# Patient Record
Sex: FEMALE | Employment: OTHER | URBAN - METROPOLITAN AREA
[De-identification: names, ages, dates, MRNs, and addresses within clinical notes are randomized per-mention and may not be internally consistent; named-entity substitution may affect disease eponyms.]

---

## 2017-01-03 ENCOUNTER — LAB VISIT (OUTPATIENT)
Dept: LAB | Facility: HOSPITAL | Age: 62
End: 2017-01-03
Attending: INTERNAL MEDICINE
Payer: COMMERCIAL

## 2017-01-03 ENCOUNTER — OFFICE VISIT (OUTPATIENT)
Dept: FAMILY MEDICINE | Facility: CLINIC | Age: 62
End: 2017-01-03
Payer: COMMERCIAL

## 2017-01-03 VITALS
BODY MASS INDEX: 33.76 KG/M2 | TEMPERATURE: 98 F | SYSTOLIC BLOOD PRESSURE: 102 MMHG | HEART RATE: 72 BPM | HEIGHT: 60 IN | DIASTOLIC BLOOD PRESSURE: 70 MMHG | WEIGHT: 171.94 LBS

## 2017-01-03 DIAGNOSIS — R53.83 FATIGUE, UNSPECIFIED TYPE: Primary | ICD-10-CM

## 2017-01-03 DIAGNOSIS — R53.83 FATIGUE, UNSPECIFIED TYPE: ICD-10-CM

## 2017-01-03 DIAGNOSIS — E03.9 HYPOTHYROIDISM, UNSPECIFIED TYPE: ICD-10-CM

## 2017-01-03 LAB
BASOPHILS # BLD AUTO: 0.07 K/UL
BASOPHILS NFR BLD: 1.2 %
DIFFERENTIAL METHOD: NORMAL
EOSINOPHIL # BLD AUTO: 0.2 K/UL
EOSINOPHIL NFR BLD: 2.7 %
ERYTHROCYTE [DISTWIDTH] IN BLOOD BY AUTOMATED COUNT: 12.4 %
HCT VFR BLD AUTO: 40.4 %
HGB BLD-MCNC: 13.6 G/DL
LYMPHOCYTES # BLD AUTO: 1.9 K/UL
LYMPHOCYTES NFR BLD: 34.2 %
MCH RBC QN AUTO: 30.9 PG
MCHC RBC AUTO-ENTMCNC: 33.7 %
MCV RBC AUTO: 92 FL
MONOCYTES # BLD AUTO: 0.5 K/UL
MONOCYTES NFR BLD: 9.3 %
NEUTROPHILS # BLD AUTO: 3 K/UL
NEUTROPHILS NFR BLD: 52.4 %
PLATELET # BLD AUTO: 311 K/UL
PMV BLD AUTO: 11.1 FL
RBC # BLD AUTO: 4.4 M/UL
TSH SERPL DL<=0.005 MIU/L-ACNC: 0.48 UIU/ML
WBC # BLD AUTO: 5.62 K/UL

## 2017-01-03 PROCEDURE — 99999 PR PBB SHADOW E&M-EST. PATIENT-LVL III: CPT | Mod: PBBFAC,,, | Performed by: INTERNAL MEDICINE

## 2017-01-03 PROCEDURE — 1159F MED LIST DOCD IN RCRD: CPT | Mod: S$GLB,,, | Performed by: INTERNAL MEDICINE

## 2017-01-03 PROCEDURE — 36415 COLL VENOUS BLD VENIPUNCTURE: CPT | Mod: PO

## 2017-01-03 PROCEDURE — 84443 ASSAY THYROID STIM HORMONE: CPT

## 2017-01-03 PROCEDURE — 99214 OFFICE O/P EST MOD 30 MIN: CPT | Mod: S$GLB,,, | Performed by: INTERNAL MEDICINE

## 2017-01-03 PROCEDURE — 85025 COMPLETE CBC W/AUTO DIFF WBC: CPT

## 2017-01-03 NOTE — PROGRESS NOTES
Subjective:        Patient ID: Radha Fall is a 61 y.o. female.    Chief Complaint: Thyroid Problem and Fatigue    HPI   Radha Fall presents with c/o fatigue and follow up of hypothyroidism.  Pt reports increased fatigue from baseline starting ~1-2 weeks ago.  There has not been a significant change in her usual routine or lifestyle.  She also reports an episode of CP ~1 week ago (after fatigue started); right sided, sharp, occurred while pt was sitting at work, not associated with any other sx and resolved spontaneously.  Pt's exercise tolerance is stable; no sx with daily activities; gets somewhat winded after walking 30 minutes but this is as per usual.  No CP associated with exertion.    Pt has famhx of heart dz on father's side (father  at 64, had 3v CABG; grandfather w MI in 50s; great grandfather also +CAD).  Pt had stress echo 3 years ago that was normal.    Pt wasn't sure if her sx were related to thyroid so she increased her dose of Synthroid from 125 to 150mcg daily 1 week ago.    Review of Systems  as per HPI      Objective:        Vitals:    17 0834   BP: 102/70   Pulse: 72   Temp: 97.9 °F (36.6 °C)     Physical Exam   Constitutional: She is oriented to person, place, and time. She appears well-developed and well-nourished. No distress.   Cardiovascular: Normal rate, regular rhythm and normal heart sounds.    Pulmonary/Chest: Effort normal and breath sounds normal. No respiratory distress.   Neurological: She is alert and oriented to person, place, and time.   Skin: Skin is warm and dry. No erythema. No pallor.   Vitals reviewed.          Assessment:         1. Fatigue, unspecified type    2. Hypothyroidism, unspecified type              Plan:         Radha was seen today for thyroid problem and fatigue.    Diagnoses and all orders for this visit:    Fatigue, unspecified type  -     CBC auto differential; Future    Hypothyroidism, unspecified type    - Check TSH, CBC today.  Pt has  h/o anemia associated with lupus.  Continue current dose Levothyroxine pending lab results.  - CP atypical for cardiac etiology; other causes of fatigue more likely.  Pt low-intermediate risk due to SLE and famhx but no other RFs like HLD, HTN, DM or tobacco use.  Will hold off on stress testing for now; reconsider if CP recurrent or fatigue persistent despite normal CBC, thyroid.  Pt agreeable to this plan        Follow up pending lab results; pt advised she is due for annual exam and would need to return for fasting labs.

## 2017-01-09 ENCOUNTER — PATIENT MESSAGE (OUTPATIENT)
Dept: FAMILY MEDICINE | Facility: CLINIC | Age: 62
End: 2017-01-09

## 2017-01-09 ENCOUNTER — PATIENT MESSAGE (OUTPATIENT)
Dept: RHEUMATOLOGY | Facility: CLINIC | Age: 62
End: 2017-01-09

## 2017-01-09 DIAGNOSIS — Z12.31 VISIT FOR SCREENING MAMMOGRAM: Primary | ICD-10-CM

## 2017-01-24 ENCOUNTER — INITIAL CONSULT (OUTPATIENT)
Dept: OTOLARYNGOLOGY | Facility: CLINIC | Age: 62
End: 2017-01-24
Payer: COMMERCIAL

## 2017-01-24 ENCOUNTER — HOSPITAL ENCOUNTER (OUTPATIENT)
Dept: RADIOLOGY | Facility: HOSPITAL | Age: 62
Discharge: HOME | End: 2017-01-24
Attending: INTERNAL MEDICINE
Payer: COMMERCIAL

## 2017-01-24 ENCOUNTER — CLINICAL SUPPORT (OUTPATIENT)
Dept: AUDIOLOGY | Facility: CLINIC | Age: 62
End: 2017-01-24
Payer: COMMERCIAL

## 2017-01-24 VITALS
BODY MASS INDEX: 33.89 KG/M2 | DIASTOLIC BLOOD PRESSURE: 81 MMHG | TEMPERATURE: 98 F | HEART RATE: 62 BPM | HEIGHT: 60 IN | SYSTOLIC BLOOD PRESSURE: 125 MMHG | WEIGHT: 172.63 LBS

## 2017-01-24 DIAGNOSIS — J34.9 SINUS DISORDER: ICD-10-CM

## 2017-01-24 DIAGNOSIS — Z12.31 VISIT FOR SCREENING MAMMOGRAM: ICD-10-CM

## 2017-01-24 DIAGNOSIS — H90.6 MIXED HEARING LOSS, BILATERAL: Primary | ICD-10-CM

## 2017-01-24 DIAGNOSIS — Z86.018 HISTORY OF ACOUSTIC NEUROMA: Primary | ICD-10-CM

## 2017-01-24 DIAGNOSIS — Z97.4 WEARS HEARING AID: ICD-10-CM

## 2017-01-24 DIAGNOSIS — H69.93 EUSTACHIAN TUBE DYSFUNCTION, BILATERAL: ICD-10-CM

## 2017-01-24 DIAGNOSIS — H90.3 SENSORINEURAL HEARING LOSS, BILATERAL: ICD-10-CM

## 2017-01-24 PROCEDURE — 92557 COMPREHENSIVE HEARING TEST: CPT | Mod: S$GLB,,, | Performed by: AUDIOLOGIST

## 2017-01-24 PROCEDURE — 77067 SCR MAMMO BI INCL CAD: CPT | Mod: 26,,, | Performed by: RADIOLOGY

## 2017-01-24 PROCEDURE — 99213 OFFICE O/P EST LOW 20 MIN: CPT | Mod: S$GLB,,, | Performed by: OTOLARYNGOLOGY

## 2017-01-24 PROCEDURE — 92567 TYMPANOMETRY: CPT | Mod: 51,S$GLB,, | Performed by: AUDIOLOGIST

## 2017-01-24 PROCEDURE — 99999 PR PBB SHADOW E&M-EST. PATIENT-LVL III: CPT | Mod: PBBFAC,,, | Performed by: OTOLARYNGOLOGY

## 2017-01-24 PROCEDURE — 77067 SCR MAMMO BI INCL CAD: CPT | Mod: TC

## 2017-01-24 PROCEDURE — 99999 PR PBB SHADOW E&M-EST. PATIENT-LVL I: CPT | Mod: PBBFAC,,,

## 2017-01-24 PROCEDURE — 1159F MED LIST DOCD IN RCRD: CPT | Mod: S$GLB,,, | Performed by: OTOLARYNGOLOGY

## 2017-01-24 RX ORDER — MULTIVITAMIN
1 TABLET ORAL
COMMUNITY
Start: 2012-04-20 | End: 2017-02-21 | Stop reason: SDUPTHER

## 2017-01-24 RX ORDER — ESOMEPRAZOLE MAGNESIUM 40 MG/1
1 CAPSULE, DELAYED RELEASE ORAL
COMMUNITY
Start: 2005-01-01 | End: 2017-02-21 | Stop reason: SDUPTHER

## 2017-01-24 RX ORDER — HYDROXYCHLOROQUINE SULFATE 200 MG/1
1 TABLET, FILM COATED ORAL 2 TIMES DAILY
COMMUNITY
Start: 1984-04-01 | End: 2017-06-14 | Stop reason: SDUPTHER

## 2017-01-24 RX ORDER — AMOXICILLIN 500 MG
1 CAPSULE ORAL
COMMUNITY
End: 2017-02-21 | Stop reason: SDUPTHER

## 2017-01-24 RX ORDER — ACETAMINOPHEN 500 MG
1 TABLET ORAL
COMMUNITY
Start: 2016-03-01 | End: 2017-05-15

## 2017-01-24 RX ORDER — CHOLECALCIFEROL (VITAMIN D3) 25 MCG
185 TABLET ORAL
COMMUNITY
End: 2017-05-15

## 2017-01-24 RX ORDER — LEVOTHYROXINE SODIUM 150 UG/1
1 TABLET ORAL
COMMUNITY
End: 2017-02-21

## 2017-01-24 NOTE — MR AVS SNAPSHOT
[facility] - Otorhinolaryngology  [address]  [facility] [address]  Phone: [phone]  Fax: [phone]                  Radha Fall   2017 2:45 PM   Initial consult    Description:  Female : 1955   Provider:  Herrera Solorzano III, MD   Department:  [facility] - Otorhinolaryngology           Diagnoses this Visit        Comments    History of acoustic neuroma    -  Primary     Sensorineural hearing loss, bilateral         Eustachian tube dysfunction, bilateral         Sinus disorder         Wears hearing aid                To Do List           Future Appointments        Provider Department Dept Phone    2017 4:00 PM [facility] MAMMO2 SCREEN [facility] [phone]    2017 8:00 AM LAB, [facility] [phone]    2017 8:00 AM William Rios MD [facility] - Rheumatology [phone]      Goals (5 Years of Data)     None      [facility]     [facility] Care Line -  Assistance  Registered nurses in the [facility] provide clinical advisement, health education, appointment booking, and other advisory services.  Call for this free service at [phone].             Medications           Message regarding Medications     Verify the changes and/or additions to your medication regime listed below are the same as discussed with your clinician today.  If any of these changes or additions are incorrect, please notify your healthcare provider.             Verify that the below list of medications is an accurate representation of the medications you are currently taking.  If none reported, the list may be blank. If incorrect, please contact your healthcare provider. Carry this list with you in case of emergency.           Current Medications     alprazolam (XANAX) 0.25 MG tablet Take 1 tablet (0.25 mg total) by mouth 3 (three) times daily as needed (tinnitus).    aspirin 81 MG Chew Take 81 mg by  mouth once daily.    b complex vitamins tablet Take 1 tablet by mouth 3 (three) times daily.     cholecalciferol, vitamin D3, 5,000 unit Tab Take 1 tablet by mouth.    ergocalciferol (ERGOCALCIFEROL) 50,000 unit Cap Take 1 capsule by mouth  every 7 days    esomeprazole (NEXIUM) 40 MG capsule Take 40 mg by mouth before breakfast.     esomeprazole (NEXIUM) 40 MG capsule Take 1 tablet by mouth.    fish oil-omega-3 fatty acids 300-1,000 mg capsule Take 1 g by mouth 2 (two) times daily.     fish oil-omega-3 fatty acids 300-1,000 mg capsule Take 1 g by mouth.    hydroxychloroquine (PLAQUENIL) 200 mg tablet Take 1 tablet by mouth.    levothyroxine (SYNTHROID) 150 MCG tablet Take 1 tablet by mouth.    multivitamin (THERAGRAN) per tablet Take 1 tablet by mouth before breakfast.    multivitamin (THERAGRAN) per tablet Take 1 tablet by mouth.    PLAQUENIL 200 mg tablet Take 1 tablet by mouth two  times daily    SYNTHROID 125 mcg tablet Take 1 tablet by mouth  every morning    VITAMIN B COMPLEX ORAL Take 1 tablet by mouth.    vitamin D 1000 units Tab Take 185 mg by mouth.           Clinical Reference Information           Vital Signs - Last Recorded  Most recent update: 1/24/2017  2:48 PM by Adam Diego MA    BP Pulse Temp Ht Wt BMI    125/81 (BP Location: Right arm, Patient Position: Sitting, BP Method: Automatic) 62 98 °F (36.7 °C) (Tympanic) 5' (1.524 m) 78.3 kg (172 lb 9.9 oz) 33.71 kg/m2      Blood Pressure          Most Recent Value    BP  125/81      Allergies as of 1/24/2017     Bactrim [Sulfamethoxazole-trimethoprim]      Immunizations Administered on Date of Encounter - 1/24/2017     None      Orders Placed During Today's Visit     Future Labs/Procedures Expected by Expires    MRI Brain W WO Contrast  1/24/2017 1/24/2018      Instructions    MRI brain/IACs ordered re: acoustic neuroma RX with radiation( [place])   Use of Flonase NSS encouraged for rhinitis sx; 1-2 sprays @ lateral nostril once a day   Gentle middle  ear insuffaltion techniques encouragEd; E.T. literatuRE provided  Consider consultation with Dr. MARANDA Myers re: indications for eustachian tube dilation pending course  Consider sinus CT pending course

## 2017-01-24 NOTE — LETTER
January 24, 2017      Kim Cage MD  [address] [facility]  [address]  [facility] [address]           [facility] - Otorhinolaryngology  [address]  [facility] [address]  Phone: [phone]  Fax: [phone]          Patient: Radha Fall   MR Number: [id number]   YOB: 1955   Date of Visit: 1/24/2017       Dear Dr. Kim Cage:    Thank you for referring Radha Fall to me for evaluation. Attached you will find relevant portions of my assessment and plan of care.    If you have questions, please do not hesitate to call me. I look forward to following Radha Fall along with you.    Sincerely,    Herrera Solorzano III, MD    Enclosure  CC:  No Recipients    If you would like to receive this communication electronically, please contact [email or web address] or [phone] to request more information on [employer] Link access.    For providers and/or their staff who would like to refer a patient to [facility], please contact us through our one-stop-shop provider referral line, [facility], at [phone].    If you feel you have received this communication in error or would no longer like to receive these types of communications, please e-mail [email or web address]

## 2017-01-24 NOTE — PROGRESS NOTES
Subjective:       Patient ID: Radha Fall is a 61 y.o. female.    Chief Complaint: No chief complaint on file.    HPI: Ms. 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  She is followed by Dr. Recio of the rheumatology service here.  She is status post a temporal lobe brain biopsy for SLE  She been evaluated here by Dr. Lito Thomas as well as Dr. Lino Mathews in late 2015 for her AD schwannoma pathology ( elucidated by MRI scanning 10/16/15 )as well as her history of bilateral hearing loss.    She was offered surgical resection of the tumor with neurosurgery versus radiation therapy.  She deviation Internet research and decided to be treated at the [facility] with stereotactic radiation.  She was told that she would be deaf in the right ear after several years.    She was more recently diagnosed with possible fluid behind  in her right ear drum by her PCP who referred her here for specialty evaluation related to a sinus disorder or eustachian tube dysfunction.  She indicates postnasal drip symptoms and clear nasal discharge at times.  She does not feel a fluid behind her right or left eardrum presently however.  Her hearing loss fluctuates i.e. it comes and goes and she tends to blame fluid for the problem.  She is here for audiometric testing and elucidation of her middle ear status specifically.    Dr. Gonzalez had examined her 1/3/17 and diagnosed her with fatigue and hypothyroidism.    She has asked my help in scheduling an MRI scan of her brain and IACs in follow-up for her acoustic neuroma treatment as indicated above.  I am happy to do so.  She is wearing a left ear hearing aid as a right ear hearing is poor especially with regard to discrimination there.    PMH: Asthma, thyroid disease, hearing loss, SLE, Crohn's , asthma, hypothyroidism,  polyneuropathy and collagen vascular disease, hepatomegaly, anemia, hearing loss in right ear   PSH: Repair of ruptured fistula 1999; ORIF right arm fracture 2013  Family history: Heart disease, high blood pressure, stroke, thyroid disease, alcoholism, hearing loss, small cell lung cancer  Habits: One half pack of cigarettes per day ×10 years/the patient quit smoking 35 years ago; 2 cups of coffee per day  Occupation: [employer]  Review of Systems   Ears: Positive for hearing loss, ringing in ear, dizziness, taken gentramycin/streptomycin and family history of hearing loss.    Nose:  Positive for nosebleeds and postnasal drip.    Constitutional: Positive for fever, chills and night sweats.    Respiratory:  Positive for asthma.    Gastrointestinal:  Positive for acid reflux.   Other:  Positive for weakness and swollen glands. Negative for rash.           Objective:         Blood pressure 125/81 pulse 62 temperature 98.0 height 5 feet weight 172 pounds  Gen.: Alert and oriented  female in no acute distress; she is a good historian  Physical Exam   Constitutional: She is oriented to person, place, and time. She appears well-developed and well-nourished.   HENT:   Head: Normocephalic.   Right Ear: Tympanic membrane and external ear normal. No drainage. No foreign bodies. No mastoid tenderness. Tympanic membrane is not perforated. No decreased hearing is noted.   Left Ear: Tympanic membrane and external ear normal. No drainage. No foreign bodies. No mastoid tenderness. Tympanic membrane is not perforated. No decreased hearing is noted.   Ears:    Nose: Nose normal. No nasal deformity, septal deviation or nasal septal hematoma. No epistaxis. Right sinus exhibits no maxillary sinus tenderness and no frontal sinus tenderness. Left sinus exhibits no maxillary sinus tenderness and no frontal sinus tenderness.       Mouth/Throat: Uvula is midline, oropharynx is clear and moist and mucous membranes are normal. No oral  lesions. No trismus in the jaw. No uvula swelling. No oropharyngeal exudate or tonsillar abscesses.   Neck: Neck supple. No tracheal deviation present. No thyromegaly present.   Pulmonary/Chest: Effort normal. No stridor.   Lymphadenopathy:     She has no cervical adenopathy.   Neurological: She is alert and oriented to person, place, and time. She displays weakness.   Skin: No rash noted.       Assessment:       1. History of acoustic neuroma    2. Sensorineural hearing loss, bilateral    3. Eustachian tube dysfunction, bilateral    4. Sinus disorder    5. Wears hearing aid AS       Plan:     MRI brain/IACs ordered re: acoustic neuroma Rx with radiation( [facility])   Use of Flonase NSS encouraged for rhinitis sx; 1-2 sprays @ lateral nostril once a day   Gentle middle ear insuffaltion techniques encouraged; E.T. literature provided  Consider consultation with Dr. MARANDA Myers re: indications for eustachian tube dilation pending course  Consider sinus CT pending course

## 2017-01-24 NOTE — PATIENT INSTRUCTIONS
MRI brain/IACs ordered re: acoustic neuroma Rx with radiation( [facility])   Use of Flonase NSS encouraged for rhinitis sx; 1-2 sprays @ lateral nostril once a day   Gentle middle ear insuffaltion techniques encouraged; E.T. literature provided  Consider consultation with Dr. MARANDA Myers re: indications for eustachian tube dilation pending course  Consider sinus CT pending course

## 2017-01-26 PROBLEM — K57.30 PANCOLONIC DIVERTICULOSIS: Status: ACTIVE | Noted: 2017-01-26

## 2017-02-10 ENCOUNTER — HOSPITAL ENCOUNTER (OUTPATIENT)
Dept: RADIOLOGY | Facility: HOSPITAL | Age: 62
Discharge: HOME | End: 2017-02-10
Attending: OTOLARYNGOLOGY
Payer: COMMERCIAL

## 2017-02-10 DIAGNOSIS — H90.3 SENSORINEURAL HEARING LOSS, BILATERAL: ICD-10-CM

## 2017-02-10 DIAGNOSIS — Z86.018 HISTORY OF ACOUSTIC NEUROMA: ICD-10-CM

## 2017-02-10 PROCEDURE — 70553 MRI BRAIN STEM W/O & W/DYE: CPT | Mod: TC

## 2017-02-10 PROCEDURE — 70553 MRI BRAIN STEM W/O & W/DYE: CPT | Mod: 26,,, | Performed by: RADIOLOGY

## 2017-02-10 PROCEDURE — 25500020 PHARM REV CODE 255: Performed by: OTOLARYNGOLOGY

## 2017-02-10 PROCEDURE — A9585 GADOBUTROL INJECTION: HCPCS | Performed by: OTOLARYNGOLOGY

## 2017-02-10 RX ORDER — GADOBUTROL 604.72 MG/ML
8 INJECTION INTRAVENOUS
Status: COMPLETED | OUTPATIENT
Start: 2017-02-10 | End: 2017-02-10

## 2017-02-10 RX ADMIN — GADOBUTROL 8 ML: 604.72 INJECTION INTRAVENOUS at 08:02

## 2017-02-21 ENCOUNTER — OFFICE VISIT (OUTPATIENT)
Dept: RHEUMATOLOGY | Facility: CLINIC | Age: 62
End: 2017-02-21
Payer: COMMERCIAL

## 2017-02-21 ENCOUNTER — PATIENT MESSAGE (OUTPATIENT)
Dept: RHEUMATOLOGY | Facility: CLINIC | Age: 62
End: 2017-02-21

## 2017-02-21 ENCOUNTER — LAB VISIT (OUTPATIENT)
Dept: LAB | Facility: HOSPITAL | Age: 62
End: 2017-02-21
Attending: INTERNAL MEDICINE
Payer: COMMERCIAL

## 2017-02-21 VITALS
HEIGHT: 62 IN | SYSTOLIC BLOOD PRESSURE: 126 MMHG | HEART RATE: 65 BPM | WEIGHT: 173.13 LBS | BODY MASS INDEX: 31.86 KG/M2 | DIASTOLIC BLOOD PRESSURE: 73 MMHG

## 2017-02-21 DIAGNOSIS — R74.8 ALKALINE PHOSPHATASE ELEVATION: ICD-10-CM

## 2017-02-21 DIAGNOSIS — D33.3 RIGHT ACOUSTIC NEUROMA: ICD-10-CM

## 2017-02-21 DIAGNOSIS — H93.11 RIGHT-SIDED TINNITUS: ICD-10-CM

## 2017-02-21 DIAGNOSIS — H91.91 HEARING LOSS OF RIGHT EAR, UNSPECIFIED HEARING LOSS TYPE: ICD-10-CM

## 2017-02-21 DIAGNOSIS — M32.19 OTHER SYSTEMIC LUPUS ERYTHEMATOSUS WITH OTHER ORGAN INVOLVEMENT: ICD-10-CM

## 2017-02-21 DIAGNOSIS — M32.19 SYSTEMIC LUPUS ERYTHEMATOSUS WITH OTHER ORGAN INVOLVEMENT, UNSPECIFIED SLE TYPE: Primary | ICD-10-CM

## 2017-02-21 DIAGNOSIS — M85.80 OSTEOPENIA: ICD-10-CM

## 2017-02-21 DIAGNOSIS — R90.82 WHITE MATTER ABNORMALITY ON MRI OF BRAIN: ICD-10-CM

## 2017-02-21 DIAGNOSIS — E55.9 VITAMIN D DEFICIENCY: ICD-10-CM

## 2017-02-21 DIAGNOSIS — E78.5 HYPERLIPIDEMIA, UNSPECIFIED HYPERLIPIDEMIA TYPE: ICD-10-CM

## 2017-02-21 DIAGNOSIS — M32.9 SLE (SYSTEMIC LUPUS ERYTHEMATOSUS): ICD-10-CM

## 2017-02-21 LAB
25(OH)D3+25(OH)D2 SERPL-MCNC: 28 NG/ML
ALBUMIN SERPL BCP-MCNC: 3.5 G/DL
ALP SERPL-CCNC: 159 U/L
ALT SERPL W/O P-5'-P-CCNC: 17 U/L
ANION GAP SERPL CALC-SCNC: 8 MMOL/L
AST SERPL-CCNC: 21 U/L
BASOPHILS # BLD AUTO: 0.03 K/UL
BASOPHILS NFR BLD: 0.5 %
BILIRUB SERPL-MCNC: 0.4 MG/DL
BUN SERPL-MCNC: 16 MG/DL
C3 SERPL-MCNC: 118 MG/DL
C4 SERPL-MCNC: 32 MG/DL
CALCIUM SERPL-MCNC: 9 MG/DL
CHLORIDE SERPL-SCNC: 106 MMOL/L
CHOLEST/HDLC SERPL: 3.3 {RATIO}
CO2 SERPL-SCNC: 26 MMOL/L
CREAT SERPL-MCNC: 0.8 MG/DL
CRP SERPL-MCNC: 9.1 MG/L
DIFFERENTIAL METHOD: NORMAL
DSDNA AB SER-ACNC: NORMAL [IU]/ML
EOSINOPHIL # BLD AUTO: 0.1 K/UL
EOSINOPHIL NFR BLD: 2.3 %
ERYTHROCYTE [DISTWIDTH] IN BLOOD BY AUTOMATED COUNT: 12.8 %
ERYTHROCYTE [SEDIMENTATION RATE] IN BLOOD BY WESTERGREN METHOD: 34 MM/HR
EST. GFR  (AFRICAN AMERICAN): >60 ML/MIN/1.73 M^2
EST. GFR  (NON AFRICAN AMERICAN): >60 ML/MIN/1.73 M^2
GGT SERPL-CCNC: 19 U/L
GLUCOSE SERPL-MCNC: 78 MG/DL
HCT VFR BLD AUTO: 40.8 %
HDL/CHOLESTEROL RATIO: 30.4 %
HDLC SERPL-MCNC: 168 MG/DL
HDLC SERPL-MCNC: 51 MG/DL
HGB BLD-MCNC: 13.1 G/DL
LDLC SERPL CALC-MCNC: 101.6 MG/DL
LYMPHOCYTES # BLD AUTO: 1.8 K/UL
LYMPHOCYTES NFR BLD: 31.8 %
MCH RBC QN AUTO: 30.7 PG
MCHC RBC AUTO-ENTMCNC: 32.1 %
MCV RBC AUTO: 96 FL
MONOCYTES # BLD AUTO: 0.5 K/UL
MONOCYTES NFR BLD: 8.5 %
NEUTROPHILS # BLD AUTO: 3.2 K/UL
NEUTROPHILS NFR BLD: 56.7 %
NONHDLC SERPL-MCNC: 117 MG/DL
PLATELET # BLD AUTO: 299 K/UL
PMV BLD AUTO: 10.4 FL
POTASSIUM SERPL-SCNC: 4.3 MMOL/L
PROT SERPL-MCNC: 7.4 G/DL
RBC # BLD AUTO: 4.27 M/UL
SODIUM SERPL-SCNC: 140 MMOL/L
TRIGL SERPL-MCNC: 77 MG/DL
WBC # BLD AUTO: 5.56 K/UL

## 2017-02-21 PROCEDURE — 80053 COMPREHEN METABOLIC PANEL: CPT

## 2017-02-21 PROCEDURE — 86160 COMPLEMENT ANTIGEN: CPT | Mod: 59

## 2017-02-21 PROCEDURE — 82977 ASSAY OF GGT: CPT

## 2017-02-21 PROCEDURE — 86160 COMPLEMENT ANTIGEN: CPT

## 2017-02-21 PROCEDURE — 36415 COLL VENOUS BLD VENIPUNCTURE: CPT

## 2017-02-21 PROCEDURE — 82306 VITAMIN D 25 HYDROXY: CPT

## 2017-02-21 PROCEDURE — 86225 DNA ANTIBODY NATIVE: CPT

## 2017-02-21 PROCEDURE — 85651 RBC SED RATE NONAUTOMATED: CPT

## 2017-02-21 PROCEDURE — 80061 LIPID PANEL: CPT

## 2017-02-21 PROCEDURE — 86140 C-REACTIVE PROTEIN: CPT

## 2017-02-21 PROCEDURE — 85025 COMPLETE CBC W/AUTO DIFF WBC: CPT

## 2017-02-21 PROCEDURE — 99999 PR PBB SHADOW E&M-EST. PATIENT-LVL IV: CPT | Mod: PBBFAC,,, | Performed by: INTERNAL MEDICINE

## 2017-02-21 PROCEDURE — 99214 OFFICE O/P EST MOD 30 MIN: CPT | Mod: S$GLB,,, | Performed by: INTERNAL MEDICINE

## 2017-02-21 ASSESSMENT — ROUTINE ASSESSMENT OF PATIENT INDEX DATA (RAPID3)
PSYCHOLOGICAL DISTRESS SCORE: 0
PATIENT GLOBAL ASSESSMENT SCORE: 1.5
FATIGUE SCORE: 3
AM STIFFNESS SCORE: 1, YES
PAIN SCORE: 4
TOTAL RAPID3 SCORE: 2.06
MDHAQ FUNCTION SCORE: .2
WHEN YOU AWAKENED IN THE MORNING OVER THE LAST WEEK, PLEASE INDICATE THE AMOUNT OF TIME IT TAKES UNTIL YOU ARE AS LIMBER AS YOU WILL BE FOR THE DAY: 30 MINS

## 2017-02-21 ASSESSMENT — SYSTEMIC LUPUS ERYTHEMATOSUS DISEASE ACTIVITY INDEX (SLEDAI): TOTAL_SCORE: 0

## 2017-02-21 NOTE — PROGRESS NOTES
"I have personally taken the history and examined the patient and agree with the resident,s note as stated above URI one month or so ago, now with residual cough and mild SCTOT. No antibiotics for this. Completed radiation to acoustic neuroma([facility]) and followed by [facility]. + recurrent allergic rhinitis and OM. Had colonoscopy 2 wks ago Dr. Beau Summers: "colitis" but no Rx; told not Crohn's (?)    Exam: normal. Chest is clear to ausc    Results for VÍCTOR COTA (MRN [id number]) as of 2/21/2017 09:31   Ref. Range 2/2/2016 10:27   Vit D, 25-Hydroxy Latest Ref Range: 30 - 96 ng/mL 28 (L)   Results for VÍCTOR COTA (MRN [id number]) as of 2/21/2017 09:31   Ref. Range 2/21/2017 07:52   WBC Latest Ref Range: 3.90 - 12.70 K/uL 5.56   RBC Latest Ref Range: 4.00 - 5.40 M/uL 4.27   Hemoglobin Latest Ref Range: 12.0 - 16.0 g/dL 13.1   Hematocrit Latest Ref Range: 37.0 - 48.5 % 40.8   MCV Latest Ref Range: 82 - 98 fL 96   MCH Latest Ref Range: 27.0 - 31.0 pg 30.7   MCHC Latest Ref Range: 32.0 - 36.0 % 32.1   RDW Latest Ref Range: 11.5 - 14.5 % 12.8   Platelets Latest Ref Range: 150 - 350 K/uL 299   MPV Latest Ref Range: 9.2 - 12.9 fL 10.4   Gran% Latest Ref Range: 38.0 - 73.0 % 56.7   Gran # Latest Ref Range: 1.8 - 7.7 K/uL 3.2   Lymph% Latest Ref Range: 18.0 - 48.0 % 31.8   Lymph # Latest Ref Range: 1.0 - 4.8 K/uL 1.8   Mono% Latest Ref Range: 4.0 - 15.0 % 8.5   Mono # Latest Ref Range: 0.3 - 1.0 K/uL 0.5   Eosinophil% Latest Ref Range: 0.0 - 8.0 % 2.3   Eos # Latest Ref Range: 0.0 - 0.5 K/uL 0.1   Basophil% Latest Ref Range: 0.0 - 1.9 % 0.5   Baso # Latest Ref Range: 0.00 - 0.20 K/uL 0.03   Sodium Latest Ref Range: 136 - 145 mmol/L 140   Potassium Latest Ref Range: 3.5 - 5.1 mmol/L 4.3   Chloride Latest Ref Range: 95 - 110 mmol/L 106   CO2 Latest Ref Range: 23 - 29 mmol/L 26   Anion Gap Latest Ref Range: 8 - 16 mmol/L 8   BUN, Bld Latest Ref Range: 6 - 20 mg/dL 16   Creatinine Latest Ref Range: 0.5 - " 1.4 mg/dL 0.8   eGFR if non African American Latest Ref Range: >60 mL/min/1.73 m^2 >60.0   eGFR if African American Latest Ref Range: >60 mL/min/1.73 m^2 >60.0   Glucose Latest Ref Range: 70 - 110 mg/dL 78   Calcium Latest Ref Range: 8.7 - 10.5 mg/dL 9.0   Alkaline Phosphatase Latest Ref Range: 55 - 135 U/L 159 (H)   Total Protein Latest Ref Range: 6.0 - 8.4 g/dL 7.4   Albumin Latest Ref Range: 3.5 - 5.2 g/dL 3.5   Total Bilirubin Latest Ref Range: 0.1 - 1.0 mg/dL 0.4   AST Latest Ref Range: 10 - 40 U/L 21   ALT Latest Ref Range: 10 - 44 U/L 17   GGT Latest Ref Range: 8 - 55 U/L 19   CRP Latest Ref Range: 0.0 - 8.2 mg/L 9.1 (H)     Procedure: MRI of the brain and IACs with and without contrast    Technique: Sagittal T1, axial T2, axial flair, axial diffusion and axial T1 post contrast imaging of the brain.  Additional thin section imaging of the IACs was performed using coronal T1, axial 3-D spoiled gradient, and axial and coronal T1 fat sat postcontrast imaging. 8 mL of gadavist contrast was injected intravenously.             Comparison: brain MRI 10/16/2015    Finding: There is small focus of cystic encephalomalacia within the right is again noted posteriorly unchanged prior compatible with remote infarction. There is a few punctate foci of T2 flair signal hyperintensity elsewhere supratentorial white matter which is similar prior may be sequela of chronic ischemic change which would be advanced for age.    There is interval development of a punctate region of enhancement right parasagittal parietal subcortical white matter concerning for sequela of lupus in light of given history. There is no restricted diffusion to suggest acute infarction. No midline shift or mass effect.    IACs: There is continued although increased size extra-axial lesion right cerebellopontine angle cistern extending into the IAC and slight expansion. This measures 1.2 X 2.1 X 1.2 CM AP X TV X CC dimensions respectively previously  measuring 0.9 x 1.5 x 0.8 CM. There is heterogeneous hypoenhancement within the central portion of the cerebellopontine angle cistern component likely related to posttherapy change.      No abnormal enhancement along the left seventh eighth nerve complex.   Impression    MR IAC: Continued enhancing extra-axial right cerebellopontine angle cistern lesion extends into the IAC compatible with vestibular schwannoma. Overall increased in size however now heterogeneous hypoenhancement within the cisternal component suggestive for posttherapy change. Clinical correlation and followup advised.    MRI brain: There is interval development of a punctate focus of enhancement right parasagittal parietal subcortical white matter while overall nonspecific most compatible for sequela of active lupus in light of history. No evidence for diffusion signal abnormality associated with this.    Superimposed remote right basal ganglia infarction. No evidence for acute infarction.    Clinical correlation and followup advised    Please see above for additional details.      Electronically signed by: STACIE LAUREN DO  Date: 02/10/17  Time: 09:44            SLE in remission SLEDAI=0  Pending U/A, ds-DNA and complements , off mycophenolate x 9 months  Osteopenia < NOF FRAX therapy threshold next DXA 11/10/17  Vitamin D deficiency   Hyperlipidemia est 10 year ASCVD risk 2.4%  Chronic intermittent mildly elevated alk phos, bone origin  S/p cranial radiation Dr. [facility] Sept 2016, now followed [facility] group for right acoustic neuroma  New area of abnormality : punctate focus of enhancement right parasagittal parietal subcortical white matter.     Add Vitamin D, lipid panel to today's labs  Ref to Neurology for abnormal cranial MRI finding as above, used to see Dr. Whiteside/ Jordy Louise.  WB NM bone scan to evaluate alk phos of apparent bone origion  To see Dr. Monge for Plaquenil check, due now  Hydroxychloroquine 200mg  twice daily  RTC 3 months with standing lupus labs

## 2017-02-21 NOTE — PROGRESS NOTES
Subjective:       Patient ID: Radha Fall is a 62 y.o. female.    Chief Complaint: No chief complaint on file.    HPI   Pt is a 61 y/o F with a h/o SLE, right acoustic neuroma and hearing loss, and osteopenia who presents today for a f/u visit. She is taking Plaquenil 200 mg BID. She last saw Dr. Monge for a Plaquenil check on 8/8/16 and had a normal exam, and she needs to schedule a f/u with Dr. Monge. Ms. Fall had stereotactic radiation for a right acoustic neuroma at the [facility] in [place] in 9/2016. She is now seeing the Dr. Gotti group on the [facility] for f/u. She had an MRI on 2/10 which showed growth of the neuroma. She is starting therapy tomorrow for balance. She denies any falls and is not requiring an assistive device, but she just feels more unsteady that she used to. She is also seeing ENT for sinus issues. She reports that about 4 weeks ago she had URI symptoms and for the past two weeks, she has had fatigue and morning stiffness lasting 30 minutes. The stiffness recurs at night sometimes also. She has been taking Alleve 1 tablet daily. She denies any joint pain or swelling. She has also been having SOB x 2 weeks which occurs after walking for about 10 minutes, and she is unable to hold a conversation while walking. She denies any chest pain, pleuritic pain, seizures, skin rash, alopecia, oral ulcers, or abdominal pain.    Review of Systems   Constitutional: Positive for fever. Negative for chills and unexpected weight change.   HENT: Negative for mouth sores and trouble swallowing.    Eyes: Negative for pain and redness.   Respiratory: Positive for cough and shortness of breath. Negative for choking.    Cardiovascular: Negative for chest pain.   Gastrointestinal: Negative for abdominal pain, blood in stool, constipation, diarrhea and nausea.   Endocrine: Negative for cold intolerance and heat intolerance.   Genitourinary: Negative for dysuria, genital sores and hematuria.  "  Musculoskeletal: Negative for myalgias.   Skin: Negative for color change, pallor and rash.   Neurological: Positive for headaches. Negative for weakness and numbness.   Hematological: Negative for adenopathy. Does not bruise/bleed easily.   Psychiatric/Behavioral: Negative for dysphoric mood and suicidal ideas.         Objective:     Visit Vitals    /73    Pulse 65    Ht 5' 2.4" (1.585 m)    Wt 78.5 kg (173 lb 1.6 oz)    BMI 31.26 kg/m2        Physical Exam   Constitutional: She is oriented to person, place, and time and well-developed, well-nourished, and in no distress. No distress.   HENT:   Head: Normocephalic and atraumatic.   Eyes: Conjunctivae and EOM are normal. Pupils are equal, round, and reactive to light. Right eye exhibits no discharge. Left eye exhibits no discharge. No scleral icterus.   Neck: Normal range of motion.   Cardiovascular: Normal rate, regular rhythm, normal heart sounds and intact distal pulses.  Exam reveals no gallop and no friction rub.    No murmur heard.  Pulmonary/Chest: Effort normal and breath sounds normal. No respiratory distress. She has no wheezes. She has no rales.   Abdominal: Soft. Bowel sounds are normal. She exhibits no distension. There is no tenderness.       Right Side Rheumatological Exam     Examination finds the shoulder, elbow, wrist, 1st PIP, 2nd PIP, 2nd MCP, 3rd PIP, 3rd MCP, 4th PIP, 4th MCP, 5th PIP and 5th MCP normal.    The patient is tender to palpation of the knee    The patient has an enlarged 1st MCP    Knee Exam   Tenderness Location: lateral joint line    Muscle Strength (0-5 scale):  Neck Flexion:  5  Neck Extension: 5  Deltoid:  5  Biceps: 5/5   Triceps:  5  : 5/5   Iliopsoas: 5  Quadriceps:  5   Distal Lower Extremity: 5    Left Side Rheumatological Exam     Examination finds the shoulder, elbow, wrist, knee, 1st PIP, 2nd PIP, 2nd MCP, 3rd PIP, 3rd MCP, 4th PIP, 4th MCP, 5th PIP and 5th MCP normal.    The patient has an enlarged " 1st MCP.    Muscle Strength (0-5 scale):  Neck Flexion:  5  Neck Extension: 5  Deltoid:  5  Biceps: 5/5   Triceps:  5  :  5/5   Iliopsoas: 5  Quadriceps:  5   Distal Lower Extremity: 5      Neurological: She is alert and oriented to person, place, and time. She has normal reflexes. Gait normal.   Right Maria's Sign:  absent  Left Maria's Sign:  Absent  Reflex Scores:       Tricep reflexes are 2+ on the right side and 2+ on the left side.       Bicep reflexes are 2+ on the right side and 2+ on the left side.       Brachioradialis reflexes are 2+ on the right side and 2+ on the left side.       Patellar reflexes are 2+ on the right side and 2+ on the left side.       Achilles reflexes are 2+ on the right side and 2+ on the left side.  Skin: Skin is warm and dry. No rash noted. She is not diaphoretic. No erythema.     Psychiatric: Mood, memory, affect and judgment normal.   Musculoskeletal: Normal range of motion.           Assessment:       1. Systemic lupus erythematosus with other organ involvement, unspecified SLE type    2. Osteopenia    3. Right acoustic neuroma    4. Hearing loss of right ear, unspecified hearing loss type        TJC=1  SJC=0  SLEDAI=labs pending    Plan:       - See Dr. Monge for Plaquenil check  - Continue hydroxychloroquine 200mg twice daily  - Nuclear medicine bone scan due to elevated alkaline phosphatase  - RTC 3 months with standing lupus labs

## 2017-02-21 NOTE — MR AVS SNAPSHOT
[facility] Rheumatology  [address]  [facility] [address]  Phone: [phone]  Fax: [phone]                  Radha Fall   2017 8:00 AM   Office Visit    Description:  Female : 1955   Provider:  William Rios MD   Department:  [facility] - Rheumatology           Diagnoses this Visit        Comments    Systemic lupus erythematosus with other organ involvement, unspecified SLE type    -  Primary     Osteopenia         Right acoustic neuroma         Hearing loss of right ear, unspecified hearing loss type         Alkaline phosphatase elevation                To Do List           Future Appointments        Provider Department Dept Phone    2017 8:00 AM LAB, [facility] [phone]    2017 8:00 AM William Rios MD [facility] [phone]      Goals (5 Years of Data)     None      Follow-Up and Disposition     Return in about 3 months (around 2017).      [facility]     [facility] Line - 24/7 Assistance  Registered nurses in the [facility] provide clinical advisement, health education, appointment booking, and other advisory services.  Call for this free service at [phone].             Medications           Message regarding Medications     Verify the changes and/or additions to your medication regime listed below are the same as discussed with your clinician today.  If any of these changes or additions are incorrect, please notify your healthcare provider.             Verify that the below list of medications is an accurate representation of the medications you are currently taking.  If none reported, the list may be blank. If incorrect, please contact your healthcare provider. Carry this list with you in case of emergency.           Current Medications     aspirin 81 MG Chew Take 81 mg by mouth once daily.    b complex vitamins tablet Take 1 tablet by mouth 3 (three) times  "daily.     cholecalciferol, vitamin D3, 5,000 unit Tab Take 1 tablet by mouth.    ergocalciferol (ERGOCALCIFEROL) 50,000 unit Cap Take 1 capsule by mouth  every 7 days    esomeprazole (NEXIUM) 40 MG capsule Take 40 mg by mouth before breakfast.     fish oil-omega-3 fatty acids 300-1,000 mg capsule Take 1 g by mouth 2 (two) times daily.     hydroxychloroquine (PLAQUENIL) 200 mg tablet Take 1 tablet by mouth.    multivitamin (THERAGRAN) per tablet Take 1 tablet by mouth before breakfast.    PLAQUENIL 200 mg tablet Take 1 tablet by mouth two  times daily    SYNTHROID 125 mcg tablet Take 1 tablet by mouth  every morning    VITAMIN B COMPLEX ORAL Take 1 tablet by mouth.    vitamin D 1000 units Tab Take 185 mg by mouth.    alprazolam (XANAX) 0.25 MG tablet Take 1 tablet (0.25 mg total) by mouth 3 (three) times daily as needed (tinnitus).           Clinical Reference Information           Your Vitals Were     BP Pulse Height Weight BMI    126/73 65 5' 2.4" (1.585 m) 78.5 kg (173 lb 1.6 oz) 31.26 kg/m2      Blood Pressure          Most Recent Value    BP  126/73      Allergies as of 2/21/2017     Bactrim [Sulfamethoxazole-trimethoprim]      Immunizations Administered on Date of Encounter - 2/21/2017     None      Orders Placed During Today's Visit     Future Labs/Procedures Expected by Expires    NM Bone Scan Whole Body  2/21/2017 2/21/2018      Instructions    - Continue Plaquenil 200 mg twice daily  - Follow up with Dr. Monge (optometry)   - Nuclear medicine bone scan  - RTC 3 months       Language Assistance Services     ATTENTION: Language assistance services are available, free of charge. Please call [phone].      ATENCIÓN: Si habla español, tiene a polanco disposición servicios gratuitos de asistencia lingüística. Llame al [phone].     LEON Ý: N?u b?n nói Ti?ng Vi?t, có các d?ch v? h? tr? ngôn ng? mi?n phí dành cho b?n. G?i s? [phone].         [facility] complies with applicable Federal " civil rights laws and does not discriminate on the basis of race, color, national origin, age, disability, or sex.

## 2017-02-21 NOTE — PATIENT INSTRUCTIONS
- Continue Plaquenil 200 mg twice daily  - Follow up with Dr. Monge (optometry)   - Nuclear medicine bone scan  - RTC 3 months

## 2017-02-28 ENCOUNTER — PATIENT MESSAGE (OUTPATIENT)
Dept: RHEUMATOLOGY | Facility: CLINIC | Age: 62
End: 2017-02-28

## 2017-03-02 ENCOUNTER — HOSPITAL ENCOUNTER (OUTPATIENT)
Dept: RADIOLOGY | Facility: HOSPITAL | Age: 62
Discharge: HOME | End: 2017-03-02
Attending: STUDENT IN AN ORGANIZED HEALTH CARE EDUCATION/TRAINING PROGRAM
Payer: COMMERCIAL

## 2017-03-02 ENCOUNTER — PATIENT MESSAGE (OUTPATIENT)
Dept: FAMILY MEDICINE | Facility: CLINIC | Age: 62
End: 2017-03-02

## 2017-03-02 DIAGNOSIS — R74.8 ALKALINE PHOSPHATASE ELEVATION: ICD-10-CM

## 2017-03-02 DIAGNOSIS — E03.9 HYPOTHYROIDISM, UNSPECIFIED TYPE: Primary | ICD-10-CM

## 2017-03-02 PROCEDURE — A9503 TC99M MEDRONATE: HCPCS

## 2017-03-02 PROCEDURE — 78306 BONE IMAGING WHOLE BODY: CPT | Mod: 26,,, | Performed by: RADIOLOGY

## 2017-03-03 ENCOUNTER — PATIENT MESSAGE (OUTPATIENT)
Dept: RHEUMATOLOGY | Facility: CLINIC | Age: 62
End: 2017-03-03

## 2017-03-06 ENCOUNTER — PATIENT MESSAGE (OUTPATIENT)
Dept: FAMILY MEDICINE | Facility: CLINIC | Age: 62
End: 2017-03-06

## 2017-03-06 ENCOUNTER — LAB VISIT (OUTPATIENT)
Dept: LAB | Facility: HOSPITAL | Age: 62
End: 2017-03-06
Attending: INTERNAL MEDICINE
Payer: COMMERCIAL

## 2017-03-06 DIAGNOSIS — E03.9 HYPOTHYROIDISM, UNSPECIFIED TYPE: ICD-10-CM

## 2017-03-06 LAB — TSH SERPL DL<=0.005 MIU/L-ACNC: 0.77 UIU/ML

## 2017-03-06 PROCEDURE — 36415 COLL VENOUS BLD VENIPUNCTURE: CPT | Mod: PO

## 2017-03-06 PROCEDURE — 84443 ASSAY THYROID STIM HORMONE: CPT

## 2017-03-18 ENCOUNTER — TELEPHONE (OUTPATIENT)
Dept: RHEUMATOLOGY | Facility: HOSPITAL | Age: 62
End: 2017-03-18

## 2017-03-18 DIAGNOSIS — M32.9 SLE (SYSTEMIC LUPUS ERYTHEMATOSUS): ICD-10-CM

## 2017-03-18 RX ORDER — HYDROXYCHLOROQUINE SULFATE 200 MG/1
TABLET ORAL
Qty: 60 TABLET | Refills: 0 | Status: SHIPPED | OUTPATIENT
Start: 2017-03-18 | End: 2017-05-15 | Stop reason: SDUPTHER

## 2017-03-18 NOTE — TELEPHONE ENCOUNTER
Please tell pt she is overdue to see Dr. Monge for her 6 monthly Plaquenil eye evaluation so I can refill . Thanks ROBERTO

## 2017-03-20 ENCOUNTER — TELEPHONE (OUTPATIENT)
Dept: OPTOMETRY | Facility: CLINIC | Age: 62
End: 2017-03-20

## 2017-03-20 ENCOUNTER — TELEPHONE (OUTPATIENT)
Dept: RHEUMATOLOGY | Facility: CLINIC | Age: 62
End: 2017-03-20

## 2017-03-29 ENCOUNTER — PATIENT MESSAGE (OUTPATIENT)
Dept: OTOLARYNGOLOGY | Facility: CLINIC | Age: 62
End: 2017-03-29

## 2017-03-30 ENCOUNTER — CLINICAL SUPPORT (OUTPATIENT)
Dept: OPHTHALMOLOGY | Facility: CLINIC | Age: 62
End: 2017-03-30
Payer: COMMERCIAL

## 2017-03-30 ENCOUNTER — OFFICE VISIT (OUTPATIENT)
Dept: OPTOMETRY | Facility: CLINIC | Age: 62
End: 2017-03-30
Payer: COMMERCIAL

## 2017-03-30 DIAGNOSIS — M32.19 SYSTEMIC LUPUS WITH CENTRAL NERVOUS SYSTEM INVOLVEMENT: ICD-10-CM

## 2017-03-30 DIAGNOSIS — Z79.899 LONG-TERM USE OF PLAQUENIL: Primary | ICD-10-CM

## 2017-03-30 DIAGNOSIS — Z79.899 HIGH RISK MEDICATION USE: ICD-10-CM

## 2017-03-30 PROCEDURE — 99999 PR PBB SHADOW E&M-EST. PATIENT-LVL III: CPT | Mod: PBBFAC,,, | Performed by: OPTOMETRIST

## 2017-03-30 PROCEDURE — 92134 CPTRZ OPH DX IMG PST SGM RTA: CPT | Mod: S$GLB,,, | Performed by: OPTOMETRIST

## 2017-03-30 PROCEDURE — 92014 COMPRE OPH EXAM EST PT 1/>: CPT | Mod: S$GLB,,, | Performed by: OPTOMETRIST

## 2017-03-30 PROCEDURE — 92083 EXTENDED VISUAL FIELD XM: CPT | Mod: S$GLB,,, | Performed by: OPTOMETRIST

## 2017-03-30 NOTE — PROGRESS NOTES
HPI     Pt presents for plaquenil check  Pt states: no changes in vision, occasional dryness, not using any gtts.   Patient denies flashes, floaters, pain and double vision. Pt hx:Acoustic   neuroma   Patient's last dilated exam was:8/8/2016         Last edited by Aurea Cisneros PCT on 3/30/2017  9:44 AM.     ROS     Negative for: Constitutional, Gastrointestinal, Neurological, Skin,   Genitourinary, Musculoskeletal, HENT, Endocrine, Cardiovascular, Eyes,   Respiratory, Psychiatric, Allergic/Imm, Heme/Lymph    Last edited by Meche Monge, OD on 3/30/2017 12:25 PM. (History)        Assessment /Plan     For exam results, see Encounter Report.    Long-term use of Plaquenil  -     OCT - Retina    Systemic lupus with central nervous system involvement            1-2.  All testing normal OU-no retinopathy.  Monitor 6 months.

## 2017-04-17 ENCOUNTER — PATIENT MESSAGE (OUTPATIENT)
Dept: RHEUMATOLOGY | Facility: CLINIC | Age: 62
End: 2017-04-17

## 2017-05-09 DIAGNOSIS — E03.9 HYPOTHYROIDISM, UNSPECIFIED TYPE: ICD-10-CM

## 2017-05-10 RX ORDER — LEVOTHYROXINE SODIUM 125 UG/1
TABLET ORAL
Qty: 30 TABLET | Refills: 5 | Status: SHIPPED | OUTPATIENT
Start: 2017-05-10 | End: 2017-12-28 | Stop reason: SDUPTHER

## 2017-05-11 ENCOUNTER — PATIENT MESSAGE (OUTPATIENT)
Dept: RHEUMATOLOGY | Facility: CLINIC | Age: 62
End: 2017-05-11

## 2017-05-12 ENCOUNTER — LAB VISIT (OUTPATIENT)
Dept: LAB | Facility: HOSPITAL | Age: 62
End: 2017-05-12
Attending: INTERNAL MEDICINE
Payer: COMMERCIAL

## 2017-05-12 DIAGNOSIS — M32.9 SLE (SYSTEMIC LUPUS ERYTHEMATOSUS): ICD-10-CM

## 2017-05-12 LAB
ALBUMIN SERPL BCP-MCNC: 3.7 G/DL
ALP SERPL-CCNC: 135 U/L
ALT SERPL W/O P-5'-P-CCNC: 19 U/L
ANION GAP SERPL CALC-SCNC: 13 MMOL/L
AST SERPL-CCNC: 32 U/L
BASOPHILS # BLD AUTO: 0.04 K/UL
BASOPHILS NFR BLD: 0.5 %
BILIRUB SERPL-MCNC: 0.3 MG/DL
BUN SERPL-MCNC: 9 MG/DL
C3 SERPL-MCNC: 128 MG/DL
C4 SERPL-MCNC: 34 MG/DL
CALCIUM SERPL-MCNC: 9.5 MG/DL
CHLORIDE SERPL-SCNC: 101 MMOL/L
CO2 SERPL-SCNC: 22 MMOL/L
CREAT SERPL-MCNC: 0.9 MG/DL
CRP SERPL-MCNC: 8.6 MG/L
DIFFERENTIAL METHOD: ABNORMAL
EOSINOPHIL # BLD AUTO: 0.1 K/UL
EOSINOPHIL NFR BLD: 1.6 %
ERYTHROCYTE [DISTWIDTH] IN BLOOD BY AUTOMATED COUNT: 12.4 %
EST. GFR  (AFRICAN AMERICAN): >60 ML/MIN/1.73 M^2
EST. GFR  (NON AFRICAN AMERICAN): >60 ML/MIN/1.73 M^2
GLUCOSE SERPL-MCNC: 86 MG/DL
HCT VFR BLD AUTO: 39.9 %
HGB BLD-MCNC: 13 G/DL
LYMPHOCYTES # BLD AUTO: 2.3 K/UL
LYMPHOCYTES NFR BLD: 30.9 %
MCH RBC QN AUTO: 30.7 PG
MCHC RBC AUTO-ENTMCNC: 32.6 %
MCV RBC AUTO: 94 FL
MONOCYTES # BLD AUTO: 0.7 K/UL
MONOCYTES NFR BLD: 9.5 %
NEUTROPHILS # BLD AUTO: 4.3 K/UL
NEUTROPHILS NFR BLD: 57.4 %
PLATELET # BLD AUTO: 381 K/UL
PMV BLD AUTO: 10.6 FL
POTASSIUM SERPL-SCNC: 4.1 MMOL/L
PROT SERPL-MCNC: 7.9 G/DL
RBC # BLD AUTO: 4.24 M/UL
SODIUM SERPL-SCNC: 136 MMOL/L
WBC # BLD AUTO: 7.45 K/UL

## 2017-05-12 PROCEDURE — 36415 COLL VENOUS BLD VENIPUNCTURE: CPT | Mod: PO

## 2017-05-12 PROCEDURE — 85025 COMPLETE CBC W/AUTO DIFF WBC: CPT

## 2017-05-12 PROCEDURE — 86225 DNA ANTIBODY NATIVE: CPT

## 2017-05-12 PROCEDURE — 86140 C-REACTIVE PROTEIN: CPT

## 2017-05-12 PROCEDURE — 86160 COMPLEMENT ANTIGEN: CPT | Mod: 59

## 2017-05-12 PROCEDURE — 85651 RBC SED RATE NONAUTOMATED: CPT

## 2017-05-12 PROCEDURE — 80053 COMPREHEN METABOLIC PANEL: CPT

## 2017-05-12 PROCEDURE — 82306 VITAMIN D 25 HYDROXY: CPT

## 2017-05-12 PROCEDURE — 86160 COMPLEMENT ANTIGEN: CPT

## 2017-05-13 LAB — ERYTHROCYTE [SEDIMENTATION RATE] IN BLOOD BY WESTERGREN METHOD: 42 MM/HR

## 2017-05-15 ENCOUNTER — OFFICE VISIT (OUTPATIENT)
Dept: RHEUMATOLOGY | Facility: CLINIC | Age: 62
End: 2017-05-15
Payer: COMMERCIAL

## 2017-05-15 ENCOUNTER — LAB VISIT (OUTPATIENT)
Dept: LAB | Facility: HOSPITAL | Age: 62
End: 2017-05-15
Attending: INTERNAL MEDICINE
Payer: COMMERCIAL

## 2017-05-15 ENCOUNTER — CLINICAL SUPPORT (OUTPATIENT)
Dept: INFECTIOUS DISEASES | Facility: CLINIC | Age: 62
End: 2017-05-15
Payer: COMMERCIAL

## 2017-05-15 ENCOUNTER — TELEPHONE (OUTPATIENT)
Dept: RHEUMATOLOGY | Facility: CLINIC | Age: 62
End: 2017-05-15

## 2017-05-15 VITALS
BODY MASS INDEX: 30.07 KG/M2 | WEIGHT: 163.38 LBS | HEART RATE: 73 BPM | HEIGHT: 62 IN | SYSTOLIC BLOOD PRESSURE: 112 MMHG | DIASTOLIC BLOOD PRESSURE: 70 MMHG

## 2017-05-15 DIAGNOSIS — R90.89 ABNORMAL FINDING ON MRI OF BRAIN: ICD-10-CM

## 2017-05-15 DIAGNOSIS — H91.91 HEARING LOSS OF RIGHT EAR, UNSPECIFIED HEARING LOSS TYPE: ICD-10-CM

## 2017-05-15 DIAGNOSIS — K50.90 CROHN'S DISEASE WITHOUT COMPLICATION, UNSPECIFIED GASTROINTESTINAL TRACT LOCATION: ICD-10-CM

## 2017-05-15 DIAGNOSIS — E55.9 VITAMIN D DEFICIENCY: Primary | ICD-10-CM

## 2017-05-15 DIAGNOSIS — M85.80 OSTEOPENIA, UNSPECIFIED LOCATION: ICD-10-CM

## 2017-05-15 DIAGNOSIS — M35.9 POLYNEUROPATHY IN COLLAGEN VASCULAR DISEASE: ICD-10-CM

## 2017-05-15 DIAGNOSIS — M32.9 SLE (SYSTEMIC LUPUS ERYTHEMATOSUS): ICD-10-CM

## 2017-05-15 DIAGNOSIS — D33.3 RIGHT ACOUSTIC NEUROMA: ICD-10-CM

## 2017-05-15 DIAGNOSIS — R93.0 ABNORMAL MRI OF HEAD: ICD-10-CM

## 2017-05-15 DIAGNOSIS — G63 POLYNEUROPATHY IN COLLAGEN VASCULAR DISEASE: ICD-10-CM

## 2017-05-15 DIAGNOSIS — M32.8 OTHER FORMS OF SYSTEMIC LUPUS ERYTHEMATOSUS, UNSPECIFIED ORGAN INVOLVEMENT STATUS: Primary | ICD-10-CM

## 2017-05-15 DIAGNOSIS — H93.11 RIGHT-SIDED TINNITUS: ICD-10-CM

## 2017-05-15 LAB
25(OH)D3+25(OH)D2 SERPL-MCNC: 97 NG/ML
BILIRUB UR QL STRIP: NEGATIVE
CLARITY UR REFRACT.AUTO: CLEAR
COLOR UR AUTO: YELLOW
CREAT UR-MCNC: 76 MG/DL
GLUCOSE UR QL STRIP: NEGATIVE
HGB UR QL STRIP: NEGATIVE
KETONES UR QL STRIP: NEGATIVE
LEUKOCYTE ESTERASE UR QL STRIP: NEGATIVE
NITRITE UR QL STRIP: NEGATIVE
PH UR STRIP: 6 [PH] (ref 5–8)
PROT UR QL STRIP: NEGATIVE
PROT UR-MCNC: <7 MG/DL
PROT/CREAT RATIO, UR: NORMAL
SP GR UR STRIP: 1.01 (ref 1–1.03)
URN SPEC COLLECT METH UR: NORMAL
UROBILINOGEN UR STRIP-ACNC: NEGATIVE EU/DL

## 2017-05-15 PROCEDURE — 99999 PR PBB SHADOW E&M-EST. PATIENT-LVL I: CPT | Mod: PBBFAC,,,

## 2017-05-15 PROCEDURE — 1160F RVW MEDS BY RX/DR IN RCRD: CPT | Mod: S$GLB,,, | Performed by: INTERNAL MEDICINE

## 2017-05-15 PROCEDURE — 99214 OFFICE O/P EST MOD 30 MIN: CPT | Mod: S$GLB,,, | Performed by: INTERNAL MEDICINE

## 2017-05-15 PROCEDURE — 90471 IMMUNIZATION ADMIN: CPT | Mod: S$GLB,,, | Performed by: INTERNAL MEDICINE

## 2017-05-15 PROCEDURE — 81003 URINALYSIS AUTO W/O SCOPE: CPT

## 2017-05-15 PROCEDURE — 99999 PR PBB SHADOW E&M-EST. PATIENT-LVL III: CPT | Mod: PBBFAC,,, | Performed by: INTERNAL MEDICINE

## 2017-05-15 PROCEDURE — 82570 ASSAY OF URINE CREATININE: CPT

## 2017-05-15 PROCEDURE — 90736 HZV VACCINE LIVE SUBQ: CPT | Mod: S$GLB,,, | Performed by: INTERNAL MEDICINE

## 2017-05-15 ASSESSMENT — SYSTEMIC LUPUS ERYTHEMATOSUS DISEASE ACTIVITY INDEX (SLEDAI): TOTAL_SCORE: 0

## 2017-05-15 NOTE — MR AVS SNAPSHOT
[facility] - Rheumatology  [address]  [facility] [address]  Phone: [phone]  Fax: [phone]                  Radha Fall   5/15/2017 1:00 PM   Office Visit    Description:  Female : 1955   Provider:  iWlliam Rios MD   Department:  [facility]           Diagnoses this Visit        Comments    Other forms of systemic lupus erythematosus, unspecified organ involvement status    -  Primary     Abnormal MRI of head         Right acoustic neuroma         Hearing loss of right ear, unspecified hearing loss type         Right-sided tinnitus         Osteopenia, unspecified location         Crohn's disease without complication, unspecified gastrointestinal tract location         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         Abnormal finding on MRI of brain                To Do List           Future Appointments        Provider Department Dept Phone    2017 1:00 PM Tristian Gutiérrez MD [facility] [phone]      Goals (5 Years of Data)     None      [facility]     [facility] On Call Nurse Care Line -  Assistance  Unless otherwise directed by your provider, please contact [facility] On-Call, our nurse care line that is available for  assistance.     Registered nurses in the [facility] On Call Center provide: appointment scheduling, clinical advisement, health education, and other advisory services.  Call: [phone] (toll free)               Medications           Message regarding Medications     Verify the changes and/or additions to your medication regime listed below are the same as discussed with your clinician today.  If any of these changes or additions are incorrect, please notify your healthcare provider.        STOP taking these medications     alprazolam (XANAX) 0.25 MG tablet Take 1 tablet (0.25 mg total) by mouth 3 (three) times daily as needed (tinnitus).    cholecalciferol, vitamin D3, 5,000 unit Tab Take 1 tablet by  "mouth.    vitamin D 1000 units Tab Take 185 mg by mouth.           Verify that the below list of medications is an accurate representation of the medications you are currently taking.  If none reported, the list may be blank. If incorrect, please contact your healthcare provider. Carry this list with you in case of emergency.           Current Medications     aspirin 81 MG Chew Take 81 mg by mouth once daily.    b complex vitamins tablet Take 1 tablet by mouth 3 (three) times daily.     ergocalciferol (ERGOCALCIFEROL) 50,000 unit Cap Take 1 capsule by mouth  every 7 days    esomeprazole (NEXIUM) 40 MG capsule Take 40 mg by mouth before breakfast.     fish oil-omega-3 fatty acids 300-1,000 mg capsule Take 1 g by mouth 2 (two) times daily.     hydroxychloroquine (PLAQUENIL) 200 mg tablet Take 1 tablet by mouth 2 (two) times daily.     multivitamin (THERAGRAN) per tablet Take 1 tablet by mouth before breakfast.    SYNTHROID 125 mcg tablet Take 1 tablet by mouth  every morning           Clinical Reference Information           Your Vitals Were     BP Pulse Height Weight BMI    112/70 73 5' 2.4" (1.585 m) 74.1 kg (163 lb 6.4 oz) 29.5 kg/m2      Blood Pressure          Most Recent Value    BP  112/70      Allergies as of 5/15/2017     Bactrim [Sulfamethoxazole-trimethoprim]      Immunizations Administered on Date of Encounter - 5/15/2017     Name Date Dose VIS Date Route    Zoster  Incomplete 0.65 mL 10/6/2009 Subcutaneous      Orders Placed During Today's Visit      Normal Orders This Visit    Ambulatory Referral to Neurology     Zoster Vaccine - Live       Instructions    Zostavax today  Urine Pr/Cr ratio  RTC in 6 mo       Language Assistance Services     ATTENTION: Language assistance services are available, free of charge. Please call [phone].      ATENCIÓN: Si habla miguel, tiene a polanco disposición servicios gratuitos de asistencia lingüística. Llame al [phone].     [facility] Ý: N?u b?n nói Ti?ng Vi?t, có các " d?ch v? h? tr? ngôn ng? mi?n phí dành cho b?n. G?i s? [phone].         [facility] complies with applicable Federal civil rights laws and does not discriminate on the basis of race, color, national origin, age, disability, or sex.

## 2017-05-15 NOTE — PROGRESS NOTES
Subjective:        Patient ID: Radha Fall is a 62 y.o. female.     Chief Complaint: SLE, Jaccoud's arthritis, Acoustic neuroma, Osteopenia     HPI   Pt is a 63 y/o F with a h/o SLE, right acoustic neuroma and hearing loss, and osteopenia who presents today for a f/u visit. She is taking Plaquenil 200 mg BID. Saw Dr. Monge for a Plaquenil check on 3/30/16 and had a normal exam. Eufemia had stereotactic radiation for a right acoustic neuroma at the [facility] in [place] in 9/2016. She is now seeing the Dr. Gotti of Neurology group on the [facility] for f/u. She had an MRI on 2/10 which showed growth of the neuroma. Continuing therapy for balance. Decreased fatigue with minimal morning stiffness. She denies any joint pain or swelling. Continued occasional SOB but much improved. She denies any chest pain, pleuritic pain, seizures, skin rash, alopecia, oral ulcers, or abdominal pain. Went to [place] last week and got abdominal pain N/V now resolved with probiotic.     Answers for HPI/ROS submitted by the patient on 5/13/2017   fever: No  eye redness: No  swollen glands: No  headaches: No  shortness of breath: No  chest pain: No  trouble swallowing: No  heartburn: No  diarrhea: Yes  constipation: No  unexpected weight change: No  genital sore: No  dysuria: No  During the last 3 days, have you had a skin rash?: No  tingling: No  Bruises or bleeds easily: No  cough: Yes    Review of Systems   Constitutional: Negative for fever. Negative for chills and unexpected weight change.   HENT: Negative for mouth sores and trouble swallowing. Positive for hearing loss.  Eyes: Negative for pain and redness.   Respiratory: Negative for cough. Negative for choking.   Cardiovascular: Negative for chest pain.   Gastrointestinal: Negative for abdominal pain, blood in stool, constipation, diarrhea and nausea.   Endocrine: Negative for cold intolerance and heat intolerance.   Genitourinary: Negative for dysuria, genital sores and  hematuria.   Musculoskeletal: Negative for myalgias.   Skin: Negative for color change, pallor and rash.   Neurological: Negative for HA. Negative for weakness and numbness.   Hematological: Negative for adenopathy. Does not bruise/bleed easily.   Psychiatric/Behavioral: Negative for dysphoric mood and suicidal ideas.          Objective:     Vitals:    05/15/17 1313   BP: 112/70   Pulse: 73     Physical Exam   Constitutional: She is oriented to person, place, and time and well-developed, well-nourished, and in no distress. No distress.   HENT:   Head: Normocephalic and atraumatic.   Eyes: Conjunctivae and EOM are normal. Pupils are equal, round, and reactive to light. Right eye exhibits no discharge. Left eye exhibits no discharge. No scleral icterus.   Neck: Normal range of motion.   Cardiovascular: Normal rate, regular rhythm, normal heart sounds and intact distal pulses. Exam reveals no gallop and no friction rub.   No murmur heard.  Pulmonary/Chest: Effort normal and breath sounds normal. No respiratory distress. She has no wheezes. She has no rales.   Abdominal: Soft. Bowel sounds are normal. She exhibits no distension. There is no tenderness.      Right Side Rheumatological Exam   Examination finds the shoulder, elbow, wrist, 1st PIP, 2nd PIP, 2nd MCP, 3rd PIP, 3rd MCP, 4th PIP, 4th MCP, 5th PIP and 5th MCP normal.  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  The patient has an enlarged 1st MCP.  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 She has normal reflexes. Gait normal.   Right Maria's Sign: absent  Left Maria's Sign: Absent  Reflex Scores:  Brachioradialis reflexes are 2+ on the right side and 2+ on the left side.  Patellar reflexes are 2+ on the right side and 2+ on the left side.  Achilles reflexes are 2+ on the right side and 2+ on the left side.  Skin: Skin is warm and dry. No rash noted. She is not diaphoretic. No erythema.     Psychiatric: Mood, memory, affect and judgment normal.   Musculoskeletal: Normal range of motion.        Results for VÍCTOR COTA (MRN [id number]) as of 5/15/2017 13:19   Ref. Range 3/6/2017 09:04 5/12/2017 14:55   WBC Latest Ref Range: 3.90 - 12.70 K/uL  7.45   RBC Latest Ref Range: 4.00 - 5.40 M/uL  4.24   Hemoglobin Latest Ref Range: 12.0 - 16.0 g/dL  13.0   Hematocrit Latest Ref Range: 37.0 - 48.5 %  39.9   MCV Latest Ref Range: 82 - 98 fL  94   MCH Latest Ref Range: 27.0 - 31.0 pg  30.7   MCHC Latest Ref Range: 32.0 - 36.0 %  32.6   RDW Latest Ref Range: 11.5 - 14.5 %  12.4   Platelets Latest Ref Range: 150 - 350 K/uL  381 (H)   MPV Latest Ref Range: 9.2 - 12.9 fL  10.6   Gran% Latest Ref Range: 38.0 - 73.0 %  57.4   Gran # Latest Ref Range: 1.8 - 7.7 K/uL  4.3   Lymph% Latest Ref Range: 18.0 - 48.0 %  30.9   Lymph # Latest Ref Range: 1.0 - 4.8 K/uL  2.3   Mono% Latest Ref Range: 4.0 - 15.0 %  9.5   Mono # Latest Ref Range: 0.3 - 1.0 K/uL  0.7   Eosinophil% Latest Ref Range: 0.0 - 8.0 %  1.6   Eos # Latest Ref Range: 0.0 - 0.5 K/uL  0.1   Basophil% Latest Ref Range: 0.0 - 1.9 %  0.5   Baso # Latest Ref Range: 0.00 - 0.20 K/uL  0.04   Sed Rate Latest Ref Range: 0 - 20 mm/Hr  42 (H)   Sodium Latest Ref  Range: 136 - 145 mmol/L  136   Potassium Latest Ref Range: 3.5 - 5.1 mmol/L  4.1   Chloride Latest Ref Range: 95 - 110 mmol/L  101   CO2 Latest Ref Range: 23 - 29 mmol/L  22 (L)   Anion Gap Latest Ref Range: 8 - 16 mmol/L  13   BUN, Bld Latest Ref Range: 6 - 20 mg/dL  9   Creatinine Latest Ref Range: 0.5 - 1.4 mg/dL  0.9   eGFR if non African American Latest Ref Range: >60 mL/min/1.73 m^2  >60.0   eGFR if African American Latest Ref Range: >60 mL/min/1.73 m^2  >60.0   Glucose Latest Ref Range: 70 - 110 mg/dL  86   Calcium Latest Ref Range: 8.7 - 10.5 mg/dL  9.5   Alkaline Phosphatase Latest Ref Range: 55 - 135 U/L  135   Total Protein Latest Ref Range: 6.0 - 8.4 g/dL  7.9   Albumin Latest Ref Range: 3.5 - 5.2 g/dL  3.7   Total Bilirubin Latest Ref Range: 0.1 - 1.0 mg/dL  0.3   AST Latest Ref Range: 10 - 40 U/L  32   ALT Latest Ref Range: 10 - 44 U/L  19   CRP Latest Ref Range: 0.0 - 8.2 mg/L  8.6 (H)   TSH Latest Ref Range: 0.400 - 4.000 uIU/mL 0.770    Complement (C-3) Latest Ref Range: 50 - 180 mg/dL  128   Complement (C-4) Latest Ref Range: 11 - 44 mg/dL  34   Differential Method Unknown  Automated       Assessment:       1. Systemic lupus erythematosus with other organ involvement, unspecified SLE type    2. Osteopenia    3. Right acoustic neuroma    4. Hearing loss of right ear, unspecified hearing loss type        TJC=0  SJC=0  SLEDAI=0.00     Plan:     - Continue to see Dr. Monge for Plaquenil check q6mo  - Continue hydroxychloroquine 200mg twice daily  - Continue vit D2 weekly  - Vit D labs added on today  - Zostavax  - Urine Pr/Cr ratio to do today  - Continue FU with Dr Gotti of neurology  - RTC 3 months with standing lupus labs     - Last DEXA 7/20/15

## 2017-05-15 NOTE — PROGRESS NOTES
I have personally taken the history and examined the patient and agree with the resident,s note as stated above     Answers for HPI/ROS submitted by the patient on 5/13/2017   fever: No  eye redness: No  swollen glands: No  headaches: No  shortness of breath: No  chest pain: No  trouble swallowing: No  heartburn: No  diarrhea: Yes  constipation: No  unexpected weight change: No  genital sore: No  dysuria: No  During the last 3 days, have you had a skin rash?: No  tingling: No  Bruises or bleeds easily: No  cough: Yes        Results for VÍCTOR COTA (MRN [id number]) as of 5/15/2017 13:20   Ref. Range 5/12/2017 14:55   WBC Latest Ref Range: 3.90 - 12.70 K/uL 7.45   RBC Latest Ref Range: 4.00 - 5.40 M/uL 4.24   Hemoglobin Latest Ref Range: 12.0 - 16.0 g/dL 13.0   Hematocrit Latest Ref Range: 37.0 - 48.5 % 39.9   MCV Latest Ref Range: 82 - 98 fL 94   MCH Latest Ref Range: 27.0 - 31.0 pg 30.7   MCHC Latest Ref Range: 32.0 - 36.0 % 32.6   RDW Latest Ref Range: 11.5 - 14.5 % 12.4   Platelets Latest Ref Range: 150 - 350 K/uL 381 (H)   MPV Latest Ref Range: 9.2 - 12.9 fL 10.6   Gran% Latest Ref Range: 38.0 - 73.0 % 57.4   Gran # Latest Ref Range: 1.8 - 7.7 K/uL 4.3   Lymph% Latest Ref Range: 18.0 - 48.0 % 30.9   Lymph # Latest Ref Range: 1.0 - 4.8 K/uL 2.3   Mono% Latest Ref Range: 4.0 - 15.0 % 9.5   Mono # Latest Ref Range: 0.3 - 1.0 K/uL 0.7   Eosinophil% Latest Ref Range: 0.0 - 8.0 % 1.6   Eos # Latest Ref Range: 0.0 - 0.5 K/uL 0.1   Basophil% Latest Ref Range: 0.0 - 1.9 % 0.5   Baso # Latest Ref Range: 0.00 - 0.20 K/uL 0.04   Sed Rate Latest Ref Range: 0 - 20 mm/Hr 42 (H)   Sodium Latest Ref Range: 136 - 145 mmol/L 136   Potassium Latest Ref Range: 3.5 - 5.1 mmol/L 4.1   Chloride Latest Ref Range: 95 - 110 mmol/L 101   CO2 Latest Ref Range: 23 - 29 mmol/L 22 (L)   Anion Gap Latest Ref Range: 8 - 16 mmol/L 13   BUN, Bld Latest Ref Range: 6 - 20 mg/dL 9   Creatinine Latest Ref Range: 0.5 - 1.4 mg/dL 0.9   eGFR if non   Latest Ref Range: >60 mL/min/1.73 m^2 >60.0   eGFR if African American Latest Ref Range: >60 mL/min/1.73 m^2 >60.0   Glucose Latest Ref Range: 70 - 110 mg/dL 86   Calcium Latest Ref Range: 8.7 - 10.5 mg/dL 9.5   Alkaline Phosphatase Latest Ref Range: 55 - 135 U/L 135   Total Protein Latest Ref Range: 6.0 - 8.4 g/dL 7.9   Albumin Latest Ref Range: 3.5 - 5.2 g/dL 3.7   Total Bilirubin Latest Ref Range: 0.1 - 1.0 mg/dL 0.3   AST Latest Ref Range: 10 - 40 U/L 32   ALT Latest Ref Range: 10 - 44 U/L 19   CRP Latest Ref Range: 0.0 - 8.2 mg/L 8.6 (H)   Complement (C-3) Latest Ref Range: 50 - 180 mg/dL 128   Complement (C-4) Latest Ref Range: 11 - 44 mg/dL 34   Differential Method Unknown Automated     Results for VÍCTOR COTA (MRN [id number]) as of 5/15/2017 13:20   Ref. Range 2/21/2017 07:52   Vit D, 25-Hydroxy Latest Ref Range: 30 - 96 ng/mL 28 (L)   Results for VÍCTOR COTA (MRN [id number]) as of 5/15/2017 13:20   Ref. Range 2/21/2017 07:52   Cholesterol Latest Ref Range: 120 - 199 mg/dL 168   HDL Latest Ref Range: 40 - 75 mg/dL 51   LDL Cholesterol Latest Ref Range: 63.0 - 159.0 mg/dL 101.6   Total Cholesterol/HDL Ratio Latest Ref Range: 2.0 - 5.0  3.3   Triglycerides Latest Ref Range: 30 - 150 mg/dL 77         Est 10 year ASCVD: 2.3% SLE x2 = 4.6% < statin threshold      Procedure: MRI of the brain and IACs with and without contrast    Technique: Sagittal T1, axial T2, axial flair, axial diffusion and axial T1 post contrast imaging of the brain.  Additional thin section imaging of the IACs was performed using coronal T1, axial 3-D spoiled gradient, and axial and coronal T1 fat sat postcontrast imaging. 8 mL of gadavist contrast was injected intravenously.             Comparison: brain MRI 10/16/2015    Finding: There is small focus of cystic encephalomalacia within the right is again noted posteriorly unchanged prior compatible with remote infarction. There is a few punctate foci of T2 flair signal  hyperintensity elsewhere supratentorial white matter which is similar prior may be sequela of chronic ischemic change which would be advanced for age.    There is interval development of a punctate region of enhancement right parasagittal parietal subcortical white matter concerning for sequela of lupus in light of given history. There is no restricted diffusion to suggest acute infarction. No midline shift or mass effect.    IACs: There is continued although increased size extra-axial lesion right cerebellopontine angle cistern extending into the IAC and slight expansion. This measures 1.2 X 2.1 X 1.2 CM AP X TV X CC dimensions respectively previously measuring 0.9 x 1.5 x 0.8 CM. There is heterogeneous hypoenhancement within the central portion of the cerebellopontine angle cistern component likely related to posttherapy change.      No abnormal enhancement along the left seventh eighth nerve complex.   Impression    MR IAC: Continued enhancing extra-axial right cerebellopontine angle cistern lesion extends into the IAC compatible with vestibular schwannoma. Overall increased in size however now heterogeneous hypoenhancement within the cisternal component suggestive for posttherapy change. Clinical correlation and followup advised.    MRI brain: There is interval development of a punctate focus of enhancement right parasagittal parietal subcortical white matter while overall nonspecific most compatible for sequela of active lupus in light of history. No evidence for diffusion signal abnormality associated with this.    Superimposed remote right basal ganglia infarction. No evidence for acute infarction.    Clinical correlation and followup advised    Please see above for additional details.      Electronically signed by: STACIE LAUREN DO  Date: 02/10/17  Time: 09:44      The nuclear medicine whole body bone scan shows no evidence of metatstatic disease, rather degenerative changes. No bone disease to correspond  with the mildly elevated alk phos. RJQ   External Result Report   External Result Report   Narrative   Findings: Patient was administered 20 mCi of technetium 99m labeled MDP intravenously.    There is degenerative activity in the shoulders, elbows, wrists, hands, knees, ankles, feet, and L-spine.  Renal, bladder, and soft tissue activity is unremarkable.  There is no definite evidence of metastatic disease.  There is hyperostosis of the skull.   Impression    Degenerative change but no evidence of metastatic disease.      Electronically signed by: ADY CACERES  Date: 03/02/17  Time: 11:28     Encounter   View Encounter      Reviewed By   Dimitri Small MD on 5/8/2017 12:07 PM   Mark Johnson MD on 3/2/2017  1:34 PM                   SLE in remission SLEDAI=0 pending U/A, ds-DNA  , off mycophenolate x 1 year  Osteopenia < NOF FRAX therapy threshold next DXA 11/10/17  Vitamin D insufficiency  Hyperlipidemia est 10 year ASCVD risk 2.3%  Chronic intermittent mildly elevated alk phos, bone origin no metastatic disease on WB NM bone scan  S/p cranial radiation [facility] 2016, now followed [facility] for right acoustic neuroma  New area of abnormality : punctate focus of enhancement right parasagittal parietal subcortical white matter on brain MRI Juan ordered by Dr. Ross in [facility]. Reviewed by Dr. Gotti, he was not concerned.       Add Vitamin D,  to today's labs  *Zostavax today  She is following with Dr. Ortez and having her brain MRI from 2/10/17 forwarded to [facility]  Hydroxychloroquine 200mg twice daily saw Dr. Monge 3/30/17  Vitamin D2 50,000 units once a week  She will be in [place] for 4 months Give Dr. Jane Eisenberg in [place](but can't find in [facility]) e-mailed. Gave name of Dr. Maynak Quick in [place] and contact number instead through [facility].   RTC 6 months with standing lupus labs

## 2017-05-15 NOTE — TELEPHONE ENCOUNTER
Gwen, please send pt orders to have vitamin D checked in [place] in 3 months. No vitamin D by Rx or OTC until then.  Thanks. ROBERTO

## 2017-05-16 ENCOUNTER — PATIENT MESSAGE (OUTPATIENT)
Dept: RHEUMATOLOGY | Facility: CLINIC | Age: 62
End: 2017-05-16

## 2017-05-16 LAB — DSDNA AB SER-ACNC: NORMAL [IU]/ML

## 2017-06-14 DIAGNOSIS — M32.8 OTHER FORMS OF SYSTEMIC LUPUS ERYTHEMATOSUS: Primary | ICD-10-CM

## 2017-06-14 RX ORDER — HYDROXYCHLOROQUINE SULFATE 200 MG/1
TABLET ORAL
Qty: 180 TABLET | Refills: 0 | Status: SHIPPED | OUTPATIENT
Start: 2017-06-14 | End: 2017-08-30 | Stop reason: SDUPTHER

## 2017-06-29 ENCOUNTER — PATIENT MESSAGE (OUTPATIENT)
Dept: OPTOMETRY | Facility: CLINIC | Age: 62
End: 2017-06-29

## 2017-06-30 ENCOUNTER — PATIENT MESSAGE (OUTPATIENT)
Dept: RHEUMATOLOGY | Facility: CLINIC | Age: 62
End: 2017-06-30

## 2017-07-04 ENCOUNTER — PATIENT MESSAGE (OUTPATIENT)
Dept: RHEUMATOLOGY | Facility: CLINIC | Age: 62
End: 2017-07-04

## 2017-07-20 ENCOUNTER — PATIENT MESSAGE (OUTPATIENT)
Dept: OPTOMETRY | Facility: CLINIC | Age: 62
End: 2017-07-20

## 2017-08-10 ENCOUNTER — OFFICE VISIT (OUTPATIENT)
Dept: OPTOMETRY | Facility: CLINIC | Age: 62
End: 2017-08-10
Attending: OPTOMETRIST
Payer: COMMERCIAL

## 2017-08-10 ENCOUNTER — CLINICAL SUPPORT (OUTPATIENT)
Dept: OPHTHALMOLOGY | Facility: CLINIC | Age: 62
End: 2017-08-10
Attending: OPTOMETRIST
Payer: COMMERCIAL

## 2017-08-10 ENCOUNTER — OFFICE VISIT (OUTPATIENT)
Dept: OPTOMETRY | Facility: CLINIC | Age: 62
End: 2017-08-10
Attending: OPTOMETRIST

## 2017-08-10 DIAGNOSIS — H52.4 MYOPIA WITH PRESBYOPIA, BILATERAL: ICD-10-CM

## 2017-08-10 DIAGNOSIS — H52.4 MYOPIA WITH PRESBYOPIA, BILATERAL: Primary | ICD-10-CM

## 2017-08-10 DIAGNOSIS — Z79.899 LONG-TERM USE OF PLAQUENIL: ICD-10-CM

## 2017-08-10 DIAGNOSIS — M32.19 SYSTEMIC LUPUS WITH CENTRAL NERVOUS SYSTEM INVOLVEMENT: ICD-10-CM

## 2017-08-10 DIAGNOSIS — H52.13 MYOPIA WITH PRESBYOPIA, BILATERAL: Primary | ICD-10-CM

## 2017-08-10 DIAGNOSIS — H25.13 SENILE NUCLEAR SCLEROSIS, BILATERAL: ICD-10-CM

## 2017-08-10 DIAGNOSIS — Z79.899 LONG-TERM USE OF PLAQUENIL: Primary | ICD-10-CM

## 2017-08-10 DIAGNOSIS — H52.13 MYOPIA WITH PRESBYOPIA, BILATERAL: ICD-10-CM

## 2017-08-10 PROCEDURE — 92134 CPTRZ OPH DX IMG PST SGM RTA: CPT | Mod: S$GLB,,, | Performed by: OPTOMETRIST

## 2017-08-10 PROCEDURE — 92310 CONTACT LENS FITTING OU: CPT | Mod: S$GLB,,, | Performed by: OPTOMETRIST

## 2017-08-10 PROCEDURE — 92014 COMPRE OPH EXAM EST PT 1/>: CPT | Mod: S$GLB,,, | Performed by: OPTOMETRIST

## 2017-08-10 PROCEDURE — 99499 UNLISTED E&M SERVICE: CPT | Mod: S$GLB,,, | Performed by: OPTOMETRIST

## 2017-08-10 PROCEDURE — 99999 PR PBB SHADOW E&M-EST. PATIENT-LVL II: CPT | Mod: PBBFAC,,, | Performed by: OPTOMETRIST

## 2017-08-10 PROCEDURE — 92083 EXTENDED VISUAL FIELD XM: CPT | Mod: S$GLB,,, | Performed by: OPTOMETRIST

## 2017-08-10 NOTE — PROGRESS NOTES
Assessment /Plan     For exam results, see Encounter Report.    Myopia with presbyopia, bilateral          1.  See note with same date.

## 2017-08-10 NOTE — PROGRESS NOTES
HPI     Concerns About Ocular Health    Additional comments: Plaquenil therapy            Comments   Patient's last dilated exam was: 3/30/2017  Pt here for plaquenil check, hvf and oct. No changes in vision, trouble   reading small print with her contacts in, needs them updated. Patient   denies flashes, floaters, pain and double vision. Not using any gtts          Last edited by Aurea Cisneros, PCT on 8/10/2017 10:21 AM.   (History)            Assessment /Plan     For exam results, see Encounter Report.    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  All testing normal OU--no retinopathy.  Monitor 6 months.  3.  Early-monitor.  4.  Bifocal and contact lens rx given.

## 2017-08-14 ENCOUNTER — LAB VISIT (OUTPATIENT)
Dept: LAB | Facility: HOSPITAL | Age: 62
End: 2017-08-14
Attending: INTERNAL MEDICINE
Payer: COMMERCIAL

## 2017-08-14 DIAGNOSIS — E55.9 VITAMIN D DEFICIENCY: ICD-10-CM

## 2017-08-14 LAB — 25(OH)D3+25(OH)D2 SERPL-MCNC: 28 NG/ML

## 2017-08-14 PROCEDURE — 82306 VITAMIN D 25 HYDROXY: CPT

## 2017-08-14 PROCEDURE — 36415 COLL VENOUS BLD VENIPUNCTURE: CPT | Mod: PO

## 2017-08-15 ENCOUNTER — PATIENT MESSAGE (OUTPATIENT)
Dept: RHEUMATOLOGY | Facility: CLINIC | Age: 62
End: 2017-08-15

## 2017-08-30 DIAGNOSIS — M32.8 OTHER FORMS OF SYSTEMIC LUPUS ERYTHEMATOSUS: ICD-10-CM

## 2017-08-30 RX ORDER — HYDROXYCHLOROQUINE SULFATE 200 MG/1
TABLET ORAL
Qty: 180 TABLET | Refills: 1 | Status: SHIPPED | OUTPATIENT
Start: 2017-08-30 | End: 2017-12-22 | Stop reason: SDUPTHER

## 2017-09-07 ENCOUNTER — OFFICE VISIT (OUTPATIENT)
Dept: FAMILY MEDICINE | Facility: CLINIC | Age: 62
End: 2017-09-07
Payer: COMMERCIAL

## 2017-09-07 ENCOUNTER — TELEPHONE (OUTPATIENT)
Dept: FAMILY MEDICINE | Facility: CLINIC | Age: 62
End: 2017-09-07

## 2017-09-07 VITALS
BODY MASS INDEX: 33.34 KG/M2 | HEART RATE: 72 BPM | TEMPERATURE: 98 F | SYSTOLIC BLOOD PRESSURE: 92 MMHG | HEIGHT: 59 IN | WEIGHT: 165.38 LBS | DIASTOLIC BLOOD PRESSURE: 60 MMHG

## 2017-09-07 DIAGNOSIS — H93.11 RIGHT-SIDED TINNITUS: Primary | ICD-10-CM

## 2017-09-07 DIAGNOSIS — D33.3 RIGHT ACOUSTIC NEUROMA: ICD-10-CM

## 2017-09-07 PROCEDURE — 99213 OFFICE O/P EST LOW 20 MIN: CPT | Mod: S$GLB,,, | Performed by: INTERNAL MEDICINE

## 2017-09-07 PROCEDURE — 3008F BODY MASS INDEX DOCD: CPT | Mod: S$GLB,,, | Performed by: INTERNAL MEDICINE

## 2017-09-07 PROCEDURE — 99999 PR PBB SHADOW E&M-EST. PATIENT-LVL III: CPT | Mod: PBBFAC,,, | Performed by: INTERNAL MEDICINE

## 2017-09-07 RX ORDER — PSEUDOEPHEDRINE HYDROCHLORIDE 60 MG/1
60 TABLET ORAL EVERY 6 HOURS PRN
Qty: 30 TABLET | Refills: 3 | Status: SHIPPED | OUTPATIENT
Start: 2017-09-07 | End: 2017-11-27

## 2017-09-07 RX ORDER — ALPRAZOLAM 0.25 MG/1
0.25 TABLET ORAL EVERY 8 HOURS PRN
Qty: 30 TABLET | Refills: 0 | Status: SHIPPED | OUTPATIENT
Start: 2017-09-07 | End: 2018-01-21 | Stop reason: SDUPTHER

## 2017-09-07 RX ORDER — PSEUDOEPHEDRINE HYDROCHLORIDE 60 MG/1
60 TABLET ORAL EVERY 6 HOURS PRN
Qty: 30 TABLET | Refills: 3 | Status: SHIPPED | OUTPATIENT
Start: 2017-09-07 | End: 2017-09-07 | Stop reason: ALTCHOICE

## 2017-09-07 NOTE — TELEPHONE ENCOUNTER
Scheduled patient with  today for ear congestion. Informed patient [facility] does not have flu shots in stock at the moment.

## 2017-09-07 NOTE — PROGRESS NOTES
Subjective:        Patient ID: Radha Fall is a 62 y.o. female.    Chief Complaint: Tinnitus (right ear)    HPI   Radha Fall presents with c/o ocean sounds in her right ear.  This has happened in the past.  Current episode started a few weeks ago when pt was in [place] and drove through a very smoky area.  There is no associated pain, change in hearing, ear drainage.  Pt notes increased HAs in the R temple.  She has tried a friend's Sudogest and this has helped.  When she blows her nose she hears a cracking sound and her ears clear momentarily.    No sx in the L ear.  Pt has a hx of an acoustic neuroma on the right.  She has a f/u MRI scheduled next week.    Review of Systems  as per HPI      Objective:        Vitals:    09/07/17 1602   BP: 92/60   Pulse: 72   Temp: 97.8 °F (36.6 °C)     Physical Exam   Constitutional: She is oriented to person, place, and time. She appears well-developed and well-nourished. No distress.   HENT:   Head: Normocephalic and atraumatic.   Right Ear: External ear normal.   Left Ear: External ear normal.   - b/l ear canals clear, TMs normal color, no significant middle ear effusion appreciated   Neurological: She is alert and oriented to person, place, and time.   Psychiatric: She has a normal mood and affect. Her behavior is normal. Judgment and thought content normal.   Vitals reviewed.          Assessment:         1. Right-sided tinnitus    2. Right acoustic neuroma              Plan:         Radha was seen today for tinnitus.    Diagnoses and all orders for this visit:    Right-sided tinnitus  -     pseudoephedrine (SUDAFED) 60 MG tablet; Take 1 tablet (60 mg total) by mouth every 6 (six) hours as needed for Congestion.  -     alprazolam (XANAX) 0.25 MG tablet; Take 1 tablet (0.25 mg total) by mouth every 8 (eight) hours as needed (acoustic neuroma, tinnitus).    Right acoustic neuroma  -     alprazolam (XANAX) 0.25 MG tablet; Take 1 tablet (0.25 mg total) by mouth every 8  (eight) hours as needed (acoustic neuroma, tinnitus).    - Recommend OTC Flonase BID.  She can also try OTC antihistamines for allergy sx and congestion.  - Recommend sudogest PRN for severe congestion, not for regular daily use  - Refill Xanax for severe tinnitus

## 2017-09-14 ENCOUNTER — PATIENT MESSAGE (OUTPATIENT)
Dept: FAMILY MEDICINE | Facility: CLINIC | Age: 62
End: 2017-09-14

## 2017-09-19 ENCOUNTER — CLINICAL SUPPORT (OUTPATIENT)
Dept: FAMILY MEDICINE | Facility: CLINIC | Age: 62
End: 2017-09-19
Payer: COMMERCIAL

## 2017-09-19 DIAGNOSIS — Z23 NEED FOR PROPHYLACTIC VACCINATION AND INOCULATION AGAINST INFLUENZA: Primary | ICD-10-CM

## 2017-09-19 PROCEDURE — 90471 IMMUNIZATION ADMIN: CPT | Mod: S$GLB,,, | Performed by: INTERNAL MEDICINE

## 2017-09-19 PROCEDURE — 90686 IIV4 VACC NO PRSV 0.5 ML IM: CPT | Mod: S$GLB,,, | Performed by: INTERNAL MEDICINE

## 2017-09-19 PROCEDURE — 99999 PR PBB SHADOW E&M-EST. PATIENT-LVL I: CPT | Mod: PBBFAC,,,

## 2017-09-19 NOTE — PROGRESS NOTES
Two patient identifiers verified.  Allergies reviewed.  Flu vaccine given IM administered to right deltoid per MD order.  Patient tolerated injection well; no redness, bleeding, or bruising noted to injection site.  Patient instructed to remain in clinic setting for 15 minutes.  Verbalizes understanding.  Flu consent signed by patient. Flu vaccine administered.

## 2017-09-24 ENCOUNTER — PATIENT MESSAGE (OUTPATIENT)
Dept: FAMILY MEDICINE | Facility: CLINIC | Age: 62
End: 2017-09-24

## 2017-09-24 DIAGNOSIS — H91.90 DECREASED HEARING, UNSPECIFIED LATERALITY: Primary | ICD-10-CM

## 2017-09-25 NOTE — TELEPHONE ENCOUNTER
Per pt's message, she was prescribed 10 days of Prednisone for decreased hearing starting 9/22/17.  Outside MRI shows right acoustic neuroma stable.

## 2017-11-11 ENCOUNTER — OFFICE VISIT (OUTPATIENT)
Dept: URGENT CARE | Facility: CLINIC | Age: 62
End: 2017-11-11
Payer: COMMERCIAL

## 2017-11-11 VITALS
BODY MASS INDEX: 31.8 KG/M2 | HEIGHT: 60 IN | DIASTOLIC BLOOD PRESSURE: 82 MMHG | WEIGHT: 162 LBS | RESPIRATION RATE: 18 BRPM | OXYGEN SATURATION: 95 % | SYSTOLIC BLOOD PRESSURE: 135 MMHG | TEMPERATURE: 98 F | HEART RATE: 70 BPM

## 2017-11-11 DIAGNOSIS — J02.9 SORE THROAT: Primary | ICD-10-CM

## 2017-11-11 DIAGNOSIS — K12.0 APHTHOUS ULCER OF TONGUE: ICD-10-CM

## 2017-11-11 DIAGNOSIS — J32.9 SINUSITIS, UNSPECIFIED CHRONICITY, UNSPECIFIED LOCATION: ICD-10-CM

## 2017-11-11 LAB
CTP QC/QA: YES
S PYO RRNA THROAT QL PROBE: NEGATIVE

## 2017-11-11 PROCEDURE — 87880 STREP A ASSAY W/OPTIC: CPT | Mod: QW,S$GLB,, | Performed by: EMERGENCY MEDICINE

## 2017-11-11 PROCEDURE — 99214 OFFICE O/P EST MOD 30 MIN: CPT | Mod: 25,S$GLB,, | Performed by: EMERGENCY MEDICINE

## 2017-11-11 PROCEDURE — 96372 THER/PROPH/DIAG INJ SC/IM: CPT | Mod: S$GLB,,, | Performed by: EMERGENCY MEDICINE

## 2017-11-11 RX ORDER — AMOXICILLIN 875 MG/1
875 TABLET, FILM COATED ORAL 2 TIMES DAILY
Qty: 20 TABLET | Refills: 0 | Status: SHIPPED | OUTPATIENT
Start: 2017-11-11 | End: 2017-11-21

## 2017-11-11 RX ORDER — BETAMETHASONE SODIUM PHOSPHATE AND BETAMETHASONE ACETATE 3; 3 MG/ML; MG/ML
9 INJECTION, SUSPENSION INTRA-ARTICULAR; INTRALESIONAL; INTRAMUSCULAR; SOFT TISSUE ONCE
Status: COMPLETED | OUTPATIENT
Start: 2017-11-11 | End: 2017-11-11

## 2017-11-11 RX ADMIN — BETAMETHASONE SODIUM PHOSPHATE AND BETAMETHASONE ACETATE 9 MG: 3; 3 INJECTION, SUSPENSION INTRA-ARTICULAR; INTRALESIONAL; INTRAMUSCULAR; SOFT TISSUE at 01:11

## 2017-11-11 NOTE — PATIENT INSTRUCTIONS
Please return here or go to the Emergency Department for any concerns or worsening of condition    Orabase-B for tongue ulcers    Start taking antibiotics if not improved in 3 days    Zyrtec, Claritin, or Allegra OTC as directed for the next 7 days  Flonase OTC as directed for the next 7 days  Salt Water Nasal Spray OTC 3x/day for the next 7 days      Follow up with Primary Care or ENT if not improved in 7-10 Days:  [id number]      Sinusitis (No Antibiotics)    The sinuses are air-filled spaces within the bones of the face. They connect to the inside of the nose. Sinusitis is an inflammation of the tissue lining the sinus cavity. Sinus inflammation can occur during a cold. It can also be due to allergies to pollens and other particles in the air. It can cause symptoms such as sinus congestion, headache, sore throat, facial swelling and fullness. It may also cause a low-grade fever. No infection is present, and no antibiotic treatment is needed.  Home care  · Drink plenty of water, hot tea, and other liquids. This may help thin mucus. It also may promote sinus drainage.  · Heat may help soothe painful areas of the face. Use a towel soaked in hot water. Or,  the shower and direct the hot spray onto your face. Using a vaporizer along with a menthol rub at night may also help.   · An expectorant containing guaifenesin may help thin the mucus and promote drainage from the sinuses.  · Over-the-counter decongestants may be used unless a similar medicine was prescribed. Nasal sprays work the fastest. Use one that contains phenylephrine or oxymetazoline. First blow the nose gently. Then use the spray. Do not use these medicines more often than directed on the label or symptoms may get worse. You may also use tablets containing pseudoephedrine. Avoid products that combine ingredients, because side effects may be increased. Read labels. You can also ask the pharmacist for help. (NOTE: Persons with high blood pressure  should not use decongestants. They can raise blood pressure.)  · Over-the-counter antihistamines may help if allergies contributed to your sinusitis.    · Use acetaminophen or ibuprofen to control pain, unless another pain medicine was prescribed. (If you have chronic liver or kidney disease or ever had a stomach ulcer, talk with your doctor before using these medicines. Aspirin should never be used in anyone under 18 years of age who is ill with a fever. It may cause severe liver damage.)  · Use nasal rinses or irrigation as instructed by your health care provider.  · Don't smoke. This can worsen symptoms.  Follow-up care  Follow up with your healthcare provider or our staff if you are not improving within the next week.  When to seek medical advice  Call your healthcare provider if any of these occur:  · Green or yellow discharge from the nose or into the throat  · Facial pain or headache becoming more severe  · Stiff neck  · Unusual drowsiness or confusion  · Swelling of the forehead or eyelids  · Vision problems, including blurred or double vision  · Fever of 100.4ºF (38ºC) or higher, or as directed by your healthcare provider  · Seizure  · Breathing problems  · Symptoms not resolving within 10 days  Date Last Reviewed: 4/13/2015  © 4789-7831 [employer]. [address] [place] [address], [place] [address]. All rights reserved. This information is not intended as a substitute for professional medical care. Always follow your healthcare professional's instructions.      When to Use Antibiotics   Antibiotics are medicines used to treat infections caused by bacteria. They dont work for illnesses caused by viruses or an allergic reaction. In fact, taking antibiotics for reasons other than a bacterial infection can cause problems. For example, you may have side effects from the medicine. And if you really need an antibiotic, it may not work  well.                                                                                                                                              When antibiotics wont help  Your healthcare provider wont usually prescribe antibiotics for the following conditions. You can help by not asking for them if you have:   · A cold. This type of illness is caused by a virus. It can cause a runny nose, stuffed-up nose, sneezing, coughing, headache, mild body aches, and low fever. A cold gets better on its own in a few days to a week.  · The flu (influenza). This is a respiratory illness caused by a virus. The flu usually goes away on its own in a week or so. It can cause fever, body aches, sore throat, and fatigue.  · Bronchitis. This is an infection in the lungs most often caused by a virus. You may have coughing, phlegm, body aches, and a low fever. A common type of bronchitis is known as a chest cold (acute bronchitis). This often happens after you have a respiratory infection like a common cold. Bronchitis can take weeks to go away, but antibiotics usually dont help.  · Most sore throats. Sore throats are most often caused by viruses. Your throat may feel scratchy or achy, and it may hurt to swallow. You may also have a low fever and body aches. A sore throat usually gets better in a few days.  · Most ear infections. An ear infection may be caused by a virus or bacteria. It causes pain in the ear. Antibiotics usually dont help, and the infection goes away on its own.  · Most sinus infections (sinusitis). This kind of infection causes sinus pain and swelling, and a runny nose. In most cases, sinusitis goes away on its own, and antibiotics dont make recovery quicker.  · Allergic rhinitis. This is a set of symptoms caused by an allergic reaction. You may have sneezing, a runny nose, itchy or watery eyes, or a sore throat. Allergies are not treated with antibiotics.  · Low fever. A mild fever thats less than 100.4°F  (38°C) most likely doesnt need treatment with antibiotics.   When antibiotics can help   Antibiotics can be used to treat:                                                     · Strep throat. This is a throat infectioncaused by a certain type of bacteria. Symptoms of strep throat include a sore throat, white patches on the tonsils, red spots on the roof of the mouth, fever, body aches, and nausea and vomiting.  · Urinary tract infection (UTI). This is a bacterial infection of the bladder and the tube that takes urine out of the body. It can cause burning pain and urine thats cloudy or tinted with blood. UTIs are very common. Antibiotics usually help treat these infections.  · Some ear infections. In some cases, a healthcare provider may prescribe antibiotics for an ear infection. You may need a test to show whats causing the ear infection.  · Some sinus infections. In some cases, yourhealthcare provider may give you antibiotics. He or she may first need to make sure your symptoms arent caused by a virus, fungus, allergies, or air pollutants such as smoke.   Your doctor may also recommend antibiotics if you have a condition that can affect your immune system, such as diabetes or cancer.   Self-care at home   If your infection cant be treated with antibiotics, you can take other steps to feel better. Try the remedies below. In general:   · Rest and sleep as much as needed.  · Drink water and other clear fluids.  · Dont smoke, and avoid smoke from other people.  · Use over-the-counter medicine such as acetaminophen to ease pain or fever, as directed by your healthcare provider.   To treat sinus pain or nasal congestion:   · Put a warm, moist washcloth on your face where you feel sinus pain or pressure.  · Use a nasal spray with medicine or saline, as directed by your healthcare provider.  · Breathe in steam from a hot shower.  · Use a humidifier or cool mist vaporizer.   To quiet a cough:   · Use a humidifier or  "cool mist vaporizer.  · Breathe in steam from a hot shower.  · Use cough lozenges.   To sooth a sore throat:   · Suck on ice chips, popsicles, or lozenges.  · Use a sore throat spray.  · Use a humidifier or cool mist vaporizer.  · Gargle with saltwater.  · Drink warm liquids.   To ease ear pain:   · Hold a warm, moist washcloth on the ear for 10 minutes at a time.  Date Last Reviewed: 9/1/2016  © 4832-7899 [employer]. [address] [place] [id number]. All rights reserved. This information is not intended as a substitute for professional medical care. Always follow your healthcare professional's instructions.           Understanding Nasal Anatomy: Inside View  A lot happens under the surface of the nose. The bone and cartilage under the skin give the nose most of its size and shape. Other structures inside and behind the nose help you breathe. Learning the anatomy of the nose can help you better understand how the nose works.       Bone supports the upper third (bridge) of the nose. The upper cartilage supports the side of the nose. The lower cartilage adds support, width, and height. It helps shape the nostrils and the tip of the nose. Skin also helps shape the nose.          The nasal cavity is a hollow space behind the nose that air flows through. The septum is a thin "wall" made of cartilage and bone. It divides the inside of the nose into two chambers. The mucous membrane is thin tissue that lines the nose, sinuses, and throat. It warms and moistens the air you breathe in. It also makes the sticky mucus that helps clean the air of dust and other small particles. The turbinates on each side of the nose are curved, bony ridges lined with mucous membrane. They warm and moisten the air you breathe in. The sinuses are hollow, air-filled chambers in the bone around your nose. Mucus from the sinuses drains into the nasal cavity.  Date Last Reviewed: 11/1/2016  © 0077-6485 [employer]. [address] [place] [address]. All rights reserved. This information is not intended as a substitute for professional medical care. Always follow your healthcare professional's instructions.           Canker Sore    A canker sore (also called an aphthous ulcer) is a painful sore on the lining of the mouth. It is most painful during the first few days, and it lasts about 7 to 14 days before going away.  Causes  Canker sores are not cold sores or fever blisters. They are not contagious, so they are not spread by contact. The exact cause of canker sores is not clear, but there are a number of things that can trigger them in different people.  · Mild injury, such as biting the inside of the mouth, lip, or cheek, or dental procedures  · Stress  · Poor diet, or lack of certain nutrients, including B vitamins and iron  · Foods that can irritate the mouth, including tomatoes, citrus fruits, and some nuts (foods that are acidic or contain bitter substances called tannins)  · Irritating chemicals, such as those in some toothpastes and mouthwashes  · Certain chronic illnesses  Symptoms  Canker sores are found on the lining of the mouth. They can be inside the cheeks or lips, on the roof of the mouth, at the base of the gums, on the tongue, or in the back of the throat. Canker sores typically have these characteristics:  · Small, flat (not raised) sores  · Can be white or yellowish bumps that are red around the edges or have a red halo  · Usually small in size, roundish, and in groups  · Accompanied by pain or burning  Canker sores do not leave a scar. But they usually come back.  Home care  The goals of canker sore treatment are to decrease the pain, speed healing, and prevent recurrence. No single treatment works for everyone. Try a number of techniques to see what works best.  General care  · You may find that soft, easy-to-chew foods cause less pain. Use a straw to direct liquids away from the sore.  · Use  a soft-bristle toothbrush, and brush your teeth gently.  · Avoid acidic, salty, or spicy foods.  · Avoid injuring the inside of your mouth, or scraping your existing canker sores, by avoiding crusty and crunchy foods like french bread and chips.  Medicines  You can try over-the-counter medicines that cover the sores and numb them. This protects the sores while they heal and helps reduce pain.  Homemade rinses and solutions  You can use these solutions as mouth rinses. Spit them out after using them. You can also dab them on the sores. You can repeat these treatments as often as needed.  · Rinse your mouth with saltwater.  · Mix equal amounts of hydrogen peroxide and water. You can use it as a mouthwash or dab it on spots with a cotton swab. You can also add sodium bicarbonate to this to make a paste, and then dab it on spots.  Follow-up care  Follow up with your healthcare provider, or as advised.  · If a culture was done, you will be notified if the treatment needs to be changed. You can call as directed for the results.  Call 911  Contact emergency services if any of these occur:  · Trouble breathing  · Inability to swallow  · Extreme drowsiness or trouble awakening  · Fainting or loss of consciousness  · Rapid heart rate  · Seizure  · Stiff neck  When to seek medical advice  Call your healthcare provider right away if any of these occur:  · You have a fever of 100.4°F (38°C) or higher.  · You are pregnant.  · You just had surgery or another medical procedure, or you were just discharged from the hospital.  · You are unable to eat or swallow due to pain.  Date Last Reviewed: 7/30/2015  © 0001-3535 [employer] [address] [place] [address], [place] [address]. All rights reserved. This information is not intended as a substitute for professional medical care. Always follow your healthcare professional's instructions.

## 2017-11-11 NOTE — PROGRESS NOTES
Subjective:       Patient ID: Radha aFll is a 62 y.o. female.    Vitals:    11/11/17 1257   BP: 135/82   Pulse: 70   Resp: 18   Temp: 97.7 °F (36.5 °C)       Chief Complaint: Sore Throat    Sores on tough started on Tuesday         Sore Throat    This is a new problem. The current episode started in the past 7 days. The problem has been gradually worsening. The maximum temperature recorded prior to her arrival was 100.4 - 100.9 F. The pain is at a severity of 7/10. Associated symptoms include swollen glands and trouble swallowing. Pertinent negatives include no abdominal pain, congestion, coughing, ear pain, headaches, hoarse voice or shortness of breath. She has had no exposure to strep or mono. She has tried nothing for the symptoms.     Review of Systems   Constitution: Positive for fever (100.5 ). Negative for chills and malaise/fatigue.   HENT: Positive for sore throat (started on tues ) and trouble swallowing. Negative for congestion, ear pain and hoarse voice.    Eyes: Negative for discharge and redness.   Cardiovascular: Negative for chest pain, dyspnea on exertion and leg swelling.   Respiratory: Negative for cough, shortness of breath, sputum production and wheezing.    Musculoskeletal: Negative for myalgias.   Gastrointestinal: Negative for abdominal pain and nausea.   Neurological: Negative for headaches.       Objective:      Physical Exam   Constitutional: She is oriented to person, place, and time. She appears well-developed and well-nourished. She is cooperative.  Non-toxic appearance. She does not appear ill. No distress.   HENT:   Head: Normocephalic and atraumatic.   Right Ear: Hearing, tympanic membrane, external ear and ear canal normal.   Left Ear: Hearing, tympanic membrane, external ear and ear canal normal.   Nose: Mucosal edema and rhinorrhea present. No nasal deformity. No epistaxis. Right sinus exhibits no maxillary sinus tenderness and no frontal sinus tenderness. Left sinus  exhibits no maxillary sinus tenderness and no frontal sinus tenderness.   Mouth/Throat: Uvula is midline, oropharynx is clear and moist and mucous membranes are normal. No trismus in the jaw. Normal dentition. No uvula swelling. No posterior oropharyngeal erythema.   Aphthous ulcer to tongue   Eyes: Conjunctivae and lids are normal. No scleral icterus.   Sclera clear bilat   Neck: Trachea normal, full passive range of motion without pain and phonation normal. Neck supple.   Cardiovascular: Normal rate, regular rhythm, normal heart sounds, intact distal pulses and normal pulses.    Pulmonary/Chest: Effort normal and breath sounds normal. No respiratory distress.   Abdominal: Soft. Normal appearance and bowel sounds are normal. She exhibits no distension. There is no tenderness.   Musculoskeletal: Normal range of motion. She exhibits no edema or deformity.   Neurological: She is alert and oriented to person, place, and time. She exhibits normal muscle tone. Coordination normal.   Skin: Skin is warm, dry and intact. She is not diaphoretic. No pallor.   Psychiatric: She has a normal mood and affect. Her speech is normal and behavior is normal. Judgment and thought content normal. Cognition and memory are normal.   Nursing note and vitals reviewed.      Assessment:       1. Sore throat    2. Sinusitis, unspecified chronicity, unspecified location    3. Aphthous ulcer of tongue        Plan:       Radha was seen today for sore throat.    Diagnoses and all orders for this visit:    Sore throat  -     POCT rapid strep A    Sinusitis, unspecified chronicity, unspecified location    Aphthous ulcer of tongue    Other orders  -     amoxicillin (AMOXIL) 875 MG tablet; Take 1 tablet (875 mg total) by mouth 2 (two) times daily.  -     betamethasone acetate-betamethasone sodium phosphate injection 9 mg; Inject 1.5 mLs (9 mg total) into the muscle once.      Patient Instructions   Please return here or go to the Emergency Department  for any concerns or worsening of condition    [facility] for tongue ulcers    Start taking antibiotics if not improved in 3 days    Zyrtec, Claritin, or Allegra OTC as directed for the next 7 days  Flonase OTC as directed for the next 7 days  Salt Water Nasal Spray OTC 3x/day for the next 7 days      Follow up with Primary Care or ENT if not improved in 7-10 Days:  [id number]      Sinusitis (No Antibiotics)    The sinuses are air-filled spaces within the bones of the face. They connect to the inside of the nose. Sinusitis is an inflammation of the tissue lining the sinus cavity. Sinus inflammation can occur during a cold. It can also be due to allergies to pollens and other particles in the air. It can cause symptoms such as sinus congestion, headache, sore throat, facial swelling and fullness. It may also cause a low-grade fever. No infection is present, and no antibiotic treatment is needed.  Home care  · Drink plenty of water, hot tea, and other liquids. This may help thin mucus. It also may promote sinus drainage.  · Heat may help soothe painful areas of the face. Use a towel soaked in hot water. Or,  the shower and direct the hot spray onto your face. Using a vaporizer along with a menthol rub at night may also help.   · An expectorant containing guaifenesin may help thin the mucus and promote drainage from the sinuses.  · Over-the-counter decongestants may be used unless a similar medicine was prescribed. Nasal sprays work the fastest. Use one that contains phenylephrine or oxymetazoline. First blow the nose gently. Then use the spray. Do not use these medicines more often than directed on the label or symptoms may get worse. You may also use tablets containing pseudoephedrine. Avoid products that combine ingredients, because side effects may be increased. Read labels. You can also ask the pharmacist for help. (NOTE: Persons with high blood pressure should not use decongestants. They can raise blood  pressure.)  · Over-the-counter antihistamines may help if allergies contributed to your sinusitis.    · Use acetaminophen or ibuprofen to control pain, unless another pain medicine was prescribed. (If you have chronic liver or kidney disease or ever had a stomach ulcer, talk with your doctor before using these medicines. Aspirin should never be used in anyone under 18 years of age who is ill with a fever. It may cause severe liver damage.)  · Use nasal rinses or irrigation as instructed by your health care provider.  · Don't smoke. This can worsen symptoms.  Follow-up care  Follow up with your healthcare provider or our staff if you are not improving within the next week.  When to seek medical advice  Call your healthcare provider if any of these occur:  · Green or yellow discharge from the nose or into the throat  · Facial pain or headache becoming more severe  · Stiff neck  · Unusual drowsiness or confusion  · Swelling of the forehead or eyelids  · Vision problems, including blurred or double vision  · Fever of 100.4ºF (38ºC) or higher, or as directed by your healthcare provider  · Seizure  · Breathing problems  · Symptoms not resolving within 10 days  Date Last Reviewed: 4/13/2015  © 9345-0341 [employer]. [address] [place] [address], [place] [address]. All rights reserved. This information is not intended as a substitute for professional medical care. Always follow your healthcare professional's instructions.      When to Use Antibiotics   Antibiotics are medicines used to treat infections caused by bacteria. They dont work for illnesses caused by viruses or an allergic reaction. In fact, taking antibiotics for reasons other than a bacterial infection can cause problems. For example, you may have side effects from the medicine. And if you really need an antibiotic, it may not work  well.                                                                                                                                              When antibiotics wont help  Your healthcare provider wont usually prescribe antibiotics for the following conditions. You can help by not asking for them if you have:   · A cold. This type of illness is caused by a virus. It can cause a runny nose, stuffed-up nose, sneezing, coughing, headache, mild body aches, and low fever. A cold gets better on its own in a few days to a week.  · The flu (influenza). This is a respiratory illness caused by a virus. The flu usually goes away on its own in a week or so. It can cause fever, body aches, sore throat, and fatigue.  · Bronchitis. This is an infection in the lungs most often caused by a virus. You may have coughing, phlegm, body aches, and a low fever. A common type of bronchitis is known as a chest cold (acute bronchitis). This often happens after you have a respiratory infection like a common cold. Bronchitis can take weeks to go away, but antibiotics usually dont help.  · Most sore throats. Sore throats are most often caused by viruses. Your throat may feel scratchy or achy, and it may hurt to swallow. You may also have a low fever and body aches. A sore throat usually gets better in a few days.  · Most ear infections. An ear infection may be caused by a virus or bacteria. It causes pain in the ear. Antibiotics usually dont help, and the infection goes away on its own.  · Most sinus infections (sinusitis). This kind of infection causes sinus pain and swelling, and a runny nose. In most cases, sinusitis goes away on its own, and antibiotics dont make recovery quicker.  · Allergic rhinitis. This is a set of symptoms caused by an allergic reaction. You may have sneezing, a runny nose, itchy or watery eyes, or a sore throat. Allergies are not treated with antibiotics.  · Low fever. A mild fever thats less than 100.4°F  (38°C) most likely doesnt need treatment with antibiotics.   When antibiotics can help   Antibiotics can be used to treat:                                                     · Strep throat. This is a throat infectioncaused by a certain type of bacteria. Symptoms of strep throat include a sore throat, white patches on the tonsils, red spots on the roof of the mouth, fever, body aches, and nausea and vomiting.  · Urinary tract infection (UTI). This is a bacterial infection of the bladder and the tube that takes urine out of the body. It can cause burning pain and urine thats cloudy or tinted with blood. UTIs are very common. Antibiotics usually help treat these infections.  · Some ear infections. In some cases, a healthcare provider may prescribe antibiotics for an ear infection. You may need a test to show whats causing the ear infection.  · Some sinus infections. In some cases, yourhealthcare provider may give you antibiotics. He or she may first need to make sure your symptoms arent caused by a virus, fungus, allergies, or air pollutants such as smoke.   Your doctor may also recommend antibiotics if you have a condition that can affect your immune system, such as diabetes or cancer.   Self-care at home   If your infection cant be treated with antibiotics, you can take other steps to feel better. Try the remedies below. In general:   · Rest and sleep as much as needed.  · Drink water and other clear fluids.  · Dont smoke, and avoid smoke from other people.  · Use over-the-counter medicine such as acetaminophen to ease pain or fever, as directed by your healthcare provider.   To treat sinus pain or nasal congestion:   · Put a warm, moist washcloth on your face where you feel sinus pain or pressure.  · Use a nasal spray with medicine or saline, as directed by your healthcare provider.  · Breathe in steam from a hot shower.  · Use a humidifier or cool mist vaporizer.   To quiet a cough:   · Use a humidifier or  "cool mist vaporizer.  · Breathe in steam from a hot shower.  · Use cough lozenges.   To sooth a sore throat:   · Suck on ice chips, popsicles, or lozenges.  · Use a sore throat spray.  · Use a humidifier or cool mist vaporizer.  · Gargle with saltwater.  · Drink warm liquids.   To ease ear pain:   · Hold a warm, moist washcloth on the ear for 10 minutes at a time.  Date Last Reviewed: 9/1/2016  © 6743-9585 [employer]. [address] [place] [address]. All rights reserved. This information is not intended as a substitute for professional medical care. Always follow your healthcare professional's instructions.           Understanding Nasal Anatomy: Inside View  A lot happens under the surface of the nose. The bone and cartilage under the skin give the nose most of its size and shape. Other structures inside and behind the nose help you breathe. Learning the anatomy of the nose can help you better understand how the nose works.       Bone supports the upper third (bridge) of the nose. The upper cartilage supports the side of the nose. The lower cartilage adds support, width, and height. It helps shape the nostrils and the tip of the nose. Skin also helps shape the nose.          The nasal cavity is a hollow space behind the nose that air flows through. The septum is a thin "wall" made of cartilage and bone. It divides the inside of the nose into two chambers. The mucous membrane is thin tissue that lines the nose, sinuses, and throat. It warms and moistens the air you breathe in. It also makes the sticky mucus that helps clean the air of dust and other small particles. The turbinates on each side of the nose are curved, bony ridges lined with mucous membrane. They warm and moisten the air you breathe in. The sinuses are hollow, air-filled chambers in the bone around your nose. Mucus from the sinuses drains into the nasal cavity.  Date Last Reviewed: 11/1/2016  © 7247-9925 [employer]. [address] [place] [address]. All rights reserved. This information is not intended as a substitute for professional medical care. Always follow your healthcare professional's instructions.           Canker Sore    A canker sore (also called an aphthous ulcer) is a painful sore on the lining of the mouth. It is most painful during the first few days, and it lasts about 7 to 14 days before going away.  Causes  Canker sores are not cold sores or fever blisters. They are not contagious, so they are not spread by contact. The exact cause of canker sores is not clear, but there are a number of things that can trigger them in different people.  · Mild injury, such as biting the inside of the mouth, lip, or cheek, or dental procedures  · Stress  · Poor diet, or lack of certain nutrients, including B vitamins and iron  · Foods that can irritate the mouth, including tomatoes, citrus fruits, and some nuts (foods that are acidic or contain bitter substances called tannins)  · Irritating chemicals, such as those in some toothpastes and mouthwashes  · Certain chronic illnesses  Symptoms  Canker sores are found on the lining of the mouth. They can be inside the cheeks or lips, on the roof of the mouth, at the base of the gums, on the tongue, or in the back of the throat. Canker sores typically have these characteristics:  · Small, flat (not raised) sores  · Can be white or yellowish bumps that are red around the edges or have a red halo  · Usually small in size, roundish, and in groups  · Accompanied by pain or burning  Canker sores do not leave a scar. But they usually come back.  Home care  The goals of canker sore treatment are to decrease the pain, speed healing, and prevent recurrence. No single treatment works for everyone. Try a number of techniques to see what works best.  General care  · You may find that soft, easy-to-chew foods cause less pain. Use a straw to direct liquids away from the sore.  · Use  a soft-bristle toothbrush, and brush your teeth gently.  · Avoid acidic, salty, or spicy foods.  · Avoid injuring the inside of your mouth, or scraping your existing canker sores, by avoiding crusty and crunchy foods like french bread and chips.  Medicines  You can try over-the-counter medicines that cover the sores and numb them. This protects the sores while they heal and helps reduce pain.  Homemade rinses and solutions  You can use these solutions as mouth rinses. Spit them out after using them. You can also dab them on the sores. You can repeat these treatments as often as needed.  · Rinse your mouth with saltwater.  · Mix equal amounts of hydrogen peroxide and water. You can use it as a mouthwash or dab it on spots with a cotton swab. You can also add sodium bicarbonate to this to make a paste, and then dab it on spots.  Follow-up care  Follow up with your healthcare provider, or as advised.  · If a culture was done, you will be notified if the treatment needs to be changed. You can call as directed for the results.  Call 911  Contact emergency services if any of these occur:  · Trouble breathing  · Inability to swallow  · Extreme drowsiness or trouble awakening  · Fainting or loss of consciousness  · Rapid heart rate  · Seizure  · Stiff neck  When to seek medical advice  Call your healthcare provider right away if any of these occur:  · You have a fever of 100.4°F (38°C) or higher.  · You are pregnant.  · You just had surgery or another medical procedure, or you were just discharged from the hospital.  · You are unable to eat or swallow due to pain.  Date Last Reviewed: 7/30/2015  © 6378-5718 [employer]. [address] [place] [address], [place] [address]. All rights reserved. This information is not intended as a substitute for professional medical care. Always follow your healthcare professional's instructions.

## 2017-11-21 ENCOUNTER — LAB VISIT (OUTPATIENT)
Dept: LAB | Facility: HOSPITAL | Age: 62
End: 2017-11-21
Attending: INTERNAL MEDICINE
Payer: COMMERCIAL

## 2017-11-21 DIAGNOSIS — M32.9 SLE (SYSTEMIC LUPUS ERYTHEMATOSUS): ICD-10-CM

## 2017-11-21 LAB
ALBUMIN SERPL BCP-MCNC: 3.5 G/DL
ALP SERPL-CCNC: 147 U/L
ALT SERPL W/O P-5'-P-CCNC: 17 U/L
ANION GAP SERPL CALC-SCNC: 13 MMOL/L
AST SERPL-CCNC: 25 U/L
BASOPHILS # BLD AUTO: 0.1 K/UL
BASOPHILS NFR BLD: 1.2 %
BILIRUB SERPL-MCNC: 0.6 MG/DL
BUN SERPL-MCNC: 14 MG/DL
C3 SERPL-MCNC: 126 MG/DL
C4 SERPL-MCNC: 33 MG/DL
CALCIUM SERPL-MCNC: 9.5 MG/DL
CHLORIDE SERPL-SCNC: 106 MMOL/L
CO2 SERPL-SCNC: 20 MMOL/L
CREAT SERPL-MCNC: 0.9 MG/DL
CRP SERPL-MCNC: 5.1 MG/L
DIFFERENTIAL METHOD: ABNORMAL
EOSINOPHIL # BLD AUTO: 0.2 K/UL
EOSINOPHIL NFR BLD: 2.1 %
ERYTHROCYTE [DISTWIDTH] IN BLOOD BY AUTOMATED COUNT: 13.2 %
ERYTHROCYTE [SEDIMENTATION RATE] IN BLOOD BY WESTERGREN METHOD: 18 MM/HR
EST. GFR  (AFRICAN AMERICAN): >60 ML/MIN/1.73 M^2
EST. GFR  (NON AFRICAN AMERICAN): >60 ML/MIN/1.73 M^2
GLUCOSE SERPL-MCNC: 67 MG/DL
HCT VFR BLD AUTO: 40.9 %
HGB BLD-MCNC: 13 G/DL
IMM GRANULOCYTES # BLD AUTO: 0.01 K/UL
IMM GRANULOCYTES NFR BLD AUTO: 0.1 %
LYMPHOCYTES # BLD AUTO: 2.6 K/UL
LYMPHOCYTES NFR BLD: 30.5 %
MCH RBC QN AUTO: 30.4 PG
MCHC RBC AUTO-ENTMCNC: 31.8 G/DL
MCV RBC AUTO: 96 FL
MONOCYTES # BLD AUTO: 0.8 K/UL
MONOCYTES NFR BLD: 9.3 %
NEUTROPHILS # BLD AUTO: 4.8 K/UL
NEUTROPHILS NFR BLD: 56.8 %
NRBC BLD-RTO: 0 /100 WBC
PLATELET # BLD AUTO: 408 K/UL
PMV BLD AUTO: 10.9 FL
POTASSIUM SERPL-SCNC: 4.6 MMOL/L
PROT SERPL-MCNC: 7.6 G/DL
RBC # BLD AUTO: 4.28 M/UL
SODIUM SERPL-SCNC: 139 MMOL/L
WBC # BLD AUTO: 8.4 K/UL

## 2017-11-21 PROCEDURE — 86160 COMPLEMENT ANTIGEN: CPT | Mod: 59

## 2017-11-21 PROCEDURE — 86225 DNA ANTIBODY NATIVE: CPT

## 2017-11-21 PROCEDURE — 82977 ASSAY OF GGT: CPT

## 2017-11-21 PROCEDURE — 85025 COMPLETE CBC W/AUTO DIFF WBC: CPT

## 2017-11-21 PROCEDURE — 86140 C-REACTIVE PROTEIN: CPT

## 2017-11-21 PROCEDURE — 36415 COLL VENOUS BLD VENIPUNCTURE: CPT | Mod: PO

## 2017-11-21 PROCEDURE — 84075 ASSAY ALKALINE PHOSPHATASE: CPT

## 2017-11-21 PROCEDURE — 85651 RBC SED RATE NONAUTOMATED: CPT

## 2017-11-21 PROCEDURE — 86160 COMPLEMENT ANTIGEN: CPT

## 2017-11-21 PROCEDURE — 80053 COMPREHEN METABOLIC PANEL: CPT

## 2017-11-22 ENCOUNTER — TELEPHONE (OUTPATIENT)
Dept: RHEUMATOLOGY | Facility: CLINIC | Age: 62
End: 2017-11-22

## 2017-11-22 DIAGNOSIS — R74.8 ALKALINE PHOSPHATASE RAISED: Primary | ICD-10-CM

## 2017-11-22 LAB
DSDNA AB SER-ACNC: NORMAL [IU]/ML
GGT SERPL-CCNC: 39 U/L

## 2017-11-27 ENCOUNTER — OFFICE VISIT (OUTPATIENT)
Dept: RHEUMATOLOGY | Facility: CLINIC | Age: 62
End: 2017-11-27
Payer: COMMERCIAL

## 2017-11-27 VITALS
BODY MASS INDEX: 30.51 KG/M2 | DIASTOLIC BLOOD PRESSURE: 71 MMHG | WEIGHT: 165.81 LBS | HEART RATE: 64 BPM | SYSTOLIC BLOOD PRESSURE: 123 MMHG | HEIGHT: 62 IN

## 2017-11-27 DIAGNOSIS — H91.91 HEARING LOSS OF RIGHT EAR, UNSPECIFIED HEARING LOSS TYPE: ICD-10-CM

## 2017-11-27 DIAGNOSIS — M32.19 SYSTEMIC LUPUS ERYTHEMATOSUS WITH OTHER ORGAN INVOLVEMENT, UNSPECIFIED SLE TYPE: Primary | ICD-10-CM

## 2017-11-27 DIAGNOSIS — E55.9 VITAMIN D DEFICIENCY: ICD-10-CM

## 2017-11-27 DIAGNOSIS — K50.90 CROHN'S DISEASE WITHOUT COMPLICATION, UNSPECIFIED GASTROINTESTINAL TRACT LOCATION: ICD-10-CM

## 2017-11-27 DIAGNOSIS — M85.80 OSTEOPENIA, UNSPECIFIED LOCATION: ICD-10-CM

## 2017-11-27 DIAGNOSIS — D33.3 RIGHT ACOUSTIC NEUROMA: ICD-10-CM

## 2017-11-27 DIAGNOSIS — R90.89 ABNORMAL FINDING ON MRI OF BRAIN: ICD-10-CM

## 2017-11-27 PROCEDURE — 99214 OFFICE O/P EST MOD 30 MIN: CPT | Mod: S$GLB,,, | Performed by: INTERNAL MEDICINE

## 2017-11-27 PROCEDURE — 99999 PR PBB SHADOW E&M-EST. PATIENT-LVL III: CPT | Mod: PBBFAC,,, | Performed by: INTERNAL MEDICINE

## 2017-11-27 ASSESSMENT — ROUTINE ASSESSMENT OF PATIENT INDEX DATA (RAPID3)
AM STIFFNESS SCORE: 0, NO
PSYCHOLOGICAL DISTRESS SCORE: 3.3
TOTAL RAPID3 SCORE: .33
PATIENT GLOBAL ASSESSMENT SCORE: 0
FATIGUE SCORE: 2
MDHAQ FUNCTION SCORE: 0
PAIN SCORE: 1

## 2017-11-27 ASSESSMENT — SYSTEMIC LUPUS ERYTHEMATOSUS DISEASE ACTIVITY INDEX (SLEDAI): TOTAL_SCORE: 0

## 2017-11-27 NOTE — PROGRESS NOTES
Subjective:       Patient ID: Radha Fall is a 62 y.o. female.    Chief Complaint: F/u for disease management of SLE, Jaccoud's arthritis, Acoustic neuroma, Osteopenia    HPI   Ms. Fall is a 63 y/o woman with a h/o SLE, right acoustic neuroma and hearing loss, and osteopenia who presents today for a f/u visit. Last seen 5/15/17. She is taking Plaquenil 200 mg BID. Saw Dr. Monge for a Plaquenil check on 8/10/17 and had a normal exam. Sh takes ergocalciferol 50,000 units/week. She had stereotactic radiation for a right acoustic neuroma at the [facility] in [place] in 9/2016. She now follows up with [place]'s on [facility] [place], which she then sends to the [facility] to be read. Most recent MRI was in September, and she was told the neuroma seems to have shrunk a little. Completed therapy for balance. Some minimal fatigue , which she attributes to increased physical activity, with minimal morning stiffness. She denies any joint pain or swelling. She denies any chest pain, pleuritic pain, seizures, skin rash, alopecia, oral ulcers, or abdominal pain. She had symptoms similar to strep throat 2 weeks ago (tested neg for strep) with fever, cough, and soar throat, which resolved with a steroid shot and have not returned in 2 weeks.     Current Outpatient Prescriptions   Medication Sig Dispense Refill    alprazolam (XANAX) 0.25 MG tablet Take 1 tablet (0.25 mg total) by mouth every 8 (eight) hours as needed (acoustic neuroma, tinnitus). 30 tablet 0    aspirin 81 MG Chew Take 81 mg by mouth once daily.      b complex vitamins tablet Take 1 tablet by mouth 3 (three) times daily.       ergocalciferol (ERGOCALCIFEROL) 50,000 unit Cap Take 1 capsule by mouth  every 7 days 12 capsule 3    esomeprazole (NEXIUM) 40 MG capsule Take 40 mg by mouth before breakfast.       fish oil-omega-3 fatty acids 300-1,000 mg capsule Take 1 g by mouth 2 (two) times daily.       multivitamin (THERAGRAN) per tablet Take 1 tablet by mouth  before breakfast.      PLAQUENIL 200 mg tablet Take 1 tablet by mouth two  times daily 180 tablet 1    pseudoephedrine (SUDOGEST) 60 MG tablet Take 1 tablet (60 mg total) by mouth every 6 (six) hours as needed for Congestion. 30 tablet 3    SYNTHROID 125 mcg tablet Take 1 tablet by mouth  every morning 30 tablet 5     No current facility-administered medications for this visit.      Past Medical History:   Diagnosis Date    Acid reflux     Anemia 2years ago    Anxiety     Arthritis     Asthma, chronic 4/10/2013    Crohn's disease 1998    Depression     Dry eyes     Fracture of forearm, proximal, open     screws & plate 6/26/13 in [facility]    History of papilledema 11/10/11    Lupus     Neuromuscular disorder 12/2009    Neuropathy    Osteoporosis     Stroke 1/29/2013    Thyroid disease      Past Surgical History:   Procedure Laterality Date    ADENOIDECTOMY  1962    BRAIN SURGERY  01/18/2012    Brain Biopsy    COLONOSCOPY  2017    [facility] Dr. WILLIS Summers; pan-diverticulosis, rec repeat 3-5 years    FRACTURE SURGERY  2013    left forearm- 6/26/13    INCISION AND DRAINAGE PERIRECTAL ABSCESS  1998    abscess removal       Review of Systems   Constitutional: Positive for fatigue and fever (last fever was 2 weeks ago). Negative for activity change, appetite change and unexpected weight change.   HENT: Positive for mouth sores (Sores on her tongue about 2 weeks ago, and they resolved with orobase B) and sore throat. Negative for facial swelling, nosebleeds and trouble swallowing.         Swollen glands   Eyes: Negative for pain, discharge, redness and itching.   Respiratory: Positive for cough. Negative for choking and shortness of breath.    Cardiovascular: Negative for chest pain and leg swelling.   Gastrointestinal: Negative for abdominal distention, abdominal pain, blood in stool, constipation, diarrhea, nausea and vomiting.   Genitourinary: Negative for difficulty urinating, frequency and genital  "sores.   Musculoskeletal: Negative for arthralgias, back pain, joint swelling, myalgias, neck pain and neck stiffness.   Skin: Negative for color change and rash.   Neurological: Negative for headaches.   Psychiatric/Behavioral: Negative for dysphoric mood. The patient is not nervous/anxious.          Family History   Problem Relation Age of Onset    Cancer Mother 64     small cell lung cancer    Heart disease Father 64     acute MI; 3v CABG    Sjogren's syndrome Sister     Allergies Sister     Cancer Maternal Grandmother     COPD Maternal Grandfather     Cancer Paternal Grandmother     Stroke Paternal Grandmother     Heart disease Paternal Grandfather     Mental retardation Brother     Hypertension Sister     Angioedema Neg Hx     Asthma Neg Hx     Atopy Neg Hx     Eczema Neg Hx     Immunodeficiency Neg Hx     Rhinitis Neg Hx     Urticaria Neg Hx      Social History     Social History    Marital status: Single     Spouse name: N/A    Number of children: N/A    Years of education: N/A     Occupational History    human resources      Social History Main Topics    Smoking status: Former Smoker     Packs/day: 0.50     Years: 15.00     Types: Cigarettes     Quit date: 7/5/1984    Smokeless tobacco: Never Used    Alcohol use Yes      Comment: less than one per week    Drug use: No    Sexual activity: No     Other Topics Concern    Not on file     Social History Narrative    Single, works full time, yoga and walking and weights       Objective:   /71   Pulse 64   Ht 5' 2.4" (1.585 m)   Wt 75.2 kg (165 lb 12.8 oz)   BMI 29.94 kg/m²        Physical Exam   Constitutional: She is oriented to person, place, and time and well-developed, well-nourished, and in no distress. No distress.   HENT:   Head: Normocephalic.   Eyes: Pupils are equal, round, and reactive to light.   Neck: Normal range of motion. Neck supple. No thyromegaly present.   Cardiovascular: Normal rate, regular rhythm and " normal heart sounds.    No murmur heard.  Pulmonary/Chest: Effort normal and breath sounds normal. No respiratory distress. She has no wheezes.   Abdominal: Soft. Bowel sounds are normal. She exhibits no distension. There is no tenderness.       Right Side Rheumatological Exam     Examination finds the shoulder, elbow, wrist, knee, 1st PIP, 1st MCP, 2nd PIP, 2nd MCP, 3rd PIP, 3rd MCP, 4th PIP, 4th MCP, 5th PIP and 5th MCP normal.    Muscle Strength (0-5 scale):  Neck Flexion:  5  Neck Extension: 5  Deltoid:  5  Biceps: 5/5   Triceps:  5  : 5/5   Iliopsoas: 5  Quadriceps:  5   Distal Lower Extremity: 5    Left Side Rheumatological Exam     Examination finds the shoulder, elbow, wrist, knee, 1st PIP, 1st MCP, 2nd PIP, 2nd MCP, 3rd PIP, 3rd MCP, 4th PIP, 4th MCP, 5th PIP and 5th MCP normal.    Muscle Strength (0-5 scale):  Neck Flexion:  5  Neck Extension: 5  Deltoid:  5  Biceps: 5/5   Triceps:  5  :  5/5   Iliopsoas: 5  Quadriceps:  5   Distal Lower Extremity: 5      Neurological: She is alert and oriented to person, place, and time.   Skin: Skin is warm and dry. No rash noted. No erythema.     Musculoskeletal: Normal range of motion. She exhibits no edema.           LABS:  Results for VÍCTOR COTA (MRN [id number]) as of 11/27/2017 08:06   Ref. Range 11/21/2017 10:13   WBC Latest Ref Range: 3.90 - 12.70 K/uL 8.40   RBC Latest Ref Range: 4.00 - 5.40 M/uL 4.28   Hemoglobin Latest Ref Range: 12.0 - 16.0 g/dL 13.0   Hematocrit Latest Ref Range: 37.0 - 48.5 % 40.9   MCV Latest Ref Range: 82 - 98 fL 96   MCH Latest Ref Range: 27.0 - 31.0 pg 30.4   MCHC Latest Ref Range: 32.0 - 36.0 g/dL 31.8 (L)   RDW Latest Ref Range: 11.5 - 14.5 % 13.2   Platelets Latest Ref Range: 150 - 350 K/uL 408 (H)   MPV Latest Ref Range: 9.2 - 12.9 fL 10.9   Gran% Latest Ref Range: 38.0 - 73.0 % 56.8   Gran # Latest Ref Range: 1.8 - 7.7 K/uL 4.8   Immature Granulocytes Latest Ref Range: 0.0 - 0.5 % 0.1   Immature Grans (Abs) Latest Ref  "Range: 0.00 - 0.04 K/uL 0.01   Lymph% Latest Ref Range: 18.0 - 48.0 % 30.5   Lymph # Latest Ref Range: 1.0 - 4.8 K/uL 2.6   Mono% Latest Ref Range: 4.0 - 15.0 % 9.3   Mono # Latest Ref Range: 0.3 - 1.0 K/uL 0.8   Eosinophil% Latest Ref Range: 0.0 - 8.0 % 2.1   Eos # Latest Ref Range: 0.0 - 0.5 K/uL 0.2   Basophil% Latest Ref Range: 0.0 - 1.9 % 1.2   Baso # Latest Ref Range: 0.00 - 0.20 K/uL 0.10   nRBC Latest Ref Range: 0 /100 WBC 0   Sed Rate Latest Ref Range: 0 - 20 mm/Hr 18   Sodium Latest Ref Range: 136 - 145 mmol/L 139   Potassium Latest Ref Range: 3.5 - 5.1 mmol/L 4.6   Chloride Latest Ref Range: 95 - 110 mmol/L 106   CO2 Latest Ref Range: 23 - 29 mmol/L 20 (L)   Anion Gap Latest Ref Range: 8 - 16 mmol/L 13   BUN, Bld Latest Ref Range: 6 - 20 mg/dL 14   Creatinine Latest Ref Range: 0.5 - 1.4 mg/dL 0.9   eGFR if non African American Latest Ref Range: >60 mL/min/1.73 m^2 >60.0   eGFR if African American Latest Ref Range: >60 mL/min/1.73 m^2 >60.0   Glucose Latest Ref Range: 70 - 110 mg/dL 67 (L)   Calcium Latest Ref Range: 8.7 - 10.5 mg/dL 9.5   Alkaline Phosphatase Latest Ref Range: 55 - 135 U/L 147 (H)   Total Protein Latest Ref Range: 6.0 - 8.4 g/dL 7.6   Albumin Latest Ref Range: 3.5 - 5.2 g/dL 3.5   Total Bilirubin Latest Ref Range: 0.1 - 1.0 mg/dL 0.6   AST Latest Ref Range: 10 - 40 U/L 25   ALT Latest Ref Range: 10 - 44 U/L 17   GGT Latest Ref Range: 8 - 55 U/L 39   CRP Latest Ref Range: 0.0 - 8.2 mg/L 5.1   ds DNA Ab Latest Ref Range: Negative 1:10  Negative 1:10   Complement (C-3) Latest Ref Range: 50 - 180 mg/dL 126   Complement (C-4) Latest Ref Range: 11 - 44 mg/dL 33   Results for VÍCTOR CTOA (MRN [id number]) as of 11/27/2017 08:06   Ref. Range 8/14/2017 11:29   Vit D, 25-Hydroxy Latest Ref Range: 30 - 96 ng/mL 28 (L)       IMAGES:  3/2/17: NM Bone scan of whole body showed " Degenerative change but no evidence of metastatic disease."     Assessment:       SLE in remission SLEDAI=0 pending U/A, " ds-DNA  , off mycophenolate x 1 year  Osteopenia < NOF FRAX therapy threshold next DXA 11/10/17  Vitamin D insufficiency  Hyperlipidemia est 10 year ASCVD risk 2.3%  Chronic intermittent mildly elevated alk phos, bone origin no metastatic disease on WB NM bone scan  S/p cranial radiation Dr. [facility] Sept 2016, now followed [facility] group for right acoustic neuroma      Plan:       Labs: alk phos isoenzymes, Vit D, UA, and UPCKA to be drawn later this week when pt is fasting  She is following with and having her brain MRI read by [place] for the neuroma  Hydroxychloroquine 200mg twice daily saw Dr. Monge 3/30/17  Vitamin D2 50,000 units once a week  DEXA scan ordered for today  RTC 6 months with standing lupus labs to be drawn in 3 mo

## 2017-11-27 NOTE — PROGRESS NOTES
I have personally taken the history and examined the patient and agree with the resident,s note as stated above           The nuclear medicine whole body bone scan shows no evidence of metatstatic disease, rather degenerative changes. No bone disease to correspond with the mildly elevated alk phos. RJQ   External Result Report     External Result Report   Narrative     Findings: Patient was administered 20 mCi of technetium 99m labeled MDP intravenously.    There is degenerative activity in the shoulders, elbows, wrists, hands, knees, ankles, feet, and L-spine.  Renal, bladder, and soft tissue activity is unremarkable.  There is no definite evidence of metastatic disease.  There is hyperostosis of the skull.   Impression      Degenerative change but no evidence of metastatic disease.      Electronically signed by: ADY CACERES  Date: 03/02/17  Time: 11:28     Encounter     View Encounter          Results for VÍCTOR COTA (MRN [id number]) as of 11/27/2017 09:14   Ref. Range 11/21/2017 10:13   WBC Latest Ref Range: 3.90 - 12.70 K/uL 8.40   RBC Latest Ref Range: 4.00 - 5.40 M/uL 4.28   Hemoglobin Latest Ref Range: 12.0 - 16.0 g/dL 13.0   Hematocrit Latest Ref Range: 37.0 - 48.5 % 40.9   MCV Latest Ref Range: 82 - 98 fL 96   MCH Latest Ref Range: 27.0 - 31.0 pg 30.4   MCHC Latest Ref Range: 32.0 - 36.0 g/dL 31.8 (L)   RDW Latest Ref Range: 11.5 - 14.5 % 13.2   Platelets Latest Ref Range: 150 - 350 K/uL 408 (H)   MPV Latest Ref Range: 9.2 - 12.9 fL 10.9   Gran% Latest Ref Range: 38.0 - 73.0 % 56.8   Gran # Latest Ref Range: 1.8 - 7.7 K/uL 4.8   Immature Granulocytes Latest Ref Range: 0.0 - 0.5 % 0.1   Immature Grans (Abs) Latest Ref Range: 0.00 - 0.04 K/uL 0.01   Lymph% Latest Ref Range: 18.0 - 48.0 % 30.5   Lymph # Latest Ref Range: 1.0 - 4.8 K/uL 2.6   Mono% Latest Ref Range: 4.0 - 15.0 % 9.3   Mono # Latest Ref Range: 0.3 - 1.0 K/uL 0.8   Eosinophil% Latest Ref Range: 0.0 - 8.0 % 2.1   Eos # Latest Ref Range: 0.0 -  0.5 K/uL 0.2   Basophil% Latest Ref Range: 0.0 - 1.9 % 1.2   Baso # Latest Ref Range: 0.00 - 0.20 K/uL 0.10   nRBC Latest Ref Range: 0 /100 WBC 0   Sed Rate Latest Ref Range: 0 - 20 mm/Hr 18   Sodium Latest Ref Range: 136 - 145 mmol/L 139   Potassium Latest Ref Range: 3.5 - 5.1 mmol/L 4.6   Chloride Latest Ref Range: 95 - 110 mmol/L 106   CO2 Latest Ref Range: 23 - 29 mmol/L 20 (L)   Anion Gap Latest Ref Range: 8 - 16 mmol/L 13   BUN, Bld Latest Ref Range: 6 - 20 mg/dL 14   Creatinine Latest Ref Range: 0.5 - 1.4 mg/dL 0.9   eGFR if non African American Latest Ref Range: >60 mL/min/1.73 m^2 >60.0   eGFR if African American Latest Ref Range: >60 mL/min/1.73 m^2 >60.0   Glucose Latest Ref Range: 70 - 110 mg/dL 67 (L)   Calcium Latest Ref Range: 8.7 - 10.5 mg/dL 9.5   Alkaline Phosphatase Latest Ref Range: 55 - 135 U/L 147 (H)   Total Protein Latest Ref Range: 6.0 - 8.4 g/dL 7.6   Albumin Latest Ref Range: 3.5 - 5.2 g/dL 3.5   Total Bilirubin Latest Ref Range: 0.1 - 1.0 mg/dL 0.6   AST Latest Ref Range: 10 - 40 U/L 25   ALT Latest Ref Range: 10 - 44 U/L 17   GGT Latest Ref Range: 8 - 55 U/L 39   CRP Latest Ref Range: 0.0 - 8.2 mg/L 5.1   ds DNA Ab Latest Ref Range: Negative 1:10  Negative 1:10   Complement (C-3) Latest Ref Range: 50 - 180 mg/dL 126   Complement (C-4) Latest Ref Range: 11 - 44 mg/dL 33   Differential Method Unknown Automated                             SLE in remission SLEDAI=0 pending U/A,  off mycophenolate x 1.5 year  Osteopenia < NOF FRAX therapy threshold next DXA due now  Vitamin D insufficiency taking vitamin D2 50,000 units once weekly erratically  Hyperlipidemia est 10 year ASCVD risk 2.3%  Chronic intermittent mildly elevated alk phos, bone origin no metastatic disease on WB NM bone scan  S/p cranial radiation Dr. [facility] Sept 2016, now followed [place] for right acoustic neuroma. Dr. Ortez refuses to follow any longer.       Fasting Alk phos isoenzymes, vitamin D this  week  U/a  upcr today  Hydroxychloroquine 200mg twice daily saw Dr. Monge 8/10/17  Vitamin D2 50,000 units once a week adjust after vitamin D  When in [place] Dr. Jane Eisenberg in [place](but can't find in [facility]) e-mailed. Gave name of Dr. Mayank Quick in [place] and contact number instead through [employer].   Standing lupus labs q 3 months  DXA  RTC 6 months with standing lupus labs

## 2017-11-28 ENCOUNTER — PATIENT MESSAGE (OUTPATIENT)
Dept: RHEUMATOLOGY | Facility: CLINIC | Age: 62
End: 2017-11-28

## 2017-11-28 LAB
ALP BONE CFR SERPL: 55 U/L (ref 5–58)
ALP BONE SERPL-CCNC: 38 % (ref 16–56)
ALP INTEST CFR SERPL: 19 U/L
ALP INTEST SERPL-CCNC: 13 %
ALP LIVER CFR SERPL: 72 U/L (ref 5–93)
ALP LIVER SERPL-CCNC: 49 % (ref 44–84)
ALP SERPL-CCNC: 146 U/L (ref 33–130)

## 2017-12-12 ENCOUNTER — PATIENT MESSAGE (OUTPATIENT)
Dept: RHEUMATOLOGY | Facility: CLINIC | Age: 62
End: 2017-12-12

## 2017-12-13 ENCOUNTER — LAB VISIT (OUTPATIENT)
Dept: LAB | Facility: HOSPITAL | Age: 62
End: 2017-12-13
Attending: INTERNAL MEDICINE
Payer: COMMERCIAL

## 2017-12-13 ENCOUNTER — TELEPHONE (OUTPATIENT)
Dept: RHEUMATOLOGY | Facility: CLINIC | Age: 62
End: 2017-12-13

## 2017-12-13 DIAGNOSIS — E78.5 HYPERLIPIDEMIA, UNSPECIFIED HYPERLIPIDEMIA TYPE: ICD-10-CM

## 2017-12-13 DIAGNOSIS — E55.9 VITAMIN D DEFICIENCY: ICD-10-CM

## 2017-12-13 DIAGNOSIS — M32.9 SLE (SYSTEMIC LUPUS ERYTHEMATOSUS): ICD-10-CM

## 2017-12-13 LAB
25(OH)D3+25(OH)D2 SERPL-MCNC: 28 NG/ML
ALBUMIN SERPL BCP-MCNC: 3.6 G/DL
ALP SERPL-CCNC: 144 U/L
ALT SERPL W/O P-5'-P-CCNC: 18 U/L
ANION GAP SERPL CALC-SCNC: 11 MMOL/L
AST SERPL-CCNC: 32 U/L
BASOPHILS # BLD AUTO: 0.06 K/UL
BASOPHILS NFR BLD: 0.9 %
BILIRUB SERPL-MCNC: 0.5 MG/DL
BUN SERPL-MCNC: 19 MG/DL
C3 SERPL-MCNC: 126 MG/DL
C4 SERPL-MCNC: 31 MG/DL
CALCIUM SERPL-MCNC: 9.8 MG/DL
CHLORIDE SERPL-SCNC: 107 MMOL/L
CHOLEST SERPL-MCNC: 222 MG/DL
CHOLEST/HDLC SERPL: 3.2 {RATIO}
CO2 SERPL-SCNC: 24 MMOL/L
CREAT SERPL-MCNC: 0.9 MG/DL
CRP SERPL-MCNC: 5.9 MG/L
DIFFERENTIAL METHOD: ABNORMAL
EOSINOPHIL # BLD AUTO: 0.2 K/UL
EOSINOPHIL NFR BLD: 2.6 %
ERYTHROCYTE [DISTWIDTH] IN BLOOD BY AUTOMATED COUNT: 13.2 %
ERYTHROCYTE [SEDIMENTATION RATE] IN BLOOD BY WESTERGREN METHOD: 27 MM/HR
EST. GFR  (AFRICAN AMERICAN): >60 ML/MIN/1.73 M^2
EST. GFR  (NON AFRICAN AMERICAN): >60 ML/MIN/1.73 M^2
GLUCOSE SERPL-MCNC: 90 MG/DL
HCT VFR BLD AUTO: 41.4 %
HDLC SERPL-MCNC: 70 MG/DL
HDLC SERPL: 31.5 %
HGB BLD-MCNC: 13.4 G/DL
IMM GRANULOCYTES # BLD AUTO: 0.02 K/UL
IMM GRANULOCYTES NFR BLD AUTO: 0.3 %
LDLC SERPL CALC-MCNC: 139 MG/DL
LYMPHOCYTES # BLD AUTO: 1.9 K/UL
LYMPHOCYTES NFR BLD: 29.6 %
MCH RBC QN AUTO: 31 PG
MCHC RBC AUTO-ENTMCNC: 32.4 G/DL
MCV RBC AUTO: 96 FL
MONOCYTES # BLD AUTO: 0.5 K/UL
MONOCYTES NFR BLD: 7.8 %
NEUTROPHILS # BLD AUTO: 3.9 K/UL
NEUTROPHILS NFR BLD: 58.8 %
NONHDLC SERPL-MCNC: 152 MG/DL
NRBC BLD-RTO: 0 /100 WBC
PLATELET # BLD AUTO: 379 K/UL
PMV BLD AUTO: 10.9 FL
POTASSIUM SERPL-SCNC: 4.4 MMOL/L
PROT SERPL-MCNC: 7.8 G/DL
RBC # BLD AUTO: 4.32 M/UL
SODIUM SERPL-SCNC: 142 MMOL/L
TRIGL SERPL-MCNC: 65 MG/DL
WBC # BLD AUTO: 6.55 K/UL

## 2017-12-13 PROCEDURE — 86160 COMPLEMENT ANTIGEN: CPT | Mod: 59

## 2017-12-13 PROCEDURE — 85025 COMPLETE CBC W/AUTO DIFF WBC: CPT

## 2017-12-13 PROCEDURE — 86225 DNA ANTIBODY NATIVE: CPT

## 2017-12-13 PROCEDURE — 36415 COLL VENOUS BLD VENIPUNCTURE: CPT | Mod: PO

## 2017-12-13 PROCEDURE — 82306 VITAMIN D 25 HYDROXY: CPT

## 2017-12-13 PROCEDURE — 80053 COMPREHEN METABOLIC PANEL: CPT

## 2017-12-13 PROCEDURE — 85651 RBC SED RATE NONAUTOMATED: CPT

## 2017-12-13 PROCEDURE — 86140 C-REACTIVE PROTEIN: CPT

## 2017-12-13 PROCEDURE — 80061 LIPID PANEL: CPT

## 2017-12-13 PROCEDURE — 86160 COMPLEMENT ANTIGEN: CPT

## 2017-12-15 ENCOUNTER — PATIENT MESSAGE (OUTPATIENT)
Dept: RHEUMATOLOGY | Facility: CLINIC | Age: 62
End: 2017-12-15

## 2017-12-18 ENCOUNTER — APPOINTMENT (OUTPATIENT)
Dept: RADIOLOGY | Facility: CLINIC | Age: 62
End: 2017-12-18
Attending: GENERAL PRACTICE
Payer: COMMERCIAL

## 2017-12-18 DIAGNOSIS — E55.9 VITAMIN D DEFICIENCY: ICD-10-CM

## 2017-12-18 LAB — DSDNA AB SER-ACNC: NORMAL [IU]/ML

## 2017-12-18 PROCEDURE — 77080 DXA BONE DENSITY AXIAL: CPT | Mod: 26,,, | Performed by: INTERNAL MEDICINE

## 2017-12-18 PROCEDURE — 77080 DXA BONE DENSITY AXIAL: CPT | Mod: TC,PO

## 2017-12-22 DIAGNOSIS — M32.8 OTHER FORMS OF SYSTEMIC LUPUS ERYTHEMATOSUS: ICD-10-CM

## 2017-12-22 RX ORDER — HYDROXYCHLOROQUINE SULFATE 200 MG/1
TABLET ORAL
Qty: 180 TABLET | Refills: 0 | Status: SHIPPED | OUTPATIENT
Start: 2017-12-22 | End: 2018-01-31 | Stop reason: SDUPTHER

## 2017-12-26 ENCOUNTER — PATIENT MESSAGE (OUTPATIENT)
Dept: FAMILY MEDICINE | Facility: CLINIC | Age: 62
End: 2017-12-26

## 2017-12-26 DIAGNOSIS — E03.9 HYPOTHYROIDISM, UNSPECIFIED TYPE: Primary | ICD-10-CM

## 2017-12-27 ENCOUNTER — LAB VISIT (OUTPATIENT)
Dept: LAB | Facility: HOSPITAL | Age: 62
End: 2017-12-27
Attending: INTERNAL MEDICINE
Payer: COMMERCIAL

## 2017-12-27 DIAGNOSIS — E03.9 HYPOTHYROIDISM, UNSPECIFIED TYPE: Primary | ICD-10-CM

## 2017-12-27 LAB
T4 FREE SERPL-MCNC: 1.08 NG/DL
TSH SERPL DL<=0.005 MIU/L-ACNC: 7.53 UIU/ML

## 2017-12-27 PROCEDURE — 84443 ASSAY THYROID STIM HORMONE: CPT

## 2017-12-27 PROCEDURE — 36415 COLL VENOUS BLD VENIPUNCTURE: CPT | Mod: PO

## 2017-12-27 PROCEDURE — 84439 ASSAY OF FREE THYROXINE: CPT

## 2017-12-28 DIAGNOSIS — E03.9 HYPOTHYROIDISM, UNSPECIFIED TYPE: ICD-10-CM

## 2017-12-28 RX ORDER — LEVOTHYROXINE SODIUM 150 UG/1
TABLET ORAL
Qty: 90 TABLET | Refills: 11 | OUTPATIENT
Start: 2017-12-28

## 2017-12-28 RX ORDER — LEVOTHYROXINE SODIUM 150 UG/1
150 TABLET ORAL
Qty: 30 TABLET | Refills: 11 | Status: SHIPPED | OUTPATIENT
Start: 2017-12-28 | End: 2018-01-22 | Stop reason: SDUPTHER

## 2018-01-09 ENCOUNTER — PATIENT MESSAGE (OUTPATIENT)
Dept: RHEUMATOLOGY | Facility: CLINIC | Age: 63
End: 2018-01-09

## 2018-01-09 ENCOUNTER — TELEPHONE (OUTPATIENT)
Dept: RHEUMATOLOGY | Facility: CLINIC | Age: 63
End: 2018-01-09

## 2018-01-10 ENCOUNTER — TELEPHONE (OUTPATIENT)
Dept: RHEUMATOLOGY | Facility: CLINIC | Age: 63
End: 2018-01-10

## 2018-01-21 DIAGNOSIS — H93.11 RIGHT-SIDED TINNITUS: ICD-10-CM

## 2018-01-21 DIAGNOSIS — D33.3 RIGHT ACOUSTIC NEUROMA: ICD-10-CM

## 2018-01-21 DIAGNOSIS — E03.9 HYPOTHYROIDISM, UNSPECIFIED TYPE: ICD-10-CM

## 2018-01-22 ENCOUNTER — PATIENT MESSAGE (OUTPATIENT)
Dept: FAMILY MEDICINE | Facility: CLINIC | Age: 63
End: 2018-01-22

## 2018-01-22 DIAGNOSIS — E55.9 VITAMIN D DEFICIENCY: ICD-10-CM

## 2018-01-22 DIAGNOSIS — Z12.31 VISIT FOR SCREENING MAMMOGRAM: Primary | ICD-10-CM

## 2018-01-22 RX ORDER — ALPRAZOLAM 0.25 MG/1
TABLET ORAL
Qty: 30 TABLET | Refills: 0 | Status: SHIPPED | OUTPATIENT
Start: 2018-01-22 | End: 2018-05-25 | Stop reason: SDUPTHER

## 2018-01-22 RX ORDER — LEVOTHYROXINE SODIUM 150 UG/1
150 TABLET ORAL
Qty: 30 TABLET | Refills: 10 | Status: SHIPPED | OUTPATIENT
Start: 2018-01-22 | End: 2018-05-21 | Stop reason: SDUPTHER

## 2018-01-22 RX ORDER — ERGOCALCIFEROL 1.25 MG/1
CAPSULE ORAL
Qty: 12 CAPSULE | Refills: 3 | OUTPATIENT
Start: 2018-01-22

## 2018-01-22 RX ORDER — HYDROXYCHLOROQUINE SULFATE 200 MG/1
200 TABLET ORAL 2 TIMES DAILY
Qty: 180 TABLET | Refills: 0 | OUTPATIENT
Start: 2018-01-22

## 2018-01-25 ENCOUNTER — HOSPITAL ENCOUNTER (OUTPATIENT)
Dept: RADIOLOGY | Facility: HOSPITAL | Age: 63
Discharge: HOME | End: 2018-01-25
Attending: INTERNAL MEDICINE
Payer: COMMERCIAL

## 2018-01-25 VITALS — BODY MASS INDEX: 30.36 KG/M2 | HEIGHT: 62 IN | WEIGHT: 165 LBS

## 2018-01-25 DIAGNOSIS — Z12.31 VISIT FOR SCREENING MAMMOGRAM: ICD-10-CM

## 2018-01-25 PROCEDURE — 77067 SCR MAMMO BI INCL CAD: CPT | Mod: 26,,, | Performed by: RADIOLOGY

## 2018-01-25 PROCEDURE — 77067 SCR MAMMO BI INCL CAD: CPT | Mod: TC

## 2018-01-25 PROCEDURE — 77063 BREAST TOMOSYNTHESIS BI: CPT | Mod: 26,,, | Performed by: RADIOLOGY

## 2018-01-28 ENCOUNTER — PATIENT MESSAGE (OUTPATIENT)
Dept: RHEUMATOLOGY | Facility: CLINIC | Age: 63
End: 2018-01-28

## 2018-01-30 ENCOUNTER — OFFICE VISIT (OUTPATIENT)
Dept: FAMILY MEDICINE | Facility: CLINIC | Age: 63
End: 2018-01-30
Payer: COMMERCIAL

## 2018-01-30 ENCOUNTER — HOSPITAL ENCOUNTER (OUTPATIENT)
Dept: RADIOLOGY | Facility: HOSPITAL | Age: 63
Discharge: HOME | End: 2018-01-30
Attending: INTERNAL MEDICINE
Payer: COMMERCIAL

## 2018-01-30 VITALS
WEIGHT: 167.31 LBS | SYSTOLIC BLOOD PRESSURE: 90 MMHG | DIASTOLIC BLOOD PRESSURE: 60 MMHG | HEART RATE: 68 BPM | TEMPERATURE: 98 F | HEIGHT: 59 IN | BODY MASS INDEX: 33.73 KG/M2

## 2018-01-30 DIAGNOSIS — M25.562 CHRONIC PAIN OF LEFT KNEE: Primary | ICD-10-CM

## 2018-01-30 DIAGNOSIS — G89.29 CHRONIC PAIN OF LEFT KNEE: Primary | ICD-10-CM

## 2018-01-30 DIAGNOSIS — M25.562 CHRONIC PAIN OF LEFT KNEE: ICD-10-CM

## 2018-01-30 DIAGNOSIS — G89.29 CHRONIC PAIN OF LEFT KNEE: ICD-10-CM

## 2018-01-30 PROCEDURE — 73560 X-RAY EXAM OF KNEE 1 OR 2: CPT | Mod: TC,FY,PO,LT

## 2018-01-30 PROCEDURE — 99999 PR PBB SHADOW E&M-EST. PATIENT-LVL III: CPT | Mod: PBBFAC,,, | Performed by: INTERNAL MEDICINE

## 2018-01-30 PROCEDURE — 73560 X-RAY EXAM OF KNEE 1 OR 2: CPT | Mod: 26,LT,, | Performed by: RADIOLOGY

## 2018-01-30 PROCEDURE — 3008F BODY MASS INDEX DOCD: CPT | Mod: S$GLB,,, | Performed by: INTERNAL MEDICINE

## 2018-01-30 PROCEDURE — 99214 OFFICE O/P EST MOD 30 MIN: CPT | Mod: S$GLB,,, | Performed by: INTERNAL MEDICINE

## 2018-01-30 NOTE — PATIENT INSTRUCTIONS
Understanding Pes Anserine Bursitis    A bursa is a thin, slippery, sac-like film that contains a small amount of fluid. A bursa is found between bones and soft tissues in and around joints. It cushions and protects joint structures and stops them from rubbing against each other. If a bursa becomes inflamed and irritated, it is known as bursitis.  The pes anserine bursa is found on the inside of the knee joint. It lies between the shinbone (tibia) and 3 tendons that attach the hamstring muscles to the shinbone. Inflammation of this bursa is called pes anserine bursitis.  Causes of pes anserine bursitis  These may include:  · Overuse of the knee during running or other sports  · Sports that require repeated side-to-side motions, such as in tennis or soccer  · Being overweight  · Having knee arthritis  · Having MCL (medial collateral ligament) injury  Symptoms of pes anserine bursitis  The knee joint may be painful. This pain may be worse with kneeling, going up or down stairs, or getting up from a chair. The pain often gets better with rest. The side of the joint may be swollen. It may also be tender and feel warm to the touch.  Treatment for pes anserine bursitis  Treatments may include:  · Resting the knee. This lessens irritation and gives the bursa time to heal.  · Sleeping with a cushion between the thighs. This limits pressure on the bursa.  · Prescription or over-the-counter pain medicines. These help reduce inflammation, swelling, and pain.  · A weight-loss plan if you are overweight. This relieves pressure on the knee joint.  · Stretching and strengthening exercises. These help improve the strength and flexibility of the muscles around the knee.  · Cold therapy, such as using ice packs. This helps reduce swelling and pain.  · Physical therapy. This may include exercises or ultrasound.  · Injections of medicine into the bursa. These may help relieve symptoms.  For symptoms that dont get better with these  treatments, your healthcare provider may recommend surgery to remove the bursa.  Possible complications  If you dont give your knee time to heal, symptoms may return or get worse. Follow your healthcare providers instructions on resting and treating your knee.     When to call your healthcare provider  Call your healthcare provider if:  · Symptoms dont get better or get worse  · New symptoms develop  · Fever, chills or drainage occurs   Date Last Reviewed: 3/10/2016  © 6191-9568 [employer]. [address] [place] [address], [place] [address]. All rights reserved. This information is not intended as a substitute for professional medical care. Always follow your healthcare professional's instructions.

## 2018-01-30 NOTE — PROGRESS NOTES
"Subjective:        Patient ID: Radha Leija is a 62 y.o. female.    Chief Complaint: Joint Swelling (left knee,since november)    HPI   Radha Leija presents with c/o left knee pain.  Pain first started 2 months ago.  It started in the lateral knee, then medial knee joint and then superior tibial region.  There is some swelling and "clicking" associated with it.  Pain gets worse at night when pt is lying flat in bed.  She has not noticed increased pain with squatting or going up and down stairs.  Pt saw a chiropractor and this helped initially.  She saw a 2nd chiropractor but that didn't help much.  It's been worse over the past few weeks.  Pt denies fall, trauma or other injury.  Pt took Ibuprofen last night and that really helped.    Pt has appt with rheum tomorrow to see if this is related to her lupus.    Review of Systems   Constitutional: Negative for activity change and unexpected weight change.   HENT: Negative for hearing loss, rhinorrhea and trouble swallowing.    Eyes: Negative for discharge and visual disturbance.   Respiratory: Negative for chest tightness and wheezing.    Cardiovascular: Negative for chest pain and palpitations.   Gastrointestinal: Negative for blood in stool, constipation, diarrhea and vomiting.   Endocrine: Negative for polydipsia and polyuria.   Genitourinary: Negative for difficulty urinating, dysuria, hematuria and menstrual problem.   Musculoskeletal: Positive for arthralgias and joint swelling. Negative for neck pain.   Neurological: Positive for weakness. Negative for headaches.   Psychiatric/Behavioral: Negative for confusion and dysphoric mood.           Objective:        Vitals:    01/30/18 1344   BP: 90/60   Pulse: 68   Temp: 97.9 °F (36.6 °C)     Physical Exam   Constitutional: She is oriented to person, place, and time. She appears well-developed and well-nourished. No distress.   Musculoskeletal:        Legs:  - mild edema of L knee over anserine bursa, tender " to palpation  - full AROM, normal strength of L knee, +crepitus, no baker's cyst palpated  - normal gait   Neurological: She is alert and oriented to person, place, and time.   Skin: Skin is warm and dry.   Vitals reviewed.          Assessment:         1. Chronic pain of left knee              Plan:         Radha was seen today for joint swelling.    Diagnoses and all orders for this visit:    Chronic pain of left knee: DDx includes SLE, OA, pes anserine bursitis, meniscal tear/injury.  Xray to evaluate joint space, bones.  Pt will follow up with rheum tomorrow and then follow up with me for possible injection of bursitis.  Okay to use NSAIDs PRN, take with food and pt already on daily PPI.  -     X-Ray Knee 1 or 2 View Left; Future        Patient Instructions       Understanding Pes Anserine Bursitis    A bursa is a thin, slippery, sac-like film that contains a small amount of fluid. A bursa is found between bones and soft tissues in and around joints. It cushions and protects joint structures and stops them from rubbing against each other. If a bursa becomes inflamed and irritated, it is known as bursitis.  The pes anserine bursa is found on the inside of the knee joint. It lies between the shinbone (tibia) and 3 tendons that attach the hamstring muscles to the shinbone. Inflammation of this bursa is called pes anserine bursitis.  Causes of pes anserine bursitis  These may include:  · Overuse of the knee during running or other sports  · Sports that require repeated side-to-side motions, such as in tennis or soccer  · Being overweight  · Having knee arthritis  · Having MCL (medial collateral ligament) injury  Symptoms of pes anserine bursitis  The knee joint may be painful. This pain may be worse with kneeling, going up or down stairs, or getting up from a chair. The pain often gets better with rest. The side of the joint may be swollen. It may also be tender and feel warm to the touch.  Treatment for pes anserine  bursitis  Treatments may include:  · Resting the knee. This lessens irritation and gives the bursa time to heal.  · Sleeping with a cushion between the thighs. This limits pressure on the bursa.  · Prescription or over-the-counter pain medicines. These help reduce inflammation, swelling, and pain.  · A weight-loss plan if you are overweight. This relieves pressure on the knee joint.  · Stretching and strengthening exercises. These help improve the strength and flexibility of the muscles around the knee.  · Cold therapy, such as using ice packs. This helps reduce swelling and pain.  · Physical therapy. This may include exercises or ultrasound.  · Injections of medicine into the bursa. These may help relieve symptoms.  For symptoms that dont get better with these treatments, your healthcare provider may recommend surgery to remove the bursa.  Possible complications  If you dont give your knee time to heal, symptoms may return or get worse. Follow your healthcare providers instructions on resting and treating your knee.     When to call your healthcare provider  Call your healthcare provider if:  · Symptoms dont get better or get worse  · New symptoms develop  · Fever, chills or drainage occurs   Date Last Reviewed: 3/10/2016  © 0702-3692 [employer]. [address] [place] [address], [place] [address]. All rights reserved. This information is not intended as a substitute for professional medical care. Always follow your healthcare professional's instructions.

## 2018-01-31 ENCOUNTER — CLINICAL SUPPORT (OUTPATIENT)
Dept: RHEUMATOLOGY | Facility: CLINIC | Age: 63
End: 2018-01-31
Payer: COMMERCIAL

## 2018-01-31 ENCOUNTER — TELEPHONE (OUTPATIENT)
Dept: RHEUMATOLOGY | Facility: CLINIC | Age: 63
End: 2018-01-31

## 2018-01-31 VITALS
DIASTOLIC BLOOD PRESSURE: 71 MMHG | BODY MASS INDEX: 30.94 KG/M2 | WEIGHT: 168.13 LBS | HEART RATE: 59 BPM | HEIGHT: 62 IN | SYSTOLIC BLOOD PRESSURE: 134 MMHG

## 2018-01-31 DIAGNOSIS — H83.3X2 NOISE-INDUCED HEARING LOSS OF LEFT EAR, UNSPECIFIED HEARING STATUS ON CONTRALATERAL SIDE: ICD-10-CM

## 2018-01-31 DIAGNOSIS — M17.12 PRIMARY OSTEOARTHRITIS OF LEFT KNEE: Primary | ICD-10-CM

## 2018-01-31 DIAGNOSIS — R42 VERTIGO: ICD-10-CM

## 2018-01-31 DIAGNOSIS — H91.8X2 OTHER SPECIFIED HEARING LOSS OF LEFT EAR, UNSPECIFIED HEARING STATUS ON CONTRALATERAL SIDE: ICD-10-CM

## 2018-01-31 DIAGNOSIS — D33.3 RIGHT ACOUSTIC NEUROMA: ICD-10-CM

## 2018-01-31 DIAGNOSIS — E55.9 VITAMIN D DEFICIENCY: ICD-10-CM

## 2018-01-31 PROCEDURE — 99213 OFFICE O/P EST LOW 20 MIN: CPT | Mod: S$GLB,,, | Performed by: INTERNAL MEDICINE

## 2018-01-31 PROCEDURE — 99999 PR PBB SHADOW E&M-EST. PATIENT-LVL IV: CPT | Mod: PBBFAC,,, | Performed by: INTERNAL MEDICINE

## 2018-01-31 RX ORDER — ERGOCALCIFEROL 1.25 MG/1
CAPSULE ORAL
Qty: 12 CAPSULE | Refills: 3 | Status: SHIPPED | OUTPATIENT
Start: 2018-01-31 | End: 2019-07-25 | Stop reason: SDUPTHER

## 2018-01-31 RX ORDER — HYDROXYCHLOROQUINE SULFATE 200 MG/1
200 TABLET ORAL 2 TIMES DAILY
Qty: 180 TABLET | Refills: 0 | Status: SHIPPED | OUTPATIENT
Start: 2018-01-31 | End: 2018-05-07 | Stop reason: SDUPTHER

## 2018-01-31 ASSESSMENT — ROUTINE ASSESSMENT OF PATIENT INDEX DATA (RAPID3)
PAIN SCORE: 3.5
MDHAQ FUNCTION SCORE: .7
TOTAL RAPID3 SCORE: 2.61
WHEN YOU AWAKENED IN THE MORNING OVER THE LAST WEEK, PLEASE INDICATE THE AMOUNT OF TIME IT TAKES UNTIL YOU ARE AS LIMBER AS YOU WILL BE FOR THE DAY: 15 MINS
PSYCHOLOGICAL DISTRESS SCORE: 3.3
PATIENT GLOBAL ASSESSMENT SCORE: 2
FATIGUE SCORE: 3.5
AM STIFFNESS SCORE: 1, YES

## 2018-01-31 NOTE — PROGRESS NOTES
"Subjective:       Patient ID: Radha Leija is a 62 y.o. female.    Chief Complaint: Left knee pain  HPI for last several months, pain and swelling left knee, lateral and inferomedial without fall/direct trauma, but worsened after manipulated by chiropractor. Had x-ray yesterday ordered by Dr. Gonzalez her primary which shows moderate medial TF OA and mild PF OA  Pt brought it today for injection, but knee is no longer painful, and swelling markedly decreased, and no longer tender  Review of Systems   HENT:        Vertigo noted several episodes  Has acoustic neuroma, followed [facility], used to see Dr. Ortez but no longer wants to see him. Will see Neurology here if necessary, but doesn;t wish to pursue currently         Objective:   /71   Pulse (!) 59   Ht 5' 2.4" (1.585 m)   Wt 76.2 kg (168 lb 1.6 oz)   BMI 30.35 kg/m²      Physical Exam       Left Side Rheumatological Exam     The patient is tender to palpation of the knee.    She has swelling of the knee    Knee Exam   Tenderness Location: medial joint line    Range of Motion   The patient has normal left knee ROM.  Extension: normal   Flexion: normal     Patellofemoral Crepitus: positive  Effusion: positive  Warmth: negative            Assessment/Plan         Problem List Items Addressed This Visit     Vitamin D deficiency    Overview     Results for RADHA COTA (MRN [id number]) as of 11/27/2017 08:33   Ref. Range 8/14/2017 11:29   Vit D, 25-Hydroxy Latest Ref Range: 30 - 96 ng/mL 28 (L)            Relevant Medications    ergocalciferol (ERGOCALCIFEROL) 50,000 unit Cap    Osteoarthritis of left knee - Primary    Overview     Time of Procedure: 01/30/18 14:13:15  Accession # [id number]    Comparison: None.    Number of views: 2: AP and lateral views of the LEFT knee.    Findings:  There is mild tricompartmental osteoarthrosis.    I detect no fracture, dislocation, radiopaque retained foreign body, lytic or blastic lesion.    Calcification " overlying the popliteal fossa raises the question of synovial osteochondromatosis.   Impression       Please see above.       Electronically signed by: Ameena Porter MD  Date: 01/30/18  Time: 14:24               Current Assessment & Plan     Left knee painless today  Swelling spontaneously resolving  Has laxity medial and lateral collaterals  Small cool suprapatellar bulge  + crepitus, full rom    No need to inject as no longer painful  Ref to PT for quad and hamstring strengthening  Keep reg appt for SLE         Relevant Orders    Ambulatory Referral to Physical/Occupational Therapy    Vertigo    Overview     Note of Dr. 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 Audiogram: lost further hearing right ear, 36% word recognition on right. There was deterioration left non-tumor ear. Recommended f/u with local otologist to evaluate lost hearing left ear.  Recommend f/u MRI and audiogram in one year.         Current Assessment & Plan     Given the vertigo, and hearing loss in left ear, will refer to ENT as recommended by Dr. Evans her neurosurgeon. She formerly saw Dr. Ortez but no longer does

## 2018-01-31 NOTE — ASSESSMENT & PLAN NOTE
Left knee painless today  Swelling spontaneously resolving  Has laxity medial and lateral collaterals  Small cool suprapatellar bulge  + crepitus, full rom    No need to inject as no longer painful  Ref to PT for quad and hamstring strengthening  Keep reg appt for SLE

## 2018-01-31 NOTE — ASSESSMENT & PLAN NOTE
Given the vertigo, and hearing loss in left ear, will refer to ENT as recommended by Dr. Evans her neurosurgeon. She formerly saw Dr. Ortez but no longer does

## 2018-02-01 ENCOUNTER — CLINICAL SUPPORT (OUTPATIENT)
Dept: REHABILITATION | Facility: HOSPITAL | Age: 63
End: 2018-02-01
Attending: INTERNAL MEDICINE
Payer: COMMERCIAL

## 2018-02-01 DIAGNOSIS — M17.12 PRIMARY OSTEOARTHRITIS OF LEFT KNEE: ICD-10-CM

## 2018-02-01 PROCEDURE — 97110 THERAPEUTIC EXERCISES: CPT | Mod: PO

## 2018-02-01 PROCEDURE — 97162 PT EVAL MOD COMPLEX 30 MIN: CPT | Mod: PO

## 2018-02-01 NOTE — PROGRESS NOTES
PHYSICAL THERAPY INITIAL EVALUATION     Date: 02/01/2018  Name: Radha Leija  Clinic Number: [id number]    Visit #: 1   Start Time:  12:10  Stop Time:  1:00  Time in treatment: 50 minutes    Orders:    none none   To Vendor Referred By By Location/POS By Department   none William Rios MD [facility] RHEUMATOLOGY   Priority Start Date Expiration Date Referral Entered By   Routine 01/31/2018 12/31/2018 William Rios MD   Visits Requested Visits Authorized Visits Completed Visits Scheduled   1 35 0 1       Diagnosis   M17.12 (ICD-10-CM) - Primary osteoarthritis of left knee       History   History of Present Illness:   Radha Leija presents with c/o left knee pain.  Pain first started November 2017.  It started in the lateral knee, then medial knee joint and then superior tibial region   Pt saw a chiropractor and this helped initially.    Treatment Diagnosis: No diagnosis found.    Past Medical History      Past Medical History:   Diagnosis Date    Acid reflux     Anemia 2years ago    Anxiety     Arthritis     Asthma, chronic 4/10/2013    Crohn's disease 1998    Depression     Dry eyes     Fracture of forearm, proximal, open     screws & plate 6/26/13 in [facility]    History of papilledema 11/10/11    Lupus     Neuromuscular disorder 12/2009    Neuropathy    Osteoporosis     Stroke 1/29/2013    Thyroid disease    Right drop foot 2/2 ?  Allergies  Review of patient's allergies indicates:   Allergen Reactions    Bactrim [sulfamethoxazole-trimethoprim] Nausea And Vomiting     Nausea, ? rash  Nausea, ? rash       Current Medication list:   Current Outpatient Prescriptions on File Prior to Visit   Medication Sig Dispense Refill    ALPRAZolam (XANAX) 0.25 MG tablet TAKE 1 TABLET BY MOUTH EVERY 8 HOURS AS NEEDED 30 tablet 0    aspirin 81 MG Chew Take 81 mg by mouth once daily.      b complex vitamins tablet Take 1 tablet by mouth 3 (three) times daily.       ergocalciferol  (ERGOCALCIFEROL) 50,000 unit Cap Take 1 capsule by mouth  every 7 days 12 capsule 3    esomeprazole (NEXIUM) 40 MG capsule Take 40 mg by mouth before breakfast.       fish oil-omega-3 fatty acids 300-1,000 mg capsule Take 1 g by mouth 2 (two) times daily.       levothyroxine (SYNTHROID) 150 MCG tablet Take 1 tablet (150 mcg total) by mouth before breakfast. 30 tablet 10    multivitamin (THERAGRAN) per tablet Take 1 tablet by mouth before breakfast.      PLAQUENIL 200 mg tablet Take 1 tablet (200 mg total) by mouth 2 (two) times daily. 180 tablet 0     No current facility-administered medications on file prior to visit.        Precautions: Standard,    Prior Therapy: no    Diagnostic Tests: xrays  Number of views: 2: AP and lateral views of the LEFT knee.  Findings:  There is mild tricompartmental osteoarthrosis.  I detect no fracture, dislocation, radiopaque retained foreign body, lytic or blastic lesion.  Calcification overlying the popliteal fossa raises the question of synovial osteochondromatosis.    Stairs in home/work?  no  Are stairs bothersome?:  yes  Work status: retired  Sports/Recreational Activities: swim  Assistive devices/equipment cane but doesn't utilize    Cultural, Spiritual, Developmental and Educational concerns: none  Abuse/Neglect, Nutritional concerns: none     Patient Therapy Goals: get stronger legs    Subjective   Radha ALONSO Vandling states she is not currently having any pain in the left.  Both knees get stiff.  Pt states when she went to [facility] pain had already abated.     Activities that increase symptoms:  Stairs, squatting   What decreases symptoms:   Ibuprofen, ice  Popping/clicking: yes  Lower extremity gives way: denies  Locking episodes: denies  Are symptoms changing since onset:  improved  Pain level with 0 being the lowest and 10 being at onset of treatment:  0  Pain level with 0 being the lowest and 10 being at conclusion of treatment:   0  Pain level at best: 0  Pain level at  worst: 7Current functional status: independent  Exercise routine prior to onset :  None        FALLS: How many in the last month?                   How many in the last year?           0  Does the patient have any concerns of abuse  no    Objective   62 y.o. White female arrives to clinic in [place]    GAIT: exhibits right circumduction.  States she had a foot drop of unknown cause on right but now improved    POSTURAL examination in standing: forward head, rounded shoulders,  protrusive abdomen, level pelvis  Left knee laterally deviated     JOINT ROM  AROM hips      WFL   AROM ankles  WFL  AROM knees   WFL    Range of Motion: Knee  POSITION  supine LEFT    AAROM/ AROM COMMENTS RIGHT    AROM COMMENTS   2/1/18 5-130  /   0-130                    Joint Mobility: WNL    LE MMT                Grossly 5/5 Right                 Grossly 5/5 Left  With following exceptions:                                             RIGHT                                  LEFT                        Hip  Abductors       4/5                                     4/5               Hip  Adductors       3+/5                                     3+/5               Hip flexors              4+/5                                     4+/5                Hip extensors         3/5                                     3/5               Quadriceps            5-/5                                      4/5               Hamstrings            5-/5                                      4+/5               Gross Core strength:  [id number]:  Right:  Hamstrings extend to 10 degrees in supine with hip flexed to 90 degrees  Left:  Hamstrings extend  To 15 degrees in supine with hip flexed to 90 degrees  Gastroc/soleus: neutral     PALPATION: Tender to palpation at left medial joint line  Crepitus: mild  Sensation: nt  Muscle  Tone:  Normal  Atrophy noted: no      EDEMA:   Left: moderate suprapatella region  Right: absent  TREATMENT: Evaluation  completed  THEREX:  Pt received 15 minutes of individualized exercises with one-to one instruction  to develop a  Home Exercise program to develop strength, increase flexibility, improve balance, and increase endurance of the LEs.    1 x 10 reps quad sets  1 x 10 reps TKE   1 x 10 reps SLR   1 x 10 reps VMO SLR  1 x 10 reps hip abduction in supine with knee flexed with blue Theraband resistance  1 x 10 reps hip adduction in supine with knee flexed with ball resistance   Add next session    1 x 10 reps sidelying hip abduction SLR   1 x 10 reps sidelying hip adduction SLR   1 x 10 reps prone hip extension SLR  ITB stretch in sidelying    Education   Patient was provided with a written copy of the above home exercises indicated in bold to perform as tolerated within limits of pain.  Exercises were reviewed and patient was able to demonstrate them prior to the end of the session.  Pt was instructed in ways to improve safety of home environment to prevent falls  Pt instructed in proper use of ice or heat to limb.    All of the patients questions were answered  Goals of therapy, the roles of PT and PTA, and the need for compliance of appointments, attendance policy and HEP was discussed with patient .  Patient expressed understanding and agreement with above education.      No barriers to learning or social/cultural issues were identified that could hinder therapy.    Assessment   Pt with Lupus had several months of  Insidious onset right knee pain that resolved this week. Ptstates she perceived no change in symptoms post therapy and suffered no adverse effects with treatment.   .  Pt presents with a fluctuant  clinical presentation which includes:  Knee pain fluctuant in nature, decreased left knee extension, decreased strength, laterally deviated left patella with weak adductors and VMO and tight ITB, abnormal gait pattern     Patient can benefit from outpatient physical therapy and a home program  Prognosis to achieve  below goals is good provided pt is compliant with home program and PT appointments..    Pt's spiritual, cultural and educational needs considered and pt agreeable to plan of care and goals as stated below:    Medical necessity is demonstrated by the following impairments/problem list:   Requires skilled supervision to complete and progress HEP    Decreased standing tolerance    Decreased community ambulation    Impaired muscle strength   Impaired ROM   Pain    Anticipated barriers to physical therapy: none    G Code   nt/Limitation/Restriction for FOTO Knee Survey  Status Limitation G-Code CMS Severity Modifier  Intake 60% 40% Current Status CK - At least 40 percent but less than 60 percent  Predicted 66% 34% Goal Status+ CJ - At least 20 percent but less than 40 percent  D/C Status CK **only report if this is a one time visit           Goals   8weeks. Pt agrees with goals set.  Independent with HEP.  Increased MMT in 50% of weak mm that are below 5-/5 grade  by 1/3 grade to enable participation in ADLS and leisure activity  Improve safety of gait and pt to report no falls curing course of therapy.  Increased AROM of knee extension by 5degrees of extension  to enable pt  achieve full heel strike during ambulation  Improve gait pattern  Without an AD incorpratng proper heel strike /toe off bilaterally    Goals beyond 8 weeks will be set at that time based on reassessment    Re-assessment due on or before:  3/1/18    PTA may be involved in patient care as part of the Rehab team.      Plan   Pt will be seen 1-2 times per week for 8 weeks.   Recommended Treatment Plan will include:  Manual soft tissue and/or joint mobilization  Therapeutic Exercise  Neuromuscular re-education          Individualized Home Exercise Program  Modalities: heat/ice, estim, US as needed         Pt education    PTA may be involved in patient's care as part of the Rehab Team.        History  Co-morbidities and personal factors that may impact  the plan of care Examination  Body Structures and Functions, activity limitations and participation restrictions that may impact the plan of care Clinical Presentation   Co-morbidities:   SLE, OA, neuropathy, OP    Personal Factors:   no deficits Body Regions:   lower extremities    Body Systems:    ROM  strength  balance  gait  edema    Participation Restrictions:   no     Activity limitations:   Learning and applying knowledge  no deficits  General Tasks and Commands  no deficits  Communication  no deficits  Mobility  walking  Self care  no deficits  Domestic Life  no deficits  Interactions/Relationships  no deficits  Life Areas  no deficits  Community and Social Life  no deficits         evolving clinical presentation with changing clinical characteristics                  moderate   high high Decision Making/ Complexity Score:  moderate        Alyse Giraldo, PT, MHA, WCC  02/01/2018

## 2018-02-05 ENCOUNTER — CLINICAL SUPPORT (OUTPATIENT)
Dept: REHABILITATION | Facility: HOSPITAL | Age: 63
End: 2018-02-05
Attending: INTERNAL MEDICINE
Payer: COMMERCIAL

## 2018-02-05 DIAGNOSIS — M17.12 PRIMARY OSTEOARTHRITIS OF LEFT KNEE: Primary | ICD-10-CM

## 2018-02-05 PROCEDURE — 97110 THERAPEUTIC EXERCISES: CPT | Mod: PO

## 2018-02-05 NOTE — PROGRESS NOTES
PHYSICAL THERAPY PROGRESS NOTE     Date: 02/05/2018  Name: Radha Leija  Clinic Number: [id number]    Visit #: 2  Start Time:  1000  Stop Time:    Time in treatment: 50 minutes    Orders:    none none   To Vendor Referred By By Location/POS By Department   none William Rios MD [facility] RHEUMATOLOGY   Priority Start Date Expiration Date Referral Entered By   Routine 01/31/2018 12/31/2018 William Rios MD   Visits Requested Visits Authorized Visits Completed Visits Scheduled   1 35 2 0       Diagnosis   M17.12 (ICD-10-CM) - Primary osteoarthritis of left knee       History   History of Present Illness:   Radha Leija presents with c/o left knee pain.  Pain first started November 2017.  It started in the lateral knee, then medial knee joint and then superior tibial region   Pt saw a chiropractor and this helped initially.    Treatment Diagnosis: No diagnosis found.    Past Medical History      Past Medical History:   Diagnosis Date    Acid reflux     Anemia 2years ago    Anxiety     Arthritis     Asthma, chronic 4/10/2013    Crohn's disease 1998    Depression     Dry eyes     Fracture of forearm, proximal, open     screws [facility] plate 6/26/13 [facility]    History of papilledema 11/10/11    Lupus     Neuromuscular disorder 12/2009    Neuropathy    Osteoporosis     Stroke 1/29/2013    Thyroid disease    Right drop foot 2/2 ?      Precautions: hearing loss in R ear    Subjective       Pt reports she has been doing the exercises once a day.  Pt denies soreness following exercises at home.    Pain: 0/10      Objective     GAIT: exhibits right circumduction.  States she had a foot drop of unknown cause on right but now improved    POSTURAL examination in standing: forward head, rounded shoulders,  protrusive abdomen, level pelvis  Left knee laterally deviated       POSITION  supine LEFT    AAROM/ AROM COMMENTS RIGHT    AROM COMMENTS   2/1/18 5-130  /   0-130        LE  MMT                Grossly 5/5 Right                 Grossly 5/5 Left  With following exceptions:                                             RIGHT                                  LEFT                        Hip  Abductors       4/5                                     4/5               Hip  Adductors       3+/5                                     3+/5               Hip flexors              4+/5                                     4+/5                Hip extensors         3/5                                     3/5               Quadriceps            5-/5                                      4/5               Hamstrings            5-/5                                      4+/5                   TREATMENT:   THEREX:  Pt received 55 minutes of individualized exercises with one-to one instruction  to develop a  Home Exercise program to develop strength, increase flexibility, improve balance, and increase endurance of the LEs.      2 x 10 reps quad sets  2 x 10 reps TKE   2 x 10 reps SLR   1 x 10 reps VMO SLR  1 x 10 reps hip abduction in supine with knee flexed with blue Theraband resistance  2 x 10 reps hip adduction in supine with knee flexed with ball resistance     1 x 10 reps sidelying hip abduction SLR   1 x 10 reps sidelying hip adduction SLR   1 x 10 reps prone hip extension SLR    Hamstring stretch at stairs 3 x 30 seconds  Gastroc stretch on incline 3 x 30 seconds  Massage roller to L IT band x 3 minutes    Education     Patient was provided with a written copy of the above home exercises indicated in bold to perform as tolerated within limits of pain.  Exercises were reviewed and patient was able to demonstrate them prior to the end of the session.  Patient expressed understanding and agreement with above education.      Assessment     Pt tolerated treatment well with no increase in pain.  Will progress reps as tolerated.     Patient can benefit from outpatient physical therapy and a home program  Prognosis  to achieve below goals is good provided pt is compliant with home program and PT appointments..    Pt's spiritual, cultural and educational needs considered and pt agreeable to plan of care and goals as stated below:    Medical necessity is demonstrated by the following impairments/problem list:   Requires skilled supervision to complete and progress HEP    Decreased standing tolerance    Decreased community ambulation    Impaired muscle strength   Impaired ROM   Pain    Anticipated barriers to physical therapy: none    G Code   nt/Limitation/Restriction for FOTO Knee Survey  Status Limitation G-Code CMS Severity Modifier  Intake 60% 40% Current Status CK - At least 40 percent but less than 60 percent  Predicted 66% 34% Goal Status+ CJ - At least 20 percent but less than 40 percent  D/C Status CK **only report if this is a one time visit           Goals   8weeks. Pt agrees with goals set.  Independent with HEP.  Increased MMT in 50% of weak mm that are below 5-/5 grade  by 1/3 grade to enable participation in ADLS and leisure activity  Improve safety of gait and pt to report no falls curing course of therapy.  Increased AROM of knee extension by 5degrees of extension  to enable pt  achieve full heel strike during ambulation  Improve gait pattern  Without an AD incorpratng proper heel strike /toe off bilaterally    Goals beyond 8 weeks will be set at that time based on reassessment    Re-assessment due on or before:  3/1/18    PTA may be involved in patient care as part of the Rehab team.      Plan   Pt will be seen 1-2 times per week for 8 weeks.   Recommended Treatment Plan will include:  Manual soft tissue and/or joint mobilization  Therapeutic Exercise  Neuromuscular re-education          Individualized Home Exercise Program  Modalities: heat/ice, estim, US as needed         Pt education    PTA may be involved in patient's care as part of the Rehab Team.        History  Co-morbidities and personal factors that  may impact the plan of care Examination  Body Structures and Functions, activity limitations and participation restrictions that may impact the plan of care Clinical Presentation   Co-morbidities:   SLE, OA, neuropathy, OP    Personal Factors:   no deficits Body Regions:   lower extremities    Body Systems:    ROM  strength  balance  gait  edema    Participation Restrictions:   no     Activity limitations:   Learning and applying knowledge  no deficits  General Tasks and Commands  no deficits  Communication  no deficits  Mobility  walking  Self care  no deficits  Domestic Life  no deficits  Interactions/Relationships  no deficits  Life Areas  no deficits  Community and Social Life  no deficits         evolving clinical presentation with changing clinical characteristics                  moderate   high high Decision Making/ Complexity Score:  moderate

## 2018-02-20 ENCOUNTER — PATIENT MESSAGE (OUTPATIENT)
Dept: RHEUMATOLOGY | Facility: CLINIC | Age: 63
End: 2018-02-20

## 2018-02-26 ENCOUNTER — CLINICAL SUPPORT (OUTPATIENT)
Dept: REHABILITATION | Facility: HOSPITAL | Age: 63
End: 2018-02-26
Attending: INTERNAL MEDICINE
Payer: COMMERCIAL

## 2018-02-26 DIAGNOSIS — M17.12 PRIMARY OSTEOARTHRITIS OF LEFT KNEE: Primary | ICD-10-CM

## 2018-02-26 PROCEDURE — 97110 THERAPEUTIC EXERCISES: CPT | Mod: PO

## 2018-02-26 NOTE — PROGRESS NOTES
Physical Therapy Daily Note     Name: Radha Leija  Clinic Number: [id number]  Diagnosis:   Encounter Diagnosis   Name Primary?    Primary osteoarthritis of left knee Yes     Physician: William Rios MD  Precautions:Standard  Visit #: 3  of 35  Eval Date: 2/1/18  Time In: 10:00  Time Out: 11:00  Treatment Time: 50   1:1 time: 25     Subjective     Pt reports: having some good days and some bad days. She reports no pain currently. Pain generally worse with to (L) knee with prolonged walking.   Pain Scale: Radha rates pain on a scale of 0-10 to be 0 currently.    Objective     Radha received individual therapeutic exercises to develop LE strength, endurance, ROM and flexibility for 50 minutes including:  Recumbent stationary bike x 5 minutes level 1  Hamstring stretch at stairs 2 x 30 seconds  Gastroc stretch on incline 2 x 30 seconds    x 10 reps quad sets with heel prop  2 x 10 reps  With 1# TKE   2 x 10 reps  1# SLR   2 x 10 reps VMO SLR with 1#   2 x 10 reps hip abduction in supine with knee flexed with blue Theraband resistance  2 x 10 reps hip adduction in supine with knee flexed with ball resistance    2 x 10 reps sidelying hip abduction SLR   2 x 10 reps sidelying hip adduction SLR   2 x 10 reps prone hip extension SLR   Bridging 2 x 10  Massage roller to L IT band x 2 minutes  Standing TKE with GTB 2 x 10  Leg press ( precor downstairs) with 20# 2 x 10    Patient receives a cold pack applied to (L)knee x 10 minutes for pain control.     Patient education: Patient educated to continue with previously issued HEP along with updated copy of HEP to include: bridging and Standing TKE with GTB. She was educated to avoid exacerbation of pain with ex's which she voices understanding.   No spiritual or educational barriers to learning      Assessment     Patient tolerated Tx well performing and progress with ex's/activities within appropriate level of muscular  fatigue without c/o pain. She reports that the addition of 1# ankle weights for SLR ex's provided a good challenge and is demonstrating improved strength. (L) knee extension not formally measured today but appeared improved from eval.   This is a 63 y.o. female referred to outpatient physical therapy and presents with a medical diagnosis of   Encounter Diagnosis   Name Primary?    Primary osteoarthritis of left knee Yes   and demonstrates limitations as described in the problem list.   Pt will continue to benefit from skilled outpatient physical therapy to address the deficits listed in the problem list, provide pt/family education and to maximize pt's level of independence in the home and community environment.     8weeks. Pt agrees with goals set. ( From Eval)   Independent with HEP.  Increased MMT in 50% of weak mm that are below 5-/5 grade  by 1/3 grade to enable participation in ADLS and leisure activity  Improve safety of gait and pt to report no falls curing course of therapy.  Increased AROM of knee extension by 5degrees of extension  to enable pt  achieve full heel strike during ambulation  Improve gait pattern  Without an AD incorpratng proper heel strike /toe off bilaterally   Plan     Continue with established Plan of Care towards PT goals.    Therapist: Jak Urias, PTA  2/26/2018

## 2018-03-06 ENCOUNTER — CLINICAL SUPPORT (OUTPATIENT)
Dept: REHABILITATION | Facility: HOSPITAL | Age: 63
End: 2018-03-06
Attending: INTERNAL MEDICINE
Payer: COMMERCIAL

## 2018-03-06 DIAGNOSIS — M17.12 PRIMARY OSTEOARTHRITIS OF LEFT KNEE: Primary | ICD-10-CM

## 2018-03-06 PROCEDURE — 97110 THERAPEUTIC EXERCISES: CPT | Mod: PO

## 2018-03-06 NOTE — PROGRESS NOTES
Physical Therapy Reassessment     Date: 03/06/2018  Name: Radha Leija  Clinic Number: [id number]    Visit #: 1   Start Time:  12:10  Stop Time:  1:00  Time in treatment: 50 minutes    Orders:    none none   To Vendor Referred By By Location/POS By Department   none William Rios MD [facility] RHEUMATOLOGY   Priority Start Date Expiration Date Referral Entered By   Routine 01/31/2018 12/31/2018 William Rios MD   Visits Requested Visits Authorized Visits Completed Visits Scheduled   1 35 0 1       Diagnosis   M17.12 (ICD-10-CM) - Primary osteoarthritis of left knee       History   History of Present Illness:   Radha Leija presents with c/o left knee pain.  Pain first started November 2017.  It started in the lateral knee, then medial knee joint and then superior tibial region   Pt saw a chiropractor and this helped initially.    Treatment Diagnosis:   1. Primary osteoarthritis of left knee         Precautions: Standard,    Diagnostic Tests: xrays  Number of views: 2: AP and lateral views of the LEFT knee.  Findings:  There is mild tricompartmental osteoarthrosis.  Calcification overlying the popliteal fossa raises the question of synovial osteochondromatosis.        Subjective   Radha Leija states she is currently having any pain in the left knee 1/10.  Both knees get stiff.    FALLS: How many in the last month?                   How many in the last year?           0  Does the patient have any concerns of abuse  no    Objective   63 y.o. White female arrives to clinic in [facility]    GAIT: exhibits right circumduction.    POSTURAL examination in standing: forward head, rounded shoulders,  protrusive abdomen, level pelvis  Left knee laterally deviated   Range of Motion: Knee  POSITION  supine LEFT    AAROM/ AROM COMMENTS RIGHT    AROM COMMENTS   2/1/18 5-130  /   0-130    3/6 5-130 /5-130               Joint Mobility:  Marked crepitus L>R  :                                              RIGHT                                  LEFT                        Hip  Abductors       4/5                                     4/5               Hip  Adductors      4-/5                                     4-/5               Hip flexors              4+/5                                     4+/5                Hip extensors         4-/5                                    4-/5               Quadriceps            5-/5                                      4/5               Hamstrings            5-/5                                      4+/5                 Right:  Hamstrings extend to 10 degrees in supine with hip flexed to 90 degrees  Left:  Hamstrings extend  To 15 degrees in supine with hip flexed to 90 degrees  Gastroc/soleus: neutral     PALPATION: Tender to palpation at left medial joint line    EDEMA:   Left: none noted today but pt relates the knee feels swollen  Right: absent      TREATMENT:  THEREX:  Pt received 55 minutes of individualized exercises with one-to one instruction  to develop a  Home Exercise program to develop strength, increase flexibility, improve balance, and increase endurance of the LEs.    Recumbent stationary bike x 5 minutes level 1  Hamstring stretch at stairs 2 x 30 seconds  Gastroc stretch on incline 2 x 30 seconds    x 10 reps quad sets   2 x 10 reps  With 1# TKE   x 10 reps  3# SLR    x 10 reps VMO SLR with 3#   2 x 10 reps hip abduction in supine with knee flexed with blue Theraband resistance NP  2 x 10 reps hip adduction in supine with knee flexed with ball resistance  NP   x 10 reps sidelying hip abduction SLR 3#   x 10 reps sidelying hip adduction SLR 3#  x 10 reps prone hip extension SLR 3#    Bridging 2 x 10  Massage roller to L IT band x 2 minutes  Standing TKE with GTB 2 x 10  NP  Step ups on step x 10   Leg press (upstairs) with 100# 2 x 10  3 way  Had some difficulty with technique of abduction with GTB    Patient receives a cold pack applied to (L)knee x 10  minutes for pain control.     Patient education: Patient educated to continue with previously issued HEP Pt expressed understandign and agreement    Assessment     Patient tolerated Tx well performing and progress with ex's/activities within appropriate level of muscular fatigue without c/o pain. She reports that she has been using 3# for wt for SLRs at home and was able to tolerate 10 reps with 3# in clinic.Her ROM is slightly improved in the left knee  Medical necessity is demonstrated by the following impairments/problem list:   Requires skilled supervision to complete and progress HEP    Decreased standing tolerance    Decreased community ambulation    Impaired muscle strength   Impaired ROM   Pain    Anticipated barriers to physical therapy: none    G Code   nt/Limitation/Restriction for FOTO Knee Survey  Status Limitation G-Code CMS Severity Modifier  Intake 60% 40% Current Status CK - At least 40 percent but less than 60 percent  Predicted 66% 34% Goal Status+ CJ - At least 20 percent but less than 40 percent  D/C Status CK **only report if this is a one time visit           Goals   8weeks. Pt agrees with goals set.  Independent with HEP.  Increased MMT in 50% of weak mm that are below 5-/5 grade  by 1/3 grade to enable participation in ADLS and leisure activity  Improve safety of gait and pt to report no falls curing course of therapy.  Increased AROM of knee extension by 5degrees of extension  to enable pt  achieve full heel strike during ambulation  Improve gait pattern  Without an AD incorpratng proper heel strike /toe off bilaterally    Goals beyond 8 weeks will be set at that time based on reassessment      PTA may be involved in patient care as part of the Rehab team.      Plan   Pt will be seen 1-2 times per week for 8 weeks.   Increase therex as tolerated  PTA may be involved in patient's care as part of the Rehab Team.             Alyse Giraldo, PT, A, [facility]  03/06/2018

## 2018-03-20 ENCOUNTER — CLINICAL SUPPORT (OUTPATIENT)
Dept: REHABILITATION | Facility: HOSPITAL | Age: 63
End: 2018-03-20
Attending: INTERNAL MEDICINE
Payer: COMMERCIAL

## 2018-03-20 DIAGNOSIS — M17.12 PRIMARY OSTEOARTHRITIS OF LEFT KNEE: Primary | ICD-10-CM

## 2018-03-20 PROCEDURE — 97110 THERAPEUTIC EXERCISES: CPT | Mod: PO

## 2018-03-20 NOTE — PROGRESS NOTES
Physical Therapy Daily Note     Name: Radha Leija  Clinic Number: [id number]  Diagnosis:   Encounter Diagnosis   Name Primary?    Primary osteoarthritis of left knee Yes     Physician: William Rios MD  Precautions:Standard  Visit #: 5  of 35  Eval Date: 2/1/18  Time In: 11:00  Time Out: 12:00  Treatment Time: 50     Subjective     Pt reports: that she is feeling a little better overall. States that her walking tolerance is limited to 30 minutes prior to knee symptoms.     Pain Scale: Radha rates pain on a scale of 0-10 to be 0 currently.    Objective     Radha received individual therapeutic exercises to develop LE strength, endurance, ROM and flexibility for 50 minutes including:  Recumbent stationary bike x 8 minutes level 1  Hamstring stretch at stairs 2 x 30 seconds  Gastroc stretch on incline 2 x 30 seconds   x 20 reps quad sets with heel prop  2 x 10 reps  with 3#  TKE   2 x 10 reps  3# SLR   2 x 10 reps VMO SLR with 1#--NP   2 x 10 reps hip abduction in supine with knee flexed with blue Theraband resistance--NP  2 x 10 reps hip adduction in supine with knee flexed with ball resistance --NP   2 x 10 reps sidelying hip abduction SLR with 3# 2 x 10  2 x 10 reps sidelying hip adduction SLR with 3# 2 x 10  2 x 10 reps prone hip extension SLR with 3# 2 x 10  Bridging + iso hip abd GTB 2 x 10  Sidelying clams with GTB 2 x 10  Massage roller to L IT band x 2 minutes--NP  Standing TKE on Matrix with 40# 2 x 10 2 x 10  Leg press ( precor downstairs) with 30# 2 x 10    Patient receives a cold pack applied to (L)knee x 10 minutes for pain control.     Patient education: Patient educated to continue with previously issued HEP .  She was educated on the addition of sidelying clams with Tband with good understanding, She was educated to avoid exacerbation of pain with ex's which she voices understanding.   No spiritual or educational barriers to learning       Assessment     Patient tolerated Tx well performing and progressing with ex's/activities within appropriate level of muscular fatigue without c/o pain.  She reports still with tendency for (L) knee symptoms with walking time > 30 minutes although this is an improvement overall. This is a 63 y.o. female referred to outpatient physical therapy and presents with a medical diagnosis of   Encounter Diagnosis   Name Primary?    Primary osteoarthritis of left knee Yes   and demonstrates limitations as described in the problem list.   Pt will continue to benefit from skilled outpatient physical therapy to address the deficits listed in the problem list, provide pt/family education and to maximize pt's level of independence in the home and community environment.     Goals from reassess  8weeks.    Independent with HEP.  Increased MMT in 50% of weak mm that are below 5-/5 grade  by 1/3 grade to enable participation in ADLS and leisure activity  Improve safety of gait and pt to report no falls curing course of therapy.  Increased AROM of knee extension by 5degrees of extension  to enable pt  achieve full heel strike during ambulation  Improve gait pattern  Without an AD incorpratng proper heel strike /toe off bilaterally   Plan     Continue with established Plan of Care towards PT goals.    Therapist: Jak Urias, PTA  3/20/2018

## 2018-04-03 ENCOUNTER — CLINICAL SUPPORT (OUTPATIENT)
Dept: REHABILITATION | Facility: HOSPITAL | Age: 63
End: 2018-04-03
Attending: INTERNAL MEDICINE
Payer: COMMERCIAL

## 2018-04-03 DIAGNOSIS — M17.12 PRIMARY OSTEOARTHRITIS OF LEFT KNEE: Primary | ICD-10-CM

## 2018-04-03 PROCEDURE — 97110 THERAPEUTIC EXERCISES: CPT | Mod: PO

## 2018-04-03 NOTE — PROGRESS NOTES
Physical Therapy Daily Note     Name: Radha Leija  Clinic Number: [id number]  Diagnosis:   Encounter Diagnosis   Name Primary?    Primary osteoarthritis of left knee Yes     Physician: William Rios MD  Precautions:Standard  Visit #: 6  of 35  Eval Date: 2/1/18  Time In: 11:00  Time Out: 12:00  Treatment Time: 50     Subjective     Pt reports: that she is feeling a little better overall. States that her walking tolerance has increased to about 40 minutes now.  Pain Scale: Radha rates pain on a scale of 0-10 to be 0 currently.    Objective     Radha received individual therapeutic exercises to develop LE strength, endurance, ROM and flexibility for 50 minutes including:  Recumbent stationary bike x 8 minutes level 1  Hamstring stretch at stairs 2 x 30 seconds  Gastroc stretch on incline 2 x 30 seconds   x 20 reps quad sets with heel prop  2 x 10 reps  with4#  TKE   2 x 10 reps  3# SLR   2 x 10 reps VMO SLR with 3#    2 x 10 reps hip abduction in supine with knee flexed with blue Theraband resistance--NP  2 x 10 reps hip adduction in supine with knee flexed with ball resistance --NP   2 x 10 reps sidelying hip abduction SLR with 3# 2 x 10  2 x 10 reps sidelying hip adduction SLR with 3# 2 x 10  2 x 10 reps prone hip extension SLR with 3# 2 x 10  Bridging + iso hip abd BTB 2 x 10  Sidelying clams with BTB 2 x 10  Massage roller to L IT band x 2 minutes--NP  Standing TKE on Matrix with 40# 2 x 10    Leg press ( precor downstairs) with 30# 3 x 10    Patient receives a cold pack applied to (L)knee x 10 minutes for pain control.     Patient education: Patient educated to continue with previously issued HEP .  S  She was educated to avoid exacerbation of pain with ex's which she voices understanding.   No spiritual or educational barriers to learning      Assessment     Patient tolerated Tx well performing and progressing with ex's/activities within appropriate  level of muscular fatigue without c/o pain.  She reports slight improvements with walking tolerance to 40 minutes now . Notes slight decreased quad contract on (L) compared to (R) with QS activity. WIll monitor and attempt to progress as tolerated. This is a 63 y.o. female referred to outpatient physical therapy and presents with a medical diagnosis of   Encounter Diagnosis   Name Primary?    Primary osteoarthritis of left knee Yes   and demonstrates limitations as described in the problem list.   Pt will continue to benefit from skilled outpatient physical therapy to address the deficits listed in the problem list, provide pt/family education and to maximize pt's level of independence in the home and community environment.     Goals from reassess  8weeks.    Independent with HEP.  Increased MMT in 50% of weak mm that are below 5-/5 grade  by 1/3 grade to enable participation in ADLS and leisure activity  Improve safety of gait and pt to report no falls curing course of therapy.  Increased AROM of knee extension by 5degrees of extension  to enable pt  achieve full heel strike during ambulation  Improve gait pattern  Without an AD incorpratng proper heel strike /toe off bilaterally   Plan     Continue with established Plan of Care towards PT goals.    Therapist: Jak Urias, PTA  4/3/2018

## 2018-04-05 ENCOUNTER — DOCUMENTATION ONLY (OUTPATIENT)
Dept: REHABILITATION | Facility: HOSPITAL | Age: 63
End: 2018-04-05

## 2018-04-17 ENCOUNTER — CLINICAL SUPPORT (OUTPATIENT)
Dept: REHABILITATION | Facility: HOSPITAL | Age: 63
End: 2018-04-17
Attending: INTERNAL MEDICINE
Payer: COMMERCIAL

## 2018-04-17 DIAGNOSIS — M17.12 PRIMARY OSTEOARTHRITIS OF LEFT KNEE: Primary | ICD-10-CM

## 2018-04-17 PROCEDURE — 97110 THERAPEUTIC EXERCISES: CPT | Mod: PO

## 2018-04-17 NOTE — PROGRESS NOTES
"                                                    Physical Therapy Daily Note     Name: Radha Leija  Clinic Number: [id number]  Diagnosis:   Encounter Diagnosis   Name Primary?    Primary osteoarthritis of left knee Yes     Physician: William Rios MD  Precautions:Standard  Visit #: 7 of 35  Eval Date: 2/1/18  Time In: 11:00  Time Out: 12:00  Treatment Time: 50     Subjective     Pt reports: that she is feeling a little better overall with improved walking tolerance. States that she was able to walk for 2 miles without c/o.  Pain Scale: Radha rates pain on a scale of 0-10 to be 0 currently.    Objective     Radha received individual therapeutic exercises to develop LE strength, endurance, ROM and flexibility for 50 minutes including:    Recumbent stationary bike x 8 minutes level 3    Supine ex's   ·  x 20 reps quad sets with heel prop  · 2 x 10 reps  With 5#  TKE   · 2 x 10 reps  3# SLR   · 2 x 10 reps VMO SLR with 3#    · Bridging + iso hip abd MTB 2 x 10  · 2 x 10 reps hip abduction in supine with knee flexed with blue Theraband resistance--NP  · 2 x 10 reps hip adduction in supine with knee flexed with ball resistance --NP     Sidelying ex's  · 2 x 10 reps sidelying hip abduction  with 3# 2 x 10  · 2 x 10 reps sidelying hip adduction   with 3# 2 x 10  · Sidelying clams with MTB 2 x 10   ·   PRone ex's   · 2 x 10 reps prone hip extension SLR with 3# 2 x 10    Standing ex's  · Standing TKE on Matrix with 55# 2 x 10    · Hamstring stretch at stairs 2 x 30 seconds  · Gastroc stretch on incline 2 x 30 seconds  · Lateral step ups (4") 2 x 10    Machines  · Leg press ( precor downstairs) with 40# 3 x 10    Patient receives a cold pack applied to (L)knee x 10 minutes for pain control.     Patient education: Patient educated to continue with previously issued HEP .  S  She was educated to avoid exacerbation of pain with ex's which she voices understanding.   No spiritual or educational barriers to learning  "     Assessment     Patient tolerated Tx well performing and progressing with ex's/activities within appropriate level of muscular fatigue without c/o pain.  Improved overall activity and walking tolerance per subjective report.. Notes slight decreased quad contract on (L) compared to (R) with QS activity but overall is doing better.. WIll monitor and attempt to progress as tolerated. This is a 63 y.o. female referred to outpatient physical therapy and presents with a medical diagnosis of   Encounter Diagnosis   Name Primary?    Primary osteoarthritis of left knee Yes   and demonstrates limitations as described in the problem list.   Pt will continue to benefit from skilled outpatient physical therapy to address the deficits listed in the problem list, provide pt/family education and to maximize pt's level of independence in the home and community environment.     Goals from reassess  8weeks.    Independent with HEP.  Increased MMT in 50% of weak mm that are below 5-/5 grade  by 1/3 grade to enable participation in ADLS and leisure activity  Improve safety of gait and pt to report no falls curing course of therapy.  Increased AROM of knee extension by 5degrees of extension  to enable pt  achieve full heel strike during ambulation  Improve gait pattern  Without an AD incorpratng proper heel strike /toe off bilaterally   Plan     Continue with established Plan of Care towards PT goals.    Therapist: Jak Urias, PTA  4/17/2018

## 2018-04-24 ENCOUNTER — OFFICE VISIT (OUTPATIENT)
Dept: URGENT CARE | Facility: CLINIC | Age: 63
End: 2018-04-24
Payer: COMMERCIAL

## 2018-04-24 VITALS
BODY MASS INDEX: 30.91 KG/M2 | SYSTOLIC BLOOD PRESSURE: 114 MMHG | RESPIRATION RATE: 16 BRPM | HEIGHT: 62 IN | OXYGEN SATURATION: 99 % | TEMPERATURE: 98 F | WEIGHT: 168 LBS | HEART RATE: 64 BPM | DIASTOLIC BLOOD PRESSURE: 58 MMHG

## 2018-04-24 DIAGNOSIS — M17.12 PRIMARY OSTEOARTHRITIS OF LEFT KNEE: Primary | ICD-10-CM

## 2018-04-24 PROCEDURE — 99214 OFFICE O/P EST MOD 30 MIN: CPT | Mod: S$GLB,,, | Performed by: EMERGENCY MEDICINE

## 2018-04-24 NOTE — PATIENT INSTRUCTIONS
Follow up with   Orthopedist    in 5-7 days if not improved [phone]    What Is Osteoarthritis?    There are about 100 different types of arthritis. In general, arthritis means problems with the joints. A joint is a point in the body where two or more bones come together. Arthritis may also cause problems in the tissue near the joints, including muscles, tendons, and ligaments. And, in some types of arthritis, the entire body can be affected.  Osteoarthritis (OA) is sometimes called degenerative joint disease, or wear-and-tear arthritis. It's the most common type of arthritis. In OA, the cartilage wears away. Cartilage is a slick tissue that covers the ends of the bones. It acts as a cushion and allows them to glide smoothly against each other. When the cartilage wears away, bone rubs against bone. This causes pain, swelling, stiffness, and difficulty moving. Risk factors for developing OA include obesity, being older than 40, past joint trauma, repetitive joint use, and a family history of OA.  Symptoms  OA can affect any joint. Weight-bearing joints, such as the hips and knees, are often affected. Common symptoms are joint pain and stiffness. Pain and stiffness may get worse with periods of inactivity or overuse. For example, you may have more stiffness first thing in the morning, usually for less than 30 minutes. Or you may have stiffness after sitting for a long period of time. This might be while sitting at a movie. You may also have more pain in your hips or knees if you walk farther than you usually do.  Other common symptoms are:  · Weak muscles  · Unstable or wobbly joints  · Grinding or crackling noises with motion  · Joints with swelling or bumps  · Loss of range of motion, or the ability to bend and straighten them  If you have any of these joint changes, make an appointment to see your healthcare provider. The two of you can work together to create a treatment plan that may help lessen your pain and  stiffness and prevent symptoms from getting worse.  Date Last Reviewed: 2/14/2016  © 7493-9676 [employer]. [address] [place] [address], [place] [address]. All rights reserved. This information is not intended as a substitute for professional medical care. Always follow your healthcare professional's instructions.

## 2018-04-24 NOTE — PROGRESS NOTES
"Subjective:       Patient ID: Radha Taylor is a 63 y.o. female.    Vitals:    04/24/18 0949   BP: (!) 114/58   Pulse: 64   Resp: 16   Temp: 97.5 °F (36.4 °C)   SpO2: 99%   Weight: 76.2 kg (168 lb)   Height: 5' 2" (1.575 m)       Chief Complaint: Knee Pain    Pt states pain and swelling of left knee x 2 days. Pt has hx of osteoarthritis and has been going to physical therapy per her PCP.      Knee Pain    The incident occurred 2 days ago. There was no injury mechanism. The pain is present in the left knee. The quality of the pain is described as aching. The pain is at a severity of 5/10. She reports no foreign bodies present. The symptoms are aggravated by weight bearing.     Review of Systems   Constitution: Negative for chills and fever.   HENT: Negative for sore throat.    Eyes: Negative for blurred vision.   Cardiovascular: Negative for chest pain.   Respiratory: Negative for shortness of breath.    Skin: Negative for rash.   Musculoskeletal: Positive for joint pain and joint swelling. Negative for back pain.   Gastrointestinal: Negative for abdominal pain, diarrhea, nausea and vomiting.   Neurological: Negative for headaches.   Psychiatric/Behavioral: The patient is not nervous/anxious.        Objective:      Physical Exam   Constitutional: She is oriented to person, place, and time. She appears well-developed and well-nourished.   HENT:   Head: Normocephalic and atraumatic. Head is without abrasion, without contusion and without laceration.   Right Ear: External ear normal.   Left Ear: External ear normal.   Nose: Nose normal.   Mouth/Throat: Oropharynx is clear and moist.   Eyes: Conjunctivae, EOM and lids are normal. Pupils are equal, round, and reactive to light.   Neck: Trachea normal, full passive range of motion without pain and phonation normal. Neck supple.   Cardiovascular: Normal rate, regular rhythm and normal heart sounds.    Pulmonary/Chest: Effort normal and breath sounds normal. No stridor. " No respiratory distress.   Musculoskeletal:        Left knee: She exhibits decreased range of motion. She exhibits no swelling, no effusion, no ecchymosis, no deformity, no laceration, no erythema and normal alignment. Tenderness found. Medial joint line and MCL tenderness noted. No lateral joint line, no LCL and no patellar tendon tenderness noted.        Legs:  Neuro-vascularly intact distal to extremity     Neurological: She is alert and oriented to person, place, and time.   Skin: Skin is warm, dry and intact. Capillary refill takes less than 2 seconds. No abrasion, no bruising, no burn, no ecchymosis, no laceration, no lesion and no rash noted. No erythema.   Psychiatric: She has a normal mood and affect. Her speech is normal and behavior is normal. Judgment and thought content normal. Cognition and memory are normal.   Nursing note and vitals reviewed.      Assessment:       1. Primary osteoarthritis of left knee        Plan:       Radha was seen today for knee pain.    Diagnoses and all orders for this visit:    Primary osteoarthritis of left knee       Patient Instructions     Follow up with   Orthopedist    in 5-7 days if not improved [phone]    What Is Osteoarthritis?    There are about 100 different types of arthritis. In general, arthritis means problems with the joints. A joint is a point in the body where two or more bones come together. Arthritis may also cause problems in the tissue near the joints, including muscles, tendons, and ligaments. And, in some types of arthritis, the entire body can be affected.  Osteoarthritis (OA) is sometimes called degenerative joint disease, or wear-and-tear arthritis. It's the most common type of arthritis. In OA, the cartilage wears away. Cartilage is a slick tissue that covers the ends of the bones. It acts as a cushion and allows them to glide smoothly against each other. When the cartilage wears away, bone rubs against bone. This causes pain, swelling,  stiffness, and difficulty moving. Risk factors for developing OA include obesity, being older than 40, past joint trauma, repetitive joint use, and a family history of OA.  Symptoms  OA can affect any joint. Weight-bearing joints, such as the hips and knees, are often affected. Common symptoms are joint pain and stiffness. Pain and stiffness may get worse with periods of inactivity or overuse. For example, you may have more stiffness first thing in the morning, usually for less than 30 minutes. Or you may have stiffness after sitting for a long period of time. This might be while sitting at a movie. You may also have more pain in your hips or knees if you walk farther than you usually do.  Other common symptoms are:  · Weak muscles  · Unstable or wobbly joints  · Grinding or crackling noises with motion  · Joints with swelling or bumps  · Loss of range of motion, or the ability to bend and straighten them  If you have any of these joint changes, make an appointment to see your healthcare provider. The two of you can work together to create a treatment plan that may help lessen your pain and stiffness and prevent symptoms from getting worse.  Date Last Reviewed: 2/14/2016  © 1467-4021 [employer]. [address] [place] [address], [place] [address]. All rights reserved. This information is not intended as a substitute for professional medical care. Always follow your healthcare professional's instructions.

## 2018-04-27 ENCOUNTER — TELEPHONE (OUTPATIENT)
Dept: URGENT CARE | Facility: CLINIC | Age: 63
End: 2018-04-27

## 2018-05-01 ENCOUNTER — CLINICAL SUPPORT (OUTPATIENT)
Dept: REHABILITATION | Facility: HOSPITAL | Age: 63
End: 2018-05-01
Attending: INTERNAL MEDICINE
Payer: COMMERCIAL

## 2018-05-01 DIAGNOSIS — M17.12 PRIMARY OSTEOARTHRITIS OF LEFT KNEE: Primary | ICD-10-CM

## 2018-05-01 PROCEDURE — G8979 MOBILITY GOAL STATUS: HCPCS | Mod: CJ,PO

## 2018-05-01 PROCEDURE — G8980 MOBILITY D/C STATUS: HCPCS | Mod: CJ,PO

## 2018-05-01 PROCEDURE — 97110 THERAPEUTIC EXERCISES: CPT | Mod: PO

## 2018-05-01 NOTE — PROGRESS NOTES
Physical Therapy treatment and discharge summary     Date: 05/01/2018  Name: Radha Taylor  Clinic Number: [id number]    Visit #: 8  Start Time:  100  Stop Time:  2:00  Time in treatment: 50 minutes  30 minutes one to one and billable    Orders:    none none   To Vendor Referred By By Location/POS By Department   none William Rios MD [facility] RHEUMATOLOGY   Priority Start Date Expiration Date Referral Entered By   Routine 01/31/2018 12/31/2018 William Rios MD   Visits Requested Visits Authorized Visits Completed Visits Scheduled   1 [phone] 1       Diagnosis   M17.12 (ICD-10-CM) - Primary osteoarthritis of left knee       History   History of Present Illness:   Radha Leija presents with c/o left knee pain.  Pain first started November 2017.  It started in the lateral knee, then medial knee joint and then superior tibial region   Pt saw a chiropractor and this helped initially.    Treatment Diagnosis:   1. Primary osteoarthritis of left knee         Precautions: Standard,    Diagnostic Tests: xrays  Number of views: 2: AP and lateral views of the LEFT knee.  Findings:  There is mild tricompartmental osteoarthrosis.  Calcification overlying the popliteal fossa raises the question of synovial osteochondromatosis.        Subjective   Radha Taylor states she is received a steroid injection int he left knee last week and since then she has not had any pain in the left knee or any where else.      Objective   63 y.o. White female arrives to clinic in NAD    GAIT: exhibits mild right circumduction.    POSTURAL examination in standing: forward head, rounded shoulders,  protrusive abdomen, level pelvis  Left knee laterally deviated   Range of Motion: Knee  POSITION  supine LEFT    AAROM/ AROM COMMENTS RIGHT    AROM COMMENTS   2/1/18 5-130  /   0-130    3/6 5-130 /5-130      5/1/18 3-132 / 3-132        Joint Mobility:  Marked crepitus L>R  :                                              RIGHT                                  LEFT                        Hip  Abductors       5-5                                      5-/5               Hip  Adductors       4-/5                                     4-/5               Hip flexors              5/5                                      5-/5                           Hip extensors         4/5                                       4/5               Quadriceps            5-/5                                     5-/5               Hamstrings            5-/5                                     5-/5                 Right:  Hamstrings extend to 10 degrees in supine with hip flexed to 90 degrees  Left:  Hamstrings extend  To 15 degrees in supine with hip flexed to 90 degrees  Gastroc/soleus: neutral     PALPATION: Tender to palpation at left medial joint line    EDEMA:   Left: none noted today but pt relates the knee feels swollen sometimes  Right: absent    TREATMENT:  THEREX:  Pt received 20 minutes of independent  exercises with  instruction including   Recumbent stationary bike x 10 minutes level 1  Hamstring stretch at stairs 2 x 30 seconds  Gastroc stretch on incline 2 x 30 seconds   2 x 10 reps hip abduction in supine with knee flexed with blue Theraband resistance   2 x 10 reps hip adduction in supine with knee flexed with ball resistance    Bridging 2 x 10  Massage roller to L IT band x 2 minutes    Pt received 25 minutes of one to one therex including review and advancement of the following exercises  Step ups on step x 10   Leg press (upstairs) with 100# 2 x 10  3 way  Had some difficulty with technique of abduction with GTB  1 x 10 reps quad sets   2 x 10 reps  With 1# TKE   x 10 reps  3# SLR    x 10 reps VMO SLR with 3# Patient receives a cold pack applied to (L)knee x 10 minutes for pain control.   2 x 10 reps sidelying hip abduction SLR 3#  2 x 10 reps sidelying hip adduction SLR 3#  2x 10 reps prone hip extension SLR 3#        EDUCATION: Pt is  now on a comprehensive  HEP which Pt is faithful to do at least 3-5x weekly.  Patient is ready for Medical Fitness Program and was educated about it but pt is not interested in it.   Patient understands that continuation of  HEP is necessary to  maintain benefits gained in therapy. All questions were answered and pt agrees to discharge.    Assessment   Pt has demonstrated improvement in LE mm strength as evidenced by MMT.  She is now without LE pain and states she feels she could continue with HEP and going to the gym on her own.  She is competent with HEP.  She brought a brace which an urgent care doctor provided but she did not know how to wear it.  I advised her to use it only if she felt it decreased her pain but to not rely upon it for stability but to rather continue with stabilization exercises.  She agreed and expressed understanding.  She is now ready for discharge from PT with goals met as noted below.    G Code     CMS Impairment/Limitation/Restriction for FOTO Knee Survey  Status Limitation G-Code CMS Severity Modifier  Intake 60% 40%  Predicted 66% 34% Goal Status+ CJ - At least 20 percent but less than 40 percent  5/1/2018 75% 25% Current Status CJ - At least 20 percent but less than 40 percent  D/C Status CJ **only report if this is discharge survey  Goals   8weeks. Pt agrees with goals set.  MET Independent with HEP.  EXCEEDED Increased MMT in 50% of weak mm that are below 5-/5 grade  by 1/3 grade to enable participation in ADLS and leisure activity  MET Improve safety of gait and pt to report no falls curing course of therapy.  IMET ncreased AROM of knee extension by 5degrees of extension  to enable pt  achieve full heel strike during ambulation  IMPROVEDImprove gait pattern  Without an AD incorpratng proper heel strike /toe off bilaterally       Plan   DISCHARGED FROM PT       Alyse Giraldo, PT, MHA, [facility]  05/01/2018

## 2018-05-07 RX ORDER — HYDROXYCHLOROQUINE SULFATE 200 MG/1
TABLET ORAL
Qty: 180 TABLET | Refills: 0 | Status: SHIPPED | OUTPATIENT
Start: 2018-05-07 | End: 2018-05-21

## 2018-05-17 ENCOUNTER — LAB VISIT (OUTPATIENT)
Dept: LAB | Facility: HOSPITAL | Age: 63
End: 2018-05-17
Attending: INTERNAL MEDICINE
Payer: COMMERCIAL

## 2018-05-17 DIAGNOSIS — M32.9 SLE (SYSTEMIC LUPUS ERYTHEMATOSUS): ICD-10-CM

## 2018-05-17 LAB
ALBUMIN SERPL BCP-MCNC: 3.5 G/DL
ALP SERPL-CCNC: 128 U/L
ALT SERPL W/O P-5'-P-CCNC: 17 U/L
ANION GAP SERPL CALC-SCNC: 9 MMOL/L
AST SERPL-CCNC: 23 U/L
BASOPHILS # BLD AUTO: 0.05 K/UL
BASOPHILS NFR BLD: 1.1 %
BILIRUB SERPL-MCNC: 0.7 MG/DL
BUN SERPL-MCNC: 12 MG/DL
C3 SERPL-MCNC: 116 MG/DL
C4 SERPL-MCNC: 32 MG/DL
CALCIUM SERPL-MCNC: 9.5 MG/DL
CHLORIDE SERPL-SCNC: 109 MMOL/L
CO2 SERPL-SCNC: 25 MMOL/L
CREAT SERPL-MCNC: 0.8 MG/DL
CRP SERPL-MCNC: 9.7 MG/L
DIFFERENTIAL METHOD: NORMAL
EOSINOPHIL # BLD AUTO: 0.1 K/UL
EOSINOPHIL NFR BLD: 2.7 %
ERYTHROCYTE [DISTWIDTH] IN BLOOD BY AUTOMATED COUNT: 12.4 %
ERYTHROCYTE [SEDIMENTATION RATE] IN BLOOD BY WESTERGREN METHOD: 26 MM/HR
EST. GFR  (AFRICAN AMERICAN): >60 ML/MIN/1.73 M^2
EST. GFR  (NON AFRICAN AMERICAN): >60 ML/MIN/1.73 M^2
GLUCOSE SERPL-MCNC: 86 MG/DL
HCT VFR BLD AUTO: 39.8 %
HGB BLD-MCNC: 12.9 G/DL
IMM GRANULOCYTES # BLD AUTO: 0.01 K/UL
IMM GRANULOCYTES NFR BLD AUTO: 0.2 %
LYMPHOCYTES # BLD AUTO: 1.8 K/UL
LYMPHOCYTES NFR BLD: 40.5 %
MCH RBC QN AUTO: 30.9 PG
MCHC RBC AUTO-ENTMCNC: 32.4 G/DL
MCV RBC AUTO: 95 FL
MONOCYTES # BLD AUTO: 0.5 K/UL
MONOCYTES NFR BLD: 11.3 %
NEUTROPHILS # BLD AUTO: 2 K/UL
NEUTROPHILS NFR BLD: 44.2 %
NRBC BLD-RTO: 0 /100 WBC
PLATELET # BLD AUTO: 318 K/UL
PMV BLD AUTO: 11.2 FL
POTASSIUM SERPL-SCNC: 4.5 MMOL/L
PROT SERPL-MCNC: 7.2 G/DL
RBC # BLD AUTO: 4.17 M/UL
SODIUM SERPL-SCNC: 143 MMOL/L
WBC # BLD AUTO: 4.42 K/UL

## 2018-05-17 PROCEDURE — 85025 COMPLETE CBC W/AUTO DIFF WBC: CPT

## 2018-05-17 PROCEDURE — 80053 COMPREHEN METABOLIC PANEL: CPT

## 2018-05-17 PROCEDURE — 86225 DNA ANTIBODY NATIVE: CPT

## 2018-05-17 PROCEDURE — 86160 COMPLEMENT ANTIGEN: CPT | Mod: 59

## 2018-05-17 PROCEDURE — 85651 RBC SED RATE NONAUTOMATED: CPT

## 2018-05-17 PROCEDURE — 36415 COLL VENOUS BLD VENIPUNCTURE: CPT | Mod: PO

## 2018-05-17 PROCEDURE — 86160 COMPLEMENT ANTIGEN: CPT

## 2018-05-17 PROCEDURE — 86140 C-REACTIVE PROTEIN: CPT

## 2018-05-18 LAB — DSDNA AB SER-ACNC: NORMAL [IU]/ML

## 2018-05-21 ENCOUNTER — OFFICE VISIT (OUTPATIENT)
Dept: RHEUMATOLOGY | Facility: CLINIC | Age: 63
End: 2018-05-21
Payer: COMMERCIAL

## 2018-05-21 ENCOUNTER — LAB VISIT (OUTPATIENT)
Dept: LAB | Facility: HOSPITAL | Age: 63
End: 2018-05-21
Attending: INTERNAL MEDICINE
Payer: COMMERCIAL

## 2018-05-21 ENCOUNTER — TELEPHONE (OUTPATIENT)
Dept: FAMILY MEDICINE | Facility: CLINIC | Age: 63
End: 2018-05-21

## 2018-05-21 VITALS
WEIGHT: 168 LBS | BODY MASS INDEX: 30.91 KG/M2 | SYSTOLIC BLOOD PRESSURE: 107 MMHG | HEIGHT: 62 IN | HEART RATE: 65 BPM | DIASTOLIC BLOOD PRESSURE: 65 MMHG

## 2018-05-21 DIAGNOSIS — E66.9 OBESITY (BMI 30-39.9): ICD-10-CM

## 2018-05-21 DIAGNOSIS — E55.9 VITAMIN D DEFICIENCY: ICD-10-CM

## 2018-05-21 DIAGNOSIS — M32.8 OTHER FORMS OF SYSTEMIC LUPUS ERYTHEMATOSUS, UNSPECIFIED ORGAN INVOLVEMENT STATUS: Primary | ICD-10-CM

## 2018-05-21 DIAGNOSIS — E03.9 HYPOTHYROIDISM, UNSPECIFIED TYPE: ICD-10-CM

## 2018-05-21 DIAGNOSIS — E78.5 HYPERLIPIDEMIA, UNSPECIFIED HYPERLIPIDEMIA TYPE: ICD-10-CM

## 2018-05-21 DIAGNOSIS — E03.9 HYPOTHYROIDISM, UNSPECIFIED TYPE: Primary | ICD-10-CM

## 2018-05-21 DIAGNOSIS — R42 VERTIGO: ICD-10-CM

## 2018-05-21 DIAGNOSIS — M32.8 OTHER FORMS OF SYSTEMIC LUPUS ERYTHEMATOSUS, UNSPECIFIED ORGAN INVOLVEMENT STATUS: ICD-10-CM

## 2018-05-21 DIAGNOSIS — M85.80 OSTEOPENIA, UNSPECIFIED LOCATION: ICD-10-CM

## 2018-05-21 LAB
25(OH)D3+25(OH)D2 SERPL-MCNC: 33 NG/ML
BILIRUB UR QL STRIP: NEGATIVE
CHOLEST SERPL-MCNC: 171 MG/DL
CHOLEST/HDLC SERPL: 3.2 {RATIO}
CLARITY UR REFRACT.AUTO: ABNORMAL
COLOR UR AUTO: YELLOW
CREAT UR-MCNC: 35 MG/DL
GLUCOSE UR QL STRIP: NEGATIVE
HDLC SERPL-MCNC: 54 MG/DL
HDLC SERPL: 31.6 %
HGB UR QL STRIP: NEGATIVE
KETONES UR QL STRIP: NEGATIVE
LDLC SERPL CALC-MCNC: 99 MG/DL
LEUKOCYTE ESTERASE UR QL STRIP: NEGATIVE
NITRITE UR QL STRIP: NEGATIVE
NONHDLC SERPL-MCNC: 117 MG/DL
PH UR STRIP: 6 [PH] (ref 5–8)
PROT UR QL STRIP: NEGATIVE
PROT UR-MCNC: <7 MG/DL
PROT/CREAT RATIO, UR: NORMAL
SP GR UR STRIP: 1 (ref 1–1.03)
T4 FREE SERPL-MCNC: 1.64 NG/DL
TRIGL SERPL-MCNC: 90 MG/DL
TSH SERPL DL<=0.005 MIU/L-ACNC: 0.13 UIU/ML
URN SPEC COLLECT METH UR: ABNORMAL
UROBILINOGEN UR STRIP-ACNC: NEGATIVE EU/DL

## 2018-05-21 PROCEDURE — 84443 ASSAY THYROID STIM HORMONE: CPT

## 2018-05-21 PROCEDURE — 81003 URINALYSIS AUTO W/O SCOPE: CPT

## 2018-05-21 PROCEDURE — 84439 ASSAY OF FREE THYROXINE: CPT

## 2018-05-21 PROCEDURE — 3008F BODY MASS INDEX DOCD: CPT | Mod: CPTII,S$GLB,, | Performed by: INTERNAL MEDICINE

## 2018-05-21 PROCEDURE — 99214 OFFICE O/P EST MOD 30 MIN: CPT | Mod: S$GLB,,, | Performed by: INTERNAL MEDICINE

## 2018-05-21 PROCEDURE — 80061 LIPID PANEL: CPT

## 2018-05-21 PROCEDURE — 99999 PR PBB SHADOW E&M-EST. PATIENT-LVL III: CPT | Mod: PBBFAC,,, | Performed by: INTERNAL MEDICINE

## 2018-05-21 PROCEDURE — 36415 COLL VENOUS BLD VENIPUNCTURE: CPT

## 2018-05-21 PROCEDURE — 82306 VITAMIN D 25 HYDROXY: CPT

## 2018-05-21 PROCEDURE — 82570 ASSAY OF URINE CREATININE: CPT

## 2018-05-21 RX ORDER — LEVOTHYROXINE SODIUM 137 UG/1
137 TABLET ORAL
Qty: 90 TABLET | Refills: 0 | Status: SHIPPED | OUTPATIENT
Start: 2018-05-21 | End: 2018-06-13 | Stop reason: SDUPTHER

## 2018-05-21 RX ORDER — HYDROXYCHLOROQUINE SULFATE 200 MG/1
200 TABLET, FILM COATED ORAL 2 TIMES DAILY
Qty: 60 TABLET | Refills: 2 | Status: SHIPPED | OUTPATIENT
Start: 2018-05-21 | End: 2018-08-22 | Stop reason: SDUPTHER

## 2018-05-21 ASSESSMENT — ROUTINE ASSESSMENT OF PATIENT INDEX DATA (RAPID3)
AM STIFFNESS SCORE: 0, NO
TOTAL RAPID3 SCORE: .67
PSYCHOLOGICAL DISTRESS SCORE: 1.1
FATIGUE SCORE: .5
PAIN SCORE: 1
PATIENT GLOBAL ASSESSMENT SCORE: 1
MDHAQ FUNCTION SCORE: 0

## 2018-05-21 ASSESSMENT — SYSTEMIC LUPUS ERYTHEMATOSUS DISEASE ACTIVITY INDEX (SLEDAI): TOTAL_SCORE: 6

## 2018-05-21 NOTE — TELEPHONE ENCOUNTER
----- Message from William Rios MD sent at 5/21/2018  4:55 PM CDT -----  The thyroid tests shows the Synthroid 150mcg daily is too high a dose. Need to check with Dr. Gorman /Cornelio who will likely reduce the dose of Synthroid slightly. The vitamin D is fine. The lipid panel is fine. The urine prot/creat ratio is normal. The urinalysis is normal. RJQ

## 2018-05-21 NOTE — ASSESSMENT & PLAN NOTE
SLEDAI=6(arthritis, malar rash intermittently, not currently present) pending U/A    Cont hydroxychloroquine 200mg twice daily

## 2018-05-21 NOTE — ASSESSMENT & PLAN NOTE
Is scheduled for brain MRI by her ENT in Aug, plans to schedule it sooner  F/u ENT/Neurosurgery([facility])  Forward MRI and their assessment to me

## 2018-05-21 NOTE — PROGRESS NOTES
"Subjective:       Patient ID: Radha Taylor is a 63 y.o. female.    Chief Complaint: SLE;  HPI Having some vertigo several episodes last 3 wks, but also under great stress with death of family member, and in process of moving, also travelling back and forth to [place], [place].  No hair fall. Has had intermittent butterfly rash.  No oral or nasal ulcers.  No serositis. Intermittent pain and swelling both knees.   Review of Systems   Constitutional: Negative for appetite change, fever and unexpected weight change.   HENT: Negative for mouth sores.    Eyes: Negative for visual disturbance.   Respiratory: Negative for cough, shortness of breath and wheezing.    Cardiovascular: Negative for chest pain and palpitations.   Gastrointestinal: Negative for abdominal pain, anal bleeding, blood in stool, constipation, diarrhea, nausea and vomiting.   Genitourinary: Negative for dysuria, frequency and urgency.   Musculoskeletal: Negative for arthralgias, back pain, gait problem, joint swelling, myalgias, neck pain and neck stiffness.   Skin: Negative for rash.   Neurological: Negative for weakness, numbness and headaches.   Hematological: Negative for adenopathy. Does not bruise/bleed easily.   Psychiatric/Behavioral: Negative for sleep disturbance. The patient is not nervous/anxious.          Objective:   /65   Pulse 65   Ht 5' 2" (1.575 m)   Wt 76.2 kg (168 lb)   BMI 30.73 kg/m²      Physical Exam   Constitutional: She is oriented to person, place, and time and well-developed, well-nourished, and in no distress.   HENT:   Head: Normocephalic and atraumatic.   Mouth/Throat: Oropharynx is clear and moist.   Decreased hearing   Eyes: Conjunctivae and EOM are normal.   Neck: Normal range of motion. Neck supple. No thyromegaly present.   Cardiovascular: Normal rate, regular rhythm, normal heart sounds and intact distal pulses.  Exam reveals no gallop and no friction rub.    No murmur heard.  Pulmonary/Chest: Breath sounds " normal. She has no wheezes. She has no rales. She exhibits no tenderness.   Abdominal: Soft. She exhibits no distension and no mass. There is no tenderness.       Right Side Rheumatological Exam     Examination finds the shoulder, elbow and wrist normal.    She has swelling of the knee    The patient has an enlarged knee, 1st PIP, 1st MCP, 2nd PIP, 2nd MCP, 3rd PIP, 3rd MCP, 4th PIP, 4th MCP, 5th PIP and 5th MCP    Left Side Rheumatological Exam     Examination finds the shoulder, elbow and wrist normal.    She has swelling of the knee    The patient has an enlarged knee, 1st PIP, 1st MCP, 2nd PIP, 2nd MCP, 3rd PIP, 3rd MCP, 4th PIP, 4th MCP, 5th PIP and 5th MCP.      Lymphadenopathy:     She has no cervical adenopathy.   Neurological: She is alert and oriented to person, place, and time. She displays normal reflexes. Gait normal.   Nl motor strength UE and LE bilateral   Skin: No rash noted. No erythema. No pallor.     Psychiatric: Mood, memory, affect and judgment normal.   Musculoskeletal: She exhibits no edema.         Results for VÍCTOR COTA (MRN [id number]) as of 5/21/2018 08:43   Ref. Range 5/17/2018 08:09   WBC Latest Ref Range: 3.90 - 12.70 K/uL 4.42   RBC Latest Ref Range: 4.00 - 5.40 M/uL 4.17   Hemoglobin Latest Ref Range: 12.0 - 16.0 g/dL 12.9   Hematocrit Latest Ref Range: 37.0 - 48.5 % 39.8   MCV Latest Ref Range: 82 - 98 fL 95   MCH Latest Ref Range: 27.0 - 31.0 pg 30.9   MCHC Latest Ref Range: 32.0 - 36.0 g/dL 32.4   RDW Latest Ref Range: 11.5 - 14.5 % 12.4   Platelets Latest Ref Range: 150 - 350 K/uL 318   MPV Latest Ref Range: 9.2 - 12.9 fL 11.2   Gran% Latest Ref Range: 38.0 - 73.0 % 44.2   Gran # (ANC) Latest Ref Range: 1.8 - 7.7 K/uL 2.0   Immature Granulocytes Latest Ref Range: 0.0 - 0.5 % 0.2   Immature Grans (Abs) Latest Ref Range: 0.00 - 0.04 K/uL 0.01   Lymph% Latest Ref Range: 18.0 - 48.0 % 40.5   Lymph # Latest Ref Range: 1.0 - 4.8 K/uL 1.8   Mono% Latest Ref Range: 4.0 - 15.0 % 11.3    Mono # Latest Ref Range: 0.3 - 1.0 K/uL 0.5   Eosinophil% Latest Ref Range: 0.0 - 8.0 % 2.7   Eos # Latest Ref Range: 0.0 - 0.5 K/uL 0.1   Basophil% Latest Ref Range: 0.0 - 1.9 % 1.1   Baso # Latest Ref Range: 0.00 - 0.20 K/uL 0.05   nRBC Latest Ref Range: 0 /100 WBC 0   Sed Rate Latest Ref Range: 0 - 20 mm/Hr 26 (H)   Sodium Latest Ref Range: 136 - 145 mmol/L 143   Potassium Latest Ref Range: 3.5 - 5.1 mmol/L 4.5   Chloride Latest Ref Range: 95 - 110 mmol/L 109   CO2 Latest Ref Range: 23 - 29 mmol/L 25   Anion Gap Latest Ref Range: 8 - 16 mmol/L 9   BUN, Bld Latest Ref Range: 8 - 23 mg/dL 12   Creatinine Latest Ref Range: 0.5 - 1.4 mg/dL 0.8   eGFR if non African American Latest Ref Range: >60 mL/min/1.73 m^2 >60.0   eGFR if African American Latest Ref Range: >60 mL/min/1.73 m^2 >60.0   Glucose Latest Ref Range: 70 - 110 mg/dL 86   Calcium Latest Ref Range: 8.7 - 10.5 mg/dL 9.5   Alkaline Phosphatase Latest Ref Range: 55 - 135 U/L 128   Total Protein Latest Ref Range: 6.0 - 8.4 g/dL 7.2   Albumin Latest Ref Range: 3.5 - 5.2 g/dL 3.5   Total Bilirubin Latest Ref Range: 0.1 - 1.0 mg/dL 0.7   AST Latest Ref Range: 10 - 40 U/L 23   ALT Latest Ref Range: 10 - 44 U/L 17   CRP Latest Ref Range: 0.0 - 8.2 mg/L 9.7 (H)   ds DNA Ab Latest Ref Range: Negative 1:10  Negative 1:10   Complement (C-3) Latest Ref Range: 50 - 180 mg/dL 116   Complement (C-4) Latest Ref Range: 11 - 44 mg/dL 32   Differential Method Unknown Automated     Assessment/Plan         Problem List Items Addressed This Visit     Systemic lupus erythematosus - Primary    Current Assessment & Plan     SLEDAI=0 pending U/A         Relevant Medications    hydroxychloroquine (PLAQUENIL) 200 mg tablet    Other Relevant Orders    Urinalysis    C3 complement    Anti-DNA antibody, double-stranded    Comprehensive metabolic panel    CBC auto differential    Protein / creatinine ratio, urine    C4 complement    Sedimentation rate, manual    C-reactive protein     LIPID PANEL    Hypothyroid    Current Assessment & Plan     TSH. Free T4         Relevant Orders    TSH    T4, FREE    Osteopenia    Overview     The bone density shows osteopenia with increased fracture risk. Would suggest resuming a bisphosphonate in the form of alendronate(Fosamax) 35mg once a week once your vitamin D is higher. Will discuss at next visit. RJQ          PACS Images     Show images for DXA Bone Density Spine And Hip   Reviewed By Papito Rios MD on 2017 18:01   Patient Result Comments     Viewed by Radha Taylor on 2017  6:31 PM   Written by William Rios MD on 2017  6:01 PM   The bone density shows osteopenia with increased fracture risk. Would suggest resuming a bisphosphonate in the form of alendronate(Fosamax) 35mg once a week once your vitamin D is higher. Will discuss at next visit. RJQ   External Result Report     External Result Report   Narrative     : 1955 ORDERING PHYSICIAN: LIDA Busby LOCATION: [facility]    HISTORY: 61 y/o female with a hx of fractured left forearm at 59 y/o. She had menopausal symptoms at 59 y/o. She is taking 50,000 units of Vit D twice a week. She has a hx of Lupus, Crohn's and Asthma. She exercises 3 times a week and does not smoke.    TECHNIQUE: Bone Mineral Density performed using Hologic Horizon A ([id number]) reveals good positioning of lumbar spine and hip.    BONE MINERAL DENSITY RESULTS:  Lumbar Spine: Lumbar bone mineral density L1-L4 is 0.833g/cm2, which is a t-score of -1.9. The z-score is -0.5.    Total Hip: The total hip bone mineral density is 0.776g/cm2.  The t-score is -1.4, and the z-score is -0.4.  Femoral neck BMD is 0.586g/cm2 and the t-score is -2.4.    COMPARISONS:  Date Location BMD T-score  11/10/15 L-spine 0.812 -2.1  Total Hip 0.754 -1.5   Impression       Osteopenia of the femoral neck, total hip and lumbar spine. There has been no significant change from the previous study.  FRAX  calculation indicates high fracture risk and does support treatment for osteoporosis.    RECOMMENDATIONS:  1) Adequate calcium and Vitamin D therapy  2) Appropriate exercise  3) Consider medical therapy including bisphosphonates, or denosumab.  4) Consider repeat BMD in 2 years.    EXPLANATION OF RESULTS:  The t-score compares this results to the bone density of a 25 year old of the same gender. The z-score compares this result to the average bone density to people of the same age and gender. The amounts indicate the number of standard deviations above or below the mean.  * Osteoporosis is generally defined as having a t-score between less than -2.5.  * Osteopenia is generally defined as having a t-score between -1 and -2.5.  * The normal range is generally defined as having a t-score between -1 to 1.      Electronically signed by: CHRYSTAL CAMACHO MD  Date: 12/20/17  Time: 15:55     Encounter     View Encounter            FRAX hip 3.2%  Major 17%         Current Assessment & Plan     Once vitamin D > 30-40, resume alendronate 35 mg po once a week         Vitamin D deficiency    Overview     Results for VÍCTOR COTA (MRN [id number]) as of 5/21/2018 08:43   Ref. Range 12/13/2017 08:16   Vit D, 25-Hydroxy Latest Ref Range: 30 - 96 ng/mL 28 (L)            Current Assessment & Plan     Vitamin D today  Cont vitamin D2 50,000 units once a week         Relevant Orders    Vitamin D    Vertigo    Overview     Note of Dr. 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 Audiogram: lost further hearing right ear, 36% word recognition on right. There was deterioration left non-tumor ear. Recommended f/u with local otologist to evaluate lost hearing left ear.  Recommend f/u MRI and  audiogram in one year.         Current Assessment & Plan     Is scheduled for brain MRI by her ENT in Aug, plans to schedule it sooner  F/u ENT/Neurosurgery([facility])  Forward MRI and their assessment to me         Hyperlipidemia    Overview     Results for VÍCTOR COTA (MRN [id number]) as of 5/21/2018 08:43   Ref. Range 12/13/2017 08:16   Cholesterol Latest Ref Range: 120 - 199 mg/dL 222 (H)   HDL Latest Ref Range: 40 - 75 mg/dL 70   LDL Cholesterol Latest Ref Range: 63.0 - 159.0 mg/dL 139.0   Total Cholesterol/HDL Ratio Latest Ref Range: 2.0 - 5.0  3.2   Triglycerides Latest Ref Range: 30 - 150 mg/dL 65            Current Assessment & Plan     Lipid panel         Relevant Orders    LIPID PANEL    Obesity (BMI 30-39.9)    Current Assessment & Plan     Gained 3#

## 2018-05-25 ENCOUNTER — PATIENT MESSAGE (OUTPATIENT)
Dept: FAMILY MEDICINE | Facility: CLINIC | Age: 63
End: 2018-05-25

## 2018-05-25 DIAGNOSIS — H93.11 RIGHT-SIDED TINNITUS: ICD-10-CM

## 2018-05-25 DIAGNOSIS — D33.3 RIGHT ACOUSTIC NEUROMA: ICD-10-CM

## 2018-05-25 RX ORDER — ALPRAZOLAM 0.25 MG/1
0.25 TABLET ORAL EVERY 8 HOURS PRN
Qty: 30 TABLET | Refills: 0 | Status: SHIPPED | OUTPATIENT
Start: 2018-05-25 | End: 2018-08-09

## 2018-06-13 DIAGNOSIS — E03.9 HYPOTHYROIDISM, UNSPECIFIED TYPE: ICD-10-CM

## 2018-06-13 RX ORDER — LEVOTHYROXINE SODIUM 137 UG/1
TABLET ORAL
Qty: 90 TABLET | Refills: 0 | Status: SHIPPED | OUTPATIENT
Start: 2018-06-13 | End: 2018-07-27 | Stop reason: SDUPTHER

## 2018-06-27 ENCOUNTER — PATIENT MESSAGE (OUTPATIENT)
Dept: RHEUMATOLOGY | Facility: CLINIC | Age: 63
End: 2018-06-27

## 2018-07-02 ENCOUNTER — PATIENT MESSAGE (OUTPATIENT)
Dept: RHEUMATOLOGY | Facility: CLINIC | Age: 63
End: 2018-07-02

## 2018-07-03 ENCOUNTER — PATIENT MESSAGE (OUTPATIENT)
Dept: RHEUMATOLOGY | Facility: CLINIC | Age: 63
End: 2018-07-03

## 2018-07-10 DIAGNOSIS — E78.5 HYPERLIPIDEMIA, UNSPECIFIED HYPERLIPIDEMIA TYPE: ICD-10-CM

## 2018-07-10 DIAGNOSIS — E55.9 VITAMIN D DEFICIENCY: ICD-10-CM

## 2018-07-10 DIAGNOSIS — M32.9 SYSTEMIC LUPUS ERYTHEMATOSUS, UNSPECIFIED SLE TYPE, UNSPECIFIED ORGAN INVOLVEMENT STATUS: Primary | ICD-10-CM

## 2018-07-17 ENCOUNTER — PATIENT MESSAGE (OUTPATIENT)
Dept: FAMILY MEDICINE | Facility: CLINIC | Age: 63
End: 2018-07-17

## 2018-07-17 DIAGNOSIS — E03.9 HYPOTHYROIDISM, UNSPECIFIED TYPE: Primary | ICD-10-CM

## 2018-07-19 ENCOUNTER — PATIENT MESSAGE (OUTPATIENT)
Dept: FAMILY MEDICINE | Facility: CLINIC | Age: 63
End: 2018-07-19

## 2018-07-19 ENCOUNTER — TELEPHONE (OUTPATIENT)
Dept: FAMILY MEDICINE | Facility: CLINIC | Age: 63
End: 2018-07-19

## 2018-07-19 DIAGNOSIS — E03.9 HYPOTHYROIDISM, UNSPECIFIED TYPE: ICD-10-CM

## 2018-07-19 NOTE — TELEPHONE ENCOUNTER
Can you print and fax the TSH order to [facility] in [place], [place]?  Info in pt's last Rodney message.  Please let pt know when she can go get the lab.

## 2018-07-27 ENCOUNTER — PATIENT MESSAGE (OUTPATIENT)
Dept: FAMILY MEDICINE | Facility: CLINIC | Age: 63
End: 2018-07-27

## 2018-07-27 RX ORDER — LEVOTHYROXINE SODIUM 125 UG/1
125 TABLET ORAL
Qty: 90 TABLET | Refills: 0 | Status: SHIPPED | OUTPATIENT
Start: 2018-07-27 | End: 2018-10-17 | Stop reason: SDUPTHER

## 2018-08-08 ENCOUNTER — PATIENT MESSAGE (OUTPATIENT)
Dept: FAMILY MEDICINE | Facility: CLINIC | Age: 63
End: 2018-08-08

## 2018-08-09 ENCOUNTER — OFFICE VISIT (OUTPATIENT)
Dept: OPTOMETRY | Facility: CLINIC | Age: 63
End: 2018-08-09
Payer: COMMERCIAL

## 2018-08-09 ENCOUNTER — CLINICAL SUPPORT (OUTPATIENT)
Dept: OPHTHALMOLOGY | Facility: CLINIC | Age: 63
End: 2018-08-09
Payer: COMMERCIAL

## 2018-08-09 DIAGNOSIS — Z79.899 LONG-TERM USE OF PLAQUENIL: Primary | ICD-10-CM

## 2018-08-09 DIAGNOSIS — H52.4 MYOPIA WITH PRESBYOPIA, BILATERAL: Primary | ICD-10-CM

## 2018-08-09 DIAGNOSIS — H52.13 MYOPIA WITH PRESBYOPIA, BILATERAL: Primary | ICD-10-CM

## 2018-08-09 DIAGNOSIS — H52.4 MYOPIA WITH PRESBYOPIA, BILATERAL: ICD-10-CM

## 2018-08-09 DIAGNOSIS — M32.19 SYSTEMIC LUPUS WITH CENTRAL NERVOUS SYSTEM INVOLVEMENT: ICD-10-CM

## 2018-08-09 DIAGNOSIS — H25.13 SENILE NUCLEAR SCLEROSIS, BILATERAL: ICD-10-CM

## 2018-08-09 DIAGNOSIS — H52.13 MYOPIA WITH PRESBYOPIA, BILATERAL: ICD-10-CM

## 2018-08-09 DIAGNOSIS — H04.123 DRY EYE SYNDROME OF BOTH EYES: ICD-10-CM

## 2018-08-09 DIAGNOSIS — Z79.899 LONG-TERM USE OF PLAQUENIL: ICD-10-CM

## 2018-08-09 PROCEDURE — 92134 CPTRZ OPH DX IMG PST SGM RTA: CPT | Mod: S$GLB,,, | Performed by: OPTOMETRIST

## 2018-08-09 PROCEDURE — 92014 COMPRE OPH EXAM EST PT 1/>: CPT | Mod: S$GLB,,, | Performed by: OPTOMETRIST

## 2018-08-09 PROCEDURE — 99999 PR PBB SHADOW E&M-EST. PATIENT-LVL II: CPT | Mod: PBBFAC,,, | Performed by: OPTOMETRIST

## 2018-08-09 PROCEDURE — 92083 EXTENDED VISUAL FIELD XM: CPT | Mod: S$GLB,,, | Performed by: OPTOMETRIST

## 2018-08-09 PROCEDURE — 92499 UNLISTED OPH SVC/PROCEDURE: CPT | Mod: ,,, | Performed by: OPTOMETRIST

## 2018-08-09 NOTE — PROGRESS NOTES
HPI     Last eye exam was 8/10/17 with Dr. Monge.  Patient states no vision changes since last exam. Only wears SCL's part   time-removes and replaces daily. Notices letter missing when looking at   something but will come back after blinking a few times-occurs more while   wearing SCL's than glasses.  Patient denies diplopia, headaches, flashes/floaters, and pain.    Plaquenil 200 mg Daily PO (since 1984)      Last edited by Joanie Cesar on 8/9/2018 10:01 AM. (History)            Assessment /Plan     For exam results, see Encounter Report.    Long-term use of Plaquenil  -     OCT- Retina    Systemic lupus with central nervous system involvement  -     OCT- Retina    Senile nuclear sclerosis, bilateral    Dry eye syndrome of both eyes    Myopia with presbyopia, bilateral          1-2.  All testing normal OU--no retinopathy.  Monitor 6 months.  3.  Early-monitor.  4.  Bifocal and contact lens rx given.  Very small cystic change OU in the fovea.  Feel this is caused by vitreous traction and epiretinal membrane.  Not affecting vision.  Will discuss with retina.

## 2018-08-10 ENCOUNTER — OFFICE VISIT (OUTPATIENT)
Dept: FAMILY MEDICINE | Facility: CLINIC | Age: 63
End: 2018-08-10
Payer: COMMERCIAL

## 2018-08-10 VITALS
SYSTOLIC BLOOD PRESSURE: 116 MMHG | WEIGHT: 165.81 LBS | HEIGHT: 60 IN | TEMPERATURE: 98 F | DIASTOLIC BLOOD PRESSURE: 70 MMHG | BODY MASS INDEX: 32.55 KG/M2 | RESPIRATION RATE: 20 BRPM

## 2018-08-10 DIAGNOSIS — K21.9 GASTROESOPHAGEAL REFLUX DISEASE, ESOPHAGITIS PRESENCE NOT SPECIFIED: ICD-10-CM

## 2018-08-10 DIAGNOSIS — R06.00 DYSPNEA, UNSPECIFIED TYPE: Primary | ICD-10-CM

## 2018-08-10 DIAGNOSIS — Z79.1 NSAID LONG-TERM USE: ICD-10-CM

## 2018-08-10 PROCEDURE — 3008F BODY MASS INDEX DOCD: CPT | Mod: CPTII,S$GLB,, | Performed by: INTERNAL MEDICINE

## 2018-08-10 PROCEDURE — 99214 OFFICE O/P EST MOD 30 MIN: CPT | Mod: S$GLB,,, | Performed by: INTERNAL MEDICINE

## 2018-08-10 PROCEDURE — 93010 ELECTROCARDIOGRAM REPORT: CPT | Mod: S$GLB,,, | Performed by: INTERNAL MEDICINE

## 2018-08-10 PROCEDURE — 93005 ELECTROCARDIOGRAM TRACING: CPT | Mod: S$GLB,,, | Performed by: INTERNAL MEDICINE

## 2018-08-10 PROCEDURE — 99999 PR PBB SHADOW E&M-EST. PATIENT-LVL III: CPT | Mod: PBBFAC,,, | Performed by: INTERNAL MEDICINE

## 2018-08-10 RX ORDER — ESOMEPRAZOLE MAGNESIUM 40 MG/1
40 CAPSULE, DELAYED RELEASE ORAL
Qty: 90 CAPSULE | Refills: 3 | Status: SHIPPED | OUTPATIENT
Start: 2018-08-10 | End: 2019-05-07 | Stop reason: SDUPTHER

## 2018-08-10 NOTE — PROGRESS NOTES
Subjective:        Patient ID: Radha Taylor is a 63 y.o. female.    Chief Complaint: Fatigue    HPI   Radha Taylor presents with c/o SCOTT.  Pt thinks this started gradually and noticed it just this summer.  She gets winded after walking up 1-2 flights of stairs or walking up a hill.  She's not sure if it's due to fatigue or stress from moving but it doesn't feel like her baseline.    She denies chest pain or tightness, coughing, wheezing, leg cramping.    Of note, pt's orthopedist started her on Aleve BID for L elbow arthritis and pain.  She has been taking Nexium QOD for GERD.    Review of Systems  as per HPI      Objective:        Vitals:    08/10/18 1124   BP: 116/70   Resp: 20   Temp: 98.1 °F (36.7 °C)     Physical Exam   Constitutional: She is oriented to person, place, and time. She appears well-developed and well-nourished. No distress.   HENT:   Head: Normocephalic and atraumatic.   Cardiovascular: Normal rate, regular rhythm and normal heart sounds.    No murmur heard.  Pulmonary/Chest: Effort normal and breath sounds normal. No respiratory distress. She has no wheezes. She has no rales.   - good air movement in all lung fields  - no bronchial breath sounds or ronchi   Musculoskeletal: She exhibits no edema.   Neurological: She is alert and oriented to person, place, and time.   Skin: Skin is warm and dry. She is not diaphoretic.   Vitals reviewed.          Assessment:         1. Dyspnea, unspecified type    2. Gastroesophageal reflux disease, esophagitis presence not specified    3. NSAID long-term use              Plan:         Radha was seen today for fatigue.    Diagnoses and all orders for this visit:    Dyspnea, unspecified type: EKG today shows sinus bradycardia; no AV block, Tw or ST abnormalities.  - Recommend stress test; pt is leaving in 2 days for [place] and [facility].  She will be back in [facility] in 2 months.  Recommend if pt establishes care in [place], to try and get the stress test sooner.   Exercise as tolerated.  Stop if severe SOB or ER if chest pain with dyspnea.  -     IN OFFICE EKG 12-LEAD (to Muse)    Gastroesophageal reflux disease, esophagitis presence not specified: Given regular NSAID use, recommend continuing Nexium daily until no longer taking Aleve daily.  - Renal function, UA normal 3 months ago. Recheck when pt returns in a few months for annual exam and labs.  -     esomeprazole (NEXIUM) 40 MG capsule; Take 1 capsule (40 mg total) by mouth before breakfast.    NSAID long-term use  -     esomeprazole (NEXIUM) 40 MG capsule; Take 1 capsule (40 mg total) by mouth before breakfast.        Follow-up for annual exam or sooner as needed.

## 2018-08-11 ENCOUNTER — PATIENT MESSAGE (OUTPATIENT)
Dept: FAMILY MEDICINE | Facility: CLINIC | Age: 63
End: 2018-08-11

## 2018-08-13 ENCOUNTER — TELEPHONE (OUTPATIENT)
Dept: FAMILY MEDICINE | Facility: CLINIC | Age: 63
End: 2018-08-13

## 2018-08-13 ENCOUNTER — PATIENT MESSAGE (OUTPATIENT)
Dept: FAMILY MEDICINE | Facility: CLINIC | Age: 63
End: 2018-08-13

## 2018-08-13 DIAGNOSIS — E55.9 VITAMIN D DEFICIENCY: ICD-10-CM

## 2018-08-13 DIAGNOSIS — Z00.00 ROUTINE GENERAL MEDICAL EXAMINATION AT A HEALTH CARE FACILITY: Primary | ICD-10-CM

## 2018-08-13 DIAGNOSIS — E78.5 HYPERLIPIDEMIA, UNSPECIFIED HYPERLIPIDEMIA TYPE: ICD-10-CM

## 2018-08-13 DIAGNOSIS — E03.9 HYPOTHYROIDISM, UNSPECIFIED TYPE: ICD-10-CM

## 2018-08-22 DIAGNOSIS — M32.8 OTHER FORMS OF SYSTEMIC LUPUS ERYTHEMATOSUS, UNSPECIFIED ORGAN INVOLVEMENT STATUS: ICD-10-CM

## 2018-08-22 RX ORDER — HYDROXYCHLOROQUINE SULFATE 200 MG/1
TABLET, FILM COATED ORAL
Qty: 180 TABLET | Refills: 1 | Status: SHIPPED | OUTPATIENT
Start: 2018-08-22 | End: 2019-10-16 | Stop reason: SDUPTHER

## 2018-08-22 RX ORDER — HYDROXYCHLOROQUINE SULFATE 200 MG/1
TABLET, FILM COATED ORAL
Qty: 180 TABLET | Refills: 1 | Status: SHIPPED | OUTPATIENT
Start: 2018-08-22 | End: 2018-10-19

## 2018-10-02 ENCOUNTER — PATIENT MESSAGE (OUTPATIENT)
Dept: FAMILY MEDICINE | Facility: CLINIC | Age: 63
End: 2018-10-02

## 2018-10-09 ENCOUNTER — PATIENT MESSAGE (OUTPATIENT)
Dept: RHEUMATOLOGY | Facility: CLINIC | Age: 63
End: 2018-10-09

## 2018-10-16 ENCOUNTER — LAB VISIT (OUTPATIENT)
Dept: LAB | Facility: OTHER | Age: 63
End: 2018-10-16
Payer: COMMERCIAL

## 2018-10-16 ENCOUNTER — TELEPHONE (OUTPATIENT)
Dept: RHEUMATOLOGY | Facility: CLINIC | Age: 63
End: 2018-10-16

## 2018-10-16 DIAGNOSIS — R74.8 ALKALINE PHOSPHATASE ELEVATION: Primary | ICD-10-CM

## 2018-10-16 DIAGNOSIS — E78.5 HYPERLIPIDEMIA, UNSPECIFIED HYPERLIPIDEMIA TYPE: ICD-10-CM

## 2018-10-16 DIAGNOSIS — E03.9 HYPOTHYROIDISM, UNSPECIFIED TYPE: ICD-10-CM

## 2018-10-16 DIAGNOSIS — E55.9 VITAMIN D DEFICIENCY: ICD-10-CM

## 2018-10-16 DIAGNOSIS — Z00.00 ROUTINE GENERAL MEDICAL EXAMINATION AT A HEALTH CARE FACILITY: ICD-10-CM

## 2018-10-16 DIAGNOSIS — M32.9 SYSTEMIC LUPUS ERYTHEMATOSUS, UNSPECIFIED SLE TYPE, UNSPECIFIED ORGAN INVOLVEMENT STATUS: ICD-10-CM

## 2018-10-16 LAB
25(OH)D3+25(OH)D2 SERPL-MCNC: 27 NG/ML
ALBUMIN SERPL BCP-MCNC: 3.6 G/DL
ALBUMIN SERPL BCP-MCNC: 3.6 G/DL
ALP SERPL-CCNC: 136 U/L
ALP SERPL-CCNC: 136 U/L
ALT SERPL W/O P-5'-P-CCNC: 32 U/L
ALT SERPL W/O P-5'-P-CCNC: 32 U/L
ANION GAP SERPL CALC-SCNC: 9 MMOL/L
ANION GAP SERPL CALC-SCNC: 9 MMOL/L
AST SERPL-CCNC: 30 U/L
AST SERPL-CCNC: 30 U/L
BASOPHILS # BLD AUTO: 0.04 K/UL
BASOPHILS # BLD AUTO: 0.04 K/UL
BASOPHILS NFR BLD: 0.8 %
BASOPHILS NFR BLD: 0.8 %
BILIRUB SERPL-MCNC: 0.5 MG/DL
BILIRUB SERPL-MCNC: 0.5 MG/DL
BUN SERPL-MCNC: 12 MG/DL
BUN SERPL-MCNC: 12 MG/DL
C3 SERPL-MCNC: 100 MG/DL
C4 SERPL-MCNC: 23 MG/DL
CALCIUM SERPL-MCNC: 9.2 MG/DL
CALCIUM SERPL-MCNC: 9.2 MG/DL
CHLORIDE SERPL-SCNC: 109 MMOL/L
CHLORIDE SERPL-SCNC: 109 MMOL/L
CHOLEST SERPL-MCNC: 191 MG/DL
CHOLEST/HDLC SERPL: 3.4 {RATIO}
CO2 SERPL-SCNC: 24 MMOL/L
CO2 SERPL-SCNC: 24 MMOL/L
CREAT SERPL-MCNC: 0.8 MG/DL
CREAT SERPL-MCNC: 0.8 MG/DL
CRP SERPL-MCNC: 9.7 MG/L
DIFFERENTIAL METHOD: NORMAL
DIFFERENTIAL METHOD: NORMAL
EOSINOPHIL # BLD AUTO: 0.2 K/UL
EOSINOPHIL # BLD AUTO: 0.2 K/UL
EOSINOPHIL NFR BLD: 3.4 %
EOSINOPHIL NFR BLD: 3.4 %
ERYTHROCYTE [DISTWIDTH] IN BLOOD BY AUTOMATED COUNT: 13.3 %
ERYTHROCYTE [DISTWIDTH] IN BLOOD BY AUTOMATED COUNT: 13.3 %
ERYTHROCYTE [SEDIMENTATION RATE] IN BLOOD: 27 MM/HR
EST. GFR  (AFRICAN AMERICAN): >60 ML/MIN/1.73 M^2
EST. GFR  (AFRICAN AMERICAN): >60 ML/MIN/1.73 M^2
EST. GFR  (NON AFRICAN AMERICAN): >60 ML/MIN/1.73 M^2
EST. GFR  (NON AFRICAN AMERICAN): >60 ML/MIN/1.73 M^2
ESTIMATED AVG GLUCOSE: 105 MG/DL
GLUCOSE SERPL-MCNC: 80 MG/DL
GLUCOSE SERPL-MCNC: 80 MG/DL
HBA1C MFR BLD HPLC: 5.3 %
HCT VFR BLD AUTO: 38.1 %
HCT VFR BLD AUTO: 38.1 %
HDLC SERPL-MCNC: 57 MG/DL
HDLC SERPL: 29.8 %
HGB BLD-MCNC: 12.4 G/DL
HGB BLD-MCNC: 12.4 G/DL
LDLC SERPL CALC-MCNC: 113.6 MG/DL
LYMPHOCYTES # BLD AUTO: 1.9 K/UL
LYMPHOCYTES # BLD AUTO: 1.9 K/UL
LYMPHOCYTES NFR BLD: 40.6 %
LYMPHOCYTES NFR BLD: 40.6 %
MCH RBC QN AUTO: 30.8 PG
MCH RBC QN AUTO: 30.8 PG
MCHC RBC AUTO-ENTMCNC: 32.5 G/DL
MCHC RBC AUTO-ENTMCNC: 32.5 G/DL
MCV RBC AUTO: 95 FL
MCV RBC AUTO: 95 FL
MONOCYTES # BLD AUTO: 0.4 K/UL
MONOCYTES # BLD AUTO: 0.4 K/UL
MONOCYTES NFR BLD: 8.9 %
MONOCYTES NFR BLD: 8.9 %
NEUTROPHILS # BLD AUTO: 2.2 K/UL
NEUTROPHILS # BLD AUTO: 2.2 K/UL
NEUTROPHILS NFR BLD: 46.1 %
NEUTROPHILS NFR BLD: 46.1 %
NONHDLC SERPL-MCNC: 134 MG/DL
PLATELET # BLD AUTO: 290 K/UL
PLATELET # BLD AUTO: 290 K/UL
PMV BLD AUTO: 9.9 FL
PMV BLD AUTO: 9.9 FL
POTASSIUM SERPL-SCNC: 4.4 MMOL/L
POTASSIUM SERPL-SCNC: 4.4 MMOL/L
PROT SERPL-MCNC: 7.2 G/DL
PROT SERPL-MCNC: 7.2 G/DL
RBC # BLD AUTO: 4.03 M/UL
RBC # BLD AUTO: 4.03 M/UL
SODIUM SERPL-SCNC: 142 MMOL/L
SODIUM SERPL-SCNC: 142 MMOL/L
TRIGL SERPL-MCNC: 102 MG/DL
TSH SERPL DL<=0.005 MIU/L-ACNC: 2.07 UIU/ML
WBC # BLD AUTO: 4.71 K/UL
WBC # BLD AUTO: 4.71 K/UL

## 2018-10-16 PROCEDURE — 85651 RBC SED RATE NONAUTOMATED: CPT

## 2018-10-16 PROCEDURE — 86225 DNA ANTIBODY NATIVE: CPT

## 2018-10-16 PROCEDURE — 86160 COMPLEMENT ANTIGEN: CPT

## 2018-10-16 PROCEDURE — 36415 COLL VENOUS BLD VENIPUNCTURE: CPT

## 2018-10-16 PROCEDURE — 80061 LIPID PANEL: CPT

## 2018-10-16 PROCEDURE — 82977 ASSAY OF GGT: CPT

## 2018-10-16 PROCEDURE — 84080 ASSAY ALKALINE PHOSPHATASES: CPT

## 2018-10-16 PROCEDURE — 86140 C-REACTIVE PROTEIN: CPT

## 2018-10-16 PROCEDURE — 84443 ASSAY THYROID STIM HORMONE: CPT

## 2018-10-16 PROCEDURE — 82306 VITAMIN D 25 HYDROXY: CPT

## 2018-10-16 PROCEDURE — 85025 COMPLETE CBC W/AUTO DIFF WBC: CPT

## 2018-10-16 PROCEDURE — 84075 ASSAY ALKALINE PHOSPHATASE: CPT

## 2018-10-16 PROCEDURE — 80053 COMPREHEN METABOLIC PANEL: CPT

## 2018-10-16 PROCEDURE — 83036 HEMOGLOBIN GLYCOSYLATED A1C: CPT

## 2018-10-16 PROCEDURE — 86160 COMPLEMENT ANTIGEN: CPT | Mod: 59

## 2018-10-17 DIAGNOSIS — E03.9 HYPOTHYROIDISM, UNSPECIFIED TYPE: ICD-10-CM

## 2018-10-17 LAB — GGT SERPL-CCNC: 26 U/L

## 2018-10-18 LAB — DSDNA AB SER-ACNC: ABNORMAL [IU]/ML

## 2018-10-18 RX ORDER — LEVOTHYROXINE SODIUM 125 UG/1
TABLET ORAL
Qty: 90 TABLET | Refills: 0 | Status: SHIPPED | OUTPATIENT
Start: 2018-10-18 | End: 2018-10-19 | Stop reason: SDUPTHER

## 2018-10-19 ENCOUNTER — OFFICE VISIT (OUTPATIENT)
Dept: FAMILY MEDICINE | Facility: CLINIC | Age: 63
End: 2018-10-19
Payer: COMMERCIAL

## 2018-10-19 VITALS
DIASTOLIC BLOOD PRESSURE: 70 MMHG | HEART RATE: 62 BPM | TEMPERATURE: 98 F | WEIGHT: 166 LBS | HEIGHT: 60 IN | BODY MASS INDEX: 32.59 KG/M2 | SYSTOLIC BLOOD PRESSURE: 110 MMHG

## 2018-10-19 DIAGNOSIS — H91.91 HEARING LOSS OF RIGHT EAR, UNSPECIFIED HEARING LOSS TYPE: ICD-10-CM

## 2018-10-19 DIAGNOSIS — Z87.891 HISTORY OF TOBACCO USE: ICD-10-CM

## 2018-10-19 DIAGNOSIS — G63 POLYNEUROPATHY IN COLLAGEN VASCULAR DISEASE: ICD-10-CM

## 2018-10-19 DIAGNOSIS — Z23 NEED FOR PROPHYLACTIC VACCINATION AND INOCULATION AGAINST INFLUENZA: ICD-10-CM

## 2018-10-19 DIAGNOSIS — E03.9 HYPOTHYROIDISM, UNSPECIFIED TYPE: ICD-10-CM

## 2018-10-19 DIAGNOSIS — M35.9 POLYNEUROPATHY IN COLLAGEN VASCULAR DISEASE: ICD-10-CM

## 2018-10-19 DIAGNOSIS — Z00.00 ANNUAL PHYSICAL EXAM: Primary | ICD-10-CM

## 2018-10-19 DIAGNOSIS — E55.9 VITAMIN D DEFICIENCY: ICD-10-CM

## 2018-10-19 DIAGNOSIS — R06.00 DYSPNEA, UNSPECIFIED TYPE: ICD-10-CM

## 2018-10-19 DIAGNOSIS — M85.80 OSTEOPENIA, UNSPECIFIED LOCATION: ICD-10-CM

## 2018-10-19 DIAGNOSIS — M32.19 SYSTEMIC LUPUS ERYTHEMATOSUS WITH OTHER ORGAN INVOLVEMENT, UNSPECIFIED SLE TYPE: ICD-10-CM

## 2018-10-19 DIAGNOSIS — D33.3 RIGHT ACOUSTIC NEUROMA: ICD-10-CM

## 2018-10-19 DIAGNOSIS — Z87.09 HISTORY OF ASTHMA: ICD-10-CM

## 2018-10-19 DIAGNOSIS — H93.11 RIGHT-SIDED TINNITUS: ICD-10-CM

## 2018-10-19 DIAGNOSIS — R42 VERTIGO: ICD-10-CM

## 2018-10-19 PROBLEM — E78.5 HYPERLIPIDEMIA: Status: RESOLVED | Noted: 2018-05-21 | Resolved: 2018-10-19

## 2018-10-19 PROCEDURE — 99396 PREV VISIT EST AGE 40-64: CPT | Mod: 25,S$GLB,, | Performed by: INTERNAL MEDICINE

## 2018-10-19 PROCEDURE — 90471 IMMUNIZATION ADMIN: CPT | Mod: S$GLB,,, | Performed by: INTERNAL MEDICINE

## 2018-10-19 PROCEDURE — 90686 IIV4 VACC NO PRSV 0.5 ML IM: CPT | Mod: S$GLB,,, | Performed by: INTERNAL MEDICINE

## 2018-10-19 PROCEDURE — 99999 PR PBB SHADOW E&M-EST. PATIENT-LVL III: CPT | Mod: PBBFAC,,, | Performed by: INTERNAL MEDICINE

## 2018-10-19 RX ORDER — DICLOFENAC SODIUM 16.05 MG/ML
SOLUTION TOPICAL
Refills: 0 | COMMUNITY
Start: 2018-10-01 | End: 2019-01-27

## 2018-10-19 RX ORDER — LEVOTHYROXINE SODIUM 125 UG/1
125 TABLET ORAL
Qty: 90 TABLET | Refills: 3 | Status: SHIPPED | OUTPATIENT
Start: 2018-10-19 | End: 2019-05-07 | Stop reason: SDUPTHER

## 2018-10-19 NOTE — PROGRESS NOTES
Subjective:        Patient ID: Radha Taylor is a 63 y.o. female.    Chief Complaint: Annual Exam and Flu Vaccine    HPI   Radha Taylor presents for annual exam.    Pt purchased topical Diclofenac OTC in [place] and it has been helping with her arthritis pain.  She was hesitant to take oral NSAIDs recommended by her orthopedist.    Pt continues to have some SCOTT, occasional wheezing - only with exertion.  She has a hx of asthma in childhood.  She has a remote smoking hx, 1/2 ppd x 15 years, quit 1984.  She had a normal stress test done in [place] this past summer.  She has not had PFTs.    Review of Systems   Constitutional: Negative for activity change and unexpected weight change.   HENT: Positive for hearing loss (chronic, stable, b/l hearing aids).    Eyes: Negative for visual disturbance (glasses).   Respiratory: Positive for shortness of breath and wheezing. Negative for chest tightness.    Cardiovascular: Negative for chest pain and leg swelling.   Gastrointestinal: Negative for abdominal pain.   Genitourinary: Negative for difficulty urinating.   Musculoskeletal: Positive for arthralgias.   Skin: Negative for rash.           Objective:        Vitals:    10/19/18 0904   BP: 110/70   Pulse: 62   Temp: 98.4 °F (36.9 °C)     Physical Exam   Constitutional: She is oriented to person, place, and time. She appears well-developed and well-nourished. No distress.   HENT:   Head: Normocephalic and atraumatic.   Right Ear: External ear normal.   Left Ear: External ear normal.   Nose: Nose normal.   - bilateral ear canals clear, tympanic membranes visualized - normal color and light reflex   Eyes: Conjunctivae and EOM are normal.   Neck: Normal range of motion. Neck supple. No thyromegaly present.   Cardiovascular: Normal rate, regular rhythm and normal heart sounds.   No murmur heard.  Pulmonary/Chest: Effort normal and breath sounds normal. No respiratory distress.   Abdominal: Soft. She exhibits no distension.  There is no tenderness. There is no guarding.   Musculoskeletal: She exhibits deformity (2/2 SLE, arthritis). She exhibits no edema.   Lymphadenopathy:     She has no cervical adenopathy.   Neurological: She is alert and oriented to person, place, and time.   Skin: Skin is warm and dry. No rash noted. No erythema.   Psychiatric: She has a normal mood and affect. Her behavior is normal. Judgment and thought content normal.   Vitals reviewed.      Most recent lab results reviewed with patient.    Assessment:         1. Annual physical exam    2. Need for prophylactic vaccination and inoculation against influenza    3. Hypothyroidism, unspecified type    4. Dyspnea, unspecified type    5. History of asthma    6. History of tobacco use    7. 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 Hearing loss of right ear, unspecified hearing loss type    9. Right-sided tinnitus    10. Right acoustic neuroma    11. Vertigo    12. Systemic lupus erythematosus with other organ involvement, unspecified SLE type    13. Vitamin D deficiency    14. Osteopenia, unspecified location              Plan:         Radha was seen today for annual exam and flu vaccine.    Diagnoses and all orders for this visit:    Annual physical exam  - flu shot today    Need for prophylactic vaccination and inoculation against influenza  -     Flu Vaccine - Quadrivalent (PF) (3 years & older)    Hypothyroidism, unspecified type: TSH normal; continue Levothyroxine 125mcg daily  -     levothyroxine (SYNTHROID) 125 MCG tablet; Take 1 tablet (125 mcg total) by mouth before breakfast.    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

## 2018-10-19 NOTE — PROGRESS NOTES
Two patient identifiers verified.  Allergies reviewed. Flu vaccine given  IM administered to Left deltoid per MD order.  Patient tolerated injection well; no redness, bleeding, or bruising noted to injection site.  Patient instructed to remain in clinic setting for 15 minutes.  Verbalizes understanding.  Flu consent signed by patient.

## 2018-10-20 LAB
ALP BONE CFR SERPL: 35 % (ref 28–66)
ALP INTEST CFR SERPL: 16 % (ref 1–24)
ALP LIVER CFR SERPL: 49 % (ref 25–69)
ALP PLAC CFR SERPL: 0 %
ALP SERPL-CCNC: 117 U/L (ref 33–130)

## 2018-10-22 ENCOUNTER — HOSPITAL ENCOUNTER (OUTPATIENT)
Dept: PULMONOLOGY | Facility: CLINIC | Age: 63
Discharge: HOME | End: 2018-10-22
Payer: COMMERCIAL

## 2018-10-22 DIAGNOSIS — R06.00 DYSPNEA, UNSPECIFIED TYPE: ICD-10-CM

## 2018-10-22 DIAGNOSIS — Z87.891 HISTORY OF TOBACCO USE: ICD-10-CM

## 2018-10-22 DIAGNOSIS — Z87.09 HISTORY OF ASTHMA: ICD-10-CM

## 2018-10-22 LAB
POST FEV1 FVC: 0.82
POST FEV1: 2.07
POST FVC: 2.51
PRE FEV1 FVC: 81
PRE FEV1: 2.1
PRE FVC: 2.58
PREDICTED FEV1 FVC: 82
PREDICTED FEV1: 2.1
PREDICTED FVC: 2.59

## 2018-10-22 PROCEDURE — 94060 EVALUATION OF WHEEZING: CPT | Mod: S$GLB,,, | Performed by: INTERNAL MEDICINE

## 2018-10-22 PROCEDURE — 94729 DIFFUSING CAPACITY: CPT | Mod: S$GLB,,, | Performed by: INTERNAL MEDICINE

## 2018-10-22 PROCEDURE — 94727 GAS DIL/WSHOT DETER LNG VOL: CPT | Mod: S$GLB,,, | Performed by: INTERNAL MEDICINE

## 2018-10-25 DIAGNOSIS — Z87.09 HISTORY OF ASTHMA: Primary | ICD-10-CM

## 2018-10-25 DIAGNOSIS — R06.2 WHEEZING: ICD-10-CM

## 2018-10-25 RX ORDER — ALBUTEROL SULFATE 90 UG/1
1-2 AEROSOL, METERED RESPIRATORY (INHALATION) EVERY 6 HOURS PRN
Qty: 18 G | Refills: 0 | Status: SHIPPED | OUTPATIENT
Start: 2018-10-25 | End: 2018-11-17 | Stop reason: SDUPTHER

## 2018-11-10 ENCOUNTER — PATIENT MESSAGE (OUTPATIENT)
Dept: RHEUMATOLOGY | Facility: CLINIC | Age: 63
End: 2018-11-10

## 2018-11-13 ENCOUNTER — PATIENT MESSAGE (OUTPATIENT)
Dept: RHEUMATOLOGY | Facility: CLINIC | Age: 63
End: 2018-11-13

## 2018-11-17 DIAGNOSIS — R06.2 WHEEZING: ICD-10-CM

## 2018-11-17 DIAGNOSIS — Z87.09 HISTORY OF ASTHMA: ICD-10-CM

## 2018-11-19 ENCOUNTER — LAB VISIT (OUTPATIENT)
Dept: LAB | Facility: HOSPITAL | Age: 63
End: 2018-11-19
Attending: INTERNAL MEDICINE
Payer: COMMERCIAL

## 2018-11-19 ENCOUNTER — TELEPHONE (OUTPATIENT)
Dept: FAMILY MEDICINE | Facility: CLINIC | Age: 63
End: 2018-11-19

## 2018-11-19 DIAGNOSIS — M32.8 OTHER FORMS OF SYSTEMIC LUPUS ERYTHEMATOSUS, UNSPECIFIED ORGAN INVOLVEMENT STATUS: ICD-10-CM

## 2018-11-19 LAB
ALBUMIN SERPL BCP-MCNC: 3.7 G/DL
ALP SERPL-CCNC: 121 U/L
ALT SERPL W/O P-5'-P-CCNC: 16 U/L
ANION GAP SERPL CALC-SCNC: 9 MMOL/L
AST SERPL-CCNC: 26 U/L
BASOPHILS # BLD AUTO: 0.06 K/UL
BASOPHILS NFR BLD: 1.3 %
BILIRUB SERPL-MCNC: 0.6 MG/DL
BUN SERPL-MCNC: 14 MG/DL
C3 SERPL-MCNC: 101 MG/DL
C4 SERPL-MCNC: 26 MG/DL
CALCIUM SERPL-MCNC: 9.3 MG/DL
CHLORIDE SERPL-SCNC: 106 MMOL/L
CO2 SERPL-SCNC: 23 MMOL/L
CREAT SERPL-MCNC: 0.8 MG/DL
CRP SERPL-MCNC: 4.4 MG/L
DIFFERENTIAL METHOD: NORMAL
EOSINOPHIL # BLD AUTO: 0.1 K/UL
EOSINOPHIL NFR BLD: 2.5 %
ERYTHROCYTE [DISTWIDTH] IN BLOOD BY AUTOMATED COUNT: 13 %
ERYTHROCYTE [SEDIMENTATION RATE] IN BLOOD BY WESTERGREN METHOD: 37 MM/HR
EST. GFR  (AFRICAN AMERICAN): >60 ML/MIN/1.73 M^2
EST. GFR  (NON AFRICAN AMERICAN): >60 ML/MIN/1.73 M^2
GLUCOSE SERPL-MCNC: 73 MG/DL
HCT VFR BLD AUTO: 40.3 %
HGB BLD-MCNC: 13 G/DL
IMM GRANULOCYTES # BLD AUTO: 0.01 K/UL
IMM GRANULOCYTES NFR BLD AUTO: 0.2 %
LYMPHOCYTES # BLD AUTO: 1.9 K/UL
LYMPHOCYTES NFR BLD: 39.5 %
MCH RBC QN AUTO: 31 PG
MCHC RBC AUTO-ENTMCNC: 32.3 G/DL
MCV RBC AUTO: 96 FL
MONOCYTES # BLD AUTO: 0.5 K/UL
MONOCYTES NFR BLD: 11.5 %
NEUTROPHILS # BLD AUTO: 2.1 K/UL
NEUTROPHILS NFR BLD: 45 %
NRBC BLD-RTO: 0 /100 WBC
PLATELET # BLD AUTO: 325 K/UL
PMV BLD AUTO: 11 FL
POTASSIUM SERPL-SCNC: 4.5 MMOL/L
PROT SERPL-MCNC: 7.4 G/DL
RBC # BLD AUTO: 4.19 M/UL
SODIUM SERPL-SCNC: 138 MMOL/L
WBC # BLD AUTO: 4.71 K/UL

## 2018-11-19 PROCEDURE — 85652 RBC SED RATE AUTOMATED: CPT

## 2018-11-19 PROCEDURE — 86160 COMPLEMENT ANTIGEN: CPT | Mod: 59

## 2018-11-19 PROCEDURE — 86140 C-REACTIVE PROTEIN: CPT

## 2018-11-19 PROCEDURE — 86160 COMPLEMENT ANTIGEN: CPT

## 2018-11-19 PROCEDURE — 36415 COLL VENOUS BLD VENIPUNCTURE: CPT | Mod: PO

## 2018-11-19 PROCEDURE — 86225 DNA ANTIBODY NATIVE: CPT

## 2018-11-19 PROCEDURE — 85025 COMPLETE CBC W/AUTO DIFF WBC: CPT

## 2018-11-19 PROCEDURE — 80053 COMPREHEN METABOLIC PANEL: CPT

## 2018-11-19 RX ORDER — ALBUTEROL SULFATE 108 UG/1
AEROSOL, METERED RESPIRATORY (INHALATION)
Qty: 6.7 G | Refills: 3 | Status: SHIPPED | OUTPATIENT
Start: 2018-11-19 | End: 2020-03-23 | Stop reason: SDUPTHER

## 2018-11-20 ENCOUNTER — PATIENT MESSAGE (OUTPATIENT)
Dept: FAMILY MEDICINE | Facility: CLINIC | Age: 63
End: 2018-11-20

## 2018-11-20 DIAGNOSIS — H93.11 RIGHT-SIDED TINNITUS: ICD-10-CM

## 2018-11-20 DIAGNOSIS — D33.3 RIGHT ACOUSTIC NEUROMA: ICD-10-CM

## 2018-11-20 RX ORDER — ALPRAZOLAM 0.25 MG/1
0.25 TABLET ORAL EVERY 8 HOURS PRN
Qty: 30 TABLET | Refills: 0 | Status: SHIPPED | OUTPATIENT
Start: 2018-11-20 | End: 2019-03-13 | Stop reason: SDUPTHER

## 2018-11-21 LAB — DSDNA AB SER-ACNC: ABNORMAL [IU]/ML

## 2018-11-26 ENCOUNTER — TELEPHONE (OUTPATIENT)
Dept: RHEUMATOLOGY | Facility: CLINIC | Age: 63
End: 2018-11-26

## 2018-11-26 ENCOUNTER — OFFICE VISIT (OUTPATIENT)
Dept: RHEUMATOLOGY | Facility: CLINIC | Age: 63
End: 2018-11-26
Payer: COMMERCIAL

## 2018-11-26 VITALS
SYSTOLIC BLOOD PRESSURE: 117 MMHG | DIASTOLIC BLOOD PRESSURE: 59 MMHG | HEIGHT: 62 IN | WEIGHT: 169.81 LBS | HEART RATE: 64 BPM | BODY MASS INDEX: 31.25 KG/M2

## 2018-11-26 DIAGNOSIS — D33.3 RIGHT ACOUSTIC NEUROMA: ICD-10-CM

## 2018-11-26 DIAGNOSIS — G63 POLYNEUROPATHY IN COLLAGEN VASCULAR DISEASE: ICD-10-CM

## 2018-11-26 DIAGNOSIS — M35.9 POLYNEUROPATHY IN COLLAGEN VASCULAR DISEASE: ICD-10-CM

## 2018-11-26 DIAGNOSIS — M32.8 OTHER FORMS OF SYSTEMIC LUPUS ERYTHEMATOSUS, UNSPECIFIED ORGAN INVOLVEMENT STATUS: Primary | ICD-10-CM

## 2018-11-26 PROCEDURE — 99214 OFFICE O/P EST MOD 30 MIN: CPT | Mod: S$GLB,,, | Performed by: INTERNAL MEDICINE

## 2018-11-26 PROCEDURE — 99999 PR PBB SHADOW E&M-EST. PATIENT-LVL III: CPT | Mod: PBBFAC,,, | Performed by: INTERNAL MEDICINE

## 2018-11-26 PROCEDURE — 3008F BODY MASS INDEX DOCD: CPT | Mod: CPTII,S$GLB,, | Performed by: INTERNAL MEDICINE

## 2018-11-26 ASSESSMENT — ROUTINE ASSESSMENT OF PATIENT INDEX DATA (RAPID3)
PSYCHOLOGICAL DISTRESS SCORE: 1.1
PAIN SCORE: 2
AM STIFFNESS SCORE: 1, YES
FATIGUE SCORE: .5
MDHAQ FUNCTION SCORE: 0
PATIENT GLOBAL ASSESSMENT SCORE: 0
WHEN YOU AWAKENED IN THE MORNING OVER THE LAST WEEK, PLEASE INDICATE THE AMOUNT OF TIME IT TAKES UNTIL YOU ARE AS LIMBER AS YOU WILL BE FOR THE DAY: 10 MINS
TOTAL RAPID3 SCORE: .67

## 2018-11-26 ASSESSMENT — SYSTEMIC LUPUS ERYTHEMATOSUS DISEASE ACTIVITY INDEX (SLEDAI): TOTAL_SCORE: 4

## 2018-11-26 NOTE — PROGRESS NOTES
I have personally taken the history and examined the patient and agree with the resident,s note as stated above  Has had several episodes nasal but not oral ulcers.  Some chronic hair fall, not worse.   Saw Dr. Quick in [place]. Back from [place], sailing x 2 wks with lots of sun exposure.    Exam: + Jaccoud's, otherwise nl    SLE: SLEDAI= 2 ( nasal ulcers > 10 days ago) pending urinalysis. She feels well and doesn't feel as if having lupus flare  Vitamin D deficiency     She will contact me if any new SLE symptoms. She doesn't feel needs Kenalog   *Shingrix when available  Fax recent stress test from [place], and lastest brain MRI done @ [facility].  Cont vitamin D2 50,000 units once a week and add vitamin D3 1000 units daily, goal is vitamin D > 40  RTC 3 months with standing labs

## 2018-11-26 NOTE — PROGRESS NOTES
"Subjective:       Patient ID: Radha Taylor is a 63 y.o. female.    Chief Complaint: SLE    Lat clinic visit 5/21/18.    Has had intermittent butterfly rash. Several nose sores recently in October. No serositis. She has been feeling stiffer than she usual. Intermittent pain and swelling both knees. Denies alopecia. No increase in fatigue. Denies any major change since her last visit. Taking Plaquenil 200mg BID, ergocalciferol 50,000 mg. Returned from month long trip overseas October 15th; did endorse getting "too much sun".         Review of Systems   Constitutional: Negative for chills, fatigue, fever and unexpected weight change.   HENT: Negative for congestion, rhinorrhea and sore throat.    Eyes: Negative for pain, discharge and itching.   Respiratory: Positive for shortness of breath (asthma). Negative for chest tightness.    Cardiovascular: Negative for chest pain and palpitations.   Gastrointestinal: Negative for abdominal pain.   Musculoskeletal: Positive for arthralgias, joint swelling and myalgias. Negative for gait problem and neck pain.   Skin: Negative for color change and rash.   Neurological: Negative for dizziness and light-headedness.   Psychiatric/Behavioral: Negative for behavioral problems.         Objective:   BP (!) 117/59   Pulse 64   Ht 5' 2.4" (1.585 m)   Wt 77 kg (169 lb 12.8 oz)   BMI 30.66 kg/m²      Physical Exam   Constitutional: She is oriented to person, place, and time and well-developed, well-nourished, and in no distress. No distress.   HENT:   Head: Normocephalic and atraumatic.   Right Ear: External ear normal.   Left Ear: External ear normal.   Nose: Nose normal.   Mouth/Throat: Oropharynx is clear and moist. No oropharyngeal exudate.   Eyes: Conjunctivae and EOM are normal. Right eye exhibits no discharge. Left eye exhibits no discharge. No scleral icterus.   Cardiovascular: Normal rate, regular rhythm, normal heart sounds and intact distal pulses.    Pulmonary/Chest: " Effort normal and breath sounds normal. No respiratory distress.   Abdominal: Soft. Bowel sounds are normal. She exhibits no distension.       Right Side Rheumatological Exam     Examination finds the shoulder, elbow, wrist, knee, 1st PIP, 1st MCP, 2nd PIP, 2nd MCP, 3rd PIP, 3rd MCP, 4th PIP, 4th MCP, 5th PIP and 5th MCP normal.    Muscle Strength (0-5 scale):  Deltoid:  5  Biceps: 5/5   Triceps:  5  : 5/5   Iliopsoas: 5  Quadriceps:  5   Distal Lower Extremity: 5    Left Side Rheumatological Exam     Examination finds the shoulder, elbow, wrist, knee, 1st PIP, 1st MCP, 2nd PIP, 2nd MCP, 3rd PIP, 3rd MCP, 4th PIP, 4th MCP, 5th PIP and 5th MCP normal.    Muscle Strength (0-5 scale):  Deltoid:  5  Biceps: 5/5   Triceps:  5  :  5/5   Iliopsoas: 5  Quadriceps:  5   Distal Lower Extremity: 5      Neurological: She is alert and oriented to person, place, and time. She has normal reflexes. She displays normal reflexes. No cranial nerve deficit. She exhibits normal muscle tone. Gait normal. Coordination normal. GCS score is 15.   Reflex Scores:       Bicep reflexes are 2+ on the right side and 2+ on the left side.       Brachioradialis reflexes are 2+ on the right side and 2+ on the left side.       Patellar reflexes are 2+ on the right side and 2+ on the left side.       Achilles reflexes are 2+ on the right side and 2+ on the left side.  Skin: Skin is warm and dry. No rash noted. She is not diaphoretic. No erythema. No pallor.     Psychiatric: Mood, memory, affect and judgment normal.   Musculoskeletal: Normal range of motion. She exhibits deformity (Ulnar deviation of fingers bilaterally. Correctable. ). She exhibits no edema or tenderness.           Labs:  Results for VÍCTOR COTA (MRN [id number]) as of 11/26/2018 14:16   Ref. Range 11/19/2018 09:42   WBC Latest Ref Range: 3.90 - 12.70 K/uL 4.71   RBC Latest Ref Range: 4.00 - 5.40 M/uL 4.19   Hemoglobin Latest Ref Range: 12.0 - 16.0 g/dL 13.0   Hematocrit  Latest Ref Range: 37.0 - 48.5 % 40.3   MCV Latest Ref Range: 82 - 98 fL 96   MCH Latest Ref Range: 27.0 - 31.0 pg 31.0   MCHC Latest Ref Range: 32.0 - 36.0 g/dL 32.3   RDW Latest Ref Range: 11.5 - 14.5 % 13.0   Platelets Latest Ref Range: 150 - 350 K/uL 325   MPV Latest Ref Range: 9.2 - 12.9 fL 11.0   Gran% Latest Ref Range: 38.0 - 73.0 % 45.0   Gran # (ANC) Latest Ref Range: 1.8 - 7.7 K/uL 2.1   Immature Granulocytes Latest Ref Range: 0.0 - 0.5 % 0.2   Immature Grans (Abs) Latest Ref Range: 0.00 - 0.04 K/uL 0.01   Lymph% Latest Ref Range: 18.0 - 48.0 % 39.5   Lymph # Latest Ref Range: 1.0 - 4.8 K/uL 1.9   Mono% Latest Ref Range: 4.0 - 15.0 % 11.5   Mono # Latest Ref Range: 0.3 - 1.0 K/uL 0.5   Eosinophil% Latest Ref Range: 0.0 - 8.0 % 2.5   Eos # Latest Ref Range: 0.0 - 0.5 K/uL 0.1   Basophil% Latest Ref Range: 0.0 - 1.9 % 1.3   Baso # Latest Ref Range: 0.00 - 0.20 K/uL 0.06   nRBC Latest Ref Range: 0 /100 WBC 0   Differential Method Unknown Automated   Sed Rate Latest Ref Range: 0 - 36 mm/Hr 37 (H)   Sodium Latest Ref Range: 136 - 145 mmol/L 138   Potassium Latest Ref Range: 3.5 - 5.1 mmol/L 4.5   Chloride Latest Ref Range: 95 - 110 mmol/L 106   CO2 Latest Ref Range: 23 - 29 mmol/L 23   Anion Gap Latest Ref Range: 8 - 16 mmol/L 9   BUN, Bld Latest Ref Range: 8 - 23 mg/dL 14   Creatinine Latest Ref Range: 0.5 - 1.4 mg/dL 0.8   eGFR if non African American Latest Ref Range: >60 mL/min/1.73 m^2 >60.0   eGFR if African American Latest Ref Range: >60 mL/min/1.73 m^2 >60.0   Glucose Latest Ref Range: 70 - 110 mg/dL 73   Calcium Latest Ref Range: 8.7 - 10.5 mg/dL 9.3   Alkaline Phosphatase Latest Ref Range: 55 - 135 U/L 121   Total Protein Latest Ref Range: 6.0 - 8.4 g/dL 7.4   Albumin Latest Ref Range: 3.5 - 5.2 g/dL 3.7   Total Bilirubin Latest Ref Range: 0.1 - 1.0 mg/dL 0.6   AST Latest Ref Range: 10 - 40 U/L 26   ALT Latest Ref Range: 10 - 44 U/L 16   CRP Latest Ref Range: 0.0 - 8.2 mg/L 4.4   ds DNA Ab Latest Ref  Range: Negative 1:10  Positive 1:40 (A)   Complement (C-3) Latest Ref Range: 50 - 180 mg/dL 101   Complement (C-4) Latest Ref Range: 11 - 44 mg/dL 26     Assessment:     SLE    Plan:   Urine sample today  Continue Plaquenil 200mg BID  Continue Ergocalciferol 50,000 units  Add Vitamin D3 1000 units daily  Return to clinic in 3 months with standing labs

## 2018-11-26 NOTE — PATIENT INSTRUCTIONS
Urine sample today  Send recent MRI and Stress test to office  Shingrix (new shingles vaccine) when available  Return to clinic in 3 months or sooner if flare or worsening symptoms

## 2018-12-03 ENCOUNTER — PATIENT MESSAGE (OUTPATIENT)
Dept: RHEUMATOLOGY | Facility: CLINIC | Age: 63
End: 2018-12-03

## 2018-12-10 ENCOUNTER — TELEPHONE (OUTPATIENT)
Dept: RHEUMATOLOGY | Facility: CLINIC | Age: 63
End: 2018-12-10

## 2018-12-10 NOTE — TELEPHONE ENCOUNTER
Received copy of letter from Александр Evans MD Dept of Neurologic Surgery  [facility]    6/7/18:    Reviewed most recent MRI head. 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 Review of MRI  Head 5/31/18: tumor definitely smaller than it was on date of treatment 9/1/16. Very stable compared with prior MRI of 9/18/17> No radiation effect. Nothing sinister on imaging/ Recommended f/u MRI Sept 2019, 3 years from date of Rx.

## 2018-12-13 ENCOUNTER — PATIENT MESSAGE (OUTPATIENT)
Dept: RHEUMATOLOGY | Facility: CLINIC | Age: 63
End: 2018-12-13

## 2018-12-23 ENCOUNTER — PATIENT MESSAGE (OUTPATIENT)
Dept: FAMILY MEDICINE | Facility: CLINIC | Age: 63
End: 2018-12-23

## 2018-12-24 DIAGNOSIS — E03.9 HYPOTHYROIDISM, UNSPECIFIED TYPE: Primary | ICD-10-CM

## 2018-12-26 ENCOUNTER — LAB VISIT (OUTPATIENT)
Dept: LAB | Facility: OTHER | Age: 63
End: 2018-12-26
Payer: COMMERCIAL

## 2018-12-26 DIAGNOSIS — E03.9 HYPOTHYROIDISM, UNSPECIFIED TYPE: ICD-10-CM

## 2018-12-26 LAB — TSH SERPL DL<=0.005 MIU/L-ACNC: 1.11 UIU/ML

## 2018-12-26 PROCEDURE — 36415 COLL VENOUS BLD VENIPUNCTURE: CPT

## 2018-12-26 PROCEDURE — 84443 ASSAY THYROID STIM HORMONE: CPT

## 2019-01-26 ENCOUNTER — PATIENT MESSAGE (OUTPATIENT)
Dept: RHEUMATOLOGY | Facility: CLINIC | Age: 64
End: 2019-01-26

## 2019-01-28 ENCOUNTER — OFFICE VISIT (OUTPATIENT)
Dept: FAMILY MEDICINE | Facility: CLINIC | Age: 64
End: 2019-01-28
Payer: COMMERCIAL

## 2019-01-28 ENCOUNTER — PATIENT MESSAGE (OUTPATIENT)
Dept: FAMILY MEDICINE | Facility: CLINIC | Age: 64
End: 2019-01-28

## 2019-01-28 ENCOUNTER — PATIENT MESSAGE (OUTPATIENT)
Dept: RHEUMATOLOGY | Facility: CLINIC | Age: 64
End: 2019-01-28

## 2019-01-28 ENCOUNTER — TELEPHONE (OUTPATIENT)
Dept: RHEUMATOLOGY | Facility: CLINIC | Age: 64
End: 2019-01-28

## 2019-01-28 VITALS
HEIGHT: 60 IN | SYSTOLIC BLOOD PRESSURE: 120 MMHG | WEIGHT: 166.25 LBS | HEART RATE: 67 BPM | BODY MASS INDEX: 32.64 KG/M2 | DIASTOLIC BLOOD PRESSURE: 64 MMHG | TEMPERATURE: 98 F

## 2019-01-28 DIAGNOSIS — M32.8 OTHER FORMS OF SYSTEMIC LUPUS ERYTHEMATOSUS, UNSPECIFIED ORGAN INVOLVEMENT STATUS: ICD-10-CM

## 2019-01-28 DIAGNOSIS — Z12.31 VISIT FOR SCREENING MAMMOGRAM: ICD-10-CM

## 2019-01-28 DIAGNOSIS — D33.3 RIGHT ACOUSTIC NEUROMA: ICD-10-CM

## 2019-01-28 DIAGNOSIS — R42 VERTIGO: Primary | ICD-10-CM

## 2019-01-28 DIAGNOSIS — M32.19 SYSTEMIC LUPUS ERYTHEMATOSUS WITH OTHER ORGAN INVOLVEMENT, UNSPECIFIED SLE TYPE: Primary | ICD-10-CM

## 2019-01-28 DIAGNOSIS — H93.11 RIGHT-SIDED TINNITUS: ICD-10-CM

## 2019-01-28 DIAGNOSIS — R42 VERTIGO: ICD-10-CM

## 2019-01-28 DIAGNOSIS — H91.91 HEARING LOSS OF RIGHT EAR, UNSPECIFIED HEARING LOSS TYPE: ICD-10-CM

## 2019-01-28 DIAGNOSIS — D33.3 RIGHT ACOUSTIC NEUROMA: Primary | ICD-10-CM

## 2019-01-28 PROCEDURE — 99214 PR OFFICE/OUTPT VISIT, EST, LEVL IV, 30-39 MIN: ICD-10-PCS | Mod: 25,S$GLB,, | Performed by: INTERNAL MEDICINE

## 2019-01-28 PROCEDURE — 96372 THER/PROPH/DIAG INJ SC/IM: CPT | Mod: S$GLB,,, | Performed by: INTERNAL MEDICINE

## 2019-01-28 PROCEDURE — 99999 PR PBB SHADOW E&M-EST. PATIENT-LVL IV: ICD-10-PCS | Mod: PBBFAC,,, | Performed by: INTERNAL MEDICINE

## 2019-01-28 PROCEDURE — 99999 PR PBB SHADOW E&M-EST. PATIENT-LVL IV: CPT | Mod: PBBFAC,,, | Performed by: INTERNAL MEDICINE

## 2019-01-28 PROCEDURE — 3008F PR BODY MASS INDEX (BMI) DOCUMENTED: ICD-10-PCS | Mod: CPTII,S$GLB,, | Performed by: INTERNAL MEDICINE

## 2019-01-28 PROCEDURE — 99214 OFFICE O/P EST MOD 30 MIN: CPT | Mod: 25,S$GLB,, | Performed by: INTERNAL MEDICINE

## 2019-01-28 PROCEDURE — 96372 PR INJECTION,THERAP/PROPH/DIAG2ST, IM OR SUBCUT: ICD-10-PCS | Mod: S$GLB,,, | Performed by: INTERNAL MEDICINE

## 2019-01-28 PROCEDURE — 3008F BODY MASS INDEX DOCD: CPT | Mod: CPTII,S$GLB,, | Performed by: INTERNAL MEDICINE

## 2019-01-28 NOTE — PROGRESS NOTES
Subjective:        Patient ID: Radha Taylor is a 63 y.o. female.    Chief Complaint: Dizziness and Nausea    HPI   Radha Taylor presents to follow up on vertigo.  Pt has had 2 episodes recently.  The first occurred while she was sleeping; she woke up with the vertigo even before she sat up in bed.  The 2nd episode occurred while she was sitting at a play.  She went to the ER yesterday and was given Meclizine, which helped but made her sleepy.  CTA showed no acute stroke.    Pt saw rheum 2 months ago and her inflammatory markers were elevated.  She declined treatment with a steroid injection at that time but she is reconsidering today because she has had vertigo 2/2 flare of SLE in the past.    Pt reports her hearing loss has decreased.  She had her hearing tested last week and her L side is worse, R is stable.  The tinnitus is increased.  She took a half of a Xanax for this and it helped a little, but it also makes her sleepy.    Pt denies any recent allergy sx, including congestion.    Pt also needs to establish care with neuro and neurosurg here.    Dizziness is resolved today.       Review of Systems   Constitutional: Negative for activity change and unexpected weight change.   HENT: Positive for hearing loss and rhinorrhea. Negative for trouble swallowing.    Eyes: Negative for discharge and visual disturbance.   Respiratory: Negative for chest tightness and wheezing.    Cardiovascular: Negative for chest pain and palpitations.   Gastrointestinal: Positive for vomiting. Negative for blood in stool, constipation and diarrhea.   Endocrine: Negative for polydipsia and polyuria.   Genitourinary: Negative for difficulty urinating, dysuria, hematuria and menstrual problem.   Musculoskeletal: Positive for arthralgias and joint swelling. Negative for neck pain.   Neurological: Positive for weakness and headaches.   Psychiatric/Behavioral: Negative for confusion and dysphoric mood.           Objective:         Vitals:    01/28/19 1437   BP: 120/64   Pulse: 67   Temp: 98 °F (36.7 °C)     Physical Exam   Constitutional: She is oriented to person, place, and time. She appears well-developed and well-nourished. No distress.   HENT:   Head: Normocephalic and atraumatic.   Right Ear: External ear normal.   Left Ear: External ear normal.   - bilateral ear canals clear, tympanic membranes visualized - normal color and light reflex  - no middle ear effusions   Neurological: She is alert and oriented to person, place, and time.   Skin: Skin is warm and dry.   Vitals reviewed.          Assessment:         1. Vertigo    2. Hearing loss of right ear, unspecified hearing loss type    3. Right-sided tinnitus    4. Right acoustic neuroma    5. Other forms of systemic lupus erythematosus, unspecified organ involvement status    6. Visit for screening mammogram              Plan:         Radha was seen today for dizziness and nausea.    Diagnoses and all orders for this visit:    Vertigo  Hearing loss of right ear, unspecified hearing loss type  Right-sided tinnitus  Right acoustic neuroma  Other forms of systemic lupus erythematosus, unspecified organ involvement status  - Solumedrol IM x 1 dose today  - Counseled pt all medications used to treat vertigo can cause some sleepiness.  - Pt is going to follow up with her hearing aid provider regarding recent changes in her hearing testing.  - Referral to neuro.  - Schedule MRI ordered by Dr. Rios.  -     methylPREDNISolone sod suc(PF) injection 125 mg    Visit for screening mammogram  -     Mammo Digital Screening Bilat; Future

## 2019-01-28 NOTE — TELEPHONE ENCOUNTER
Have placed referral to Neurosurgery to f/u on acoustic neuroma right, s/p XRT [place] in [place].  Pt Having recurrent vertigo. Brain MRI ordered. ROBERTO

## 2019-01-28 NOTE — PROGRESS NOTES
Two patient identifiers verified.  Allergies reviewed.  Solu_Medrol 125 mg. IM administered to LUOQ per MD order.  Patient tolerated injection well; no redness, bleeding, or bruising noted to injection site.  Patient instructed to remain in clinic setting for 15 minutes.  Verbalizes understanding.

## 2019-01-28 NOTE — TELEPHONE ENCOUNTER
Please schedule MRI brain and ask her whom she is following with in [place] for the vertigo, aware she sees Neurosurgeon @ [place], whom will she see here? Let me know and will place referral. Thanks ROBERTO

## 2019-01-30 ENCOUNTER — HOSPITAL ENCOUNTER (OUTPATIENT)
Dept: RADIOLOGY | Facility: HOSPITAL | Age: 64
Discharge: HOME | End: 2019-01-30
Attending: INTERNAL MEDICINE
Payer: COMMERCIAL

## 2019-01-30 ENCOUNTER — TELEPHONE (OUTPATIENT)
Dept: RHEUMATOLOGY | Facility: CLINIC | Age: 64
End: 2019-01-30

## 2019-01-30 ENCOUNTER — PATIENT MESSAGE (OUTPATIENT)
Dept: RHEUMATOLOGY | Facility: CLINIC | Age: 64
End: 2019-01-30

## 2019-01-30 DIAGNOSIS — M32.19 SYSTEMIC LUPUS ERYTHEMATOSUS WITH OTHER ORGAN INVOLVEMENT, UNSPECIFIED SLE TYPE: ICD-10-CM

## 2019-01-30 DIAGNOSIS — D33.3 RIGHT ACOUSTIC NEUROMA: ICD-10-CM

## 2019-01-30 DIAGNOSIS — R42 VERTIGO: ICD-10-CM

## 2019-01-30 PROCEDURE — 25500020 PHARM REV CODE 255: Performed by: INTERNAL MEDICINE

## 2019-01-30 PROCEDURE — A9585 GADOBUTROL INJECTION: HCPCS | Performed by: INTERNAL MEDICINE

## 2019-01-30 PROCEDURE — 70553 MRI BRAIN W WO CONTRAST: ICD-10-PCS | Mod: 26,,, | Performed by: RADIOLOGY

## 2019-01-30 PROCEDURE — 70553 MRI BRAIN STEM W/O & W/DYE: CPT | Mod: TC

## 2019-01-30 PROCEDURE — 70553 MRI BRAIN STEM W/O & W/DYE: CPT | Mod: 26,,, | Performed by: RADIOLOGY

## 2019-01-30 RX ORDER — GADOBUTROL 604.72 MG/ML
8 INJECTION INTRAVENOUS
Status: COMPLETED | OUTPATIENT
Start: 2019-01-30 | End: 2019-01-30

## 2019-01-30 RX ADMIN — GADOBUTROL 8 ML: 604.72 INJECTION INTRAVENOUS at 01:01

## 2019-01-31 ENCOUNTER — PATIENT MESSAGE (OUTPATIENT)
Dept: FAMILY MEDICINE | Facility: CLINIC | Age: 64
End: 2019-01-31

## 2019-02-01 ENCOUNTER — PATIENT MESSAGE (OUTPATIENT)
Dept: RHEUMATOLOGY | Facility: CLINIC | Age: 64
End: 2019-02-01

## 2019-02-05 ENCOUNTER — OFFICE VISIT (OUTPATIENT)
Dept: NEUROLOGY | Facility: CLINIC | Age: 64
End: 2019-02-05
Payer: COMMERCIAL

## 2019-02-05 ENCOUNTER — HOSPITAL ENCOUNTER (OUTPATIENT)
Dept: RADIOLOGY | Facility: HOSPITAL | Age: 64
Discharge: HOME | End: 2019-02-05
Attending: INTERNAL MEDICINE
Payer: COMMERCIAL

## 2019-02-05 ENCOUNTER — PATIENT MESSAGE (OUTPATIENT)
Dept: NEUROLOGY | Facility: CLINIC | Age: 64
End: 2019-02-05

## 2019-02-05 VITALS
BODY MASS INDEX: 31.22 KG/M2 | SYSTOLIC BLOOD PRESSURE: 107 MMHG | HEART RATE: 68 BPM | WEIGHT: 159 LBS | HEIGHT: 60 IN | DIASTOLIC BLOOD PRESSURE: 68 MMHG

## 2019-02-05 DIAGNOSIS — R42 VERTIGO: ICD-10-CM

## 2019-02-05 DIAGNOSIS — H81.311 VERTIGO, AURAL, RIGHT: Primary | ICD-10-CM

## 2019-02-05 DIAGNOSIS — Z12.31 VISIT FOR SCREENING MAMMOGRAM: ICD-10-CM

## 2019-02-05 PROCEDURE — 3008F PR BODY MASS INDEX (BMI) DOCUMENTED: ICD-10-PCS | Mod: CPTII,S$GLB,, | Performed by: PSYCHIATRY & NEUROLOGY

## 2019-02-05 PROCEDURE — 77067 SCR MAMMO BI INCL CAD: CPT | Mod: TC

## 2019-02-05 PROCEDURE — 77067 SCR MAMMO BI INCL CAD: CPT | Mod: 26,,, | Performed by: RADIOLOGY

## 2019-02-05 PROCEDURE — 77067 MAMMO DIGITAL SCREENING BILAT WITH CAD: ICD-10-PCS | Mod: 26,,, | Performed by: RADIOLOGY

## 2019-02-05 PROCEDURE — 99999 PR PBB SHADOW E&M-EST. PATIENT-LVL III: CPT | Mod: PBBFAC,,, | Performed by: PSYCHIATRY & NEUROLOGY

## 2019-02-05 PROCEDURE — 3008F BODY MASS INDEX DOCD: CPT | Mod: CPTII,S$GLB,, | Performed by: PSYCHIATRY & NEUROLOGY

## 2019-02-05 PROCEDURE — 99204 OFFICE O/P NEW MOD 45 MIN: CPT | Mod: S$GLB,,, | Performed by: PSYCHIATRY & NEUROLOGY

## 2019-02-05 PROCEDURE — 99999 PR PBB SHADOW E&M-EST. PATIENT-LVL III: ICD-10-PCS | Mod: PBBFAC,,, | Performed by: PSYCHIATRY & NEUROLOGY

## 2019-02-05 PROCEDURE — 99204 PR OFFICE/OUTPT VISIT, NEW, LEVL IV, 45-59 MIN: ICD-10-PCS | Mod: S$GLB,,, | Performed by: PSYCHIATRY & NEUROLOGY

## 2019-02-05 NOTE — ASSESSMENT & PLAN NOTE
Her acoustic neuroma is likely the cause of the aural vertigo. Will get ENG to make sure sure this is aural in nature.

## 2019-02-05 NOTE — LETTER
February 5, 2019      Vita Grimes MD  [address] [place] [facility] [address]           [facility]  [address]  [place] [address]  Phone: [phone]          Patient: Radha Taylor   MR Number: [id number]   YOB: 1955   Date of Visit: 2/5/2019       Dear Dr. Vita Grimes:    Thank you for referring Radha Taylor to me for evaluation. Attached you will find relevant portions of my assessment and plan of care.    If you have questions, please do not hesitate to call me. I look forward to following Radha Taylor along with you.    Sincerely,    Scarlett Pavon MD    Enclosure  CC:  No Recipients    If you would like to receive this communication electronically, please contact [email or web address] or [phone] to request more information on [employer] Link access.    For providers and/or their staff who would like to refer a patient to [facility], please contact us through our one-stop-shop provider referral line, [facility], at [phone].    If you feel you have received this communication in error or would no longer like to receive these types of communications, please e-mail [email or web address]

## 2019-02-05 NOTE — PROGRESS NOTES
Subjective:       Patient ID: Radha Taylor is a 63 y.o. female.    Chief Complaint: Numbness and Dizziness    2 dizzy spalls the most recent of which was 10 days ago. Episode lasted 2 hours. She has true vertigo, nausea, weakness (generalized) and numbness left middle toe. Denies diplopia. Had some unsteadiness of gait. The first episode occurred 1/24/2019 when she awakened with the symptoms and those lasted 20 minutes associated with nausea and emesis   She was seen in ED and had MRI. 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 Has history of tinnitus and is deaf in right ear. Had it treated with gamma knife at [facility] in [place].      Review of Systems   HENT: Positive for hearing loss and tinnitus.    Musculoskeletal: Positive for back pain.   Neurological: Positive for dizziness and numbness.       Objective:      Physical Exam   Neurological: She is alert. She has normal strength and normal reflexes. A cranial nerve deficit is present.   Nystagmus on left gaze.  (+) romberg  Decreased hearing OD       Assessment:       Problem List Items Addressed This Visit        ENT    Vertigo     Her acoustic neuroma is likely the cause of the aural vertigo. Will get ENG to make sure sure this is aural in nature.           Other Visit Diagnoses     Vertigo, aural, right    -  Primary    Relevant Orders    Electronystagmographyx        Plan:       RTC 6 weeks

## 2019-02-06 ENCOUNTER — PATIENT MESSAGE (OUTPATIENT)
Dept: NEUROLOGY | Facility: CLINIC | Age: 64
End: 2019-02-06

## 2019-02-19 ENCOUNTER — PATIENT MESSAGE (OUTPATIENT)
Dept: FAMILY MEDICINE | Facility: CLINIC | Age: 64
End: 2019-02-19

## 2019-02-19 DIAGNOSIS — R09.81 NASAL SINUS CONGESTION: Primary | ICD-10-CM

## 2019-02-20 ENCOUNTER — PATIENT MESSAGE (OUTPATIENT)
Dept: FAMILY MEDICINE | Facility: CLINIC | Age: 64
End: 2019-02-20

## 2019-02-20 RX ORDER — PSEUDOEPHEDRINE HYDROCHLORIDE 60 MG/1
60 TABLET ORAL EVERY 6 HOURS PRN
Qty: 30 TABLET | Refills: 1 | Status: SHIPPED | OUTPATIENT
Start: 2019-02-20 | End: 2019-05-07 | Stop reason: SDUPTHER

## 2019-02-25 ENCOUNTER — OFFICE VISIT (OUTPATIENT)
Dept: FAMILY MEDICINE | Facility: CLINIC | Age: 64
End: 2019-02-25
Payer: COMMERCIAL

## 2019-02-25 ENCOUNTER — PATIENT MESSAGE (OUTPATIENT)
Dept: FAMILY MEDICINE | Facility: CLINIC | Age: 64
End: 2019-02-25

## 2019-02-25 ENCOUNTER — HOSPITAL ENCOUNTER (OUTPATIENT)
Dept: RADIOLOGY | Facility: HOSPITAL | Age: 64
Discharge: HOME | End: 2019-02-25
Attending: INTERNAL MEDICINE
Payer: COMMERCIAL

## 2019-02-25 VITALS
TEMPERATURE: 98 F | BODY MASS INDEX: 31.46 KG/M2 | SYSTOLIC BLOOD PRESSURE: 126 MMHG | WEIGHT: 160.25 LBS | HEART RATE: 74 BPM | HEIGHT: 60 IN | DIASTOLIC BLOOD PRESSURE: 70 MMHG

## 2019-02-25 DIAGNOSIS — J98.8 RESPIRATORY INFECTION: ICD-10-CM

## 2019-02-25 DIAGNOSIS — J98.8 RESPIRATORY INFECTION: Primary | ICD-10-CM

## 2019-02-25 DIAGNOSIS — J10.1 INFLUENZA A: ICD-10-CM

## 2019-02-25 DIAGNOSIS — J10.1 INFLUENZA A: Primary | ICD-10-CM

## 2019-02-25 DIAGNOSIS — R06.2 WHEEZING: ICD-10-CM

## 2019-02-25 DIAGNOSIS — Z87.09 HISTORY OF ASTHMA: ICD-10-CM

## 2019-02-25 LAB
INFLUENZA A, MOLECULAR: POSITIVE
INFLUENZA B, MOLECULAR: NEGATIVE
SPECIMEN SOURCE: ABNORMAL

## 2019-02-25 PROCEDURE — 71046 X-RAY EXAM CHEST 2 VIEWS: CPT | Mod: TC,FY,PO

## 2019-02-25 PROCEDURE — 87502 INFLUENZA DNA AMP PROBE: CPT | Mod: PO

## 2019-02-25 PROCEDURE — 99214 OFFICE O/P EST MOD 30 MIN: CPT | Mod: ,,, | Performed by: INTERNAL MEDICINE

## 2019-02-25 PROCEDURE — 71046 X-RAY EXAM CHEST 2 VIEWS: CPT | Mod: 26,,, | Performed by: RADIOLOGY

## 2019-02-25 PROCEDURE — 99999 PR PBB SHADOW E&M-EST. PATIENT-LVL III: ICD-10-PCS | Mod: PBBFAC,,, | Performed by: INTERNAL MEDICINE

## 2019-02-25 PROCEDURE — 99214 PR OFFICE/OUTPT VISIT, EST, LEVL IV, 30-39 MIN: ICD-10-PCS | Mod: ,,, | Performed by: INTERNAL MEDICINE

## 2019-02-25 PROCEDURE — 99999 PR PBB SHADOW E&M-EST. PATIENT-LVL III: CPT | Mod: PBBFAC,,, | Performed by: INTERNAL MEDICINE

## 2019-02-25 PROCEDURE — 71046 XR CHEST PA AND LATERAL: ICD-10-PCS | Mod: 26,,, | Performed by: RADIOLOGY

## 2019-02-25 RX ORDER — PROMETHAZINE HYDROCHLORIDE AND CODEINE PHOSPHATE 6.25; 1 MG/5ML; MG/5ML
5 SOLUTION ORAL EVERY 6 HOURS PRN
Qty: 180 ML | Refills: 0 | Status: SHIPPED | OUTPATIENT
Start: 2019-02-25 | End: 2019-02-28

## 2019-02-25 RX ORDER — PROMETHAZINE HYDROCHLORIDE AND CODEINE PHOSPHATE 6.25; 1 MG/5ML; MG/5ML
5 SOLUTION ORAL EVERY 6 HOURS PRN
Qty: 180 ML | Refills: 0 | Status: CANCELLED | OUTPATIENT
Start: 2019-02-25

## 2019-02-25 RX ORDER — OSELTAMIVIR PHOSPHATE 75 MG/1
75 CAPSULE ORAL 2 TIMES DAILY
Qty: 10 CAPSULE | Refills: 0 | Status: SHIPPED | OUTPATIENT
Start: 2019-02-25 | End: 2019-02-25 | Stop reason: SDUPTHER

## 2019-02-25 RX ORDER — PROMETHAZINE HYDROCHLORIDE AND CODEINE PHOSPHATE 6.25; 1 MG/5ML; MG/5ML
5 SOLUTION ORAL EVERY 6 HOURS PRN
Qty: 180 ML | Refills: 0 | Status: SHIPPED | OUTPATIENT
Start: 2019-02-25 | End: 2019-02-25 | Stop reason: SDUPTHER

## 2019-02-25 RX ORDER — OSELTAMIVIR PHOSPHATE 75 MG/1
75 CAPSULE ORAL 2 TIMES DAILY
Qty: 10 CAPSULE | Refills: 0 | Status: SHIPPED | OUTPATIENT
Start: 2019-02-25 | End: 2019-03-02

## 2019-02-25 NOTE — PROGRESS NOTES
Subjective:        Patient ID: Radha Taylor is a 64 y.o. female.    Chief Complaint: Chest Pain; Fever; Sore Throat; and Cough    HPI   Radha Taylor presents with c/o fever and cough that started 2 days ago.  Fever is now resolved.  Cough is productive of clear sputum but chest congestion is getting worse.  She has sore throat.  Pt has not needed to use her inhaler more frequently.  Pt did get a flu shot this season but she was exposed to someone who had flu last week.  She has been taking Zina seltzer cold which helps temporarily with cough.    Review of Systems  as per HPI      Objective:        Vitals:    02/25/19 0956   BP: 126/70   Pulse: 74   Temp: 98 °F (36.7 °C)     Physical Exam   Constitutional: She is oriented to person, place, and time. She appears well-developed and well-nourished. No distress.   HENT:   Head: Normocephalic and atraumatic.   Right Ear: External ear normal.   Left Ear: External ear normal.   Nose: Nose normal.   Mouth/Throat: Oropharynx is clear and moist. No oropharyngeal exudate.   Eyes: Conjunctivae and EOM are normal.   Neck: Neck supple.   Cardiovascular: Normal rate, regular rhythm and normal heart sounds.   Pulmonary/Chest: Effort normal. No stridor. No respiratory distress. She has no wheezes. She has rales (coarse breath sounds b/l).   Neurological: She is alert and oriented to person, place, and time.   Skin: Skin is warm and dry. She is not diaphoretic.   Vitals reviewed.          Assessment:         1. Respiratory infection              Plan:         Radha was seen today for chest pain, fever, sore throat and cough.    Diagnoses and all orders for this visit:    Respiratory infection: flu swab, CXR today  - promethazine/codeine for cough  - recommend OTC guaifenesin for thick mucous and congestion  -     Influenza A & B by Molecular  -     X-Ray Chest PA And Lateral; Future  -     promethazine-codeine 6.25-10 mg/5 ml (PHENERGAN WITH CODEINE) 6.25-10 mg/5 mL syrup;  Take 5 mLs by mouth every 6 (six) hours as needed for Cough. May cause sedation.      Follow up pending CXR and flu results.

## 2019-02-25 NOTE — TELEPHONE ENCOUNTER
----- Message from Kelsey Ro sent at 2/25/2019  1:20 PM CST -----  Contact: patient [phone]  You sent an e-script for promethazine with codeine and that pharmacy is out of the medication. She found another pharmacy that has the medication in stock. Please order from the following pharmacy:    [facility] [address] [phone]

## 2019-02-28 ENCOUNTER — OFFICE VISIT (OUTPATIENT)
Dept: NEUROSURGERY | Facility: CLINIC | Age: 64
End: 2019-02-28
Payer: COMMERCIAL

## 2019-02-28 ENCOUNTER — PATIENT MESSAGE (OUTPATIENT)
Dept: NEUROLOGY | Facility: CLINIC | Age: 64
End: 2019-02-28

## 2019-02-28 VITALS
DIASTOLIC BLOOD PRESSURE: 58 MMHG | SYSTOLIC BLOOD PRESSURE: 120 MMHG | BODY MASS INDEX: 31.88 KG/M2 | HEIGHT: 60 IN | WEIGHT: 162.38 LBS | HEART RATE: 59 BPM

## 2019-02-28 DIAGNOSIS — R42 VERTIGO: ICD-10-CM

## 2019-02-28 DIAGNOSIS — D33.3 RIGHT ACOUSTIC NEUROMA: Primary | ICD-10-CM

## 2019-02-28 PROCEDURE — 99999 PR PBB SHADOW E&M-EST. PATIENT-LVL III: ICD-10-PCS | Mod: PBBFAC,,, | Performed by: NEUROLOGICAL SURGERY

## 2019-02-28 PROCEDURE — 3008F PR BODY MASS INDEX (BMI) DOCUMENTED: ICD-10-PCS | Mod: CPTII,S$GLB,, | Performed by: NEUROLOGICAL SURGERY

## 2019-02-28 PROCEDURE — 3008F BODY MASS INDEX DOCD: CPT | Mod: CPTII,S$GLB,, | Performed by: NEUROLOGICAL SURGERY

## 2019-02-28 PROCEDURE — 99999 PR PBB SHADOW E&M-EST. PATIENT-LVL III: CPT | Mod: PBBFAC,,, | Performed by: NEUROLOGICAL SURGERY

## 2019-02-28 PROCEDURE — 99205 PR OFFICE/OUTPT VISIT, NEW, LEVL V, 60-74 MIN: ICD-10-PCS | Mod: S$GLB,,, | Performed by: NEUROLOGICAL SURGERY

## 2019-02-28 PROCEDURE — 99205 OFFICE O/P NEW HI 60 MIN: CPT | Mod: S$GLB,,, | Performed by: NEUROLOGICAL SURGERY

## 2019-02-28 NOTE — LETTER
February 28, 2019      William Rios MD  [address]  [facility] [address]           [facility] 7[facility] Fl  [address]  [facility] [address]  Phone: [phone]          Patient: Radha Taylor   MR Number: [id number]   YOB: 1955   Date of Visit: 2/28/2019       Dear Dr. William Rios:    Thank you for referring Radha Taylor to me for evaluation. Attached you will find relevant portions of my assessment and plan of care.    If you have questions, please do not hesitate to call me. I look forward to following Radha Taylor along with you.    Sincerely,    Terrance Aldridge MD    Enclosure  CC:  No Recipients    If you would like to receive this communication electronically, please contact [email or web address] or [phone] to request more information on [employer] access.    For providers and/or their staff who would like to refer a patient to [facility], please contact us through our one-stop-shop provider referral line, [facility], at [phone].    If you feel you have received this communication in error or would no longer like to receive these types of communications, please e-mail [email or web address]

## 2019-02-28 NOTE — PROGRESS NOTES
History & Physical    I, Guy Gonzalez, attest that this documentation has been prepared under the direction and in the presence of Terrance Aldridge MD.    02/28/2019    No chief complaint on file.      History of Present Illness:  Radha Taylor is a 64 y.o. patient who was referred to me by Dr. William Rios. This is a patient with history of acoustic neuroma s/p treatment with stereotactic radiosurgery 3 years ago at the [facility]. Patient had previously been seen by Dr. Stuart, prior to her SRS at [place], and is here to follow up after neurology evaluation for an episode of vertigo.     Patient complains of a recent episode of vertigo, associated with nausea and vomiting. Patient also reports subjective decreased hearing particularly on the right side, with gradual worsening since having SRS. Patient's last audiogram in our records was done in 2015. Patient usually follows up for her vestibular schwannoma at [place].    Patient mentions having multiple bouts of the flu over the last 6 months which may be contributing to her nausea and vomiting and she thinks is related to her recent episode of vertigo.     Review of patient's allergies indicates:   Allergen Reactions    Bactrim [sulfamethoxazole-trimethoprim] Nausea And Vomiting     Nausea, ? rash  Nausea, ? rash       Current Outpatient Medications   Medication Sig Dispense Refill    ALPRAZolam (XANAX) 0.25 MG tablet Take 1 tablet (0.25 mg total) by mouth every 8 (eight) hours as needed. 30 tablet 0    aspirin 81 MG Chew Take 81 mg by mouth once daily.      b complex vitamins tablet Take 1 tablet by mouth 3 (three) times daily.       ergocalciferol (ERGOCALCIFEROL) 50,000 unit Cap Take 1 capsule by mouth  every 7 days 12 capsule 3    esomeprazole (NEXIUM) 40 MG capsule Take 1 capsule (40 mg total) by mouth before breakfast. 90 capsule 3    fish oil-omega-3 fatty acids 300-1,000 mg capsule Take 1 g by mouth 2 (two) times daily.       hydroxychloroquine  (PLAQUENIL) 200 mg tablet TAKE 1 TABLET BY MOUTH TWICE DAILY 180 tablet 1    levothyroxine (SYNTHROID) 125 MCG tablet Take 1 tablet (125 mcg total) by mouth before breakfast. 90 tablet 3    meclizine (ANTIVERT) 12.5 mg tablet Take 1 tablet (12.5 mg total) by mouth 3 (three) times daily as needed for Dizziness. 30 tablet 0    multivitamin (THERAGRAN) per tablet Take 1 tablet by mouth before breakfast.      oseltamivir (TAMIFLU) 75 MG capsule Take 1 capsule (75 mg total) by mouth 2 (two) times daily. for 5 days 10 capsule 0    PROVENTIL HFA 90 mcg/actuation inhaler INHALE 1 TO 2 PUFFS INTO THE LUNGS EVERY 6 HOURS AS NEEDED FOR WHEEZING OR SHORTNESS OF BREATH. USE 30 MINS BEFORE EXERCISING 6.7 g 3    SUDOGEST 60 mg tablet Take 1 tablet (60 mg total) by mouth every 6 (six) hours as needed for Congestion. 30 tablet 1     No current facility-administered medications for this visit.        Past Medical History:   Diagnosis Date    Acid reflux     Anemia 2years ago    Anxiety     Arthritis     Asthma, chronic 4/10/2013    Crohn's disease 1998    Depression     Dry eyes     Fracture of forearm, proximal, open     screws & plate 6/26/[facility]    History of papilledema 11/10/11    Hyperlipidemia 5/21/2018    Lupus     Neuromuscular disorder 12/2009    Neuropathy    Osteoporosis     Stroke 1/29/2013    Thyroid disease        Past Surgical History:   Procedure Laterality Date    ADENOIDECTOMY  1962    BRAIN SURGERY  01/18/2012    Brain Biopsy    COLONOSCOPY  2017    [facility] Dr. WILLIS Summers; pan-diverticulosis, rec repeat 3-5 years    INCISION AND DRAINAGE PERIRECTAL ABSCESS  1998    abscess removal    ORIF FOREARM FRACTURE Left 06/23/2013    proximal ulnar and radius       Family History   Problem Relation Age of Onset    Cancer Mother 64        small cell lung cancer; smoker    Heart disease Father 64        acute MI; 3v CABG    Sjogren's syndrome Sister     Allergies Sister     Cancer  Maternal Grandmother     COPD Maternal Grandfather     Cancer Paternal Grandmother     Stroke Paternal Grandmother     Ovarian cancer Paternal Grandmother     Heart disease Paternal Grandfather     Mental retardation Brother     Hypertension Sister     Angioedema Neg Hx     Asthma Neg Hx     Atopy Neg Hx     Eczema Neg Hx     Immunodeficiency Neg Hx     Rhinitis Neg Hx     Urticaria Neg Hx        Social History     Tobacco Use    Smoking status: Former Smoker     Packs/day: 0.50     Years: 15.00     Pack years: 7.50     Types: Cigarettes     Last attempt to quit: 1984     Years since quittin.6    Smokeless tobacco: Never Used    Tobacco comment: Quit    Substance Use Topics    Alcohol use: Yes     Comment: less than one per week    Drug use: No        Review of Systems:  Review of Systems   Constitutional: Negative for activity change.   HENT: Negative for congestion.    Eyes: Negative for discharge.   Respiratory: Negative for apnea.    Cardiovascular: Negative for chest pain.   Gastrointestinal: Positive for nausea and vomiting. Negative for abdominal distention.   Endocrine: Negative for cold intolerance.   Genitourinary: Negative for difficulty urinating.   Musculoskeletal: Negative for arthralgias.   Neurological: Positive for dizziness. Negative for weakness and headaches.   Psychiatric/Behavioral: Negative for agitation.       Vital Signs (Most Recent)  Pulse: (!) 59 (19 0850)  BP: (!) 120/58 (19 0850)  5' (1.524 m)  73.7 kg (162 lb 6.4 oz)       Physical Exam:  Physical Exam:    Constitutional: She appears well-developed.     Eyes: Pupils are equal, round, and reactive to light. EOM are normal. Right eye exhibits no discharge. Left eye exhibits no discharge.     Abdominal: Soft.     Skin: Skin displays no rash on trunk and no rash on extremities.     Psych/Behavior: She is alert. She is oriented to person, place, and time.     Musculoskeletal:        Neck: There is  no tenderness.        Back: Range of motion is full.        Right Upper Extremities: Range of motion is full. Muscle strength is 5/5. Tone is normal.        Left Upper Extremities: Range of motion is full. Muscle strength is 5/5. Tone is normal.       Right Lower Extremities: Range of motion is full. Muscle strength is 5/5. Tone is normal.        Left Lower Extremities: Range of motion is full. Muscle strength is 5/5. Tone is normal.     Neurological:        Coordination: She has a normal Romberg Test and normal tandem walking coordination.        Sensory: There is no sensory deficit in the trunk. There is no sensory deficit in the extremities.        DTRs: DTRs are DTRS NORMAL AND SYMMETRICnormal and symmetric. Tricep reflexes are 2+ on the right side and 2+ on the left side. Bicep reflexes are 2+ on the right side and 2+ on the left side. Brachioradialis reflexes are 2+ on the right side and 2+ on the left side. Patellar reflexes are 2+ on the right side and 2+ on the left side. Achilles reflexes are 2+ on the right side and 2+ on the left side. She displays no Babinski's sign on the right side. She displays no Babinski's sign on the left side.        Cranial nerves: Cranial nerve(s) II, III, IV, V, VI, VII, VIII, IX, X, XI and XII are intact.     Cranial nerves II-XII are intact.   Intact hearing to finger rub in both ears. Intact CN VII.     Laboratory  CBC: Reviewed  CMP: Reviewed    Diagnostic Results:  MRI: Reviewed   I have personally reviewed the images and discussed them with the patient:     MRI Brain W WO Contrast, dated 1/30/2019:   Known enhancing extra-axial mass centered in the right internal auditory canal compatible with a vestibular schwannoma as above.  There has been a slight decrease in size of the cisternal component relative to the prior study of 02/10/2017.    The brain appears unchanged.  No evidence of new infarct or other acute intracranial pathology.    ASSESSMENT/PLAN:       ICD-10-CM  ICD-9-CM   1. Right acoustic neuroma D33.3 225.1   2. Vertigo R42 780.4       PLAN:    1. Right-sided vestibular schwannoma treated with radiosurgery 3 years ago. Follow up MRI shows decrease in size of the tumor. At this time, there is no surgical interventions indicated. I think her vertigo could be associated to her flu, rather than to her tumor.     2. Vertigo: continue follow up with her ENT for audiogram and vestibular rehab, as she had previously done.     3. RTC PRN, patient is discharged from my clinic. She will continue to follow up with Neurosurgery at [place] to follow up on her acoustic neuroma.     4. I spent 45 minutes with the patient, 50% of time in counselingabout the above mentioned issues.        Diagnoses and all orders for this visit:    Right acoustic neuroma    Vertigo      I, Dr. Terrance Aldridge, personally performed the services described in this documentation. All medical record entries made by the scribe were at my direction and in my presence.  I have reviewed the chart and agree that the record reflects my personal performance and is accurate and complete. Terrance Aldridge MD.  7:22 PM 02/28/2019

## 2019-03-03 ENCOUNTER — PATIENT MESSAGE (OUTPATIENT)
Dept: NEUROLOGY | Facility: CLINIC | Age: 64
End: 2019-03-03

## 2019-03-12 ENCOUNTER — PATIENT MESSAGE (OUTPATIENT)
Dept: FAMILY MEDICINE | Facility: CLINIC | Age: 64
End: 2019-03-12

## 2019-03-12 ENCOUNTER — OFFICE VISIT (OUTPATIENT)
Dept: RHEUMATOLOGY | Facility: CLINIC | Age: 64
End: 2019-03-12
Payer: COMMERCIAL

## 2019-03-12 VITALS
BODY MASS INDEX: 30.02 KG/M2 | HEIGHT: 62 IN | DIASTOLIC BLOOD PRESSURE: 72 MMHG | WEIGHT: 163.13 LBS | HEART RATE: 65 BPM | SYSTOLIC BLOOD PRESSURE: 124 MMHG

## 2019-03-12 DIAGNOSIS — M32.19 OTHER SYSTEMIC LUPUS ERYTHEMATOSUS WITH OTHER ORGAN INVOLVEMENT: Primary | ICD-10-CM

## 2019-03-12 DIAGNOSIS — R42 VERTIGO: Primary | ICD-10-CM

## 2019-03-12 DIAGNOSIS — H93.11 RIGHT-SIDED TINNITUS: ICD-10-CM

## 2019-03-12 DIAGNOSIS — D33.3 RIGHT ACOUSTIC NEUROMA: ICD-10-CM

## 2019-03-12 PROCEDURE — 99214 OFFICE O/P EST MOD 30 MIN: CPT | Mod: S$GLB,,, | Performed by: INTERNAL MEDICINE

## 2019-03-12 PROCEDURE — 3008F PR BODY MASS INDEX (BMI) DOCUMENTED: ICD-10-PCS | Mod: CPTII,S$GLB,, | Performed by: INTERNAL MEDICINE

## 2019-03-12 PROCEDURE — 99214 PR OFFICE/OUTPT VISIT, EST, LEVL IV, 30-39 MIN: ICD-10-PCS | Mod: S$GLB,,, | Performed by: INTERNAL MEDICINE

## 2019-03-12 PROCEDURE — 99999 PR PBB SHADOW E&M-EST. PATIENT-LVL III: ICD-10-PCS | Mod: PBBFAC,,, | Performed by: INTERNAL MEDICINE

## 2019-03-12 PROCEDURE — 3008F BODY MASS INDEX DOCD: CPT | Mod: CPTII,S$GLB,, | Performed by: INTERNAL MEDICINE

## 2019-03-12 PROCEDURE — 99999 PR PBB SHADOW E&M-EST. PATIENT-LVL III: CPT | Mod: PBBFAC,,, | Performed by: INTERNAL MEDICINE

## 2019-03-12 ASSESSMENT — ROUTINE ASSESSMENT OF PATIENT INDEX DATA (RAPID3)
PAIN SCORE: 0
PSYCHOLOGICAL DISTRESS SCORE: 2.2
FATIGUE SCORE: 4
PATIENT GLOBAL ASSESSMENT SCORE: 4
MDHAQ FUNCTION SCORE: .3
AM STIFFNESS SCORE: 0, NO
TOTAL RAPID3 SCORE: 1.67

## 2019-03-12 ASSESSMENT — SYSTEMIC LUPUS ERYTHEMATOSUS DISEASE ACTIVITY INDEX (SLEDAI): TOTAL_SCORE: 2

## 2019-03-12 NOTE — PROGRESS NOTES
I have personally taken the history and examined the patient and agree with the resident,s note as stated above       Results for VÍCTOR COTA (MRN [id number]) as of 3/12/2019 14:56   Ref. Range 1/27/2019 18:21 1/30/2019 12:25 1/30/2019 12:25 2/25/2019 10:32   WBC Latest Ref Range: 3.90 - 12.70 K/uL  8.76     RBC Latest Ref Range: 4.00 - 5.40 M/uL  4.23     Hemoglobin Latest Ref Range: 12.0 - 16.0 g/dL  13.1     Hematocrit Latest Ref Range: 37.0 - 48.5 %  39.8     MCV Latest Ref Range: 82 - 98 fL  94     MCH Latest Ref Range: 27.0 - 31.0 pg  31.0     MCHC Latest Ref Range: 32.0 - 36.0 g/dL  32.9     RDW Latest Ref Range: 11.5 - 14.5 %  12.2     Platelets Latest Ref Range: 150 - 350 K/uL  340     MPV Latest Ref Range: 9.2 - 12.9 fL  10.5     Gran% Latest Ref Range: 38.0 - 73.0 %  50.7     Gran # (ANC) Latest Ref Range: 1.8 - 7.7 K/uL  4.4     Immature Granulocytes Latest Ref Range: 0.0 - 0.5 %  0.2     Immature Grans (Abs) Latest Ref Range: 0.00 - 0.04 K/uL  0.02     Lymph% Latest Ref Range: 18.0 - 48.0 %  38.2     Lymph # Latest Ref Range: 1.0 - 4.8 K/uL  3.4     Mono% Latest Ref Range: 4.0 - 15.0 %  9.0     Mono # Latest Ref Range: 0.3 - 1.0 K/uL  0.8     Eosinophil% Latest Ref Range: 0.0 - 8.0 %  1.1     Eos # Latest Ref Range: 0.0 - 0.5 K/uL  0.1     Basophil% Latest Ref Range: 0.0 - 1.9 %  0.8     Baso # Latest Ref Range: 0.00 - 0.20 K/uL  0.07     nRBC Latest Ref Range: 0 /100 WBC  0     Differential Method Unknown  Automated     Sed Rate Latest Ref Range: 0 - 36 mm/Hr  18     Sodium Latest Ref Range: 136 - 145 mmol/L  140     Potassium Latest Ref Range: 3.5 - 5.1 mmol/L  4.3     Chloride Latest Ref Range: 95 - 110 mmol/L  105     CO2 Latest Ref Range: 23 - 29 mmol/L  28     Anion Gap Latest Ref Range: 8 - 16 mmol/L  7 (L)     BUN, Bld Latest Ref Range: 8 - 23 mg/dL  20     Creatinine Latest Ref Range: 0.5 - 1.4 mg/dL  0.9 0.9    eGFR if non African American Latest Ref Range: >60 mL/min/1.73 m^2  >60.0 >60.0     eGFR if African American Latest Ref Range: >60 mL/min/1.73 m^2  >60.0 >60.0    Glucose Latest Ref Range: 70 - 110 mg/dL  87     Calcium Latest Ref Range: 8.7 - 10.5 mg/dL  9.8     Alkaline Phosphatase Latest Ref Range: 55 - 135 U/L  115     Total Protein Latest Ref Range: 6.0 - 8.4 g/dL  7.8     Albumin Latest Ref Range: 3.5 - 5.2 g/dL  4.1     Total Bilirubin Latest Ref Range: 0.1 - 1.0 mg/dL  0.6     AST Latest Ref Range: 10 - 40 U/L  27     ALT Latest Ref Range: 10 - 44 U/L  20     CRP Latest Ref Range: 0.0 - 8.2 mg/L  1.7     ds DNA Ab Latest Ref Range: Negative 1:10   Positive 1:10 (A)     Complement (C-3) Latest Ref Range: 50 - 180 mg/dL  102     Complement (C-4) Latest Ref Range: 11 - 44 mg/dL  24     Flu A & B Source Unknown    NP   Influenza A, Molecular Latest Ref Range: Negative     Positive (A)   Influenza B, Molecular Latest Ref Range: Negative     Negative   POC Glucose Latest Ref Range: 73 - 118 mg/dL 97      POC Creatinine Latest Ref Range: 0.6 - 1.2 mg/dL 0.8      POC BUN Latest Ref Range: 7 - 22 mg/dL 16      POC Chloride Latest Ref Range: 98 - 108 mmol/L 101      POC Sodium Latest Ref Range: 128 - 145 mmol/L 139      POC Potassium Latest Ref Range: 3.6 - 5.1 mmol/L 4.2      POC PTINR Latest Ref Range: 0.8 - 1.2  1.1      Calcium, POC Latest Ref Range: 8.0 - 10.3 mg/dL 9.8      Albumin, POC Latest Ref Range: 3.3 - 5.5 g/dL 4.5      Alkaline Phosphatase, POC Latest Ref Range: 42 - 141 U/L 108      POC Troponin I Latest Ref Range: 0.00 - 0.08 ng/mL <0.01      POC eGFR Latest Ref Range: 61 - 2,000 mL/min >60      POC Hemolysis Unknown 0      POC WBC Latest Ref Range: 3.9 - 12.7 K/uL 8.5      POC GRA% Latest Ref Range: 38.0 - 73.0 % 75.4 (H)      POC MID% Latest Ref Range: 4.0 - 15.0 % 5.9      POC LYM% Latest Ref Range: 18.0 - 48.0 % 18.7      POC Gran# Latest Ref Range: 1.8 - 7.7 K/uL 6.4      POC MID# Latest Ref Range: 0.3 - 1.0 K/uL 0.5      POC LYM# Latest Ref Range: 1.0 - 4.8 K/uL 1.6      POC  RBC Latest Ref Range: 4.00 - 5.40 M/uL 4.46      POC HGB Latest Ref Range: 12.0 - 16.0 g/dL 13.5      POC MCV Latest Ref Range: 82.0 - 98.0 fl 86.7      POC HCT Latest Ref Range: 37.0 - 48.5 % 38.7      POC MCH Latest Ref Range: 27.0 - 31.0 pg 30.4      POC MCHC Latest Ref Range: 32.0 - 36.0 g/dL 35.0      POC RDW% Latest Ref Range: 11.5 - 14.5 % 11.6      POC PLT Latest Ref Range: 150 - 350 K/uL 263      POC MPV Latest Ref Range: 9.2 - 12.9 fl 8.6 (L)      Results for VÍCTOR COTA (MRN [id number]) as of 3/12/2019 14:56   Ref. Range 10/16/2018 08:40   Vit D, 25-Hydroxy Latest Ref Range: 30 - 96 ng/mL 27 (L)       Received copy of letter from Александр Evans MD Dept of Neurologic Surgery  [facility]     6/7/18:     Reviewed most recent MRI head. 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 Review of MRI  Head 5/31/18: tumor definitely smaller than it was on date of treatment 9/1/16. Very stable compared with prior MRI of 9/18/17> No radiation effect. Nothing sinister on imaging/ Recommended f/u MRI Sept 2019, 3 years from date of Rx  The MRI brain shows decrease in size of the vesitbular schwannoma compared to study here 2 years ago. No other new findings. No evidence of any new findings. Would f/u with Neurology and Neurosurgery as planned. RJQ          PACS Images for [facility] Viewer      Show images for MRI Brain W WO Contrast   PACS Images for [facility] [place] Viewer (Includes Digital Folio Images)      Show images for MRI Brain W WO Contrast   Reviewed By List     William Rios MD on 1/30/2019 18:06   Result Notes for MRI Brain W WO Contrast     Notes recorded by William Rios MD on 1/30/2019 at 6:06 PM CST  The MRI brain shows decrease in size of the vesitbular schwannoma compared to study here 2 years ago. No other new findings. No evidence of any new findings. Would f/u with Neurology and Neurosurgery as planned. RJQ   MRI Brain W WO Contrast   Order:  [id number]   Status:  Final result   Visible to patient:  Yes (Patient Portal)   Next appt:  03/13/2019 at 03:30 PM in Audiology (DAVID Marks)   Dx:  Vertigo; Right acoustic neuroma; Syst...   Details     Reading Physician Reading Date Result Priority   Rizwan Machado MD 1/30/2019       Narrative     EXAMINATION:  MRI BRAIN W WO CONTRAST    CLINICAL HISTORY:  recurrent vertigo, acoustic neuroma, SLE with h/o CNS lupus; Other organ or system involvement in systemic lupus erythematosus    TECHNIQUE:  Multiplanar multisequence MR imaging of the brain was performed before and after the administration of 02/10/2017 mL Gadavist intravenous contrast.    COMPARISON:  02/10/2017    FINDINGS:  Ventricles are normal in size for age.  No hydrocephalus.    The brain appears unchanged.  Few small scattered nonspecific T2/FLAIR hyperintense foci in the supratentorial white matter.  Few prominent perivascular spaces in the basal ganglia region bilaterally..  No new parenchymal mass, hemorrhage, edema or recent or remote major vascular distribution infarct.  No abnormal postcontrast parenchymal or leptomeningeal enhancement.    Homogeneously enhancing extra-axial mass extending through the right internal auditory canal into the cerebellopontine angle cistern compatible with the reported history of vestibular schwannoma.  The cisternal segment portion of the lesion is slightly decreased in size, measuring 0.8 x 0.9 cm in maximal transverse dimension, previously 1.2 x 1.2 cm respectively.  There is lesion again fills the internal auditory canal extending to the fundus without apparent extension into the vestibule or cochlear.  No new enhancing extra-axial mass identified elsewhere    No extra-axial blood or fluid collections.    The T2 skull base flow voids are preserved.    Prior right frontal craniotomy. Bone marrow signal intensity unremarkable.      Impression       Known enhancing extra-axial mass centered in the  right internal auditory canal compatible with a vestibular schwannoma as above.  There has been a slight decrease in size of the cisternal component relative to the prior study of 02/10/2017.    The brain appears unchanged.  No evidence of new infarct or other acute intracranial pathology.      Electronically signed by: Rizwan Machado MD  Date: 01/30/2019  Time: 14:37                      SLE: SLEDAI=2 pending U/A  Vitamin D deficiency  Right vestibular Schwannoma s/p gamma knife surgery   Recurrent vertigo x2 ED on 1/27/19. Saw Scarlett Pavon in Neurololgy 2/5/19; ENG ordered; saw Dr. Aldridge Neurosurgery 2/28/19: felt vertigo could be associated with flu as MRI of brain showed decrease in size of schwannoma. Rec f/u with ENT with audiogram and vestibular rehab. F/u [place]  S/p influenza A respiratory infection 2/25/19: Dr. Grimes          Vitamin D today  U/A, UPCR today  Cont hydroxychloroquine 200mg twice daily. Saw Dr. Monge 8/9/18 in Optometry   *Shingrix x2  when available  Cont vitamin D2 50,000 units once a week and add vitamin D3 1000 units daily, goal is vitamin D > 40  F/u Dr. Pavon in Neurology after ENG  F/u ENT with audiogram, vestibular rehab  RTC 3 months with standing labs

## 2019-03-12 NOTE — PROGRESS NOTES
"Subjective:       Patient ID: Radha Taylor is a 64 y.o. female.    Chief Complaint: SLE    Mrs. Taylor is a 64 year old woman with SLE. Taking Plaquenil 200mg BID, ergocalciferol 50,000 mg. She is here for her 3 month follow-up; last seen in clinic 11/26/18. She has not had any nose sores or oral ulcers. She had a HA yesterday on her right side. She does have SOB when she is exerting herself. She denies any vision changes. Denies any blood in her urine. Denies any recent infections or antibiotic use. Positive Influenza A in late February. She has been seeing a neurologist for vertigo and symptoms that may be related to her acoustic neuroma. She endorses alopecia.       Review of Systems   Constitutional: Negative for unexpected weight change.   HENT: Negative for mouth sores.    Respiratory: Negative for choking.    Gastrointestinal: Negative for blood in stool.   Genitourinary: Negative for hematuria.   Musculoskeletal: Negative for arthralgias.   Skin: Negative for rash.   Neurological: Positive for dizziness and headaches. Negative for seizures and syncope.   Psychiatric/Behavioral: Negative for behavioral problems.         Objective:   /72   Pulse 65   Ht 5' 2.4" (1.585 m)   Wt 74 kg (163 lb 1.6 oz)   BMI 29.45 kg/m²      Physical Exam   Constitutional: She is oriented to person, place, and time and well-developed, well-nourished, and in no distress. No distress.   HENT:   Head: Normocephalic and atraumatic.   Mouth/Throat: No oropharyngeal exudate.   Cardiovascular: Normal rate.    Pulmonary/Chest: Effort normal and breath sounds normal. No respiratory distress. She has no wheezes. She has no rales. She exhibits no tenderness.   Abdominal: She exhibits no distension.       Right Side Rheumatological Exam     Examination finds the shoulder, elbow, wrist and knee normal.    Muscle Strength (0-5 scale):  Deltoid:  5  Biceps: 5/5   Triceps:  5  : 5/5   Iliopsoas: 5  Quadriceps:  5   Distal Lower " Extremity: 5    Left Side Rheumatological Exam     Examination finds the shoulder, elbow, wrist and knee normal.    Muscle Strength (0-5 scale):  Deltoid:  5  Biceps: 5/5   Triceps:  5  :  5/5   Iliopsoas: 5  Quadriceps:  5   Distal Lower Extremity: 5      Neurological: She is alert and oriented to person, place, and time. She has normal reflexes. She displays normal reflexes. No cranial nerve deficit. She exhibits normal muscle tone. Gait normal. Coordination normal. GCS score is 15.   Reflex Scores:       Bicep reflexes are 2+ on the right side and 2+ on the left side.       Brachioradialis reflexes are 2+ on the right side and 2+ on the left side.       Patellar reflexes are 2+ on the right side and 2+ on the left side.       Achilles reflexes are 2+ on the right side and 2+ on the left side.  Skin: Skin is warm and dry. No rash noted. She is not diaphoretic. No erythema. No pallor.     Psychiatric: Mood, memory, affect and judgment normal.   Musculoskeletal: She exhibits deformity (Ulnar deviation of fingers bilaterally. Correctable.). She exhibits no edema or tenderness.           Labs:  Results for VÍCTOR COTA (MRN [id number]) as of 3/12/2019 14:58   Ref. Range 1/30/2019 12:25   Creatinine Latest Ref Range: 0.5 - 1.4 mg/dL 0.9   eGFR if non African American Latest Ref Range: >60 mL/min/1.73 m^2 >60.0   eGFR if African American Latest Ref Range: >60 mL/min/1.73 m^2 >60.0   Results for VÍCTOR COTA (MRN [id number]) as of 3/12/2019 14:58   Ref. Range 1/30/2019 12:25   WBC Latest Ref Range: 3.90 - 12.70 K/uL 8.76   RBC Latest Ref Range: 4.00 - 5.40 M/uL 4.23   Hemoglobin Latest Ref Range: 12.0 - 16.0 g/dL 13.1   Hematocrit Latest Ref Range: 37.0 - 48.5 % 39.8   MCV Latest Ref Range: 82 - 98 fL 94   MCH Latest Ref Range: 27.0 - 31.0 pg 31.0   MCHC Latest Ref Range: 32.0 - 36.0 g/dL 32.9   RDW Latest Ref Range: 11.5 - 14.5 % 12.2   Platelets Latest Ref Range: 150 - 350 K/uL 340   MPV Latest Ref Range: 9.2 -  12.9 fL 10.5   Gran% Latest Ref Range: 38.0 - 73.0 % 50.7   Gran # (ANC) Latest Ref Range: 1.8 - 7.7 K/uL 4.4   Immature Granulocytes Latest Ref Range: 0.0 - 0.5 % 0.2   Immature Grans (Abs) Latest Ref Range: 0.00 - 0.04 K/uL 0.02   Lymph% Latest Ref Range: 18.0 - 48.0 % 38.2   Lymph # Latest Ref Range: 1.0 - 4.8 K/uL 3.4   Mono% Latest Ref Range: 4.0 - 15.0 % 9.0   Mono # Latest Ref Range: 0.3 - 1.0 K/uL 0.8   Eosinophil% Latest Ref Range: 0.0 - 8.0 % 1.1   Eos # Latest Ref Range: 0.0 - 0.5 K/uL 0.1   Basophil% Latest Ref Range: 0.0 - 1.9 % 0.8   Baso # Latest Ref Range: 0.00 - 0.20 K/uL 0.07   nRBC Latest Ref Range: 0 /100 WBC 0   Differential Method Unknown Automated   Sed Rate Latest Ref Range: 0 - 36 mm/Hr 18   Sodium Latest Ref Range: 136 - 145 mmol/L 140   Potassium Latest Ref Range: 3.5 - 5.1 mmol/L 4.3   Chloride Latest Ref Range: 95 - 110 mmol/L 105   CO2 Latest Ref Range: 23 - 29 mmol/L 28   Anion Gap Latest Ref Range: 8 - 16 mmol/L 7 (L)   BUN, Bld Latest Ref Range: 8 - 23 mg/dL 20   Creatinine Latest Ref Range: 0.5 - 1.4 mg/dL 0.9   eGFR if non African American Latest Ref Range: >60 mL/min/1.73 m^2 >60.0   eGFR if African American Latest Ref Range: >60 mL/min/1.73 m^2 >60.0   Glucose Latest Ref Range: 70 - 110 mg/dL 87   Calcium Latest Ref Range: 8.7 - 10.5 mg/dL 9.8   Alkaline Phosphatase Latest Ref Range: 55 - 135 U/L 115   Total Protein Latest Ref Range: 6.0 - 8.4 g/dL 7.8   Albumin Latest Ref Range: 3.5 - 5.2 g/dL 4.1   Total Bilirubin Latest Ref Range: 0.1 - 1.0 mg/dL 0.6   AST Latest Ref Range: 10 - 40 U/L 27   ALT Latest Ref Range: 10 - 44 U/L 20   CRP Latest Ref Range: 0.0 - 8.2 mg/L 1.7   ds DNA Ab Latest Ref Range: Negative 1:10  Positive 1:10 (A)   Complement (C-3) Latest Ref Range: 50 - 180 mg/dL 102   Complement (C-4) Latest Ref Range: 11 - 44 mg/dL 24       Assessment:     SLE: SLEDAI=2 pending U/A  Vitamin D deficiency  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 Saw Scarlett Pavon in Neurololgy 2/5/19; ENG ordered; saw Dr. Aldridge Neurosurgery 2/28/19: felt vertigo could be associated with flu as MRI of brain showed decrease in size of schwannoma. Rec f/u with ENT with audiogram and vestibular rehab. F/u [facility]  S/p influenza A respiratory infection 2/25/19: Dr. Grimes    Plan:   Urine sample today  Vitamin D level today  UPCR today  Continue Plaquenil 200mg BID. Dr. Monge seen 8/9/18 in Optometry  Shingrix x 2 when available  Continue Ergocalciferol 50,000 units add vitamin D3 1000 units daily, goal is vitamin D > 40  F/u Dr. Pavon in Neurology after ENG  F/u ENT with audiogram, vestibular rehab  Return to clinic in 3 months with standing labs

## 2019-03-13 ENCOUNTER — CLINICAL SUPPORT (OUTPATIENT)
Dept: AUDIOLOGY | Facility: CLINIC | Age: 64
End: 2019-03-13
Payer: COMMERCIAL

## 2019-03-13 ENCOUNTER — LAB VISIT (OUTPATIENT)
Dept: LAB | Facility: HOSPITAL | Age: 64
End: 2019-03-13
Attending: INTERNAL MEDICINE
Payer: COMMERCIAL

## 2019-03-13 DIAGNOSIS — H83.2X3 VESTIBULAR HYPOFUNCTION OF BOTH EARS: Primary | ICD-10-CM

## 2019-03-13 DIAGNOSIS — E55.9 VITAMIN D DEFICIENCY: ICD-10-CM

## 2019-03-13 LAB — 25(OH)D3+25(OH)D2 SERPL-MCNC: 44 NG/ML

## 2019-03-13 PROCEDURE — 99999 PR PBB SHADOW E&M-EST. PATIENT-LVL I: CPT | Mod: PBBFAC,,, | Performed by: AUDIOLOGIST

## 2019-03-13 PROCEDURE — 36415 COLL VENOUS BLD VENIPUNCTURE: CPT

## 2019-03-13 PROCEDURE — 92540 PR VESTIBULAR EVAL NYSTAG FOVL&PERPH STIM OSCIL TRACKING: ICD-10-PCS | Mod: S$GLB,,, | Performed by: AUDIOLOGIST

## 2019-03-13 PROCEDURE — 99999 PR PBB SHADOW E&M-EST. PATIENT-LVL I: ICD-10-PCS | Mod: PBBFAC,,, | Performed by: AUDIOLOGIST

## 2019-03-13 PROCEDURE — 92537 PR CALORIC VSTBLR TEST W/REC BITHERMAL: ICD-10-PCS | Mod: S$GLB,,, | Performed by: AUDIOLOGIST

## 2019-03-13 PROCEDURE — 92540 BASIC VESTIBULAR EVALUATION: CPT | Mod: S$GLB,,, | Performed by: AUDIOLOGIST

## 2019-03-13 PROCEDURE — 92537 CALORIC VSTBLR TEST W/REC: CPT | Mod: S$GLB,,, | Performed by: AUDIOLOGIST

## 2019-03-13 PROCEDURE — 82306 VITAMIN D 25 HYDROXY: CPT

## 2019-03-13 RX ORDER — ALPRAZOLAM 0.25 MG/1
0.25 TABLET ORAL EVERY 8 HOURS PRN
Qty: 30 TABLET | Refills: 0 | Status: SHIPPED | OUTPATIENT
Start: 2019-03-13 | End: 2019-05-07 | Stop reason: SDUPTHER

## 2019-03-13 RX ORDER — MECLIZINE HCL 12.5 MG 12.5 MG/1
12.5 TABLET ORAL 3 TIMES DAILY PRN
Qty: 30 TABLET | Refills: 0 | Status: SHIPPED | OUTPATIENT
Start: 2019-03-13 | End: 2019-05-07 | Stop reason: SDUPTHER

## 2019-03-13 NOTE — PROGRESS NOTES
VNG/Postuography Evaluation    Referring physician:  Dr. Scarlett Pavon    64 y.o. female complains of vertigo, dizziness, headache, bilateral hearing loss, and bilateral tinnitus.  Symptoms are not provoked by any specific movement and have been recurring over the past 2 months.  Ms. Taylor reported that took Xanax and Meclizine yesterday but it was more than 24 hours prior to testing.      Gaze nystagmus was absent.    Oculomotor function was normal.    Spontaneous nystagmus was absent.    The head-hanging left Hallpike revealed <clinically insignificant, non-fatiguing> nystagmus: 5 d/s left-beating in the supine position and 3 d/s left-beating in the return to upright position.    The head-hanging right Hallpike revealed <clinically insignificant, non-fatiguing> nystagmus: 3 d/s left-beating in the supine position.    Static positional testing revealed <clinically insignificant> nystagmus: 5 d/s left-beating in the head left position.    Unilateral weakness: 26% (right)  Directional preponderance 47% (left-beating)    RC: 7 d/s   d/s  RW: 0 d/s  LW: 7 d/s  Fixation suppression was normal.    Impression: Bilateral vestibular hypofunction accompanied by a non-localizing vestibular abnormality that is incompletely compensated.    Recommend: 1. A trial with [place] exercises to help reduce subjective symptoms.  A copy of these exercises was provided at today's appointment.  2. Consider a trial of formal vestibular/balance rehab.

## 2019-03-13 NOTE — Clinical Note
Please see results of yesterday's VNG evaluation.  Please let me know if I can provide any additional information.Thank you,Marilyn Bennett, CCC-[facility] Audi[facility]

## 2019-03-19 ENCOUNTER — OFFICE VISIT (OUTPATIENT)
Dept: NEUROLOGY | Facility: CLINIC | Age: 64
End: 2019-03-19
Payer: COMMERCIAL

## 2019-03-19 VITALS
SYSTOLIC BLOOD PRESSURE: 129 MMHG | WEIGHT: 163 LBS | HEART RATE: 66 BPM | BODY MASS INDEX: 30 KG/M2 | HEIGHT: 62 IN | DIASTOLIC BLOOD PRESSURE: 66 MMHG

## 2019-03-19 DIAGNOSIS — H81.10 BENIGN PAROXYSMAL POSITIONAL VERTIGO, UNSPECIFIED LATERALITY: Primary | ICD-10-CM

## 2019-03-19 DIAGNOSIS — R42 VERTIGO: ICD-10-CM

## 2019-03-19 PROCEDURE — 3008F BODY MASS INDEX DOCD: CPT | Mod: CPTII,S$GLB,, | Performed by: PSYCHIATRY & NEUROLOGY

## 2019-03-19 PROCEDURE — 99214 PR OFFICE/OUTPT VISIT, EST, LEVL IV, 30-39 MIN: ICD-10-PCS | Mod: S$GLB,,, | Performed by: PSYCHIATRY & NEUROLOGY

## 2019-03-19 PROCEDURE — 99999 PR PBB SHADOW E&M-EST. PATIENT-LVL III: ICD-10-PCS | Mod: PBBFAC,,, | Performed by: PSYCHIATRY & NEUROLOGY

## 2019-03-19 PROCEDURE — 3008F PR BODY MASS INDEX (BMI) DOCUMENTED: ICD-10-PCS | Mod: CPTII,S$GLB,, | Performed by: PSYCHIATRY & NEUROLOGY

## 2019-03-19 PROCEDURE — 99999 PR PBB SHADOW E&M-EST. PATIENT-LVL III: CPT | Mod: PBBFAC,,, | Performed by: PSYCHIATRY & NEUROLOGY

## 2019-03-19 PROCEDURE — 99214 OFFICE O/P EST MOD 30 MIN: CPT | Mod: S$GLB,,, | Performed by: PSYCHIATRY & NEUROLOGY

## 2019-03-19 NOTE — PROGRESS NOTES
Subjective:       Patient ID: Radha Taylor is a 64 y.o. female.    Chief Complaint:  Dizziness      History of Present Illness  Dizziness is dissipating. Last episode was 1 week ago. Had ENG and results are:  Impression: Bilateral vestibular hypofunction accompanied by a non-localizing vestibular abnormality that is incompletely compensated.     Recommend: 1. A trial with [place] exercises to help reduce subjective symptoms.  A copy of these exercises was provided at today's appointment.  2. Consider a trial of formal vestibular/balance rehab.          Dizziness    Review of Systems  Review of Systems   Neurological: Positive for dizziness.       Objective:      Neurologic Exam     Mental Status   Oriented to person, place, and time.   Level of consciousness: alert  Knowledge: good. Able to perform simple calculations.   Able to name object. Able to read. Able to repeat. Able to write.     Cranial Nerves   Cranial nerves II through XII intact.     CN III, IV, VI   Pupils are equal, round, and reactive to light.  Extraocular motions are normal.     Motor Exam   Muscle bulk: normal  Right arm tone: normal  Left arm tone: normal  Right arm pronator drift: absent  Left arm pronator drift: absent  Right leg tone: normal  Left leg tone: normal    Strength   Strength 5/5 throughout.     Sensory Exam   Light touch normal.     Gait, Coordination, and Reflexes     Gait  Gait: normal    Coordination   Romberg: negative  Finger to nose coordination: normal  Heel to shin coordination: normal  Tandem walking coordination: normal    Tremor   Resting tremor: absent  Intention tremor: absent  Action tremor: absent    Reflexes   Reflexes 2+ except as noted.       Physical Exam   Constitutional: She is oriented to person, place, and time. She appears well-developed and well-nourished.   HENT:   Head: Normocephalic and atraumatic.   Eyes: EOM are normal. Pupils are equal, round, and reactive to light.   Cardiovascular: Normal  rate and regular rhythm.   Neurological: She is alert and oriented to person, place, and time. She has normal strength. She has a normal Finger-Nose-Finger Test, a normal Heel to Shin Test, a normal Romberg Test and a normal Tandem Gait Test. Gait normal.   Vitals reviewed.        Assessment:     Problem List Items Addressed This Visit        ENT    Vertigo    Overview     Note of Dr. Александр Evans Neurologic Surgery, [facility] 9/21/17:    One year post gamma knife surgery to treat right vestibular Schwannoma with severe right sided hearing loss, no word recognition on right, normal on left  MRI 9/18/17: tumor significantly smaller, no adverse radiation effect on brain. Audiogram: lost further hearing right ear, 36% word recognition on right. There was deterioration left non-tumor ear. Recommended f/u with local otologist to evaluate lost hearing left ear.  Recommend f/u MRI and audiogram in one year.         Current Assessment & Plan     Patient is improved. Has been doing [place] execises and is improving.         Relevant Orders    Ambulatory Referral to Physical/Occupational Therapy      Other Visit Diagnoses     Benign paroxysmal positional vertigo, unspecified laterality    -  Primary    Relevant Orders    Ambulatory Referral to Physical/Occupational Therapy            Plan:     RTC 6 months

## 2019-03-22 ENCOUNTER — PATIENT MESSAGE (OUTPATIENT)
Dept: NEUROLOGY | Facility: CLINIC | Age: 64
End: 2019-03-22

## 2019-04-01 ENCOUNTER — CLINICAL SUPPORT (OUTPATIENT)
Dept: REHABILITATION | Facility: OTHER | Age: 64
End: 2019-04-01
Attending: PSYCHIATRY & NEUROLOGY
Payer: COMMERCIAL

## 2019-04-01 DIAGNOSIS — R42 DIZZINESS: ICD-10-CM

## 2019-04-01 PROCEDURE — 97161 PT EVAL LOW COMPLEX 20 MIN: CPT | Mod: PN

## 2019-04-01 PROCEDURE — 97112 NEUROMUSCULAR REEDUCATION: CPT | Mod: PN

## 2019-04-05 NOTE — PROGRESS NOTES
"  Physical Therapy Daily Treatment Note     Name: Radha ALONSO [place]  Clinic Number: [id number]    Therapy Diagnosis:   Encounter Diagnosis   Name Primary?    Dizziness      Physician: Scarlett Pavon MD    Visit Date: 4/8/2019    Physician Orders: PT Eval and Treat - Balance/vestibular rehab  Medical Diagnosis from Referral:   R42 (ICD-10-CM) - Vertigo   H81.10 (ICD-10-CM) - Benign paroxysmal positional vertigo, unspecified laterality         Evaluation Date: 4/1/2019  Authorization Period Expiration: 12/31/2019  Plan of Care Expiration: 07/01/2019  Visit # / Visits authorized: 2/ 30     Time In: 3:00 PM  Time Out: 3:53 PM  Total Billable Time: 53 minutes     Precautions: Standard [place] R ear, SLE, oseopenia, hypothyroidism, obesity    Subjective     Pt reports: 1 episode of dizziness while driving since the previous visit. Notes slight improvement since the initial visit..  She was compliant with home exercise program.  Response to previous treatment: fair  Functional change: none    Pain: N/A  Location: bilateral head, ears      Objective       Radha participated in neuromuscular re-education activities to improve: Balance, Coordination, Kinesthetic, Sense, Proprioception and Posture for 53 minutes. The following activities were included:    Sitting VOR X1 up/down 2 x 30"  Sitting VOR X1 L/R 2 x 30"  Sitting Smooth pursuit up/down 2 x 30"  Sitting Smooth pursuit L/R 2 x 30"  Sitting VOR Cancellation up/down 2 x 30"  Sitting VOR cancellation L/R 2 x 30"  Sitting Saccades up/down 2 x 30"  Sitting Saccades L/R 2 x 30"  Sitting Pencil pushups 10x    NBOS w/ head turns: 10x ea direction w/o aired, 10x ea WITH airex  Tandem stance: 2 x 30"  SLS: 2 x 30" --- frequent LOB with touch down assist to limit fall --- UA to maintain >3" before LOB  Seated forward bending head with hands 10x, head stays forwards 10x  Step forward with opp arm out: 10x  Lat step out with arms out: 15x   Walk with head turns: 3x in // bars " today  Side stepping: 3x  in // bars today  Caraoca: 3x  in // bars today    Home Exercises Provided and Patient Education Provided     Education provided:   - none today    Written Home Exercises Provided: Patient instructed to cont prior HEP.  Exercises were reviewed and Radha was able to demonstrate them prior to the end of the session.  Radha demonstrated good  understanding of the education provided.     See EMR under Patient Instructions for exercises provided prior visit.    Assessment     Pt tolerated overall session well today. Good return technique iwht HEP indicates good compliance at home. Pt notes dizziness with VOR cancellation and saccades L/R with mild dizziness, but notes resolution of sx as soon as motion stops. Progressed dynamic and static balance today. Good stationary balance on stable and unstable surface with NBOS but pt with increased difficulty maintaining SLS R>L without HHA before LOB. Increased lateral excursions with head turns L/R with forward walking.    Radha is progressing well towards her goals.   Pt prognosis is Good.     Pt will continue to benefit from skilled outpatient physical therapy to address the deficits listed in the problem list box on initial evaluation, provide pt/family education and to maximize pt's level of independence in the home and community environment.     Pt's spiritual, cultural and educational needs considered and pt agreeable to plan of care and goals.     Anticipated barriers to physical therapy: present acoustic neuroma, financial considerations, chronicity of symptoms      Goals:   Short term (4 weeks):  1. Pt able to look up and down with Min c/o vertigo during dressing and grooming activities. - not met, progressing  2. Pt able to look left and right with Min c/o vertigo while driving/riding in the car. - not met, progressing  3. Pt able to roll left/right in bed and transfer to sitting with Min c/o vertigo. - not met, progressing  4. Pt to  demonstrate improved functional mobility with FOTO limitation <=36% disability. - not met, progressing     Long term (4 weeks):  1. Pt able to look up and down without c/o vertigo during dressing and grooming activities. - not met, progressing  2. Pt able to look left and right without c/o vertigo while driving/riding in the car. - not met, progressing  3. Pt able to roll left/right in bed and transfer to sitting without c/o vertigo. - not met, progressing  4. Pt to demonstrate improved functional mobility with FOTO limitation <=26% disability. - not met, progressing  5. Pt is independent with HEP and demonstrates good return technique to allow for independence with controlling symptoms at home. - not met, progressing      Plan     Continue with POC for dizziness and balance instability. Refer to Neuro PT at [facility] if pt no longer progressing.    Codie Sharma, PT

## 2019-04-08 ENCOUNTER — CLINICAL SUPPORT (OUTPATIENT)
Dept: REHABILITATION | Facility: OTHER | Age: 64
End: 2019-04-08
Payer: COMMERCIAL

## 2019-04-08 DIAGNOSIS — R42 DIZZINESS: ICD-10-CM

## 2019-04-08 PROCEDURE — 97112 NEUROMUSCULAR REEDUCATION: CPT | Mod: PN

## 2019-04-08 RX ORDER — HYDROXYCHLOROQUINE SULFATE 200 MG/1
TABLET ORAL
Qty: 180 TABLET | Refills: 0 | Status: SHIPPED | OUTPATIENT
Start: 2019-04-08 | End: 2019-05-07 | Stop reason: SDUPTHER

## 2019-04-11 ENCOUNTER — PATIENT MESSAGE (OUTPATIENT)
Dept: FAMILY MEDICINE | Facility: CLINIC | Age: 64
End: 2019-04-11

## 2019-04-15 ENCOUNTER — PATIENT MESSAGE (OUTPATIENT)
Dept: FAMILY MEDICINE | Facility: CLINIC | Age: 64
End: 2019-04-15

## 2019-04-15 ENCOUNTER — CLINICAL SUPPORT (OUTPATIENT)
Dept: REHABILITATION | Facility: OTHER | Age: 64
End: 2019-04-15
Payer: COMMERCIAL

## 2019-04-15 DIAGNOSIS — R42 DIZZINESS: ICD-10-CM

## 2019-04-15 PROCEDURE — 97110 THERAPEUTIC EXERCISES: CPT | Mod: PN

## 2019-04-15 NOTE — PROGRESS NOTES
"  Physical Therapy Daily Treatment Note     Name: Radha ALONSO [place]  Clinic Number: [id number]    Therapy Diagnosis:   Encounter Diagnosis   Name Primary?    Dizziness      Physician: Scarlett Pavon MD    Visit Date: 4/15/2019    Physician Orders: PT Eval and Treat - Balance/vestibular rehab  Medical Diagnosis from Referral:   R42 (ICD-10-CM) - Vertigo   H81.10 (ICD-10-CM) - Benign paroxysmal positional vertigo, unspecified laterality         Evaluation Date: 4/1/2019  Authorization Period Expiration: 12/31/2019  Plan of Care Expiration: 07/01/2019  Visit # / Visits authorized: 3/ 30     Time In: 3:00 PM  Time Out: 4:00 PM  Total Billable Time: 60 minutes     Precautions: Standard [place] R ear, SLE, oseopenia, hypothyroidism, obesity    Subjective     Pt reports: 1 episode of dizziness while driving since the previous visit. Notes slight improvement since the initial visit..  She was compliant with home exercise program.  Response to previous treatment: fair  Functional change: none    Pain: N/A  Location: bilateral head, ears      Objective       Radha participated in neuromuscular re-education activities to improve: Balance, Coordination, Kinesthetic, Sense, Proprioception and Posture for 60 minutes. The following activities were included:    Sitting VOR X1 up/down 2 x 30" - changed to VOR X2 today  Sitting VOR X1 L/R 2 x 30" - changed to VOR X2 today  +VOR X1 diagonals: 2 x 30"    Sitting Smooth pursuit up/down 2 x 30"  Sitting Smooth pursuit L/R 2 x 30"  +smooth pursuit diagonals 2 x 30"    Sitting VOR Cancellation up/down 2 x 30"  Sitting VOR cancellation L/R 2 x 30"  +VOR cancellation diagonals: 2 x 30"    Sitting Saccades up/down 2 x 30"  Sitting Saccades L/R 2 x 30"  +saccades diagonals: 2 x 30"    Sitting Pencil pushups 10x -- changed to 3-way today    NBOS w/ head turns: 10x ea direction w/o aired, 10x ea WITH airex  Tandem stance: 2 x 30"  SLS: 2 x 30" --- frequent LOB with touch down assist to limit fall " "--- UA to maintain >3" before LOB  Seated forward bending head with hands 10x, head stays forwards 10x  Step forward with opp arm out: 10x  Lat step out with arms out: 15x     Walk with head turns: 3x in // bars today  Side stepping: 3x  in // bars today  Caraoca: 3x  in // bars today  +tandem walks: 3 laps in // bars    Home Exercises Provided and Patient Education Provided     Education provided:   - none today    Written Home Exercises Provided: Patient instructed to cont prior HEP.  Exercises were reviewed and Radha was able to demonstrate them prior to the end of the session.  Radha demonstrated good  understanding of the education provided.     See EMR under Patient Instructions for exercises provided prior visit.    Assessment     Pt tolerated overall session well today. Progressed vestibulo-occular exercises today to more functional PNF patterns to promote coordination and control. Pt able to perform without increased symptoms but continued to require VC for slow controlled motion and maintaining focus on small image. Encouraged pt to continue HEP in cardinal planes as they have slowly improved her tolerance with functional activities.    Radha is progressing well towards her goals.   Pt prognosis is Good.     Pt will continue to benefit from skilled outpatient physical therapy to address the deficits listed in the problem list box on initial evaluation, provide pt/family education and to maximize pt's level of independence in the home and community environment.     Pt's spiritual, cultural and educational needs considered and pt agreeable to plan of care and goals.     Anticipated barriers to physical therapy: present acoustic neuroma, financial considerations, chronicity of symptoms      Goals:   Short term (4 weeks):  1. Pt able to look up and down with Min c/o vertigo during dressing and grooming activities. - not met, progressing  2. Pt able to look left and right with Min c/o vertigo while " driving/riding in the car. - not met, progressing  3. Pt able to roll left/right in bed and transfer to sitting with Min c/o vertigo. - not met, progressing  4. Pt to demonstrate improved functional mobility with FOTO limitation <=36% disability. - not met, progressing     Long term (4 weeks):  1. Pt able to look up and down without c/o vertigo during dressing and grooming activities. - not met, progressing  2. Pt able to look left and right without c/o vertigo while driving/riding in the car. - not met, progressing  3. Pt able to roll left/right in bed and transfer to sitting without c/o vertigo. - not met, progressing  4. Pt to demonstrate improved functional mobility with FOTO limitation <=26% disability. - not met, progressing  5. Pt is independent with HEP and demonstrates good return technique to allow for independence with controlling symptoms at home. - not met, progressing      Plan     Continue with POC for dizziness and balance instability. Refer to Neuro PT at [facility] if pt no longer progressing.    Codie Sharma, PT

## 2019-04-23 ENCOUNTER — PATIENT MESSAGE (OUTPATIENT)
Dept: AUDIOLOGY | Facility: CLINIC | Age: 64
End: 2019-04-23

## 2019-04-23 ENCOUNTER — PATIENT MESSAGE (OUTPATIENT)
Dept: NEUROLOGY | Facility: CLINIC | Age: 64
End: 2019-04-23

## 2019-05-07 ENCOUNTER — OFFICE VISIT (OUTPATIENT)
Dept: PRIMARY CARE CLINIC | Facility: CLINIC | Age: 64
End: 2019-05-07
Payer: COMMERCIAL

## 2019-05-07 VITALS
HEIGHT: 61 IN | DIASTOLIC BLOOD PRESSURE: 68 MMHG | SYSTOLIC BLOOD PRESSURE: 104 MMHG | BODY MASS INDEX: 30.47 KG/M2 | WEIGHT: 161.38 LBS

## 2019-05-07 DIAGNOSIS — Z79.1 NSAID LONG-TERM USE: ICD-10-CM

## 2019-05-07 DIAGNOSIS — B35.4 TINEA CORPORIS: Primary | ICD-10-CM

## 2019-05-07 DIAGNOSIS — E03.9 HYPOTHYROIDISM, UNSPECIFIED TYPE: ICD-10-CM

## 2019-05-07 DIAGNOSIS — H93.11 RIGHT-SIDED TINNITUS: ICD-10-CM

## 2019-05-07 DIAGNOSIS — D33.3 RIGHT ACOUSTIC NEUROMA: ICD-10-CM

## 2019-05-07 DIAGNOSIS — R42 VERTIGO: ICD-10-CM

## 2019-05-07 DIAGNOSIS — K21.9 GASTROESOPHAGEAL REFLUX DISEASE, ESOPHAGITIS PRESENCE NOT SPECIFIED: ICD-10-CM

## 2019-05-07 DIAGNOSIS — R09.81 NASAL SINUS CONGESTION: ICD-10-CM

## 2019-05-07 PROCEDURE — 3008F PR BODY MASS INDEX (BMI) DOCUMENTED: ICD-10-PCS | Mod: CPTII,S$GLB,, | Performed by: INTERNAL MEDICINE

## 2019-05-07 PROCEDURE — 99999 PR PBB SHADOW E&M-EST. PATIENT-LVL III: ICD-10-PCS | Mod: PBBFAC,,, | Performed by: INTERNAL MEDICINE

## 2019-05-07 PROCEDURE — 99213 OFFICE O/P EST LOW 20 MIN: CPT | Mod: S$GLB,,, | Performed by: INTERNAL MEDICINE

## 2019-05-07 PROCEDURE — 99213 PR OFFICE/OUTPT VISIT, EST, LEVL III, 20-29 MIN: ICD-10-PCS | Mod: S$GLB,,, | Performed by: INTERNAL MEDICINE

## 2019-05-07 PROCEDURE — 3008F BODY MASS INDEX DOCD: CPT | Mod: CPTII,S$GLB,, | Performed by: INTERNAL MEDICINE

## 2019-05-07 PROCEDURE — 99999 PR PBB SHADOW E&M-EST. PATIENT-LVL III: CPT | Mod: PBBFAC,,, | Performed by: INTERNAL MEDICINE

## 2019-05-07 RX ORDER — MECLIZINE HCL 12.5 MG 12.5 MG/1
12.5 TABLET ORAL 3 TIMES DAILY PRN
Qty: 30 TABLET | Refills: 2 | Status: SHIPPED | OUTPATIENT
Start: 2019-05-07 | End: 2019-06-18

## 2019-05-07 RX ORDER — ALPRAZOLAM 0.25 MG/1
0.25 TABLET ORAL EVERY 8 HOURS PRN
Qty: 30 TABLET | Refills: 2 | Status: SHIPPED | OUTPATIENT
Start: 2019-05-07 | End: 2019-12-16 | Stop reason: SDUPTHER

## 2019-05-07 RX ORDER — ESOMEPRAZOLE MAGNESIUM 40 MG/1
40 CAPSULE, DELAYED RELEASE ORAL
Qty: 90 CAPSULE | Refills: 2 | Status: SHIPPED | OUTPATIENT
Start: 2019-05-07 | End: 2019-06-18

## 2019-05-07 RX ORDER — CLOTRIMAZOLE 1 %
CREAM (GRAM) TOPICAL
Qty: 60 G | Refills: 1 | Status: SHIPPED | OUTPATIENT
Start: 2019-05-07 | End: 2019-07-16

## 2019-05-07 RX ORDER — PSEUDOEPHEDRINE HYDROCHLORIDE 60 MG/1
60 TABLET ORAL EVERY 6 HOURS PRN
Qty: 30 TABLET | Refills: 2 | Status: SHIPPED | OUTPATIENT
Start: 2019-05-07 | End: 2019-08-24 | Stop reason: SDUPTHER

## 2019-05-07 RX ORDER — LEVOTHYROXINE SODIUM 125 UG/1
125 TABLET ORAL
Qty: 90 TABLET | Refills: 2 | Status: SHIPPED | OUTPATIENT
Start: 2019-05-07 | End: 2019-10-18 | Stop reason: ALTCHOICE

## 2019-05-07 NOTE — PROGRESS NOTES
Subjective:        Patient ID: Radha Taylor is a 64 y.o. female.    Chief Complaint: Rash (red, raised denies itch x few weeks)    HPI   Radha Taylor presents with c/o small rash on the right upper arm for a few weeks.  No itching or pain.  Pt has not applied anything to treat it.  It has not changed significantly and pt has no other similar lesions elsewhere on the skin.    Review of Systems   Constitutional: Negative for activity change and unexpected weight change.   HENT: Positive for hearing loss. Negative for rhinorrhea and trouble swallowing.    Eyes: Negative for discharge and visual disturbance.   Respiratory: Negative for chest tightness and wheezing.    Cardiovascular: Negative for chest pain and palpitations.   Gastrointestinal: Positive for constipation. Negative for blood in stool, diarrhea and vomiting.   Endocrine: Negative for polydipsia and polyuria.   Genitourinary: Negative for difficulty urinating, dysuria, hematuria and menstrual problem.   Musculoskeletal: Positive for arthralgias and joint swelling. Negative for neck pain.   Neurological: Positive for weakness. Negative for headaches.   Psychiatric/Behavioral: Negative for confusion and dysphoric mood.           Objective:        Vitals:    05/07/19 1011   BP: 104/68     Physical Exam   Constitutional: She is oriented to person, place, and time. She appears well-developed and well-nourished. No distress.   Neurological: She is alert and oriented to person, place, and time.   Skin: Skin is warm and dry.   R upper anterior arm: 2cm erythematous plaque, no central clearing, minimal scaling   Vitals reviewed.          Assessment:         1. Tinea corporis    2. Right-sided tinnitus    3. Right acoustic neuroma    4. Vertigo    5. Hypothyroidism, unspecified type    6. Gastroesophageal reflux disease, esophagitis presence not specified    7. NSAID long-term use    8. Nasal sinus congestion              Plan:         Radha was seen today  for rash.    Diagnoses and all orders for this visit:    Tinea corporis: Rash suggestive of tinea.  Recommend clotrimazole cream BID until rash resolves.  Follow up if worse or not improving in 1-2 weeks.  -     clotrimazole (LOTRIMIN) 1 % cream; Apply twice daily to affected area of skin until ringworm resolves.    Medications refilled:  Right-sided tinnitus  -     ALPRAZolam (XANAX) 0.25 MG tablet; Take 1 tablet (0.25 mg total) by mouth every 8 (eight) hours as needed.    Right acoustic neuroma  -     ALPRAZolam (XANAX) 0.25 MG tablet; Take 1 tablet (0.25 mg total) by mouth every 8 (eight) hours as needed.    Vertigo  -     meclizine (ANTIVERT) 12.5 mg tablet; Take 1 tablet (12.5 mg total) by mouth 3 (three) times daily as needed for Dizziness.    Hypothyroidism, unspecified type  -     levothyroxine (SYNTHROID) 125 MCG tablet; Take 1 tablet (125 mcg total) by mouth before breakfast.    Gastroesophageal reflux disease, esophagitis presence not specified  -     esomeprazole (NEXIUM) 40 MG capsule; Take 1 capsule (40 mg total) by mouth before breakfast.    NSAID long-term use  -     esomeprazole (NEXIUM) 40 MG capsule; Take 1 capsule (40 mg total) by mouth before breakfast.    Nasal sinus congestion  -     SUDOGEST 60 mg tablet; Take 1 tablet (60 mg total) by mouth every 6 (six) hours as needed for Congestion.      Follow up as needed.

## 2019-06-05 ENCOUNTER — PATIENT MESSAGE (OUTPATIENT)
Dept: PRIMARY CARE CLINIC | Facility: CLINIC | Age: 64
End: 2019-06-05

## 2019-06-06 ENCOUNTER — PATIENT MESSAGE (OUTPATIENT)
Dept: RHEUMATOLOGY | Facility: CLINIC | Age: 64
End: 2019-06-06

## 2019-06-17 ENCOUNTER — LAB VISIT (OUTPATIENT)
Dept: LAB | Facility: HOSPITAL | Age: 64
End: 2019-06-17
Attending: INTERNAL MEDICINE
Payer: COMMERCIAL

## 2019-06-17 ENCOUNTER — TELEPHONE (OUTPATIENT)
Dept: RHEUMATOLOGY | Facility: CLINIC | Age: 64
End: 2019-06-17

## 2019-06-17 DIAGNOSIS — R74.8 ALKALINE PHOSPHATASE ELEVATION: Primary | ICD-10-CM

## 2019-06-17 DIAGNOSIS — M32.9 SYSTEMIC LUPUS ERYTHEMATOSUS, UNSPECIFIED SLE TYPE, UNSPECIFIED ORGAN INVOLVEMENT STATUS: ICD-10-CM

## 2019-06-17 LAB
BILIRUB UR QL STRIP: NEGATIVE
CLARITY UR REFRACT.AUTO: CLEAR
COLOR UR AUTO: YELLOW
CREAT UR-MCNC: 59 MG/DL (ref 15–325)
GLUCOSE UR QL STRIP: NEGATIVE
HGB UR QL STRIP: NEGATIVE
KETONES UR QL STRIP: NEGATIVE
LEUKOCYTE ESTERASE UR QL STRIP: NEGATIVE
MICROSCOPIC COMMENT: NORMAL
NITRITE UR QL STRIP: NEGATIVE
PH UR STRIP: 6 [PH] (ref 5–8)
PROT UR QL STRIP: NEGATIVE
PROT UR-MCNC: <7 MG/DL (ref 0–15)
PROT/CREAT UR: NORMAL MG/G{CREAT} (ref 0–0.2)
SP GR UR STRIP: 1 (ref 1–1.03)
SQUAMOUS #/AREA URNS AUTO: 0 /HPF
URN SPEC COLLECT METH UR: NORMAL
WBC #/AREA URNS AUTO: 0 /HPF (ref 0–5)

## 2019-06-17 PROCEDURE — 81001 URINALYSIS AUTO W/SCOPE: CPT

## 2019-06-17 PROCEDURE — 84156 ASSAY OF PROTEIN URINE: CPT

## 2019-06-18 ENCOUNTER — OFFICE VISIT (OUTPATIENT)
Dept: RHEUMATOLOGY | Facility: CLINIC | Age: 64
End: 2019-06-18
Payer: COMMERCIAL

## 2019-06-18 VITALS
HEIGHT: 62 IN | WEIGHT: 163.13 LBS | HEART RATE: 72 BPM | BODY MASS INDEX: 30.02 KG/M2 | DIASTOLIC BLOOD PRESSURE: 69 MMHG | SYSTOLIC BLOOD PRESSURE: 107 MMHG

## 2019-06-18 DIAGNOSIS — K21.9 GASTROESOPHAGEAL REFLUX DISEASE, ESOPHAGITIS PRESENCE NOT SPECIFIED: ICD-10-CM

## 2019-06-18 DIAGNOSIS — M32.19 OTHER SYSTEMIC LUPUS ERYTHEMATOSUS WITH OTHER ORGAN INVOLVEMENT: Primary | ICD-10-CM

## 2019-06-18 DIAGNOSIS — R42 VERTIGO: ICD-10-CM

## 2019-06-18 DIAGNOSIS — R74.8 ELEVATED ALKALINE PHOSPHATASE LEVEL: ICD-10-CM

## 2019-06-18 DIAGNOSIS — M85.80 OSTEOPENIA, UNSPECIFIED LOCATION: ICD-10-CM

## 2019-06-18 PROCEDURE — 99999 PR PBB SHADOW E&M-EST. PATIENT-LVL IV: ICD-10-PCS | Mod: PBBFAC,,, | Performed by: INTERNAL MEDICINE

## 2019-06-18 PROCEDURE — 3008F BODY MASS INDEX DOCD: CPT | Mod: CPTII,S$GLB,, | Performed by: INTERNAL MEDICINE

## 2019-06-18 PROCEDURE — 99999 PR PBB SHADOW E&M-EST. PATIENT-LVL IV: CPT | Mod: PBBFAC,,, | Performed by: INTERNAL MEDICINE

## 2019-06-18 PROCEDURE — 99214 OFFICE O/P EST MOD 30 MIN: CPT | Mod: S$GLB,,, | Performed by: INTERNAL MEDICINE

## 2019-06-18 PROCEDURE — 99214 PR OFFICE/OUTPT VISIT, EST, LEVL IV, 30-39 MIN: ICD-10-PCS | Mod: S$GLB,,, | Performed by: INTERNAL MEDICINE

## 2019-06-18 PROCEDURE — 3008F PR BODY MASS INDEX (BMI) DOCUMENTED: ICD-10-PCS | Mod: CPTII,S$GLB,, | Performed by: INTERNAL MEDICINE

## 2019-06-18 RX ORDER — VIT C/E/ZN/COPPR/LUTEIN/ZEAXAN 250MG-90MG
1000 CAPSULE ORAL DAILY
COMMUNITY
End: 2022-03-09

## 2019-06-18 ASSESSMENT — ROUTINE ASSESSMENT OF PATIENT INDEX DATA (RAPID3)
MDHAQ FUNCTION SCORE: 2.2
PAIN SCORE: 0
PATIENT GLOBAL ASSESSMENT SCORE: 0
AM STIFFNESS SCORE: 0, NO
TOTAL RAPID3 SCORE: 2.44
FATIGUE SCORE: 0
PSYCHOLOGICAL DISTRESS SCORE: 0

## 2019-06-18 ASSESSMENT — SYSTEMIC LUPUS ERYTHEMATOSUS DISEASE ACTIVITY INDEX (SLEDAI)
TOTAL_SCORE: 2
TOTAL_SCORE: 2

## 2019-06-18 NOTE — PATIENT INSTRUCTIONS
-We will recheck your alk phos levels in 1 month, be sure to be fasting for this lab  -We will get normal lupus labs plus check your cholesterol in 3 months prior to next appointment  -Be sure to take your Vitamin D2 50,000 Units every week, add over the counter Vitamin D 3 1000 units DAILY as well  -Shingrix vaccine (1st dose) will be administered today; second dose is due in 2-5 months  -Avar Nasal Spray/Gel for nasal sores can be purchased over the counter  -New prescription for Zantac 150 mg twice a day (medication for reflux placed today, can discontinue Nexium). Be sure to mention this change with your gastroenterologists  -We will repeat bone mineral density test at the end of this year  -Make sure to get flu shot around October/ early november

## 2019-06-18 NOTE — PROGRESS NOTES
"Subjective:       Patient ID: Radha Taylor is a 64 y.o. female.    Chief Complaint: No chief complaint on file.    64 year old female with history of SLE. Last seen in clinic 03/2019. In the interim she has seen Neurology/audiology for issues with vertigo and dizziness, had ENG perfomed and was given vestibular exercises which have helped with her symptoms.  Regarding her lupus, she reports some dry eyes and nasal sores, but attributes this due to her recent travels to [place] where she reports it was very dry.  She denies any hair loss at the moment, no rashes, no hospitalizations for lupus complications. She has been adherent with her plaquenil 200 mg bid; she reports that her yearly eye exam is almost due.  She admits that she has not been very adherent to her vitamin D medication and reports that she has not been taking any supplemental vitamin d OTC daily either.  She has not received her Shingrix vaccination.  She is concerned with a trigger finger that she has on her right hand, 5th digit. Reports that it used to get stuck and she would have to manually extend her finger, but has since improved.    Review of Systems   Constitutional: Negative for fever and unexpected weight change.   HENT: Negative for trouble swallowing.    Eyes: Negative for redness.   Respiratory: Positive for shortness of breath. Negative for cough.    Cardiovascular: Negative for chest pain and leg swelling.   Gastrointestinal: Negative for constipation and diarrhea.   Genitourinary: Negative for dysuria, genital sores and hematuria.   Musculoskeletal: Negative for arthralgias and joint swelling.   Skin: Negative for rash.   Neurological: Negative for headaches.   Hematological: Does not bruise/bleed easily.         Objective:   /69   Pulse 72   Ht 5' 2.4" (1.585 m)   Wt 74 kg (163 lb 1.6 oz)   BMI 29.45 kg/m²      Physical Exam   Constitutional: She is oriented to person, place, and time and well-developed, well-nourished, " and in no distress. No distress.   HENT:   Head: Normocephalic and atraumatic.   Right Ear: External ear normal.   Left Ear: External ear normal.   Eyes: Conjunctivae and EOM are normal. Pupils are equal, round, and reactive to light.   Neck: Normal range of motion. No JVD present.   Cardiovascular: Normal rate, regular rhythm, normal heart sounds and intact distal pulses.    No murmur heard.  Pulmonary/Chest: Effort normal and breath sounds normal. No stridor. No respiratory distress. She has no wheezes.   Abdominal: Soft. There is no guarding.       Right Side Rheumatological Exam     Examination finds the shoulder, elbow, wrist, knee, 1st PIP, 1st MCP, 2nd PIP, 2nd MCP, 3rd PIP, 3rd MCP, 4th PIP, 4th MCP, 5th PIP and 5th MCP normal.    Muscle Strength (0-5 scale):  Deltoid:  5  Biceps: 5/5   Triceps:  5  : 5/5   Iliopsoas: 5  Quadriceps:  5   Distal Lower Extremity: 5    Left Side Rheumatological Exam     Examination finds the shoulder, elbow, wrist, knee, 1st PIP, 1st MCP, 2nd PIP, 2nd MCP, 3rd PIP, 3rd MCP, 4th PIP, 4th MCP, 5th PIP and 5th MCP normal.    Muscle Strength (0-5 scale):  Deltoid:  5  Biceps: 5/5   Triceps:  5  :  5/5   Iliopsoas: 5  Quadriceps:  5   Distal Lower Extremity: 5      Neurological: She is alert and oriented to person, place, and time. She displays normal reflexes. No cranial nerve deficit. She exhibits normal muscle tone. Gait normal. Coordination normal. GCS score is 15.   Reflex Scores:       Tricep reflexes are 2+ on the right side and 2+ on the left side.       Bicep reflexes are 2+ on the right side and 2+ on the left side.       Patellar reflexes are 1+ on the right side and 1+ on the left side.       Achilles reflexes are 1+ on the right side and 1+ on the left side.  Skin: Skin is warm and dry. No rash noted. She is not diaphoretic. No erythema.     Psychiatric: Mood, memory, affect and judgment normal.   Musculoskeletal: Normal range of motion. She exhibits deformity  (ulnar deviation of fingers, bilatearl hands; reducible). She exhibits no edema or tenderness.         Results for VÍCTOR COTA (MRN [id number]) as of 6/18/2019 10:15   Ref. Range 6/17/2019 09:43   WBC Latest Ref Range: 3.90 - 12.70 K/uL 6.32   RBC Latest Ref Range: 4.00 - 5.40 M/uL 4.42   Hemoglobin Latest Ref Range: 12.0 - 16.0 g/dL 13.4   Hematocrit Latest Ref Range: 37.0 - 48.5 % 43.1   MCV Latest Ref Range: 82 - 98 fL 98   MCH Latest Ref Range: 27.0 - 31.0 pg 30.3   MCHC Latest Ref Range: 32.0 - 36.0 g/dL 31.1 (L)   RDW Latest Ref Range: 11.5 - 14.5 % 11.9   Platelets Latest Ref Range: 150 - 350 K/uL 337   MPV Latest Ref Range: 9.2 - 12.9 fL 10.3   Gran% Latest Ref Range: 38.0 - 73.0 % 55.4   Gran # (ANC) Latest Ref Range: 1.8 - 7.7 K/uL 3.5   Lymph% Latest Ref Range: 18.0 - 48.0 % 30.9   Lymph # Latest Ref Range: 1.0 - 4.8 K/uL 2.0   Mono% Latest Ref Range: 4.0 - 15.0 % 9.5   Mono # Latest Ref Range: 0.3 - 1.0 K/uL 0.6   Eosinophil% Latest Ref Range: 0.0 - 8.0 % 3.0   Eos # Latest Ref Range: 0.0 - 0.5 K/uL 0.2   Basophil% Latest Ref Range: 0.0 - 1.9 % 0.9   Baso # Latest Ref Range: 0.00 - 0.20 K/uL 0.06   nRBC Latest Ref Range: 0 /100 WBC 0   Differential Method Unknown Automated   Immature Grans (Abs) Latest Ref Range: 0.00 - 0.04 K/uL 0.02   Immature Granulocytes Latest Ref Range: 0.0 - 0.5 % 0.3   Sed Rate Latest Ref Range: 0 - 36 mm/Hr 37 (H)   Sodium Latest Ref Range: 136 - 145 mmol/L 140   Potassium Latest Ref Range: 3.5 - 5.1 mmol/L 4.2   Chloride Latest Ref Range: 95 - 110 mmol/L 106   CO2 Latest Ref Range: 23 - 29 mmol/L 25   Anion Gap Latest Ref Range: 8 - 16 mmol/L 9   BUN, Bld Latest Ref Range: 8 - 23 mg/dL 12   Creatinine Latest Ref Range: 0.5 - 1.4 mg/dL 0.8   eGFR if non African American Latest Ref Range: >60 mL/min/1.73 m^2 >60.0   eGFR if African American Latest Ref Range: >60 mL/min/1.73 m^2 >60.0   Glucose Latest Ref Range: 70 - 110 mg/dL 72   Calcium Latest Ref Range: 8.7 - 10.5 mg/dL 9.6    Alkaline Phosphatase Latest Ref Range: 55 - 135 U/L 141 (H)   PROTEIN TOTAL Latest Ref Range: 6.0 - 8.4 g/dL 7.5   Albumin Latest Ref Range: 3.5 - 5.2 g/dL 3.6   BILIRUBIN TOTAL Latest Ref Range: 0.1 - 1.0 mg/dL 0.4   AST Latest Ref Range: 10 - 40 U/L 22   ALT Latest Ref Range: 10 - 44 U/L 16   GGT Latest Ref Range: 8 - 55 U/L 29   CRP Latest Ref Range: 0.0 - 8.2 mg/L 5.5   Complement (C-3) Latest Ref Range: 50 - 180 mg/dL 113   Complement (C-4) Latest Ref Range: 11 - 44 mg/dL 26       Results for VÍCTOR COTA (MRN [id number]) as of 6/18/2019 10:15   Ref. Range 6/17/2019 09:16   Specimen UA Unknown Urine, Unspecified   Color, UA Latest Ref Range: Yellow, Straw, Alejandra  Yellow   Appearance, UA Latest Ref Range: Clear  Clear   Specific [place], UA Latest Ref Range: 1.005 - 1.030  1.005   pH, UA Latest Ref Range: 5.0 - 8.0  6.0   Protein, UA Latest Ref Range: Negative  Negative   Glucose, UA Latest Ref Range: Negative  Negative   Ketones, UA Latest Ref Range: Negative  Negative   Occult Blood UA Latest Ref Range: Negative  Negative   NITRITE UA Latest Ref Range: Negative  Negative   Bilirubin (UA) Latest Ref Range: Negative  Negative   Leukocytes, UA Latest Ref Range: Negative  Negative   WBC, UA Latest Ref Range: 0 - 5 /hpf 0   Squam Epithel, UA Latest Units: /hpf 0   Microscopic Comment Unknown SEE COMMENT   Prot/Creat Ratio, Ur Latest Ref Range: 0.00 - 0.20  Unable to calculate   Protein, Urine Random Latest Ref Range: 0 - 15 mg/dL <7   Creatinine, Random Ur Latest Ref Range: 15.0 - 325.0 mg/dL 59.0        Assessment:       1. Other systemic lupus erythematosus with other organ involvement    2. Osteopenia, unspecified location    3. Gastroesophageal reflux disease, esophagitis presence not specified    4. Vertigo    5. Elevated alkaline phosphatase level          SLEDAI Score: 2  Seizure: Not present  Psychosis: Not present  Organic Brain Syndrome: Not present  Visual Disturbance: Not present  Cranial Nerve  Disorder: Not present  Lupus Headache: Not present  CVA: Not present  Vasculitis: Not present  Arthritis: Not present  Myositis: Not present  Urinary Casts: Not present  Hematuria: Not present  Proteinuria: Not present  Pyuria: Not present  New Rash: Not present  Alopecia: Not present  Mucosal Ulcers: Present  Pleurisy: Not present  Pericarditis: Not present  Low Complement: Not present  Increased DNA Binding: Not present  Fever: Not present  Thrombocytopenia: Not present  Leukopenia: Not present    Plan:       SLE  - CRP 5.5, Sed Rate elevated 37, pending Anti-dsDNA, Urine wnl, SLEDAI score 2  -Continue plaquenil 200 mg bid; yearly optometry checks  -Continue vitamin D2 50,000 U weekly; start Vitamin D3 1000 Units daily (Over the counter)  -Will recheck Lipid panel with standing lupus labs  -Recommended [place] Nasal spray/gel for nasal sores    Osteopenia, Vitamin D def  -Last Vit D level 44 on 03/2019  -Encouraged to continue taking Vitamin D2 50,000 units weekly, added Vitamin D3 1000 units daily. Goal Vitamin D level >40  -DEXA scan due at the end of the year    Elevated Alk Phos  -will recheck alk phos in 1 month, instructed Ms. Taylor to be fasting for lab work    Vertigo  -improved with vestibular exercises  -continue to follow with audiology    GERD  -Discontinued Nexium, initiated Zantac 150 mg bid. Encouraged patient to notify gastroenterologist of change    Shingrix 1st dose to be administered today, 2nd dose due in 2-5 months time    RTC 3 months with standing lupus labs + lipid panel

## 2019-06-18 NOTE — PROGRESS NOTES
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 Melina Allen's with intermittent triggering right little finger, with nodular tenosynovitis.        Take vitamin D2 50,000 units every 7 days and vitamin D3 1000 units daily with goal vitamin D >40  Cont hydroxychloroquine 200mg twice daily. Saw Dr. Monge 8/9/18 in Optometry  *Shingrix x2  first dose today  Stop esomeprazole, try ranitidine 150mg twice daily for GERD given potential long term toxicities of PPIs  Ref to IM, Dr. Grimes moved to [place]  DXA due > 12/18/19  Ref to Dr. Giordano if triggering right little finger persists/worsens. Wants to hold off now  RTC 3 months with standing labs

## 2019-06-19 ENCOUNTER — PATIENT MESSAGE (OUTPATIENT)
Dept: RHEUMATOLOGY | Facility: CLINIC | Age: 64
End: 2019-06-19

## 2019-06-19 ENCOUNTER — CLINICAL SUPPORT (OUTPATIENT)
Dept: INFECTIOUS DISEASES | Facility: CLINIC | Age: 64
End: 2019-06-19
Payer: COMMERCIAL

## 2019-06-19 PROCEDURE — 90750 ZOSTER RECOMBINANT VACCINE: ICD-10-PCS | Mod: S$GLB,,, | Performed by: INTERNAL MEDICINE

## 2019-06-19 PROCEDURE — 90750 HZV VACC RECOMBINANT IM: CPT | Mod: S$GLB,,, | Performed by: INTERNAL MEDICINE

## 2019-06-19 PROCEDURE — 90471 ZOSTER RECOMBINANT VACCINE: ICD-10-PCS | Mod: S$GLB,,, | Performed by: INTERNAL MEDICINE

## 2019-06-19 PROCEDURE — 90471 IMMUNIZATION ADMIN: CPT | Mod: S$GLB,,, | Performed by: INTERNAL MEDICINE

## 2019-06-19 NOTE — PROGRESS NOTES
Mrs. Taylorreceived initial dose Shingrix vaccine and tolerated it well. She left the unit in NAD.

## 2019-07-16 ENCOUNTER — OFFICE VISIT (OUTPATIENT)
Dept: OPTOMETRY | Facility: CLINIC | Age: 64
End: 2019-07-16
Payer: COMMERCIAL

## 2019-07-16 ENCOUNTER — OFFICE VISIT (OUTPATIENT)
Dept: OPTOMETRY | Facility: CLINIC | Age: 64
End: 2019-07-16

## 2019-07-16 DIAGNOSIS — Z79.899 LONG-TERM USE OF PLAQUENIL: ICD-10-CM

## 2019-07-16 DIAGNOSIS — M32.9 SYSTEMIC LUPUS ERYTHEMATOSUS, UNSPECIFIED SLE TYPE, UNSPECIFIED ORGAN INVOLVEMENT STATUS: Primary | ICD-10-CM

## 2019-07-16 DIAGNOSIS — H52.4 MYOPIA WITH PRESBYOPIA OF BOTH EYES: ICD-10-CM

## 2019-07-16 DIAGNOSIS — H52.13 MYOPIA WITH PRESBYOPIA OF BOTH EYES: ICD-10-CM

## 2019-07-16 DIAGNOSIS — Z46.0 ENCOUNTER FOR FITTING OR ADJUSTMENT OF SPECTACLES OR CONTACT LENSES: Primary | ICD-10-CM

## 2019-07-16 DIAGNOSIS — H43.391 VITREOUS FLOATER, RIGHT: ICD-10-CM

## 2019-07-16 DIAGNOSIS — H25.13 SENILE NUCLEAR SCLEROSIS, BILATERAL: ICD-10-CM

## 2019-07-16 DIAGNOSIS — Z13.5 SCREENING FOR EYE CONDITION: ICD-10-CM

## 2019-07-16 PROCEDURE — 92310 CONTACT LENS FITTING OU: CPT | Mod: CSM,,, | Performed by: OPTOMETRIST

## 2019-07-16 PROCEDURE — 99499 UNLISTED E&M SERVICE: CPT | Mod: S$GLB,,, | Performed by: OPTOMETRIST

## 2019-07-16 PROCEDURE — 92015 PR REFRACTION: ICD-10-PCS | Mod: S$GLB,,, | Performed by: OPTOMETRIST

## 2019-07-16 PROCEDURE — 99499 NO LOS: ICD-10-PCS | Mod: S$GLB,,, | Performed by: OPTOMETRIST

## 2019-07-16 PROCEDURE — 92083 EXTENDED VISUAL FIELD XM: CPT | Mod: S$GLB,,, | Performed by: OPTOMETRIST

## 2019-07-16 PROCEDURE — 99999 PR PBB SHADOW E&M-EST. PATIENT-LVL III: CPT | Mod: PBBFAC,,, | Performed by: OPTOMETRIST

## 2019-07-16 PROCEDURE — 99999 PR PBB SHADOW E&M-EST. PATIENT-LVL III: ICD-10-PCS | Mod: PBBFAC,,, | Performed by: OPTOMETRIST

## 2019-07-16 PROCEDURE — 92083 HUMPHREY VISUAL FIELD - OU - BOTH EYES: ICD-10-PCS | Mod: S$GLB,,, | Performed by: OPTOMETRIST

## 2019-07-16 PROCEDURE — 92014 COMPRE OPH EXAM EST PT 1/>: CPT | Mod: S$GLB,,, | Performed by: OPTOMETRIST

## 2019-07-16 PROCEDURE — 92015 DETERMINE REFRACTIVE STATE: CPT | Mod: S$GLB,,, | Performed by: OPTOMETRIST

## 2019-07-16 PROCEDURE — 92310 PR CONTACT LENS FITTING (NO CHANGE): ICD-10-PCS | Mod: CSM,,, | Performed by: OPTOMETRIST

## 2019-07-16 PROCEDURE — 92134 CPTRZ OPH DX IMG PST SGM RTA: CPT | Mod: S$GLB,,, | Performed by: OPTOMETRIST

## 2019-07-16 PROCEDURE — 92014 PR EYE EXAM, EST PATIENT,COMPREHESV: ICD-10-PCS | Mod: S$GLB,,, | Performed by: OPTOMETRIST

## 2019-07-16 PROCEDURE — 92134 POSTERIOR SEGMENT OCT RETINA (OCULAR COHERENCE TOMOGRAPHY)-BOTH EYES: ICD-10-PCS | Mod: S$GLB,,, | Performed by: OPTOMETRIST

## 2019-07-16 NOTE — PROGRESS NOTES
HPI     Contact Lens Follow Up      Additional comments: Patient in today for contact lens follow-up with   general eye examination.  Refer to additional patient encounter notes   dated 07/16/2019.                Comments     Patient in today for contact lens follow-up with general eye examination.    Refer to additional patient encounter notes dated 07/16/2019.              Last edited by Mendez Busby, OD on 7/16/2019  1:30 PM. (History)            Assessment /Plan     For exam results, see Encounter Report.    1. Encounter for fitting or adjustment of spectacles or contact lenses                      Plaquenil patient (since 1984) without evidence of Plaquenil toxicity.  No bull's eye pigmentary changes at posterior pole of either eye.  Normal OCT of retina at macula in each eye.  Normal central 10° visual field.    Okay to continue to take Plaquenil under the supervision of rheumatologist.    Early nuclear sclerosis of lens of both eyes, consistent with age.    Pre-papillary vitreous floater(s) in the right eye.  No evidence of retinal etiology.    Myopia in each eye, with satisfactory best-corrected VA in each eye.    Presbyopia consistent with age.  New spectacle lens Rx issued for use in lieu of SCLs.    Wearing daily disposable multifocal SCLs.  Good lens fit in each eye.  Wearing lenses well in both eyes.  New CL RX issued with only slight change in lens power of the right lens.   No change made to the left lens power.  New CL Rx issued.    Recheck in one year, with repeat Ocampo visual field test and repeat OCT of the retina, per Plaquenil protocol.  Return sooner, if any apparent problems or changes in vision noted in the interim.

## 2019-07-16 NOTE — PATIENT INSTRUCTIONS
Plaquenil patient (since 1984) without evidence of Plaquenil toxicity.  No bull's eye pigmentary changes at posterior pole of either eye.  Normal OCT of retina at macula in each eye.  Normal central 10° visual field.    Okay to continue to take Plaquenil under the supervision of rheumatologist.    Early nuclear sclerosis of lens of both eyes, consistent with age.    Pre-papillary vitreous floater(s) in the right eye.  No evidence of retinal etiology.    Myopia in each eye, with satisfactory best-corrected VA in each eye.    Presbyopia consistent with age.  New spectacle lens Rx issued for use in lieu of SCLs.    Wearing daily disposable multifocal SCLs.  Good lens fit in each eye.  Wearing lenses well in both eyes.  New CL RX issued with only slight change in lens power of the right lens.   No change made to the left lens power.  New CL Rx issued.    Recheck in one year, with repeat Ocampo visual field test and repeat OCT of the retina, per Plaquenil protocol.  Return sooner, if any apparent problems or changes in vision noted in the interim.

## 2019-07-16 NOTE — PROGRESS NOTES
HPI     1 yr Plaq ck      Additional comments: Pt c/o floater OD and denies any decrease in VA.  Has been taking Plaquenil since 1984 for treatment of Lupus (two tablets   per day).  Has noticed floater in the right eye x 2 weeks.   Wears CLs and glasses.  Wears daily disposable SCLs (multifocal lenses              Comments     Patient's age: 64 y.o. WF  Occupation: retired   Approximate date of last eye examination:  08/09/2018  Name of last eye doctor seen:   City/State: [facility]  Wears glasses? Yes      If yes, wears  Full-time or part-time?  Part time   Present glasses are: Bifocal, SV Distance, SV Reading?  Bifocals   Approximate age of present glasses:  Over a year old    Got new glasses following last exam, or subsequently?:  Yes    Any problem with VA with glasses?  No, reports floaters OD  Wears CLs?: yes             If yes:              Type of CL worn:  Dailies Aqua Comfort plus Multifocal               Wears full-time or part-time:  Full time                Sleeps with contact lenses:  No                CL Solution used:  No                How often replace CLs:  Daily               Any problem with VA with CLs?  No               Has patient read and signed the contact lens fee information   document? Yes   Headaches?  No   Eye pain/discomfort?  No                                                                                      Flashes?   Yes   Floaters?  Yes, OD   Diplopia/Double vision?  No   Patient's Ocular History:         Any eye surgeries? No          Any eye injury?  No          Any treatment for eye disease?  No   Family history of eye disease?  No   Significant patient medical history:         1. Diabetes?  No        If yes, IDDM or NIDDM?    2. HBP?  No               3. Other (describe):  Thyroid    ! OTC eyedrops currently using:  AT's prn   ! Prescription eye meds currently using:  No    ! Any history of allergy/adverse reaction to any eye meds used   previously?  No     ! Any history  "of allergy/adverse reaction to eyedrops used during prior   eye exam(s)? No     ! Any history of allergy/adverse reaction to Novacaine or similar meds?   No    ! Any history of allergy/adverse reaction to Epinephrine or similar meds?   No     ! Patient okay with use of anesthetic eyedrops to check eye pressure?    Yes         ! Patient okay with use of eyedrops to dilate pupils today?  Yes    !  Allergies/Medications/Medical History/Family History reviewed today?    Yes       PD =   61/58  Desired reading distance =  22"                                                                      Last edited by Mendez Busby, OD on 7/16/2019  1:28 PM. (History)            Assessment /Plan     For exam results, see Encounter Report.    1. Systemic lupus erythematosus, unspecified SLE type, unspecified organ involvement status  Ocampo Visual Field - OU - Extended - Both Eyes    Posterior Segment OCT Retina-Both eyes   2. Long-term use of Plaquenil  Ocampo Visual Field - OU - Extended - Both Eyes    Posterior Segment OCT Retina-Both eyes   3. Senile nuclear sclerosis, bilateral     4. Vitreous floater, right     5. Screening for eye condition     6. Myopia with presbyopia of both eyes                      Plaquenil patient (since 1984) without evidence of Plaquenil toxicity.  No bull's eye pigmentary changes at posterior pole of either eye.  Normal OCT of retina at macula in each eye.  Normal central 10° visual field.    Okay to continue to take Plaquenil under the supervision of rheumatologist.    Early nuclear sclerosis of lens of both eyes, consistent with age.    Pre-papillary vitreous floater(s) in the right eye.  No evidence of retinal etiology.    Myopia in each eye, with satisfactory best-corrected VA in each eye.    Presbyopia consistent with age.  New spectacle lens Rx issued for use in lieu of SCLs.    Wearing daily disposable multifocal SCLs.  Good lens fit in each eye.  Wearing lenses well in both " eyes.  New CL RX issued with only slight change in lens power of the right lens.   No change made to the left lens power.  New CL Rx issued.    Recheck in one year, with repeat Ocampo visual field test and repeat OCT of the retina, per Plaquenil protocol.  Return sooner, if any apparent problems or changes in vision noted in the interim.

## 2019-07-18 ENCOUNTER — LAB VISIT (OUTPATIENT)
Dept: LAB | Facility: HOSPITAL | Age: 64
End: 2019-07-18
Attending: INTERNAL MEDICINE
Payer: COMMERCIAL

## 2019-07-18 DIAGNOSIS — R74.8 ALKALINE PHOSPHATASE ELEVATION: ICD-10-CM

## 2019-07-18 PROCEDURE — 84080 ASSAY ALKALINE PHOSPHATASES: CPT

## 2019-07-18 PROCEDURE — 84075 ASSAY ALKALINE PHOSPHATASE: CPT

## 2019-07-18 PROCEDURE — 36415 COLL VENOUS BLD VENIPUNCTURE: CPT

## 2019-07-25 DIAGNOSIS — E55.9 VITAMIN D DEFICIENCY: ICD-10-CM

## 2019-07-25 LAB
ALP BONE CFR SERPL: 34 % (ref 28–66)
ALP INTEST CFR SERPL: 12 % (ref 1–24)
ALP LIVER CFR SERPL: 54 % (ref 25–69)
ALP PLAC CFR SERPL: 0 %
ALP SERPL-CCNC: 113 U/L (ref 33–130)

## 2019-07-26 RX ORDER — ERGOCALCIFEROL 1.25 MG/1
CAPSULE ORAL
Qty: 12 CAPSULE | Refills: 3 | Status: SHIPPED | OUTPATIENT
Start: 2019-07-26 | End: 2020-03-16 | Stop reason: SDUPTHER

## 2019-07-26 RX ORDER — ERGOCALCIFEROL 1.25 MG/1
CAPSULE ORAL
Qty: 12 CAPSULE | Refills: 0 | Status: SHIPPED | OUTPATIENT
Start: 2019-07-26 | End: 2019-10-21

## 2019-08-13 ENCOUNTER — PATIENT MESSAGE (OUTPATIENT)
Dept: INTERNAL MEDICINE | Facility: CLINIC | Age: 64
End: 2019-08-13

## 2019-08-13 NOTE — TELEPHONE ENCOUNTER
You can assist In putting her on hold list if sooner comes up available  Or urgent appt if having urgent need prior

## 2019-08-24 ENCOUNTER — PATIENT MESSAGE (OUTPATIENT)
Dept: INTERNAL MEDICINE | Facility: CLINIC | Age: 64
End: 2019-08-24

## 2019-08-24 DIAGNOSIS — R09.81 NASAL SINUS CONGESTION: ICD-10-CM

## 2019-08-24 RX ORDER — PSEUDOEPHEDRINE HYDROCHLORIDE 60 MG/1
60 TABLET ORAL EVERY 6 HOURS PRN
Qty: 30 TABLET | Refills: 0 | Status: SHIPPED | OUTPATIENT
Start: 2019-08-24 | End: 2019-12-13 | Stop reason: SDUPTHER

## 2019-10-01 ENCOUNTER — PATIENT MESSAGE (OUTPATIENT)
Dept: INTERNAL MEDICINE | Facility: CLINIC | Age: 64
End: 2019-10-01

## 2019-10-16 ENCOUNTER — OFFICE VISIT (OUTPATIENT)
Dept: INTERNAL MEDICINE | Facility: CLINIC | Age: 64
End: 2019-10-16
Payer: COMMERCIAL

## 2019-10-16 ENCOUNTER — IMMUNIZATION (OUTPATIENT)
Dept: PHARMACY | Facility: CLINIC | Age: 64
End: 2019-10-16
Payer: COMMERCIAL

## 2019-10-16 VITALS
WEIGHT: 167.31 LBS | BODY MASS INDEX: 32.85 KG/M2 | HEIGHT: 60 IN | TEMPERATURE: 98 F | SYSTOLIC BLOOD PRESSURE: 122 MMHG | DIASTOLIC BLOOD PRESSURE: 80 MMHG | HEART RATE: 69 BPM

## 2019-10-16 DIAGNOSIS — I77.9 CAROTID ARTERY DISEASE WITHOUT CEREBRAL INFARCTION: ICD-10-CM

## 2019-10-16 DIAGNOSIS — Z00.00 ANNUAL PHYSICAL EXAM: Primary | ICD-10-CM

## 2019-10-16 DIAGNOSIS — M32.9 SYSTEMIC LUPUS ERYTHEMATOSUS, UNSPECIFIED SLE TYPE, UNSPECIFIED ORGAN INVOLVEMENT STATUS: ICD-10-CM

## 2019-10-16 DIAGNOSIS — R06.02 SOB (SHORTNESS OF BREATH): ICD-10-CM

## 2019-10-16 DIAGNOSIS — R53.1 WEAKNESS: ICD-10-CM

## 2019-10-16 DIAGNOSIS — K50.90 CROHN'S DISEASE WITHOUT COMPLICATION, UNSPECIFIED GASTROINTESTINAL TRACT LOCATION: ICD-10-CM

## 2019-10-16 DIAGNOSIS — M32.8 OTHER FORMS OF SYSTEMIC LUPUS ERYTHEMATOSUS, UNSPECIFIED ORGAN INVOLVEMENT STATUS: ICD-10-CM

## 2019-10-16 DIAGNOSIS — D33.3 RIGHT ACOUSTIC NEUROMA: ICD-10-CM

## 2019-10-16 DIAGNOSIS — E03.9 HYPOTHYROIDISM, UNSPECIFIED TYPE: ICD-10-CM

## 2019-10-16 PROCEDURE — 99396 PREV VISIT EST AGE 40-64: CPT | Mod: S$GLB,,, | Performed by: INTERNAL MEDICINE

## 2019-10-16 PROCEDURE — 99396 PR PREVENTIVE VISIT,EST,40-64: ICD-10-PCS | Mod: S$GLB,,, | Performed by: INTERNAL MEDICINE

## 2019-10-16 PROCEDURE — 99999 PR PBB SHADOW E&M-EST. PATIENT-LVL IV: ICD-10-PCS | Mod: PBBFAC,,, | Performed by: INTERNAL MEDICINE

## 2019-10-16 PROCEDURE — 99999 PR PBB SHADOW E&M-EST. PATIENT-LVL IV: CPT | Mod: PBBFAC,,, | Performed by: INTERNAL MEDICINE

## 2019-10-16 RX ORDER — HYDROXYCHLOROQUINE SULFATE 200 MG/1
200 TABLET, FILM COATED ORAL 2 TIMES DAILY
Qty: 180 TABLET | Refills: 1 | Status: SHIPPED | OUTPATIENT
Start: 2019-10-16 | End: 2020-03-23 | Stop reason: SDUPTHER

## 2019-10-16 NOTE — PROGRESS NOTES
Answers for HPI/ROS submitted by the patient on 10/15/2019   activity change: No  unexpected weight change: No  neck pain: No  hearing loss: Yes  rhinorrhea: No  trouble swallowing: No  eye discharge: No  visual disturbance: No  chest tightness: Yes  wheezing: Yes  chest pain: Yes  palpitations: No  blood in stool: No  constipation: No  vomiting: No  diarrhea: No  polydipsia: No  polyuria: No  difficulty urinating: No  hematuria: No  menstrual problem: No  dysuria: No  joint swelling: Yes  arthralgias: No  headaches: No  weakness: Yes  confusion: No  dysphoric mood: No  Subjective:       Patient ID: Radha aTylor is a 64 y.o. female.    Chief Complaint: Establish Care and Annual Exam   This is a 64-year-old who presents today for physical to establish she reports her current doctor has left although she follows with Rheumatology regularly for her ongoing medical care she reports a history of both Crohn's disease in the past which has been in remission she follows with her outlying gastroenterologist Dr. Summers at .  She is following with Rheumatology regularly reports history of lupus for which she has been fairly stable recently on Plaquenil in the past she has had hospitalizations including prior brain biopsy for inflammation.  Patient reports in addition she has been diagnosed with an acoustic neuroma on the right for which she had sought several opinions ultimately went to [facility] she decided she did not want proceed with surgery but underwent radiation at that time reports she had been followed there regularly until more recently things have been stable and she was discharged.  She does use hearing aids and occasionally will have issues with her balance related to her neuroma.  She does occasionally do balance training exercises has had no recent falls.  She has a few concerns had an episode last year where she was in the ER due to feeling badly and multiple tests were done she reports they did some CT scans  which show some carotid blockage she was concerned about that she feels that sometimes that she gets dizziness when she lifts things and carries them.  Sometimes she will get chest discomfort or shortness of breath.  She has concerns about her blockage and would like to consider further evaluation.  She is a former smoker and quit many years ago    HPI  Review of Systems   Constitutional: Negative for activity change and unexpected weight change.   HENT: Positive for hearing loss. Negative for rhinorrhea and trouble swallowing.         Hearing loss hearing aids    Eyes: Negative for discharge and visual disturbance.   Respiratory: Positive for chest tightness and wheezing.    Cardiovascular: Positive for chest pain. Negative for palpitations.   Gastrointestinal: Negative for blood in stool, constipation, diarrhea and vomiting.   Endocrine: Negative for polydipsia and polyuria.   Genitourinary: Negative for difficulty urinating, dysuria, hematuria and menstrual problem.   Musculoskeletal: Positive for joint swelling. Negative for arthralgias and neck pain.   Neurological: Positive for dizziness and weakness. Negative for headaches.        Balance issues at times    Psychiatric/Behavioral: Negative for confusion and dysphoric mood.       Objective:     Blood pressure 122/80, pulse 69, temperature 98.4 °F (36.9 °C), height 5' (1.524 m), weight 75.9 kg (167 lb 5.3 oz).    Physical Exam   Constitutional: No distress.   HENT:   Head: Normocephalic.   Mouth/Throat: Oropharynx is clear and moist.   Hearing aids    Eyes: No scleral icterus.   Neck: Neck supple.   Cardiovascular: Normal rate, regular rhythm and normal heart sounds. Exam reveals no gallop and no friction rub.   No murmur heard.  Pulmonary/Chest: Effort normal and breath sounds normal. No respiratory distress.   Breast : normal no masses or tenderness    Abdominal: Soft. Bowel sounds are normal. She exhibits no mass. There is no tenderness.   Musculoskeletal:  She exhibits no edema.   Neurological: She is alert.   Skin: No erythema.   Psychiatric: She has a normal mood and affect.   Vitals reviewed.      Assessment:       1. Annual physical exam    2. Hypothyroidism, unspecified type    3. Systemic lupus erythematosus, unspecified SLE type, unspecified organ involvement status    4. SOB (shortness of breath)    5. Weakness    6. Carotid artery disease without cerebral infarction    7. Right acoustic neuroma    8. Crohn's disease without complication, unspecified gastrointestinal tract location        Plan:       Radha was seen today for establish care.    Diagnoses and all orders for this visit:    Annual physical exam    Hypothyroidism, unspecified type  Euthyroid she remains on medication update with her next blood draw  Adjust if needed   -     TSH; Future  -     T4, free; Future    Systemic lupus erythematosus, unspecified SLE type, unspecified organ involvement status  History of she continues to follow with her rheumatologist regularly currently on Plaquenil  She reports continue to stay up to date on her eye exam     SOB (shortness of breath)  Weakness  -     Stress echo Treadmill Exercise; Future  Discussed proceed with due to symptoms     Carotid artery disease without cerebral infarction  Previous imaging reviewed and patient has concerns about carotid discussed we can update carotid ultrasound has a baseline she would like to consider seeing vascular surgeon due to her symptoms we discussed statin as preventative measure she would like to hold off for now  But she does have her upcoming lipids she was a previous smoker and quit  -     VAS US Carotid Bilateral; Future  -     Ambulatory consult to Vascular Surgery    Right acoustic neuroma  Patient reports has previously been evaluated by ENT she has had multiple specialty appointments and ultimately sought treatment at [facility] where she underwent radiation with some improvement over time she still has issues with  her balance and hearing but has been doing vestibular exercises and occasionally gait training    History of Crohn's disease she is following with her outlying gatroenterologist has been asymptomatic    Discussed gyn for her pap/pelvic     Flu shot recommended

## 2019-10-17 ENCOUNTER — PATIENT MESSAGE (OUTPATIENT)
Dept: INTERNAL MEDICINE | Facility: CLINIC | Age: 64
End: 2019-10-17

## 2019-10-17 ENCOUNTER — TELEPHONE (OUTPATIENT)
Dept: RHEUMATOLOGY | Facility: CLINIC | Age: 64
End: 2019-10-17

## 2019-10-17 ENCOUNTER — LAB VISIT (OUTPATIENT)
Dept: LAB | Facility: HOSPITAL | Age: 64
End: 2019-10-17
Attending: INTERNAL MEDICINE
Payer: COMMERCIAL

## 2019-10-17 ENCOUNTER — TELEPHONE (OUTPATIENT)
Dept: INTERNAL MEDICINE | Facility: CLINIC | Age: 64
End: 2019-10-17

## 2019-10-17 DIAGNOSIS — E03.9 HYPOTHYROIDISM, UNSPECIFIED TYPE: ICD-10-CM

## 2019-10-17 DIAGNOSIS — M32.9 SYSTEMIC LUPUS ERYTHEMATOSUS, UNSPECIFIED SLE TYPE, UNSPECIFIED ORGAN INVOLVEMENT STATUS: ICD-10-CM

## 2019-10-17 DIAGNOSIS — R74.8 ALKALINE PHOSPHATASE RAISED: Primary | ICD-10-CM

## 2019-10-17 DIAGNOSIS — K21.9 GASTROESOPHAGEAL REFLUX DISEASE, ESOPHAGITIS PRESENCE NOT SPECIFIED: ICD-10-CM

## 2019-10-17 LAB
ALBUMIN SERPL BCP-MCNC: 3.8 G/DL (ref 3.5–5.2)
ALP SERPL-CCNC: 166 U/L (ref 55–135)
ALT SERPL W/O P-5'-P-CCNC: 22 U/L (ref 10–44)
ANION GAP SERPL CALC-SCNC: 9 MMOL/L (ref 8–16)
AST SERPL-CCNC: 27 U/L (ref 10–40)
BASOPHILS # BLD AUTO: 0.07 K/UL (ref 0–0.2)
BASOPHILS NFR BLD: 1.2 % (ref 0–1.9)
BILIRUB SERPL-MCNC: 0.6 MG/DL (ref 0.1–1)
BUN SERPL-MCNC: 13 MG/DL (ref 8–23)
C3 SERPL-MCNC: 119 MG/DL (ref 50–180)
C4 SERPL-MCNC: 27 MG/DL (ref 11–44)
CALCIUM SERPL-MCNC: 9.7 MG/DL (ref 8.7–10.5)
CHLORIDE SERPL-SCNC: 106 MMOL/L (ref 95–110)
CHOLEST SERPL-MCNC: 187 MG/DL (ref 120–199)
CHOLEST/HDLC SERPL: 3 {RATIO} (ref 2–5)
CO2 SERPL-SCNC: 26 MMOL/L (ref 23–29)
CREAT SERPL-MCNC: 0.9 MG/DL (ref 0.5–1.4)
CRP SERPL-MCNC: 8.6 MG/L (ref 0–8.2)
DIFFERENTIAL METHOD: NORMAL
EOSINOPHIL # BLD AUTO: 0.1 K/UL (ref 0–0.5)
EOSINOPHIL NFR BLD: 2 % (ref 0–8)
ERYTHROCYTE [DISTWIDTH] IN BLOOD BY AUTOMATED COUNT: 12 % (ref 11.5–14.5)
ERYTHROCYTE [SEDIMENTATION RATE] IN BLOOD BY WESTERGREN METHOD: 32 MM/HR (ref 0–36)
EST. GFR  (AFRICAN AMERICAN): >60 ML/MIN/1.73 M^2
EST. GFR  (NON AFRICAN AMERICAN): >60 ML/MIN/1.73 M^2
GLUCOSE SERPL-MCNC: 93 MG/DL (ref 70–110)
HCT VFR BLD AUTO: 43.1 % (ref 37–48.5)
HDLC SERPL-MCNC: 63 MG/DL (ref 40–75)
HDLC SERPL: 33.7 % (ref 20–50)
HGB BLD-MCNC: 14 G/DL (ref 12–16)
IMM GRANULOCYTES # BLD AUTO: 0.01 K/UL (ref 0–0.04)
IMM GRANULOCYTES NFR BLD AUTO: 0.2 % (ref 0–0.5)
LDLC SERPL CALC-MCNC: 111.8 MG/DL (ref 63–159)
LYMPHOCYTES # BLD AUTO: 1.5 K/UL (ref 1–4.8)
LYMPHOCYTES NFR BLD: 26.2 % (ref 18–48)
MCH RBC QN AUTO: 29.8 PG (ref 27–31)
MCHC RBC AUTO-ENTMCNC: 32.5 G/DL (ref 32–36)
MCV RBC AUTO: 92 FL (ref 82–98)
MONOCYTES # BLD AUTO: 0.6 K/UL (ref 0.3–1)
MONOCYTES NFR BLD: 10.2 % (ref 4–15)
NEUTROPHILS # BLD AUTO: 3.5 K/UL (ref 1.8–7.7)
NEUTROPHILS NFR BLD: 60.2 % (ref 38–73)
NONHDLC SERPL-MCNC: 124 MG/DL
NRBC BLD-RTO: 0 /100 WBC
PLATELET # BLD AUTO: 335 K/UL (ref 150–350)
PMV BLD AUTO: 10.5 FL (ref 9.2–12.9)
POTASSIUM SERPL-SCNC: 4.2 MMOL/L (ref 3.5–5.1)
PROT SERPL-MCNC: 7.7 G/DL (ref 6–8.4)
RBC # BLD AUTO: 4.7 M/UL (ref 4–5.4)
SODIUM SERPL-SCNC: 141 MMOL/L (ref 136–145)
T4 FREE SERPL-MCNC: 1.6 NG/DL (ref 0.71–1.51)
TRIGL SERPL-MCNC: 61 MG/DL (ref 30–150)
TSH SERPL DL<=0.005 MIU/L-ACNC: 0.52 UIU/ML (ref 0.4–4)
WBC # BLD AUTO: 5.88 K/UL (ref 3.9–12.7)

## 2019-10-17 PROCEDURE — 86160 COMPLEMENT ANTIGEN: CPT | Mod: 59

## 2019-10-17 PROCEDURE — 80053 COMPREHEN METABOLIC PANEL: CPT

## 2019-10-17 PROCEDURE — 86140 C-REACTIVE PROTEIN: CPT

## 2019-10-17 PROCEDURE — 84439 ASSAY OF FREE THYROXINE: CPT

## 2019-10-17 PROCEDURE — 85025 COMPLETE CBC W/AUTO DIFF WBC: CPT

## 2019-10-17 PROCEDURE — 86225 DNA ANTIBODY NATIVE: CPT

## 2019-10-17 PROCEDURE — 84443 ASSAY THYROID STIM HORMONE: CPT

## 2019-10-17 PROCEDURE — 80061 LIPID PANEL: CPT

## 2019-10-17 PROCEDURE — 85652 RBC SED RATE AUTOMATED: CPT

## 2019-10-17 PROCEDURE — 86160 COMPLEMENT ANTIGEN: CPT

## 2019-10-17 PROCEDURE — 36415 COLL VENOUS BLD VENIPUNCTURE: CPT

## 2019-10-18 ENCOUNTER — TELEPHONE (OUTPATIENT)
Dept: INTERNAL MEDICINE | Facility: CLINIC | Age: 64
End: 2019-10-18

## 2019-10-18 DIAGNOSIS — E03.9 HYPOTHYROIDISM, UNSPECIFIED TYPE: Primary | ICD-10-CM

## 2019-10-18 DIAGNOSIS — E03.9 HYPOTHYROIDISM, UNSPECIFIED TYPE: ICD-10-CM

## 2019-10-18 RX ORDER — LEVOTHYROXINE SODIUM 112 UG/1
112 TABLET ORAL DAILY
Qty: 90 TABLET | Refills: 3 | Status: SHIPPED | OUTPATIENT
Start: 2019-10-18 | End: 2020-04-23 | Stop reason: SDUPTHER

## 2019-10-18 NOTE — TELEPHONE ENCOUNTER
Spoke with pt labs reveiwed thyorid remains   Up discussed stop her current dose   125 mcg and switch to 112 mcg daily    Recheck thyroid labs in 2 months  Order in please schedule   And notify pt on portal of appointment lab date requested

## 2019-10-18 NOTE — TELEPHONE ENCOUNTER
----- Message from Bob Baker sent at 10/18/2019 10:02 AM CDT -----  Contact: Patient [phone]  Patient is returning a phone call.  Who left a message for the patient: Dr navas   Does patient know what this is regarding:  Not sure of call  Comments:     Please call an advise  Thank you

## 2019-10-19 ENCOUNTER — PATIENT MESSAGE (OUTPATIENT)
Dept: INTERNAL MEDICINE | Facility: CLINIC | Age: 64
End: 2019-10-19

## 2019-10-21 ENCOUNTER — OFFICE VISIT (OUTPATIENT)
Dept: RHEUMATOLOGY | Facility: CLINIC | Age: 64
End: 2019-10-21
Payer: COMMERCIAL

## 2019-10-21 VITALS
HEART RATE: 71 BPM | SYSTOLIC BLOOD PRESSURE: 107 MMHG | DIASTOLIC BLOOD PRESSURE: 69 MMHG | HEIGHT: 61 IN | WEIGHT: 169.81 LBS | BODY MASS INDEX: 32.06 KG/M2

## 2019-10-21 DIAGNOSIS — M32.19 OTHER SYSTEMIC LUPUS ERYTHEMATOSUS WITH OTHER ORGAN INVOLVEMENT: Primary | ICD-10-CM

## 2019-10-21 DIAGNOSIS — Z78.0 MENOPAUSE: Primary | ICD-10-CM

## 2019-10-21 LAB — DSDNA AB SER-ACNC: ABNORMAL [IU]/ML

## 2019-10-21 PROCEDURE — 99214 OFFICE O/P EST MOD 30 MIN: CPT | Mod: S$GLB,,, | Performed by: INTERNAL MEDICINE

## 2019-10-21 PROCEDURE — 99214 PR OFFICE/OUTPT VISIT, EST, LEVL IV, 30-39 MIN: ICD-10-PCS | Mod: S$GLB,,, | Performed by: INTERNAL MEDICINE

## 2019-10-21 PROCEDURE — 3008F BODY MASS INDEX DOCD: CPT | Mod: CPTII,S$GLB,, | Performed by: INTERNAL MEDICINE

## 2019-10-21 PROCEDURE — 3008F PR BODY MASS INDEX (BMI) DOCUMENTED: ICD-10-PCS | Mod: CPTII,S$GLB,, | Performed by: INTERNAL MEDICINE

## 2019-10-21 PROCEDURE — 99999 PR PBB SHADOW E&M-EST. PATIENT-LVL III: ICD-10-PCS | Mod: PBBFAC,,, | Performed by: INTERNAL MEDICINE

## 2019-10-21 PROCEDURE — 99999 PR PBB SHADOW E&M-EST. PATIENT-LVL III: CPT | Mod: PBBFAC,,, | Performed by: INTERNAL MEDICINE

## 2019-10-21 RX ORDER — ALPRAZOLAM 0.25 MG/1
TABLET ORAL 3 TIMES DAILY
COMMUNITY
End: 2020-12-18

## 2019-10-21 ASSESSMENT — ROUTINE ASSESSMENT OF PATIENT INDEX DATA (RAPID3)
MDHAQ FUNCTION SCORE: .2
PAIN SCORE: 6
PATIENT GLOBAL ASSESSMENT SCORE: 2
PSYCHOLOGICAL DISTRESS SCORE: 2.2
WHEN YOU AWAKENED IN THE MORNING OVER THE LAST WEEK, PLEASE INDICATE THE AMOUNT OF TIME IT TAKES UNTIL YOU ARE AS LIMBER AS YOU WILL BE FOR THE DAY: 20 MINS
FATIGUE SCORE: 2.5
TOTAL RAPID3 SCORE: 2.89
AM STIFFNESS SCORE: 1, YES

## 2019-10-21 ASSESSMENT — SYSTEMIC LUPUS ERYTHEMATOSUS DISEASE ACTIVITY INDEX (SLEDAI): TOTAL_SCORE: 2

## 2019-10-21 NOTE — PROGRESS NOTES
Subjective:       Patient ID: Radha Taylor is a 64 y.o. female.    Chief Complaint: No chief complaint on file.    HPI   64 year old female with a history of Crohn's disease, GERD, acoustic neuroma s/p radiation therapy followed in this clinic for SLE. She was last seen in this clinic on 6/18/19 at which point her SLEDAI was 2 because of the presence of a nasal ulcer. At that time she was recommended to start taking otc Vit D 3, and her Nexium was switched to Zantac. In the interim she has seen optometry,  established with a new PCP (Dr. Espinal), received her flu shot and shingrix #1.   Today she says she has been feeling well with resolution of her nasal ulcers with Ayr spray. She tried zantac for about 2 weeks but had worsening of her GERD symptoms and switched back to the Nexium. She has occasional diffuse stiffness in the mornings for about 20 minutes and says she has been doing less exercises than in the past and is no longer swimming. She denies any headaches, vertigo, weight changes, alopecia, or significant joint pain or swelling at this time.  She has been taking her plaquenil and Vit D2 59067e weekly but has not been taking otc D3 1000u daily.    Review of Systems   Constitutional: Negative for fever and unexpected weight change.   HENT: Negative for trouble swallowing.    Eyes: Negative for redness.   Respiratory: Positive for shortness of breath. Negative for cough.    Cardiovascular: Negative for chest pain.   Gastrointestinal: Negative for abdominal pain, constipation and diarrhea.   Genitourinary: Negative for dysuria and genital sores.   Skin: Negative for rash.   Neurological: Negative for headaches.   Hematological: Does not bruise/bleed easily.         Objective:     Vitals:    10/21/19 0911   BP: 107/69   Pulse: 71         Physical Exam   Constitutional: She is well-developed, well-nourished, and in no distress.   HENT:   Head: Normocephalic and atraumatic.   Eyes: EOM are normal. Pupils are  equal, round, and reactive to light.   Cardiovascular: Normal rate and regular rhythm.    Pulmonary/Chest: Effort normal and breath sounds normal.       Right Side Rheumatological Exam     Examination finds the shoulder, elbow, wrist, knee, 1st PIP, 2nd PIP, 3rd PIP, 3rd MCP, 4th PIP, 4th MCP, 5th PIP and 5th MCP normal.    She has swelling of the 1st MCP and 2nd MCP    Left Side Rheumatological Exam     Examination finds the shoulder, elbow, wrist, knee, 1st PIP, 2nd PIP, 2nd MCP, 3rd PIP, 3rd MCP, 4th PIP, 4th MCP, 5th PIP and 5th MCP normal.    She has swelling of the 1st MCP      Skin: No rash noted. No erythema.      She has bilateral jaccoud's arthropathy with reversible ulnar deviation of the 2nd-5th digits bilaterally.    Results for VÍCTOR COTA (MRN [id number]) as of 10/21/2019 08:11   Ref. Range 10/17/2019 08:30   WBC Latest Ref Range: 3.90 - 12.70 K/uL 5.88   RBC Latest Ref Range: 4.00 - 5.40 M/uL 4.70   Hemoglobin Latest Ref Range: 12.0 - 16.0 g/dL 14.0   Hematocrit Latest Ref Range: 37.0 - 48.5 % 43.1   MCV Latest Ref Range: 82 - 98 fL 92   MCH Latest Ref Range: 27.0 - 31.0 pg 29.8   MCHC Latest Ref Range: 32.0 - 36.0 g/dL 32.5   RDW Latest Ref Range: 11.5 - 14.5 % 12.0   Platelets Latest Ref Range: 150 - 350 K/uL 335   MPV Latest Ref Range: 9.2 - 12.9 fL 10.5   Gran% Latest Ref Range: 38.0 - 73.0 % 60.2   Gran # (ANC) Latest Ref Range: 1.8 - 7.7 K/uL 3.5   Lymph% Latest Ref Range: 18.0 - 48.0 % 26.2   Lymph # Latest Ref Range: 1.0 - 4.8 K/uL 1.5   Mono% Latest Ref Range: 4.0 - 15.0 % 10.2   Mono # Latest Ref Range: 0.3 - 1.0 K/uL 0.6   Eosinophil% Latest Ref Range: 0.0 - 8.0 % 2.0   Eos # Latest Ref Range: 0.0 - 0.5 K/uL 0.1   Basophil% Latest Ref Range: 0.0 - 1.9 % 1.2   Baso # Latest Ref Range: 0.00 - 0.20 K/uL 0.07   nRBC Latest Ref Range: 0 /100 WBC 0   Differential Method Unknown Automated   Immature Grans (Abs) Latest Ref Range: 0.00 - 0.04 K/uL 0.01   Immature Granulocytes Latest Ref Range:  0.0 - 0.5 % 0.2   Sed Rate Latest Ref Range: 0 - 36 mm/Hr 32   Sodium Latest Ref Range: 136 - 145 mmol/L 141   Potassium Latest Ref Range: 3.5 - 5.1 mmol/L 4.2   Chloride Latest Ref Range: 95 - 110 mmol/L 106   CO2 Latest Ref Range: 23 - 29 mmol/L 26   Anion Gap Latest Ref Range: 8 - 16 mmol/L 9   BUN, Bld Latest Ref Range: 8 - 23 mg/dL 13   Creatinine Latest Ref Range: 0.5 - 1.4 mg/dL 0.9   eGFR if non African American Latest Ref Range: >60 mL/min/1.73 m^2 >60.0   eGFR if African American Latest Ref Range: >60 mL/min/1.73 m^2 >60.0   Glucose Latest Ref Range: 70 - 110 mg/dL 93   Calcium Latest Ref Range: 8.7 - 10.5 mg/dL 9.7   Alkaline Phosphatase Latest Ref Range: 55 - 135 U/L 166 (H)   PROTEIN TOTAL Latest Ref Range: 6.0 - 8.4 g/dL 7.7   Albumin Latest Ref Range: 3.5 - 5.2 g/dL 3.8   BILIRUBIN TOTAL Latest Ref Range: 0.1 - 1.0 mg/dL 0.6   AST Latest Ref Range: 10 - 40 U/L 27   ALT Latest Ref Range: 10 - 44 U/L 22   CRP Latest Ref Range: 0.0 - 8.2 mg/L 8.6 (H)   Triglycerides Latest Ref Range: 30 - 150 mg/dL 61   Cholesterol Latest Ref Range: 120 - 199 mg/dL 187   HDL Latest Ref Range: 40 - 75 mg/dL 63   Hdl/Cholesterol Ratio Latest Ref Range: 20.0 - 50.0 % 33.7   LDL Cholesterol External Latest Ref Range: 63.0 - 159.0 mg/dL 111.8   Non-HDL Cholesterol Latest Units: mg/dL 124   Total Cholesterol/HDL Ratio Latest Ref Range: 2.0 - 5.0  3.0   Results for VÍCTOR COTA (MRN [id number]) as of 10/21/2019 08:11   Ref. Range 7/18/2019 09:44   Intestine, Alk Phos Latest Ref Range: 1 - 24 % 12   Liver, Alk Phos Latest Ref Range: 25 - 69 % 54   PLACENTAL ISOENZYME Latest Ref Range: 0 % 0   Results for VÍCTOR COTA (MRN [id number]) as of 10/21/2019 08:11   Ref. Range 10/17/2019 08:30   TSH Latest Ref Range: 0.400 - 4.000 uIU/mL 0.523   Free T4 Latest Ref Range: 0.71 - 1.51 ng/dL 1.60 (H)   Results for VÍCTOR COTA (MRN [id number]) as of 10/21/2019 08:11   Ref. Range 10/17/2019 08:30   Complement (C-3) Latest Ref Range:  50 - 180 mg/dL 119   Complement (C-4) Latest Ref Range: 11 - 44 mg/dL 27   Results for VÍCTOR COTA (MRN [id number]) as of 10/21/2019 08:11   Ref. Range 10/17/2019 08:16   Specimen UA Unknown Urine, Unspecified   Color, UA Latest Ref Range: Yellow, Straw, Alejandra  Yellow   Appearance, UA Latest Ref Range: Clear  Clear   Specific [place], UA Latest Ref Range: 1.005 - 1.030  1.010   pH, UA Latest Ref Range: 5.0 - 8.0  7.0   Protein, UA Latest Ref Range: Negative  Negative   Glucose, UA Latest Ref Range: Negative  Negative   Ketones, UA Latest Ref Range: Negative  Negative   Occult Blood UA Latest Ref Range: Negative  Negative   NITRITE UA Latest Ref Range: Negative  Negative   Bilirubin (UA) Latest Ref Range: Negative  Negative   Leukocytes, UA Latest Ref Range: Negative  Negative   Results for VÍCTOR COTA (MRN [id number]) as of 10/21/2019 08:11   Ref. Range 10/17/2019 08:16   Prot/Creat Ratio, Ur Latest Ref Range: 0.00 - 0.20  Unable to calculate   Protein, Urine Random Latest Ref Range: 0 - 15 mg/dL <7   Creatinine, Random Ur Latest Ref Range: 15.0 - 325.0 mg/dL 65.0   Specific Gravity, UA Latest Ref Range: 1.005 - 1.030  1.010   Protein, UA Latest Ref Range: Negative  Negative   Leukocytes, UA Latest Ref Range: Negative  Negative   Color, UA Latest Ref Range: Yellow, Straw, Alejandra  Yellow   NITRITE UA Latest Ref Range: Negative  Negative   Results for VÍCTOR COTA (MRN [id number]) as of 10/21/2019 08:11   Ref. Range 7/18/2019 09:44   Bone, Alk Phos Latest Ref Range: 28 - 66 % 34     Posterior Segment OCT Retina OU 7/16/19  Impression:  Normal foveal contour OD and OS. No evidence of abnormality in   either eye to suggest Plaquenil toxicity.  Assessment:       1. Other systemic lupus erythematosus with other organ involvement    2. Osteopenia, unspecified location    3. Gastroesophageal reflux disease, esophagitis presence not specified    4. Vertigo    5. Elevated alkaline phosphatase level            Plan:    SLE  - CRP 8.6, Sed Rate 32, Urine wnl, SLEDAI=0  -Continue plaquenil 200 mg bid; yearly optometry checks (normal OCT 7/16/19)  -Continue vitamin D2 50,000 U weekly, Vitamin D3 1000 Units daily   -ASCVD 10-yr risk: 2.5% x2 due to SLE=5%     -Would strongly consider Statin therapy in the setting of carotid stenosis and borderline ASCVD score. Recommended that she discuss this with Dr. Espinal and Vascular Surgery  -Continue Ayr Nasal spray prn for nasal sores  -Recommended that she increase her exercise    Osteopenia, Vitamin D def  -Last Vit D level 44 on 03/2019  -Encouraged to continue taking Vitamin D2 50,000 units weekly, added Vitamin D3 1000 units daily. Goal Vitamin D level >40  -DEXA scan due after 12/18/19  -Recheck Vit D with labs in 3 months     Elevated Alk Phos  -Scheduled fasting Alk Phos and GGT in 1 month     Vertigo  -improved with vestibular exercises  -continue to follow with audiology     Shingrix 2st dose as soon as possible  RTC in 6 months with standing labs

## 2019-10-21 NOTE — PATIENT INSTRUCTIONS
-Get second shingrix dose   -Discuss starting statin with PCP  -fasting labs on 12/17  -bone density scan late december

## 2019-10-21 NOTE — PROGRESS NOTES
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 Melina Allen's with intermittent triggering right little finger, with nodular tenosynovitis.  ASCVD based on CTA showing 50% right carotid stenosis  Lipids with est 10 year ASCVD 2.5%( SLE x2)=5%, but with right carotid stenosis should be on statin with goal of LDL< 70  Chronic intermittently elevated alk phos since 2009 with normal isoenzymes        Take vitamin D2 50,000 units every 7 days and vitamin D3 1000 units daily with goal  to  Keep vitamin D >40(44 3/13/19)  Cont hydroxychloroquine 200mg twice daily. Saw Dr. Monge 7/16/19 in Optometry  *Shingrix #2  Back on esomeprazole. Consider change to famotidine, however ranitidine did not control GERD, will leave to Dr. Espinal  DXA due > 12/18/19  Agree with statin Rx per Dr. Espinal  Add fasting alk phos isoenzymes and GGT to thyroid function scheduled for 12/17/19  Standing lupus labs q 3 months  RTC 6 months  Answers for HPI/ROS submitted by the patient on 10/16/2019   fever: No  eye redness: No  headaches: No  shortness of breath: Yes  chest pain: Yes  trouble swallowing: No  diarrhea: No  constipation: Yes  unexpected weight change: No  genital sore: No  dysuria: No  During the last 3 days, have you had a skin rash?: No  Bruises or bleeds easily: No  cough: Yes

## 2019-10-31 ENCOUNTER — HOSPITAL ENCOUNTER (OUTPATIENT)
Dept: VASCULAR SURGERY | Facility: CLINIC | Age: 64
Discharge: HOME | End: 2019-10-31
Attending: INTERNAL MEDICINE
Payer: COMMERCIAL

## 2019-10-31 ENCOUNTER — HOSPITAL ENCOUNTER (OUTPATIENT)
Dept: CARDIOLOGY | Facility: CLINIC | Age: 64
Discharge: HOME | End: 2019-10-31
Attending: INTERNAL MEDICINE
Payer: COMMERCIAL

## 2019-10-31 VITALS — BODY MASS INDEX: 32.2 KG/M2 | HEIGHT: 60 IN | WEIGHT: 164 LBS

## 2019-10-31 DIAGNOSIS — R06.02 SOB (SHORTNESS OF BREATH): ICD-10-CM

## 2019-10-31 DIAGNOSIS — I77.9 CAROTID ARTERY DISEASE WITHOUT CEREBRAL INFARCTION: ICD-10-CM

## 2019-10-31 DIAGNOSIS — R53.1 WEAKNESS: ICD-10-CM

## 2019-10-31 LAB
ASCENDING AORTA: 3.05 CM
BSA FOR ECHO PROCEDURE: 1.77 M2
CV ECHO LV RWT: 0.23 CM
CV STRESS BASE HR: 70 BPM
DIASTOLIC BLOOD PRESSURE: 70 MMHG
DOP CALC LVOT AREA: 3 CM2
DOP CALC LVOT DIAMETER: 1.97 CM
DOP CALC LVOT PEAK VEL: 1.21 M/S
DOP CALC LVOT STROKE VOLUME: 66.11 CM3
DOP CALCLVOT PEAK VEL VTI: 21.7 CM
E WAVE DECELERATION TIME: 190.69 MSEC
E/A RATIO: 0.96
E/E' RATIO: 7.2 M/S
ECHO LV POSTERIOR WALL: 0.49 CM (ref 0.6–1.1)
FRACTIONAL SHORTENING: 34 % (ref 28–44)
INTERVENTRICULAR SEPTUM: 0.64 CM (ref 0.6–1.1)
IVRT: 0.09 MSEC
LA MAJOR: 5.47 CM
LA MINOR: 5.24 CM
LA WIDTH: 4.4 CM
LEFT ATRIUM SIZE: 3.75 CM
LEFT ATRIUM VOLUME INDEX: 43.8 ML/M2
LEFT ATRIUM VOLUME: 75.07 CM3
LEFT INTERNAL DIMENSION IN SYSTOLE: 2.78 CM (ref 2.1–4)
LEFT VENTRICLE DIASTOLIC VOLUME INDEX: 46.66 ML/M2
LEFT VENTRICLE DIASTOLIC VOLUME: 80.05 ML
LEFT VENTRICLE MASS INDEX: 38 G/M2
LEFT VENTRICLE SYSTOLIC VOLUME INDEX: 16.9 ML/M2
LEFT VENTRICLE SYSTOLIC VOLUME: 28.91 ML
LEFT VENTRICULAR INTERNAL DIMENSION IN DIASTOLE: 4.23 CM (ref 3.5–6)
LEFT VENTRICULAR MASS: 65.75 G
LV LATERAL E/E' RATIO: 5.14 M/S
LV SEPTAL E/E' RATIO: 12 M/S
MV PEAK A VEL: 0.75 M/S
MV PEAK E VEL: 0.72 M/S
OHS CV CPX 1 MINUTE RECOVERY HEART RATE: 112 BPM
OHS CV CPX 85 PERCENT MAX PREDICTED HEART RATE MALE: 129
OHS CV CPX ESTIMATED METS: 11
OHS CV CPX MAX PREDICTED HEART RATE: 151
OHS CV CPX PATIENT IS FEMALE: 1
OHS CV CPX PATIENT IS MALE: 0
OHS CV CPX PEAK DIASTOLIC BLOOD PRESSURE: 75 MMHG
OHS CV CPX PEAK HEAR RATE: 148 BPM
OHS CV CPX PEAK RATE PRESSURE PRODUCT: ABNORMAL
OHS CV CPX PEAK SYSTOLIC BLOOD PRESSURE: 169 MMHG
OHS CV CPX PERCENT MAX PREDICTED HEART RATE ACHIEVED: 98
OHS CV CPX RATE PRESSURE PRODUCT PRESENTING: 8610
PISA TR MAX VEL: 2.44 M/S
PULM VEIN S/D RATIO: 1.61
PV PEAK D VEL: 0.36 M/S
PV PEAK S VEL: 0.58 M/S
RA MAJOR: 4.27 CM
RA WIDTH: 3.38 CM
RIGHT VENTRICULAR END-DIASTOLIC DIMENSION: 2.79 CM
RV TISSUE DOPPLER FREE WALL SYSTOLIC VELOCITY 1 (APICAL 4 CHAMBER VIEW): 9.6 CM/S
SINUS: 2.86 CM
STJ: 2.48 CM
STRESS ECHO POST EXERCISE DUR MIN: 7 MINUTES
STRESS ECHO POST EXERCISE DUR SEC: 0 SECONDS
SYSTOLIC BLOOD PRESSURE: 123 MMHG
TDI LATERAL: 0.14 M/S
TDI SEPTAL: 0.06 M/S
TDI: 0.1 M/S
TR MAX PG: 24 MMHG
TRICUSPID ANNULAR PLANE SYSTOLIC EXCURSION: 1.47 CM

## 2019-10-31 PROCEDURE — 93880 PR DUPLEX SCAN EXTRACRANIAL,BILAT: ICD-10-PCS | Mod: S$GLB,,, | Performed by: SURGERY

## 2019-10-31 PROCEDURE — 93351 STRESS TTE COMPLETE: CPT

## 2019-10-31 PROCEDURE — 93351 STRESS ECHO (CUPID ONLY): ICD-10-PCS | Mod: 26,,, | Performed by: INTERNAL MEDICINE

## 2019-10-31 PROCEDURE — 93351 STRESS TTE COMPLETE: CPT | Mod: 26,,, | Performed by: INTERNAL MEDICINE

## 2019-10-31 PROCEDURE — 93880 EXTRACRANIAL BILAT STUDY: CPT | Mod: S$GLB,,, | Performed by: SURGERY

## 2019-11-03 NOTE — PROGRESS NOTES
Patient ID: Radha Taylor is a 64 y.o. female.    I. HISTORY     Chief Complaint: carotid artery stenosis      HPI: Radha Taylor is a 64 y.o. female with extensive medical history listed below. She is here today for new patient initial appointment. The patient was referred for evaluation of right carotid stenosis. The patient has no history of stroke or TIA. She has history of right neck/head irradiation for acoustic neuroma years ago. She was found to have right carotid stenosis on CTA in 2019 and carotid ultrasound performed 10/16/19 and was referred to vascular surgery clinic for evaluation. She denies any stroke/TIA symptoms currently. She has occasional dizziness but I explained that this is not related to carotid stenosis.       Past Medical History:   Diagnosis Date    Acid reflux     Anemia 2years ago    Anxiety     Arthritis     Asthma, chronic 4/10/2013    Crohn's disease     Depression     Dry eyes     Fracture of forearm, proximal, open     screws & plate 13 in [facility]    History of papilledema 11/10/11    Hyperlipidemia 2018    Lupus     Neuromuscular disorder 2009    Neuropathy    Osteoporosis     Stroke 2013    Thyroid disease         Past Surgical History:   Procedure Laterality Date    ADENOIDECTOMY      BRAIN SURGERY  2012    Brain Biopsy    COLONOSCOPY  2017    [facility] Dr. WILLIS Summers; pan-diverticulosis, rec repeat 3-5 years    INCISION AND DRAINAGE PERIRECTAL ABSCESS  1998    abscess removal    ORIF FOREARM FRACTURE Left 2013    proximal ulnar and radius       Social History     Tobacco Use   Smoking Status Former Smoker    Packs/day: 0.50    Years: 15.00    Pack years: 7.50    Types: Cigarettes    Last attempt to quit: 1984    Years since quittin.3   Smokeless Tobacco Never Used   Tobacco Comment    Quit        Review of Systems   Constitution: Negative for chills and fever.   HENT: Negative for ear  pain and hoarse voice.    Eyes: Negative for discharge and pain.   Cardiovascular: Negative for chest pain and palpitations.   Respiratory: Negative for cough, hemoptysis and shortness of breath.    Endocrine: Negative for polydipsia and polyuria.   Skin: Negative for dry skin and rash.   Musculoskeletal: Negative for back pain and neck pain.   Gastrointestinal: Negative for abdominal pain, diarrhea, nausea and vomiting.   Genitourinary: Negative for dysuria and hematuria.   Neurological: Negative for dizziness, loss of balance and paresthesias.     ASA: no  Clopidogrel: no  High Intensity Statin: no      II. PHYSICAL EXAM     Physical Exam   Constitutional: She is oriented to person, place, and time. She appears well-developed and well-nourished.   HENT:   Head: Normocephalic and atraumatic.   Eyes: EOM are normal. No scleral icterus.   Neck: Normal range of motion. Neck supple. No tracheal deviation present.   Pulmonary/Chest: Effort normal. No respiratory distress. She has no wheezes.   Neurological: She is alert and oriented to person, place, and time.   Skin: Skin is warm and dry.   Vitals reviewed.  VASC: 2+ radial pulses bilaterally    III. ASSESSMENT & PLAN (MEDICAL DECISION MAKING)     1. Carotid artery disease without cerebral infarction        Imaging Results:   Carotid Duplex 10/16/19:  R L   CCA: 124cm/s (tortuous)  ICA PSV: 184cm/s  ICA EDV: 41cm/s  Vert: antegrade  ICA/CCA ratio: 1.5  Impression: 50-75% stenosis CCA: 78cm/s  ICA PSV: 75cm/s  ICA EDV: 28cm/s  Vert: antegrade  ICA/CCA ratio: 1.1  Impression: <50% stenosis         Assessment/Diagnosis and Plan:  64 y.o. female with asymptomatic right internal carotid artery stenosis. She has history of right head/neck irradiation. Velocities above are corrected and based off of the initial imaging. Recommend best medical therapy with ASA, statin, and BP control <140/90.    -F/u with PCP to start atorvastatin 40mg daily or rosuvastatin 20mg daily  -Start  ASA 81mg daily  -Goal BP <140/90  -Follow up in 1 year for surveillance carotid ultrasound to monitor for progression    JACK Corona II, MD, VI  Vascular Surgeon  [facility]

## 2019-11-04 ENCOUNTER — OFFICE VISIT (OUTPATIENT)
Dept: VASCULAR SURGERY | Facility: CLINIC | Age: 64
End: 2019-11-04
Attending: SURGERY
Payer: COMMERCIAL

## 2019-11-04 VITALS
HEART RATE: 67 BPM | WEIGHT: 167.56 LBS | HEIGHT: 60 IN | SYSTOLIC BLOOD PRESSURE: 127 MMHG | TEMPERATURE: 99 F | BODY MASS INDEX: 32.89 KG/M2 | DIASTOLIC BLOOD PRESSURE: 68 MMHG

## 2019-11-04 DIAGNOSIS — I77.9 CAROTID ARTERY DISEASE WITHOUT CEREBRAL INFARCTION: Primary | ICD-10-CM

## 2019-11-04 PROCEDURE — 3008F BODY MASS INDEX DOCD: CPT | Mod: CPTII,S$GLB,, | Performed by: SURGERY

## 2019-11-04 PROCEDURE — 99999 PR PBB SHADOW E&M-EST. PATIENT-LVL IV: CPT | Mod: PBBFAC,,, | Performed by: SURGERY

## 2019-11-04 PROCEDURE — 3008F PR BODY MASS INDEX (BMI) DOCUMENTED: ICD-10-PCS | Mod: CPTII,S$GLB,, | Performed by: SURGERY

## 2019-11-04 PROCEDURE — 99204 PR OFFICE/OUTPT VISIT, NEW, LEVL IV, 45-59 MIN: ICD-10-PCS | Mod: S$GLB,,, | Performed by: SURGERY

## 2019-11-04 PROCEDURE — 99999 PR PBB SHADOW E&M-EST. PATIENT-LVL IV: ICD-10-PCS | Mod: PBBFAC,,, | Performed by: SURGERY

## 2019-11-04 PROCEDURE — 99204 OFFICE O/P NEW MOD 45 MIN: CPT | Mod: S$GLB,,, | Performed by: SURGERY

## 2019-11-04 NOTE — LETTER
November 4, 2019      Merry Espinal MD  [address]  [place] [facility] [address]           [facility] - Vascular Surgery  [address]  [place] [address]  Phone: [phone]  Fax: [phone]          Patient: Radha Taylor   MR Number: [id number]   YOB: 1955   Date of Visit: 11/4/2019       Dear Dr. Merry Espinal:    Thank you for referring Radha Taylor to me for evaluation. Attached you will find relevant portions of my assessment and plan of care.    If you have questions, please do not hesitate to call me. I look forward to following Radha Taylor along with you.    Sincerely,    JACK Corona II, MD    Enclosure  CC:  No Recipients    If you would like to receive this communication electronically, please contact [email or web address] or [phone] to request more information on [employer] Link access.    For providers and/or their staff who would like to refer a patient to [facility], please contact us through our one-stop-shop provider referral line, [facility], at [phone].    If you feel you have received this communication in error or would no longer like to receive these types of communications, please e-mail [email or web address]

## 2019-11-05 ENCOUNTER — PATIENT MESSAGE (OUTPATIENT)
Dept: INTERNAL MEDICINE | Facility: CLINIC | Age: 64
End: 2019-11-05

## 2019-11-05 ENCOUNTER — TELEPHONE (OUTPATIENT)
Dept: INTERNAL MEDICINE | Facility: CLINIC | Age: 64
End: 2019-11-05

## 2019-11-05 DIAGNOSIS — I77.9 CAROTID ARTERY DISEASE WITHOUT CEREBRAL INFARCTION: Primary | ICD-10-CM

## 2019-11-05 RX ORDER — ROSUVASTATIN CALCIUM 20 MG/1
20 TABLET, COATED ORAL DAILY
Qty: 30 TABLET | Refills: 6 | Status: SHIPPED | OUTPATIENT
Start: 2019-11-05 | End: 2020-03-16 | Stop reason: SDUPTHER

## 2019-11-05 NOTE — TELEPHONE ENCOUNTER
----- Message from Deidre Red sent at 11/5/2019 10:08 AM CST -----  Contact: self   Patient is returning a phone call.  Who left a message for the patient: Merry Espinal MD  Does patient know what this is regarding:  prescription  Comments:

## 2019-11-05 NOTE — TELEPHONE ENCOUNTER
reveiwed test with pt   And she is no agreeable to cholesterol medication  rx for rosuvastatin sent to pharmacy to start 20 mg    Plan recheck labs pt request febuary due to insurance     Please loren repeat  labs in early febuary

## 2019-11-07 ENCOUNTER — LAB VISIT (OUTPATIENT)
Dept: LAB | Facility: HOSPITAL | Age: 64
End: 2019-11-07
Attending: INTERNAL MEDICINE
Payer: COMMERCIAL

## 2019-11-07 DIAGNOSIS — R74.8 ALKALINE PHOSPHATASE RAISED: ICD-10-CM

## 2019-11-07 LAB — GGT SERPL-CCNC: 25 U/L (ref 8–55)

## 2019-11-07 PROCEDURE — 84075 ASSAY ALKALINE PHOSPHATASE: CPT

## 2019-11-07 PROCEDURE — 82977 ASSAY OF GGT: CPT

## 2019-11-07 PROCEDURE — 36415 COLL VENOUS BLD VENIPUNCTURE: CPT

## 2019-11-07 PROCEDURE — 84080 ASSAY ALKALINE PHOSPHATASES: CPT

## 2019-11-09 LAB
ALP BONE CFR SERPL: 28 % (ref 28–66)
ALP INTEST CFR SERPL: 7 % (ref 1–24)
ALP LIVER CFR SERPL: 65 % (ref 25–69)
ALP PLAC CFR SERPL: 0 %
ALP SERPL-CCNC: 145 U/L (ref 33–130)

## 2019-11-11 ENCOUNTER — PATIENT MESSAGE (OUTPATIENT)
Dept: INTERNAL MEDICINE | Facility: CLINIC | Age: 64
End: 2019-11-11

## 2019-12-05 ENCOUNTER — PATIENT MESSAGE (OUTPATIENT)
Dept: INTERNAL MEDICINE | Facility: CLINIC | Age: 64
End: 2019-12-05

## 2019-12-05 NOTE — TELEPHONE ENCOUNTER
Please call the colonoscopy dept and see if there is a possibility this could be done by the end of the year.    [phone]: please call for the patient and let her know. GINI

## 2019-12-13 ENCOUNTER — PATIENT MESSAGE (OUTPATIENT)
Dept: RHEUMATOLOGY | Facility: CLINIC | Age: 64
End: 2019-12-13

## 2019-12-13 DIAGNOSIS — E55.9 VITAMIN D DEFICIENCY: ICD-10-CM

## 2019-12-13 DIAGNOSIS — R09.81 NASAL SINUS CONGESTION: ICD-10-CM

## 2019-12-13 RX ORDER — ERGOCALCIFEROL 1.25 MG/1
CAPSULE ORAL
Qty: 12 CAPSULE | Refills: 3 | Status: SHIPPED | OUTPATIENT
Start: 2019-12-13 | End: 2020-11-18 | Stop reason: SDUPTHER

## 2019-12-15 RX ORDER — PSEUDOEPHEDRINE HYDROCHLORIDE 60 MG/1
TABLET ORAL
Qty: 30 TABLET | Refills: 2 | Status: SHIPPED | OUTPATIENT
Start: 2019-12-15 | End: 2020-06-08

## 2019-12-16 ENCOUNTER — PATIENT MESSAGE (OUTPATIENT)
Dept: INTERNAL MEDICINE | Facility: CLINIC | Age: 64
End: 2019-12-16

## 2019-12-16 DIAGNOSIS — D33.3 RIGHT ACOUSTIC NEUROMA: ICD-10-CM

## 2019-12-16 DIAGNOSIS — H93.11 RIGHT-SIDED TINNITUS: ICD-10-CM

## 2019-12-16 RX ORDER — ALPRAZOLAM 0.25 MG/1
0.25 TABLET ORAL EVERY 8 HOURS PRN
Qty: 30 TABLET | Refills: 5 | Status: SHIPPED | OUTPATIENT
Start: 2019-12-16 | End: 2020-11-13 | Stop reason: SDUPTHER

## 2019-12-17 ENCOUNTER — LAB VISIT (OUTPATIENT)
Dept: LAB | Facility: HOSPITAL | Age: 64
End: 2019-12-17
Attending: INTERNAL MEDICINE
Payer: COMMERCIAL

## 2019-12-17 ENCOUNTER — TELEPHONE (OUTPATIENT)
Dept: RHEUMATOLOGY | Facility: CLINIC | Age: 64
End: 2019-12-17

## 2019-12-17 DIAGNOSIS — R74.8 ALKALINE PHOSPHATASE ELEVATION: Primary | ICD-10-CM

## 2019-12-17 DIAGNOSIS — E03.9 HYPOTHYROIDISM, UNSPECIFIED TYPE: Primary | ICD-10-CM

## 2019-12-17 DIAGNOSIS — R74.8 ALKALINE PHOSPHATASE ELEVATION: ICD-10-CM

## 2019-12-17 DIAGNOSIS — E03.9 HYPOTHYROIDISM, UNSPECIFIED TYPE: ICD-10-CM

## 2019-12-17 DIAGNOSIS — M32.9 SYSTEMIC LUPUS ERYTHEMATOSUS, UNSPECIFIED SLE TYPE, UNSPECIFIED ORGAN INVOLVEMENT STATUS: ICD-10-CM

## 2019-12-17 LAB
ALBUMIN SERPL BCP-MCNC: 3.9 G/DL (ref 3.5–5.2)
ALP SERPL-CCNC: 157 U/L (ref 55–135)
ALT SERPL W/O P-5'-P-CCNC: 19 U/L (ref 10–44)
ANION GAP SERPL CALC-SCNC: 8 MMOL/L (ref 8–16)
AST SERPL-CCNC: 27 U/L (ref 10–40)
BILIRUB SERPL-MCNC: 0.5 MG/DL (ref 0.1–1)
BUN SERPL-MCNC: 12 MG/DL (ref 8–23)
CALCIUM SERPL-MCNC: 9.9 MG/DL (ref 8.7–10.5)
CHLORIDE SERPL-SCNC: 107 MMOL/L (ref 95–110)
CO2 SERPL-SCNC: 27 MMOL/L (ref 23–29)
CREAT SERPL-MCNC: 0.8 MG/DL (ref 0.5–1.4)
EST. GFR  (AFRICAN AMERICAN): >60 ML/MIN/1.73 M^2
EST. GFR  (NON AFRICAN AMERICAN): >60 ML/MIN/1.73 M^2
GGT SERPL-CCNC: 30 U/L (ref 8–55)
GLUCOSE SERPL-MCNC: 71 MG/DL (ref 70–110)
POTASSIUM SERPL-SCNC: 4.4 MMOL/L (ref 3.5–5.1)
PROT SERPL-MCNC: 7.7 G/DL (ref 6–8.4)
SODIUM SERPL-SCNC: 142 MMOL/L (ref 136–145)
T4 FREE SERPL-MCNC: 1.53 NG/DL (ref 0.71–1.51)
TSH SERPL DL<=0.005 MIU/L-ACNC: 0.73 UIU/ML (ref 0.4–4)

## 2019-12-17 PROCEDURE — 36415 COLL VENOUS BLD VENIPUNCTURE: CPT

## 2019-12-17 PROCEDURE — 84443 ASSAY THYROID STIM HORMONE: CPT

## 2019-12-17 PROCEDURE — 84439 ASSAY OF FREE THYROXINE: CPT

## 2019-12-17 PROCEDURE — 80053 COMPREHEN METABOLIC PANEL: CPT

## 2019-12-17 PROCEDURE — 82977 ASSAY OF GGT: CPT

## 2019-12-18 ENCOUNTER — PATIENT MESSAGE (OUTPATIENT)
Dept: RHEUMATOLOGY | Facility: CLINIC | Age: 64
End: 2019-12-18

## 2019-12-27 ENCOUNTER — HOSPITAL ENCOUNTER (OUTPATIENT)
Dept: RADIOLOGY | Facility: CLINIC | Age: 64
Discharge: HOME | End: 2019-12-27
Attending: INTERNAL MEDICINE
Payer: COMMERCIAL

## 2019-12-27 ENCOUNTER — LAB VISIT (OUTPATIENT)
Dept: LAB | Facility: HOSPITAL | Age: 64
End: 2019-12-27
Attending: INTERNAL MEDICINE
Payer: COMMERCIAL

## 2019-12-27 DIAGNOSIS — E03.9 HYPOTHYROIDISM, UNSPECIFIED TYPE: ICD-10-CM

## 2019-12-27 DIAGNOSIS — Z78.0 MENOPAUSE: ICD-10-CM

## 2019-12-27 DIAGNOSIS — R74.8 ALKALINE PHOSPHATASE ELEVATION: ICD-10-CM

## 2019-12-27 LAB
T4 FREE SERPL-MCNC: 1.19 NG/DL (ref 0.71–1.51)
TSH SERPL DL<=0.005 MIU/L-ACNC: 1.61 UIU/ML (ref 0.4–4)

## 2019-12-27 PROCEDURE — 84443 ASSAY THYROID STIM HORMONE: CPT

## 2019-12-27 PROCEDURE — 77080 DXA BONE DENSITY AXIAL: CPT | Mod: 26,,, | Performed by: INTERNAL MEDICINE

## 2019-12-27 PROCEDURE — 36415 COLL VENOUS BLD VENIPUNCTURE: CPT

## 2019-12-27 PROCEDURE — 84075 ASSAY ALKALINE PHOSPHATASE: CPT

## 2019-12-27 PROCEDURE — 77080 DXA BONE DENSITY AXIAL: CPT | Mod: TC

## 2019-12-27 PROCEDURE — 84080 ASSAY ALKALINE PHOSPHATASES: CPT

## 2019-12-27 PROCEDURE — 84439 ASSAY OF FREE THYROXINE: CPT

## 2019-12-27 PROCEDURE — 77080 DEXA BONE DENSITY SPINE HIP: ICD-10-PCS | Mod: 26,,, | Performed by: INTERNAL MEDICINE

## 2020-01-02 LAB
ALP BONE CFR SERPL: 29 % (ref 28–66)
ALP INTEST CFR SERPL: 17 % (ref 1–24)
ALP LIVER CFR SERPL: 54 % (ref 25–69)
ALP PLAC CFR SERPL: 0 %
ALP SERPL-CCNC: 150 U/L (ref 33–130)

## 2020-01-04 ENCOUNTER — TELEPHONE (OUTPATIENT)
Dept: RHEUMATOLOGY | Facility: CLINIC | Age: 65
End: 2020-01-04

## 2020-01-04 DIAGNOSIS — R74.8 ALKALINE PHOSPHATASE ELEVATION: Primary | ICD-10-CM

## 2020-01-06 ENCOUNTER — PATIENT MESSAGE (OUTPATIENT)
Dept: RHEUMATOLOGY | Facility: CLINIC | Age: 65
End: 2020-01-06

## 2020-01-06 ENCOUNTER — TELEPHONE (OUTPATIENT)
Dept: RHEUMATOLOGY | Facility: CLINIC | Age: 65
End: 2020-01-06

## 2020-01-06 ENCOUNTER — PATIENT MESSAGE (OUTPATIENT)
Dept: INTERNAL MEDICINE | Facility: CLINIC | Age: 65
End: 2020-01-06

## 2020-01-23 ENCOUNTER — PATIENT MESSAGE (OUTPATIENT)
Dept: INTERNAL MEDICINE | Facility: CLINIC | Age: 65
End: 2020-01-23

## 2020-01-24 DIAGNOSIS — Z12.31 ENCOUNTER FOR SCREENING MAMMOGRAM FOR BREAST CANCER: Primary | ICD-10-CM

## 2020-02-03 ENCOUNTER — TELEPHONE (OUTPATIENT)
Dept: RHEUMATOLOGY | Facility: CLINIC | Age: 65
End: 2020-02-03

## 2020-02-03 ENCOUNTER — PATIENT MESSAGE (OUTPATIENT)
Dept: INTERNAL MEDICINE | Facility: CLINIC | Age: 65
End: 2020-02-03

## 2020-02-03 ENCOUNTER — HOSPITAL ENCOUNTER (OUTPATIENT)
Dept: RADIOLOGY | Facility: HOSPITAL | Age: 65
Discharge: HOME | End: 2020-02-03
Attending: INTERNAL MEDICINE
Payer: MEDICARE

## 2020-02-03 DIAGNOSIS — R74.8 ALKALINE PHOSPHATASE ELEVATION: ICD-10-CM

## 2020-02-03 DIAGNOSIS — M85.80 HYPEROSTOSIS: Primary | ICD-10-CM

## 2020-02-03 PROCEDURE — 78306 BONE IMAGING WHOLE BODY: CPT | Mod: 26,,, | Performed by: RADIOLOGY

## 2020-02-03 PROCEDURE — 78306 NM BONE SCAN WHOLE BODY: ICD-10-PCS | Mod: 26,,, | Performed by: RADIOLOGY

## 2020-02-03 PROCEDURE — A9503 TC99M MEDRONATE: HCPCS

## 2020-02-04 ENCOUNTER — TELEPHONE (OUTPATIENT)
Dept: RHEUMATOLOGY | Facility: CLINIC | Age: 65
End: 2020-02-04

## 2020-02-05 ENCOUNTER — TELEPHONE (OUTPATIENT)
Dept: INTERNAL MEDICINE | Facility: CLINIC | Age: 65
End: 2020-02-05

## 2020-02-05 NOTE — TELEPHONE ENCOUNTER
Anahi, can you call this patient and let her know if she needs any more in the series for the shingle shot.  She wants to know if its a series of 2 or 3 shots.  Thank you!

## 2020-02-10 ENCOUNTER — HOSPITAL ENCOUNTER (OUTPATIENT)
Dept: RADIOLOGY | Facility: HOSPITAL | Age: 65
Discharge: HOME | End: 2020-02-10
Attending: INTERNAL MEDICINE
Payer: MEDICARE

## 2020-02-10 DIAGNOSIS — M85.80 HYPEROSTOSIS: ICD-10-CM

## 2020-02-10 DIAGNOSIS — Z12.31 ENCOUNTER FOR SCREENING MAMMOGRAM FOR BREAST CANCER: ICD-10-CM

## 2020-02-10 DIAGNOSIS — R74.8 ALKALINE PHOSPHATASE ELEVATION: ICD-10-CM

## 2020-02-10 PROCEDURE — 77063 MAMMO DIGITAL SCREENING BILAT WITH TOMOSYNTHESIS_CAD: ICD-10-PCS | Mod: 26,,, | Performed by: RADIOLOGY

## 2020-02-10 PROCEDURE — 77067 SCR MAMMO BI INCL CAD: CPT | Mod: 26,,, | Performed by: RADIOLOGY

## 2020-02-10 PROCEDURE — 77067 MAMMO DIGITAL SCREENING BILAT WITH TOMOSYNTHESIS_CAD: ICD-10-PCS | Mod: 26,,, | Performed by: RADIOLOGY

## 2020-02-10 PROCEDURE — 70260 X-RAY EXAM OF SKULL: CPT | Mod: 26,,, | Performed by: RADIOLOGY

## 2020-02-10 PROCEDURE — 70260 X-RAY EXAM OF SKULL: CPT | Mod: TC

## 2020-02-10 PROCEDURE — 77067 SCR MAMMO BI INCL CAD: CPT | Mod: TC

## 2020-02-10 PROCEDURE — 77063 BREAST TOMOSYNTHESIS BI: CPT | Mod: 26,,, | Performed by: RADIOLOGY

## 2020-02-10 PROCEDURE — 70260 XR SKULL COMPLETE MIN 4 VIEWS: ICD-10-PCS | Mod: 26,,, | Performed by: RADIOLOGY

## 2020-02-11 ENCOUNTER — PATIENT MESSAGE (OUTPATIENT)
Dept: RHEUMATOLOGY | Facility: CLINIC | Age: 65
End: 2020-02-11

## 2020-02-19 ENCOUNTER — PATIENT MESSAGE (OUTPATIENT)
Dept: NEUROLOGY | Facility: CLINIC | Age: 65
End: 2020-02-19

## 2020-03-16 ENCOUNTER — PATIENT MESSAGE (OUTPATIENT)
Dept: INTERNAL MEDICINE | Facility: CLINIC | Age: 65
End: 2020-03-16

## 2020-03-16 ENCOUNTER — OFFICE VISIT (OUTPATIENT)
Dept: INTERNAL MEDICINE | Facility: CLINIC | Age: 65
End: 2020-03-16
Payer: MEDICARE

## 2020-03-16 VITALS
SYSTOLIC BLOOD PRESSURE: 122 MMHG | DIASTOLIC BLOOD PRESSURE: 80 MMHG | WEIGHT: 167.56 LBS | HEIGHT: 60 IN | HEART RATE: 95 BPM | BODY MASS INDEX: 32.89 KG/M2 | OXYGEN SATURATION: 99 %

## 2020-03-16 DIAGNOSIS — L98.9 SKIN LESION: ICD-10-CM

## 2020-03-16 DIAGNOSIS — M32.9 SYSTEMIC LUPUS ERYTHEMATOSUS, UNSPECIFIED SLE TYPE, UNSPECIFIED ORGAN INVOLVEMENT STATUS: ICD-10-CM

## 2020-03-16 DIAGNOSIS — J31.0 RHINITIS, UNSPECIFIED TYPE: ICD-10-CM

## 2020-03-16 DIAGNOSIS — D33.3 RIGHT ACOUSTIC NEUROMA: ICD-10-CM

## 2020-03-16 DIAGNOSIS — B35.9 TINEA: Primary | ICD-10-CM

## 2020-03-16 DIAGNOSIS — E78.5 HYPERLIPIDEMIA, UNSPECIFIED HYPERLIPIDEMIA TYPE: ICD-10-CM

## 2020-03-16 PROCEDURE — 99999 PR PBB SHADOW E&M-EST. PATIENT-LVL IV: ICD-10-PCS | Mod: PBBFAC,,, | Performed by: INTERNAL MEDICINE

## 2020-03-16 PROCEDURE — 99999 PR PBB SHADOW E&M-EST. PATIENT-LVL IV: CPT | Mod: PBBFAC,,, | Performed by: INTERNAL MEDICINE

## 2020-03-16 PROCEDURE — 99214 PR OFFICE/OUTPT VISIT, EST, LEVL IV, 30-39 MIN: ICD-10-PCS | Mod: S$PBB,,, | Performed by: INTERNAL MEDICINE

## 2020-03-16 PROCEDURE — 99214 OFFICE O/P EST MOD 30 MIN: CPT | Mod: S$PBB,,, | Performed by: INTERNAL MEDICINE

## 2020-03-16 PROCEDURE — 99214 OFFICE O/P EST MOD 30 MIN: CPT | Mod: PBBFAC | Performed by: INTERNAL MEDICINE

## 2020-03-16 RX ORDER — ROSUVASTATIN CALCIUM 20 MG/1
20 TABLET, COATED ORAL DAILY
Qty: 30 TABLET | Refills: 6
Start: 2020-03-16 | End: 2020-11-13 | Stop reason: SDUPTHER

## 2020-03-16 RX ORDER — CLOTRIMAZOLE 1 %
CREAM (GRAM) TOPICAL 2 TIMES DAILY
Qty: 45 G | Refills: 4 | Status: SHIPPED | OUTPATIENT
Start: 2020-03-16 | End: 2021-10-18

## 2020-03-16 NOTE — PROGRESS NOTES
Answers for HPI/ROS submitted by the patient on 3/12/2020   Rash  Chronicity: recurrent  Onset: more than 1 month ago  Progression since onset: gradually worsening  Affected locations: right arm  Characteristics: dryness, redness, scaling  Exposed to: nothing  anorexia: No  congestion: No  diarrhea: No  eye pain: No  fatigue: No  fever: No  nail changes: No  rhinorrhea: No  sore throat: No  vomiting: No  Treatments tried: nothing  Improvement on treatment: no relief  asthma: Yes  allergies: No  eczema: No  varicella: Yes  Subjective:       Patient ID: Radha Taylor is a 65 y.o. female.    Chief Complaint: Arm Pain (blister on right upper arm)   This is a 65-year-old who presents today concerns about a rash on her right arm.  She reports she has had a rash similarly in the past and was treated with an antifungal cream which improved.  She goes back and forth between [facility] and reports that she does have a cat at home who comes and goes.     she has had no other rashes.  She also reports that she has some seasonal allergies congestion over the last few days with though pollen.  She does use some saline and nasal spray she has had no fever she has had no cough or flu symptoms.  She does have concerns about her underlying condition and questions about the virus as well    HPI  Review of Systems   Constitutional: Negative for fatigue and fever.   HENT: Negative for congestion, rhinorrhea and sore throat.    Eyes: Negative for pain.   Gastrointestinal: Negative for diarrhea and vomiting.   Skin: Positive for rash.       Objective:     Blood pressure 122/80, pulse 95, height 5' (1.524 m), weight 76 kg (167 lb 8.8 oz), SpO2 99 %.  temp 98.7  Physical Exam   Constitutional: No distress.   HENT:   Head: Normocephalic.   Mouth/Throat: Oropharynx is clear and moist.   Hearing aids   Congestion    Eyes: No scleral icterus.   Neck: Neck supple.   Cardiovascular: Normal rate, regular rhythm and normal heart  sounds. Exam reveals no gallop and no friction rub.   No murmur heard.  Pulmonary/Chest: Effort normal and breath sounds normal. No respiratory distress.   Abdominal: Soft. Bowel sounds are normal. She exhibits no mass. There is no tenderness.   Musculoskeletal: She exhibits no edema.   Neurological: She is alert.   Skin: No erythema.   Rash arm   Some scalint tinea    Psychiatric: She has a normal mood and affect.   Vitals reviewed.      Assessment:       1. Tinea    2. Skin lesion    3. Hyperlipidemia, unspecified hyperlipidemia type    4. Rhinitis, unspecified type    5. Right acoustic neuroma    6. Systemic lupus erythematosus, unspecified SLE type, unspecified organ involvement status        Plan:       Radha was seen today for arm pain.    Diagnoses and all orders for this visit:    Tinea    Skin lesion  -     Ambulatory referral/consult to Dermatology; Future    Hyperlipidemia, unspecified hyperlipidemia type  She is tolerating rosuvastatin but she cuts it in 1/2 with impromvent she will call if she wants an alternate prescription sent    Rhinitis, unspecified type  Congestion  Discussed saline spray     -     rosuvastatin (CRESTOR) 20 MG tablet; Take 1 tablet (20 mg total) by mouth once daily.    Hx lupus   Hx acoustic neuroma she continues to follow with her specialists     Tinea corporus   Trial of   -     clotrimazole (LOTRIMIN) 1 % cream; Apply topically 2 (two) times daily.    Pt educated regarding viral and contgeon precuatons

## 2020-03-18 ENCOUNTER — PATIENT MESSAGE (OUTPATIENT)
Dept: RHEUMATOLOGY | Facility: CLINIC | Age: 65
End: 2020-03-18

## 2020-03-19 ENCOUNTER — NURSE TRIAGE (OUTPATIENT)
Dept: ADMINISTRATIVE | Facility: CLINIC | Age: 65
End: 2020-03-19

## 2020-03-19 ENCOUNTER — PATIENT MESSAGE (OUTPATIENT)
Dept: RHEUMATOLOGY | Facility: CLINIC | Age: 65
End: 2020-03-19

## 2020-03-19 NOTE — TELEPHONE ENCOUNTER
Patient with lupus has had cough and nasal congestion for the past week. Symptoms have not worsened but persisted The cough has become productive but patient is experiencing postnasal drip. Patient did express worry concerning COVID-19 given her respiratory symptoms and the fact that she has lupus and is on Plaquenil.   - Suggested patient to stay home as much as possible given her autoimmune disease and current infection  - Recommended patient rest until her symptoms subside  - Symptoms do not currently make her eligible for COVID-19 testing  - Patient will follow care advice including Neti Pot, nasal decongestants, cough medication and pain medication (Tylenol) if fever starts  - Patient will call back if symptoms worsen or evolve. Also recommended contacting PCP if symptoms do not improve    Reason for Disposition   Colds with no complications    Additional Information   Negative: Severe difficulty breathing (e.g., struggling for each breath, speaks in single words)   Negative: Very weak (can't stand)   Negative: Sounds like a life-threatening emergency to the triager   Negative: Runny nose is caused by pollen or other allergies   Negative: Cough is the main symptom   Negative: Sore throat is the main symptom   Negative: Patient sounds very sick or weak to the triager   Negative: Fever > 103 F (39.4 C)   Negative: Fever > 101 F (38.3 C) and over 60 years of age   Negative: Fever > 100.0 F (37.8 C) and has diabetes mellitus or a weak immune system (e.g., HIV positive, cancer chemotherapy, organ transplant, splenectomy, chronic steroids)   Negative: Fever > 100.0 F (37.8 C) and bedridden (e.g., nursing home patient, stroke, chronic illness, recovering from surgery)   Negative: Fever present > 3 days (72 hours)   Negative: Fever returns after gone for over 24 hours and symptoms worse or not improved   Negative: Sinus pain (not just congestion) and fever   Negative: Earache   Negative: Sinus  congestion (pressure, fullness) present > 10 days   Negative: Nasal discharge present > 10 days   Negative: Using nasal washes and pain medicine > 24 hours and sinus pain (lower forehead, cheekbone, or eye) persists   Negative: Patient wants to be seen   Negative: Sore throat present > 5 days    Protocols used: COMMON COLD-A-OH

## 2020-03-22 ENCOUNTER — PATIENT MESSAGE (OUTPATIENT)
Dept: RHEUMATOLOGY | Facility: CLINIC | Age: 65
End: 2020-03-22

## 2020-03-23 ENCOUNTER — PATIENT MESSAGE (OUTPATIENT)
Dept: INTERNAL MEDICINE | Facility: CLINIC | Age: 65
End: 2020-03-23

## 2020-03-23 ENCOUNTER — TELEPHONE (OUTPATIENT)
Dept: INTERNAL MEDICINE | Facility: CLINIC | Age: 65
End: 2020-03-23

## 2020-03-23 DIAGNOSIS — M32.8 OTHER FORMS OF SYSTEMIC LUPUS ERYTHEMATOSUS, UNSPECIFIED ORGAN INVOLVEMENT STATUS: ICD-10-CM

## 2020-03-23 DIAGNOSIS — Z87.09 HISTORY OF ASTHMA: ICD-10-CM

## 2020-03-23 DIAGNOSIS — R06.2 WHEEZING: ICD-10-CM

## 2020-03-23 RX ORDER — HYDROXYCHLOROQUINE SULFATE 200 MG/1
200 TABLET, FILM COATED ORAL 2 TIMES DAILY
Qty: 180 TABLET | Refills: 1 | Status: SHIPPED | OUTPATIENT
Start: 2020-03-23 | End: 2020-05-04 | Stop reason: SDUPTHER

## 2020-03-23 RX ORDER — ALBUTEROL SULFATE 90 UG/1
AEROSOL, METERED RESPIRATORY (INHALATION)
Qty: 6.7 G | Refills: 3 | Status: SHIPPED | OUTPATIENT
Start: 2020-03-23 | End: 2020-03-24 | Stop reason: SDUPTHER

## 2020-03-23 NOTE — TELEPHONE ENCOUNTER
----- Message from Vita Chacon sent at 3/23/2020  9:26 AM CDT -----  Contact: Pt-- [phone]  Type:  Patient Returning Call    Who Called: Pt    Who Left Message for Patient: Giovana Almaraz LPN      Does the patient know what this is regarding?: yes    Would the patient rather a call back or a response via [facility] [facility]? Call    Best Call Back Number: [phone]

## 2020-03-23 NOTE — TELEPHONE ENCOUNTER
Called to speak with the pt about her symptoms. Pt also sent a portal message     Fever in the last 24 hours?NO  Been out of the country in the last 30 days?NO    Had any of the following symptoms;  Cough? YES MUCUS IS CLEAR  Muscle Pain? NO  S.O.B? YES  Diarrhea? NO  Rash? NO  Emesis? NO  Abdominal Pain? NO  Red Eye? NO  Weakness? YES  Bruising or Bleeding? NO  Joint Pain? NO  Severe Headache? SOMETIMES      Pt stated she is around her girlfriend that is sick.   Pt cannot sleep at night because of the coughing. She would also like to know can she use Proventil for wheezing?

## 2020-03-23 NOTE — TELEPHONE ENCOUNTER
Called and spoke with   Pt she reprots her friend was not tested  Neither met criteria  She has had some wheezing requested  Refill of her inhaler which was sent  And will call if fever discussed testing criteria may change  But for now continue home isolation and precautions   She is feeling better today will javier for sob etc

## 2020-03-24 ENCOUNTER — PATIENT MESSAGE (OUTPATIENT)
Dept: INTERNAL MEDICINE | Facility: CLINIC | Age: 65
End: 2020-03-24

## 2020-03-24 DIAGNOSIS — Z87.09 HISTORY OF ASTHMA: ICD-10-CM

## 2020-03-24 DIAGNOSIS — R06.2 WHEEZING: ICD-10-CM

## 2020-03-24 RX ORDER — ALBUTEROL SULFATE 90 UG/1
AEROSOL, METERED RESPIRATORY (INHALATION)
Qty: 6.7 G | Refills: 3 | Status: SHIPPED | OUTPATIENT
Start: 2020-03-24 | End: 2022-04-20 | Stop reason: SDUPTHER

## 2020-04-02 ENCOUNTER — PATIENT MESSAGE (OUTPATIENT)
Dept: INTERNAL MEDICINE | Facility: CLINIC | Age: 65
End: 2020-04-02

## 2020-04-06 ENCOUNTER — PATIENT MESSAGE (OUTPATIENT)
Dept: RHEUMATOLOGY | Facility: CLINIC | Age: 65
End: 2020-04-06

## 2020-04-06 ENCOUNTER — TELEPHONE (OUTPATIENT)
Dept: RHEUMATOLOGY | Facility: CLINIC | Age: 65
End: 2020-04-06

## 2020-04-13 ENCOUNTER — LAB VISIT (OUTPATIENT)
Dept: LAB | Facility: HOSPITAL | Age: 65
End: 2020-04-13
Attending: INTERNAL MEDICINE
Payer: MEDICARE

## 2020-04-13 DIAGNOSIS — M32.9 SYSTEMIC LUPUS ERYTHEMATOSUS, UNSPECIFIED SLE TYPE, UNSPECIFIED ORGAN INVOLVEMENT STATUS: ICD-10-CM

## 2020-04-13 LAB
BILIRUB UR QL STRIP: NEGATIVE
CLARITY UR REFRACT.AUTO: CLEAR
COLOR UR AUTO: YELLOW
CREAT UR-MCNC: 73 MG/DL (ref 15–325)
GLUCOSE UR QL STRIP: NEGATIVE
HGB UR QL STRIP: NEGATIVE
KETONES UR QL STRIP: NEGATIVE
LEUKOCYTE ESTERASE UR QL STRIP: NEGATIVE
NITRITE UR QL STRIP: NEGATIVE
PH UR STRIP: 7 [PH] (ref 5–8)
PROT UR QL STRIP: NEGATIVE
PROT UR-MCNC: <7 MG/DL (ref 0–15)
PROT/CREAT UR: NORMAL MG/G{CREAT} (ref 0–0.2)
SP GR UR STRIP: 1.01 (ref 1–1.03)
URN SPEC COLLECT METH UR: NORMAL

## 2020-04-13 PROCEDURE — 81003 URINALYSIS AUTO W/O SCOPE: CPT

## 2020-04-13 PROCEDURE — 82570 ASSAY OF URINE CREATININE: CPT

## 2020-04-16 ENCOUNTER — PATIENT MESSAGE (OUTPATIENT)
Dept: INTERNAL MEDICINE | Facility: CLINIC | Age: 65
End: 2020-04-16

## 2020-04-17 ENCOUNTER — CLINICAL SUPPORT (OUTPATIENT)
Dept: INFECTIOUS DISEASES | Facility: CLINIC | Age: 65
End: 2020-04-17
Payer: MEDICARE

## 2020-04-17 PROCEDURE — 90750 HZV VACC RECOMBINANT IM: CPT | Mod: PBBFAC

## 2020-04-21 ENCOUNTER — PATIENT MESSAGE (OUTPATIENT)
Dept: RHEUMATOLOGY | Facility: CLINIC | Age: 65
End: 2020-04-21

## 2020-04-23 ENCOUNTER — PATIENT OUTREACH (OUTPATIENT)
Dept: ADMINISTRATIVE | Facility: OTHER | Age: 65
End: 2020-04-23

## 2020-04-23 ENCOUNTER — OFFICE VISIT (OUTPATIENT)
Dept: RHEUMATOLOGY | Facility: CLINIC | Age: 65
End: 2020-04-23
Payer: MEDICARE

## 2020-04-23 VITALS
SYSTOLIC BLOOD PRESSURE: 149 MMHG | DIASTOLIC BLOOD PRESSURE: 86 MMHG | HEART RATE: 59 BPM | HEIGHT: 60 IN | WEIGHT: 159 LBS | BODY MASS INDEX: 31.22 KG/M2

## 2020-04-23 DIAGNOSIS — M32.19 OTHER SYSTEMIC LUPUS ERYTHEMATOSUS WITH OTHER ORGAN INVOLVEMENT: Primary | ICD-10-CM

## 2020-04-23 DIAGNOSIS — M85.80 OSTEOPENIA, UNSPECIFIED LOCATION: ICD-10-CM

## 2020-04-23 DIAGNOSIS — E55.9 VITAMIN D DEFICIENCY: ICD-10-CM

## 2020-04-23 DIAGNOSIS — Z78.0 MENOPAUSE: ICD-10-CM

## 2020-04-23 PROCEDURE — 99213 OFFICE O/P EST LOW 20 MIN: CPT | Mod: 95,,, | Performed by: INTERNAL MEDICINE

## 2020-04-23 PROCEDURE — 99213 PR OFFICE/OUTPT VISIT, EST, LEVL III, 20-29 MIN: ICD-10-PCS | Mod: 95,,, | Performed by: INTERNAL MEDICINE

## 2020-04-23 RX ORDER — LEVOTHYROXINE SODIUM 112 UG/1
112 TABLET ORAL DAILY
Qty: 90 TABLET | Refills: 3 | Status: SHIPPED | OUTPATIENT
Start: 2020-04-23 | End: 2020-07-07 | Stop reason: SDUPTHER

## 2020-04-23 ASSESSMENT — SYSTEMIC LUPUS ERYTHEMATOSUS DISEASE ACTIVITY INDEX (SLEDAI): TOTAL_SCORE: 4

## 2020-04-23 NOTE — TELEPHONE ENCOUNTER
Approved Medications      levothyroxine (SYNTHROID) 112 MCG tablet         Sig: Take 1 tablet (112 mcg total) by mouth once daily.    Disp:  90 tablet    Refills:  3    Start: 4/23/2020 - 4/23/2021    Class: Normal    Authorized by: Merry Espinal MD        To be filled at: [facility] - [facility] [address] [facility]

## 2020-04-23 NOTE — PATIENT INSTRUCTIONS
-Continue Hydroxychloroquine 200mg BID  -Handout provided on sources of dietary calcium    Many of our rheumatology patients are concerned about the novel corona virus, Covid-19. Rheumatology patients are at increased risk of complications from seasonal influenza as well as other viruses including Covid-19. Appropriate precautions include frequent thorough handwashing, avoiding hand-face and hand-mouth contact, and keeping a distance from friends and family who are sick with fever. You should avoid travel to high risk areas as noted on the Center for Disease Control website ( https://www.cdc.gov/coronavirus/2019-ncov/index.html).   If you develop flu-like symptoms with fever, you should call your primary care physician for guidance, or seek help at an urgent care center. Tell them you have fever when you first check in so that they will treat you appropriately.     If any member of your  household is diagnosed with Covid-19, please stop all immunosuppressive medications except prednisone and contact your primary care physician (PCP) immediately.       Most rheumatology medicines can be temporarily stopped for any illness with fever, except for prednisone. If you are unsure about your rheumatology medicine please ask  your rheumatologist. At this time you should continue your prescribed medicine while you are well.     The importance of hand washing cannot be stressed enough during this COVID 19 pandemic. Here is a YouTube video that shows you the proper 20 sec technique for hand washing.   https://www.[employer].com/watch?v=[email or web address]

## 2020-04-23 NOTE — PROGRESS NOTES
I have personally taken the history but not able to examine pt and agree with the resident,s note as stated above     The patient location is: home  The chief complaint leading to consultation is: SLE  Visit type: audiovisual;  Total time spent with patient: 15 min  Each patient to whom he or she provides medical services by telemedicine is:  (1) informed of the relationship between the physician and patient and the respective role of any other health care provider with respect to management of the patient; and (2) notified that he or she may decline to receive medical services by telemedicine and may withdraw from such care at any time.    Notes:     SLE  SLEDAI =4  Osteopenia on DXA 12/27/19 with elevated FRAX  Major 17.6% hip 2.6%  Headache after 2nd Shingrix, subsiding       *Pneumovax when she can  Cont hydroxychloroquine 200mg twice daily  Cont vitamin D2 50,000 units once a week  Message me if headaches don't continue to subside  RTC 3 months with standing lupus labs, vitamin D        Answers for HPI/ROS submitted by the patient on 4/21/2020   fever: No  eye redness: No  headaches: Yes  shortness of breath: No  chest pain: No  trouble swallowing: No  diarrhea: No  constipation: No  unexpected weight change: No  genital sore: No  dysuria: No  During the last 3 days, have you had a skin rash?: No  Bruises or bleeds easily: No  cough: Yes

## 2020-04-23 NOTE — PROGRESS NOTES
Subjective:   The patient location is: Home  The chief complaint leading to consultation is: SLE, Osteopenia  Visit type: audiovisual  Total time spent with patient: 30 minutes  Each patient to whom he or she provides medical services by telemedicine is:  (1) informed of the relationship between the physician and patient and the respective role of any other health care provider with respect to management of the patient; and (2) notified that he or she may decline to receive medical services by telemedicine and may withdraw from such care at any time.      Patient ID: Radha Taylor is a 65 y.o. female.    Chief Complaint: SLE, Osteopenia, OA, Vitamin D deficiency     Mrs. Taylor is a 64 y/o woman with PMHx of SLE, Vit D def, hypothyroid, Crohn's, Osteopenia, OA of left knee, here today for a 6 month follow up. She's been having diffuse intermittent headaches. Got her 2nd Shingrix 6 days ago and headaches started then. They are getting better everyday. She is taking Excedrin. She has a sore on her tongue today. Scheduled to get an eye exam next week. Fevers March 11th, chest cold, was sick for a few weeks. No longer feeling sick. She did eventually get tested for COVID-19 at the beginning of April and it was negative. She started taking rosuvastatin 20 and then was reduced to 10mg per PCP, making her achy and exhausted. Better with lower dose.    She denies fever, weight loss, dry eyes or mouth, rash, raynaud's phenomenon, alopecia, dysphagia, diarrhea or blood in the stools.      Review of Systems   Constitutional: Negative for fever and unexpected weight change.   HENT: Negative for trouble swallowing.    Eyes: Negative for redness.   Respiratory: Positive for cough. Negative for shortness of breath.    Cardiovascular: Negative for chest pain.   Gastrointestinal: Negative for constipation and diarrhea.   Genitourinary: Negative for dysuria and genital sores.   Skin: Negative for rash.   Neurological: Positive  for headaches.   Hematological: Does not bruise/bleed easily.         Objective:   BP (!) 149/86   Pulse (!) 59   Ht 5' (1.524 m)   Wt 72.1 kg (159 lb)   BMI 31.05 kg/m²      Physical Exam   Constitutional: She is oriented to person, place, and time and well-developed, well-nourished, and in no distress.   HENT:   Head: Normocephalic.   Eyes: EOM are normal. No scleral icterus.   Neck: Normal range of motion.   Pulmonary/Chest: Effort normal.   Neurological: She is alert and oriented to person, place, and time. No cranial nerve deficit.   Skin: Skin is dry.     Musculoskeletal: Normal range of motion.        XR SKULL COMPLETE MIN 4 VIEWS 02/10/2020  CLINICAL HISTORY:  Other specified disorders of bone density and structure, unspecified site  TECHNIQUE:  Skull, four views  COMPARISON:  None  FINDINGS:  Postoperative changes with plate and screws in the right frontoparietal region.  No suspicious appearing lytic or blastic lesion.  No depressed fracture.    NM BONE SCAN WHOLE BODY 02/03/2020    CLINICAL HISTORY:  SLE, increased alk phos;  Abnormal levels of other serum enzymes    FINDINGS:  Patient was administered 21.6 mCi of FDG intravenously.  There is hyperostosis of the skull a normal variant.  There is degenerative change of the C-spine, wrists, hands, knees, ankles, and feet.  No abnormal renal bladder or soft tissue activity is seen.  There is degenerative activity of the L-spine.  There is no evidence of trauma infection neoplasm metabolic bone disease or Paget's disease.       Results for VÍCTOR COTA (MRN [id number]) as of 4/23/2020 08:08   Ref. Range 4/13/2020 10:02 4/13/2020 10:18   WBC Latest Ref Range: 3.90 - 12.70 K/uL  4.93   RBC Latest Ref Range: 4.00 - 5.40 M/uL  4.08   Hemoglobin Latest Ref Range: 12.0 - 16.0 g/dL  12.4   Hematocrit Latest Ref Range: 37.0 - 48.5 %  39.1   MCV Latest Ref Range: 82 - 98 fL  96   MCH Latest Ref Range: 27.0 - 31.0 pg  30.4   MCHC Latest Ref Range: 32.0 - 36.0  g/dL  31.7 (L)   RDW Latest Ref Range: 11.5 - 14.5 %  11.8   Platelets Latest Ref Range: 150 - 350 K/uL  335   MPV Latest Ref Range: 9.2 - 12.9 fL  10.4   Gran% Latest Ref Range: 38.0 - 73.0 %  39.4   Gran # (ANC) Latest Ref Range: 1.8 - 7.7 K/uL  1.9   Lymph% Latest Ref Range: 18.0 - 48.0 %  42.8   Lymph # Latest Ref Range: 1.0 - 4.8 K/uL  2.1   Mono% Latest Ref Range: 4.0 - 15.0 %  13.0   Mono # Latest Ref Range: 0.3 - 1.0 K/uL  0.6   Eosinophil% Latest Ref Range: 0.0 - 8.0 %  2.8   Eos # Latest Ref Range: 0.0 - 0.5 K/uL  0.1   Basophil% Latest Ref Range: 0.0 - 1.9 %  1.8   Baso # Latest Ref Range: 0.00 - 0.20 K/uL  0.09   nRBC Latest Ref Range: 0 /100 WBC  0   Differential Method Unknown  Automated   Immature Grans (Abs) Latest Ref Range: 0.00 - 0.04 K/uL  0.01   Immature Granulocytes Latest Ref Range: 0.0 - 0.5 %  0.2   Sed Rate Latest Ref Range: 0 - 36 mm/Hr  35   Sodium Latest Ref Range: 136 - 145 mmol/L  139   Potassium Latest Ref Range: 3.5 - 5.1 mmol/L  4.4   Chloride Latest Ref Range: 95 - 110 mmol/L  107   CO2 Latest Ref Range: 23 - 29 mmol/L  24   Anion Gap Latest Ref Range: 8 - 16 mmol/L  8   BUN, Bld Latest Ref Range: 8 - 23 mg/dL  10   Creatinine Latest Ref Range: 0.5 - 1.4 mg/dL  0.8   eGFR if non African American Latest Ref Range: >60 mL/min/1.73 m^2  >60.0   eGFR if African American Latest Ref Range: >60 mL/min/1.73 m^2  >60.0   Glucose Latest Ref Range: 70 - 110 mg/dL  87   Calcium Latest Ref Range: 8.7 - 10.5 mg/dL  9.1   Alkaline Phosphatase Latest Ref Range: 55 - 135 U/L  137 (H)   PROTEIN TOTAL Latest Ref Range: 6.0 - 8.4 g/dL  7.6   Albumin Latest Ref Range: 3.5 - 5.2 g/dL  3.9   BILIRUBIN TOTAL Latest Ref Range: 0.1 - 1.0 mg/dL  0.5   AST Latest Ref Range: 10 - 40 U/L  29   ALT Latest Ref Range: 10 - 44 U/L  19   CRP Latest Ref Range: 0.0 - 8.2 mg/L  3.0   ds DNA Ab Latest Ref Range: Negative 1:10   Positive 1:320 (A)   Complement (C-3) Latest Ref Range: 50 - 180 mg/dL  102   Complement  (C-4) Latest Ref Range: 11 - 44 mg/dL  25   Specimen UA Unknown Urine, Unspecified    Color, UA Latest Ref Range: Yellow, Straw, Alejandra  Yellow    Appearance, UA Latest Ref Range: Clear  Clear    Specific [place], UA Latest Ref Range: 1.005 - 1.030  1.010    pH, UA Latest Ref Range: 5.0 - 8.0  7.0    Protein, UA Latest Ref Range: Negative  Negative    Glucose, UA Latest Ref Range: Negative  Negative    Ketones, UA Latest Ref Range: Negative  Negative    Occult Blood UA Latest Ref Range: Negative  Negative    NITRITE UA Latest Ref Range: Negative  Negative    Bilirubin (UA) Latest Ref Range: Negative  Negative    Leukocytes, UA Latest Ref Range: Negative  Negative    Prot/Creat Ratio, Ur Latest Ref Range: 0.00 - 0.20  Unable to calculate    Protein, Urine Random Latest Ref Range: 0 - 15 mg/dL <7    Creatinine, Random Ur Latest Ref Range: 15.0 - 325.0 mg/dL 73.0      Yaa SLEDAI Score     Office Visit from 4/23/2020 in [facility]  04/23/20  0805          Disease Activity Index   Seizure  0          Psychosis  0          Organic Brain Syndrome  0          Visual Disturbance  0          Cranial Nerve Disorder  0          Lupus Headache  0          CVA  0          Vasculitis  0          Arthritis  0          Myositis  0          Urinary Casts  0          Hematuria  0          Proteinuria  0          Pyuria  0          New Rash  0          Alopecia  0          Mucosal Ulcers  2          Pleurisy  0          Pericarditis  0          Low Complement  0          Increased DNA Binding  2          Fever  0          Thrombocytopenia  0          Leukopenia  0          SLEDAI Score  4 (calculated)               Lipid Panel   Ref. Range 2/10/2020 08:39   Cholesterol Latest Ref Range: 120 - 199 mg/dL 140   HDL Latest Ref Range: 40 - 75 mg/dL 64   Hdl/Cholesterol Ratio Latest Ref Range: 20.0 - 50.0 % 45.7   LDL Cholesterol External Latest Ref Range: 63.0 - 159.0 mg/dL 66.8   Non-HDL Cholesterol Latest Units: mg/dL 76    Total Cholesterol/HDL Ratio Latest Ref Range: 2.0 - 5.0  2.2   Triglycerides Latest Ref Range: 30 - 150 mg/dL 46       Assessment:       1. Other systemic lupus erythematosus with other organ involvement    2. Menopause    3. Osteopenia, unspecified location    4. Vitamin D deficiency            Plan:       SLE:   SLEDAI=4 (ulcer and increased ds DNA Ab)  Will closely monitor and repeat labs and f/u in 3 months, no need for increasing tx at the moment.  -Continue Hydroxychloroquine 200mg BID (aw Dr. Monge 7/16/19 in Optometry, due for next exam soon)  -Continue daily exercise.  -Continue Statin per Dr. Espinal    Elevated Alk Phos  No evidence of Paget's disease or metastatic disease on NM scan and skull xray.  Chronic intermittently elevated alk phos since 2009 with normal isoenzymes   Ref. Range 12/17/2019 08:52 2/10/2020 08:39 4/13/2020 10:18   Alkaline Phosphatase Latest Ref Range: 55 - 135 U/L 157 (H) 152 (H) 137 (H)      Ref. Range 5/15/2009 07:38   Alkaline Phos Bone Specific Latest Units: ug/L 20       Osteopenia  DXA 12/27/19: There is a 17.6% risk of a major osteoporotic fracture and a 2.6% risk of hip fracture in the next 10 years (FRAX adjusted for TBS).  Compared with previous DXA, BMD at the lumbar spine has remained stable, and the BMD at the total hip has remained stable.  Results for VÍCTOR COTA (MRN [id number]) as of 4/23/2020 08:08   Ref. Range 10/16/2018 08:40 3/13/2019 14:54   Vit D, 25-Hydroxy Latest Ref Range: 30 - 96 ng/mL 27 (L) 44   -Cont vitamin D2 50,000 units once a week, vitamin D3 1000 units daily OTC, and  1200mg dietary calcium daily   -Handout provided on dietary calcium sources  -Check vitamin D level with next labs     Standing lupus labs q 3 months plus vitamin D  Virtual Visit 3 months  Lori Bailey MD PM&R PGY1

## 2020-05-04 DIAGNOSIS — M32.8 OTHER FORMS OF SYSTEMIC LUPUS ERYTHEMATOSUS, UNSPECIFIED ORGAN INVOLVEMENT STATUS: ICD-10-CM

## 2020-05-04 RX ORDER — HYDROXYCHLOROQUINE SULFATE 200 MG/1
200 TABLET, FILM COATED ORAL 2 TIMES DAILY
Qty: 180 TABLET | Refills: 0 | Status: SHIPPED | OUTPATIENT
Start: 2020-05-04 | End: 2020-05-15

## 2020-05-13 ENCOUNTER — PATIENT MESSAGE (OUTPATIENT)
Dept: RHEUMATOLOGY | Facility: CLINIC | Age: 65
End: 2020-05-13

## 2020-05-13 ENCOUNTER — TELEPHONE (OUTPATIENT)
Dept: RHEUMATOLOGY | Facility: CLINIC | Age: 65
End: 2020-05-13

## 2020-05-13 DIAGNOSIS — M32.9 SYSTEMIC LUPUS ERYTHEMATOSUS, UNSPECIFIED SLE TYPE, UNSPECIFIED ORGAN INVOLVEMENT STATUS: Primary | ICD-10-CM

## 2020-05-15 ENCOUNTER — LAB VISIT (OUTPATIENT)
Dept: LAB | Facility: HOSPITAL | Age: 65
End: 2020-05-15
Attending: INTERNAL MEDICINE
Payer: MEDICARE

## 2020-05-15 DIAGNOSIS — M32.8 OTHER FORMS OF SYSTEMIC LUPUS ERYTHEMATOSUS, UNSPECIFIED ORGAN INVOLVEMENT STATUS: ICD-10-CM

## 2020-05-15 DIAGNOSIS — M32.9 SYSTEMIC LUPUS ERYTHEMATOSUS, UNSPECIFIED SLE TYPE, UNSPECIFIED ORGAN INVOLVEMENT STATUS: ICD-10-CM

## 2020-05-15 LAB — SARS-COV-2 IGG SERPLBLD QL IA.RAPID: NEGATIVE

## 2020-05-15 PROCEDURE — 36415 COLL VENOUS BLD VENIPUNCTURE: CPT

## 2020-05-15 PROCEDURE — 86769 SARS-COV-2 COVID-19 ANTIBODY: CPT

## 2020-05-15 RX ORDER — HYDROXYCHLOROQUINE SULFATE 200 MG/1
TABLET, FILM COATED ORAL
Qty: 180 TABLET | Refills: 0 | Status: SHIPPED | OUTPATIENT
Start: 2020-05-15 | End: 2020-07-05

## 2020-05-20 ENCOUNTER — TELEPHONE (OUTPATIENT)
Dept: RHEUMATOLOGY | Facility: CLINIC | Age: 65
End: 2020-05-20

## 2020-05-20 NOTE — TELEPHONE ENCOUNTER
Received from [facility] 5/12/20, Beau Summers MD:    6 mm sessile  polyp ileocecal valve  Normal mucosa entire colon, biopsied  Examined portion of ileum normal  Repeat colonoscopy 3-5 yrs

## 2020-05-22 ENCOUNTER — PATIENT MESSAGE (OUTPATIENT)
Dept: RHEUMATOLOGY | Facility: CLINIC | Age: 65
End: 2020-05-22

## 2020-06-01 ENCOUNTER — PATIENT OUTREACH (OUTPATIENT)
Dept: ADMINISTRATIVE | Facility: OTHER | Age: 65
End: 2020-06-01

## 2020-06-02 ENCOUNTER — OFFICE VISIT (OUTPATIENT)
Dept: OPTOMETRY | Facility: CLINIC | Age: 65
End: 2020-06-02
Payer: MEDICARE

## 2020-06-02 ENCOUNTER — CLINICAL SUPPORT (OUTPATIENT)
Dept: OPHTHALMOLOGY | Facility: CLINIC | Age: 65
End: 2020-06-02
Payer: MEDICARE

## 2020-06-02 DIAGNOSIS — H43.811 POSTERIOR VITREOUS DETACHMENT, RIGHT: ICD-10-CM

## 2020-06-02 DIAGNOSIS — G43.109 OCULAR MIGRAINE: ICD-10-CM

## 2020-06-02 DIAGNOSIS — Z79.899 LONG-TERM USE OF PLAQUENIL: ICD-10-CM

## 2020-06-02 DIAGNOSIS — H25.13 NUCLEAR SCLEROSIS OF BOTH EYES: ICD-10-CM

## 2020-06-02 DIAGNOSIS — H52.4 MYOPIA WITH PRESBYOPIA OF BOTH EYES: ICD-10-CM

## 2020-06-02 DIAGNOSIS — H52.13 MYOPIA WITH PRESBYOPIA OF BOTH EYES: Primary | ICD-10-CM

## 2020-06-02 DIAGNOSIS — H52.13 MYOPIA WITH PRESBYOPIA OF BOTH EYES: ICD-10-CM

## 2020-06-02 DIAGNOSIS — M32.9 SYSTEMIC LUPUS ERYTHEMATOSUS, UNSPECIFIED SLE TYPE, UNSPECIFIED ORGAN INVOLVEMENT STATUS: Primary | ICD-10-CM

## 2020-06-02 DIAGNOSIS — H52.4 MYOPIA WITH PRESBYOPIA OF BOTH EYES: Primary | ICD-10-CM

## 2020-06-02 PROCEDURE — 92014 PR EYE EXAM, EST PATIENT,COMPREHESV: ICD-10-PCS | Mod: S$PBB,,, | Performed by: OPTOMETRIST

## 2020-06-02 PROCEDURE — 99499 NO LOS: ICD-10-PCS | Mod: S$PBB,,, | Performed by: OPTOMETRIST

## 2020-06-02 PROCEDURE — 99212 OFFICE O/P EST SF 10 MIN: CPT | Mod: PBBFAC,27,25 | Performed by: OPTOMETRIST

## 2020-06-02 PROCEDURE — 92083 EXTENDED VISUAL FIELD XM: CPT | Mod: PBBFAC | Performed by: OPTOMETRIST

## 2020-06-02 PROCEDURE — 92083 HUMPHREY VISUAL FIELD - OU - BOTH EYES: ICD-10-PCS | Mod: 26,S$PBB,, | Performed by: OPTOMETRIST

## 2020-06-02 PROCEDURE — 99211 OFF/OP EST MAY X REQ PHY/QHP: CPT | Mod: PBBFAC,25

## 2020-06-02 PROCEDURE — 92310 CONTACT LENS FITTING OU: CPT | Mod: CSM,,, | Performed by: OPTOMETRIST

## 2020-06-02 PROCEDURE — 99999 PR PBB SHADOW E&M-EST. PATIENT-LVL I: CPT | Mod: PBBFAC,,,

## 2020-06-02 PROCEDURE — 99999 PR PBB SHADOW E&M-EST. PATIENT-LVL II: CPT | Mod: PBBFAC,,, | Performed by: OPTOMETRIST

## 2020-06-02 PROCEDURE — 99499 UNLISTED E&M SERVICE: CPT | Mod: S$PBB,,, | Performed by: OPTOMETRIST

## 2020-06-02 PROCEDURE — 99999 PR PBB SHADOW E&M-EST. PATIENT-LVL II: ICD-10-PCS | Mod: PBBFAC,,, | Performed by: OPTOMETRIST

## 2020-06-02 PROCEDURE — 99999 PR PBB SHADOW E&M-EST. PATIENT-LVL I: ICD-10-PCS | Mod: PBBFAC,,,

## 2020-06-02 PROCEDURE — 92310 PR CONTACT LENS FITTING (NO CHANGE): ICD-10-PCS | Mod: CSM,,, | Performed by: OPTOMETRIST

## 2020-06-02 PROCEDURE — 92134 CPTRZ OPH DX IMG PST SGM RTA: CPT | Mod: PBBFAC | Performed by: OPTOMETRIST

## 2020-06-02 PROCEDURE — 92134 POSTERIOR SEGMENT OCT RETINA (OCULAR COHERENCE TOMOGRAPHY)-BOTH EYES: ICD-10-PCS | Mod: 26,S$PBB,, | Performed by: OPTOMETRIST

## 2020-06-02 PROCEDURE — 92014 COMPRE OPH EXAM EST PT 1/>: CPT | Mod: S$PBB,,, | Performed by: OPTOMETRIST

## 2020-06-02 NOTE — PROGRESS NOTES
HPI     Last eye exam was 7/16/19 with Dr. Busby.  Patient states got new glasses made in December-no vision changes since   last exam. Only wears SCL's socially-removes and replaces daily.  Patient denies diplopia, headaches, flashes/floaters, and pain.    Plaquenil 200 mg Twice Daily PO      Last edited by Joanie Cesar on 6/2/2020 11:01 AM. (History)            Assessment /Plan     For exam results, see Encounter Report.    Systemic lupus erythematosus, unspecified SLE type, unspecified organ involvement status  -     Ocampo Visual Field - OU - Extended - Both Eyes  -     OCT- Retina    Long-term use of Plaquenil  -     Ocampo Visual Field - OU - Extended - Both Eyes  -     OCT- Retina    Nuclear sclerosis of both eyes    Posterior vitreous detachment, right    Ocular migraine    Myopia with presbyopia of both eyes            1-2. All testing normal OU-no retinopathy.  Monitor yearly.  3.  Early-monitor.  4.  Longstanding-stable.  Retina flat and intact OU--no holes, tears, breaks, or RDs.    5.  Patient reports [place] episode [place] kale[place].  Did get a headache later.  Educated pt on ocular migraine.  Monitor.    6.  Continue with current glasses.  Dispensed Total1 MF trials.  Patient will try and call in preference for final rx.

## 2020-06-12 DIAGNOSIS — R09.81 NASAL SINUS CONGESTION: ICD-10-CM

## 2020-06-12 RX ORDER — PSEUDOEPHEDRINE HYDROCHLORIDE 60 MG/1
TABLET ORAL
Qty: 30 TABLET | Refills: 4 | Status: SHIPPED | OUTPATIENT
Start: 2020-06-12 | End: 2020-06-19 | Stop reason: SDUPTHER

## 2020-06-19 DIAGNOSIS — R09.81 NASAL SINUS CONGESTION: ICD-10-CM

## 2020-06-20 RX ORDER — PSEUDOEPHEDRINE HYDROCHLORIDE 60 MG/1
TABLET ORAL
Qty: 30 TABLET | Refills: 4 | Status: SHIPPED | OUTPATIENT
Start: 2020-06-20 | End: 2021-10-18

## 2020-07-07 ENCOUNTER — PATIENT MESSAGE (OUTPATIENT)
Dept: PRIMARY CARE CLINIC | Facility: CLINIC | Age: 65
End: 2020-07-07

## 2020-07-07 RX ORDER — LEVOTHYROXINE SODIUM 112 UG/1
112 TABLET ORAL DAILY
Qty: 90 TABLET | Refills: 3 | Status: SHIPPED | OUTPATIENT
Start: 2020-07-07 | End: 2020-11-05 | Stop reason: SDUPTHER

## 2020-08-12 ENCOUNTER — TELEPHONE (OUTPATIENT)
Dept: RHEUMATOLOGY | Facility: CLINIC | Age: 65
End: 2020-08-12

## 2020-08-12 NOTE — TELEPHONE ENCOUNTER
Received from [facility] 7/17/20: [place]    Microalb/creat 0.3    HDL 52  LDL 98  Cmp: m; creat 0.84 eGFR 68 otherwise nl    TSH 0.517 nl  FELIX+ speckled 1:80. Homogeneous 1:320  crp 6.1 mg/L  C3 C4 both nl  Vitamin D 65  CBC wbc 4300 nl diff  ESR 28  UA 5 WBCs    Please tell pt labs are fine. 1 extra WBC in UA. Any urinary symptoms?  The sed rate inflammation test minimally elevated  All the other labs are fine. CARLENEQ

## 2020-08-27 DIAGNOSIS — M32.8 OTHER FORMS OF SYSTEMIC LUPUS ERYTHEMATOSUS, UNSPECIFIED ORGAN INVOLVEMENT STATUS: ICD-10-CM

## 2020-08-27 RX ORDER — HYDROXYCHLOROQUINE SULFATE 200 MG/1
200 TABLET, FILM COATED ORAL 2 TIMES DAILY
Qty: 180 TABLET | Refills: 1 | Status: SHIPPED | OUTPATIENT
Start: 2020-08-27 | End: 2020-09-03 | Stop reason: SDUPTHER

## 2020-09-03 DIAGNOSIS — M32.8 OTHER FORMS OF SYSTEMIC LUPUS ERYTHEMATOSUS, UNSPECIFIED ORGAN INVOLVEMENT STATUS: ICD-10-CM

## 2020-09-03 RX ORDER — HYDROXYCHLOROQUINE SULFATE 200 MG/1
200 TABLET, FILM COATED ORAL 2 TIMES DAILY
Qty: 180 TABLET | Refills: 1 | Status: SHIPPED | OUTPATIENT
Start: 2020-09-03 | End: 2021-03-29 | Stop reason: SDUPTHER

## 2020-09-08 ENCOUNTER — PATIENT MESSAGE (OUTPATIENT)
Dept: INTERNAL MEDICINE | Facility: CLINIC | Age: 65
End: 2020-09-08

## 2020-09-09 NOTE — TELEPHONE ENCOUNTER
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

## 2020-11-05 RX ORDER — LEVOTHYROXINE SODIUM 112 UG/1
112 TABLET ORAL DAILY
Qty: 90 TABLET | Refills: 3 | Status: SHIPPED | OUTPATIENT
Start: 2020-11-05 | End: 2021-10-20

## 2020-11-05 NOTE — TELEPHONE ENCOUNTER
----- Message from Tristian Barlow sent at 11/5/2020  1:24 PM CST -----  Regarding: self [phone]  Requesting an RX refill or new RX.  Is this a refill or new RX: refill   RX name and strength:levothyroxine (SYNTHROID) 112 MCG tablet  Is this a 30 day or 90 day RX: 90   Pharmacy name and phone # (copy/paste from chart):  [facility] - [address] [phone] (Phone)  [phone] (Fax)      Comments:

## 2020-11-11 ENCOUNTER — PATIENT MESSAGE (OUTPATIENT)
Dept: INTERNAL MEDICINE | Facility: CLINIC | Age: 65
End: 2020-11-11

## 2020-11-12 ENCOUNTER — LAB VISIT (OUTPATIENT)
Dept: LAB | Facility: HOSPITAL | Age: 65
End: 2020-11-12
Attending: INTERNAL MEDICINE
Payer: MEDICARE

## 2020-11-12 DIAGNOSIS — M32.9 SYSTEMIC LUPUS ERYTHEMATOSUS, UNSPECIFIED SLE TYPE, UNSPECIFIED ORGAN INVOLVEMENT STATUS: ICD-10-CM

## 2020-11-12 LAB
ALBUMIN SERPL BCP-MCNC: 3.9 G/DL (ref 3.5–5.2)
ALP SERPL-CCNC: 151 U/L (ref 55–135)
ALT SERPL W/O P-5'-P-CCNC: 19 U/L (ref 10–44)
ANION GAP SERPL CALC-SCNC: 8 MMOL/L (ref 8–16)
AST SERPL-CCNC: 28 U/L (ref 10–40)
BASOPHILS # BLD AUTO: 0.05 K/UL (ref 0–0.2)
BASOPHILS NFR BLD: 0.8 % (ref 0–1.9)
BILIRUB SERPL-MCNC: 0.5 MG/DL (ref 0.1–1)
BUN SERPL-MCNC: 11 MG/DL (ref 8–23)
C3 SERPL-MCNC: 101 MG/DL (ref 50–180)
C4 SERPL-MCNC: 25 MG/DL (ref 11–44)
CALCIUM SERPL-MCNC: 9.6 MG/DL (ref 8.7–10.5)
CHLORIDE SERPL-SCNC: 105 MMOL/L (ref 95–110)
CO2 SERPL-SCNC: 28 MMOL/L (ref 23–29)
CREAT SERPL-MCNC: 0.8 MG/DL (ref 0.5–1.4)
CRP SERPL-MCNC: 2.7 MG/L (ref 0–8.2)
DIFFERENTIAL METHOD: ABNORMAL
EOSINOPHIL # BLD AUTO: 0.2 K/UL (ref 0–0.5)
EOSINOPHIL NFR BLD: 2.5 % (ref 0–8)
ERYTHROCYTE [DISTWIDTH] IN BLOOD BY AUTOMATED COUNT: 11.9 % (ref 11.5–14.5)
ERYTHROCYTE [SEDIMENTATION RATE] IN BLOOD BY WESTERGREN METHOD: 27 MM/HR (ref 0–36)
EST. GFR  (AFRICAN AMERICAN): >60 ML/MIN/1.73 M^2
EST. GFR  (NON AFRICAN AMERICAN): >60 ML/MIN/1.73 M^2
GLUCOSE SERPL-MCNC: 81 MG/DL (ref 70–110)
HCT VFR BLD AUTO: 39.5 % (ref 37–48.5)
HGB BLD-MCNC: 12.3 G/DL (ref 12–16)
IMM GRANULOCYTES # BLD AUTO: 0.01 K/UL (ref 0–0.04)
IMM GRANULOCYTES NFR BLD AUTO: 0.2 % (ref 0–0.5)
LYMPHOCYTES # BLD AUTO: 1.8 K/UL (ref 1–4.8)
LYMPHOCYTES NFR BLD: 28 % (ref 18–48)
MCH RBC QN AUTO: 30.7 PG (ref 27–31)
MCHC RBC AUTO-ENTMCNC: 31.1 G/DL (ref 32–36)
MCV RBC AUTO: 99 FL (ref 82–98)
MONOCYTES # BLD AUTO: 0.5 K/UL (ref 0.3–1)
MONOCYTES NFR BLD: 8 % (ref 4–15)
NEUTROPHILS # BLD AUTO: 3.9 K/UL (ref 1.8–7.7)
NEUTROPHILS NFR BLD: 60.5 % (ref 38–73)
NRBC BLD-RTO: 0 /100 WBC
PLATELET # BLD AUTO: 288 K/UL (ref 150–350)
PMV BLD AUTO: 11.6 FL (ref 9.2–12.9)
POTASSIUM SERPL-SCNC: 4.6 MMOL/L (ref 3.5–5.1)
PROT SERPL-MCNC: 7.5 G/DL (ref 6–8.4)
RBC # BLD AUTO: 4.01 M/UL (ref 4–5.4)
SODIUM SERPL-SCNC: 141 MMOL/L (ref 136–145)
WBC # BLD AUTO: 6.36 K/UL (ref 3.9–12.7)

## 2020-11-12 PROCEDURE — 86160 COMPLEMENT ANTIGEN: CPT | Mod: 59

## 2020-11-12 PROCEDURE — 85025 COMPLETE CBC W/AUTO DIFF WBC: CPT

## 2020-11-12 PROCEDURE — 80053 COMPREHEN METABOLIC PANEL: CPT

## 2020-11-12 PROCEDURE — 86225 DNA ANTIBODY NATIVE: CPT | Mod: 59

## 2020-11-12 PROCEDURE — 86160 COMPLEMENT ANTIGEN: CPT

## 2020-11-12 PROCEDURE — 86140 C-REACTIVE PROTEIN: CPT

## 2020-11-12 PROCEDURE — 36415 COLL VENOUS BLD VENIPUNCTURE: CPT

## 2020-11-12 PROCEDURE — 85652 RBC SED RATE AUTOMATED: CPT

## 2020-11-12 PROCEDURE — 86225 DNA ANTIBODY NATIVE: CPT

## 2020-11-12 NOTE — TELEPHONE ENCOUNTER
From the chart I see pt is up-to-date. Not sure about the pneumonia vaccine. Does pt need a pneumonia vaccine?

## 2020-11-12 NOTE — TELEPHONE ENCOUNTER
She is up to date on her vaccines currently   Not neeed at this time   She will need again when 67

## 2020-11-13 ENCOUNTER — PATIENT MESSAGE (OUTPATIENT)
Dept: ADMINISTRATIVE | Facility: OTHER | Age: 65
End: 2020-11-13

## 2020-11-13 DIAGNOSIS — D33.3 RIGHT ACOUSTIC NEUROMA: ICD-10-CM

## 2020-11-13 DIAGNOSIS — H93.11 RIGHT-SIDED TINNITUS: ICD-10-CM

## 2020-11-13 RX ORDER — ROSUVASTATIN CALCIUM 20 MG/1
20 TABLET, COATED ORAL DAILY
Qty: 30 TABLET | Refills: 6 | Status: SHIPPED | OUTPATIENT
Start: 2020-11-13 | End: 2021-01-21 | Stop reason: SDUPTHER

## 2020-11-13 RX ORDER — ALPRAZOLAM 0.25 MG/1
0.25 TABLET ORAL EVERY 8 HOURS PRN
Qty: 30 TABLET | Refills: 5 | Status: SHIPPED | OUTPATIENT
Start: 2020-11-13 | End: 2022-06-25 | Stop reason: SDUPTHER

## 2020-11-16 LAB
DNA TITER: NORMAL
DSDNA AB SER-ACNC: POSITIVE [IU]/ML

## 2020-11-18 ENCOUNTER — OFFICE VISIT (OUTPATIENT)
Dept: RHEUMATOLOGY | Facility: CLINIC | Age: 65
End: 2020-11-18
Payer: MEDICARE

## 2020-11-18 ENCOUNTER — CLINICAL SUPPORT (OUTPATIENT)
Dept: INTERNAL MEDICINE | Facility: CLINIC | Age: 65
End: 2020-11-18
Payer: MEDICARE

## 2020-11-18 ENCOUNTER — PATIENT MESSAGE (OUTPATIENT)
Dept: RHEUMATOLOGY | Facility: CLINIC | Age: 65
End: 2020-11-18

## 2020-11-18 VITALS
HEIGHT: 60 IN | SYSTOLIC BLOOD PRESSURE: 120 MMHG | DIASTOLIC BLOOD PRESSURE: 75 MMHG | WEIGHT: 159 LBS | BODY MASS INDEX: 31.22 KG/M2 | HEART RATE: 63 BPM

## 2020-11-18 DIAGNOSIS — H91.91 HEARING LOSS OF RIGHT EAR, UNSPECIFIED HEARING LOSS TYPE: ICD-10-CM

## 2020-11-18 DIAGNOSIS — M32.19 OTHER SYSTEMIC LUPUS ERYTHEMATOSUS WITH OTHER ORGAN INVOLVEMENT: ICD-10-CM

## 2020-11-18 DIAGNOSIS — K50.90 CROHN'S DISEASE WITHOUT COMPLICATION, UNSPECIFIED GASTROINTESTINAL TRACT LOCATION: ICD-10-CM

## 2020-11-18 DIAGNOSIS — E55.9 VITAMIN D DEFICIENCY: ICD-10-CM

## 2020-11-18 DIAGNOSIS — R74.8 ELEVATED ALKALINE PHOSPHATASE LEVEL: ICD-10-CM

## 2020-11-18 PROCEDURE — 99214 OFFICE O/P EST MOD 30 MIN: CPT | Mod: PBBFAC,25 | Performed by: INTERNAL MEDICINE

## 2020-11-18 PROCEDURE — 99214 PR OFFICE/OUTPT VISIT, EST, LEVL IV, 30-39 MIN: ICD-10-PCS | Mod: S$PBB,,, | Performed by: INTERNAL MEDICINE

## 2020-11-18 PROCEDURE — G0009 ADMIN PNEUMOCOCCAL VACCINE: HCPCS | Mod: PBBFAC

## 2020-11-18 PROCEDURE — 99999 PR PBB SHADOW E&M-EST. PATIENT-LVL IV: ICD-10-PCS | Mod: PBBFAC,,, | Performed by: INTERNAL MEDICINE

## 2020-11-18 PROCEDURE — 99214 OFFICE O/P EST MOD 30 MIN: CPT | Mod: S$PBB,,, | Performed by: INTERNAL MEDICINE

## 2020-11-18 PROCEDURE — 99999 PR PBB SHADOW E&M-EST. PATIENT-LVL IV: CPT | Mod: PBBFAC,,, | Performed by: INTERNAL MEDICINE

## 2020-11-18 RX ORDER — ERGOCALCIFEROL 1.25 MG/1
50000 CAPSULE ORAL
Qty: 12 CAPSULE | Refills: 3 | Status: SHIPPED | OUTPATIENT
Start: 2020-11-18 | End: 2020-12-18

## 2020-11-18 ASSESSMENT — SYSTEMIC LUPUS ERYTHEMATOSUS DISEASE ACTIVITY INDEX (SLEDAI): TOTAL_SCORE: 4

## 2020-11-18 NOTE — PROGRESS NOTES
Subjective:       Patient ID: Radha Taylor is a 65 y.o. female.    Chief Complaint: SLE  HPI  Had generalized stiffness up until last week, right after back from [place], fine now. Also intermittent sores in the nose. Seeing ENT Dr Brian Rojo. Hasn't mentioned the nasal ulcers. Seeing b/o progressive hearing loss right ear. Suddenly. He prescribed prednisone 72 mg daily and Augmentin. Driving to [place] [employer] for 9 member family Thanksgiving. Masking and all getting tested prior. In summer, had visual migraines while in [place] had brain MRI neuroma was found to be smaller. The migraines resolved.   Review of Systems   Constitutional: Negative for fever and unexpected weight change.   HENT: Negative for mouth sores and trouble swallowing.    Eyes: Negative for redness.   Respiratory: Positive for cough and shortness of breath.    Cardiovascular: Negative for chest pain.   Gastrointestinal: Negative for constipation and diarrhea.   Genitourinary: Negative for dysuria and genital sores.   Skin: Negative for rash.   Neurological: Negative for headaches.   Hematological: Does not bruise/bleed easily.         Objective:   /75   Pulse 63   Ht 5' (1.524 m)   Wt 72.1 kg (159 lb)   BMI 31.05 kg/m²      Physical Exam   Constitutional: She is oriented to person, place, and time and well-developed, well-nourished, and in no distress.   obese   HENT:   Head: Normocephalic and atraumatic.   Mouth/Throat: Oropharynx is clear and moist.   Nasal ulcer left   Eyes: Conjunctivae and EOM are normal.   Neck: Normal range of motion. Neck supple. No thyromegaly present.   Cardiovascular: Normal rate, regular rhythm, normal heart sounds and intact distal pulses.  Exam reveals no gallop and no friction rub.    No murmur heard.  Pulmonary/Chest: Breath sounds normal. She has no wheezes. She has no rales. She exhibits no tenderness.   Abdominal: Soft. She exhibits no distension and no mass. There is no abdominal tenderness.    Lymphadenopathy:     She has no cervical adenopathy.   Neurological: She is alert and oriented to person, place, and time. She displays normal reflexes. Gait normal.   Nl motor strength UE and LE bilateral   Skin: No rash noted. No erythema. No pallor.     Psychiatric: Mood, memory, affect and judgment normal.   Musculoskeletal: No edema.      Comments: Jaccoud's arthropathy both hands         Results for VÍCTOR COTA (MRN [id number]) as of 11/18/2020 09:42   Ref. Range 11/12/2020 09:45 11/12/2020 09:46   WBC Latest Ref Range: 3.90 - 12.70 K/uL 6.36    RBC Latest Ref Range: 4.00 - 5.40 M/uL 4.01    Hemoglobin Latest Ref Range: 12.0 - 16.0 g/dL 12.3    Hematocrit Latest Ref Range: 37.0 - 48.5 % 39.5    MCV Latest Ref Range: 82 - 98 fL 99 (H)    MCH Latest Ref Range: 27.0 - 31.0 pg 30.7    MCHC Latest Ref Range: 32.0 - 36.0 g/dL 31.1 (L)    RDW Latest Ref Range: 11.5 - 14.5 % 11.9    Platelets Latest Ref Range: 150 - 350 K/uL 288    MPV Latest Ref Range: 9.2 - 12.9 fL 11.6    Gran % Latest Ref Range: 38.0 - 73.0 % 60.5    Lymph % Latest Ref Range: 18.0 - 48.0 % 28.0    Mono % Latest Ref Range: 4.0 - 15.0 % 8.0    Eosinophil % Latest Ref Range: 0.0 - 8.0 % 2.5    Basophil % Latest Ref Range: 0.0 - 1.9 % 0.8    Immature Granulocytes Latest Ref Range: 0.0 - 0.5 % 0.2    Gran # (ANC) Latest Ref Range: 1.8 - 7.7 K/uL 3.9    Lymph # Latest Ref Range: 1.0 - 4.8 K/uL 1.8    Mono # Latest Ref Range: 0.3 - 1.0 K/uL 0.5    Eos # Latest Ref Range: 0.0 - 0.5 K/uL 0.2    Baso # Latest Ref Range: 0.00 - 0.20 K/uL 0.05    Immature Grans (Abs) Latest Ref Range: 0.00 - 0.04 K/uL 0.01    nRBC Latest Ref Range: 0 /100 WBC 0    Differential Method Unknown Automated    Sed Rate Latest Ref Range: 0 - 36 mm/Hr 27    Sodium Latest Ref Range: 136 - 145 mmol/L 141    Potassium Latest Ref Range: 3.5 - 5.1 mmol/L 4.6    Chloride Latest Ref Range: 95 - 110 mmol/L 105    CO2 Latest Ref Range: 23 - 29 mmol/L 28    Anion Gap Latest Ref Range: 8 -  16 mmol/L 8    BUN Latest Ref Range: 8 - 23 mg/dL 11    Creatinine Latest Ref Range: 0.5 - 1.4 mg/dL 0.8    eGFR if non African American Latest Ref Range: >60 mL/min/1.73 m^2 >60.0    eGFR if African American Latest Ref Range: >60 mL/min/1.73 m^2 >60.0    Glucose Latest Ref Range: 70 - 110 mg/dL 81    Calcium Latest Ref Range: 8.7 - 10.5 mg/dL 9.6    Alkaline Phosphatase Latest Ref Range: 55 - 135 U/L 151 (H)    PROTEIN TOTAL Latest Ref Range: 6.0 - 8.4 g/dL 7.5    Albumin Latest Ref Range: 3.5 - 5.2 g/dL 3.9    BILIRUBIN TOTAL Latest Ref Range: 0.1 - 1.0 mg/dL 0.5    AST Latest Ref Range: 10 - 40 U/L 28    ALT Latest Ref Range: 10 - 44 U/L 19    CRP Latest Ref Range: 0.0 - 8.2 mg/L 2.7    ds DNA Ab Latest Ref Range: Negative 1:10  Positive (A)    DNA Titer Unknown 1:80    Complement (C-3) Latest Ref Range: 50 - 180 mg/dL 101    Complement (C-4) Latest Ref Range: 11 - 44 mg/dL 25    Specimen UA Unknown  Urine, Unspecified   Color, UA Latest Ref Range: Yellow, Straw, Alejandra   Yellow   Appearance, UA Latest Ref Range: Clear   Hazy (A)   Specific [place], UA Latest Ref Range: 1.005 - 1.030   1.005   pH, UA Latest Ref Range: 5.0 - 8.0   7.0   Protein, UA Latest Ref Range: Negative   Negative   Glucose, UA Latest Ref Range: Negative   Negative   Ketones, UA Latest Ref Range: Negative   Negative   Occult Blood UA Latest Ref Range: Negative   Negative   NITRITE UA Latest Ref Range: Negative   Negative   Bilirubin (UA) Latest Ref Range: Negative   Negative   Leukocytes, UA Latest Ref Range: Negative   Negative   Creatinine, Urine Latest Ref Range: 15.0 - 325.0 mg/dL  11.0 (L)   Protein, Urine Random Latest Ref Range: 0 - 15 mg/dL  <7   Prot/Creat Ratio, Urine Latest Ref Range: 0.00 - 0.20   Unable to calculate     Assessment/Plan         Other systemic lupus erythematosus with other organ involvement    Vitamin D deficiency  -     ergocalciferol (ERGOCALCIFEROL) 50,000 unit Cap; Take 1 capsule (50,000 Units total) by mouth  every 7 days.  Dispense: 12 capsule; Refill: 3    Hearing loss of right ear, unspecified hearing loss type    Crohn's disease without complication, unspecified gastrointestinal tract location    Elevated alkaline phosphatase level       Problem List Items Addressed This Visit     Crohn's disease    Current Assessment & Plan     asymptomatic         Elevated alkaline phosphatase level    Current Assessment & Plan     Fluctuates, since 2007         Hearing loss of right ear    Current Assessment & Plan     Encouraged to proceed with Dr. Rojo's recommended prednisone/antibiotic course  Have him check the nares         Systemic lupus erythematosus    Current Assessment & Plan     SLEDAI =4    *Pneumovax booster  Hydroxychloroquine 200mg twice daily with annual eye exam.  Discussed risks of upcoming [facility]  Outdoors, masks for all, 6 foot distancing  RTC 6 months with standing lupus labs           Vitamin D deficiency    Overview     Results for VÍCTOR COTA (MRN [id number]) as of 5/21/2018 08:43   Ref. Range 12/13/2017 08:16   Vit D, 25-Hydroxy Latest Ref Range: 30 - 96 ng/mL 28 (L)            Relevant Medications    ergocalciferol (ERGOCALCIFEROL) 50,000 unit Cap

## 2020-11-18 NOTE — ASSESSMENT & PLAN NOTE
SLEDAI =4    *Pneumovax booster  Hydroxychloroquine 200mg twice daily with annual eye exam.  Discussed risks of upcoming Thanksgiving family affair  Outdoors, masks for all, 6 foot distancing  RTC 6 months with standing lupus labs

## 2020-11-18 NOTE — ASSESSMENT & PLAN NOTE
Encouraged to proceed with Dr. Rojo's recommended prednisone/antibiotic course  Have him check the nares

## 2020-11-23 ENCOUNTER — TELEPHONE (OUTPATIENT)
Dept: VASCULAR SURGERY | Facility: CLINIC | Age: 65
End: 2020-11-23

## 2020-11-23 DIAGNOSIS — I77.9 CAROTID ARTERY DISEASE WITHOUT CEREBRAL INFARCTION: Primary | ICD-10-CM

## 2020-11-23 DIAGNOSIS — I65.21 CAROTID STENOSIS, RIGHT: ICD-10-CM

## 2020-11-23 NOTE — TELEPHONE ENCOUNTER
Attempted to reach patient to inform she was scheduled in the wrong clinic. She needs to see Dr. Corona at [facility]. [facility]. Message sent to Eileen to please f/u with patient

## 2020-12-08 ENCOUNTER — PATIENT MESSAGE (OUTPATIENT)
Dept: RHEUMATOLOGY | Facility: CLINIC | Age: 65
End: 2020-12-08

## 2020-12-18 ENCOUNTER — OFFICE VISIT (OUTPATIENT)
Dept: VASCULAR SURGERY | Facility: CLINIC | Age: 65
End: 2020-12-18
Attending: SURGERY
Payer: MEDICARE

## 2020-12-18 ENCOUNTER — HOSPITAL ENCOUNTER (OUTPATIENT)
Dept: VASCULAR SURGERY | Facility: CLINIC | Age: 65
Discharge: HOME | End: 2020-12-18
Attending: SURGERY
Payer: MEDICARE

## 2020-12-18 VITALS
SYSTOLIC BLOOD PRESSURE: 132 MMHG | TEMPERATURE: 98 F | HEIGHT: 60 IN | WEIGHT: 163.13 LBS | HEART RATE: 63 BPM | BODY MASS INDEX: 32.02 KG/M2 | DIASTOLIC BLOOD PRESSURE: 69 MMHG

## 2020-12-18 DIAGNOSIS — I65.21 CAROTID STENOSIS, RIGHT: ICD-10-CM

## 2020-12-18 DIAGNOSIS — I65.23 BILATERAL CAROTID ARTERY STENOSIS: ICD-10-CM

## 2020-12-18 DIAGNOSIS — I65.21 CAROTID STENOSIS, RIGHT: Primary | ICD-10-CM

## 2020-12-18 PROCEDURE — 93880 PR DUPLEX SCAN EXTRACRANIAL,BILAT: ICD-10-PCS | Mod: 26,S$PBB,, | Performed by: SURGERY

## 2020-12-18 PROCEDURE — 99212 PR OFFICE/OUTPT VISIT, EST, LEVL II, 10-19 MIN: ICD-10-PCS | Mod: S$PBB,,, | Performed by: SURGERY

## 2020-12-18 PROCEDURE — 99999 PR PBB SHADOW E&M-EST. PATIENT-LVL III: ICD-10-PCS | Mod: PBBFAC,,, | Performed by: SURGERY

## 2020-12-18 PROCEDURE — 99999 PR PBB SHADOW E&M-EST. PATIENT-LVL III: CPT | Mod: PBBFAC,,, | Performed by: SURGERY

## 2020-12-18 PROCEDURE — 99213 OFFICE O/P EST LOW 20 MIN: CPT | Mod: PBBFAC,25 | Performed by: SURGERY

## 2020-12-18 PROCEDURE — 93880 EXTRACRANIAL BILAT STUDY: CPT | Mod: 26,S$PBB,, | Performed by: SURGERY

## 2020-12-18 PROCEDURE — 93880 EXTRACRANIAL BILAT STUDY: CPT | Mod: PBBFAC | Performed by: SURGERY

## 2020-12-18 PROCEDURE — 99212 OFFICE O/P EST SF 10 MIN: CPT | Mod: S$PBB,,, | Performed by: SURGERY

## 2020-12-18 RX ORDER — BETAHISTINE HCL 100 %
POWDER (GRAM) MISCELLANEOUS
COMMUNITY
Start: 2020-12-15 | End: 2021-01-21

## 2020-12-18 RX ORDER — BETAHISTINE HCL 100 %
12 POWDER (GRAM) MISCELLANEOUS 2 TIMES DAILY
COMMUNITY
Start: 2020-12-15 | End: 2021-01-21

## 2020-12-18 NOTE — PROGRESS NOTES
"VASCULAR SURGERY NOTE    Patient ID: Radha Taylor is a 65 y.o. female.    I. HISTORY     Chief Complaint: carotid artery stenosis    HPI: Radha Taylor is a 65 y.o. female who is here today for established patient appointment. She was last seen by me 1 yr ago and at that time I wrote the following:    "Referred for evaluation of right carotid stenosis. The patient has no history of stroke or TIA. She has history of right neck/head irradiation for acoustic neuroma years ago. She was found to have right carotid stenosis on CTA in January 2019 and carotid ultrasound performed 10/16/19 and was referred to vascular surgery clinic for evaluation. She denies any stroke/TIA symptoms currently. She has occasional dizziness but I explained that this is not related to carotid stenosis."    She is here today for her yearly follow up. She continues to remain asymptomatic. No vision loss, confusion, weakness, or dizziness.    ALLERGIES: unremarkable    SOCIAL HISTORY: former smoker    FAMILY HISTORY: unremarkable    MEDICATIONS: up to date in EPIC    Past Medical History:   Diagnosis Date    Acid reflux     Anemia 2years ago    Anxiety     Arthritis     Asthma, chronic 4/10/2013    Crohn's disease 1998    Depression     Dry eyes     Fracture of forearm, proximal, open     screws [facility]    History of papilledema 11/10/11    Hyperlipidemia 5/21/2018    Lupus     Neuromuscular disorder 12/2009    Neuropathy    Osteoporosis     Stroke 1/29/2013    Thyroid disease         Past Surgical History:   Procedure Laterality Date    ADENOIDECTOMY  1962    BRAIN SURGERY  01/18/2012    Brain Biopsy    COLONOSCOPY  2017    A Dr. WILLIS Summers; pan-diverticulosis, rec repeat 3-5 years    INCISION AND DRAINAGE PERIRECTAL ABSCESS  1998    abscess removal    ORIF FOREARM FRACTURE Left 06/23/2013    proximal ulnar and radius       Social History     Tobacco Use   Smoking Status Former Smoker    " Packs/day: 0.50    Years: 15.00    Pack years: 7.50    Types: Cigarettes    Quit date: 1984    Years since quittin.4   Smokeless Tobacco Never Used   Tobacco Comment    Quit         Review of Systems   Constitution: Negative for chills and fever.   HENT: Negative for ear pain and hoarse voice.    Eyes: Negative for discharge and pain.   Cardiovascular: Negative for chest pain and palpitations.   Respiratory: Negative for cough, hemoptysis and shortness of breath.    Endocrine: Negative for polydipsia and polyuria.   Skin: Negative for dry skin and rash.   Musculoskeletal: Negative for back pain and neck pain.   Gastrointestinal: Negative for abdominal pain, diarrhea, nausea and vomiting.   Genitourinary: Negative for dysuria and hematuria.   Neurological: Negative for dizziness and paresthesias.       II. PHYSICAL EXAM     Physical Exam   Constitutional: She is oriented to person, place, and time. She appears well-developed and well-nourished. No distress.   HENT:   Head: Normocephalic and atraumatic.   Eyes: Pupils are equal, round, and reactive to light. Conjunctivae and EOM are normal.   Neck: Normal range of motion. Neck supple.   Cardiovascular: Normal rate, regular rhythm and intact distal pulses.   Pulmonary/Chest: Effort normal and breath sounds normal. No respiratory distress.   Musculoskeletal: Normal range of motion.         General: No deformity or edema.   Neurological: She is alert and oriented to person, place, and time. She has normal reflexes. No cranial nerve deficit. She exhibits normal muscle tone. Coordination normal.   Non focal neuro exam   Skin: Skin is warm and dry. She is not diaphoretic. No erythema.   Psychiatric: She has a normal mood and affect. Her behavior is normal.   Nursing note and vitals reviewed.      III. ASSESSMENT & PLAN (MEDICAL DECISION MAKING)       Imaging Results: (I have personally reviewed all images and provided interpretation below)   Carotid Duplex  12/21/20:     R L   CCA: 105cm/s  ICA PSV: 260cm/s (previous 228cm/s)  ICA EDV: 72cm/s  Vert: antegrade, normal waveform  ICA/CCA ratio: 2.4  Impression: 50-75% stenosis of ICA CCA: 85cm/s  ICA PSV: 108cm/s  ICA EDV:33cm/s  Vert: antegrade, normal waveform  ICA/CCA ratio: 1.4  Impression: less than 50% stenosis of ICA       Assessment/Diagnosis and Plan:    1. Carotid stenosis, right        65 y.o. female here for yearly follow up for asymptomatic right carotid stenosis. Her her velocities have increased slightly since last year, but she remains asymptomatic.  Velocities are still below threshold for 80% stenosis.  Due to h/o radiation, would recommend treatment with carotid stent versus TCAR if her stenosis worsens to more than 80% or she becomes symptomatic.  Until then continue best medical treatment.    -continue ASA 81 mg daily  -continue received a statin  -goal blood pressure less than 140/90  -RTC in 1 year for repeat carotid duplex    JACK Corona II, MD, VI  Vascular Surgeon  [facility]

## 2021-01-04 ENCOUNTER — PATIENT MESSAGE (OUTPATIENT)
Dept: RHEUMATOLOGY | Facility: CLINIC | Age: 66
End: 2021-01-04

## 2021-01-04 ENCOUNTER — PATIENT MESSAGE (OUTPATIENT)
Dept: INTERNAL MEDICINE | Facility: CLINIC | Age: 66
End: 2021-01-04

## 2021-01-12 ENCOUNTER — OFFICE VISIT (OUTPATIENT)
Dept: OPTOMETRY | Facility: CLINIC | Age: 66
End: 2021-01-12
Payer: MEDICARE

## 2021-01-12 ENCOUNTER — CLINICAL SUPPORT (OUTPATIENT)
Dept: OPHTHALMOLOGY | Facility: CLINIC | Age: 66
End: 2021-01-12
Payer: MEDICARE

## 2021-01-12 DIAGNOSIS — Z79.899 LONG-TERM USE OF PLAQUENIL: Primary | ICD-10-CM

## 2021-01-12 DIAGNOSIS — H52.13 MYOPIA WITH PRESBYOPIA OF BOTH EYES: Primary | ICD-10-CM

## 2021-01-12 DIAGNOSIS — H52.4 MYOPIA WITH PRESBYOPIA OF BOTH EYES: ICD-10-CM

## 2021-01-12 DIAGNOSIS — H52.4 MYOPIA WITH PRESBYOPIA OF BOTH EYES: Primary | ICD-10-CM

## 2021-01-12 DIAGNOSIS — M32.9 SYSTEMIC LUPUS ERYTHEMATOSUS, UNSPECIFIED SLE TYPE, UNSPECIFIED ORGAN INVOLVEMENT STATUS: ICD-10-CM

## 2021-01-12 DIAGNOSIS — H25.13 NUCLEAR SCLEROSIS OF BOTH EYES: ICD-10-CM

## 2021-01-12 DIAGNOSIS — H52.13 MYOPIA WITH PRESBYOPIA OF BOTH EYES: ICD-10-CM

## 2021-01-12 DIAGNOSIS — H43.811 POSTERIOR VITREOUS DETACHMENT, RIGHT: ICD-10-CM

## 2021-01-12 DIAGNOSIS — H53.9 VISION DISTURBANCE: ICD-10-CM

## 2021-01-12 PROCEDURE — 92015 DETERMINE REFRACTIVE STATE: CPT | Mod: ,,, | Performed by: OPTOMETRIST

## 2021-01-12 PROCEDURE — 99999 PR PBB SHADOW E&M-EST. PATIENT-LVL III: CPT | Mod: PBBFAC,,, | Performed by: OPTOMETRIST

## 2021-01-12 PROCEDURE — 92134 POSTERIOR SEGMENT OCT RETINA (OCULAR COHERENCE TOMOGRAPHY)-BOTH EYES: ICD-10-PCS | Mod: 26,S$PBB,, | Performed by: OPTOMETRIST

## 2021-01-12 PROCEDURE — 99213 OFFICE O/P EST LOW 20 MIN: CPT | Mod: PBBFAC,25 | Performed by: OPTOMETRIST

## 2021-01-12 PROCEDURE — 92014 PR EYE EXAM, EST PATIENT,COMPREHESV: ICD-10-PCS | Mod: S$PBB,,, | Performed by: OPTOMETRIST

## 2021-01-12 PROCEDURE — 92015 PR REFRACTION: ICD-10-PCS | Mod: ,,, | Performed by: OPTOMETRIST

## 2021-01-12 PROCEDURE — 92310 PR CONTACT LENS FITTING (NO CHANGE): ICD-10-PCS | Mod: CSM,,, | Performed by: OPTOMETRIST

## 2021-01-12 PROCEDURE — 99499 UNLISTED E&M SERVICE: CPT | Mod: S$PBB,,, | Performed by: OPTOMETRIST

## 2021-01-12 PROCEDURE — 92310 CONTACT LENS FITTING OU: CPT | Mod: CSM,,, | Performed by: OPTOMETRIST

## 2021-01-12 PROCEDURE — 92014 COMPRE OPH EXAM EST PT 1/>: CPT | Mod: S$PBB,,, | Performed by: OPTOMETRIST

## 2021-01-12 PROCEDURE — 92134 CPTRZ OPH DX IMG PST SGM RTA: CPT | Mod: PBBFAC | Performed by: OPTOMETRIST

## 2021-01-12 PROCEDURE — 92083 EXTENDED VISUAL FIELD XM: CPT | Mod: PBBFAC | Performed by: OPTOMETRIST

## 2021-01-12 PROCEDURE — 99499 NO LOS: ICD-10-PCS | Mod: S$PBB,,, | Performed by: OPTOMETRIST

## 2021-01-12 PROCEDURE — 92083 HUMPHREY VISUAL FIELD - OU - BOTH EYES: ICD-10-PCS | Mod: 26,S$PBB,, | Performed by: OPTOMETRIST

## 2021-01-12 PROCEDURE — 99999 PR PBB SHADOW E&M-EST. PATIENT-LVL III: ICD-10-PCS | Mod: PBBFAC,,, | Performed by: OPTOMETRIST

## 2021-01-21 ENCOUNTER — TELEPHONE (OUTPATIENT)
Dept: INTERNAL MEDICINE | Facility: CLINIC | Age: 66
End: 2021-01-21

## 2021-01-21 ENCOUNTER — OFFICE VISIT (OUTPATIENT)
Dept: INTERNAL MEDICINE | Facility: CLINIC | Age: 66
End: 2021-01-21
Payer: MEDICARE

## 2021-01-21 ENCOUNTER — TELEPHONE (OUTPATIENT)
Dept: OTOLARYNGOLOGY | Facility: CLINIC | Age: 66
End: 2021-01-21

## 2021-01-21 ENCOUNTER — LAB VISIT (OUTPATIENT)
Dept: LAB | Facility: HOSPITAL | Age: 66
End: 2021-01-21
Attending: INTERNAL MEDICINE
Payer: MEDICARE

## 2021-01-21 VITALS
BODY MASS INDEX: 32.59 KG/M2 | OXYGEN SATURATION: 100 % | SYSTOLIC BLOOD PRESSURE: 118 MMHG | DIASTOLIC BLOOD PRESSURE: 70 MMHG | HEIGHT: 60 IN | HEART RATE: 68 BPM | WEIGHT: 166 LBS

## 2021-01-21 DIAGNOSIS — I77.9 CAROTID ARTERY DISEASE WITHOUT CEREBRAL INFARCTION: ICD-10-CM

## 2021-01-21 DIAGNOSIS — E78.5 HYPERLIPIDEMIA, UNSPECIFIED HYPERLIPIDEMIA TYPE: ICD-10-CM

## 2021-01-21 DIAGNOSIS — H93.11 TINNITUS OF RIGHT EAR: ICD-10-CM

## 2021-01-21 DIAGNOSIS — Z00.00 ANNUAL PHYSICAL EXAM: Primary | ICD-10-CM

## 2021-01-21 DIAGNOSIS — E03.9 HYPOTHYROIDISM, UNSPECIFIED TYPE: ICD-10-CM

## 2021-01-21 DIAGNOSIS — H91.93 BILATERAL HEARING LOSS, UNSPECIFIED HEARING LOSS TYPE: ICD-10-CM

## 2021-01-21 DIAGNOSIS — M25.561 RIGHT KNEE PAIN, UNSPECIFIED CHRONICITY: ICD-10-CM

## 2021-01-21 DIAGNOSIS — D33.3 RIGHT ACOUSTIC NEUROMA: ICD-10-CM

## 2021-01-21 DIAGNOSIS — M20.60 DEFORMITY OF TOE, UNSPECIFIED LATERALITY: ICD-10-CM

## 2021-01-21 DIAGNOSIS — Z12.31 ENCOUNTER FOR SCREENING MAMMOGRAM FOR BREAST CANCER: ICD-10-CM

## 2021-01-21 DIAGNOSIS — M32.19 OTHER SYSTEMIC LUPUS ERYTHEMATOSUS WITH OTHER ORGAN INVOLVEMENT: ICD-10-CM

## 2021-01-21 LAB
CHOLEST SERPL-MCNC: 110 MG/DL (ref 120–199)
CHOLEST/HDLC SERPL: 2.1 {RATIO} (ref 2–5)
HDLC SERPL-MCNC: 53 MG/DL (ref 40–75)
HDLC SERPL: 48.2 % (ref 20–50)
LDLC SERPL CALC-MCNC: 45.8 MG/DL (ref 63–159)
NONHDLC SERPL-MCNC: 57 MG/DL
T4 FREE SERPL-MCNC: 1.32 NG/DL (ref 0.71–1.51)
TRIGL SERPL-MCNC: 56 MG/DL (ref 30–150)
TSH SERPL DL<=0.005 MIU/L-ACNC: 0.32 UIU/ML (ref 0.4–4)

## 2021-01-21 PROCEDURE — 99215 OFFICE O/P EST HI 40 MIN: CPT | Mod: PBBFAC | Performed by: INTERNAL MEDICINE

## 2021-01-21 PROCEDURE — 84439 ASSAY OF FREE THYROXINE: CPT

## 2021-01-21 PROCEDURE — 99999 PR PBB SHADOW E&M-EST. PATIENT-LVL V: ICD-10-PCS | Mod: PBBFAC,,, | Performed by: INTERNAL MEDICINE

## 2021-01-21 PROCEDURE — 99396 PREV VISIT EST AGE 40-64: CPT | Mod: S$PBB,,, | Performed by: INTERNAL MEDICINE

## 2021-01-21 PROCEDURE — 99396 PR PREVENTIVE VISIT,EST,40-64: ICD-10-PCS | Mod: S$PBB,,, | Performed by: INTERNAL MEDICINE

## 2021-01-21 PROCEDURE — 99999 PR PBB SHADOW E&M-EST. PATIENT-LVL V: CPT | Mod: PBBFAC,,, | Performed by: INTERNAL MEDICINE

## 2021-01-21 PROCEDURE — 36415 COLL VENOUS BLD VENIPUNCTURE: CPT

## 2021-01-21 PROCEDURE — 80061 LIPID PANEL: CPT

## 2021-01-21 PROCEDURE — 84443 ASSAY THYROID STIM HORMONE: CPT

## 2021-01-21 RX ORDER — ROSUVASTATIN CALCIUM 20 MG/1
20 TABLET, COATED ORAL DAILY
Qty: 90 TABLET | Refills: 4 | Status: SHIPPED | OUTPATIENT
Start: 2021-01-21 | End: 2022-03-29

## 2021-01-21 RX ORDER — ASPIRIN 81 MG/1
81 TABLET ORAL DAILY
COMMUNITY
End: 2021-12-08

## 2021-01-22 ENCOUNTER — PATIENT MESSAGE (OUTPATIENT)
Dept: INTERNAL MEDICINE | Facility: CLINIC | Age: 66
End: 2021-01-22

## 2021-01-22 DIAGNOSIS — L98.9 SKIN LESION: ICD-10-CM

## 2021-01-22 DIAGNOSIS — E03.9 HYPOTHYROIDISM, UNSPECIFIED TYPE: Primary | ICD-10-CM

## 2021-01-25 ENCOUNTER — PATIENT MESSAGE (OUTPATIENT)
Dept: INTERNAL MEDICINE | Facility: CLINIC | Age: 66
End: 2021-01-25

## 2021-01-30 ENCOUNTER — PATIENT MESSAGE (OUTPATIENT)
Dept: INTERNAL MEDICINE | Facility: CLINIC | Age: 66
End: 2021-01-30

## 2021-02-01 ENCOUNTER — TELEPHONE (OUTPATIENT)
Dept: OTOLARYNGOLOGY | Facility: CLINIC | Age: 66
End: 2021-02-01

## 2021-02-01 DIAGNOSIS — J31.0 RHINITIS, UNSPECIFIED TYPE: Primary | ICD-10-CM

## 2021-02-01 DIAGNOSIS — R09.81 CONGESTION OF NASAL SINUS: ICD-10-CM

## 2021-02-03 ENCOUNTER — PATIENT MESSAGE (OUTPATIENT)
Dept: VASCULAR SURGERY | Facility: CLINIC | Age: 66
End: 2021-02-03

## 2021-02-10 ENCOUNTER — HOSPITAL ENCOUNTER (OUTPATIENT)
Dept: RADIOLOGY | Facility: HOSPITAL | Age: 66
Discharge: HOME | End: 2021-02-10
Attending: INTERNAL MEDICINE
Payer: MEDICARE

## 2021-02-10 DIAGNOSIS — Z12.31 ENCOUNTER FOR SCREENING MAMMOGRAM FOR BREAST CANCER: ICD-10-CM

## 2021-02-10 PROCEDURE — 77063 BREAST TOMOSYNTHESIS BI: CPT | Mod: 26,,, | Performed by: RADIOLOGY

## 2021-02-10 PROCEDURE — 77067 SCR MAMMO BI INCL CAD: CPT | Mod: TC

## 2021-02-10 PROCEDURE — 77067 MAMMO DIGITAL SCREENING BILAT WITH TOMO: ICD-10-PCS | Mod: 26,,, | Performed by: RADIOLOGY

## 2021-02-10 PROCEDURE — 77063 MAMMO DIGITAL SCREENING BILAT WITH TOMO: ICD-10-PCS | Mod: 26,,, | Performed by: RADIOLOGY

## 2021-02-10 PROCEDURE — 77067 SCR MAMMO BI INCL CAD: CPT | Mod: 26,,, | Performed by: RADIOLOGY

## 2021-02-17 ENCOUNTER — TELEPHONE (OUTPATIENT)
Dept: RHEUMATOLOGY | Facility: CLINIC | Age: 66
End: 2021-02-17

## 2021-02-17 DIAGNOSIS — E55.9 VITAMIN D DEFICIENCY: Primary | ICD-10-CM

## 2021-02-17 RX ORDER — ERGOCALCIFEROL 1.25 MG/1
50000 CAPSULE ORAL
Status: CANCELLED | OUTPATIENT
Start: 2021-02-17 | End: 2021-02-17

## 2021-02-17 RX ORDER — ERGOCALCIFEROL 1.25 MG/1
50000 CAPSULE ORAL
Qty: 12 CAPSULE | Refills: 3 | Status: SHIPPED | OUTPATIENT
Start: 2021-02-17 | End: 2021-12-07 | Stop reason: SDUPTHER

## 2021-02-19 ENCOUNTER — OFFICE VISIT (OUTPATIENT)
Dept: DERMATOLOGY | Facility: CLINIC | Age: 66
End: 2021-02-19
Payer: MEDICARE

## 2021-02-19 DIAGNOSIS — L82.1 SK (SEBORRHEIC KERATOSIS): Primary | ICD-10-CM

## 2021-02-19 DIAGNOSIS — D23.9 DERMATOFIBROMA: ICD-10-CM

## 2021-02-19 DIAGNOSIS — Z12.83 SKIN CANCER SCREENING: ICD-10-CM

## 2021-02-19 DIAGNOSIS — D22.9 MULTIPLE BENIGN NEVI: ICD-10-CM

## 2021-02-19 DIAGNOSIS — L81.4 LENTIGINES: ICD-10-CM

## 2021-02-19 PROCEDURE — 99999 PR PBB SHADOW E&M-EST. PATIENT-LVL III: CPT | Mod: PBBFAC,,, | Performed by: DERMATOLOGY

## 2021-02-19 PROCEDURE — 99213 OFFICE O/P EST LOW 20 MIN: CPT | Mod: PBBFAC | Performed by: DERMATOLOGY

## 2021-02-19 PROCEDURE — 99203 OFFICE O/P NEW LOW 30 MIN: CPT | Mod: S$PBB,,, | Performed by: DERMATOLOGY

## 2021-02-19 PROCEDURE — 99203 PR OFFICE/OUTPT VISIT, NEW, LEVL III, 30-44 MIN: ICD-10-PCS | Mod: S$PBB,,, | Performed by: DERMATOLOGY

## 2021-02-19 PROCEDURE — 99999 PR PBB SHADOW E&M-EST. PATIENT-LVL III: ICD-10-PCS | Mod: PBBFAC,,, | Performed by: DERMATOLOGY

## 2021-02-23 ENCOUNTER — PATIENT MESSAGE (OUTPATIENT)
Dept: RHEUMATOLOGY | Facility: CLINIC | Age: 66
End: 2021-02-23

## 2021-03-02 ENCOUNTER — OFFICE VISIT (OUTPATIENT)
Dept: OTOLARYNGOLOGY | Facility: CLINIC | Age: 66
End: 2021-03-02
Payer: MEDICARE

## 2021-03-02 ENCOUNTER — CLINICAL SUPPORT (OUTPATIENT)
Dept: AUDIOLOGY | Facility: CLINIC | Age: 66
End: 2021-03-02
Payer: MEDICARE

## 2021-03-02 VITALS — BODY MASS INDEX: 34.96 KG/M2 | WEIGHT: 179 LBS

## 2021-03-02 DIAGNOSIS — H93.11 TINNITUS OF RIGHT EAR: ICD-10-CM

## 2021-03-02 DIAGNOSIS — R09.81 CONGESTION OF NASAL SINUS: ICD-10-CM

## 2021-03-02 DIAGNOSIS — H90.A31 MIXED CONDUCTIVE AND SENSORINEURAL HEARING LOSS OF RIGHT EAR WITH RESTRICTED HEARING OF LEFT EAR: Primary | ICD-10-CM

## 2021-03-02 DIAGNOSIS — D33.3 RIGHT ACOUSTIC NEUROMA: ICD-10-CM

## 2021-03-02 DIAGNOSIS — H91.93 BILATERAL HEARING LOSS, UNSPECIFIED HEARING LOSS TYPE: ICD-10-CM

## 2021-03-02 DIAGNOSIS — J31.0 RHINITIS, UNSPECIFIED TYPE: ICD-10-CM

## 2021-03-02 PROCEDURE — 99211 OFF/OP EST MAY X REQ PHY/QHP: CPT | Mod: PBBFAC,25 | Performed by: AUDIOLOGIST

## 2021-03-02 PROCEDURE — 99999 PR PBB SHADOW E&M-EST. PATIENT-LVL IV: ICD-10-PCS | Mod: PBBFAC,,, | Performed by: OTOLARYNGOLOGY

## 2021-03-02 PROCEDURE — 99204 OFFICE O/P NEW MOD 45 MIN: CPT | Mod: S$PBB,,, | Performed by: OTOLARYNGOLOGY

## 2021-03-02 PROCEDURE — 99999 PR PBB SHADOW E&M-EST. PATIENT-LVL IV: CPT | Mod: PBBFAC,,, | Performed by: OTOLARYNGOLOGY

## 2021-03-02 PROCEDURE — 92567 TYMPANOMETRY: CPT | Mod: PBBFAC | Performed by: AUDIOLOGIST

## 2021-03-02 PROCEDURE — 99999 PR PBB SHADOW E&M-EST. PATIENT-LVL I: ICD-10-PCS | Mod: PBBFAC,,, | Performed by: AUDIOLOGIST

## 2021-03-02 PROCEDURE — 99204 PR OFFICE/OUTPT VISIT, NEW, LEVL IV, 45-59 MIN: ICD-10-PCS | Mod: S$PBB,,, | Performed by: OTOLARYNGOLOGY

## 2021-03-02 PROCEDURE — 99214 OFFICE O/P EST MOD 30 MIN: CPT | Mod: PBBFAC,27 | Performed by: OTOLARYNGOLOGY

## 2021-03-02 PROCEDURE — 99999 PR PBB SHADOW E&M-EST. PATIENT-LVL I: CPT | Mod: PBBFAC,,, | Performed by: AUDIOLOGIST

## 2021-03-02 PROCEDURE — 92557 COMPREHENSIVE HEARING TEST: CPT | Mod: PBBFAC | Performed by: AUDIOLOGIST

## 2021-03-15 ENCOUNTER — TELEPHONE (OUTPATIENT)
Dept: RHEUMATOLOGY | Facility: CLINIC | Age: 66
End: 2021-03-15

## 2021-03-15 ENCOUNTER — LAB VISIT (OUTPATIENT)
Dept: LAB | Facility: HOSPITAL | Age: 66
End: 2021-03-15
Attending: INTERNAL MEDICINE
Payer: MEDICARE

## 2021-03-15 DIAGNOSIS — R74.8 ALKALINE PHOSPHATASE ELEVATION: Primary | ICD-10-CM

## 2021-03-15 DIAGNOSIS — M32.9 SYSTEMIC LUPUS ERYTHEMATOSUS, UNSPECIFIED SLE TYPE, UNSPECIFIED ORGAN INVOLVEMENT STATUS: ICD-10-CM

## 2021-03-15 LAB
ALBUMIN SERPL BCP-MCNC: 3.8 G/DL (ref 3.5–5.2)
ALP SERPL-CCNC: 151 U/L (ref 55–135)
ALT SERPL W/O P-5'-P-CCNC: 17 U/L (ref 10–44)
ANION GAP SERPL CALC-SCNC: 7 MMOL/L (ref 8–16)
AST SERPL-CCNC: 25 U/L (ref 10–40)
BASOPHILS # BLD AUTO: 0.06 K/UL (ref 0–0.2)
BASOPHILS NFR BLD: 0.9 % (ref 0–1.9)
BILIRUB SERPL-MCNC: 0.4 MG/DL (ref 0.1–1)
BUN SERPL-MCNC: 11 MG/DL (ref 8–23)
C3 SERPL-MCNC: 97 MG/DL (ref 50–180)
C4 SERPL-MCNC: 22 MG/DL (ref 11–44)
CALCIUM SERPL-MCNC: 9.1 MG/DL (ref 8.7–10.5)
CHLORIDE SERPL-SCNC: 107 MMOL/L (ref 95–110)
CO2 SERPL-SCNC: 26 MMOL/L (ref 23–29)
CREAT SERPL-MCNC: 0.8 MG/DL (ref 0.5–1.4)
CRP SERPL-MCNC: 1.5 MG/L (ref 0–8.2)
DIFFERENTIAL METHOD: NORMAL
EOSINOPHIL # BLD AUTO: 0.1 K/UL (ref 0–0.5)
EOSINOPHIL NFR BLD: 1.6 % (ref 0–8)
ERYTHROCYTE [DISTWIDTH] IN BLOOD BY AUTOMATED COUNT: 11.9 % (ref 11.5–14.5)
ERYTHROCYTE [SEDIMENTATION RATE] IN BLOOD BY WESTERGREN METHOD: 23 MM/HR (ref 0–36)
EST. GFR  (AFRICAN AMERICAN): >60 ML/MIN/1.73 M^2
EST. GFR  (NON AFRICAN AMERICAN): >60 ML/MIN/1.73 M^2
GLUCOSE SERPL-MCNC: 95 MG/DL (ref 70–110)
HCT VFR BLD AUTO: 38.4 % (ref 37–48.5)
HGB BLD-MCNC: 12.4 G/DL (ref 12–16)
IMM GRANULOCYTES # BLD AUTO: 0.01 K/UL (ref 0–0.04)
IMM GRANULOCYTES NFR BLD AUTO: 0.1 % (ref 0–0.5)
LYMPHOCYTES # BLD AUTO: 2.1 K/UL (ref 1–4.8)
LYMPHOCYTES NFR BLD: 30.4 % (ref 18–48)
MCH RBC QN AUTO: 30.2 PG (ref 27–31)
MCHC RBC AUTO-ENTMCNC: 32.3 G/DL (ref 32–36)
MCV RBC AUTO: 93 FL (ref 82–98)
MONOCYTES # BLD AUTO: 0.6 K/UL (ref 0.3–1)
MONOCYTES NFR BLD: 9 % (ref 4–15)
NEUTROPHILS # BLD AUTO: 4 K/UL (ref 1.8–7.7)
NEUTROPHILS NFR BLD: 58 % (ref 38–73)
NRBC BLD-RTO: 0 /100 WBC
PLATELET # BLD AUTO: 302 K/UL (ref 150–350)
PMV BLD AUTO: 11 FL (ref 9.2–12.9)
POTASSIUM SERPL-SCNC: 4.3 MMOL/L (ref 3.5–5.1)
PROT SERPL-MCNC: 7.3 G/DL (ref 6–8.4)
RBC # BLD AUTO: 4.11 M/UL (ref 4–5.4)
SODIUM SERPL-SCNC: 140 MMOL/L (ref 136–145)
WBC # BLD AUTO: 6.97 K/UL (ref 3.9–12.7)

## 2021-03-15 PROCEDURE — 80053 COMPREHEN METABOLIC PANEL: CPT | Performed by: INTERNAL MEDICINE

## 2021-03-15 PROCEDURE — 86140 C-REACTIVE PROTEIN: CPT | Performed by: INTERNAL MEDICINE

## 2021-03-15 PROCEDURE — 85025 COMPLETE CBC W/AUTO DIFF WBC: CPT | Performed by: INTERNAL MEDICINE

## 2021-03-15 PROCEDURE — 85652 RBC SED RATE AUTOMATED: CPT | Performed by: INTERNAL MEDICINE

## 2021-03-15 PROCEDURE — 86160 COMPLEMENT ANTIGEN: CPT | Performed by: INTERNAL MEDICINE

## 2021-03-15 PROCEDURE — 86225 DNA ANTIBODY NATIVE: CPT | Mod: 59 | Performed by: INTERNAL MEDICINE

## 2021-03-15 PROCEDURE — 86160 COMPLEMENT ANTIGEN: CPT | Mod: 59 | Performed by: INTERNAL MEDICINE

## 2021-03-15 PROCEDURE — 36415 COLL VENOUS BLD VENIPUNCTURE: CPT | Performed by: INTERNAL MEDICINE

## 2021-03-15 PROCEDURE — 86225 DNA ANTIBODY NATIVE: CPT | Performed by: INTERNAL MEDICINE

## 2021-03-16 ENCOUNTER — TELEPHONE (OUTPATIENT)
Dept: RHEUMATOLOGY | Facility: CLINIC | Age: 66
End: 2021-03-16

## 2021-03-17 ENCOUNTER — PATIENT MESSAGE (OUTPATIENT)
Dept: RHEUMATOLOGY | Facility: CLINIC | Age: 66
End: 2021-03-17

## 2021-03-17 LAB
DNA TITER: NORMAL
DSDNA AB SER-ACNC: POSITIVE [IU]/ML

## 2021-03-19 ENCOUNTER — LAB VISIT (OUTPATIENT)
Dept: LAB | Facility: HOSPITAL | Age: 66
End: 2021-03-19
Attending: INTERNAL MEDICINE
Payer: MEDICARE

## 2021-03-19 DIAGNOSIS — R74.8 ALKALINE PHOSPHATASE ELEVATION: ICD-10-CM

## 2021-03-19 LAB — GGT SERPL-CCNC: 22 U/L (ref 8–55)

## 2021-03-19 PROCEDURE — 82977 ASSAY OF GGT: CPT | Performed by: INTERNAL MEDICINE

## 2021-03-19 PROCEDURE — 84080 ASSAY ALKALINE PHOSPHATASES: CPT | Performed by: INTERNAL MEDICINE

## 2021-03-24 LAB
ALP BONE CFR SERPL: 38 % (ref 28–66)
ALP INTEST CFR SERPL: 13 % (ref 1–24)
ALP ISOS SERPL-IMP: NORMAL
ALP LIVER CFR SERPL: 49 % (ref 25–69)
ALP MACROHEPATIC CFR SERPL: NORMAL %
ALP PLAC CFR SERPL: 0 %
ALP SERPL-CCNC: 137 U/L (ref 37–153)

## 2021-03-29 ENCOUNTER — PATIENT MESSAGE (OUTPATIENT)
Dept: RHEUMATOLOGY | Facility: CLINIC | Age: 66
End: 2021-03-29

## 2021-03-29 ENCOUNTER — TELEPHONE (OUTPATIENT)
Dept: RHEUMATOLOGY | Facility: CLINIC | Age: 66
End: 2021-03-29

## 2021-03-29 DIAGNOSIS — M32.8 OTHER FORMS OF SYSTEMIC LUPUS ERYTHEMATOSUS, UNSPECIFIED ORGAN INVOLVEMENT STATUS: ICD-10-CM

## 2021-03-29 RX ORDER — HYDROXYCHLOROQUINE SULFATE 200 MG/1
200 TABLET, FILM COATED ORAL 2 TIMES DAILY
Qty: 180 TABLET | Refills: 1 | Status: SHIPPED | OUTPATIENT
Start: 2021-03-29 | End: 2021-04-13 | Stop reason: SDUPTHER

## 2021-03-29 RX ORDER — HYDROXYCHLOROQUINE SULFATE 200 MG/1
200 TABLET, FILM COATED ORAL 2 TIMES DAILY
Qty: 60 TABLET | Refills: 0 | Status: SHIPPED | OUTPATIENT
Start: 2021-03-29 | End: 2021-03-29 | Stop reason: SDUPTHER

## 2021-03-30 ENCOUNTER — PATIENT MESSAGE (OUTPATIENT)
Dept: RHEUMATOLOGY | Facility: CLINIC | Age: 66
End: 2021-03-30

## 2021-04-05 ENCOUNTER — PATIENT MESSAGE (OUTPATIENT)
Dept: ADMINISTRATIVE | Facility: HOSPITAL | Age: 66
End: 2021-04-05

## 2021-04-05 ENCOUNTER — PATIENT MESSAGE (OUTPATIENT)
Dept: RHEUMATOLOGY | Facility: CLINIC | Age: 66
End: 2021-04-05

## 2021-04-09 ENCOUNTER — TELEPHONE (OUTPATIENT)
Dept: INTERNAL MEDICINE | Facility: CLINIC | Age: 66
End: 2021-04-09

## 2021-04-09 RX ORDER — PROMETHAZINE HYDROCHLORIDE 25 MG/1
25 TABLET ORAL EVERY 6 HOURS PRN
Qty: 20 TABLET | Refills: 1 | Status: SHIPPED | OUTPATIENT
Start: 2021-04-09 | End: 2021-10-18

## 2021-04-13 DIAGNOSIS — M32.8 OTHER FORMS OF SYSTEMIC LUPUS ERYTHEMATOSUS, UNSPECIFIED ORGAN INVOLVEMENT STATUS: ICD-10-CM

## 2021-04-13 RX ORDER — HYDROXYCHLOROQUINE SULFATE 200 MG/1
200 TABLET, FILM COATED ORAL 2 TIMES DAILY
Qty: 180 TABLET | Refills: 1 | Status: SHIPPED | OUTPATIENT
Start: 2021-04-13 | End: 2021-07-06 | Stop reason: SDUPTHER

## 2021-04-16 ENCOUNTER — PATIENT MESSAGE (OUTPATIENT)
Dept: RESEARCH | Facility: HOSPITAL | Age: 66
End: 2021-04-16

## 2021-06-17 ENCOUNTER — PATIENT MESSAGE (OUTPATIENT)
Dept: RHEUMATOLOGY | Facility: CLINIC | Age: 66
End: 2021-06-17

## 2021-06-17 ENCOUNTER — PATIENT MESSAGE (OUTPATIENT)
Dept: INTERNAL MEDICINE | Facility: CLINIC | Age: 66
End: 2021-06-17

## 2021-06-18 DIAGNOSIS — Z01.84 COVID-19 VIRUS IGG ANTIBODY TEST RESULT UNKNOWN: Primary | ICD-10-CM

## 2021-07-06 DIAGNOSIS — M32.8 OTHER FORMS OF SYSTEMIC LUPUS ERYTHEMATOSUS, UNSPECIFIED ORGAN INVOLVEMENT STATUS: ICD-10-CM

## 2021-07-06 RX ORDER — HYDROXYCHLOROQUINE SULFATE 200 MG/1
200 TABLET, FILM COATED ORAL 2 TIMES DAILY
Qty: 180 TABLET | Refills: 1 | Status: SHIPPED | OUTPATIENT
Start: 2021-07-06 | End: 2021-12-07 | Stop reason: SDUPTHER

## 2021-07-07 ENCOUNTER — PATIENT MESSAGE (OUTPATIENT)
Dept: ADMINISTRATIVE | Facility: HOSPITAL | Age: 66
End: 2021-07-07

## 2021-08-18 ENCOUNTER — PATIENT MESSAGE (OUTPATIENT)
Dept: INTERNAL MEDICINE | Facility: CLINIC | Age: 66
End: 2021-08-18

## 2021-08-18 ENCOUNTER — PATIENT MESSAGE (OUTPATIENT)
Dept: RHEUMATOLOGY | Facility: CLINIC | Age: 66
End: 2021-08-18

## 2021-08-19 DIAGNOSIS — Z01.419 ENCOUNTER FOR GYNECOLOGICAL EXAMINATION: Primary | ICD-10-CM

## 2021-08-27 ENCOUNTER — PATIENT MESSAGE (OUTPATIENT)
Dept: INTERNAL MEDICINE | Facility: CLINIC | Age: 66
End: 2021-08-27

## 2021-08-27 ENCOUNTER — PATIENT MESSAGE (OUTPATIENT)
Dept: RHEUMATOLOGY | Facility: CLINIC | Age: 66
End: 2021-08-27

## 2021-09-11 ENCOUNTER — PATIENT MESSAGE (OUTPATIENT)
Dept: RHEUMATOLOGY | Facility: CLINIC | Age: 66
End: 2021-09-11

## 2021-09-14 ENCOUNTER — PATIENT MESSAGE (OUTPATIENT)
Dept: RHEUMATOLOGY | Facility: CLINIC | Age: 66
End: 2021-09-14

## 2021-09-15 ENCOUNTER — TELEPHONE (OUTPATIENT)
Dept: RHEUMATOLOGY | Facility: CLINIC | Age: 66
End: 2021-09-15

## 2021-09-15 ENCOUNTER — PATIENT MESSAGE (OUTPATIENT)
Dept: RHEUMATOLOGY | Facility: CLINIC | Age: 66
End: 2021-09-15

## 2021-09-16 ENCOUNTER — TELEPHONE (OUTPATIENT)
Dept: RHEUMATOLOGY | Facility: CLINIC | Age: 66
End: 2021-09-16

## 2021-09-16 ENCOUNTER — PATIENT MESSAGE (OUTPATIENT)
Dept: RHEUMATOLOGY | Facility: CLINIC | Age: 66
End: 2021-09-16

## 2021-09-17 ENCOUNTER — TELEPHONE (OUTPATIENT)
Dept: RHEUMATOLOGY | Facility: CLINIC | Age: 66
End: 2021-09-17

## 2021-10-02 ENCOUNTER — PATIENT MESSAGE (OUTPATIENT)
Dept: RHEUMATOLOGY | Facility: CLINIC | Age: 66
End: 2021-10-02

## 2021-10-07 ENCOUNTER — PATIENT MESSAGE (OUTPATIENT)
Dept: INTERNAL MEDICINE | Facility: CLINIC | Age: 66
End: 2021-10-07

## 2021-10-15 ENCOUNTER — PATIENT MESSAGE (OUTPATIENT)
Dept: INTERNAL MEDICINE | Facility: CLINIC | Age: 66
End: 2021-10-15
Payer: MEDICARE

## 2021-10-18 ENCOUNTER — TELEPHONE (OUTPATIENT)
Dept: RHEUMATOLOGY | Facility: CLINIC | Age: 66
End: 2021-10-18

## 2021-10-18 ENCOUNTER — OFFICE VISIT (OUTPATIENT)
Dept: INTERNAL MEDICINE | Facility: CLINIC | Age: 66
End: 2021-10-18
Payer: MEDICARE

## 2021-10-18 ENCOUNTER — PATIENT MESSAGE (OUTPATIENT)
Dept: RHEUMATOLOGY | Facility: CLINIC | Age: 66
End: 2021-10-18
Payer: MEDICARE

## 2021-10-18 ENCOUNTER — HOSPITAL ENCOUNTER (OUTPATIENT)
Dept: RADIOLOGY | Facility: HOSPITAL | Age: 66
Discharge: HOME | End: 2021-10-18
Attending: INTERNAL MEDICINE
Payer: MEDICARE

## 2021-10-18 VITALS
HEIGHT: 60 IN | BODY MASS INDEX: 31.82 KG/M2 | SYSTOLIC BLOOD PRESSURE: 102 MMHG | WEIGHT: 162.06 LBS | HEART RATE: 64 BPM | DIASTOLIC BLOOD PRESSURE: 60 MMHG | OXYGEN SATURATION: 97 %

## 2021-10-18 DIAGNOSIS — E55.9 VITAMIN D DEFICIENCY: ICD-10-CM

## 2021-10-18 DIAGNOSIS — R06.00 DYSPNEA, UNSPECIFIED TYPE: ICD-10-CM

## 2021-10-18 DIAGNOSIS — M32.19 OTHER SYSTEMIC LUPUS ERYTHEMATOSUS WITH OTHER ORGAN INVOLVEMENT: ICD-10-CM

## 2021-10-18 DIAGNOSIS — D33.3 RIGHT ACOUSTIC NEUROMA: ICD-10-CM

## 2021-10-18 DIAGNOSIS — I65.23 BILATERAL CAROTID ARTERY STENOSIS: ICD-10-CM

## 2021-10-18 DIAGNOSIS — M32.9 SYSTEMIC LUPUS ERYTHEMATOSUS, UNSPECIFIED SLE TYPE, UNSPECIFIED ORGAN INVOLVEMENT STATUS: Primary | ICD-10-CM

## 2021-10-18 DIAGNOSIS — R53.83 FATIGUE, UNSPECIFIED TYPE: ICD-10-CM

## 2021-10-18 DIAGNOSIS — R42 DIZZINESS: ICD-10-CM

## 2021-10-18 DIAGNOSIS — E78.5 HYPERLIPIDEMIA, UNSPECIFIED HYPERLIPIDEMIA TYPE: ICD-10-CM

## 2021-10-18 DIAGNOSIS — E03.9 HYPOTHYROIDISM, UNSPECIFIED TYPE: Primary | ICD-10-CM

## 2021-10-18 PROCEDURE — 99999 PR PBB SHADOW E&M-EST. PATIENT-LVL V: ICD-10-PCS | Mod: PBBFAC,,, | Performed by: INTERNAL MEDICINE

## 2021-10-18 PROCEDURE — 71046 X-RAY EXAM CHEST 2 VIEWS: CPT | Mod: TC

## 2021-10-18 PROCEDURE — 99215 OFFICE O/P EST HI 40 MIN: CPT | Mod: PBBFAC,25 | Performed by: INTERNAL MEDICINE

## 2021-10-18 PROCEDURE — 99999 PR PBB SHADOW E&M-EST. PATIENT-LVL V: CPT | Mod: PBBFAC,,, | Performed by: INTERNAL MEDICINE

## 2021-10-18 PROCEDURE — 99214 PR OFFICE/OUTPT VISIT, EST, LEVL IV, 30-39 MIN: ICD-10-PCS | Mod: S$PBB,,, | Performed by: INTERNAL MEDICINE

## 2021-10-18 PROCEDURE — 99214 OFFICE O/P EST MOD 30 MIN: CPT | Mod: S$PBB,,, | Performed by: INTERNAL MEDICINE

## 2021-10-18 PROCEDURE — 71046 XR CHEST PA AND LATERAL: ICD-10-PCS | Mod: 26,,, | Performed by: RADIOLOGY

## 2021-10-18 PROCEDURE — 71046 X-RAY EXAM CHEST 2 VIEWS: CPT | Mod: 26,,, | Performed by: RADIOLOGY

## 2021-10-18 RX ORDER — LISINOPRIL 5 MG/1
5 TABLET ORAL DAILY
Status: ON HOLD | COMMUNITY
Start: 2021-09-13 | End: 2021-11-12 | Stop reason: HOSPADM

## 2021-10-18 RX ORDER — PREDNISONE 5 MG/1
2 TABLET ORAL
Status: ON HOLD | COMMUNITY
Start: 2021-09-16 | End: 2021-11-12 | Stop reason: HOSPADM

## 2021-10-18 RX ORDER — HYDROGEN PEROXIDE 3 %
20 SOLUTION, NON-ORAL MISCELLANEOUS
COMMUNITY
End: 2022-06-25 | Stop reason: SDUPTHER

## 2021-10-19 ENCOUNTER — IMMUNIZATION (OUTPATIENT)
Dept: PHARMACY | Facility: CLINIC | Age: 66
End: 2021-10-19
Payer: MEDICARE

## 2021-10-19 DIAGNOSIS — Z23 NEED FOR VACCINATION: Primary | ICD-10-CM

## 2021-10-20 ENCOUNTER — LAB VISIT (OUTPATIENT)
Dept: LAB | Facility: HOSPITAL | Age: 66
End: 2021-10-20
Attending: INTERNAL MEDICINE
Payer: MEDICARE

## 2021-10-20 DIAGNOSIS — E55.9 VITAMIN D DEFICIENCY: ICD-10-CM

## 2021-10-20 DIAGNOSIS — M32.9 SYSTEMIC LUPUS ERYTHEMATOSUS, UNSPECIFIED SLE TYPE, UNSPECIFIED ORGAN INVOLVEMENT STATUS: ICD-10-CM

## 2021-10-20 DIAGNOSIS — E03.9 HYPOTHYROIDISM, UNSPECIFIED TYPE: ICD-10-CM

## 2021-10-20 LAB
25(OH)D3+25(OH)D2 SERPL-MCNC: 41 NG/ML (ref 30–96)
ALBUMIN SERPL BCP-MCNC: 3.7 G/DL (ref 3.5–5.2)
ALBUMIN SERPL BCP-MCNC: 3.7 G/DL (ref 3.5–5.2)
ALP SERPL-CCNC: 111 U/L (ref 55–135)
ALP SERPL-CCNC: 111 U/L (ref 55–135)
ALT SERPL W/O P-5'-P-CCNC: 19 U/L (ref 10–44)
ALT SERPL W/O P-5'-P-CCNC: 19 U/L (ref 10–44)
ANION GAP SERPL CALC-SCNC: 12 MMOL/L (ref 8–16)
ANION GAP SERPL CALC-SCNC: 12 MMOL/L (ref 8–16)
AST SERPL-CCNC: 26 U/L (ref 10–40)
AST SERPL-CCNC: 26 U/L (ref 10–40)
BASOPHILS # BLD AUTO: 0.07 K/UL (ref 0–0.2)
BASOPHILS # BLD AUTO: 0.07 K/UL (ref 0–0.2)
BASOPHILS NFR BLD: 0.9 % (ref 0–1.9)
BASOPHILS NFR BLD: 0.9 % (ref 0–1.9)
BILIRUB DIRECT SERPL-MCNC: 0.4 MG/DL (ref 0.1–0.3)
BILIRUB SERPL-MCNC: 0.9 MG/DL (ref 0.1–1)
BILIRUB SERPL-MCNC: 0.9 MG/DL (ref 0.1–1)
BUN SERPL-MCNC: 14 MG/DL (ref 8–23)
BUN SERPL-MCNC: 14 MG/DL (ref 8–23)
C3 SERPL-MCNC: 104 MG/DL (ref 50–180)
C4 SERPL-MCNC: 31 MG/DL (ref 11–44)
CALCIUM SERPL-MCNC: 9.7 MG/DL (ref 8.7–10.5)
CALCIUM SERPL-MCNC: 9.7 MG/DL (ref 8.7–10.5)
CHLORIDE SERPL-SCNC: 107 MMOL/L (ref 95–110)
CHLORIDE SERPL-SCNC: 107 MMOL/L (ref 95–110)
CHOLEST SERPL-MCNC: 134 MG/DL (ref 120–199)
CHOLEST/HDLC SERPL: 1.7 {RATIO} (ref 2–5)
CO2 SERPL-SCNC: 23 MMOL/L (ref 23–29)
CO2 SERPL-SCNC: 23 MMOL/L (ref 23–29)
CREAT SERPL-MCNC: 0.8 MG/DL (ref 0.5–1.4)
CREAT SERPL-MCNC: 0.8 MG/DL (ref 0.5–1.4)
CRP SERPL-MCNC: 5.7 MG/L (ref 0–8.2)
DIFFERENTIAL METHOD: NORMAL
DIFFERENTIAL METHOD: NORMAL
EOSINOPHIL # BLD AUTO: 0.2 K/UL (ref 0–0.5)
EOSINOPHIL # BLD AUTO: 0.2 K/UL (ref 0–0.5)
EOSINOPHIL NFR BLD: 1.8 % (ref 0–8)
EOSINOPHIL NFR BLD: 1.8 % (ref 0–8)
ERYTHROCYTE [DISTWIDTH] IN BLOOD BY AUTOMATED COUNT: 12.7 % (ref 11.5–14.5)
ERYTHROCYTE [DISTWIDTH] IN BLOOD BY AUTOMATED COUNT: 12.7 % (ref 11.5–14.5)
ERYTHROCYTE [SEDIMENTATION RATE] IN BLOOD BY WESTERGREN METHOD: 23 MM/HR (ref 0–36)
EST. GFR  (AFRICAN AMERICAN): >60 ML/MIN/1.73 M^2
EST. GFR  (AFRICAN AMERICAN): >60 ML/MIN/1.73 M^2
EST. GFR  (NON AFRICAN AMERICAN): >60 ML/MIN/1.73 M^2
EST. GFR  (NON AFRICAN AMERICAN): >60 ML/MIN/1.73 M^2
GLUCOSE SERPL-MCNC: 75 MG/DL (ref 70–110)
GLUCOSE SERPL-MCNC: 75 MG/DL (ref 70–110)
HCT VFR BLD AUTO: 38 % (ref 37–48.5)
HCT VFR BLD AUTO: 38 % (ref 37–48.5)
HDLC SERPL-MCNC: 81 MG/DL (ref 40–75)
HDLC SERPL: 60.4 % (ref 20–50)
HGB BLD-MCNC: 12.3 G/DL (ref 12–16)
HGB BLD-MCNC: 12.3 G/DL (ref 12–16)
IMM GRANULOCYTES # BLD AUTO: 0.03 K/UL (ref 0–0.04)
IMM GRANULOCYTES # BLD AUTO: 0.03 K/UL (ref 0–0.04)
IMM GRANULOCYTES NFR BLD AUTO: 0.4 % (ref 0–0.5)
IMM GRANULOCYTES NFR BLD AUTO: 0.4 % (ref 0–0.5)
LDLC SERPL CALC-MCNC: 41.2 MG/DL (ref 63–159)
LYMPHOCYTES # BLD AUTO: 1.5 K/UL (ref 1–4.8)
LYMPHOCYTES # BLD AUTO: 1.5 K/UL (ref 1–4.8)
LYMPHOCYTES NFR BLD: 18 % (ref 18–48)
LYMPHOCYTES NFR BLD: 18 % (ref 18–48)
MCH RBC QN AUTO: 30.5 PG (ref 27–31)
MCH RBC QN AUTO: 30.5 PG (ref 27–31)
MCHC RBC AUTO-ENTMCNC: 32.4 G/DL (ref 32–36)
MCHC RBC AUTO-ENTMCNC: 32.4 G/DL (ref 32–36)
MCV RBC AUTO: 94 FL (ref 82–98)
MCV RBC AUTO: 94 FL (ref 82–98)
MONOCYTES # BLD AUTO: 0.7 K/UL (ref 0.3–1)
MONOCYTES # BLD AUTO: 0.7 K/UL (ref 0.3–1)
MONOCYTES NFR BLD: 8.2 % (ref 4–15)
MONOCYTES NFR BLD: 8.2 % (ref 4–15)
NEUTROPHILS # BLD AUTO: 5.8 K/UL (ref 1.8–7.7)
NEUTROPHILS # BLD AUTO: 5.8 K/UL (ref 1.8–7.7)
NEUTROPHILS NFR BLD: 70.7 % (ref 38–73)
NEUTROPHILS NFR BLD: 70.7 % (ref 38–73)
NONHDLC SERPL-MCNC: 53 MG/DL
NRBC BLD-RTO: 0 /100 WBC
NRBC BLD-RTO: 0 /100 WBC
PLATELET # BLD AUTO: 330 K/UL (ref 150–450)
PLATELET # BLD AUTO: 330 K/UL (ref 150–450)
PMV BLD AUTO: 10.7 FL (ref 9.2–12.9)
PMV BLD AUTO: 10.7 FL (ref 9.2–12.9)
POTASSIUM SERPL-SCNC: 4.2 MMOL/L (ref 3.5–5.1)
POTASSIUM SERPL-SCNC: 4.2 MMOL/L (ref 3.5–5.1)
PROT SERPL-MCNC: 7.1 G/DL (ref 6–8.4)
PROT SERPL-MCNC: 7.1 G/DL (ref 6–8.4)
RBC # BLD AUTO: 4.03 M/UL (ref 4–5.4)
RBC # BLD AUTO: 4.03 M/UL (ref 4–5.4)
SODIUM SERPL-SCNC: 142 MMOL/L (ref 136–145)
SODIUM SERPL-SCNC: 142 MMOL/L (ref 136–145)
T4 FREE SERPL-MCNC: 1.13 NG/DL (ref 0.71–1.51)
TRIGL SERPL-MCNC: 59 MG/DL (ref 30–150)
TSH SERPL DL<=0.005 MIU/L-ACNC: 0.69 UIU/ML (ref 0.4–4)
WBC # BLD AUTO: 8.13 K/UL (ref 3.9–12.7)
WBC # BLD AUTO: 8.13 K/UL (ref 3.9–12.7)

## 2021-10-20 PROCEDURE — 86225 DNA ANTIBODY NATIVE: CPT | Performed by: INTERNAL MEDICINE

## 2021-10-20 PROCEDURE — 86225 DNA ANTIBODY NATIVE: CPT | Mod: 59 | Performed by: INTERNAL MEDICINE

## 2021-10-20 PROCEDURE — 84443 ASSAY THYROID STIM HORMONE: CPT | Performed by: INTERNAL MEDICINE

## 2021-10-20 PROCEDURE — 80061 LIPID PANEL: CPT | Performed by: INTERNAL MEDICINE

## 2021-10-20 PROCEDURE — 86146 BETA-2 GLYCOPROTEIN ANTIBODY: CPT | Performed by: INTERNAL MEDICINE

## 2021-10-20 PROCEDURE — 84439 ASSAY OF FREE THYROXINE: CPT | Performed by: INTERNAL MEDICINE

## 2021-10-20 PROCEDURE — 85613 RUSSELL VIPER VENOM DILUTED: CPT | Performed by: INTERNAL MEDICINE

## 2021-10-20 PROCEDURE — 86140 C-REACTIVE PROTEIN: CPT | Performed by: INTERNAL MEDICINE

## 2021-10-20 PROCEDURE — 82306 VITAMIN D 25 HYDROXY: CPT | Performed by: INTERNAL MEDICINE

## 2021-10-20 PROCEDURE — 85652 RBC SED RATE AUTOMATED: CPT | Performed by: INTERNAL MEDICINE

## 2021-10-20 PROCEDURE — 86160 COMPLEMENT ANTIGEN: CPT | Performed by: INTERNAL MEDICINE

## 2021-10-20 PROCEDURE — 85025 COMPLETE CBC W/AUTO DIFF WBC: CPT | Performed by: INTERNAL MEDICINE

## 2021-10-20 PROCEDURE — 80048 BASIC METABOLIC PNL TOTAL CA: CPT | Performed by: INTERNAL MEDICINE

## 2021-10-20 PROCEDURE — 80076 HEPATIC FUNCTION PANEL: CPT | Performed by: INTERNAL MEDICINE

## 2021-10-20 PROCEDURE — 86147 CARDIOLIPIN ANTIBODY EA IG: CPT | Mod: 59 | Performed by: INTERNAL MEDICINE

## 2021-10-20 PROCEDURE — 36415 COLL VENOUS BLD VENIPUNCTURE: CPT | Performed by: INTERNAL MEDICINE

## 2021-10-20 PROCEDURE — 86160 COMPLEMENT ANTIGEN: CPT | Mod: 59 | Performed by: INTERNAL MEDICINE

## 2021-10-22 ENCOUNTER — TELEPHONE (OUTPATIENT)
Dept: RHEUMATOLOGY | Facility: HOSPITAL | Age: 66
End: 2021-10-22

## 2021-10-22 ENCOUNTER — OFFICE VISIT (OUTPATIENT)
Dept: OTOLARYNGOLOGY | Facility: CLINIC | Age: 66
End: 2021-10-22
Payer: MEDICARE

## 2021-10-22 ENCOUNTER — PATIENT MESSAGE (OUTPATIENT)
Dept: INTERNAL MEDICINE | Facility: CLINIC | Age: 66
End: 2021-10-22
Payer: MEDICARE

## 2021-10-22 ENCOUNTER — PATIENT MESSAGE (OUTPATIENT)
Dept: OTOLARYNGOLOGY | Facility: CLINIC | Age: 66
End: 2021-10-22

## 2021-10-22 ENCOUNTER — PATIENT MESSAGE (OUTPATIENT)
Dept: RHEUMATOLOGY | Facility: CLINIC | Age: 66
End: 2021-10-22
Payer: MEDICARE

## 2021-10-22 VITALS — BODY MASS INDEX: 31.47 KG/M2 | WEIGHT: 161.19 LBS

## 2021-10-22 DIAGNOSIS — R42 DYSEQUILIBRIUM: ICD-10-CM

## 2021-10-22 DIAGNOSIS — D33.3 RIGHT ACOUSTIC NEUROMA: Primary | ICD-10-CM

## 2021-10-22 DIAGNOSIS — R42 VERTIGO: ICD-10-CM

## 2021-10-22 DIAGNOSIS — M32.9 SYSTEMIC LUPUS ERYTHEMATOSUS, UNSPECIFIED SLE TYPE, UNSPECIFIED ORGAN INVOLVEMENT STATUS: Primary | ICD-10-CM

## 2021-10-22 LAB
DNA TITER: NORMAL
DSDNA AB SER-ACNC: POSITIVE [IU]/ML
RIBOSOMAL P IGG SER-ACNC: <0.2 U

## 2021-10-22 PROCEDURE — 99213 PR OFFICE/OUTPT VISIT, EST, LEVL III, 20-29 MIN: ICD-10-PCS | Mod: S$PBB,,, | Performed by: OTOLARYNGOLOGY

## 2021-10-22 PROCEDURE — 99999 PR PBB SHADOW E&M-EST. PATIENT-LVL III: ICD-10-PCS | Mod: PBBFAC,,, | Performed by: OTOLARYNGOLOGY

## 2021-10-22 PROCEDURE — 99213 OFFICE O/P EST LOW 20 MIN: CPT | Mod: PBBFAC | Performed by: OTOLARYNGOLOGY

## 2021-10-22 PROCEDURE — 99213 OFFICE O/P EST LOW 20 MIN: CPT | Mod: S$PBB,,, | Performed by: OTOLARYNGOLOGY

## 2021-10-22 PROCEDURE — 99999 PR PBB SHADOW E&M-EST. PATIENT-LVL III: CPT | Mod: PBBFAC,,, | Performed by: OTOLARYNGOLOGY

## 2021-10-22 RX ORDER — MECLIZINE HYDROCHLORIDE 25 MG/1
25 TABLET ORAL 3 TIMES DAILY PRN
COMMUNITY
Start: 2021-09-28 | End: 2021-10-26

## 2021-10-23 ENCOUNTER — PATIENT MESSAGE (OUTPATIENT)
Dept: RHEUMATOLOGY | Facility: CLINIC | Age: 66
End: 2021-10-23
Payer: MEDICARE

## 2021-10-23 LAB
B2 GLYCOPROT1 IGA SER QL: <9 SAU
B2 GLYCOPROT1 IGG SER QL: <9 SGU
B2 GLYCOPROT1 IGM SER QL: <9 SMU

## 2021-10-24 LAB
APTT IMM NP PPP: NORMAL SEC (ref 32–48)
APTT P HEP NEUT PPP: NORMAL SEC (ref 32–48)
CONFIRM APTT STACLOT: NORMAL
DRVVT SCREEN TO CONFIRM RATIO: NORMAL RATIO
LA 3 SCREEN W REFLEX-IMP: NORMAL
LA NT DPL PPP QL: NORMAL
MIXING DRVVT: NORMAL SEC (ref 33–44)
PROTHROMBIN TIME: 12.3 SEC (ref 12–15.5)
REPTILASE TIME: NORMAL SEC
SCREEN APTT: 39 SEC (ref 32–48)
SCREEN DRVVT: 39 SEC (ref 33–44)
THROMBIN TIME: NORMAL SEC (ref 14.7–19.5)

## 2021-10-25 ENCOUNTER — TELEPHONE (OUTPATIENT)
Dept: RHEUMATOLOGY | Facility: CLINIC | Age: 66
End: 2021-10-25
Payer: MEDICARE

## 2021-10-25 ENCOUNTER — OFFICE VISIT (OUTPATIENT)
Dept: CARDIOLOGY | Facility: CLINIC | Age: 66
End: 2021-10-25
Payer: MEDICARE

## 2021-10-25 VITALS
HEART RATE: 67 BPM | DIASTOLIC BLOOD PRESSURE: 80 MMHG | BODY MASS INDEX: 31.86 KG/M2 | WEIGHT: 162.25 LBS | OXYGEN SATURATION: 99 % | SYSTOLIC BLOOD PRESSURE: 128 MMHG | HEIGHT: 60 IN

## 2021-10-25 DIAGNOSIS — M32.19 OTHER SYSTEMIC LUPUS ERYTHEMATOSUS WITH OTHER ORGAN INVOLVEMENT: ICD-10-CM

## 2021-10-25 DIAGNOSIS — R42 DIZZINESS: Primary | ICD-10-CM

## 2021-10-25 DIAGNOSIS — E55.9 VITAMIN D DEFICIENCY: ICD-10-CM

## 2021-10-25 DIAGNOSIS — I65.23 BILATERAL CAROTID ARTERY STENOSIS: ICD-10-CM

## 2021-10-25 DIAGNOSIS — E03.9 HYPOTHYROIDISM, UNSPECIFIED TYPE: ICD-10-CM

## 2021-10-25 LAB
CARDIOLIPIN IGG SER IA-ACNC: <9.4 GPL (ref 0–14.99)
CARDIOLIPIN IGM SER IA-ACNC: 11.8 MPL (ref 0–12.49)

## 2021-10-25 PROCEDURE — 99999 PR PBB SHADOW E&M-EST. PATIENT-LVL V: CPT | Mod: PBBFAC,GC,, | Performed by: STUDENT IN AN ORGANIZED HEALTH CARE EDUCATION/TRAINING PROGRAM

## 2021-10-25 PROCEDURE — 99213 OFFICE O/P EST LOW 20 MIN: CPT | Mod: S$PBB,GC,, | Performed by: STUDENT IN AN ORGANIZED HEALTH CARE EDUCATION/TRAINING PROGRAM

## 2021-10-25 PROCEDURE — 99999 PR PBB SHADOW E&M-EST. PATIENT-LVL V: ICD-10-PCS | Mod: PBBFAC,GC,, | Performed by: STUDENT IN AN ORGANIZED HEALTH CARE EDUCATION/TRAINING PROGRAM

## 2021-10-25 PROCEDURE — 99215 OFFICE O/P EST HI 40 MIN: CPT | Mod: PBBFAC | Performed by: STUDENT IN AN ORGANIZED HEALTH CARE EDUCATION/TRAINING PROGRAM

## 2021-10-25 PROCEDURE — 99213 PR OFFICE/OUTPT VISIT, EST, LEVL III, 20-29 MIN: ICD-10-PCS | Mod: S$PBB,GC,, | Performed by: STUDENT IN AN ORGANIZED HEALTH CARE EDUCATION/TRAINING PROGRAM

## 2021-10-26 ENCOUNTER — OFFICE VISIT (OUTPATIENT)
Dept: RHEUMATOLOGY | Facility: CLINIC | Age: 66
End: 2021-10-26
Payer: MEDICARE

## 2021-10-26 ENCOUNTER — CLINICAL SUPPORT (OUTPATIENT)
Dept: REHABILITATION | Facility: HOSPITAL | Age: 66
End: 2021-10-26
Attending: OTOLARYNGOLOGY
Payer: MEDICARE

## 2021-10-26 VITALS
HEART RATE: 86 BPM | SYSTOLIC BLOOD PRESSURE: 118 MMHG | WEIGHT: 163.13 LBS | BODY MASS INDEX: 32.02 KG/M2 | DIASTOLIC BLOOD PRESSURE: 67 MMHG | HEIGHT: 60 IN

## 2021-10-26 DIAGNOSIS — R26.89 IMPAIRMENT OF BALANCE: ICD-10-CM

## 2021-10-26 DIAGNOSIS — H81.90 VESTIBULAR DIZZINESS: ICD-10-CM

## 2021-10-26 DIAGNOSIS — D33.3 RIGHT ACOUSTIC NEUROMA: ICD-10-CM

## 2021-10-26 DIAGNOSIS — M32.9 SYSTEMIC LUPUS ERYTHEMATOSUS, UNSPECIFIED SLE TYPE, UNSPECIFIED ORGAN INVOLVEMENT STATUS: Primary | ICD-10-CM

## 2021-10-26 DIAGNOSIS — R42 DYSEQUILIBRIUM: ICD-10-CM

## 2021-10-26 DIAGNOSIS — R42 DIZZINESS: ICD-10-CM

## 2021-10-26 PROCEDURE — 99214 OFFICE O/P EST MOD 30 MIN: CPT | Mod: PBBFAC | Performed by: INTERNAL MEDICINE

## 2021-10-26 PROCEDURE — 99214 OFFICE O/P EST MOD 30 MIN: CPT | Mod: S$PBB,,, | Performed by: INTERNAL MEDICINE

## 2021-10-26 PROCEDURE — 99999 PR PBB SHADOW E&M-EST. PATIENT-LVL IV: CPT | Mod: PBBFAC,,, | Performed by: INTERNAL MEDICINE

## 2021-10-26 PROCEDURE — 99214 PR OFFICE/OUTPT VISIT, EST, LEVL IV, 30-39 MIN: ICD-10-PCS | Mod: S$PBB,,, | Performed by: INTERNAL MEDICINE

## 2021-10-26 PROCEDURE — 99999 PR PBB SHADOW E&M-EST. PATIENT-LVL IV: ICD-10-PCS | Mod: PBBFAC,,, | Performed by: INTERNAL MEDICINE

## 2021-10-26 PROCEDURE — 97162 PT EVAL MOD COMPLEX 30 MIN: CPT | Mod: PO

## 2021-10-26 ASSESSMENT — SYSTEMIC LUPUS ERYTHEMATOSUS DISEASE ACTIVITY INDEX (SLEDAI): TOTAL_SCORE: 6

## 2021-10-27 ENCOUNTER — OFFICE VISIT (OUTPATIENT)
Dept: NEUROLOGY | Facility: CLINIC | Age: 66
End: 2021-10-27
Payer: MEDICARE

## 2021-10-27 ENCOUNTER — TELEPHONE (OUTPATIENT)
Dept: SLEEP MEDICINE | Facility: CLINIC | Age: 66
End: 2021-10-27
Payer: MEDICARE

## 2021-10-27 ENCOUNTER — PATIENT MESSAGE (OUTPATIENT)
Dept: RHEUMATOLOGY | Facility: CLINIC | Age: 66
End: 2021-10-27
Payer: MEDICARE

## 2021-10-27 VITALS
HEART RATE: 69 BPM | WEIGHT: 161.63 LBS | BODY MASS INDEX: 31.73 KG/M2 | DIASTOLIC BLOOD PRESSURE: 73 MMHG | HEIGHT: 60 IN | SYSTOLIC BLOOD PRESSURE: 116 MMHG

## 2021-10-27 DIAGNOSIS — R42 DIZZINESS: ICD-10-CM

## 2021-10-27 DIAGNOSIS — R51.9 NONINTRACTABLE HEADACHE, UNSPECIFIED CHRONICITY PATTERN, UNSPECIFIED HEADACHE TYPE: ICD-10-CM

## 2021-10-27 DIAGNOSIS — M32.19 OTHER SYSTEMIC LUPUS ERYTHEMATOSUS WITH OTHER ORGAN INVOLVEMENT: Primary | ICD-10-CM

## 2021-10-27 DIAGNOSIS — H81.90 VESTIBULAR DIZZINESS: ICD-10-CM

## 2021-10-27 PROCEDURE — 99213 PR OFFICE/OUTPT VISIT, EST, LEVL III, 20-29 MIN: ICD-10-PCS | Mod: S$PBB,,, | Performed by: NURSE PRACTITIONER

## 2021-10-27 PROCEDURE — 99999 PR PBB SHADOW E&M-EST. PATIENT-LVL III: ICD-10-PCS | Mod: PBBFAC,,, | Performed by: NURSE PRACTITIONER

## 2021-10-27 PROCEDURE — 99213 OFFICE O/P EST LOW 20 MIN: CPT | Mod: S$PBB,,, | Performed by: NURSE PRACTITIONER

## 2021-10-27 PROCEDURE — 99213 OFFICE O/P EST LOW 20 MIN: CPT | Mod: PBBFAC | Performed by: NURSE PRACTITIONER

## 2021-10-27 PROCEDURE — 99999 PR PBB SHADOW E&M-EST. PATIENT-LVL III: CPT | Mod: PBBFAC,,, | Performed by: NURSE PRACTITIONER

## 2021-10-28 ENCOUNTER — TELEPHONE (OUTPATIENT)
Dept: SLEEP MEDICINE | Facility: CLINIC | Age: 66
End: 2021-10-28
Payer: MEDICARE

## 2021-11-02 ENCOUNTER — TELEPHONE (OUTPATIENT)
Dept: SLEEP MEDICINE | Facility: CLINIC | Age: 66
End: 2021-11-02
Payer: MEDICARE

## 2021-11-02 ENCOUNTER — CLINICAL SUPPORT (OUTPATIENT)
Dept: REHABILITATION | Facility: HOSPITAL | Age: 66
End: 2021-11-02
Attending: OTOLARYNGOLOGY
Payer: MEDICARE

## 2021-11-02 DIAGNOSIS — R26.89 IMPAIRMENT OF BALANCE: ICD-10-CM

## 2021-11-02 DIAGNOSIS — M32.9 SYSTEMIC LUPUS ERYTHEMATOSUS, UNSPECIFIED SLE TYPE, UNSPECIFIED ORGAN INVOLVEMENT STATUS: ICD-10-CM

## 2021-11-02 DIAGNOSIS — H81.90 VESTIBULAR DIZZINESS: ICD-10-CM

## 2021-11-02 DIAGNOSIS — H81.90 VESTIBULAR DIZZINESS: Primary | ICD-10-CM

## 2021-11-02 DIAGNOSIS — G44.52 NEW DAILY PERSISTENT HEADACHE: ICD-10-CM

## 2021-11-02 PROCEDURE — 97112 NEUROMUSCULAR REEDUCATION: CPT | Mod: PO

## 2021-11-03 ENCOUNTER — TELEPHONE (OUTPATIENT)
Dept: INTERVENTIONAL RADIOLOGY/VASCULAR | Facility: OTHER | Age: 66
End: 2021-11-03
Payer: MEDICARE

## 2021-11-04 ENCOUNTER — CLINICAL SUPPORT (OUTPATIENT)
Dept: REHABILITATION | Facility: HOSPITAL | Age: 66
End: 2021-11-04
Attending: OTOLARYNGOLOGY
Payer: MEDICARE

## 2021-11-04 DIAGNOSIS — H81.90 VESTIBULAR DIZZINESS: ICD-10-CM

## 2021-11-04 DIAGNOSIS — R26.89 IMPAIRMENT OF BALANCE: ICD-10-CM

## 2021-11-04 PROCEDURE — 97112 NEUROMUSCULAR REEDUCATION: CPT | Mod: PO

## 2021-11-09 ENCOUNTER — CLINICAL SUPPORT (OUTPATIENT)
Dept: REHABILITATION | Facility: HOSPITAL | Age: 66
End: 2021-11-09
Attending: OTOLARYNGOLOGY
Payer: MEDICARE

## 2021-11-09 DIAGNOSIS — R26.89 IMPAIRMENT OF BALANCE: ICD-10-CM

## 2021-11-09 DIAGNOSIS — H81.90 VESTIBULAR DIZZINESS: ICD-10-CM

## 2021-11-09 PROCEDURE — 97112 NEUROMUSCULAR REEDUCATION: CPT | Mod: PO

## 2021-11-12 ENCOUNTER — PATIENT MESSAGE (OUTPATIENT)
Dept: NEUROLOGY | Facility: CLINIC | Age: 66
End: 2021-11-12
Payer: MEDICARE

## 2021-11-12 ENCOUNTER — HOSPITAL ENCOUNTER (OUTPATIENT)
Facility: OTHER | Age: 66
Discharge: HOME | End: 2021-11-12
Attending: RADIOLOGY | Admitting: RADIOLOGY
Payer: MEDICARE

## 2021-11-12 VITALS
TEMPERATURE: 98 F | SYSTOLIC BLOOD PRESSURE: 128 MMHG | DIASTOLIC BLOOD PRESSURE: 66 MMHG | HEART RATE: 60 BPM | OXYGEN SATURATION: 99 % | RESPIRATION RATE: 16 BRPM

## 2021-11-12 DIAGNOSIS — M32.9 SYSTEMIC LUPUS ERYTHEMATOSUS, UNSPECIFIED SLE TYPE, UNSPECIFIED ORGAN INVOLVEMENT STATUS: ICD-10-CM

## 2021-11-12 DIAGNOSIS — H81.90 VESTIBULAR DIZZINESS: ICD-10-CM

## 2021-11-12 DIAGNOSIS — G44.52 NEW DAILY PERSISTENT HEADACHE: ICD-10-CM

## 2021-11-12 LAB
BASOPHILS NFR CSF MANUAL: 0 %
BLOOD COLLECTION FOR MS PROFILE: NORMAL
CLARITY CSF: CLEAR
COLOR CSF: COLORLESS
EOSINOPHIL NFR CSF MANUAL: 0 %
GLUCOSE CSF-MCNC: 55 MG/DL (ref 40–70)
LYMPHOCYTES NFR CSF MANUAL: 75 % (ref 40–80)
MONOS+MACROS NFR CSF MANUAL: 25 % (ref 15–45)
NEUTROPHILS NFR CSF MANUAL: 0 % (ref 0–6)
OTHER CELLS CSF: 0 %
PROT CSF-MCNC: 50 MG/DL (ref 15–40)
RBC # CSF: 35 /CU MM
SPECIMEN VOL CSF: 2 ML
WBC # CSF: 2 /CU MM (ref 0–5)

## 2021-11-12 PROCEDURE — 82945 GLUCOSE OTHER FLUID: CPT | Performed by: NURSE PRACTITIONER

## 2021-11-12 PROCEDURE — 89051 BODY FLUID CELL COUNT: CPT | Performed by: NURSE PRACTITIONER

## 2021-11-12 PROCEDURE — 25000003 PHARM REV CODE 250: Performed by: RADIOLOGY

## 2021-11-12 PROCEDURE — 83883 ASSAY NEPHELOMETRY NOT SPEC: CPT | Performed by: NURSE PRACTITIONER

## 2021-11-12 PROCEDURE — 84157 ASSAY OF PROTEIN OTHER: CPT | Performed by: NURSE PRACTITIONER

## 2021-11-12 RX ORDER — LIDOCAINE HYDROCHLORIDE 10 MG/ML
INJECTION INFILTRATION; PERINEURAL
Status: DISCONTINUED | OUTPATIENT
Start: 2021-11-12 | End: 2021-11-12 | Stop reason: HOSPADM

## 2021-11-15 ENCOUNTER — PATIENT MESSAGE (OUTPATIENT)
Dept: SLEEP MEDICINE | Facility: CLINIC | Age: 66
End: 2021-11-15
Payer: MEDICARE

## 2021-11-15 LAB — KAPPA LC FREE CSF-MCNC: 0.04 MG/DL

## 2021-11-16 ENCOUNTER — PATIENT MESSAGE (OUTPATIENT)
Dept: NEUROLOGY | Facility: CLINIC | Age: 66
End: 2021-11-16
Payer: MEDICARE

## 2021-11-16 ENCOUNTER — TELEPHONE (OUTPATIENT)
Dept: RHEUMATOLOGY | Facility: CLINIC | Age: 66
End: 2021-11-16
Payer: MEDICARE

## 2021-11-16 ENCOUNTER — CLINICAL SUPPORT (OUTPATIENT)
Dept: REHABILITATION | Facility: HOSPITAL | Age: 66
End: 2021-11-16
Attending: OTOLARYNGOLOGY
Payer: MEDICARE

## 2021-11-16 ENCOUNTER — PATIENT MESSAGE (OUTPATIENT)
Dept: RHEUMATOLOGY | Facility: CLINIC | Age: 66
End: 2021-11-16
Payer: MEDICARE

## 2021-11-16 DIAGNOSIS — R26.89 IMPAIRMENT OF BALANCE: ICD-10-CM

## 2021-11-16 DIAGNOSIS — R42 DIZZINESS: Primary | ICD-10-CM

## 2021-11-16 DIAGNOSIS — H81.90 VESTIBULAR DIZZINESS: ICD-10-CM

## 2021-11-16 DIAGNOSIS — R83.8 ELEVATED CSF PROTEIN: ICD-10-CM

## 2021-11-16 DIAGNOSIS — M32.9 SYSTEMIC LUPUS ERYTHEMATOSUS, UNSPECIFIED SLE TYPE, UNSPECIFIED ORGAN INVOLVEMENT STATUS: ICD-10-CM

## 2021-11-16 PROCEDURE — 97112 NEUROMUSCULAR REEDUCATION: CPT | Mod: PO

## 2021-11-17 ENCOUNTER — TELEPHONE (OUTPATIENT)
Dept: NEUROLOGY | Facility: CLINIC | Age: 66
End: 2021-11-17
Payer: MEDICARE

## 2021-11-17 ENCOUNTER — PATIENT MESSAGE (OUTPATIENT)
Dept: NEUROLOGY | Facility: CLINIC | Age: 66
End: 2021-11-17
Payer: MEDICARE

## 2021-11-17 DIAGNOSIS — R42 DIZZINESS: ICD-10-CM

## 2021-11-17 DIAGNOSIS — R83.8 ELEVATED CSF PROTEIN: Primary | ICD-10-CM

## 2021-11-18 ENCOUNTER — CLINICAL SUPPORT (OUTPATIENT)
Dept: REHABILITATION | Facility: HOSPITAL | Age: 66
End: 2021-11-18
Attending: OTOLARYNGOLOGY
Payer: MEDICARE

## 2021-11-18 DIAGNOSIS — R26.89 IMPAIRMENT OF BALANCE: ICD-10-CM

## 2021-11-18 DIAGNOSIS — H81.90 VESTIBULAR DIZZINESS: ICD-10-CM

## 2021-11-18 PROCEDURE — 97112 NEUROMUSCULAR REEDUCATION: CPT | Mod: PO

## 2021-11-19 RX ORDER — BUTALBITAL, ACETAMINOPHEN AND CAFFEINE 50; 325; 40 MG/1; MG/1; MG/1
1-2 TABLET ORAL
Qty: 30 TABLET | Refills: 1 | Status: SHIPPED | OUTPATIENT
Start: 2021-11-19 | End: 2021-12-07

## 2021-11-22 ENCOUNTER — CLINICAL SUPPORT (OUTPATIENT)
Dept: REHABILITATION | Facility: HOSPITAL | Age: 66
End: 2021-11-22
Attending: OTOLARYNGOLOGY
Payer: MEDICARE

## 2021-11-22 DIAGNOSIS — H81.90 VESTIBULAR DIZZINESS: ICD-10-CM

## 2021-11-22 DIAGNOSIS — R26.89 IMPAIRMENT OF BALANCE: ICD-10-CM

## 2021-11-22 PROCEDURE — 97112 NEUROMUSCULAR REEDUCATION: CPT | Mod: PO

## 2021-11-24 ENCOUNTER — CLINICAL SUPPORT (OUTPATIENT)
Dept: REHABILITATION | Facility: HOSPITAL | Age: 66
End: 2021-11-24
Attending: OTOLARYNGOLOGY
Payer: MEDICARE

## 2021-11-24 DIAGNOSIS — R26.89 IMPAIRMENT OF BALANCE: ICD-10-CM

## 2021-11-24 DIAGNOSIS — H81.90 VESTIBULAR DIZZINESS: ICD-10-CM

## 2021-11-24 PROCEDURE — 97112 NEUROMUSCULAR REEDUCATION: CPT | Mod: PO

## 2021-11-28 ENCOUNTER — PATIENT MESSAGE (OUTPATIENT)
Dept: INTERNAL MEDICINE | Facility: CLINIC | Age: 66
End: 2021-11-28
Payer: MEDICARE

## 2021-11-29 ENCOUNTER — CLINICAL SUPPORT (OUTPATIENT)
Dept: REHABILITATION | Facility: HOSPITAL | Age: 66
End: 2021-11-29
Attending: OTOLARYNGOLOGY
Payer: MEDICARE

## 2021-11-29 DIAGNOSIS — H81.90 VESTIBULAR DIZZINESS: ICD-10-CM

## 2021-11-29 DIAGNOSIS — R26.89 IMPAIRMENT OF BALANCE: ICD-10-CM

## 2021-11-29 PROCEDURE — 97112 NEUROMUSCULAR REEDUCATION: CPT | Mod: PO

## 2021-12-07 ENCOUNTER — OFFICE VISIT (OUTPATIENT)
Dept: RHEUMATOLOGY | Facility: CLINIC | Age: 66
End: 2021-12-07
Payer: MEDICARE

## 2021-12-07 ENCOUNTER — LAB VISIT (OUTPATIENT)
Dept: LAB | Facility: HOSPITAL | Age: 66
End: 2021-12-07
Attending: INTERNAL MEDICINE
Payer: MEDICARE

## 2021-12-07 VITALS
WEIGHT: 172 LBS | HEIGHT: 60 IN | HEART RATE: 54 BPM | SYSTOLIC BLOOD PRESSURE: 137 MMHG | BODY MASS INDEX: 33.77 KG/M2 | DIASTOLIC BLOOD PRESSURE: 74 MMHG

## 2021-12-07 DIAGNOSIS — Z78.0 MENOPAUSE: Primary | ICD-10-CM

## 2021-12-07 DIAGNOSIS — M85.9 LOW BONE DENSITY: ICD-10-CM

## 2021-12-07 DIAGNOSIS — E55.9 VITAMIN D DEFICIENCY: ICD-10-CM

## 2021-12-07 DIAGNOSIS — M32.9 SYSTEMIC LUPUS ERYTHEMATOSUS, UNSPECIFIED SLE TYPE, UNSPECIFIED ORGAN INVOLVEMENT STATUS: ICD-10-CM

## 2021-12-07 DIAGNOSIS — M32.8 OTHER FORMS OF SYSTEMIC LUPUS ERYTHEMATOSUS, UNSPECIFIED ORGAN INVOLVEMENT STATUS: ICD-10-CM

## 2021-12-07 LAB
ALBUMIN SERPL BCP-MCNC: 4 G/DL (ref 3.5–5.2)
ALP SERPL-CCNC: 139 U/L (ref 55–135)
ALT SERPL W/O P-5'-P-CCNC: 20 U/L (ref 10–44)
ANION GAP SERPL CALC-SCNC: 9 MMOL/L (ref 8–16)
AST SERPL-CCNC: 26 U/L (ref 10–40)
BASOPHILS # BLD AUTO: 0.09 K/UL (ref 0–0.2)
BASOPHILS NFR BLD: 1.5 % (ref 0–1.9)
BILIRUB SERPL-MCNC: 0.4 MG/DL (ref 0.1–1)
BUN SERPL-MCNC: 12 MG/DL (ref 8–23)
C3 SERPL-MCNC: 107 MG/DL (ref 50–180)
C4 SERPL-MCNC: 27 MG/DL (ref 11–44)
CALCIUM SERPL-MCNC: 9.3 MG/DL (ref 8.7–10.5)
CHLORIDE SERPL-SCNC: 105 MMOL/L (ref 95–110)
CO2 SERPL-SCNC: 26 MMOL/L (ref 23–29)
CREAT SERPL-MCNC: 0.7 MG/DL (ref 0.5–1.4)
CRP SERPL-MCNC: 3.1 MG/L (ref 0–8.2)
DIFFERENTIAL METHOD: ABNORMAL
EOSINOPHIL # BLD AUTO: 0.1 K/UL (ref 0–0.5)
EOSINOPHIL NFR BLD: 1.8 % (ref 0–8)
ERYTHROCYTE [DISTWIDTH] IN BLOOD BY AUTOMATED COUNT: 12.8 % (ref 11.5–14.5)
ERYTHROCYTE [SEDIMENTATION RATE] IN BLOOD BY WESTERGREN METHOD: 14 MM/HR (ref 0–36)
EST. GFR  (AFRICAN AMERICAN): >60 ML/MIN/1.73 M^2
EST. GFR  (NON AFRICAN AMERICAN): >60 ML/MIN/1.73 M^2
GLUCOSE SERPL-MCNC: 81 MG/DL (ref 70–110)
HCT VFR BLD AUTO: 40.7 % (ref 37–48.5)
HGB BLD-MCNC: 13 G/DL (ref 12–16)
IMM GRANULOCYTES # BLD AUTO: 0.01 K/UL (ref 0–0.04)
IMM GRANULOCYTES NFR BLD AUTO: 0.2 % (ref 0–0.5)
LYMPHOCYTES # BLD AUTO: 2 K/UL (ref 1–4.8)
LYMPHOCYTES NFR BLD: 34.1 % (ref 18–48)
MCH RBC QN AUTO: 31.4 PG (ref 27–31)
MCHC RBC AUTO-ENTMCNC: 31.9 G/DL (ref 32–36)
MCV RBC AUTO: 98 FL (ref 82–98)
MONOCYTES # BLD AUTO: 0.7 K/UL (ref 0.3–1)
MONOCYTES NFR BLD: 11.4 % (ref 4–15)
NEUTROPHILS # BLD AUTO: 3.1 K/UL (ref 1.8–7.7)
NEUTROPHILS NFR BLD: 51 % (ref 38–73)
NRBC BLD-RTO: 0 /100 WBC
PLATELET # BLD AUTO: 267 K/UL (ref 150–450)
PMV BLD AUTO: 10.8 FL (ref 9.2–12.9)
POTASSIUM SERPL-SCNC: 4.1 MMOL/L (ref 3.5–5.1)
PROT SERPL-MCNC: 7.3 G/DL (ref 6–8.4)
RBC # BLD AUTO: 4.14 M/UL (ref 4–5.4)
SODIUM SERPL-SCNC: 140 MMOL/L (ref 136–145)
WBC # BLD AUTO: 5.99 K/UL (ref 3.9–12.7)

## 2021-12-07 PROCEDURE — 86160 COMPLEMENT ANTIGEN: CPT | Performed by: INTERNAL MEDICINE

## 2021-12-07 PROCEDURE — 86225 DNA ANTIBODY NATIVE: CPT | Mod: 59 | Performed by: INTERNAL MEDICINE

## 2021-12-07 PROCEDURE — 99999 PR PBB SHADOW E&M-EST. PATIENT-LVL IV: ICD-10-PCS | Mod: PBBFAC,,, | Performed by: INTERNAL MEDICINE

## 2021-12-07 PROCEDURE — 99213 OFFICE O/P EST LOW 20 MIN: CPT | Mod: S$PBB,,, | Performed by: INTERNAL MEDICINE

## 2021-12-07 PROCEDURE — 86140 C-REACTIVE PROTEIN: CPT | Performed by: INTERNAL MEDICINE

## 2021-12-07 PROCEDURE — 36415 COLL VENOUS BLD VENIPUNCTURE: CPT | Performed by: INTERNAL MEDICINE

## 2021-12-07 PROCEDURE — 86160 COMPLEMENT ANTIGEN: CPT | Mod: 59 | Performed by: INTERNAL MEDICINE

## 2021-12-07 PROCEDURE — 99999 PR PBB SHADOW E&M-EST. PATIENT-LVL IV: CPT | Mod: PBBFAC,,, | Performed by: INTERNAL MEDICINE

## 2021-12-07 PROCEDURE — 86225 DNA ANTIBODY NATIVE: CPT | Performed by: INTERNAL MEDICINE

## 2021-12-07 PROCEDURE — 99213 PR OFFICE/OUTPT VISIT, EST, LEVL III, 20-29 MIN: ICD-10-PCS | Mod: S$PBB,,, | Performed by: INTERNAL MEDICINE

## 2021-12-07 PROCEDURE — 99214 OFFICE O/P EST MOD 30 MIN: CPT | Mod: PBBFAC | Performed by: INTERNAL MEDICINE

## 2021-12-07 PROCEDURE — 85652 RBC SED RATE AUTOMATED: CPT | Performed by: INTERNAL MEDICINE

## 2021-12-07 PROCEDURE — 80053 COMPREHEN METABOLIC PANEL: CPT | Performed by: INTERNAL MEDICINE

## 2021-12-07 PROCEDURE — 85025 COMPLETE CBC W/AUTO DIFF WBC: CPT | Performed by: INTERNAL MEDICINE

## 2021-12-07 RX ORDER — ERGOCALCIFEROL 1.25 MG/1
50000 CAPSULE ORAL
Qty: 12 CAPSULE | Refills: 3 | Status: SHIPPED | OUTPATIENT
Start: 2021-12-07 | End: 2022-02-21 | Stop reason: SDUPTHER

## 2021-12-07 RX ORDER — HYDROXYCHLOROQUINE SULFATE 200 MG/1
200 TABLET, FILM COATED ORAL 2 TIMES DAILY
Qty: 180 TABLET | Refills: 1 | Status: SHIPPED | OUTPATIENT
Start: 2021-12-07 | End: 2022-04-12 | Stop reason: SDUPTHER

## 2021-12-07 ASSESSMENT — SYSTEMIC LUPUS ERYTHEMATOSUS DISEASE ACTIVITY INDEX (SLEDAI): TOTAL_SCORE: 2

## 2021-12-08 ENCOUNTER — OFFICE VISIT (OUTPATIENT)
Dept: OPTOMETRY | Facility: CLINIC | Age: 66
End: 2021-12-08
Payer: MEDICARE

## 2021-12-08 DIAGNOSIS — Z79.899 LONG-TERM USE OF PLAQUENIL: Primary | ICD-10-CM

## 2021-12-08 DIAGNOSIS — H52.4 MYOPIA WITH PRESBYOPIA OF BOTH EYES: ICD-10-CM

## 2021-12-08 DIAGNOSIS — R42 DIZZINESS: ICD-10-CM

## 2021-12-08 DIAGNOSIS — H52.4 MYOPIA WITH PRESBYOPIA OF BOTH EYES: Primary | ICD-10-CM

## 2021-12-08 DIAGNOSIS — M32.9 SYSTEMIC LUPUS ERYTHEMATOSUS, UNSPECIFIED SLE TYPE, UNSPECIFIED ORGAN INVOLVEMENT STATUS: ICD-10-CM

## 2021-12-08 DIAGNOSIS — H52.13 MYOPIA WITH PRESBYOPIA OF BOTH EYES: Primary | ICD-10-CM

## 2021-12-08 DIAGNOSIS — Z97.3 WEARS CONTACT LENSES: ICD-10-CM

## 2021-12-08 DIAGNOSIS — H25.13 NUCLEAR SCLEROSIS OF BOTH EYES: ICD-10-CM

## 2021-12-08 DIAGNOSIS — H52.13 MYOPIA WITH PRESBYOPIA OF BOTH EYES: ICD-10-CM

## 2021-12-08 PROCEDURE — 92499 UNLISTED OPH SVC/PROCEDURE: CPT | Mod: CSM,,, | Performed by: OPTOMETRIST

## 2021-12-08 PROCEDURE — 92134 POSTERIOR SEGMENT OCT RETINA (OCULAR COHERENCE TOMOGRAPHY)-BOTH EYES: ICD-10-PCS | Mod: 26,S$PBB,, | Performed by: OPTOMETRIST

## 2021-12-08 PROCEDURE — 92083 HUMPHREY VISUAL FIELD - OU - BOTH EYES: ICD-10-PCS | Mod: 26,S$PBB,, | Performed by: OPTOMETRIST

## 2021-12-08 PROCEDURE — 92014 COMPRE OPH EXAM EST PT 1/>: CPT | Mod: S$PBB,,, | Performed by: OPTOMETRIST

## 2021-12-08 PROCEDURE — 99999 PR PBB SHADOW E&M-EST. PATIENT-LVL III: CPT | Mod: PBBFAC,,, | Performed by: OPTOMETRIST

## 2021-12-08 PROCEDURE — 92499 PR CONTACT LENS F/U LEV 1: ICD-10-PCS | Mod: CSM,,, | Performed by: OPTOMETRIST

## 2021-12-08 PROCEDURE — 92083 EXTENDED VISUAL FIELD XM: CPT | Mod: PBBFAC | Performed by: OPTOMETRIST

## 2021-12-08 PROCEDURE — 99213 OFFICE O/P EST LOW 20 MIN: CPT | Mod: PBBFAC | Performed by: OPTOMETRIST

## 2021-12-08 PROCEDURE — 92014 PR EYE EXAM, EST PATIENT,COMPREHESV: ICD-10-PCS | Mod: S$PBB,,, | Performed by: OPTOMETRIST

## 2021-12-08 PROCEDURE — 92134 CPTRZ OPH DX IMG PST SGM RTA: CPT | Mod: PBBFAC | Performed by: OPTOMETRIST

## 2021-12-08 PROCEDURE — 99999 PR PBB SHADOW E&M-EST. PATIENT-LVL III: ICD-10-PCS | Mod: PBBFAC,,, | Performed by: OPTOMETRIST

## 2021-12-08 RX ORDER — BUTALBITAL, ACETAMINOPHEN AND CAFFEINE 50; 325; 40 MG/1; MG/1; MG/1
TABLET ORAL
COMMUNITY
Start: 2021-12-07 | End: 2022-12-07

## 2021-12-10 ENCOUNTER — CLINICAL SUPPORT (OUTPATIENT)
Dept: REHABILITATION | Facility: HOSPITAL | Age: 66
End: 2021-12-10
Attending: OTOLARYNGOLOGY
Payer: MEDICARE

## 2021-12-10 DIAGNOSIS — H81.90 VESTIBULAR DIZZINESS: ICD-10-CM

## 2021-12-10 DIAGNOSIS — R26.89 IMPAIRMENT OF BALANCE: ICD-10-CM

## 2021-12-10 LAB
DNA TITER: NORMAL
DSDNA AB SER-ACNC: POSITIVE [IU]/ML

## 2021-12-10 PROCEDURE — 97112 NEUROMUSCULAR REEDUCATION: CPT | Mod: PO

## 2021-12-14 ENCOUNTER — CLINICAL SUPPORT (OUTPATIENT)
Dept: REHABILITATION | Facility: HOSPITAL | Age: 66
End: 2021-12-14
Attending: OTOLARYNGOLOGY
Payer: MEDICARE

## 2021-12-14 DIAGNOSIS — H81.90 VESTIBULAR DIZZINESS: ICD-10-CM

## 2021-12-14 DIAGNOSIS — R26.89 IMPAIRMENT OF BALANCE: ICD-10-CM

## 2021-12-14 PROCEDURE — 97112 NEUROMUSCULAR REEDUCATION: CPT | Mod: PO

## 2021-12-16 ENCOUNTER — CLINICAL SUPPORT (OUTPATIENT)
Dept: REHABILITATION | Facility: HOSPITAL | Age: 66
End: 2021-12-16
Attending: OTOLARYNGOLOGY
Payer: MEDICARE

## 2021-12-16 DIAGNOSIS — H81.90 VESTIBULAR DIZZINESS: ICD-10-CM

## 2021-12-16 DIAGNOSIS — R26.89 IMPAIRMENT OF BALANCE: ICD-10-CM

## 2021-12-16 PROCEDURE — 97112 NEUROMUSCULAR REEDUCATION: CPT | Mod: PO

## 2021-12-20 ENCOUNTER — OFFICE VISIT (OUTPATIENT)
Dept: NEUROLOGY | Facility: CLINIC | Age: 66
End: 2021-12-20
Payer: MEDICARE

## 2021-12-20 ENCOUNTER — LAB VISIT (OUTPATIENT)
Dept: LAB | Facility: HOSPITAL | Age: 66
End: 2021-12-20
Attending: STUDENT IN AN ORGANIZED HEALTH CARE EDUCATION/TRAINING PROGRAM
Payer: MEDICARE

## 2021-12-20 VITALS
WEIGHT: 170.94 LBS | DIASTOLIC BLOOD PRESSURE: 72 MMHG | HEART RATE: 58 BPM | HEIGHT: 60 IN | BODY MASS INDEX: 33.56 KG/M2 | SYSTOLIC BLOOD PRESSURE: 137 MMHG

## 2021-12-20 DIAGNOSIS — G62.9 NEUROPATHY: Primary | ICD-10-CM

## 2021-12-20 DIAGNOSIS — G43.009 MIGRAINE WITHOUT AURA AND WITHOUT STATUS MIGRAINOSUS, NOT INTRACTABLE: ICD-10-CM

## 2021-12-20 DIAGNOSIS — E53.8 DEFICIENCY OF OTHER SPECIFIED B GROUP VITAMINS: ICD-10-CM

## 2021-12-20 DIAGNOSIS — R42 DIZZINESS: ICD-10-CM

## 2021-12-20 DIAGNOSIS — G60.9 HEREDITARY AND IDIOPATHIC NEUROPATHY, UNSPECIFIED: ICD-10-CM

## 2021-12-20 DIAGNOSIS — G62.9 NEUROPATHY: ICD-10-CM

## 2021-12-20 DIAGNOSIS — R83.8 ELEVATED CSF PROTEIN: ICD-10-CM

## 2021-12-20 PROCEDURE — 99214 OFFICE O/P EST MOD 30 MIN: CPT | Mod: PBBFAC | Performed by: STUDENT IN AN ORGANIZED HEALTH CARE EDUCATION/TRAINING PROGRAM

## 2021-12-20 PROCEDURE — 99999 PR PBB SHADOW E&M-EST. PATIENT-LVL IV: CPT | Mod: PBBFAC,,, | Performed by: STUDENT IN AN ORGANIZED HEALTH CARE EDUCATION/TRAINING PROGRAM

## 2021-12-20 PROCEDURE — 82746 ASSAY OF FOLIC ACID SERUM: CPT | Performed by: STUDENT IN AN ORGANIZED HEALTH CARE EDUCATION/TRAINING PROGRAM

## 2021-12-20 PROCEDURE — 82525 ASSAY OF COPPER: CPT | Performed by: STUDENT IN AN ORGANIZED HEALTH CARE EDUCATION/TRAINING PROGRAM

## 2021-12-20 PROCEDURE — 84165 PROTEIN E-PHORESIS SERUM: CPT | Performed by: STUDENT IN AN ORGANIZED HEALTH CARE EDUCATION/TRAINING PROGRAM

## 2021-12-20 PROCEDURE — 84446 ASSAY OF VITAMIN E: CPT | Performed by: STUDENT IN AN ORGANIZED HEALTH CARE EDUCATION/TRAINING PROGRAM

## 2021-12-20 PROCEDURE — 99999 PR PBB SHADOW E&M-EST. PATIENT-LVL IV: ICD-10-PCS | Mod: PBBFAC,,, | Performed by: STUDENT IN AN ORGANIZED HEALTH CARE EDUCATION/TRAINING PROGRAM

## 2021-12-20 PROCEDURE — 99215 PR OFFICE/OUTPT VISIT, EST, LEVL V, 40-54 MIN: ICD-10-PCS | Mod: S$PBB,,, | Performed by: STUDENT IN AN ORGANIZED HEALTH CARE EDUCATION/TRAINING PROGRAM

## 2021-12-20 PROCEDURE — 84165 PROTEIN E-PHORESIS SERUM: CPT | Mod: 26,,, | Performed by: PATHOLOGY

## 2021-12-20 PROCEDURE — 99215 OFFICE O/P EST HI 40 MIN: CPT | Mod: S$PBB,,, | Performed by: STUDENT IN AN ORGANIZED HEALTH CARE EDUCATION/TRAINING PROGRAM

## 2021-12-20 PROCEDURE — 84165 PATHOLOGIST INTERPRETATION SPE: ICD-10-PCS | Mod: 26,,, | Performed by: PATHOLOGY

## 2021-12-20 PROCEDURE — 84207 ASSAY OF VITAMIN B-6: CPT | Performed by: STUDENT IN AN ORGANIZED HEALTH CARE EDUCATION/TRAINING PROGRAM

## 2021-12-20 RX ORDER — DULOXETIN HYDROCHLORIDE 20 MG/1
CAPSULE, DELAYED RELEASE ORAL
Qty: 42 CAPSULE | Refills: 0 | Status: SHIPPED | OUTPATIENT
Start: 2021-12-20 | End: 2022-01-12

## 2021-12-20 RX ORDER — DULOXETIN HYDROCHLORIDE 60 MG/1
60 CAPSULE, DELAYED RELEASE ORAL DAILY
Qty: 90 CAPSULE | Refills: 3 | Status: SHIPPED | OUTPATIENT
Start: 2022-01-09 | End: 2022-01-13

## 2021-12-20 NOTE — PROGRESS NOTES
[facility]  New Patient Visit      MS Disease Summary     Principle neurological diagnosis: R acoustic schwannoma, headaches  Last MRI Brain: 9/10/21 stable schwannoma  Last MRI C-spine: NA  CSF 11/12/21: No OCB. 50 protein, 55 glucose  JCV status: NA  Other relevant labs and tests: NA    History:     She has Lupus that was diagnosed in 1984, she has been on plaquenil since then. Her symptoms are mainly arthritis, rash, fatigue.   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 Follow up MRI has been stable.   She recently developed some symptoms that she thought were similar to MS (headaches, dizziness). Dizzy spells (room spinning, lightheaded) occurred in Aug 2021 and were occurring frequently until Nov 2021. She was ordered vestibular therapy by her ENT, and this has improved.    She used to have migraine headaches, she had ocular involvements - kaleidoscope vision. This year she started having pain - squeezing/pressure like pain around her head, associated with eye swelling, photophobia, phonophobia, mild nausea. They can last from a few seconds to an hour. She was prescribed Fioricet and this helped with her headaches (currently taking it once every other day). Aleve doesn't seem to help. Sleeping helps.   LP 11/12/21: MS profile was negative, but protein was elevated 50.  MRI 9/10/21 reportedly showed stable Schwannoma size compared to 1 year ago.     ROS wise:  She denies weakness, but she does have generalized fatigue.  She has tingling and numbness in her R foot, and now her L foot is experiencing the same, although to a milder degree. She is currently taking zinc.    She denies bowel/bladder symptoms.  Denies vision issues, recent ophthalmology visit showed normal exam findings.   She has cognitive deficits - trouble recalling names/objects      No family of MS or other neurological diseases.      ROS:     A complete 9 system ROS was reviewed by me and  otherwise negative .     Past Medical History:   Diagnosis Date    Acid reflux     Anemia 2years ago    Anxiety     Arthritis     Asthma, chronic 4/10/2013    Crohn's disease 1998    Depression     Dry eyes     Fracture of forearm, proximal, open     screws & plate 13 in [facility]    History of papilledema 11/10/11    Hyperlipidemia 2018    Lupus     Neuromuscular disorder 2009    Neuropathy    Osteoporosis     Stroke 2013    Thyroid disease        Past Surgical History:   Procedure Laterality Date    ADENOIDECTOMY      BRAIN SURGERY  2012    Brain Biopsy    COLONOSCOPY      [facility] Dr. WILLIS Summers; pan-diverticulosis, rec repeat 3-5 years    INCISION AND DRAINAGE PERIRECTAL ABSCESS  1998    abscess removal    ORIF FOREARM FRACTURE Left 2013    proximal ulnar and radius       Family History   Problem Relation Age of Onset    Cancer Mother 64        small cell lung cancer; smoker    Heart disease Father 64        acute MI; 3v CABG    Sjogren's syndrome Sister     Allergies Sister     Cancer Maternal Grandmother     COPD Maternal Grandfather     Cancer Paternal Grandmother     Stroke Paternal Grandmother     Ovarian cancer Paternal Grandmother     Heart disease Paternal Grandfather     Mental retardation Brother     Hypertension Sister     Angioedema Neg Hx     Asthma Neg Hx     Atopy Neg Hx     Eczema Neg Hx     Immunodeficiency Neg Hx     Rhinitis Neg Hx     Urticaria Neg Hx        Social History     Socioeconomic History    Marital status: Single   Occupational History    Occupation: human resources   Tobacco Use    Smoking status: Former Smoker     Packs/day: 0.50     Years: 15.00     Pack years: 7.50     Types: Cigarettes     Quit date: 1984     Years since quittin.4    Smokeless tobacco: Never Used    Tobacco comment: Quit    Substance and Sexual Activity    Alcohol use: Yes     Comment: less than one per week    Drug  use: No    Sexual activity: Never   Social History Narrative    [place] in [place], rest of the year in [facility]    Enjoys traveling; Going to [facility] x 1 month fall 2018    Regular exercise     Social Determinants of Health     Financial Resource Strain: Low Risk     Difficulty of Paying Living Expenses: Not hard at all   Physical Activity: Sufficiently Active    Days of Exercise per Week: 5 days    Minutes of Exercise per Session: 40 min   Social Connections: Unknown    Frequency of Communication with Friends and Family: More than three times a week    Active Member of Clubs or Organizations: No       Review of patient's allergies indicates:   Allergen Reactions    Bactrim [sulfamethoxazole-trimethoprim] Nausea And Vomiting     Nausea, ? rash  Nausea, ? rash       Living arrangements - the patient lives with their family.    Exam:     Vitals:    12/20/21 0915   BP: 137/72   Pulse: (!) 58   Weight: 77.6 kg (170 lb 15.5 oz)   Height: 5' (1.524 m)          In general, the patient is well nourished and appears to be in no acute distress.     Fundi are normal bilaterally.    MENTAL STATUS: language is fluent, normal verbal comprehension, 1 mistake on WORLD backward spelling, several mistakes performing serial 7's, patient is alert and oriented x 3, fund of knowlege is appropriate by vocabulary.     CRANIAL NERVE EXAM:  There is no intrernuclear ophthalmoplegia.  Extraocular muscles are intact. Pupils are equal, round, and reactive to light. No facial asymmetry. Facial sensation is intact bilaterally. There is no dysarthria. Uvula is midline, and palate moves symmetrically. Shoulder shrug intact bilaterlly. Tongue protrusion is midline. Hearing is intact to finger rub bilaterally. Neck is supple with full ROM  No Lhermitte's    MOTOR EXAM: Normal bulk and tone throughout UE and LE bilaterally.   No pronator drift; rapid sequential movements are normal; Strength is  5/5 in all groups in the lower extremities and upper  extremities except mild finger extensor weakness b/l (R 4/5, L 4+/5) and bilateral finger abductor weakness 4/5.    REFLEXES: 2+ and symmetric throughout in all four extremeties; toes are down bilaterally; no cross-adductors    SENSORY EXAM: Normal to light touch, vibration, pinprick throughout except RLE reduced vibration at toes.    COORDINATION: Normal finger-to-nose exam. Normal heel-to-shin exam.    GAIT: Narrow based and stable. Able to heel walk, toe walk, and perform tandem gait.     Negative Romberg's    Imaging (personally reviewed):   MRI brain 9/10/21 without evidence of any MS lesions, right acoustic schwannoma stable in size    Labs:     Lab Results   Component Value Date    PCHRWPWR96JT 41 10/20/2021    [id number] 44 03/13/2019    RNTGXWSG74YG 27 (L) 10/16/2018       Lab Results   Component Value Date    WBC 5.99 12/07/2021    RBC 4.14 12/07/2021    HGB 13.0 12/07/2021    HCT 40.7 12/07/2021    MCV 98 12/07/2021    MCH 31.4 (H) 12/07/2021    MCHC 31.9 (L) 12/07/2021    RDW 12.8 12/07/2021     12/07/2021    MPV 10.8 12/07/2021    GRAN 3.1 12/07/2021    GRAN 51.0 12/07/2021    LYMPH 2.0 12/07/2021    LYMPH 34.1 12/07/2021    MONO 0.7 12/07/2021    MONO 11.4 12/07/2021    EOS 0.1 12/07/2021    BASO 0.09 12/07/2021    EOSINOPHIL 1.8 12/07/2021    BASOPHIL 1.5 12/07/2021     Sodium   Date Value Ref Range Status   12/07/2021 140 136 - 145 mmol/L Final     Potassium   Date Value Ref Range Status   12/07/2021 4.1 3.5 - 5.1 mmol/L Final     Chloride   Date Value Ref Range Status   12/07/2021 105 95 - 110 mmol/L Final     CO2   Date Value Ref Range Status   12/07/2021 26 23 - 29 mmol/L Final     Glucose   Date Value Ref Range Status   12/07/2021 81 70 - 110 mg/dL Final     BUN   Date Value Ref Range Status   12/07/2021 12 8 - 23 mg/dL Final     Creatinine   Date Value Ref Range Status   12/07/2021 0.7 0.5 - 1.4 mg/dL Final     Calcium   Date Value Ref Range Status   12/07/2021 9.3 8.7 - 10.5 mg/dL  Final     Total Protein   Date Value Ref Range Status   12/07/2021 7.3 6.0 - 8.4 g/dL Final     Albumin   Date Value Ref Range Status   12/07/2021 4.0 3.5 - 5.2 g/dL Final     Total Bilirubin   Date Value Ref Range Status   12/07/2021 0.4 0.1 - 1.0 mg/dL Final     Comment:     For infants and newborns, interpretation of results should be based  on gestational age, weight and in agreement with clinical  observations.    Premature Infant recommended reference ranges:  Up to 24 hours.............<8.0 mg/dL  Up to 48 hours............<12.0 mg/dL  3-5 days..................<15.0 mg/dL  6-29 days.................<15.0 mg/dL       Alkaline Phosphatase   Date Value Ref Range Status   12/07/2021 139 (H) 55 - 135 U/L Final     AST   Date Value Ref Range Status   12/07/2021 26 10 - 40 U/L Final     ALT   Date Value Ref Range Status   12/07/2021 20 10 - 44 U/L Final     Anion Gap   Date Value Ref Range Status   12/07/2021 9 8 - 16 mmol/L Final     eGFR if    Date Value Ref Range Status   12/07/2021 >60.0 >60 mL/min/1.73 m^2 Final     eGFR if non    Date Value Ref Range Status   12/07/2021 >60.0 >60 mL/min/1.73 m^2 Final     Comment:     Calculation used to obtain the estimated glomerular filtration  rate (eGFR) is the CKD-EPI equation.                   Diagnosis/Assessment/Plan:     Risk for MS is very low given absence of characteristic lesions in the brain, and CSF study negative for intrathecal oligoclonal bands.  She does have a right acoustic schwannoma, which is stable in size the most recent MRI.  There is no evidence of any CNS lupus involvement on imaging. Her CSF protein is slightly elevated which is nonspecific. Her most bothersome symptom right now is her headache, I will start her on Cymbalta.  She will follow-up with Headache Clinic.  Education regarding lifestyle modifications for brain health discussed.       Total time spent with the patient: 47 minutes, 47 minutes of face to  face consultation and chart review and coordination of care, on the day of the visit. This includes face to face time and non-face to face time preparing to see the patient (eg, review of tests), obtaining and/or reviewing separately obtained history, documenting clinical information in the electronic or other health record, independently interpreting resultsand communicating results to the patient/family/caregiver, or care coordination.         Rochelle Saucedo MD, MSc  Attending neurologist

## 2021-12-20 NOTE — PATIENT INSTRUCTIONS
-Avoid cured meats, hot dogs, sausages, aged cheeses, red wine, chocolate, MSG  -Increase oral hydration    -Cymbalta:  Week 1: 20mg at bedtime  Week 2: 40mg at bedtime  Week 3 and thereafter: 60mg at bedtime     -Follow up with headache clinic   -Low risk for MS

## 2021-12-21 ENCOUNTER — PATIENT MESSAGE (OUTPATIENT)
Dept: NEUROLOGY | Facility: CLINIC | Age: 66
End: 2021-12-21
Payer: MEDICARE

## 2021-12-21 ENCOUNTER — CLINICAL SUPPORT (OUTPATIENT)
Dept: REHABILITATION | Facility: HOSPITAL | Age: 66
End: 2021-12-21
Attending: OTOLARYNGOLOGY
Payer: MEDICARE

## 2021-12-21 DIAGNOSIS — R26.89 IMPAIRMENT OF BALANCE: ICD-10-CM

## 2021-12-21 DIAGNOSIS — H81.90 VESTIBULAR DIZZINESS: ICD-10-CM

## 2021-12-21 LAB
ALBUMIN SERPL ELPH-MCNC: 3.96 G/DL (ref 3.35–5.55)
ALPHA1 GLOB SERPL ELPH-MCNC: 0.29 G/DL (ref 0.17–0.41)
ALPHA2 GLOB SERPL ELPH-MCNC: 0.78 G/DL (ref 0.43–0.99)
B-GLOBULIN SERPL ELPH-MCNC: 0.81 G/DL (ref 0.5–1.1)
FOLATE SERPL-MCNC: 35.7 NG/ML (ref 4–24)
GAMMA GLOB SERPL ELPH-MCNC: 1.18 G/DL (ref 0.67–1.58)
PATHOLOGIST INTERPRETATION SPE: NORMAL
PROT SERPL-MCNC: 7 G/DL (ref 6–8.4)
VIT B12 SERPL-MCNC: 489 NG/L (ref 180–914)

## 2021-12-21 PROCEDURE — 97112 NEUROMUSCULAR REEDUCATION: CPT | Mod: PO

## 2021-12-25 LAB — A-TOCOPHEROL VIT E SERPL-MCNC: 1428 UG/DL (ref 500–1800)

## 2021-12-27 LAB — PYRIDOXAL SERPL-MCNC: 40 UG/L (ref 5–50)

## 2021-12-28 LAB — COPPER SERPL-MCNC: 1270 UG/L (ref 810–1990)

## 2021-12-29 ENCOUNTER — CLINICAL SUPPORT (OUTPATIENT)
Dept: REHABILITATION | Facility: HOSPITAL | Age: 66
End: 2021-12-29
Attending: OTOLARYNGOLOGY
Payer: MEDICARE

## 2021-12-29 DIAGNOSIS — R26.89 IMPAIRMENT OF BALANCE: ICD-10-CM

## 2021-12-29 DIAGNOSIS — H81.90 VESTIBULAR DIZZINESS: ICD-10-CM

## 2021-12-29 PROCEDURE — 97112 NEUROMUSCULAR REEDUCATION: CPT | Mod: PO

## 2022-01-03 NOTE — PROGRESS NOTES
"    Physical Therapy Daily Treatment Note     Name: Radha ALONSO [facility]  Clinic Number: [id number]    Therapy Diagnosis:   Encounter Diagnoses   Name Primary?    Vestibular dizziness     Impairment of balance      Physician: Jeronimo Vance MD    Visit Date: 1/4/2022    Physician Orders: PT Eval and Treat   Medical Diagnosis from Referral:   D33.3 (ICD-10-CM) - Right acoustic neuroma   R42 (ICD-10-CM) - Dysequilibrium      Evaluation Date: 10/26/2021  Authorization Period Expiration: 01/01/22 to 03/08/22  Plan of Care Expiration: 12/28/21 to 01/28/22  Visit # / Visits authorized: 01/01 (14 total visits)- further authorization pending     Time In: 11:05  Time Out: 11:45  Total Billable Time: 40 minutes     Precautions: Standard, B hearing aides    PTA Visit #: 0/5     Missed visits: 0  Cancelled visits: 0  Subjective     Pt reports: that she is doing well this morning. No recent dizziness episodes. No questions regarding HEP.     She was compliant with home exercise program.    Response to previous treatment: No adverse effects reported   Functional change: Ongoing     Pain: 0/10  Location:  N/a     Objective     See below for objective measurements taken this date.    Treatment     Radha participated in neuromuscular re-education activities to improve: Balance and Vestibular Function for 40 minutes. The following activities were included:     Hallway walking: SBA  2 x 100 feet ambulation in hallway with  R<>L head turns, min sway, no dizziness  2 x 100 feet ambulation in hallway with up <> down head turns, min sway, no dizziness  2 x 100 feet, Ambulation in hallway with diagonal plane RUQ<>LLQ head turns, min unsteadiness, no dizziness  2 x 100 feet, Ambulation in hallway with diagonal plane LUQ<>RLQ head turns, min unsteadiness, no dizziness    X 4 laps tandem ambulation in // bars, occ touchdown support, CGA  X 4 laps alternating marching in // bars, 3" hold, CGA occ touchdown support  X 4 laps R<>L side stepping over " "8 medium cones, each LE taps cone prior to stepping, CGA, min unsteadiness, no UE support    2 x 30" each LE in front, tandem stance with eyes closed, CGA, intermittent touchdown support    X 4 laps forward ambulation in // bars with eyes closed    X 10 reps, RLE leading, forward step up onto 4 pt foam fitter board, no UE support, CGA  X 10 reps, LLE leading, forward step up onto 4 pt foam fitter board, no UE support, CGA  Progress to including head movements next session    Foam pad: narrow RAMONITA  3 x 30" static stance with eyes closed, CGA, no UE support  2 x 30" R<>L head turns, eyes closed, CGA, no UE support  2 x 30" up <> down head turns, eyes closed, CGA, no UE support    4 point foam fitter board  2 x 10 reps, RLE SLS with LLE tapping 2 large cones in front, CGA, no UE support, min unsteadiness  2 x 10 reps, LLE SLS with RLE tapping 2 large cones in front, CGA, no UE support, min unsteadiness  2 x 10 reps, R<>L side stepping across fitter board, CGA, no UE support, min unsteadiness    2 x 30" each LE, SLS occ touchdown support    Home Exercises Provided and Patient Education Provided     Education provided:   - HEP, POC, Proper technique with therapeutic interventions, Benefits/purpose of today's therapeutic interventions    Written Home Exercises Provided: yes. Updated on 12/29/21.    Exercises were reviewed and Radha was able to demonstrate them prior to the end of the session.  Radha demonstrated good  understanding of the education provided.     See EMR under Patient Instructions for exercises provided 11/2/2021, 11/22/21, 12/10/21    Assessment     Radha tolerated therapy session well this morning. Progressed difficulty of both dynamic and static balance activities for increased challenge. No dizziness elicited with activities performed today. Plan to continue to focus on balance challenges in subsequent sessions to prepare for upcoming d/c.     Radha is progressing well towards her goals.   Pt prognosis " is Good.     Pt will continue to benefit from skilled outpatient physical therapy to address the deficits listed in the problem list box on initial evaluation, provide pt/family education and to maximize pt's level of independence in the home and community environment.     Pt's spiritual, cultural and educational needs considered and pt agreeable to plan of care and goals.     Anticipated barriers to physical therapy: co-morbidities     Goals:  Short Term Goals: 4 weeks   1. Patient to be (I) with established HEP. MET 11/22/21  2. Patient to perform saccades at 80 bpm WFL for improved oculomotor endurance. MET 11/22/21  3. Patient to perform VOR 1 at 70 bpm WFL for improved gaze stabilization. MET 11/22/21  4. Patient to pass GST condition 2 with no more than min additional sway for improved balance in low vision environments. MET 11/22/21  5. Patient to improve GST condition 4 to at least 8 seconds for improved balance in low vision environments. MET 12/29/21  6. Patient to improve GST condition 6 to at least 15 seconds for improved balance in low vision environments. MET 11/22/21  7. Patient to improve performance on Fukuda step test to <20 deg bias in either direction for improved vestibular system symmetry. MET 11/22/21     Long Term Goals: 8 weeks   1. Patient to be (I) with advanced HEP. Progressing  2. Patient to perform saccades at 100 bpm WFL for improved oculomotor endurance. MET 11/22/21  3. Patient to perform VOR 1 at 90 bpm WFL for improved gaze stabilization. MET 11/22/21  4. Patient to pass GST condition 2 with no more than slight additional sway for improved balance in low vision environments. MET 12/29/21  5. Patient to improve GST condition 4 to at least 12 seconds for improved balance in low vision environments. Partially Met  6. Patient to improve GST condition 6 to at least 25 seconds for improved balance in low vision environments. MET 11/22/21   Updated 11/22/21: Patient to pass GST condition  6 with no more than slight additional sway for improved balance in low vision environments. Progressing  7. Patient to improve FGA score to at least 28/30 for improved balance and decreased risk of fall in community settings. Progressing      Plan     Continue smooth pursuits with reading component- can be performed walking or sitting.   Continue VOR 2.   Continue to address ambulation with head movements, turning, and positional movements.   Continue vision eliminated balance, tandem balance, and ascending/descending steps.     Silvia Tony, PT

## 2022-01-04 ENCOUNTER — CLINICAL SUPPORT (OUTPATIENT)
Dept: REHABILITATION | Facility: HOSPITAL | Age: 67
End: 2022-01-04
Attending: OTOLARYNGOLOGY
Payer: MEDICARE

## 2022-01-04 DIAGNOSIS — R26.89 IMPAIRMENT OF BALANCE: ICD-10-CM

## 2022-01-04 DIAGNOSIS — H81.90 VESTIBULAR DIZZINESS: ICD-10-CM

## 2022-01-04 PROCEDURE — 97112 NEUROMUSCULAR REEDUCATION: CPT | Mod: PO

## 2022-01-09 ENCOUNTER — PATIENT MESSAGE (OUTPATIENT)
Dept: NEUROLOGY | Facility: CLINIC | Age: 67
End: 2022-01-09
Payer: MEDICARE

## 2022-01-10 ENCOUNTER — PATIENT MESSAGE (OUTPATIENT)
Dept: NEUROLOGY | Facility: CLINIC | Age: 67
End: 2022-01-10
Payer: MEDICARE

## 2022-01-10 NOTE — PROGRESS NOTES
"    Physical Therapy Daily Treatment Note     Name: Radha ALONSO [facility]  Clinic Number: [id number]    Therapy Diagnosis:   Encounter Diagnoses   Name Primary?    Vestibular dizziness     Impairment of balance      Physician: Jeronimo Vance MD    Visit Date: 1/11/2022    Physician Orders: PT Eval and Treat   Medical Diagnosis from Referral:   D33.3 (ICD-10-CM) - Right acoustic neuroma   R42 (ICD-10-CM) - Dysequilibrium      Evaluation Date: 10/26/2021  Authorization Period Expiration: 01/01/22 to 03/08/22  Plan of Care Expiration: 12/28/21 to 01/28/22  Visit # / Visits authorized: 02/01 (15 total visits)- further authorization pending     Time In: 1150  Time Out: 1230  Total Billable Time: 40 minutes     Precautions: Standard, B hearing aides    PTA Visit #: 0/5     Missed visits: 0  Cancelled visits: 0  Subjective     Pt reports: that she is doing well this morning. No recent dizziness episodes.    She was compliant with home exercise program.    Response to previous treatment: No adverse effects reported   Functional change: Ongoing     Pain: 0/10  Location:  N/a     Objective     See below for objective measurements taken this date.    Treatment     Radha participated in neuromuscular re-education activities to improve: Balance and Vestibular Function for 40 minutes. The following activities were included:     Hallway walking: SBA  2 x 100 feet ambulation in hallway with  R<>L head turns, min sway, no dizziness  2 x 100 feet ambulation in hallway with up <> down head turns, min sway, no dizziness  2 x 100 feet, Ambulation in hallway with diagonal plane RUQ<>LLQ head turns, min unsteadiness, no dizziness  2 x 100 feet, Ambulation in hallway with diagonal plane LUQ<>RLQ head turns, min unsteadiness, no dizziness    X 4 laps tandem ambulation in // bars, occ touchdown support, contact guard assist   X 4 laps eyes closed walking 15 meters, contact guard assist     2 x 30" each LE in front, tandem stance with eyes closed, " "CGA, no upper extremity touchdown    Foam pad: narrow RAMONITA  3 x 30" static stance with eyes closed, standby assist, no upper extremity support  2 x 30" R<>L head turns, eyes closed, contact guard assist no upper extremity support, slight increase in dizziness  2 x 30" up <> down head turns, eyes closed, CGA, no UE support    2 x 30 sec split stance on hard side BOSU, each lower extremity, eyes closed, contact guard assist, no upper extremity support  2 x 30 sec split stance on fitter board, each lower extremity, horizontal head turns, no upper extremity support, standby assist (could be progressed to BOSU)    X 10 reps, RLE leading, forward step up onto 4 pt foam fitter board, with horizontal head turns, no upper extremity support, contact guard assist   X 10 reps, LLE leading, forward step up onto 4 pt foam fitter board, with horizontal head turns, no upper extremity support, contact guard assist     Standing on fitter board:   X 10 right lower extremity 2 orange cone tap, no upper extremity support, standby assist   X 10 left lower extremity 2 orange cone tap, no upper extremity support, standby assist       Home Exercises Provided and Patient Education Provided     Education provided:   - HEP, POC, Proper technique with therapeutic interventions, Benefits/purpose of today's therapeutic interventions    Written Home Exercises Provided: yes. Updated on 12/29/21.    Exercises were reviewed and Radha was able to demonstrate them prior to the end of the session.  Radha demonstrated good  understanding of the education provided.     See EMR under Patient Instructions for exercises provided 11/2/2021, 11/22/21, 12/10/21    Assessment     Radha tolerated therapy session well this morning. No dizziness reported post intervention. Brief report of dizziness with vision eliminated static balance with head turns. Good stability observed with ambulation with head turns, all performed standby assist. Modified single leg balance " activities performed with good stability and could be progressed next visit. Plan to continue to focus on balance challenges in subsequent sessions to prepare for upcoming d/c.     Radha is progressing well towards her goals.   Pt prognosis is Good.     Pt will continue to benefit from skilled outpatient physical therapy to address the deficits listed in the problem list box on initial evaluation, provide pt/family education and to maximize pt's level of independence in the home and community environment.     Pt's spiritual, cultural and educational needs considered and pt agreeable to plan of care and goals.     Anticipated barriers to physical therapy: co-morbidities     Goals:  Short Term Goals: 4 weeks   1. Patient to be (I) with established HEP. MET 11/22/21  2. Patient to perform saccades at 80 bpm WFL for improved oculomotor endurance. MET 11/22/21  3. Patient to perform VOR 1 at 70 bpm WFL for improved gaze stabilization. MET 11/22/21  4. Patient to pass GST condition 2 with no more than min additional sway for improved balance in low vision environments. MET 11/22/21  5. Patient to improve GST condition 4 to at least 8 seconds for improved balance in low vision environments. MET 12/29/21  6. Patient to improve GST condition 6 to at least 15 seconds for improved balance in low vision environments. MET 11/22/21  7. Patient to improve performance on Fukuda step test to <20 deg bias in either direction for improved vestibular system symmetry. MET 11/22/21     Long Term Goals: 8 weeks   1. Patient to be (I) with advanced HEP. Progressing  2. Patient to perform saccades at 100 bpm WFL for improved oculomotor endurance. MET 11/22/21  3. Patient to perform VOR 1 at 90 bpm WFL for improved gaze stabilization. MET 11/22/21  4. Patient to pass GST condition 2 with no more than slight additional sway for improved balance in low vision environments. MET 12/29/21  5. Patient to improve GST condition 4 to at least 12  seconds for improved balance in low vision environments. Partially Met  6. Patient to improve GST condition 6 to at least 25 seconds for improved balance in low vision environments. MET 11/22/21   Updated 11/22/21: Patient to pass GST condition 6 with no more than slight additional sway for improved balance in low vision environments. Progressing  7. Patient to improve FGA score to at least 28/30 for improved balance and decreased risk of fall in community settings. Progressing      Plan     Continue to address ambulation with head movements, turning, and positional movements.   Continue vision eliminated balance, tandem balance, and ascending/descending steps.     Chel Pereira, PT

## 2022-01-11 ENCOUNTER — OFFICE VISIT (OUTPATIENT)
Dept: RHEUMATOLOGY | Facility: CLINIC | Age: 67
End: 2022-01-11
Payer: MEDICARE

## 2022-01-11 ENCOUNTER — CLINICAL SUPPORT (OUTPATIENT)
Dept: REHABILITATION | Facility: HOSPITAL | Age: 67
End: 2022-01-11
Attending: OTOLARYNGOLOGY
Payer: MEDICARE

## 2022-01-11 VITALS
WEIGHT: 172 LBS | HEIGHT: 60 IN | DIASTOLIC BLOOD PRESSURE: 75 MMHG | HEART RATE: 69 BPM | BODY MASS INDEX: 33.77 KG/M2 | SYSTOLIC BLOOD PRESSURE: 139 MMHG

## 2022-01-11 DIAGNOSIS — R26.89 IMPAIRMENT OF BALANCE: ICD-10-CM

## 2022-01-11 DIAGNOSIS — M32.9 SYSTEMIC LUPUS ERYTHEMATOSUS, UNSPECIFIED SLE TYPE, UNSPECIFIED ORGAN INVOLVEMENT STATUS: Primary | ICD-10-CM

## 2022-01-11 DIAGNOSIS — H81.90 VESTIBULAR DIZZINESS: ICD-10-CM

## 2022-01-11 PROCEDURE — 99214 OFFICE O/P EST MOD 30 MIN: CPT | Mod: PBBFAC | Performed by: INTERNAL MEDICINE

## 2022-01-11 PROCEDURE — 99213 PR OFFICE/OUTPT VISIT, EST, LEVL III, 20-29 MIN: ICD-10-PCS | Mod: S$PBB,,, | Performed by: INTERNAL MEDICINE

## 2022-01-11 PROCEDURE — 99213 OFFICE O/P EST LOW 20 MIN: CPT | Mod: S$PBB,,, | Performed by: INTERNAL MEDICINE

## 2022-01-11 PROCEDURE — 97112 NEUROMUSCULAR REEDUCATION: CPT | Mod: PO

## 2022-01-11 PROCEDURE — 99999 PR PBB SHADOW E&M-EST. PATIENT-LVL IV: ICD-10-PCS | Mod: PBBFAC,,, | Performed by: INTERNAL MEDICINE

## 2022-01-11 PROCEDURE — 99999 PR PBB SHADOW E&M-EST. PATIENT-LVL IV: CPT | Mod: PBBFAC,,, | Performed by: INTERNAL MEDICINE

## 2022-01-11 ASSESSMENT — SYSTEMIC LUPUS ERYTHEMATOSUS DISEASE ACTIVITY INDEX (SLEDAI): TOTAL_SCORE: 2

## 2022-01-11 NOTE — PROGRESS NOTES
Subjective:       Patient ID: Radha Chiang is a 66 y.o. female.    Chief Complaint: SLE    HPI 66 year old with pmh of SLE here for follow up. Last visit was 12/7/21 where her dizziness symptoms have improved with vestibular therapy but reported continuous headaches. In the interim she followed up with neurology who added cymbalta and will continue to follow her in headache clinic. Today, she reports she is up to 60 mg of duloxetine at night since yesterday and is feeling very fatigued and overall unwell since starting the medication. She is, however, reporting a decrease in the frequency of her headaches. Her dizziness has also resolved. She denies oral ulcers, hair loss, chest pain, arthritis, myositis, or fever. She will be following up with neurology in headache clinic.     SLEDAI: 0    Review of Systems   Constitutional: Negative for fever and unexpected weight change.   HENT: Negative for mouth sores and trouble swallowing.    Eyes: Positive for redness.   Respiratory: Positive for shortness of breath. Negative for cough.    Cardiovascular: Negative for chest pain.   Gastrointestinal: Negative for constipation and diarrhea.   Genitourinary: Negative for dysuria and genital sores.   Skin: Negative for rash.   Neurological: Positive for headaches.   Hematological: Does not bruise/bleed easily.         Objective:   /75   Pulse 69   Ht 5' (1.524 m)   Wt 78 kg (172 lb)   BMI 33.59 kg/m²      Physical Exam   Constitutional: She is oriented to person, place, and time.   HENT:   Head: Normocephalic and atraumatic.   Eyes: Pupils are equal, round, and reactive to light.   Cardiovascular: Normal rate, regular rhythm, normal heart sounds and normal pulses.   Pulmonary/Chest: Effort normal and breath sounds normal.   Abdominal: Normal appearance and bowel sounds are normal.   Musculoskeletal:         General: Normal range of motion.      Cervical back: Normal range of motion.   Neurological: She is alert  and oriented to person, place, and time.   Reflex Scores:       Bicep reflexes are 2+ on the right side and 2+ on the left side.       Patellar reflexes are 2+ on the right side and 2+ on the left side.       Achilles reflexes are 2+ on the right side and 2+ on the left side.  Skin: Skin is warm.   Psychiatric: Her behavior is normal. Mood normal.       Right Side Rheumatological Exam     Shoulder Exam   Sensation: normal    Knee Exam   Sensation: normal    Hip Exam   Sensation: normal    Elbow/Wrist Exam   Sensation: normal    Muscle Strength (0-5 scale):  Neck Flexion:  5  Neck Extension: 5  Deltoid:  5  Biceps: 5/5   Triceps:  5  : 5/5   Iliopsoas: 5  Quadriceps:  5   Distal Lower Extremity: 5    Left Side Rheumatological Exam     Shoulder Exam   Sensation: normal    Knee Exam   Sensation: normal    Hip Exam   Sensation: normal    Elbow/Wrist Exam   Sensation: normal    Muscle Strength (0-5 scale):  Neck Flexion:  5  Neck Extension: 5  Deltoid:  5  Biceps: 5/5   Triceps:  5  :  5/5   Iliopsoas: 5  Quadriceps:  5   Distal Lower Extremity: 5          Lab Results   Component Value Date    WBC 5.99 12/07/2021    HGB 13.0 12/07/2021    HCT 40.7 12/07/2021    MCV 98 12/07/2021     12/07/2021       CMP  Sodium   Date Value Ref Range Status   12/07/2021 140 136 - 145 mmol/L Final     Potassium   Date Value Ref Range Status   12/07/2021 4.1 3.5 - 5.1 mmol/L Final     Chloride   Date Value Ref Range Status   12/07/2021 105 95 - 110 mmol/L Final     CO2   Date Value Ref Range Status   12/07/2021 26 23 - 29 mmol/L Final     Glucose   Date Value Ref Range Status   12/07/2021 81 70 - 110 mg/dL Final     BUN   Date Value Ref Range Status   12/07/2021 12 8 - 23 mg/dL Final     Creatinine   Date Value Ref Range Status   12/07/2021 0.7 0.5 - 1.4 mg/dL Final     Calcium   Date Value Ref Range Status   12/07/2021 9.3 8.7 - 10.5 mg/dL Final     Total Protein   Date Value Ref Range Status   12/07/2021 7.3 6.0 - 8.4 g/dL  Final     Albumin   Date Value Ref Range Status   12/07/2021 4.0 3.5 - 5.2 g/dL Final     Total Bilirubin   Date Value Ref Range Status   12/07/2021 0.4 0.1 - 1.0 mg/dL Final     Comment:     For infants and newborns, interpretation of results should be based  on gestational age, weight and in agreement with clinical  observations.    Premature Infant recommended reference ranges:  Up to 24 hours.............<8.0 mg/dL  Up to 48 hours............<12.0 mg/dL  3-5 days..................<15.0 mg/dL  6-29 days.................<15.0 mg/dL       Alkaline Phosphatase   Date Value Ref Range Status   12/07/2021 139 (H) 55 - 135 U/L Final     AST   Date Value Ref Range Status   12/07/2021 26 10 - 40 U/L Final     ALT   Date Value Ref Range Status   12/07/2021 20 10 - 44 U/L Final     Anion Gap   Date Value Ref Range Status   12/07/2021 9 8 - 16 mmol/L Final     eGFR if    Date Value Ref Range Status   12/07/2021 >60.0 >60 mL/min/1.73 m^2 Final     eGFR if non    Date Value Ref Range Status   12/07/2021 >60.0 >60 mL/min/1.73 m^2 Final     Comment:     Calculation used to obtain the estimated glomerular filtration  rate (eGFR) is the CKD-EPI equation.        Lab Results   Component Value Date    SEDRATE 14 12/07/2021      Lab Results   Component Value Date    CRP 3.1 12/07/2021       Assessment:       No diagnosis found.        Plan:       Problem List Items Addressed This Visit    None         SLE, SLEDAI 0:  - Continue hydroxychloroquine 200 mg BID  - Lupus labs at next appointment   - DEXA is scheduled for tomorrow 1/12/22.     Headache:   - Recommend decreasing Cymbalta to 40 mg for one week. If symptoms persist then decrease to 20 mg for one week. If symptoms persist than discontinue.   - Continue follow up with neurology in headache clinic.     Dizziness, resolved:    - Continue with vestibular therapy      RTC 3 months     Pt was seen and discussed with Dr. Rios who is in agreement with  the plan.    Cyril Hernandez MD  [facility] PGY-1

## 2022-01-11 NOTE — PATIENT INSTRUCTIONS
Decrease duloxetine to 40 mg for one week. If still symptomatic decrease to 20 mg for one week. If symptoms continue then discontinue.

## 2022-01-11 NOTE — PROGRESS NOTES
I have personally taken the history and examined the patient and agree with the resident,s note as stated above           SLE SLEDAI: 2( + dsDNA)but decreased from 1:1280 to 1:80 Recent brain MRI down in [place] as per above, unchanged from 1 year earlier and was fowarded to her Neurosurgeon at [place] who had treated the acoustic neuroma with gamma knife.  History of CNS lupus 2014  treated with cyclophosphamide IV x 6 months, then mycophenolate. Saw Dr. Saucedo 12/20/21, no evidence of MS, right acoustic schwannoma, headaches, started on duloxetine with side effects as listed by Dr. Hernandez  APS panel negative  Since August: Intermittent Dizziness/lightheadness, lasts 1-2 hrs, associated gain imbalance/unsteadiness  Post episode fatigue. Not vertigo, not orthostatic/positional. Now resolved  Persistent migraine headache for past year normal ESR and CRP  Chronic hearing loss right ear  Jaccoud's arthropathy, asymptomatic  Vitamin D deficiency level 41, perfect  Crohn's in remission  Chronically minimallyeelevated alk phos          Cont hydroxychloroquine 200mg twice daily, saw Dr. Monge in Optometry 12/8/21  DXA scheduled tomorrow  Cont vitamin D2 50,000 units once a week.   RTC 3 months with standing lupus labs     Answers for HPI/ROS submitted by the patient on 1/11/2022  fever: No  eye redness: Yes  mouth sores: No  headaches: Yes  shortness of breath: Yes  chest pain: No  trouble swallowing: No  diarrhea: No  constipation: No  unexpected weight change: No  genital sore: No  dysuria: No  During the last 3 days, have you had a skin rash?: No  Bruises or bleeds easily: No  cough: No

## 2022-01-12 ENCOUNTER — HOSPITAL ENCOUNTER (OUTPATIENT)
Dept: RADIOLOGY | Facility: CLINIC | Age: 67
Discharge: HOME | End: 2022-01-12
Attending: INTERNAL MEDICINE
Payer: MEDICARE

## 2022-01-12 DIAGNOSIS — E55.9 VITAMIN D DEFICIENCY: ICD-10-CM

## 2022-01-12 DIAGNOSIS — M85.9 LOW BONE DENSITY: ICD-10-CM

## 2022-01-12 DIAGNOSIS — Z78.0 MENOPAUSE: ICD-10-CM

## 2022-01-12 PROCEDURE — 77080 DXA BONE DENSITY AXIAL: CPT | Mod: 26,,, | Performed by: INTERNAL MEDICINE

## 2022-01-12 PROCEDURE — 77080 DEXA BONE DENSITY SPINE HIP: ICD-10-PCS | Mod: 26,,, | Performed by: INTERNAL MEDICINE

## 2022-01-12 PROCEDURE — 77080 DXA BONE DENSITY AXIAL: CPT | Mod: TC

## 2022-01-13 DIAGNOSIS — G62.9 NEUROPATHY: ICD-10-CM

## 2022-01-13 DIAGNOSIS — R42 DIZZINESS: ICD-10-CM

## 2022-01-13 RX ORDER — DULOXETIN HYDROCHLORIDE 20 MG/1
40 CAPSULE, DELAYED RELEASE ORAL NIGHTLY
Qty: 60 CAPSULE | Refills: 6 | Status: SHIPPED | OUTPATIENT
Start: 2022-01-13 | End: 2022-03-16

## 2022-01-14 NOTE — PROGRESS NOTES
"    Physical Therapy Daily Treatment Note     [facility]  [facility] Number: [id number]    Therapy Diagnosis:   No diagnosis found.  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 No recent dizziness episodes.    She was compliant with home exercise program.    Response to previous treatment: No adverse effects reported   Functional change: Ongoing     Pain: 0/10  Location:  N/a     Objective     See below for objective measurements taken this date.    Treatment     Radha participated in neuromuscular re-education activities to improve: Balance and Vestibular Function for 40 minutes. The following activities were included:     Hallway walking: SBA  2 x 100 feet ambulation in hallway with  R<>L head turns, min sway, no dizziness  2 x 100 feet ambulation in hallway with up <> down head turns, min sway, no dizziness  2 x 100 feet, Ambulation in hallway with diagonal plane RUQ<>LLQ head turns, min unsteadiness, no dizziness  2 x 100 feet, Ambulation in hallway with diagonal plane LUQ<>RLQ head turns, min unsteadiness, no dizziness    X 4 laps tandem ambulation in // bars, occ touchdown support, contact guard assist   X 4 laps eyes closed walking 15 meters, contact guard assist     2 x 30" each LE in front, tandem stance with eyes closed, CGA, no upper extremity touchdown    Foam pad: narrow RAMONITA  3 x 30" static stance with eyes closed, " "standby assist, no upper extremity support  2 x 30" R<>L head turns, eyes closed, contact guard assist no upper extremity support, slight increase in dizziness  2 x 30" up <> down head turns, eyes closed, CGA, no UE support    2 x 30 sec split stance on hard side BOSU, each lower extremity, eyes closed, contact guard assist, no upper extremity support  2 x 30 sec split stance on fitter board, each lower extremity, horizontal head turns, no upper extremity support, standby assist (could be progressed to BOSU)    X 10 reps, RLE leading, forward step up onto 4 pt foam fitter board, with horizontal head turns, no upper extremity support, contact guard assist   X 10 reps, LLE leading, forward step up onto 4 pt foam fitter board, with horizontal head turns, no upper extremity support, contact guard assist     Standing on fitter board:   X 10 right lower extremity 2 orange cone tap, no upper extremity support, standby assist   X 10 left lower extremity 2 orange cone tap, no upper extremity support, standby assist       Home Exercises Provided and Patient Education Provided     Education provided:   - HEP, POC, Proper technique with therapeutic interventions, Benefits/purpose of today's therapeutic interventions    Written Home Exercises Provided: yes. Updated on 12/29/21.    Exercises were reviewed and Radha was able to demonstrate them prior to the end of the session.  Radha demonstrated good  understanding of the education provided.     See EMR under Patient Instructions for exercises provided 11/2/2021, 11/22/21, 12/10/21    Assessment     Radha tolerated therapy session well this morning. No dizziness reported post intervention. Brief report of dizziness with vision eliminated static balance with head turns. Good stability observed with ambulation with head turns, all performed standby assist. Modified single leg balance activities performed with good stability and could be progressed next visit. Plan to continue to " focus on balance challenges in subsequent sessions to prepare for upcoming d/c.     Radha is progressing well towards her goals.   Pt prognosis is Good.     Pt will continue to benefit from skilled outpatient physical therapy to address the deficits listed in the problem list box on initial evaluation, provide pt/family education and to maximize pt's level of independence in the home and community environment.     Pt's spiritual, cultural and educational needs considered and pt agreeable to plan of care and goals.     Anticipated barriers to physical therapy: co-morbidities     Goals:  Short Term Goals: 4 weeks   1. Patient to be (I) with established HEP. MET 11/22/21  2. Patient to perform saccades at 80 bpm WFL for improved oculomotor endurance. MET 11/22/21  3. Patient to perform VOR 1 at 70 bpm WFL for improved gaze stabilization. MET 11/22/21  4. Patient to pass GST condition 2 with no more than min additional sway for improved balance in low vision environments. MET 11/22/21  5. Patient to improve GST condition 4 to at least 8 seconds for improved balance in low vision environments. MET 12/29/21  6. Patient to improve GST condition 6 to at least 15 seconds for improved balance in low vision environments. MET 11/22/21  7. Patient to improve performance on Fukuda step test to <20 deg bias in either direction for improved vestibular system symmetry. MET 11/22/21     Long Term Goals: 8 weeks   1. Patient to be (I) with advanced HEP. Progressing  2. Patient to perform saccades at 100 bpm WFL for improved oculomotor endurance. MET 11/22/21  3. Patient to perform VOR 1 at 90 bpm WFL for improved gaze stabilization. MET 11/22/21  4. Patient to pass GST condition 2 with no more than slight additional sway for improved balance in low vision environments. MET 12/29/21  5. Patient to improve GST condition 4 to at least 12 seconds for improved balance in low vision environments. Partially Met  6. Patient to improve GST  condition 6 to at least 25 seconds for improved balance in low vision environments. MET 11/22/21   Updated 11/22/21: Patient to pass GST condition 6 with no more than slight additional sway for improved balance in low vision environments. Progressing  7. Patient to improve FGA score to at least 28/30 for improved balance and decreased risk of fall in community settings. Progressing      Plan     Continue to address ambulation with head movements, turning, and positional movements.   Continue vision eliminated balance, tandem balance, and ascending/descending steps.     Chel Pereira, PT

## 2022-01-18 ENCOUNTER — CLINICAL SUPPORT (OUTPATIENT)
Dept: REHABILITATION | Facility: HOSPITAL | Age: 67
End: 2022-01-18
Attending: OTOLARYNGOLOGY
Payer: MEDICARE

## 2022-01-18 DIAGNOSIS — R26.89 IMPAIRMENT OF BALANCE: ICD-10-CM

## 2022-01-18 DIAGNOSIS — H81.90 VESTIBULAR DIZZINESS: ICD-10-CM

## 2022-01-18 PROCEDURE — 97112 NEUROMUSCULAR REEDUCATION: CPT | Mod: PO

## 2022-01-18 NOTE — PROGRESS NOTES
"    Physical Therapy Daily Treatment Note     Name: Radha ALONSO [facility]  Clinic Number: [id number]    Therapy Diagnosis:   Encounter Diagnoses   Name Primary?   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     She was compliant with home exercise program.    Response to previous treatment: No adverse effects reported   Functional change: Ongoing     Pain: 0/10  Location:  N/a     Objective     See below for objective measurements taken this date.    Treatment     Radha participated in neuromuscular re-education activities to improve: Balance and Vestibular Function for 45 minutes. The following activities were included:     Hallway walking: SBA  2 x 100 feet ambulation in hallway with  R<>L head turns, min sway, no dizziness  2 x 100 feet ambulation in hallway with up <> down head turns, min sway, no dizziness  2 x 100 feet, Ambulation in hallway with diagonal plane RUQ<>LLQ head turns, min unsteadiness, no dizziness  2 x 100 feet, Ambulation in hallway with diagonal plane LUQ<>RLQ head turns, min unsteadiness, no dizziness    X 4 laps tandem ambulation in // bars, occ touchdown support, contact guard assist   X 2 laps eyes closed walking 15 meters, contact guard assist     2 x 30" each LE in front, tandem stance with eyes closed, CGA, no upper extremity touchdown    2 x 30 " "sec split stance on hard side BOSU, each lower extremity, eyes closed, contact guard assist, no upper extremity support    2 X 10 reps, RLE leading, forward step up onto 4 pt foam fitter board, with horizontal head turns, no upper extremity support, contact guard assist   2 X 10 reps, LLE leading, forward step up onto 4 pt foam fitter board, with horizontal head turns, no upper extremity support, contact guard assist   2 X 10 reps, RLE leading, forward step up onto 4 pt foam fitter board, with vertical head turns, no upper extremity support, contact guard assist   2 X 10 reps, LLE leading, forward step up onto 4 pt foam fitter board, with vertical head turns, no upper extremity support, contact guard assist     Standing on fitter board:   X 10 right lower extremity 2 orange cone tap, no upper extremity support, standby assist   X 10 left lower extremity 2 orange cone tap, no upper extremity support, standby assist     Performed in corner:  X 30s, normal RAMONITA, eyes closed with horizontal head turns, SBA  X 30s, normal RAMONITA, eyes closed with vertical head turns, SBA  X 30s, feet together, eyes closed, SBA    Home Exercises Provided and Patient Education Provided     Education provided:   - HEP, POC, Proper technique with therapeutic interventions, Benefits/purpose of today's therapeutic interventions    Written Home Exercises Provided: yes. Updated on 12/29/21.    Exercises were reviewed and Radha was able to demonstrate them prior to the end of the session.  Radha demonstrated good  understanding of the education provided.     See EMR under Patient Instructions for exercises provided 11/2/2021, 11/22/21, 12/10/21    Assessment   Radha tolerated today's session well. She was most challenged with vision-eliminated tasks, but responded well to verbal cues to "fixate" on a target even with her eyes closed. HEP instructions were reviewed with patient to address her concerns; weekly frequency of interventions were clarified. " She also reported no challenge from vision-eliminated static balance in a corner. PT had patient perform a few trials of alternative conditions; she was most appropriately challenged by performing the tasks with a narrowed RAMONITA (feet together). Will defer changes to HEP to patient's primary PT. Next session to review HEP as needed and discharge from PT.     Radha is progressing well towards her goals.   Pt prognosis is Good.     Pt will continue to benefit from skilled outpatient physical therapy to address the deficits listed in the problem list box on initial evaluation, provide pt/family education and to maximize pt's level of independence in the home and community environment.     Pt's spiritual, cultural and educational needs considered and pt agreeable to plan of care and goals.     Anticipated barriers to physical therapy: co-morbidities     Goals:  Short Term Goals: 4 weeks   1. Patient to be (I) with established HEP. MET 11/22/21  2. Patient to perform saccades at 80 bpm WFL for improved oculomotor endurance. MET 11/22/21  3. Patient to perform VOR 1 at 70 bpm WFL for improved gaze stabilization. MET 11/22/21  4. Patient to pass GST condition 2 with no more than min additional sway for improved balance in low vision environments. MET 11/22/21  5. Patient to improve GST condition 4 to at least 8 seconds for improved balance in low vision environments. MET 12/29/21  6. Patient to improve GST condition 6 to at least 15 seconds for improved balance in low vision environments. MET 11/22/21  7. Patient to improve performance on Fukuda step test to <20 deg bias in either direction for improved vestibular system symmetry. MET 11/22/21     Long Term Goals: 8 weeks   1. Patient to be (I) with advanced HEP. Progressing  2. Patient to perform saccades at 100 bpm WFL for improved oculomotor endurance. MET 11/22/21  3. Patient to perform VOR 1 at 90 bpm WFL for improved gaze stabilization. MET 11/22/21  4. Patient to pass  GST condition 2 with no more than slight additional sway for improved balance in low vision environments. MET 12/29/21  5. Patient to improve GST condition 4 to at least 12 seconds for improved balance in low vision environments. Partially Met  6. Patient to improve GST condition 6 to at least 25 seconds for improved balance in low vision environments. MET 11/22/21   Updated 11/22/21: Patient to pass GST condition 6 with no more than slight additional sway for improved balance in low vision environments. Progressing  7. Patient to improve FGA score to at least 28/30 for improved balance and decreased risk of fall in community settings. Progressing      Plan     Review HEP as indicated. Discharge.     Nicole Mitchell, PT

## 2022-01-23 ENCOUNTER — PATIENT MESSAGE (OUTPATIENT)
Dept: NEUROLOGY | Facility: CLINIC | Age: 67
End: 2022-01-23
Payer: MEDICARE

## 2022-01-25 ENCOUNTER — TELEPHONE (OUTPATIENT)
Dept: RHEUMATOLOGY | Facility: CLINIC | Age: 67
End: 2022-01-25
Payer: MEDICARE

## 2022-01-25 NOTE — TELEPHONE ENCOUNTER
Gwen, please schedule brief visit virtual or in person to discuss therapy options for low bone density with increased fracture risk. Thank you ROBERTO

## 2022-01-26 ENCOUNTER — PATIENT MESSAGE (OUTPATIENT)
Dept: ADMINISTRATIVE | Facility: HOSPITAL | Age: 67
End: 2022-01-26
Payer: MEDICARE

## 2022-02-01 ENCOUNTER — LAB VISIT (OUTPATIENT)
Dept: LAB | Facility: HOSPITAL | Age: 67
End: 2022-02-01
Attending: INTERNAL MEDICINE
Payer: MEDICARE

## 2022-02-01 ENCOUNTER — OFFICE VISIT (OUTPATIENT)
Dept: RHEUMATOLOGY | Facility: CLINIC | Age: 67
End: 2022-02-01
Payer: MEDICARE

## 2022-02-01 VITALS
BODY MASS INDEX: 33.77 KG/M2 | SYSTOLIC BLOOD PRESSURE: 132 MMHG | WEIGHT: 172 LBS | HEIGHT: 60 IN | DIASTOLIC BLOOD PRESSURE: 74 MMHG | HEART RATE: 59 BPM

## 2022-02-01 DIAGNOSIS — M32.19 OTHER SYSTEMIC LUPUS ERYTHEMATOSUS WITH OTHER ORGAN INVOLVEMENT: Primary | ICD-10-CM

## 2022-02-01 DIAGNOSIS — M85.9 LOW BONE DENSITY: ICD-10-CM

## 2022-02-01 LAB
MAGNESIUM SERPL-MCNC: 1.8 MG/DL (ref 1.6–2.6)
PHOSPHATE SERPL-MCNC: 3.8 MG/DL (ref 2.7–4.5)
PTH-INTACT SERPL-MCNC: 87.1 PG/ML (ref 9–77)

## 2022-02-01 PROCEDURE — 36415 COLL VENOUS BLD VENIPUNCTURE: CPT | Performed by: INTERNAL MEDICINE

## 2022-02-01 PROCEDURE — 99212 PR OFFICE/OUTPT VISIT, EST, LEVL II, 10-19 MIN: ICD-10-PCS | Mod: S$PBB,,, | Performed by: INTERNAL MEDICINE

## 2022-02-01 PROCEDURE — 86334 IMMUNOFIX E-PHORESIS SERUM: CPT | Mod: 26,,, | Performed by: PATHOLOGY

## 2022-02-01 PROCEDURE — 99999 PR PBB SHADOW E&M-EST. PATIENT-LVL III: ICD-10-PCS | Mod: PBBFAC,,, | Performed by: INTERNAL MEDICINE

## 2022-02-01 PROCEDURE — 99999 PR PBB SHADOW E&M-EST. PATIENT-LVL III: CPT | Mod: PBBFAC,,, | Performed by: INTERNAL MEDICINE

## 2022-02-01 PROCEDURE — 99212 OFFICE O/P EST SF 10 MIN: CPT | Mod: S$PBB,,, | Performed by: INTERNAL MEDICINE

## 2022-02-01 PROCEDURE — 86334 PATHOLOGIST INTERPRETATION IFE: ICD-10-PCS | Mod: 26,,, | Performed by: PATHOLOGY

## 2022-02-01 PROCEDURE — 83970 ASSAY OF PARATHORMONE: CPT | Performed by: INTERNAL MEDICINE

## 2022-02-01 PROCEDURE — 86334 IMMUNOFIX E-PHORESIS SERUM: CPT | Performed by: INTERNAL MEDICINE

## 2022-02-01 PROCEDURE — 99213 OFFICE O/P EST LOW 20 MIN: CPT | Mod: PBBFAC | Performed by: INTERNAL MEDICINE

## 2022-02-01 PROCEDURE — 83735 ASSAY OF MAGNESIUM: CPT | Performed by: INTERNAL MEDICINE

## 2022-02-01 PROCEDURE — 84100 ASSAY OF PHOSPHORUS: CPT | Performed by: INTERNAL MEDICINE

## 2022-02-01 ASSESSMENT — ROUTINE ASSESSMENT OF PATIENT INDEX DATA (RAPID3)
PSYCHOLOGICAL DISTRESS SCORE: 1.1
PATIENT GLOBAL ASSESSMENT SCORE: 3
TOTAL RAPID3 SCORE: 2
MDHAQ FUNCTION SCORE: 0.3
PAIN SCORE: 2
FATIGUE SCORE: 1.5

## 2022-02-01 NOTE — PROGRESS NOTES
I have personally taken the history and examined the patient and agree with the resident,s note as stated above       The DXA shows low bone density with increased fracture risk. Gwen will schedule brief visit, virtual or in person, to discuss therapy options to help prevent fractures. RJQ              PACS Images for [facility] [place] Viewer     Show images for DXA Bone Density Spine And Hip    All Reviewers List    William Rios MD on 1/25/2022 16:52     DXA Bone Density Spine And Hip  Order: [id number]   Status: Final result       Visible to patient: Yes (not seen)       Next appt: 02/04/2022 at 10:45 AM in Outpatient Rehab (Silvia Tony, PT)       Dx: Menopause; Vitamin D deficiency; Low ...       1 Result Note       1 Patient Communication       1  Topic      Details    Reading Physician Reading Date Result Priority   Margy Preciado MD  [phone] 1/25/2022      Narrative & Impression  EXAMINATION:  DEXA BONE DENSITY SPINE HIP     CLINICAL HISTORY:  Vitamin D deficiency, unspecified.  67 y/o female with a history of fractured left arm at 59 y/o.  She had menopausal symptoms at 50 y/o.  She is taking is taking 50,000 units of Vit D once a week.  She has a history of Lupus, Asthma, Crohn's Disease and IBS.  She exercises regularly and does not smoke.     TECHNIQUE:  DXA specification: [facility] Hologic Horizon A ([id number])     Bone Mineral Density scanning was performed over the hip and lumbar spine.     Review of the images confirms satisfactory positioning and technique.     COMPARISON:  Comparison study done on 12/27/2019.     Lumbar spine:    BMD 0.826 g/cm2 and T-score -2.0.     Total Hip:           BMD 0.773 g/cm2 and T-score -1.4.     Distal 1/3 radius: Not applicable     FINDINGS:  Lumbar spine (L1-L4):              BMD is 0.808 g/cm2, T-score is -2.2, and Z-score is -0.4.     Total hip:                                BMD is 0.761 g/cm2, T-score is -1.5, and Z-score is -0.3.     Femoral neck:                           BMD is 0.584 g/cm2, T-score is -2.4, and Z-score is -0.9.     Distal 1/3 radius:                      Not applicable     FRAX:     19% risk of a major osteoporotic fracture in the next 10 years.     3.4% risk of hip fracture in the next 10 years.     Impression:     *Osteoporosis based on T-score between -1.0 and -2.5 and elevated risk based on FRAX  *Fracture risk is high  *Compared with previous DXA, BMD at the lumbar spine has remained stable, and the BMD at the total hip has remained stable.     RECOMMENDATIONS:  *Daily calcium intake 7284-3022 mg, dietary sources preferred; Vitamin D 1479-5526 IU daily.  *Weight bearing exercise and fall precautions.  *Recommend medical therapy for osteopenia with increased risk for fracture based on FRAX calculations. Consider bisphosphonates (alendronate, risedronate, zoledronic acid), or denosumab.  *Repeat BMD in 2 years                  low bone density DXA 1/12/22: FRAX hip 3.4% major 19%. Discussed alendronate which she has taken intermittently in past and tolerated. However currently GI issues with duloxetine which she increased back to full dose due to increased headache. She in contact with Dr. Saucedo in Neurology about alternative headache prophylaxis.  She will then decide between resumption of alendronate once current   GI symptoms resolve, and zoledronic acid 5mg IV q 2 years. ACR bisphosphonate fact sheet provided.  SLE SLEDAI: 2( + dsDNA)but decreased from 1:1280 to 1:80 Recent brain MRI down in [place] as per above, unchanged from 1 year earlier and was fowarded to her Neurosurgeon at [place] who had treated the acoustic neuroma with gamma knife.  History of CNS lupus 2014  treated with cyclophosphamide IV x 6 months, then mycophenolate. Saw Dr. Saucedo 12/20/21, no evidence of MS, right acoustic schwannoma, headaches, started on duloxetine with side effects as listed by Dr. Hernandez  APS panel negative  Since August: Intermittent  Dizziness/lightheadness, lasts 1-2 hrs, associated gain imbalance/unsteadiness  Post episode fatigue. Not vertigo, not orthostatic/positional. Now resolved  Persistent migraine headache for past year normal ESR and CRP  Chronic hearing loss right ear  Jaccoud's arthropathy, asymptomatic  Vitamin D deficiency level 41, perfect  Crohn's in remission  Chronically minimallyeelevated alk phos         iPTH, Mg, phosphorus, SIFE today  Cont hydroxychloroquine 200mg twice daily, saw Dr. Monge in Optometry 12/8/21  Cont vitamin D2 50,000 units once a week.   RTC 2 months with standing lupus labs

## 2022-02-01 NOTE — PROGRESS NOTES
Subjective:       Patient ID: Radha Chiang is a 66 y.o. female.    Chief Complaint: SLE    HPI 66 year old with pmh of SLE here for follow up. Last visit was 1/11/22 where her dizziness symptoms have improved with vestibular therapy but reported continuous headaches and was discussed to decrease Cymbalta and ordered DXA scan for which she is here to discuss the results today. In the interim, she is endorsing stiffness after sitting still for awhile and improves after movement. She has also increased Cymbalta back to 60 mg once nightly as she was getting too many headaches with the 40 mg once nightly dose. She is experiencing nausea with eating on the 60 mg dose.       Review of Systems   Constitutional: Negative for fever and unexpected weight change.   HENT: Negative for mouth sores and trouble swallowing.    Eyes: Positive for redness.   Respiratory: Positive for shortness of breath. Negative for cough.    Cardiovascular: Negative for chest pain.   Gastrointestinal: Negative for constipation and diarrhea.   Genitourinary: Negative for dysuria and genital sores.   Skin: Negative for rash.   Neurological: Positive for headaches.   Hematological: Does not bruise/bleed easily.         Objective:   /74   Pulse (!) 59   Ht 5' (1.524 m)   Wt 78 kg (172 lb)   BMI 33.59 kg/m²      Physical Exam   Constitutional: She is oriented to person, place, and time.   HENT:   Head: Normocephalic and atraumatic.   Eyes: Pupils are equal, round, and reactive to light.   Cardiovascular: Normal rate, regular rhythm, normal heart sounds and normal pulses.   Pulmonary/Chest: Effort normal and breath sounds normal.   Abdominal: Normal appearance and bowel sounds are normal.   Musculoskeletal:         General: Normal range of motion.      Cervical back: Normal range of motion.   Neurological: She is alert and oriented to person, place, and time.   Reflex Scores:       Bicep reflexes are 2+ on the right side and 2+ on the left  side.       Patellar reflexes are 2+ on the right side and 2+ on the left side.       Achilles reflexes are 2+ on the right side and 2+ on the left side.  Skin: Skin is warm.   Psychiatric: Her behavior is normal. Mood normal.       Right Side Rheumatological Exam     Shoulder Exam   Sensation: normal    Knee Exam   Sensation: normal    Hip Exam   Sensation: normal    Elbow/Wrist Exam   Sensation: normal    Muscle Strength (0-5 scale):  Neck Flexion:  5  Neck Extension: 5  Deltoid:  5  Biceps: 5/5   Triceps:  5  : 5/5   Iliopsoas: 5  Quadriceps:  5   Distal Lower Extremity: 5    Left Side Rheumatological Exam     Shoulder Exam   Sensation: normal    Knee Exam   Sensation: normal    Hip Exam   Sensation: normal    Elbow/Wrist Exam   Sensation: normal    Muscle Strength (0-5 scale):  Neck Flexion:  5  Neck Extension: 5  Deltoid:  5  Biceps: 5/5   Triceps:  5  :  5/5   Iliopsoas: 5  Quadriceps:  5   Distal Lower Extremity: 5          Lab Results   Component Value Date    WBC 5.99 12/07/2021    HGB 13.0 12/07/2021    HCT 40.7 12/07/2021    MCV 98 12/07/2021     12/07/2021       CMP  Sodium   Date Value Ref Range Status   12/07/2021 140 136 - 145 mmol/L Final     Potassium   Date Value Ref Range Status   12/07/2021 4.1 3.5 - 5.1 mmol/L Final     Chloride   Date Value Ref Range Status   12/07/2021 105 95 - 110 mmol/L Final     CO2   Date Value Ref Range Status   12/07/2021 26 23 - 29 mmol/L Final     Glucose   Date Value Ref Range Status   12/07/2021 81 70 - 110 mg/dL Final     BUN   Date Value Ref Range Status   12/07/2021 12 8 - 23 mg/dL Final     Creatinine   Date Value Ref Range Status   12/07/2021 0.7 0.5 - 1.4 mg/dL Final     Calcium   Date Value Ref Range Status   12/07/2021 9.3 8.7 - 10.5 mg/dL Final     Total Protein   Date Value Ref Range Status   12/07/2021 7.3 6.0 - 8.4 g/dL Final     Albumin   Date Value Ref Range Status   12/07/2021 4.0 3.5 - 5.2 g/dL Final     Total Bilirubin   Date Value Ref  Range Status   12/07/2021 0.4 0.1 - 1.0 mg/dL Final     Comment:     For infants and newborns, interpretation of results should be based  on gestational age, weight and in agreement with clinical  observations.    Premature Infant recommended reference ranges:  Up to 24 hours.............<8.0 mg/dL  Up to 48 hours............<12.0 mg/dL  3-5 days..................<15.0 mg/dL  6-29 days.................<15.0 mg/dL       Alkaline Phosphatase   Date Value Ref Range Status   12/07/2021 139 (H) 55 - 135 U/L Final     AST   Date Value Ref Range Status   12/07/2021 26 10 - 40 U/L Final     ALT   Date Value Ref Range Status   12/07/2021 20 10 - 44 U/L Final     Anion Gap   Date Value Ref Range Status   12/07/2021 9 8 - 16 mmol/L Final     eGFR if    Date Value Ref Range Status   12/07/2021 >60.0 >60 mL/min/1.73 m^2 Final     eGFR if non    Date Value Ref Range Status   12/07/2021 >60.0 >60 mL/min/1.73 m^2 Final     Comment:     Calculation used to obtain the estimated glomerular filtration  rate (eGFR) is the CKD-EPI equation.        Lab Results   Component Value Date    SEDRATE 14 12/07/2021      Lab Results   Component Value Date    CRP 3.1 12/07/2021       Assessment:       1. Other systemic lupus erythematosus with other organ involvement    2. Low bone density            Plan:       Problem List Items Addressed This Visit        Immunology/Multi System    Systemic lupus erythematosus - Primary      Other Visit Diagnoses     Low bone density        Relevant Orders    PTH, intact    Immunofixation Electrophoresis    Magnesium    Phosphorus          SLE, SLEDAI 0:  - Continue plaquenil  - Continue Vit D supplementation, Vit D levels >40 (10/2021) which is excellent  - Lupus labs at next appointment    Low Bone Density:  - DEXA scan showed her to have low bone density with high risk of fracture due to FRAX calculation.   - She has been on and off Fosamax (last taken 2018), will plan on  restarting after patient has next neuro appointment. If she decides to remain on Cymbalta, will order IV fosamax as she experienced nausea previously. If changes are made to Cymbalta, she will take PO fosamax. She will contact the office once she has made a decision. Repeat DXA scan in 2 years.   - Ordered PTH, immunofixation electrophoresis, Mag, and Phosphorous    Headache:   - Continue Cymbalta. Patient plans on discussing new medication at next neuro appointment due to poorly tolerating side effects of cymbalta    Dizziness, resolved:    - Continue with vestibular therapy      RTC 3 months     Pt was seen and discussed with Dr. Rios who is in agreement with the plan.    Farbicio Yanez MD  [employer] [facility] PGY-1

## 2022-02-02 ENCOUNTER — TELEPHONE (OUTPATIENT)
Dept: RHEUMATOLOGY | Facility: CLINIC | Age: 67
End: 2022-02-02
Payer: MEDICARE

## 2022-02-02 ENCOUNTER — PATIENT MESSAGE (OUTPATIENT)
Dept: RHEUMATOLOGY | Facility: CLINIC | Age: 67
End: 2022-02-02
Payer: MEDICARE

## 2022-02-02 ENCOUNTER — PATIENT MESSAGE (OUTPATIENT)
Dept: NEUROLOGY | Facility: CLINIC | Age: 67
End: 2022-02-02
Payer: MEDICARE

## 2022-02-02 DIAGNOSIS — E21.3 HYPERPARATHYROIDISM: Primary | ICD-10-CM

## 2022-02-02 LAB — INTERPRETATION SERPL IFE-IMP: NORMAL

## 2022-02-03 ENCOUNTER — PATIENT MESSAGE (OUTPATIENT)
Dept: RHEUMATOLOGY | Facility: CLINIC | Age: 67
End: 2022-02-03
Payer: MEDICARE

## 2022-02-03 LAB — PATHOLOGIST INTERPRETATION IFE: NORMAL

## 2022-02-04 ENCOUNTER — CLINICAL SUPPORT (OUTPATIENT)
Dept: REHABILITATION | Facility: HOSPITAL | Age: 67
End: 2022-02-04
Attending: OTOLARYNGOLOGY
Payer: MEDICARE

## 2022-02-04 DIAGNOSIS — R26.89 IMPAIRMENT OF BALANCE: ICD-10-CM

## 2022-02-04 DIAGNOSIS — H81.90 VESTIBULAR DIZZINESS: ICD-10-CM

## 2022-02-04 PROCEDURE — 97112 NEUROMUSCULAR REEDUCATION: CPT | Mod: PO

## 2022-02-04 NOTE — PROGRESS NOTES
"    Physical Therapy Daily Treatment Note     Name: Radha ALONSO [facility]  Clinic Number: [id number]    Therapy Diagnosis:   Encounter Diagnoses   Name Primary?   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 She has not had any issues during ambulation    She was compliant with home exercise program.    Response to previous treatment: No adverse effects reported   Functional change: Ongoing     Pain: 0/10  Location:  N/a     Objective     See below for objective measurements taken this date.    Treatment     Radha participated in neuromuscular re-education activities to improve: Balance and Vestibular Function for 43 minutes. The following activities were included:     Smooth pursuits: reading word card, impaired with reading component  1 x 30s, horizontal - 2 saccadic corrections, no dizziness  1 x 30s, vertical -  No visual slippage, no dizziness  1 x 30s, RUQ <> LLQ diagonal, no visual slippage, no dizziness  1 x 30s, LUQ <> RLQ diagonal, no visual slippage, no dizziness      VORx1 using simple "x" target  WFL at 140 bpm horizontal, no visual slippage, slight brief dizziness, resolved within 5 sec  WFL at 140 bpm vertical, no visual slippage slight brief dizziness, resolved within 5 sec     VORx2 using simple "x" target   1 x 30s, horizontal, no metronome, min jerkiness of eyes, slight brief dizziness, resolved within 5 " "sec     MARLENI SENSORY TESTING:  (P= Pass, F= Fail; note any sway; hold each position for 30")  Condition 1: (firm surface/feet together/eyes open) P  Condition 2: (firm surface/feet together/eyes closed) P  Condition 3: (firm surface/feet in tandem/eyes open) P, each LE in front  Condition 4: (firm surface/feet in tandem/eyes closed) F, 9 sec c/ RLE in front; 17 sec c/ LLE in front  Condition 5: (soft surface/feet together/eyes open) P  Condition 6: (soft surface/feet together/eyes closed) P  Condition 7: (Fukuda step test), measure distance varied from center starting position, > 30 deg deviation to either side indicates hypofunction of biased side P, 9 deg bias to R (14 deg bias to L on 11/22/21, 26 deg bias to L on eval)     Functional Gait Assessment:   1. Gait on level surface =  3  2. Change in Gait Speed = 3  3. Gait with horizontal head turns  = 3, no dizziness  4. Gait with vertical head turns = 3, no dizziness  5. Gait with pivot turns = 3  6. Step over obstacle = 3  7. Gait with Narrow RAMONITA = 2  8. Gait with eyes closed = 2  9. Ambulating Backwards = 3  10. Steps = 3      Score 28/30 (25/30 on eval, 25/30 on 11/22/21, 26/30 on 12/29/21)  Score:   <22/30 fall risk   <20/30 fall risk in older adults   <18/30 fall risk in Parkinsons     Home exercises: standing in corner of room  X 30" tandem stance with eyes open, no upper extremity support, S  X 30" tandem stance with eyes closed, intermittent upper extremity support on wall, S  X 30" tandem stance with eyes closed, modified tandem stance, intermittent upper extremity support on wall, S- PT recommended that if patient attempts eyes closed tandem at home, to perform modified tandem position first until it become less challenging; then move to more narrow tandem position       Home Exercises Provided and Patient Education Provided     Education provided:   - HEP, POC, Proper technique with therapeutic interventions, Benefits/purpose of today's therapeutic " interventions    Written Home Exercises Provided: yes. Updated on 02/04/22.    Exercises were reviewed and Radha was able to demonstrate them prior to the end of the session.  Radha demonstrated good  understanding of the education provided.     See EMR under Patient Instructions for exercises provided 11/2/2021, 11/22/21, 12/10/21, 02/04/22    PHYSICAL THERAPY DISCHARGE SUMMARY   Total Visits: 17    Status Towards Goals Met: Patient demonstrates good progress with therapy, meeting 7/7 STGs and 6/7 LTGs. Improvements noted with oculomotor performance, motion tolerance, and overall balance. Performance on oculomotor testing WFL this date. Slight dizziness elicited with VOR exercises, but symptoms resolve within 5 seconds of stopping activity, which is considered normal response. Significant improvement noted on GST. Only remaining difficulty still noted with vision eliminated tandem stance, but this is much improved throughout course of therapy. Performance on Functional Gait Assessment also improved with current score placing patient out of elevated fall risk category. Home exercise program updated this date based on current performance. At this time, patient has maximized benefit of physical therapy intervention and patient is appropriate to maintain therapy gains on independent home basis.     Goals:  Short Term Goals: 4 weeks   1. Patient to be (I) with established HEP. MET 11/22/21  2. Patient to perform saccades at 80 bpm WFL for improved oculomotor endurance. MET 11/22/21  3. Patient to perform VOR 1 at 70 bpm WFL for improved gaze stabilization. MET 11/22/21  4. Patient to pass GST condition 2 with no more than min additional sway for improved balance in low vision environments. MET 11/22/21  5. Patient to improve GST condition 4 to at least 8 seconds for improved balance in low vision environments. MET 12/29/21  6. Patient to improve GST condition 6 to at least 15 seconds for improved balance in low vision  environments. MET 11/22/21  7. Patient to improve performance on Fukuda step test to <20 deg bias in either direction for improved vestibular system symmetry. MET 11/22/21     Long Term Goals: 8 weeks   1. Patient to be (I) with advanced HEP. MET 02/04/22  2. Patient to perform saccades at 100 bpm WFL for improved oculomotor endurance. MET 11/22/21  3. Patient to perform VOR 1 at 90 bpm WFL for improved gaze stabilization. MET 11/22/21  4. Patient to pass GST condition 2 with no more than slight additional sway for improved balance in low vision environments. MET 12/29/21  5. Patient to improve GST condition 4 to at least 12 seconds for improved balance in low vision environments. Partially Met 02/04/22  6. Patient to improve GST condition 6 to at least 25 seconds for improved balance in low vision environments. MET 11/22/21   Updated 11/22/21: Patient to pass GST condition 6 with no more than slight additional sway for improved balance in low vision environments. MET 02/04/22  7. Patient to improve FGA score to at least 28/30 for improved balance and decreased risk of fall in community settings. MET 02/04/22    Goals Not achieved and why:   Patient only partially met LTG for vision eliminated tandem stance. Performance on this condition remains most challenging but is improved from evaluation. Home exercise program updated for patient to safely perform this condition at home. PT instructed patient on use of hands for safety and modifications to foot positioning as needed.     Discharge reason : Met goals and Patient has maximized benefit from PT at this time    PLAN   This patient is discharged from Outpatient [facility] Therapy Services.     Silvia Tony, PT  02/04/2022

## 2022-02-04 NOTE — PATIENT INSTRUCTIONS
Home Exercise Program: updated on 02/04/22    1. Continue VOR 1. Perform 2 times per week. Target remains still and eyes remain on target. Head moves. Use metronome at 140 bpm for each of the following:  A. 2 x 30 seconds right <> left   B. 2 x 30 seconds up <> down      Resource: [employer] [place] [facility] Balance        2. Continue VOR 2, horizontal. Perform 2 times per week.           3. Tandem stance        Goal is to perform exercises 2 times per week for 1 month after discharge. If symptoms feels fine after this 1 month period, then decrease frequency to 1 day per week. After another month, if symptoms again remain fine, reduce to 0 times per week.

## 2022-02-12 ENCOUNTER — PATIENT MESSAGE (OUTPATIENT)
Dept: INTERNAL MEDICINE | Facility: CLINIC | Age: 67
End: 2022-02-12
Payer: MEDICARE

## 2022-02-21 DIAGNOSIS — E55.9 VITAMIN D DEFICIENCY: ICD-10-CM

## 2022-02-21 RX ORDER — ERGOCALCIFEROL 1.25 MG/1
50000 CAPSULE ORAL
Qty: 12 CAPSULE | Refills: 3 | Status: SHIPPED | OUTPATIENT
Start: 2022-02-21 | End: 2023-01-25 | Stop reason: SDUPTHER

## 2022-02-23 DIAGNOSIS — D84.9 IMMUNOSUPPRESSED STATUS: ICD-10-CM

## 2022-02-25 ENCOUNTER — PATIENT MESSAGE (OUTPATIENT)
Dept: NEUROLOGY | Facility: CLINIC | Age: 67
End: 2022-02-25
Payer: MEDICARE

## 2022-02-28 ENCOUNTER — TELEPHONE (OUTPATIENT)
Dept: NEUROLOGY | Facility: CLINIC | Age: 67
End: 2022-02-28
Payer: MEDICARE

## 2022-02-28 NOTE — TELEPHONE ENCOUNTER
Was unable to get in contact with patient via phone so I sent a [employer] message informing her I will help assist her with rescheduling her appointment.

## 2022-03-08 ENCOUNTER — PATIENT OUTREACH (OUTPATIENT)
Dept: ADMINISTRATIVE | Facility: OTHER | Age: 67
End: 2022-03-08
Payer: MEDICARE

## 2022-03-08 ENCOUNTER — PATIENT MESSAGE (OUTPATIENT)
Dept: ADMINISTRATIVE | Facility: OTHER | Age: 67
End: 2022-03-08
Payer: MEDICARE

## 2022-03-08 DIAGNOSIS — Z12.31 ENCOUNTER FOR SCREENING MAMMOGRAM FOR MALIGNANT NEOPLASM OF BREAST: Primary | ICD-10-CM

## 2022-03-08 NOTE — PROGRESS NOTES
Health Maintenance Due   Topic Date Due    Aspirin/Antiplatelet Therapy  Never done    Cervical Cancer Screening  01/13/2021    Mammogram  02/10/2022    COVID-19 Vaccine (4 - Booster for Moderna series) 03/19/2022     Updates were requested from care everywhere.  Chart was reviewed for overdue Proactive [facility] Encounters ([facility]) topics (CRS, Breast Cancer Screening, Eye exam)  Health Maintenance has been updated.  [facility] immunization registry triggered.  Immunizations were reconciled.  Mammogram ordered/task ticket sent.

## 2022-03-09 ENCOUNTER — OFFICE VISIT (OUTPATIENT)
Dept: ENDOCRINOLOGY | Facility: CLINIC | Age: 67
End: 2022-03-09
Payer: MEDICARE

## 2022-03-09 VITALS
DIASTOLIC BLOOD PRESSURE: 78 MMHG | RESPIRATION RATE: 18 BRPM | OXYGEN SATURATION: 97 % | HEIGHT: 60 IN | BODY MASS INDEX: 32.57 KG/M2 | SYSTOLIC BLOOD PRESSURE: 122 MMHG | HEART RATE: 62 BPM | WEIGHT: 165.88 LBS

## 2022-03-09 DIAGNOSIS — E03.9 HYPOTHYROIDISM, UNSPECIFIED TYPE: ICD-10-CM

## 2022-03-09 DIAGNOSIS — R79.89 ELEVATED PARATHYROID HORMONE: Primary | ICD-10-CM

## 2022-03-09 DIAGNOSIS — R63.5 WEIGHT GAIN: ICD-10-CM

## 2022-03-09 DIAGNOSIS — M85.9 LOW BONE DENSITY: ICD-10-CM

## 2022-03-09 PROCEDURE — 99204 OFFICE O/P NEW MOD 45 MIN: CPT | Mod: S$PBB,,, | Performed by: INTERNAL MEDICINE

## 2022-03-09 PROCEDURE — 99214 OFFICE O/P EST MOD 30 MIN: CPT | Mod: PBBFAC | Performed by: INTERNAL MEDICINE

## 2022-03-09 PROCEDURE — 99999 PR PBB SHADOW E&M-EST. PATIENT-LVL IV: CPT | Mod: PBBFAC,,, | Performed by: INTERNAL MEDICINE

## 2022-03-09 PROCEDURE — 99999 PR PBB SHADOW E&M-EST. PATIENT-LVL IV: ICD-10-PCS | Mod: PBBFAC,,, | Performed by: INTERNAL MEDICINE

## 2022-03-09 PROCEDURE — 99204 PR OFFICE/OUTPT VISIT, NEW, LEVL IV, 45-59 MIN: ICD-10-PCS | Mod: S$PBB,,, | Performed by: INTERNAL MEDICINE

## 2022-03-09 NOTE — ASSESSMENT & PLAN NOTE
Suspect PTH elevation related to inadequate calcium intake and impaired absorption due to Crohn's disease.  Patient was previously not taking any calcium supplementation with inadequate dietary calcium intake.  Since her labs last month she has started taking Caltrate 2 tablets daily (unclear what the doses).  We discussed importance of getting at least 1200 mg of calcium daily in divided doses and will repeat her labs in addition to a 24 hour urine collection for evaluation of calcium in 2 months.  Given history of Crohn's disease she may need higher doses of calcium if PTH and urine calcium are abnormal.

## 2022-03-09 NOTE — PATIENT INSTRUCTIONS
HOW TO COLLECT AN ACCURATE   24 HOUR URINE SPECIMEN     I want you to collect EVERY drop of your urine during a 24 hour period. I do not care what the volume of the urine is as long as it represents every drop that you pass during that 24 hour period. If you need to have a bowel movement, you may separate the urine but I want every drop.     Begin the collection on a morning which is convenient for you.  If you have been prescribed a dexamethasone pill to take the night before a blood draw, you must do the urine collection and complete it before you take the dexamethasone pill.     The morning you start the urine collection when you wake up in the morning, pee and flush it down the toilet and note the exact time. Now you have an empty bladder and empty bottle. That starts the collection. Collect every drop after that all during the day and night until exactly the same time the next morning, when you should pass your urine and add it to the bottle. Please store the urine container in the fridge or in an ice bucket.    Bring the closed container back to the laboratory with the provided order slip.      For calcium intake:  I advise you get 1200 mg per day of calcium, split up over the day into 2 to 4 divided doses.  This amount includes calcium from both dietary sources and/or calcium supplements, and it is best to get smaller amounts throughout the day rather than all of the calcium at one time; this allows for better absorption of the calcium.     To estimate calcium content from a nutrition label, the label lists the % calcium in that food.   For example, the label for an 8 oz glass of milk lists the calcium content as 30%.  This is equivalent to 300 mg of calcium (multiply the % by 10 to get the mg of calcium per serving).  It is best to try to get the majority of calcium intake from the diet.  I've included a table below of some calcium-rich foods.    If you are not getting enough calcium in the diet, then you  can add low doses of calcium supplements.  For calcium supplements, your body can only absorb about 500-600 mg of calcium at one time.  Thus, it is best to split the tablets up over the course of a day.  It is also best to avoid taking calcium supplements at the same time as a calcium-rich food to maximize your calcium absorption.  Calcium carbonate is best taken with or after a meal.  Calcium citrate can be taken on an empty stomach or with/after a meal.  Please look at any multivitamin you are taking as these often usually contain calcium as well.    Estimated Calcium Content of Foods:  Produce  Serving Size Estimated Calcium*    [place] greens, frozen 8 oz 360 mg   Broccoli laney 8 oz 200 mg   Kale, frozen 8 oz 180 mg   Soy Beans, green, boiled 8 oz 175 mg   Bok [place], cooked, boiled 8 oz 160 mg   Figs, dried 2 figs 65 mg   Broccoli, fresh, cooked 8 oz 60 mg   Oranges 1 whole 55 mg   Seafood Serving Size Estimated Calcium*    Sardines, canned with bones 3 oz 325 mg   [place], canned with bones 3 oz 180 mg   Shrimp, canned 3 oz 125 mg   Dairy Serving Size Estimated Calcium*    Ricotta, part-skim 4 oz 335 mg   Yogurt, plain, low-fat 6 oz 310 mg   Milk, skim, low-fat, whole 8 oz 300 mg   Yogurt with fruit, low-fat 6 oz 260 mg   Mozzarella, part-skim 1 oz 210 mg   Cheddar 1 oz 205 mg   Yogurt, Greek 6 oz 200 mg   American Cheese 1 oz 195 mg   Feta Cheese 4 oz 140 mg   Cottage Cheese, 2% 4 oz 105 mg   Frozen yogurt, vanilla 8 oz 105 mg   Ice Cream, vanilla 8 oz 85 mg   Parmesan 1 tbsp 55 mg   Fortified Food Serving Size Estimated Calcium*   [place] milk, rice milk or soy milk, fortified 8 oz 300 mg   Orange juice and other fruit juices, fortified 8 oz 300 mg   Tofu, prepared with calcium 4 oz 205 mg   Waffle, frozen, fortified 2 pieces 200 mg   Oatmeal, fortified 1 packet 140 mg   English muffin, fortified 1 muffin 100 mg   Cereal, fortified 8 oz 100-1,000 mg   Other Serving Size Estimated Calcium*   Mac & cheese, frozen  1 package 325 mg   Pizza, cheese, frozen 1 serving 115 mg   Pudding, chocolate, prepared with 2% milk 4 oz 160 mg   Beans, baked, canned 4 oz 160 mg   *The calcium content listed for most foods is estimated and can vary due to multiple factors. Check the food label to determine how much calcium is in a particular product.  If you read the nutrition label for a food source, it lists the % calcium in that food.  For an 8 oz glass of milk, for example, the label states calcium 30%.  This is equivalent to 300 mg of calcium (multiply the listed number by 10).   **Table from the [employer]

## 2022-03-09 NOTE — PROGRESS NOTES
ENDOCRINOLOGY INITIAL VISIT  03/09/2022    Reason for Visit:  Radha Chiang is a 67 y.o. female referred by Saray Amaral NP for evaluation of elevated parathyroid hormone    HPI:  Radha Chiang is a 67 y.o. female  with history of SLE, polyneuropathy, osteopenia, hypothyroidism, Crohn's disease with noted to have elevated PTH on recent labs to evaluate bone health.    With regards to the hypercalcemia or primary hyperparathyroidism:    Was found to have elevated PTH on labs done to evaluate bone health - first noted: 2/2022  No hx of hypercalcemia or low phos    Lab Results   Component Value Date    CALCIUM 9.3 12/07/2021    ALBUMIN 4.0 12/07/2021    ESTGFRAFRICA >60.0 12/07/2021    EGFRNONAA >60.0 12/07/2021    PHOS 3.8 02/01/2022    ALKPHOS 139 (H) 12/07/2021    AFTGTQGM48SO 41 10/20/2021    PTH 87.1 (H) 02/01/2022    TSH 0.688 10/20/2021      She denied current or past lithium use  Denies HCTZ use.    Parathyroid imaging: not done  Thyroid ultrasound:  Not done    Daily intake of calcium is: started taking caltrate 2/2022, dairy one serving EOD  Taking vitamin D: 50K ergo weekly     + fatigue  Denies constipation, depression, polyuria   Denies or kidney stones     Regarding hypothyroidism:  On levothyroxine 112 mcg daily with normal TSH in 10/2021 with mixed symptoms, increased fatigue     Regarding Osteopenia :  She was seen by [facility] for bone health, previously on fosamax ([phone]), plans to start reclast for nause with oral med.      Hx of elbow fracture after fall on volcanic rock, finger fracture  height loss of 0.5 inches    Last bmd was:   1/12/22  Lumbar spine (L1-L4):              BMD is 0.808 g/cm2, T-score is -2.2, and Z-score is -0.4.     Total hip:                                BMD is 0.761 g/cm2, T-score is -1.5, and Z-score is -0.3.     Femoral neck:                          BMD is 0.584 g/cm2, T-score is -2.4, and Z-score is -0.9.     Distal 1/3 radius:                      Not  applicable     FRAX:     19% risk of a major osteoporotic fracture in the next 10 years.     3.4% risk of hip fracture in the next 10 years.     Impression:     *Osteoporosis based on T-score between -1.0 and -2.5 and elevated risk based on FRAX  *Fracture risk is high  *Compared with previous DXA, BMD at the lumbar spine has remained stable, and the BMD at the total hip has remained stable.      Past Medical History:   Diagnosis Date    Acid reflux     Anemia 2years ago    Anxiety     Arthritis     Asthma, chronic 4/10/2013    Crohn's disease 1998    Depression     Dry eyes     Fracture of forearm, proximal, open     screws & plate 6/26/13 in [facility]    History of papilledema 11/10/11    Hyperlipidemia 5/21/2018    Lupus     Neuromuscular disorder 12/2009    Neuropathy    Osteoporosis     Stroke 1/29/2013    Thyroid disease        Past Surgical History:   Procedure Laterality Date    ADENOIDECTOMY  1962    BRAIN SURGERY  01/18/2012    Brain Biopsy    COLONOSCOPY  2017    [facility] Dr. WILLIS Summers; pan-diverticulosis, rec repeat 3-5 years    INCISION AND DRAINAGE PERIRECTAL ABSCESS  1998    abscess removal    ORIF FOREARM FRACTURE Left 06/23/2013    proximal ulnar and radius       Review of patient's allergies indicates:   Allergen Reactions    Bactrim [sulfamethoxazole-trimethoprim] Nausea And Vomiting     Nausea, ? rash  Nausea, ? rash         Current Outpatient Medications:     albuterol (PROVENTIL HFA) 90 mcg/actuation inhaler, INHALE 1 TO 2 PUFFS INTO THE LUNGS EVERY 6 HOURS AS NEEDED FOR WHEEZING OR SHORTNESS OF BREATH. USE 30 MINS BEFORE EXERCISING, Disp: 6.7 g, Rfl: 3    ALPRAZolam (XANAX) 0.25 MG tablet, Take 1 tablet (0.25 mg total) by mouth every 8 (eight) hours as needed., Disp: 30 tablet, Rfl: 5    butalbital-acetaminophen-caffeine -40 mg (FIORICET, ESGIC) -40 mg per tablet, Take by mouth., Disp: , Rfl:     ergocalciferol (ERGOCALCIFEROL) 50,000 unit Cap, Take 1  capsule (50,000 Units total) by mouth every 7 days., Disp: 12 capsule, Rfl: 3    esomeprazole (NEXIUM) 20 MG capsule, Take 20 mg by mouth before breakfast., Disp: , Rfl:     fish oil-omega-3 fatty acids 300-1,000 mg capsule, Take 1 g by mouth 2 (two) times daily., Disp: , Rfl:     hydrOXYchloroQUINE (PLAQUENIL) 200 mg tablet, Take 1 tablet (200 mg total) by mouth 2 (two) times daily. Brand name only, Disp: 180 tablet, Rfl: 1    levothyroxine (SYNTHROID) 112 MCG tablet, TAKE 1 TABLET(112 MCG) BY MOUTH EVERY DAY, Disp: 90 tablet, Rfl: 3    multivitamin (THERAGRAN) per tablet, Take 1 tablet by mouth before breakfast., Disp: , Rfl:     rosuvastatin (CRESTOR) 20 MG tablet, Take 1 tablet (20 mg total) by mouth once daily., Disp: 90 tablet, Rfl: 4    b complex vitamins tablet, Take 1 tablet by mouth 3 (three) times daily. , Disp: , Rfl:     DULoxetine (CYMBALTA) 20 MG capsule, Take 2 capsules (40 mg total) by mouth every evening. (Patient taking differently: Take 60 mg by mouth every evening.), Disp: 60 capsule, Rfl: 6    Social History     Socioeconomic History    Marital status: Single   Occupational History    Occupation: human resources   Tobacco Use    Smoking status: Former Smoker     Packs/day: 0.50     Years: 15.00     Pack years: 7.50     Types: Cigarettes     Quit date: 1984     Years since quittin.7    Smokeless tobacco: Never Used    Tobacco comment: Quit    Substance and Sexual Activity    Alcohol use: Yes     Comment: less than one per week    Drug use: No    Sexual activity: Never   Social History Narrative    [place] in [place], rest of the year in [facility]    Enjoys traveling; Going to [facility] x 1 month 2018    Regular exercise     Social Determinants of Health     Financial Resource Strain: Low Risk     Difficulty of Paying Living Expenses: Not hard at all   Food Insecurity: No Food Insecurity    Worried About Running Out of Food in the Last Year: Never true    Ran Out of  Food in the Last Year: Never true   Transportation Needs: No Transportation Needs    Lack of Transportation (Medical): No    Lack of Transportation (Non-Medical): No   Physical Activity: Sufficiently Active    Days of Exercise per Week: 4 days    Minutes of Exercise per Session: 60 min   Stress: No Stress Concern Present    Feeling of Stress : Only a little   Social Connections: Unknown    Frequency of Communication with Friends and Family: Three times a week    Frequency of Social Gatherings with Friends and Family: Twice a week    Active Member of Clubs or Organizations: No    Attends Club or Organization Meetings: More than 4 times per year    Marital Status: Living with partner   Housing Stability: Low Risk     Unable to Pay for Housing in the Last Year: No    Number of Places Lived in the Last Year: 2    Unstable Housing in the Last Year: No     Family History   Problem Relation Age of Onset    Cancer Mother 64        small cell lung cancer; smoker    Heart disease Father 64        acute MI; 3v CABG    Sjogren's syndrome Sister     Allergies Sister     Cancer Maternal Grandmother     COPD Maternal Grandfather     Cancer Paternal Grandmother     Stroke Paternal Grandmother     Ovarian cancer Paternal Grandmother     Heart disease Paternal Grandfather     Mental retardation Brother     Hypertension Sister     Lupus Paternal Uncle     Angioedema Neg Hx     Asthma Neg Hx     Atopy Neg Hx     Eczema Neg Hx     Immunodeficiency Neg Hx     Rhinitis Neg Hx     Urticaria Neg Hx        ROS: see HPI       PHYSICAL EXAM  /78 (BP Location: Left arm, Patient Position: Sitting, BP Method: Medium (Manual))   Pulse 62   Resp 18   Ht 5' (1.524 m)   Wt 75.3 kg (165 lb 14.3 oz)   SpO2 97%   BMI 32.40 kg/m²   Wt Readings from Last 3 Encounters:   03/09/22 75.3 kg (165 lb 14.3 oz)   02/01/22 78 kg (172 lb)   01/11/22 78 kg (172 lb)   ]    Constitutional:  Pleasant,  in no acute distress.    HENT:   Head:    Normocephalic and atraumatic.   Eyes:    EOMI. No scleral icterus.   Neck:    No thyromegaly or palpable thyroid nodules, no cervical LAD  Cardiovascular:  Normal rate, regular rhythm, 2+ radial pulses blx   Respiratory:   Effort normal       LABORATORY REVIEW:  Thyroid Labs Latest Ref Rng & Units 10/20/2021 10/20/2021 12/7/2021   TSH 0.400 - 4.000 uIU/mL 0.688 - -   Free T4 0.71 - 1.51 ng/dL 1.13 - -   Sodium 136 - 145 mmol/L 142 142 140   Potassium 3.5 - 5.1 mmol/L 4.2 4.2 4.1   Chloride 95 - 110 mmol/L 107 107 105   Carbon Dioxide 23 - 29 mmol/L 23 23 26   Glucose 70 - 110 mg/dL 75 75 81   Blood Urea Nitrogen 8 - 23 mg/dL 14 14 12   Creatinine 0.5 - 1.4 mg/dL 0.8 0.8 0.7   Calcium 8.7 - 10.5 mg/dL 9.7 9.7 9.3   Total Protein 6.0 - 8.4 g/dL 7.1 7.1 7.3   Albumin 3.5 - 5.2 g/dL 3.7 3.7 4.0   Total Bilirubin 0.1 - 1.0 mg/dL 0.9 0.9 0.4   AST 10 - 40 U/L 26 26 26   ALT 10 - 44 U/L 19 19 20   Anion Gap 8 - 16 mmol/L 12 12 9   eGFR (African American) >60 mL/min/1.73 m:2 >60.0 >60.0 >60.0   eGFR (Non-African American) >60 mL/min/1.73 m:2 >60.0 >60.0 >60.0   WBC 3.90 - 12.70 K/uL 8.13 8.13 5.99   RBC 4.00 - 5.40 M/uL 4.03 4.03 4.14   Hemoglobin 12.0 - 16.0 g/dL 12.3 12.3 13.0   Hematocrit 37.0 - 48.5 % 38.0 38.0 40.7   MCV 82 - 98 fL 94 94 98   MCH 27.0 - 31.0 pg 30.5 30.5 31.4(H)   MCHC 32.0 - 36.0 g/dL 32.4 32.4 31.9(L)   RDW 11.5 - 14.5 % 12.7 12.7 12.8   Platelets 150 - 450 K/uL 330 330 267   MPV 9.2 - 12.9 fL 10.7 10.7 10.8   Gran # 1.8 - 7.7 K/uL 5.8 5.8 3.1   Lymph # 1.0 - 4.8 K/uL 1.5 1.5 2.0   Mono # 0.3 - 1.0 K/uL 0.7 0.7 0.7   Eos # 0.0 - 0.5 K/uL 0.2 0.2 0.1   Baso # 0.00 - 0.20 K/uL 0.07 0.07 0.09   Gran % 38.0 - 73.0 % 70.7 70.7 51.0   Lymph % 18.0 - 48.0 % 18.0 18.0 34.1   Mono% 4.0 - 15.0 % 8.2 8.2 11.4   Eos % 0.0 - 8.0 % 1.8 1.8 1.8   Baso % 0.0 - 1.9 % 0.9 0.9 1.5   Prothrombin Time 9.0 - 12.5 sec - - -   INR 0.8 - 1.2 - - -        IMAGING STUDIES    ASSESSMENT/PLAN    Problem List  Items Addressed This Visit        2     Hypothyroid     Having some mixed nonspecific symptoms so will add TSH level to next blood draw.  Goal TSH within the normal range              3     Low bone density     Multifactorial with history of Crohn's disease, elevated PTH with normal calcium indicative of inadequate calcium absorption.  This is being managed by her rheumatologist with plans to possibly start IV bisphosphonate given history of intolerance to oral bisphosphonates.  Patient was prescribed Fosamax for a total of 4 years however does not believe that she was consistently taking this.  Ongoing management per Rheumatology              Unprioritized    Elevated parathyroid hormone - Primary     Suspect PTH elevation related to inadequate calcium intake and impaired absorption due to Crohn's disease.  Patient was previously not taking any calcium supplementation with inadequate dietary calcium intake.  Since her labs last month she has started taking Caltrate 2 tablets daily (unclear what the doses).  We discussed importance of getting at least 1200 mg of calcium daily in divided doses and will repeat her labs in addition to a 24 hour urine collection for evaluation of calcium in 2 months.  Given history of Crohn's disease she may need higher doses of calcium if PTH and urine calcium are abnormal.           Relevant Orders    Calcium, Timed Urine    Creatinine, urine, timed 24 Hours    TSH    PTH, Intact    Renal Function Panel      Other Visit Diagnoses     Weight gain         Relevant Orders    TSH        RTC prn if labs abnormal  Lab and urine drop off in 2 month(s)       Lizette Ross MD

## 2022-03-09 NOTE — ASSESSMENT & PLAN NOTE
Multifactorial with history of Crohn's disease, elevated PTH with normal calcium indicative of inadequate calcium absorption.  This is being managed by her rheumatologist with plans to possibly start IV bisphosphonate given history of intolerance to oral bisphosphonates.  Patient was prescribed Fosamax for a total of 4 years however does not believe that she was consistently taking this.  Ongoing management per Rheumatology

## 2022-03-09 NOTE — ASSESSMENT & PLAN NOTE
Having some mixed nonspecific symptoms so will add TSH level to next blood draw.  Goal TSH within the normal range

## 2022-03-15 ENCOUNTER — PATIENT MESSAGE (OUTPATIENT)
Dept: ENDOCRINOLOGY | Facility: CLINIC | Age: 67
End: 2022-03-15
Payer: MEDICARE

## 2022-03-16 ENCOUNTER — PATIENT MESSAGE (OUTPATIENT)
Dept: ENDOCRINOLOGY | Facility: CLINIC | Age: 67
End: 2022-03-16
Payer: MEDICARE

## 2022-03-16 ENCOUNTER — OFFICE VISIT (OUTPATIENT)
Dept: NEUROLOGY | Facility: CLINIC | Age: 67
End: 2022-03-16
Payer: MEDICARE

## 2022-03-16 DIAGNOSIS — G44.229 CHRONIC TENSION-TYPE HEADACHE, NOT INTRACTABLE: Primary | ICD-10-CM

## 2022-03-16 PROCEDURE — 99214 OFFICE O/P EST MOD 30 MIN: CPT | Mod: 95,,, | Performed by: STUDENT IN AN ORGANIZED HEALTH CARE EDUCATION/TRAINING PROGRAM

## 2022-03-16 PROCEDURE — 99214 PR OFFICE/OUTPT VISIT, EST, LEVL IV, 30-39 MIN: ICD-10-PCS | Mod: 95,,, | Performed by: STUDENT IN AN ORGANIZED HEALTH CARE EDUCATION/TRAINING PROGRAM

## 2022-03-16 RX ORDER — TOPIRAMATE 25 MG/1
TABLET ORAL
Qty: 162 TABLET | Refills: 0 | Status: SHIPPED | OUTPATIENT
Start: 2022-03-16 | End: 2022-07-12

## 2022-03-16 RX ORDER — TOPIRAMATE 25 MG/1
25 TABLET ORAL 2 TIMES DAILY
Qty: 60 TABLET | Refills: 11 | Status: SHIPPED | OUTPATIENT
Start: 2022-03-16 | End: 2022-03-16

## 2022-03-16 NOTE — PATIENT INSTRUCTIONS
Topamax   25mg daily a week, then increase to 50mg daily for a week, then increase to 75mg daily a week, then increase to final dose of 100mg daily thereafter.     Okay to stop Cymbalta    Daily over the counter supplement for headache prevention   Vitamin B2 300mg daily  Coenzyme Q10 300mg daily   Magnesium oxide 400mg - 800mg daily     Continue 150 minutes of light-moderate exercise a week for brain health     Head/neck/shoulder massage    Do not take Fioricet more than twice a week.       For neuropathy:   Consider alpha lipoic acid 600mg daily for neuropathy

## 2022-03-16 NOTE — PROGRESS NOTES
[facility]  Follow Up Patient Visit Virtual    The patient location is: home  The chief complaint leading to consultation is: headaches follow up  Visit type: Virtual visit with synchronous audio and video    Each patient to whom he or she provides medical services by telemedicine is:  (1) informed of the relationship between the physician and patient and the respective role of any other health care provider with respect to management of the patient; and (2) notified that he or she may decline to receive medical services by telemedicine and may withdraw from such care at any time.      Disease Summary     Principle neurological diagnosis: R acoustic schwannoma, headaches, neuropathy  Last MRI Brain: 9/10/21 stable schwannoma  Last MRI C-spine: NA  CSF 21: No OCB. 50 protein, 55 glucose      Interval history:     She was doing well on 60mg but was getting drowsy side effects, and it was decreased to 40mg, and she has stopped Cymbalta since a few weeks ago.   She has been taking Fioricet PRN (once every couple days).   Headaches quality hasn't changed in quality  She's eliminated cheese and processed meat from her diet and has noticed a small benefit.    She takes vitamin B complex daily (does not include B2)   Her vertigo and dizziness has decreased.   Her neuropathy is persistent, numbness and tingling    ROS:     SOCIAL HISTORY  Social History     Tobacco Use    Smoking status: Former Smoker     Packs/day: 0.50     Years: 15.00     Pack years: 7.50     Types: Cigarettes     Quit date: 1984     Years since quittin.7    Smokeless tobacco: Never Used    Tobacco comment: Quit    Substance Use Topics    Alcohol use: Yes     Comment: less than one per week    Drug use: No     Living arrangements - the patient lives with their family.      Exam:     Vitals unable to be obtained virtually       In general, the patient is well nourished and appears to be in no acute distress.          MENTAL  STATUS: language is fluent, normal verbal comprehension, attention is normal, fund of knowlege is appropriate by vocabulary.     Imaging (personally reviewed):   NA    Labs:     Lab Results   Component Value Date    BNDMDXVH73WL 41 10/20/2021    TITBRHHN78NO 44 03/13/2019    QMLMRIUK88VW 27 (L) 10/16/2018     No results found for: JCVINDEX, JCVANTIBODY  No results found for: DG7GTKDB, ABSOLUTECD3, YJ1UNKZT, ABSOLUTECD8, LQ4LZNRU, ABSOLUTECD4, LABCD48  Lab Results   Component Value Date    WBC 5.99 12/07/2021    RBC 4.14 12/07/2021    HGB 13.0 12/07/2021    HCT 40.7 12/07/2021    MCV 98 12/07/2021    MCH 31.4 (H) 12/07/2021    MCHC 31.9 (L) 12/07/2021    RDW 12.8 12/07/2021     12/07/2021    MPV 10.8 12/07/2021    GRAN 3.1 12/07/2021    GRAN 51.0 12/07/2021    LYMPH 2.0 12/07/2021    LYMPH 34.1 12/07/2021    MONO 0.7 12/07/2021    MONO 11.4 12/07/2021    EOS 0.1 12/07/2021    BASO 0.09 12/07/2021    EOSINOPHIL 1.8 12/07/2021    BASOPHIL 1.5 12/07/2021     Sodium   Date Value Ref Range Status   12/07/2021 140 136 - 145 mmol/L Final     Potassium   Date Value Ref Range Status   12/07/2021 4.1 3.5 - 5.1 mmol/L Final     Chloride   Date Value Ref Range Status   12/07/2021 105 95 - 110 mmol/L Final     CO2   Date Value Ref Range Status   12/07/2021 26 23 - 29 mmol/L Final     Glucose   Date Value Ref Range Status   12/07/2021 81 70 - 110 mg/dL Final     BUN   Date Value Ref Range Status   12/07/2021 12 8 - 23 mg/dL Final     Creatinine   Date Value Ref Range Status   12/07/2021 0.7 0.5 - 1.4 mg/dL Final     Calcium   Date Value Ref Range Status   12/07/2021 9.3 8.7 - 10.5 mg/dL Final     Total Protein   Date Value Ref Range Status   12/07/2021 7.3 6.0 - 8.4 g/dL Final     Albumin   Date Value Ref Range Status   12/07/2021 4.0 3.5 - 5.2 g/dL Final     Total Bilirubin   Date Value Ref Range Status   12/07/2021 0.4 0.1 - 1.0 mg/dL Final     Comment:     For infants and newborns, interpretation of results should be  based  on gestational age, weight and in agreement with clinical  observations.    Premature Infant recommended reference ranges:  Up to 24 hours.............<8.0 mg/dL  Up to 48 hours............<12.0 mg/dL  3-5 days..................<15.0 mg/dL  6-29 days.................<15.0 mg/dL       Alkaline Phosphatase   Date Value Ref Range Status   12/07/2021 139 (H) 55 - 135 U/L Final     AST   Date Value Ref Range Status   12/07/2021 26 10 - 40 U/L Final     ALT   Date Value Ref Range Status   12/07/2021 20 10 - 44 U/L Final     Anion Gap   Date Value Ref Range Status   12/07/2021 9 8 - 16 mmol/L Final     eGFR if    Date Value Ref Range Status   12/07/2021 >60.0 >60 mL/min/1.73 m^2 Final     eGFR if non    Date Value Ref Range Status   12/07/2021 >60.0 >60 mL/min/1.73 m^2 Final     Comment:     Calculation used to obtain the estimated glomerular filtration  rate (eGFR) is the CKD-EPI equation.                   Diagnosis/Assessment/Plan:     She couldn't tolerate Cymbalta due to side effects. No concerning features per history. Offered to try nutraceutricals (B2, CoQ10, Mag) and/or topamax (25mg slow uptitrate to 100mg daily). Patient will try nutraceuticals first. Educated about medication rebound headache.  Healthy lifestyle changes for brain health and headache prevent discussed.   Suggested ALA for neuropathy.     Return to clinic as needed.    Total time spent with the patient: 35 minutes, 25 minutes of face to face consultation and 10 minutes of chart review and coordination of care, on the day of the visit. This includes face to face time and non-face to face time preparing to see the patient (eg, review of tests), obtaining and/or reviewing separately obtained history, documenting clinical information in the electronic or other health record, independently interpreting resultsand communicating results to the patient/family/caregiver, or care coordination.         Rochelle Saucedo MD,  MSc  Attending neurologist

## 2022-03-29 ENCOUNTER — HOSPITAL ENCOUNTER (OUTPATIENT)
Dept: RADIOLOGY | Facility: HOSPITAL | Age: 67
Discharge: HOME | End: 2022-03-29
Attending: INTERNAL MEDICINE
Payer: MEDICARE

## 2022-03-29 VITALS — BODY MASS INDEX: 32.39 KG/M2 | WEIGHT: 165 LBS | HEIGHT: 60 IN

## 2022-03-29 DIAGNOSIS — Z12.31 ENCOUNTER FOR SCREENING MAMMOGRAM FOR MALIGNANT NEOPLASM OF BREAST: ICD-10-CM

## 2022-03-29 PROCEDURE — 77063 BREAST TOMOSYNTHESIS BI: CPT | Mod: TC

## 2022-03-29 PROCEDURE — 77067 SCR MAMMO BI INCL CAD: CPT | Mod: 26,,, | Performed by: RADIOLOGY

## 2022-03-29 PROCEDURE — 77063 MAMMO DIGITAL SCREENING BILAT WITH TOMO: ICD-10-PCS | Mod: 26,,, | Performed by: RADIOLOGY

## 2022-03-29 PROCEDURE — 77067 SCR MAMMO BI INCL CAD: CPT | Mod: TC

## 2022-03-29 PROCEDURE — 77063 BREAST TOMOSYNTHESIS BI: CPT | Mod: 26,,, | Performed by: RADIOLOGY

## 2022-03-29 PROCEDURE — 77067 MAMMO DIGITAL SCREENING BILAT WITH TOMO: ICD-10-PCS | Mod: 26,,, | Performed by: RADIOLOGY

## 2022-03-29 RX ORDER — ROSUVASTATIN CALCIUM 20 MG/1
TABLET, COATED ORAL
Qty: 90 TABLET | Refills: 1 | Status: SHIPPED | OUTPATIENT
Start: 2022-03-29 | End: 2022-06-25 | Stop reason: SDUPTHER

## 2022-03-30 ENCOUNTER — OFFICE VISIT (OUTPATIENT)
Dept: INTERNAL MEDICINE | Facility: CLINIC | Age: 67
End: 2022-03-30
Payer: MEDICARE

## 2022-03-30 VITALS
DIASTOLIC BLOOD PRESSURE: 80 MMHG | HEIGHT: 60 IN | BODY MASS INDEX: 33.41 KG/M2 | OXYGEN SATURATION: 96 % | WEIGHT: 170.19 LBS | HEART RATE: 63 BPM | SYSTOLIC BLOOD PRESSURE: 112 MMHG

## 2022-03-30 DIAGNOSIS — D33.3 RIGHT ACOUSTIC NEUROMA: ICD-10-CM

## 2022-03-30 DIAGNOSIS — R22.1 NECK SWELLING: Primary | ICD-10-CM

## 2022-03-30 DIAGNOSIS — M32.19 OTHER SYSTEMIC LUPUS ERYTHEMATOSUS WITH OTHER ORGAN INVOLVEMENT: ICD-10-CM

## 2022-03-30 DIAGNOSIS — E03.9 HYPOTHYROIDISM, UNSPECIFIED TYPE: ICD-10-CM

## 2022-03-30 PROCEDURE — 99999 PR PBB SHADOW E&M-EST. PATIENT-LVL IV: CPT | Mod: PBBFAC,,, | Performed by: INTERNAL MEDICINE

## 2022-03-30 PROCEDURE — 99214 OFFICE O/P EST MOD 30 MIN: CPT | Mod: PBBFAC | Performed by: INTERNAL MEDICINE

## 2022-03-30 PROCEDURE — 99999 PR PBB SHADOW E&M-EST. PATIENT-LVL IV: ICD-10-PCS | Mod: PBBFAC,,, | Performed by: INTERNAL MEDICINE

## 2022-03-30 PROCEDURE — 99214 PR OFFICE/OUTPT VISIT, EST, LEVL IV, 30-39 MIN: ICD-10-PCS | Mod: S$PBB,,, | Performed by: INTERNAL MEDICINE

## 2022-03-30 PROCEDURE — 99214 OFFICE O/P EST MOD 30 MIN: CPT | Mod: S$PBB,,, | Performed by: INTERNAL MEDICINE

## 2022-03-30 NOTE — TELEPHONE ENCOUNTER
No new care gaps identified.  Powered by [employer]. Reference number: [id number].   3/29/2022 8:44:43 PM CDT

## 2022-03-30 NOTE — PROGRESS NOTES
Answers for HPI/ROS submitted by the patient on 3/30/2022  activity change: No  unexpected weight change: No  neck pain: No  hearing loss: No  rhinorrhea: Yes  trouble swallowing: No  eye discharge: No  visual disturbance: No  chest tightness: No  wheezing: Yes  chest pain: No  palpitations: No  blood in stool: No  constipation: No  vomiting: No  diarrhea: No  polydipsia: No  polyuria: No  difficulty urinating: No  hematuria: No  menstrual problem: No  dysuria: No  joint swelling: Yes  arthralgias: Yes  headaches: Yes  weakness: Yes  confusion: No  dysphoric mood: No    Subjective:       Patient ID: Radha Chiang is a 67 y.o. female.    Chief Complaint: Thyroid Problem (Need checked ) and Neck Pain (Right side of neck is swollen)    This is a 67-year-old who presents today for follow-up she was concerned because she feels that she has a bit of fullness in her neck and she was worried better thyroid and wanted to do an ultrasound she has recently seen Endocrinology but reports that they were going to do some additional test for her PTH which was only mildly elevated she also reports that they increased her calcium and she is taking vitamin-D.  Since last visit she has had improvement resolution in her vertigo .  She did do some exercises but also reports that she thought she may have had some type of virus that lingered.  She continues to follow with rheumatology for her lupus.  Patient reports she is also back to being more active and exercising by swimming more regularly which she enjoys she continues to travel and is planning on going on a trip this summer and then back to or get and September.    HPI  Review of Systems   Constitutional: Negative for activity change and unexpected weight change.   HENT: Positive for rhinorrhea. Negative for hearing loss and trouble swallowing.    Eyes: Negative for discharge and visual disturbance.   Respiratory: Positive for wheezing. Negative for chest tightness.     Cardiovascular: Negative for chest pain and palpitations.   Gastrointestinal: Negative for blood in stool, constipation, diarrhea and vomiting.   Endocrine: Negative for polydipsia and polyuria.   Genitourinary: Negative for difficulty urinating, dysuria, hematuria and menstrual problem.   Musculoskeletal: Positive for arthralgias and joint swelling. Negative for neck pain.   Neurological: Positive for weakness and headaches.   Psychiatric/Behavioral: Negative for confusion and dysphoric mood.       Objective:     Blood pressure 112/80, pulse 63, height 5' (1.524 m), weight 77.2 kg (170 lb 3.1 oz), SpO2 96 %.    Physical Exam  Constitutional:       General: She is not in acute distress.  HENT:      Head: Normocephalic.      Comments: Hearing aids   Neck fullness   Eyes:      General: No scleral icterus.  Cardiovascular:      Rate and Rhythm: Normal rate and regular rhythm.      Heart sounds: Normal heart sounds. No murmur heard.    No friction rub. No gallop.   Pulmonary:      Effort: Pulmonary effort is normal. No respiratory distress.      Breath sounds: Normal breath sounds.   Abdominal:      General: Bowel sounds are normal.      Palpations: Abdomen is soft. There is no mass.      Tenderness: There is no abdominal tenderness.   Musculoskeletal:      Cervical back: Neck supple.   Skin:     Findings: No erythema.   Neurological:      Mental Status: She is alert.         Assessment:       1. Neck swelling    2. Other systemic lupus erythematosus with other organ involvement    3. Hypothyroidism, unspecified type    4. Right acoustic neuroma        Plan:       Radha was seen today for thyroid problem and neck pain.    Diagnoses and all orders for this visit:    Neck swelling  -     US Soft Tissue Head Neck Thyroid; Future    Other systemic lupus erythematosus with other organ involvement  History of she follows with Rheumatology stable    Hypothyroidism, unspecified type  History of she remains on medication  proceed with thyroid ultrasound    Right acoustic neuroma  Recent vertigo symptoms have resolved  History of status post previous treatment she follows with outlying specialist    hyperlipdemia carotid disease remains on statin     For mildly elevated PTH she is taking calcium vitamin-D and is following with endocrinology    For headache she did see Neurology and reports his taking some vitamins b2/coq lipoflavenoid  And magnesium with improvement is not started on Topamax     Discussed COVID vaccine/booster

## 2022-03-30 NOTE — TELEPHONE ENCOUNTER
Refill Authorization Note   Radha Chiang  is requesting a refill authorization.  Brief Assessment and Rationale for Refill:  Approve     Medication Therapy Plan:       Medication Reconciliation Completed: No   Comments:   --->Care Gap information included below if applicable.       Requested Prescriptions   Pending Prescriptions Disp Refills    rosuvastatin (CRESTOR) 20 MG tablet [Pharmacy Med Name: ROSUVASTATIN 20MG TABLETS] 90 tablet 1     Sig: TAKE 1 TABLET(20 MG) BY MOUTH EVERY DAY       Cardiovascular:  Antilipid - Statins Failed - 3/29/2022  8:44 PM        Failed - No ED/Hospital visits since last PCP visit     Last PCP Visit: 10/18/2021 Last Admission: 11/12/2021 Last ED Visit: 1/27/2019          Passed - Patient is at least 18 years old        Passed - Valid encounter within last 15 months     Recent Visits  Date Type Provider Dept   10/18/21 Office Visit Merry Espinal MD [facility] Internal Medicine   01/21/21 Office Visit Merry Espinal MD [facility] Internal Medicine   Showing recent visits within past 720 days and meeting all other requirements  Future Appointments  No visits were found meeting these conditions.  Showing future appointments within next 150 days and meeting all other requirements      Future Appointments              Tomorrow MD [facility], [facility]    In 1 week LAB, APPOINTMENT [facility] - Lab (Venipuncture), [facility]    In 1 week LAB, APPOINTMENT [facility] - Lab (Venipuncture), [facility]    In 2 weeks MD [id number], [facility]    In 1 month LAB, APPOINTMENT [facility] - Lab (Venipuncture), [facility]    In 1 month MD Mike J[facility] - Dermatology 11th Fl, [facility]                Passed - Matches previous order       Previous Authorizing Provider: Merry Espinal MD (rosuvastatin (CRESTOR) 20 MG tablet)  Previous  Pharmacy: [id number] - [place], [place] - [address]            Passed - ALT is 131 or below and within 360 days     ALT   Date Value Ref Range Status   12/07/2021 20 10 - 44 U/L Final   10/20/2021 19 10 - 44 U/L Final   10/20/2021 19 10 - 44 U/L Final     POC ALT   Date Value Ref Range Status   01/27/2019 23 10 - 47 U/L Final              Passed - AST is 119 or below and within 360 days     AST   Date Value Ref Range Status   12/07/2021 26 10 - 40 U/L Final   10/20/2021 26 10 - 40 U/L Final   10/20/2021 26 10 - 40 U/L Final     POC AST   Date Value Ref Range Status   01/27/2019 38 11 - 38 U/L Final              Passed - Total Cholesterol within 360 days     Lab Results   Component Value Date    CHOL 134 10/20/2021    CHOL 110 (L) 01/21/2021    CHOL 140 02/10/2020              Passed - LDL within 360 days     LDL Cholesterol   Date Value Ref Range Status   10/20/2021 41.2 (L) 63.0 - 159.0 mg/dL Final     Comment:     The National Cholesterol Education Program (NCEP) has set the  following guidelines (reference values) for LDL Cholesterol:  Optimal.......................<130 mg/dL  Borderline High...............130-159 mg/dL  High..........................160-189 mg/dL  Very High.....................>190 mg/dL              Passed - HDL within 360 days     HDL   Date Value Ref Range Status   10/20/2021 81 (H) 40 - 75 mg/dL Final     Comment:     The National Cholesterol Education Program (NCEP) has set the  following guidelines (reference values) for HDL Cholesterol:  Low...............<40 mg/dL  Optimal...........>60 mg/dL              Passed - Triglycerides within 360 days     Lab Results   Component Value Date    TRIG 59 10/20/2021    TRIG 56 01/21/2021    TRIG 46 02/10/2020                  Appointments  past 12m or future 3m with PCP    Date Provider   Last Visit   10/18/2021 Merry Espinal MD   Next Visit   3/30/2022 Merry Espinal MD   ED  visits in past 90 days: 0     Note composed:9:12 PM 03/29/2022

## 2022-04-07 ENCOUNTER — HOSPITAL ENCOUNTER (OUTPATIENT)
Dept: RADIOLOGY | Facility: HOSPITAL | Age: 67
Discharge: HOME | End: 2022-04-07
Attending: INTERNAL MEDICINE
Payer: MEDICARE

## 2022-04-07 DIAGNOSIS — R22.1 NECK SWELLING: ICD-10-CM

## 2022-04-07 PROCEDURE — 76536 US EXAM OF HEAD AND NECK: CPT | Mod: TC

## 2022-04-07 PROCEDURE — 76536 US SOFT TISSUE HEAD NECK THYROID: ICD-10-PCS | Mod: 26,,, | Performed by: STUDENT IN AN ORGANIZED HEALTH CARE EDUCATION/TRAINING PROGRAM

## 2022-04-07 PROCEDURE — 76536 US EXAM OF HEAD AND NECK: CPT | Mod: 26,,, | Performed by: STUDENT IN AN ORGANIZED HEALTH CARE EDUCATION/TRAINING PROGRAM

## 2022-04-11 ENCOUNTER — TELEPHONE (OUTPATIENT)
Dept: RHEUMATOLOGY | Facility: CLINIC | Age: 67
End: 2022-04-11
Payer: MEDICARE

## 2022-04-11 ENCOUNTER — PATIENT MESSAGE (OUTPATIENT)
Dept: RHEUMATOLOGY | Facility: CLINIC | Age: 67
End: 2022-04-11
Payer: MEDICARE

## 2022-04-12 ENCOUNTER — OFFICE VISIT (OUTPATIENT)
Dept: RHEUMATOLOGY | Facility: CLINIC | Age: 67
End: 2022-04-12
Payer: MEDICARE

## 2022-04-12 VITALS
BODY MASS INDEX: 33.77 KG/M2 | HEIGHT: 60 IN | TEMPERATURE: 98 F | SYSTOLIC BLOOD PRESSURE: 106 MMHG | HEART RATE: 59 BPM | DIASTOLIC BLOOD PRESSURE: 64 MMHG | WEIGHT: 172 LBS

## 2022-04-12 DIAGNOSIS — M31.6 TEMPORAL ARTERITIS: Primary | ICD-10-CM

## 2022-04-12 DIAGNOSIS — M85.9 LOW BONE DENSITY: ICD-10-CM

## 2022-04-12 DIAGNOSIS — M32.8 OTHER FORMS OF SYSTEMIC LUPUS ERYTHEMATOSUS, UNSPECIFIED ORGAN INVOLVEMENT STATUS: ICD-10-CM

## 2022-04-12 PROCEDURE — 99214 PR OFFICE/OUTPT VISIT, EST, LEVL IV, 30-39 MIN: ICD-10-PCS | Mod: S$PBB,,, | Performed by: INTERNAL MEDICINE

## 2022-04-12 PROCEDURE — 99999 PR PBB SHADOW E&M-EST. PATIENT-LVL IV: ICD-10-PCS | Mod: PBBFAC,,, | Performed by: INTERNAL MEDICINE

## 2022-04-12 PROCEDURE — 99214 OFFICE O/P EST MOD 30 MIN: CPT | Mod: S$PBB,,, | Performed by: INTERNAL MEDICINE

## 2022-04-12 PROCEDURE — 99214 OFFICE O/P EST MOD 30 MIN: CPT | Mod: PBBFAC | Performed by: INTERNAL MEDICINE

## 2022-04-12 PROCEDURE — 99999 PR PBB SHADOW E&M-EST. PATIENT-LVL IV: CPT | Mod: PBBFAC,,, | Performed by: INTERNAL MEDICINE

## 2022-04-12 RX ORDER — HYDROXYCHLOROQUINE SULFATE 200 MG/1
200 TABLET, FILM COATED ORAL 2 TIMES DAILY
Qty: 180 TABLET | Refills: 1 | Status: SHIPPED | OUTPATIENT
Start: 2022-04-12 | End: 2022-12-21 | Stop reason: SDUPTHER

## 2022-04-12 RX ORDER — ALENDRONATE SODIUM 70 MG/1
70 TABLET ORAL
Qty: 4 TABLET | Refills: 11 | Status: SHIPPED | OUTPATIENT
Start: 2022-04-12 | End: 2023-01-25

## 2022-04-12 ASSESSMENT — SYSTEMIC LUPUS ERYTHEMATOSUS DISEASE ACTIVITY INDEX (SLEDAI): TOTAL_SCORE: 2

## 2022-04-12 NOTE — PROGRESS NOTES
Pre chart    Answers for HPI/ROS submitted by the patient on 4/11/2022  fever: No  eye redness: No  mouth sores: No  headaches: Yes  shortness of breath: Yes  chest pain: No  trouble swallowing: No  diarrhea: No  constipation: No  unexpected weight change: No  genital sore: No  dysuria: No  During the last 3 days, have you had a skin rash?: No  Bruises or bleeds easily: No  cough: Yes

## 2022-04-12 NOTE — PROGRESS NOTES
Subjective:       Patient ID: Radha Chiang is a 67 y.o. female.    Chief Complaint: SLE; low bone density with elevated FRAX  HPI asymptomatic. Single nasal ulcer No fever, stable hair loss, no rash. No serositis no arthritis, stable Jaccoud's painless.  Would like to take alendronate rather than denosumab or zoledronic acid as she has tolerated oral bisphosphonates in past  Review of Systems   Constitutional: Negative for fever and unexpected weight change.   HENT: Negative for mouth sores and trouble swallowing.    Eyes: Negative for redness.   Respiratory: Positive for cough and shortness of breath.    Cardiovascular: Negative for chest pain.   Gastrointestinal: Negative for constipation and diarrhea.   Genitourinary: Negative for dysuria and genital sores.   Skin: Negative for rash.   Neurological: Positive for headaches.   Hematological: Does not bruise/bleed easily.         Objective:   There were no vitals taken for this visit.     Physical Exam   Constitutional: She is oriented to person, place, and time.   HENT:   Head: Normocephalic and atraumatic.   Mouth/Throat: Oropharynx is clear and moist.   Eyes: Conjunctivae are normal.   Neck: No thyromegaly present.   Cardiovascular: Normal rate, regular rhythm and normal heart sounds. Exam reveals no gallop and no friction rub.   No murmur heard.  Pulmonary/Chest: Breath sounds normal. She has no wheezes. She has no rales. She exhibits no tenderness.   Abdominal: Soft. She exhibits no distension and no mass. There is no abdominal tenderness.   Musculoskeletal:      Right shoulder: Normal.      Left shoulder: Normal.      Right elbow: Normal.      Left elbow: Normal.      Right wrist: Normal.      Left wrist: Normal.      Cervical back: Normal range of motion and neck supple.      Right knee: Normal.      Left knee: Normal.      Comments: Reducible Jaccoud's arthropathy   Lymphadenopathy:     She has no cervical adenopathy.   Neurological: She is alert and  oriented to person, place, and time. She displays normal reflexes. Gait normal.   Nl motor strength UE and LE bilateral       Right Side Rheumatological Exam     Examination finds the shoulder, elbow, wrist, knee, 1st PIP, 1st MCP, 2nd PIP, 2nd MCP, 3rd PIP, 3rd MCP, 4th PIP, 4th MCP, 5th PIP and 5th MCP normal.    Left Side Rheumatological Exam     Examination finds the shoulder, elbow, wrist, knee, 1st PIP, 1st MCP, 2nd PIP, 2nd MCP, 3rd PIP, 3rd MCP, 4th PIP, 4th MCP, 5th PIP and 5th MCP normal.               Latest Reference Range & Units 04/07/22 16:08   WBC 3.90 - 12.70 K/uL 6.69   RBC 4.00 - 5.40 M/uL 4.11   Hemoglobin 12.0 - 16.0 g/dL 12.7   Hematocrit 37.0 - 48.5 % 40.3   MCV 82 - 98 fL 98   MCH 27.0 - 31.0 pg 30.9   MCHC 32.0 - 36.0 g/dL 31.5 (L)   RDW 11.5 - 14.5 % 12.0   Platelets 150 - 450 K/uL 280   MPV 9.2 - 12.9 fL 11.1   Gran % 38.0 - 73.0 % 60.5   Lymph % 18.0 - 48.0 % 26.0   Mono % 4.0 - 15.0 % 11.2   Eosinophil % 0.0 - 8.0 % 1.0   Basophil % 0.0 - 1.9 % 1.0   Immature Granulocytes 0.0 - 0.5 % 0.3   Gran # (ANC) 1.8 - 7.7 K/uL 4.0   Lymph # 1.0 - 4.8 K/uL 1.7   Mono # 0.3 - 1.0 K/uL 0.8   Eos # 0.0 - 0.5 K/uL 0.1   Baso # 0.00 - 0.20 K/uL 0.07   Immature Grans (Abs) 0.00 - 0.04 K/uL 0.02 [1]   NRBC 0 /100 WBC 0   Differential Method  Automated   Sed Rate 0 - 36 mm/Hr 8   Sodium 136 - 145 mmol/L 140   Potassium 3.5 - 5.1 mmol/L 5.0   Chloride 95 - 110 mmol/L 103   CO2 23 - 29 mmol/L 26   Anion Gap 8 - 16 mmol/L 11   BUN 8 - 23 mg/dL 19   Creatinine 0.5 - 1.4 mg/dL 0.9   EGFR if non African American >60 mL/min/1.73 m^2 >60.0 [2]   EGFR if African American >60 mL/min/1.73 m^2 >60.0   Glucose 70 - 110 mg/dL 78   Calcium 8.7 - 10.5 mg/dL 9.9   Alkaline Phosphatase 55 - 135 U/L 116   PROTEIN TOTAL 6.0 - 8.4 g/dL 7.4   Albumin 3.5 - 5.2 g/dL 3.9   BILIRUBIN TOTAL 0.1 - 1.0 mg/dL 0.5 [3]   AST 10 - 40 U/L 26   ALT 10 - 44 U/L 17   CRP 0.0 - 8.2 mg/L 2.2   TSH 0.400 - 4.000 uIU/mL 2.234   Ds DNA Ab  Negative 1:10  Negative 1:10 [4]   Complement (C-3) 50 - 180 mg/dL 105   Complement (C-4) 11 - 44 mg/dL 27   (   Latest Reference Range & Units 04/07/22 15:44   Specimen UA  Urine, Unspecified   Color, UA Yellow, Straw, Alejandra  Yellow   Appearance, UA Clear  Hazy !   Specific Gravity, UA 1.005 - 1.030  1.005   PH, UA 5.0 - 8.0  6.0   Protein, UA Negative  Negative [1]   Glucose, UA Negative  Negative   Ketones, UA Negative  Negative   Occult Blood UA Negative  Negative   NITRITE UA Negative  Negative   Bilirubin (UA) Negative  Negative   Leukocytes, UA Negative  Negative   RBC, UA 0 - 4 /hpf 0   WBC, UA 0 - 5 /hpf 1   Bacteria, UA None-Occ /hpf Rare   Squam Epithel, UA /hpf 0   Microscopic Comment  SEE COMMENT [2]   Creatinine, Urine 15.0 - 325.0 mg/dL 20.0   Protein, Urine Random 0 - 15 mg/dL <7   Prot/Creat Ratio, Urine 0.00 - 0.20  Unable to calculate     Margy Preciado MD  [phone] 1/25/2022        Narrative & Impression  EXAMINATION:  DEXA BONE DENSITY SPINE HIP     CLINICAL HISTORY:  Vitamin D deficiency, unspecified.  67 y/o female with a history of fractured left arm at 59 y/o.  She had menopausal symptoms at 48 y/o.  She is taking is taking 50,000 units of Vit D once a week.  She has a history of Lupus, Asthma, Crohn's Disease and IBS.  She exercises regularly and does not smoke.     TECHNIQUE:  DXA specification: Main [place] [employer] Horizon A (S/Q052500M)     Bone Mineral Density scanning was performed over the hip and lumbar spine.     Review of the images confirms satisfactory positioning and technique.     COMPARISON:  Comparison study done on 12/27/2019.     Lumbar spine:    BMD 0.826 g/cm2 and T-score -2.0.     Total Hip:           BMD 0.773 g/cm2 and T-score -1.4.     Distal 1/3 radius: Not applicable     FINDINGS:  Lumbar spine (L1-L4):              BMD is 0.808 g/cm2, T-score is -2.2, and Z-score is -0.4.     Total hip:                                BMD is 0.761 g/cm2, T-score is -1.5, and  Z-score is -0.3.     Femoral neck:                          BMD is 0.584 g/cm2, T-score is -2.4, and Z-score is -0.9.     Distal 1/3 radius:                      Not applicable     FRAX:     19% risk of a major osteoporotic fracture in the next 10 years.     3.4% risk of hip fracture in the next 10 years.     Impression:     *Osteoporosis based on T-score between -1.0 and -2.5 and elevated risk based on FRAX  *Fracture risk is high  *Compared with previous DXA, BMD at the lumbar spine has remained stable, and the BMD at the total hip has remained stable.     RECOMMENDATIONS:  *Daily calcium intake 9732-6961 mg, dietary sources preferred; Vitamin D 2993-4635 IU daily.  *Weight bearing exercise and fall precautions.  *Recommend medical therapy for osteopenia with increased risk for fracture based on FRAX calculations. Consider bisphosphonates (alendronate, risedronate, zoledronic acid), or denosumab.  *Repeat BMD in 2 years                    Assessment:     SLE SLEDAI     low bone density DXA 1/12/22: FRAX hip 3.4% major 19%. Discussed alendronate which she has taken intermittently in past and tolerated. SLE SLEDAI: 2( nasal ulcer)    brain MRI done in [place] as per above, unchanged from 1 year earlier and was fowarded to her Neurosurgeon at [place] who had treated the acoustic neuroma with gamma knife.  History of CNS lupus 2014  treated with cyclophosphamide IV x 6 months, then mycophenolate. Saw Dr. Saucedo 12/20/21, no evidence of MS, right acoustic schwannoma, headaches, started on duloxetine with side effects as listed by Dr. Hernandez  APS panel negative  Since August: Intermittent Dizziness/lightheadness, lasts 1-2 hrs, associated gain imbalance/unsteadiness  Post episode fatigue. Not vertigo, not orthostatic/positional. Now resolved  Persistent migraine headache for past year normal ESR and CRP  Chronic hearing loss right ear  Jaccoud's arthropathy, asymptomatic  Vitamin D deficiency level 41, perfect  Crohn's in  remission  Chronically minimally intermittently eelevated alk phos now normal.      Plan:         Cont hydroxychloroquine 200mg twice daily, saw Dr. Monge in Optometry 12/8/21  Cont vitamin D2 50,000 units once a week.   *4th dose of Moderna Covid vaccine  Alendronate 70mg po once a week. Protocol reviewed and printe  RTC 9 months when back from 6 month car trip to [place] and [place].   RTC 9 months with standing lupus labs

## 2022-04-12 NOTE — PATIENT INSTRUCTIONS
Alendronate 70mg once a week  Take with 8 oz plain water  Stay upright for at least 30min after taking  No food drink or other pills for at least 30 min       4th dose 2nd booster Moderna Covid vaccine

## 2022-04-15 ENCOUNTER — PATIENT MESSAGE (OUTPATIENT)
Dept: NEUROLOGY | Facility: CLINIC | Age: 67
End: 2022-04-15
Payer: MEDICARE

## 2022-04-20 ENCOUNTER — PATIENT MESSAGE (OUTPATIENT)
Dept: INTERNAL MEDICINE | Facility: CLINIC | Age: 67
End: 2022-04-20
Payer: MEDICARE

## 2022-04-20 DIAGNOSIS — Z87.09 HISTORY OF ASTHMA: ICD-10-CM

## 2022-04-20 DIAGNOSIS — R06.2 WHEEZING: ICD-10-CM

## 2022-04-20 RX ORDER — ALBUTEROL SULFATE 90 UG/1
AEROSOL, METERED RESPIRATORY (INHALATION)
Qty: 6.7 G | Refills: 1 | Status: SHIPPED | OUTPATIENT
Start: 2022-04-20 | End: 2022-12-26

## 2022-05-02 ENCOUNTER — PATIENT MESSAGE (OUTPATIENT)
Dept: DERMATOLOGY | Facility: CLINIC | Age: 67
End: 2022-05-02
Payer: MEDICARE

## 2022-05-10 ENCOUNTER — LAB VISIT (OUTPATIENT)
Dept: LAB | Facility: HOSPITAL | Age: 67
End: 2022-05-10
Payer: MEDICARE

## 2022-05-10 DIAGNOSIS — R79.89 ELEVATED PARATHYROID HORMONE: ICD-10-CM

## 2022-05-10 DIAGNOSIS — R79.89 ELEVATED PARATHYROID HORMONE: Primary | ICD-10-CM

## 2022-05-10 LAB
ALBUMIN SERPL BCP-MCNC: 3.8 G/DL (ref 3.5–5.2)
ANION GAP SERPL CALC-SCNC: 10 MMOL/L (ref 8–16)
BUN SERPL-MCNC: 11 MG/DL (ref 8–23)
CALCIUM SERPL-MCNC: 9.6 MG/DL (ref 8.7–10.5)
CHLORIDE SERPL-SCNC: 105 MMOL/L (ref 95–110)
CO2 SERPL-SCNC: 25 MMOL/L (ref 23–29)
CREAT SERPL-MCNC: 0.8 MG/DL (ref 0.5–1.4)
EST. GFR  (AFRICAN AMERICAN): >60 ML/MIN/1.73 M^2
EST. GFR  (NON AFRICAN AMERICAN): >60 ML/MIN/1.73 M^2
GLUCOSE SERPL-MCNC: 68 MG/DL (ref 70–110)
PHOSPHATE SERPL-MCNC: 3.4 MG/DL (ref 2.7–4.5)
POTASSIUM SERPL-SCNC: 4.7 MMOL/L (ref 3.5–5.1)
PTH-INTACT SERPL-MCNC: 78.2 PG/ML (ref 9–77)
SODIUM SERPL-SCNC: 140 MMOL/L (ref 136–145)

## 2022-05-10 PROCEDURE — 36415 COLL VENOUS BLD VENIPUNCTURE: CPT | Performed by: INTERNAL MEDICINE

## 2022-05-10 PROCEDURE — 80069 RENAL FUNCTION PANEL: CPT | Performed by: INTERNAL MEDICINE

## 2022-05-10 PROCEDURE — 83970 ASSAY OF PARATHORMONE: CPT | Performed by: INTERNAL MEDICINE

## 2022-05-23 ENCOUNTER — PATIENT MESSAGE (OUTPATIENT)
Dept: OTOLARYNGOLOGY | Facility: CLINIC | Age: 67
End: 2022-05-23
Payer: MEDICARE

## 2022-06-14 ENCOUNTER — TELEPHONE (OUTPATIENT)
Dept: OTOLARYNGOLOGY | Facility: CLINIC | Age: 67
End: 2022-06-14
Payer: MEDICARE

## 2022-06-14 NOTE — TELEPHONE ENCOUNTER
Spoke with patient in regards to scheduling an audiogram prior to her appointment with Dr Vance. Patient stated she will call back because she was unavailable to talk at that time.

## 2022-06-15 ENCOUNTER — CLINICAL SUPPORT (OUTPATIENT)
Dept: AUDIOLOGY | Facility: CLINIC | Age: 67
End: 2022-06-15
Payer: MEDICARE

## 2022-06-15 DIAGNOSIS — H90.A21 SENSORINEURAL HEARING LOSS (SNHL) OF RIGHT EAR WITH RESTRICTED HEARING OF LEFT EAR: Primary | ICD-10-CM

## 2022-06-15 DIAGNOSIS — H93.11 TINNITUS, SUBJECTIVE, RIGHT: ICD-10-CM

## 2022-06-15 PROCEDURE — 92557 COMPREHENSIVE HEARING TEST: CPT | Mod: PBBFAC

## 2022-06-15 PROCEDURE — 92567 TYMPANOMETRY: CPT | Mod: PBBFAC

## 2022-06-15 NOTE — Clinical Note
Hey yall!  This pt was an audio only before her appt to see Dr. Vanec on 6/23. She noticed a sudden HL in the left ear (her better ear) about 2 weeks ago accomp by increased tinnitus and an ep of vertigo. We were unable to have her seen today, and since Dr. Vance is not in tomorrow, we put her on your schedule [place]. She also has an appt with her audiologist at [facility] at 9AM tomorrow (on [address]) - but I'm sure the audiologist would understand her running late due to a SSNHL! Let me know if yall need anything of have questions. Thanks!

## 2022-06-15 NOTE — PROGRESS NOTES
"Radha Chiang was seen today in the clinic for an audiologic evaluation. She has a history of an acoustic neuroma, profound hearing loss, and tinnitus on the right side with restricted hearing in the left ear. Ms. Chiang is currently fit with binaural Resound BELKIS's from [facility]. Today she reported a sudden change in hearing in her left ear about two weeks ago. She also reported two weeks ago that the "ringing" in her right ear became louder. She reported a history of trouble with vertigo and utilizing at home exercises to manage her symptoms. She reported 2 weeks ago when she experienced a decrease in hearing and increase in tinnitus she also experienced some dizziness. Ms. Chiang denied otalgia and aural fullness. Her previous audiogram on 03/02/2021 revealed a profound sensorineural hearing loss (SNHL) in the right ear and a mild to moderate SNHL in the left ear.      Tympanometry revealed Type A in the right ear and Type A in the left ear.     Audiogram results revealed a profound sensorineural hearing loss (SNHL) in the right ear and a moderate to severe SNHL in the left ear.      Speech reception thresholds could not be obtained in the right ear and were noted at 45 dBHL in the left ear. A speech awareness threshold was noted at 95 dBHL in the right ear.  Speech discrimination scores could not be obtained in the right ear and were 84% in the left ear. (Speech discrimination in the left ear was 100% at the previous audiogram on 03/02/2021)    Today's results were discussed with the patient and Ms. Chiang expressed understanding of today's findings. Today's results indicate a significant sudden change in the left ear. Ms. Chiang is not scheduled with Dr. Vance until 06/23/2022. Due to the nature of her hearing loss and first available, Ms. Chiang is scheduled to follow-up with Doug Godwin DNP tomorrow.    Recommendations:  1. Otologic evaluation as scheduled  2. Daily and consistent use of " amplification  3. Hearing aid follow-up as scheduled  4. Annual audiogram or sooner if change perceived  5. Hearing protection in noise

## 2022-06-16 ENCOUNTER — PATIENT MESSAGE (OUTPATIENT)
Dept: OTOLARYNGOLOGY | Facility: CLINIC | Age: 67
End: 2022-06-16

## 2022-06-16 ENCOUNTER — OFFICE VISIT (OUTPATIENT)
Dept: OTOLARYNGOLOGY | Facility: CLINIC | Age: 67
End: 2022-06-16
Payer: MEDICARE

## 2022-06-16 DIAGNOSIS — H91.22 SUDDEN HEARING LOSS, LEFT: Primary | ICD-10-CM

## 2022-06-16 DIAGNOSIS — D33.3 RIGHT ACOUSTIC NEUROMA: ICD-10-CM

## 2022-06-16 DIAGNOSIS — M32.19 OTHER SYSTEMIC LUPUS ERYTHEMATOSUS WITH OTHER ORGAN INVOLVEMENT: ICD-10-CM

## 2022-06-16 PROCEDURE — 99214 OFFICE O/P EST MOD 30 MIN: CPT | Mod: S$PBB,,, | Performed by: NURSE PRACTITIONER

## 2022-06-16 PROCEDURE — 99999 PR PBB SHADOW E&M-EST. PATIENT-LVL III: ICD-10-PCS | Mod: PBBFAC,,, | Performed by: NURSE PRACTITIONER

## 2022-06-16 PROCEDURE — 99214 PR OFFICE/OUTPT VISIT, EST, LEVL IV, 30-39 MIN: ICD-10-PCS | Mod: S$PBB,,, | Performed by: NURSE PRACTITIONER

## 2022-06-16 PROCEDURE — 99213 OFFICE O/P EST LOW 20 MIN: CPT | Mod: PBBFAC | Performed by: NURSE PRACTITIONER

## 2022-06-16 PROCEDURE — 99999 PR PBB SHADOW E&M-EST. PATIENT-LVL III: CPT | Mod: PBBFAC,,, | Performed by: NURSE PRACTITIONER

## 2022-06-16 RX ORDER — PREDNISONE 5 MG/1
TABLET ORAL
Qty: 90 TABLET | Refills: 0 | Status: SHIPPED | OUTPATIENT
Start: 2022-06-16 | End: 2022-12-07

## 2022-06-16 NOTE — PROGRESS NOTES
Subjective:      Radha Chiang is a 67 y.o. female who was self-referred for hearing loss.     Radha Chiang is a 66 y.o. female with hx of AD AN s/p GKS in 2016 at [place].  Stable on last MRI @ 1.2cm here today to report sudden loss of hearing in left ear that she noticed about 4 weeks ago.  She had one incident of severe vertigo when she noticed the loss of hearing in her left ear. She denies headaches or change in vision. .     Past Medical History  She has a past medical history of Acid reflux, Anemia, Anxiety, Arthritis, Asthma, chronic, Crohn's disease, Depression, Dry eyes, Fracture of forearm, proximal, open, History of papilledema, Hyperlipidemia, Lupus, Neuromuscular disorder, Osteoporosis, Stroke, and Thyroid disease.    Past Surgical History  She has a past surgical history that includes Brain surgery (01/18/2012); Incision and drainage perirectal abscess (1998); Adenoidectomy (1962); Colonoscopy (2017); ORIF forearm fracture (Left, 06/23/2013); and Breast cyst aspiration (Left).    Family History  Her family history includes Allergies in her sister; COPD in her maternal grandfather; Cancer in her maternal grandmother and paternal grandmother; Cancer (age of onset: 64) in her mother; Heart disease in her paternal grandfather; Heart disease (age of onset: 64) in her father; Hypertension in her sister; Lupus in her paternal uncle; Mental retardation in her brother; Ovarian cancer in her paternal grandmother; Sjogren's syndrome in her sister; Stroke in her paternal grandmother.    Social History  She reports that she quit smoking about 37 years ago. Her smoking use included cigarettes. She has a 7.50 pack-year smoking history. She has never used smokeless tobacco. She reports current alcohol use. She reports that she does not use drugs.    Allergies  She is allergic to bactrim [sulfamethoxazole-trimethoprim].    Medications  She has a current medication list which includes the following  prescription(s): albuterol, alendronate, alprazolam, b complex vitamins, butalbital-acetaminophen-caffeine -40 mg, ergocalciferol, esomeprazole, fish oil-omega-3 fatty acids, hydroxychloroquine, levothyroxine, multivitamin, rosuvastatin, prednisone, and topiramate.    Review of Systems   Constitutional: Negative for chills, fever and unexpected weight change.   HENT: Positive for hearing loss, postnasal drip and tinnitus. Negative for congestion, ear discharge, ear pain, facial swelling, sinus pressure, sore throat and trouble swallowing.    Eyes: Positive for photophobia. Negative for pain and visual disturbance.   Respiratory: Positive for cough, shortness of breath and wheezing. Negative for apnea.    Cardiovascular: Negative for chest pain and palpitations.   Gastrointestinal: Positive for diarrhea. Negative for abdominal pain and nausea.   Endocrine: Negative.  Negative for cold intolerance and heat intolerance.   Genitourinary: Negative.    Musculoskeletal: Negative.  Negative for joint swelling and neck stiffness.   Skin: Negative.  Negative for color change and rash.   Allergic/Immunologic: Negative.    Neurological: Positive for dizziness and headaches. Negative for facial asymmetry.   Hematological: Negative.  Negative for adenopathy. Does not bruise/bleed easily.   Psychiatric/Behavioral: Positive for decreased concentration and sleep disturbance. Negative for agitation. The patient is nervous/anxious.           Objective:     There were no vitals taken for this visit.     Constitutional:   She is oriented to person, place, and time. Vital signs are normal. She appears well-developed and well-nourished. She appears alert. Normal speech.      Head:  Normocephalic and atraumatic.     Ears:    Right Ear: No lacerations. No drainage, swelling or tenderness. No foreign bodies. No mastoid tenderness. Tympanic membrane is not injected, not scarred, not perforated, not erythematous, not retracted and not  bulging. Tympanic membrane mobility is normal. No middle ear effusion. No hemotympanum. Decreased hearing is noted.   Left Ear: No lacerations. No drainage, swelling or tenderness. No foreign bodies. No mastoid tenderness. Tympanic membrane is not injected, not scarred, not perforated, not erythematous, not retracted and not bulging. Tympanic membrane mobility is normal.  No middle ear effusion. No hemotympanum. Decreased hearing is noted.     Nose:  Nose normal including turbinates, nasal mucosa, sinuses and nasal septum.     Neck:  Neck normal without thyromegaly masses, asymmetry, normal tracheal structure, crepitus, and tenderness and no adenopathy.     Psychiatric:   She has a normal mood and affect. Her speech is normal.     Neurological:   She is alert and oriented to person, place, and time.     Skin:   No abrasions, lacerations, lesions, or rashes.       Procedure    None      Data Reviewed    WBC (K/uL)   Date Value   04/07/2022 6.69     Platelets (K/uL)   Date Value   04/07/2022 280      Creatinine (mg/dL)   Date Value   05/10/2022 0.8     TSH (uIU/mL)   Date Value   04/07/2022 2.234     Glucose (mg/dL)   Date Value   05/10/2022 68 (L)     Hemoglobin A1C (%)   Date Value   10/16/2018 5.3                       I independently reviewed the tracings of the complete audiometric evaluation performed today.  I reviewed the audiogram with the patient as well.  Pertinent findings include profound SNHL in right. moderate to severe SNHL in left. Left SRT 45 with 84% discrimination at 75db. Type A tympanogram in both ears. Left speech discriminatino score os 16% lower from prior audio perfomred in 2021.         Assessment:     1. Sudden hearing loss, left    2. Right acoustic neuroma    3. Other systemic lupus erythematosus with other organ involvement         Plan:     Ms. Chiang has left sided SSHL.   Rx high dose steroid taper.  Follow up in 10 days for repeat audiometric testing.     Follow up in about 10  days (around 6/26/2022).

## 2022-06-22 ENCOUNTER — CLINICAL SUPPORT (OUTPATIENT)
Dept: AUDIOLOGY | Facility: CLINIC | Age: 67
End: 2022-06-22
Payer: MEDICARE

## 2022-06-22 ENCOUNTER — PATIENT MESSAGE (OUTPATIENT)
Dept: INTERNAL MEDICINE | Facility: CLINIC | Age: 67
End: 2022-06-22
Payer: MEDICARE

## 2022-06-22 DIAGNOSIS — D33.3 RIGHT ACOUSTIC NEUROMA: ICD-10-CM

## 2022-06-22 DIAGNOSIS — H93.11 RIGHT-SIDED TINNITUS: ICD-10-CM

## 2022-06-22 DIAGNOSIS — H90.3 ASYMMETRICAL SENSORINEURAL HEARING LOSS: Primary | ICD-10-CM

## 2022-06-22 PROCEDURE — 92550 TYMPANOMETRY & REFLEX THRESH: CPT | Mod: S$GLB,,, | Performed by: AUDIOLOGIST

## 2022-06-22 PROCEDURE — 92557 PR COMPREHENSIVE HEARING TEST: ICD-10-PCS | Mod: S$GLB,,, | Performed by: AUDIOLOGIST

## 2022-06-22 PROCEDURE — 92550 PR TYMPANOMETRY AND REFLEX THRESHOLD MEASUREMENTS: ICD-10-PCS | Mod: S$GLB,,, | Performed by: AUDIOLOGIST

## 2022-06-22 PROCEDURE — 92557 COMPREHENSIVE HEARING TEST: CPT | Mod: S$GLB,,, | Performed by: AUDIOLOGIST

## 2022-06-22 NOTE — PROGRESS NOTES
Ms. Radha Chiang was seen in the clinic today for an audiological evaluation.    Audiological testing revealed a profound sensorineural hearing loss for the right ear and a moderately-severe to severe sensorineural hearing loss for the left ear.  A speech reception threshold was obtained at 105 dBHL for the right ear and at 60 dBHL for the left ear.  Speech discrimination was 0% for the right ear and 76% for the left ear.      Tympanometry testing revealed a Type A tympanogram for the right ear and a Type A tympanogram for the left ear.  Ipsilateral acoustic reflexes were absent for the right ear and were present from 500 Hz-2000 Hz for the left ear.    Recommendations:  1. Otologic evaluation tomorrow with Dr. Vance  2. Annual audiological evaluation  3. Hearing protection when in noise   4. Continue use of BiCROS hearing aid  5. Follow-up PRN with dispensing audiologist for hearing aid service    Please click on link to view Audiogram:  Document on 6/22/2022  9:50 AM by ADAM BennettD: Audiogram

## 2022-06-23 ENCOUNTER — OFFICE VISIT (OUTPATIENT)
Dept: OTOLARYNGOLOGY | Facility: CLINIC | Age: 67
End: 2022-06-23
Payer: MEDICARE

## 2022-06-23 VITALS
WEIGHT: 168.19 LBS | HEIGHT: 60 IN | BODY MASS INDEX: 33.02 KG/M2 | DIASTOLIC BLOOD PRESSURE: 86 MMHG | SYSTOLIC BLOOD PRESSURE: 118 MMHG

## 2022-06-23 DIAGNOSIS — D33.3 RIGHT ACOUSTIC NEUROMA: Primary | ICD-10-CM

## 2022-06-23 DIAGNOSIS — H93.11 TINNITUS OF RIGHT EAR: ICD-10-CM

## 2022-06-23 DIAGNOSIS — H91.22 SUDDEN HEARING LOSS, LEFT: ICD-10-CM

## 2022-06-23 PROCEDURE — 99213 OFFICE O/P EST LOW 20 MIN: CPT | Mod: S$GLB,,, | Performed by: OTOLARYNGOLOGY

## 2022-06-23 PROCEDURE — 99213 PR OFFICE/OUTPT VISIT, EST, LEVL III, 20-29 MIN: ICD-10-PCS | Mod: S$GLB,,, | Performed by: OTOLARYNGOLOGY

## 2022-06-23 NOTE — PROGRESS NOTES
Answers for HPI/ROS submitted by the patient on 6/16/2022  Fatigue (Tiredness)?: Yes  hearing loss: Yes  postnasal drip: Yes  Light sensitivity / Light hurts the eyes?: Yes  shortness of breath: Yes  wheezing: Yes  cough: Yes  Foot swelling?: Yes  diarrhea: Yes  Acid Reflux?: Yes  None of these: Yes  None of these: Yes  None of these : Yes  None of these: Yes  None of these : Yes  dizziness: Yes  headaches: Yes  None of these: Yes  decreased concentration: Yes  nervous/ anxious: Yes  sleep disturbance: Yes

## 2022-06-23 NOTE — PROGRESS NOTES
Subjective:       Patient ID: Radha Chiang is a 67 y.o. female.    Chief Complaint: Hearing Loss (10 day f/u for sudden hearing loss)    HPI     Radha Chiang is a 67 y.o. female hx of AD AN s/p GKS in 2016 at [facility]. Tumor decreasing in size as of last MRI in 9/21.  Has seen me for issues with dysequilibrium and tinnitus.  She recently experienced worsening hearing loss of her left ear and was treated by oral prednisone.  She feels she is back at her base line today.    Review of Systems   HENT: Positive for hearing loss and postnasal drip.    Eyes: Positive for photophobia.   Respiratory: Positive for cough, shortness of breath and wheezing.    Gastrointestinal: Positive for diarrhea.   Endocrine: Negative.    Genitourinary: Negative.    Musculoskeletal: Negative.    Integumentary:  Negative.   Allergic/Immunologic: Negative.    Neurological: Positive for dizziness and headaches.   Hematological: Negative.    Psychiatric/Behavioral: Positive for decreased concentration and sleep disturbance. The patient is nervous/anxious.          Objective:      Physical Exam  Vitals and nursing note reviewed.   Constitutional:       Appearance: Normal appearance.   HENT:      Head: Normocephalic and atraumatic.      Right Ear: Tympanic membrane, ear canal and external ear normal. There is no impacted cerumen.      Left Ear: Tympanic membrane, ear canal and external ear normal. There is no impacted cerumen.   Neurological:      Mental Status: She is alert.       Data Reviewed:                  Assessment:       Problem List Items Addressed This Visit        ENT    Right acoustic neuroma - Primary       Immunology/Multi System    Systemic lupus erythematosus      Other Visit Diagnoses     Sudden hearing loss, left        Tinnitus of right ear              Plan:        pt feels back at baseline.  Good ear has trended lower since 2016. Right ear no longer aidable.      Discussed option of cochlear implant for RIGHT  ear.  Could consider candidacy testing if she is interested.  Would like to implant her ear earlier as opposed to decades later when duration of deafness has been longer, to give her a back up to her LEFT ear.      Pt to consider this.    Continue vestibular exercises as instructed.      Answers for HPI/ROS submitted by the patient on 6/16/2022  Fatigue (Tiredness)?: Yes  Foot swelling?: Yes  Acid Reflux?: Yes

## 2022-06-25 RX ORDER — HYDROGEN PEROXIDE 3 %
20 SOLUTION, NON-ORAL MISCELLANEOUS
Qty: 90 CAPSULE | Refills: 1 | Status: SHIPPED | OUTPATIENT
Start: 2022-06-25 | End: 2023-03-29 | Stop reason: SDUPTHER

## 2022-06-25 RX ORDER — ALPRAZOLAM 0.25 MG/1
0.25 TABLET ORAL EVERY 8 HOURS PRN
Qty: 30 TABLET | Refills: 5 | Status: SHIPPED | OUTPATIENT
Start: 2022-06-25 | End: 2022-12-07

## 2022-06-25 RX ORDER — LEVOTHYROXINE SODIUM 112 UG/1
112 TABLET ORAL DAILY
Qty: 90 TABLET | Refills: 3 | Status: SHIPPED | OUTPATIENT
Start: 2022-06-25 | End: 2023-09-17

## 2022-06-25 RX ORDER — ROSUVASTATIN CALCIUM 20 MG/1
20 TABLET, COATED ORAL DAILY
Qty: 90 TABLET | Refills: 1 | Status: SHIPPED | OUTPATIENT
Start: 2022-06-25 | End: 2022-09-15

## 2022-07-05 ENCOUNTER — PATIENT MESSAGE (OUTPATIENT)
Dept: INTERNAL MEDICINE | Facility: CLINIC | Age: 67
End: 2022-07-05
Payer: MEDICARE

## 2022-07-05 DIAGNOSIS — K64.9 HEMORRHOIDS, UNSPECIFIED HEMORRHOID TYPE: Primary | ICD-10-CM

## 2022-07-11 ENCOUNTER — TELEPHONE (OUTPATIENT)
Dept: SURGERY | Facility: CLINIC | Age: 67
End: 2022-07-11
Payer: MEDICARE

## 2022-07-12 ENCOUNTER — OFFICE VISIT (OUTPATIENT)
Dept: SURGERY | Facility: CLINIC | Age: 67
End: 2022-07-12
Payer: MEDICARE

## 2022-07-12 VITALS
BODY MASS INDEX: 32.85 KG/M2 | SYSTOLIC BLOOD PRESSURE: 117 MMHG | HEIGHT: 60 IN | WEIGHT: 167.31 LBS | DIASTOLIC BLOOD PRESSURE: 61 MMHG | HEART RATE: 70 BPM

## 2022-07-12 DIAGNOSIS — K64.9 HEMORRHOIDS, UNSPECIFIED HEMORRHOID TYPE: ICD-10-CM

## 2022-07-12 DIAGNOSIS — K64.5 THROMBOSED HEMORRHOIDS: Primary | ICD-10-CM

## 2022-07-12 PROCEDURE — 99999 PR PBB SHADOW E&M-EST. PATIENT-LVL V: ICD-10-PCS | Mod: PBBFAC,,, | Performed by: NURSE PRACTITIONER

## 2022-07-12 PROCEDURE — 99999 PR PBB SHADOW E&M-EST. PATIENT-LVL V: CPT | Mod: PBBFAC,,, | Performed by: NURSE PRACTITIONER

## 2022-07-12 PROCEDURE — 99213 OFFICE O/P EST LOW 20 MIN: CPT | Mod: S$PBB,,, | Performed by: NURSE PRACTITIONER

## 2022-07-12 PROCEDURE — 99215 OFFICE O/P EST HI 40 MIN: CPT | Mod: PBBFAC | Performed by: NURSE PRACTITIONER

## 2022-07-12 PROCEDURE — 99213 PR OFFICE/OUTPT VISIT, EST, LEVL III, 20-29 MIN: ICD-10-PCS | Mod: S$PBB,,, | Performed by: NURSE PRACTITIONER

## 2022-07-12 RX ORDER — HYDROCORTISONE 25 MG/G
CREAM TOPICAL 2 TIMES DAILY
Qty: 28 G | Refills: 2 | Status: SHIPPED | OUTPATIENT
Start: 2022-07-12 | End: 2022-12-07

## 2022-07-12 NOTE — PATIENT INSTRUCTIONS
Anusol cream 2x/day internally and externally for 2 weeks. Take a break for 2 weeks, then can use again.

## 2022-07-12 NOTE — PROGRESS NOTES
CRS Office Visit History and Physical    Referring Md:   Merry Espinal Md  [address] [place] [address]    SUBJECTIVE:     Chief Complaint: hemorrhoids    History of Present Illness:  The patient is new patient to this practice.   Course is as follows:  Patient is a 67 y.o. female presents with hemorrhoidal bleeding.  Symptoms have been present for a few weeks.  Has tried anusol cream and tucks pads with some improvement although some bleeding still noted.  Previous anorectal procedures: yes, Dr. Bergman with abscess, fistula, setons in late 90's  confirms straining/prolonged time on toilet with bowel movements, although much improved with mag supplement recommended d/t HAs  is not currently taking fiber supplement or stool softener  Blood thinners: No    Last Colonoscopy: 5/12/2020 at [facility]  - 6 mm ileocecal valve polyp  - repeat in 3-5 years  Family history of colorectal cancer or IBD: none.    Review of patient's allergies indicates:   Allergen Reactions    Bactrim [sulfamethoxazole-trimethoprim] Nausea And Vomiting     Nausea, ? rash  Nausea, ? rash       Past Medical History:   Diagnosis Date    Acid reflux     Anemia 2years ago    Anxiety     Arthritis     Asthma, chronic 4/10/2013    Crohn's disease 1998    Depression     Dry eyes     [facility], proximal[facility]    History of papilledema 11/10/11    Hyperlipidemia 5/21/2018    Lupus     Neuromuscular disorder 12/2009    Neuropathy    Osteoporosis     Stroke 1/29/2013    Thyroid disease      Past Surgical History:   Procedure Laterality Date    ADENOIDECTOMY  1962    BRAIN SURGERY  01/18/2012    Brain Biopsy    BREAST CYST ASPIRATION Left     COLONOSCOPY  2017    [facility] Dr. WILLIS Summers; pan-diverticulosis, rec repeat 3-5 years    INCISION AND DRAINAGE PERIRECTAL ABSCESS  1998    abscess removal    ORIF FOREARM FRACTURE Left 06/23/2013    proximal ulnar and radius      Family History   Problem Relation Age of Onset    Cancer Mother 64        small cell lung cancer; smoker    Heart disease Father 64        acute MI; 3v CABG    Sjogren's syndrome Sister     Allergies Sister     Cancer Maternal Grandmother     COPD Maternal Grandfather     Cancer Paternal Grandmother     Stroke Paternal Grandmother     Ovarian cancer Paternal Grandmother     Heart disease Paternal Grandfather     Mental retardation Brother     Hypertension Sister     Lupus Paternal Uncle     Angioedema Neg Hx     Asthma Neg Hx     Atopy Neg Hx     Eczema Neg Hx     Immunodeficiency Neg Hx     Rhinitis Neg Hx     Urticaria Neg Hx      Social History     Tobacco Use    Smoking status: Former Smoker     Packs/day: 0.50     Years: 15.00     Pack years: 7.50     Types: Cigarettes     Quit date: 1984     Years since quittin.0    Smokeless tobacco: Never Used    Tobacco comment: Quit    Substance Use Topics    Alcohol use: Yes     Comment: less than one per week    Drug use: No        Review of Systems:  Review of Systems   Gastrointestinal: Positive for blood in stool and constipation.       OBJECTIVE:     Vital Signs (Most Recent)  Blood Pressure 117/61 (BP Location: Left arm, Patient Position: Sitting, BP Method: Large (Automatic))   Pulse 70   Height 5' (1.524 m)   Weight 75.9 kg (167 lb 5.3 oz)   Body Mass Index 32.68 kg/m²     Physical Exam:  General: Patient Refused female in no distress   Neuro: Alert and oriented to person, place, and time.  Moves all extremities.     HEENT: No icterus.  Trachea midline  Respiratory: Respirations are even and unlabored, no cough or audible wheezing  Skin: Warm dry and intact, No visible rashes, no jaundice    Labs reviewed today:  Lab Results   Component Value Date    WBC 6.69 2022    HGB 12.7 2022    HCT 40.3 2022     2022    CHOL 134 10/20/2021    TRIG 59 10/20/2021    HDL 81 (H) 10/20/2021    ALT 17  04/07/2022    AST 26 04/07/2022     05/10/2022    K 4.7 05/10/2022     05/10/2022    CREATININE 0.8 05/10/2022    BUN 11 05/10/2022    CO2 25 05/10/2022    TSH 2.234 04/07/2022    INR 1.0 01/04/2012    HGBA1C 5.3 10/16/2018       Imaging reviewed today:  none    Endoscopy reviewed today:  Last Colonoscopy: 5/12/2020 at [facility]  - 6 mm ileocecal valve polyp  - repeat in 3-5 years    Anorectal Exam:    Anal Skin: resolving left lateral thrombosed hemorrhoid; other external hemorrhoids wnl    Digital Rectal Exam:  Resting Tone normal  Squeeze normal  Relaxation with bear down present  Mass none  Rectocele  absent  Tenderness  absent      ASSESSMENT/PLAN:     Radha was seen today for hemorrhoids.    Diagnoses and all orders for this visit:    Thrombosed hemorrhoids  -     hydrocortisone (ANUSOL-HC) 2.5 % rectal cream; Place rectally 2 (two) times daily.    Hemorrhoids, unspecified hemorrhoid type  -     Ambulatory referral/consult to Colorectal Surgery        The patient was instructed to:  anusol 2x/day for 2 weeks at a time.   Increase water intake to at least 8-10 glasses of water per day.  Take a daily fiber supplement (Konsyl, Benefiber, Metamucil) and increase dietary intake to 20-30 grams/day.  Avoid straining or spending >5min/event with bowel movements.  Follow-up in clinic prn.      KAREN Muir  Colon and Rectal Surgery

## 2022-07-14 ENCOUNTER — TELEPHONE (OUTPATIENT)
Dept: RHEUMATOLOGY | Facility: CLINIC | Age: 67
End: 2022-07-14
Payer: MEDICARE

## 2022-07-14 ENCOUNTER — LAB VISIT (OUTPATIENT)
Dept: LAB | Facility: HOSPITAL | Age: 67
End: 2022-07-14
Attending: INTERNAL MEDICINE
Payer: MEDICARE

## 2022-07-14 DIAGNOSIS — M32.9 SYSTEMIC LUPUS ERYTHEMATOSUS, UNSPECIFIED SLE TYPE, UNSPECIFIED ORGAN INVOLVEMENT STATUS: ICD-10-CM

## 2022-07-14 DIAGNOSIS — N18.31 STAGE 3A CHRONIC KIDNEY DISEASE: Primary | ICD-10-CM

## 2022-07-14 LAB
ALBUMIN SERPL BCP-MCNC: 3.9 G/DL (ref 3.5–5.2)
ALP SERPL-CCNC: 110 U/L (ref 55–135)
ALT SERPL W/O P-5'-P-CCNC: 20 U/L (ref 10–44)
ANION GAP SERPL CALC-SCNC: 7 MMOL/L (ref 8–16)
AST SERPL-CCNC: 27 U/L (ref 10–40)
BASOPHILS # BLD AUTO: 0.07 K/UL (ref 0–0.2)
BASOPHILS NFR BLD: 1.3 % (ref 0–1.9)
BILIRUB SERPL-MCNC: 0.6 MG/DL (ref 0.1–1)
BUN SERPL-MCNC: 15 MG/DL (ref 8–23)
C3 SERPL-MCNC: 120 MG/DL (ref 50–180)
C4 SERPL-MCNC: 32 MG/DL (ref 11–44)
CALCIUM SERPL-MCNC: 10 MG/DL (ref 8.7–10.5)
CHLORIDE SERPL-SCNC: 106 MMOL/L (ref 95–110)
CO2 SERPL-SCNC: 27 MMOL/L (ref 23–29)
CREAT SERPL-MCNC: 1 MG/DL (ref 0.5–1.4)
CRP SERPL-MCNC: 2.1 MG/L (ref 0–8.2)
DIFFERENTIAL METHOD: ABNORMAL
EOSINOPHIL # BLD AUTO: 0.1 K/UL (ref 0–0.5)
EOSINOPHIL NFR BLD: 2.2 % (ref 0–8)
ERYTHROCYTE [DISTWIDTH] IN BLOOD BY AUTOMATED COUNT: 12.1 % (ref 11.5–14.5)
ERYTHROCYTE [SEDIMENTATION RATE] IN BLOOD BY PHOTOMETRIC METHOD: 18 MM/HR (ref 0–36)
EST. GFR  (AFRICAN AMERICAN): >60 ML/MIN/1.73 M^2
EST. GFR  (NON AFRICAN AMERICAN): 59.3 ML/MIN/1.73 M^2
GLUCOSE SERPL-MCNC: 95 MG/DL (ref 70–110)
HCT VFR BLD AUTO: 38.9 % (ref 37–48.5)
HGB BLD-MCNC: 12.7 G/DL (ref 12–16)
IMM GRANULOCYTES # BLD AUTO: 0.01 K/UL (ref 0–0.04)
IMM GRANULOCYTES NFR BLD AUTO: 0.2 % (ref 0–0.5)
LYMPHOCYTES # BLD AUTO: 1.4 K/UL (ref 1–4.8)
LYMPHOCYTES NFR BLD: 26.2 % (ref 18–48)
MCH RBC QN AUTO: 31.4 PG (ref 27–31)
MCHC RBC AUTO-ENTMCNC: 32.6 G/DL (ref 32–36)
MCV RBC AUTO: 96 FL (ref 82–98)
MONOCYTES # BLD AUTO: 0.6 K/UL (ref 0.3–1)
MONOCYTES NFR BLD: 10.3 % (ref 4–15)
NEUTROPHILS # BLD AUTO: 3.3 K/UL (ref 1.8–7.7)
NEUTROPHILS NFR BLD: 59.8 % (ref 38–73)
NRBC BLD-RTO: 0 /100 WBC
PLATELET # BLD AUTO: 322 K/UL (ref 150–450)
PMV BLD AUTO: 10.5 FL (ref 9.2–12.9)
POTASSIUM SERPL-SCNC: 4.4 MMOL/L (ref 3.5–5.1)
PROT SERPL-MCNC: 7.6 G/DL (ref 6–8.4)
RBC # BLD AUTO: 4.04 M/UL (ref 4–5.4)
SODIUM SERPL-SCNC: 140 MMOL/L (ref 136–145)
WBC # BLD AUTO: 5.45 K/UL (ref 3.9–12.7)

## 2022-07-14 PROCEDURE — 80053 COMPREHEN METABOLIC PANEL: CPT | Performed by: INTERNAL MEDICINE

## 2022-07-14 PROCEDURE — 86160 COMPLEMENT ANTIGEN: CPT | Performed by: INTERNAL MEDICINE

## 2022-07-14 PROCEDURE — 86160 COMPLEMENT ANTIGEN: CPT | Mod: 59 | Performed by: INTERNAL MEDICINE

## 2022-07-14 PROCEDURE — 85025 COMPLETE CBC W/AUTO DIFF WBC: CPT | Performed by: INTERNAL MEDICINE

## 2022-07-14 PROCEDURE — 36415 COLL VENOUS BLD VENIPUNCTURE: CPT | Performed by: INTERNAL MEDICINE

## 2022-07-14 PROCEDURE — 86225 DNA ANTIBODY NATIVE: CPT | Mod: 59 | Performed by: INTERNAL MEDICINE

## 2022-07-14 PROCEDURE — 85652 RBC SED RATE AUTOMATED: CPT | Performed by: INTERNAL MEDICINE

## 2022-07-14 PROCEDURE — 86140 C-REACTIVE PROTEIN: CPT | Performed by: INTERNAL MEDICINE

## 2022-07-14 PROCEDURE — 86225 DNA ANTIBODY NATIVE: CPT | Performed by: INTERNAL MEDICINE

## 2022-07-14 NOTE — PROGRESS NOTES
The complement levels are normal. The kidney function is mildly reduced. Be sure and drinks at least four 12-16 oz bottles of water daily to stay hydrated in the heat.  the liver tests are normal. The sed rate and crp inflammation tests are normal. The cbc is normal. ROBERTO

## 2022-07-16 ENCOUNTER — PATIENT MESSAGE (OUTPATIENT)
Dept: RHEUMATOLOGY | Facility: CLINIC | Age: 67
End: 2022-07-16
Payer: MEDICARE

## 2022-07-18 ENCOUNTER — TELEPHONE (OUTPATIENT)
Dept: RHEUMATOLOGY | Facility: CLINIC | Age: 67
End: 2022-07-18
Payer: MEDICARE

## 2022-07-18 LAB
DNA TITER: NORMAL
DSDNA AB SER-ACNC: POSITIVE [IU]/ML

## 2022-07-18 NOTE — TELEPHONE ENCOUNTER
Please schedule f/u appt as her dsDNA has turned positive again if she is in town, otherwise as soon as she is back. . Thank you ROBERTO

## 2022-07-19 ENCOUNTER — TELEPHONE (OUTPATIENT)
Dept: RHEUMATOLOGY | Facility: CLINIC | Age: 67
End: 2022-07-19
Payer: MEDICARE

## 2022-07-26 ENCOUNTER — PATIENT MESSAGE (OUTPATIENT)
Dept: NEUROLOGY | Facility: CLINIC | Age: 67
End: 2022-07-26
Payer: MEDICARE

## 2022-08-04 ENCOUNTER — PATIENT MESSAGE (OUTPATIENT)
Dept: OPTOMETRY | Facility: CLINIC | Age: 67
End: 2022-08-04
Payer: MEDICARE

## 2022-08-10 ENCOUNTER — PATIENT MESSAGE (OUTPATIENT)
Dept: RHEUMATOLOGY | Facility: CLINIC | Age: 67
End: 2022-08-10
Payer: MEDICARE

## 2022-08-11 ENCOUNTER — TELEPHONE (OUTPATIENT)
Dept: RHEUMATOLOGY | Facility: CLINIC | Age: 67
End: 2022-08-11
Payer: MEDICARE

## 2022-08-11 NOTE — TELEPHONE ENCOUNTER
Radha, cannot do virtual visit if you are in another state. Would suggest you retest for Covid. The at home test is only positive 65% of the time, and it is recommended you retest in 2-3 days after the first to be sure. You should go to Urgent Care in [place] to get evaluated and should have someone listen to your chest, and get chest x-ray. Let me know if the 2nd Covid test is positive.  RJQ  ===View-only below this line===      ----- Message -----       From:Radha Chiang       Sent:8/10/2022  7:10 PM CDT         To:William Rios MD    Subject:Virtual Appt    I think I remember that if I am out of [place], we cannot video, is that correct?  I havent had pleurisy in a long time & cant remember if it dissipated on its own or I need to take medicinethis has been going on for about 6 daysbut still no fever.any suggestions Doctor Q?        ----- Message -----       From:Med Assistant Hidalgo       Sent:8/10/2022  4:21 PM CDT         To:Radha Chiang    Subject:Virtual Appt    Are you  in [place] now, if so we can not      ----- Message -----       From:Radha Chiang       Sent:8/10/2022  4:17 PM CDT         To:William Rios MD    Subject:Virtual Appt    Dr. THOMASWould it be possible to have a virtual appt with you?  I have been traveling and am in [place]..been fatiguedno fever but some congestionwas wondering  if I could have pleurisy?  I tested negative for Covidmy BP was 110/74, Temperature 97.9; oxygen 97%, pulse 61    Its been 6 days and thought I was getting better but then felt bad again.I dont know if some antibiotics would help?  Please let me know if we can do a virtual visitthank you

## 2022-09-01 ENCOUNTER — PATIENT MESSAGE (OUTPATIENT)
Dept: DERMATOLOGY | Facility: CLINIC | Age: 67
End: 2022-09-01
Payer: MEDICARE

## 2022-09-12 ENCOUNTER — PATIENT MESSAGE (OUTPATIENT)
Dept: DERMATOLOGY | Facility: CLINIC | Age: 67
End: 2022-09-12
Payer: MEDICARE

## 2022-09-12 ENCOUNTER — PATIENT MESSAGE (OUTPATIENT)
Dept: RHEUMATOLOGY | Facility: CLINIC | Age: 67
End: 2022-09-12
Payer: MEDICARE

## 2022-09-13 ENCOUNTER — PATIENT MESSAGE (OUTPATIENT)
Dept: ENDOCRINOLOGY | Facility: CLINIC | Age: 67
End: 2022-09-13
Payer: MEDICARE

## 2022-09-15 RX ORDER — ROSUVASTATIN CALCIUM 20 MG/1
TABLET, COATED ORAL
Qty: 90 TABLET | Refills: 0 | Status: SHIPPED | OUTPATIENT
Start: 2022-09-15 | End: 2023-05-29

## 2022-09-15 NOTE — TELEPHONE ENCOUNTER
Refill Decision Note   Radha DEVANbebeto  is requesting a refill authorization.  Brief Assessment and Rationale for Refill:  Approve    -Medication-Related Problems Identified: Requires labs  Medication Therapy Plan:       Medication Reconciliation Completed: No   Comments:     Provider Staff:     Action is required for this patient.   Please see care gap opportunities below in Care Due Message.     Thanks!  [facility]     Appointments      Date Provider   Last Visit   3/30/2022 Merry Espinal MD   Next Visit   Visit date not found Merry Espinal MD     Note composed:1:54 PM 09/15/2022           Note composed:1:54 PM 09/15/2022

## 2022-09-15 NOTE — TELEPHONE ENCOUNTER
Care Due:                  Date            Visit Type   Department     Provider  --------------------------------------------------------------------------------                                MYCHART                              FOLLOWUP/OF  [facility] INTERNAL  Merry Amaral  Last Visit: 03-      FICE VISIT   [facility]       [place]  Next Visit: None Scheduled  None         None Found                                                            Last  Test          Frequency    Reason                     Performed    Due Date  --------------------------------------------------------------------------------    Lipid Panel.  12 months..  rosuvastatin.............  10-   10-    [facility] Embedded Care Gaps. Reference number: [id number]. 9/15/2022   11:31:00 AM CDT

## 2022-10-06 ENCOUNTER — PATIENT MESSAGE (OUTPATIENT)
Dept: RHEUMATOLOGY | Facility: CLINIC | Age: 67
End: 2022-10-06
Payer: MEDICARE

## 2022-10-11 DIAGNOSIS — I65.21 CAROTID STENOSIS, RIGHT: Primary | ICD-10-CM

## 2022-12-06 ENCOUNTER — HOSPITAL ENCOUNTER (OUTPATIENT)
Dept: RADIOLOGY | Facility: CLINIC | Age: 67
Discharge: HOME | End: 2022-12-06
Attending: INTERNAL MEDICINE
Payer: MEDICARE

## 2022-12-06 DIAGNOSIS — E55.9 VITAMIN D DEFICIENCY: ICD-10-CM

## 2022-12-06 DIAGNOSIS — M85.9 LOW BONE DENSITY: ICD-10-CM

## 2022-12-06 DIAGNOSIS — Z78.0 MENOPAUSE: ICD-10-CM

## 2022-12-06 PROCEDURE — 77080 DEXA BONE DENSITY SPINE HIP: ICD-10-PCS | Mod: 26,,, | Performed by: INTERNAL MEDICINE

## 2022-12-06 PROCEDURE — 77080 DXA BONE DENSITY AXIAL: CPT | Mod: TC

## 2022-12-06 PROCEDURE — 77080 DXA BONE DENSITY AXIAL: CPT | Mod: 26,,, | Performed by: INTERNAL MEDICINE

## 2022-12-06 NOTE — PROGRESS NOTES
ENDOCRINOLOGY FOLLOW UP VISIT  12/07/2022    Reason for Visit:  Radha Chiang is a 67 y.o. female referred by William Rios MD for evaluation of elevated parathyroid hormone    HPI:  Radha Chiang is a 67 y.o. female  with history of SLE, polyneuropathy, osteopenia, hypothyroidism, Crohn's disease with noted to have elevated PTH on recent labs to evaluate bone health.    This is a MyChart video visit.    The patient location is: in [place]  The chief complaint leading to consultation is: elevated PTH  Visit type: Virtual visit with synchronous audio and video  Total time spent with patient: 29 minutes  Each patient to whom he or she provides medical services by telemedicine is:  (1) informed of the relationship between the physician and patient and the respective role of any other health care provider with respect to management of the patient; and (2) notified that he or she may decline to receive medical services by telemedicine and may withdraw from such care at any time.    With regards to the hypercalcemia or primary hyperparathyroidism:    Was found to have elevated PTH on labs done to evaluate bone health - first noted: 2/2022  No hx of hypercalcemia or low phos    Lab Results   Component Value Date    CALCIUM 9.6 12/06/2022    CALCIUM 9.6 12/06/2022    ALBUMIN 3.8 12/06/2022    ALBUMIN 3.8 12/06/2022    ESTGFRAFRICA >60.0 07/14/2022    EGFRNONAA 59.3 (A) 07/14/2022    PHOS 3.2 12/06/2022    ALKPHOS 120 12/06/2022    [id number] 41 10/20/2021    PTH 81.3 (H) 12/06/2022    TSH 2.234 04/07/2022     Lab Results   Component Value Date    PTH 81.3 (H) 12/06/2022    CALCIUM 9.6 12/06/2022    CALCIUM 9.6 12/06/2022    PHOS 3.2 12/06/2022     Lab Results   Component Value Date    ALBUMIN 3.8 12/06/2022    ALBUMIN 3.8 12/06/2022     Parathyroid imaging: not done  Thyroid ultrasound:  4/2022  FINDINGS:  Slightly heterogeneous thyroid parenchyma.  Normal parenchymal vascularity.  No focal nodules.     Right lobe  of the thyroid measures 2.4 x 1.1 x 0.7 cm.     Isthmus measures 0.3 cm.     Left lobe of the thyroid measures 2.1 x 1.4 x 0.7 cm.     Cervical lymph nodes: Several slightly prominent bilateral supraclavicular lymph nodes demonstrating normal morphology with preserved elliptical shape and fatty hilum.  Largest right supraclavicular lymph nodes measures 1.9 x 0.7 x 1.3 cm and 2.1 x 0.5 x 1.5 cm.  Left supraclavicular lymph node measures 1.1 x 0.5 x 1.4 cm.     Impression:     1. Slightly heterogeneous thyroid parenchyma.  No focal nodules.  2. Slightly prominent bilateral supraclavicular lymph nodes with normal morphology.  Findings may be reactive in etiology.    Daily intake of calcium is: started taking caltrate 2/2022, dairy one serving EOD  Taking vitamin D: 50K ergo weekly     + fatigue  + constipation,   Denies depression, polyuria   Denies or kidney stones   Denies FH of calcium or parathyroid problems      Latest Reference Range & Units 05/10/22 08:30   Calcium, Urine 0.0 - 15.0 mg/dL 5.2   Calcium, 24 Hr Urine 4 - 12 mg/Hr 4   Calcium, Urine (mg/spec) mg/Spec 99   Creatinine, Urinr (mg/spec) mg/Spec 836.0   Creatinine, Timed Urine 40.0 - 75.0 mg/Hr 34.8 (L)   (L): Data is abnormally low    Regarding hypothyroidism:  On levothyroxine 112 mcg daily with normal TSH in 10/2021 with mixed symptoms, increased fatigue   Has some family members with hyperthyroidism    Lab Results   Component Value Date    TSH 2.234 04/07/2022    FREET4 1.13 10/20/2021     Regarding Osteopenia :  She was seen by [facility] for [facility] health, previously on fosamax ([id number]), plans to start reclast for nausea with oral med.  Today, she is back on fosamax restarted in July 2022.    Hx of elbow fracture after fall on volcanic rock, finger fracture  height loss of 0.5 inches  Denies falls or fractures since her last visit  She remains on Vitamin D ergo 50k weekly and Caltrate 1 pills twice daily    Last bmd was:   1/12/22  Lumbar spine  (L1-L4):              BMD is 0.808 g/cm2, T-score is -2.2, and Z-score is -0.4.  Total hip:                                BMD is 0.761 g/cm2, T-score is -1.5, and Z-score is -0.3.  Femoral neck:                          BMD is 0.584 g/cm2, T-score is -2.4, and Z-score is -0.9  Distal 1/3 radius:                      Not applicable     FRAX:     19% risk of a major osteoporotic fracture in the next 10 years.     3.4% risk of hip fracture in the next 10 years.     Impression:     *Osteoporosis based on T-score between -1.0 and -2.5 and elevated risk based on FRAX  *Fracture risk is high  *Compared with previous DXA, BMD at the lumbar spine has remained stable, and the BMD at the total hip has remained stable.    Had DXA yesterday that has not been read yet.    Past Medical History:   Diagnosis Date    Acid reflux     Anemia 2years ago    Anxiety     Arthritis     Asthma, chronic 4/10/2013    Crohn's disease 1998    Depression     Dry eyes     Fracture of forearm, proximal, open     screws & plate 6/26/13 in [facility]    History of papilledema 11/10/11    Hyperlipidemia 5/21/2018    Lupus     Neuromuscular disorder 12/2009    Neuropathy    Osteoporosis     Stroke 1/29/2013    Thyroid disease        Past Surgical History:   Procedure Laterality Date    ADENOIDECTOMY  1962    BRAIN SURGERY  01/18/2012    Brain Biopsy    BREAST CYST ASPIRATION Left     COLONOSCOPY  2017    [facility] Dr. WILLIS Summers; pan-diverticulosis, rec repeat 3-5 years    INCISION AND DRAINAGE PERIRECTAL ABSCESS  1998    abscess removal    ORIF FOREARM FRACTURE Left 06/23/2013    proximal ulnar and radius       Review of patient's allergies indicates:   Allergen Reactions    Bactrim [sulfamethoxazole-trimethoprim] Nausea And Vomiting     Nausea, ? rash  Nausea, ? rash         Current Outpatient Medications:     albuterol (PROVENTIL HFA) 90 mcg/actuation inhaler, INHALE 1 TO 2 PUFFS INTO THE LUNGS EVERY 6 HOURS AS NEEDED FOR WHEEZING OR SHORTNESS OF  BREATH. USE 30 MINS BEFORE EXERCISING, Disp: 6.7 g, Rfl: 1    alendronate (FOSAMAX) 70 MG tablet, Take 1 tablet (70 mg total) by mouth every 7 days., Disp: 4 tablet, Rfl: 11    ALPRAZolam (XANAX) 0.25 MG tablet, Take 1 tablet (0.25 mg total) by mouth every 8 (eight) hours as needed for Anxiety., Disp: 30 tablet, Rfl: 5    b complex vitamins tablet, Take 1 tablet by mouth 3 (three) times daily. , Disp: , Rfl:     butalbital-acetaminophen-caffeine -40 mg (FIORICET, ESGIC) -40 mg per tablet, Take by mouth., Disp: , Rfl:     ergocalciferol (ERGOCALCIFEROL) 50,000 unit Cap, Take 1 capsule (50,000 Units total) by mouth every 7 days., Disp: 12 capsule, Rfl: 3    esomeprazole (NEXIUM) 20 MG capsule, Take 1 capsule (20 mg total) by mouth before breakfast., Disp: 90 capsule, Rfl: 1    fish oil-omega-3 fatty acids 300-1,000 mg capsule, Take 1 g by mouth 2 (two) times daily., Disp: , Rfl:     hydrocortisone (ANUSOL-HC) 2.5 % rectal cream, Place rectally 2 (two) times daily., Disp: 28 g, Rfl: 2    hydrOXYchloroQUINE (PLAQUENIL) 200 mg tablet, Take 1 tablet (200 mg total) by mouth 2 (two) times daily. Brand name only, Disp: 180 tablet, Rfl: 1    levothyroxine (SYNTHROID) 112 MCG tablet, Take 1 tablet (112 mcg total) by mouth once daily., Disp: 90 tablet, Rfl: 3    multivitamin (THERAGRAN) per tablet, Take 1 tablet by mouth before breakfast., Disp: , Rfl:     predniSONE (DELTASONE) 5 MG tablet, Take 6 PO twice daily for five days, then 5 twice daily for one day, then 4 twice daily for one day, then 3 twice daily for one day, then 2 twice daily for one day, then one twice daily for a day, then stop., Disp: 90 tablet, Rfl: 0    rosuvastatin (CRESTOR) 20 MG tablet, TAKE 1 TABLET(20 MG) BY MOUTH EVERY DAY, Disp: 90 tablet, Rfl: 0    Social History     Socioeconomic History    Marital status: Single   Occupational History    Occupation: human resources   Tobacco Use    Smoking status: Former     Packs/day: 0.50     Years:  15.00     Pack years: 7.50     Types: Cigarettes     Quit date: 1984     Years since quittin.4    Smokeless tobacco: Never    Tobacco comments:     Quit    Substance and Sexual Activity    Alcohol use: Yes     Comment: less than one per week    Drug use: No    Sexual activity: Never   Social History Narrative    [place] in [place], rest of the year in [facility]    Enjoys traveling; Going to [facility] x 1 month 2018    Regular exercise     Social Determinants of Health     Financial Resource Strain: Low Risk     Difficulty of Paying Living Expenses: Not hard at all   Food Insecurity: No Food Insecurity    Worried About Running Out of Food in the Last Year: Never true    Ran Out of Food in the Last Year: Never true   Transportation Needs: No Transportation Needs    Lack of Transportation (Medical): No    Lack of Transportation (Non-Medical): No   Physical Activity: Sufficiently Active    Days of Exercise per Week: 3 days    Minutes of Exercise per Session: 60 min   Stress: No Stress Concern Present    Feeling of Stress : Only a little   Social Connections: Unknown    Frequency of Communication with Friends and Family: Three times a week    Frequency of Social Gatherings with Friends and Family: Three times a week    Active Member of Clubs or Organizations: No    Attends Club or Organization Meetings: Never    Marital Status: Never    Housing Stability: High Risk    Unable to Pay for Housing in the Last Year: No    Number of Places Lived in the Last Year: 3    Unstable Housing in the Last Year: No     Family History   Problem Relation Age of Onset    Cancer Mother 64        small cell lung cancer; smoker    Heart disease Father 64        acute MI; 3v CABG    Sjogren's syndrome Sister     Allergies Sister     Cancer Maternal Grandmother     COPD Maternal Grandfather     Cancer Paternal Grandmother     Stroke Paternal Grandmother     Ovarian cancer Paternal Grandmother     Heart disease Paternal  Grandfather     Mental retardation Brother     Hypertension Sister     Lupus Paternal Uncle     Angioedema Neg Hx     Asthma Neg Hx     Atopy Neg Hx     Eczema Neg Hx     Immunodeficiency Neg Hx     Rhinitis Neg Hx     Urticaria Neg Hx        ROS: see HPI     PHYSICAL EXAM  There were no vitals taken for this visit.  Wt Readings from Last 3 Encounters:   07/12/22 75.9 kg (167 lb 5.3 oz)   06/23/22 76.3 kg (168 lb 3.4 oz)   04/12/22 78 kg (172 lb)   ] No physical exam - virtual visit    LABORATORY REVIEW:  Thyroid Labs Latest Ref Rng & Units 7/14/2022 12/6/2022 12/6/2022   TSH 0.400 - 4.000 uIU/mL - - -   Free T4 0.71 - 1.51 ng/dL - - -   Sodium 136 - 145 mmol/L 140 142 142   Potassium 3.5 - 5.1 mmol/L 4.4 4.4 4.4   Chloride 95 - 110 mmol/L 106 109 109   Carbon Dioxide 23 - 29 mmol/L 27 26 26   Glucose 70 - 110 mg/dL 95 84 84   Blood Urea Nitrogen 8 - 23 mg/dL 15 15 15   Creatinine 0.5 - 1.4 mg/dL 1.0 0.9 0.9   Calcium 8.7 - 10.5 mg/dL 10.0 9.6 9.6   Total Protein 6.0 - 8.4 g/dL 7.6 7.4 -   Albumin 3.5 - 5.2 g/dL 3.9 3.8 3.8   Total Bilirubin 0.1 - 1.0 mg/dL 0.6 0.7 -   AST 10 - 40 U/L 27 25 -   ALT 10 - 44 U/L 20 19 -   Anion Gap 8 - 16 mmol/L 7(L) 7(L) 7(L)   eGFR (African American) >60 mL/min/1.73 m:2 >60.0 - -   eGFR (Non-African American) >60 mL/min/1.73 m:2 59.3(A) - -   WBC 3.90 - 12.70 K/uL 5.45 5.33 -   RBC 4.00 - 5.40 M/uL 4.04 4.04 -   Hemoglobin 12.0 - 16.0 g/dL 12.7 12.7 -   Hematocrit 37.0 - 48.5 % 38.9 39.8 -   MCV 82 - 98 fL 96 99(H) -   MCH 27.0 - 31.0 pg 31.4(H) 31.4(H) -   MCHC 32.0 - 36.0 g/dL 32.6 31.9(L) -   RDW 11.5 - 14.5 % 12.1 12.1 -   Platelets 150 - 450 K/uL 322 312 -   MPV 9.2 - 12.9 fL 10.5 11.5 -   Gran # 1.8 - 7.7 K/uL 3.3 3.2 -   Lymph # 1.0 - 4.8 K/uL 1.4 1.4 -   Mono # 0.3 - 1.0 K/uL 0.6 0.5 -   Eos # 0.0 - 0.5 K/uL 0.1 0.1 -   Baso # 0.00 - 0.20 K/uL 0.07 0.07 -   Gran % 38.0 - 73.0 % 59.8 60.2 -   Lymph % 18.0 - 48.0 % 26.2 26.8 -   Mono% 4.0 - 15.0 % 10.3 9.8 -   Eos % 0.0 - 8.0  % 2.2 1.7 -   Baso % 0.0 - 1.9 % 1.3 1.3 -   Prothrombin Time 9.0 - 12.5 sec - - -   INR 0.8 - 1.2 - - -      IMAGING STUDIES    ASSESSMENT/PLAN    1. Elevated parathyroid hormone  Renal Function Panel    Calcium, Timed Urine    Creatinine, urine, timed 24 Hours      2. Hypothyroidism, unspecified type        3. Vitamin D deficiency        4. Low bone density        5. Hyperparathyroidism  Ambulatory referral/consult to Endocrinology        Elevated parathyroid hormone  Suspect PTH elevation related to inadequate calcium intake and impaired absorption due to Crohn's disease. Given history of Crohn's disease she may need higher doses of calcium or citracal if PTH and urine calcium are abnormal.  -- No parathyroid adenoma seen on US  -- 24 hour urine was lower end of normal   -- recheck the 24 hour urine now as she has been on caltrate for nearly one year. Consider switching caltrate to citracal if remains lower end of normal  -- Continue to monitor calcium levels      Hypothyroid  Goal TSH within the normal range  Continue LT4 112 mcg daily    Vitamin D deficiency  Vitamin D in 12.2021 at 41 -normal  Continue current vitamin D supplementation    Low bone density  Multifactorial with history of Crohn's disease, elevated PTH with normal calcium indicative of inadequate calcium absorption.  This is being managed by her rheumatologist and she is back on Fosamax     I spent 29 minutes face-to-face with the patient, over half of the visit was spent on counseling and/or coordinating the care of the patient.    Counseling includes:  Diagnostic results, impressions, recommendations   Prognosis   Risk and benefits of management/treatment options   Instructions for management treatment and or follow-up   Importance of compliance with management   Risk factor reduction   Patient education    Rosie Vargas NP

## 2022-12-07 ENCOUNTER — TELEPHONE (OUTPATIENT)
Dept: ENDOCRINOLOGY | Facility: CLINIC | Age: 67
End: 2022-12-07

## 2022-12-07 ENCOUNTER — OFFICE VISIT (OUTPATIENT)
Dept: ENDOCRINOLOGY | Facility: CLINIC | Age: 67
End: 2022-12-07
Payer: MEDICARE

## 2022-12-07 ENCOUNTER — PATIENT MESSAGE (OUTPATIENT)
Dept: ENDOCRINOLOGY | Facility: CLINIC | Age: 67
End: 2022-12-07

## 2022-12-07 DIAGNOSIS — E03.9 HYPOTHYROIDISM, UNSPECIFIED TYPE: ICD-10-CM

## 2022-12-07 DIAGNOSIS — R79.89 ELEVATED PARATHYROID HORMONE: ICD-10-CM

## 2022-12-07 DIAGNOSIS — E55.9 VITAMIN D DEFICIENCY: ICD-10-CM

## 2022-12-07 DIAGNOSIS — E21.3 HYPERPARATHYROIDISM: ICD-10-CM

## 2022-12-07 DIAGNOSIS — M85.9 LOW BONE DENSITY: ICD-10-CM

## 2022-12-07 PROCEDURE — 99214 OFFICE O/P EST MOD 30 MIN: CPT | Mod: 95,,, | Performed by: NURSE PRACTITIONER

## 2022-12-07 PROCEDURE — 99214 PR OFFICE/OUTPT VISIT, EST, LEVL IV, 30-39 MIN: ICD-10-PCS | Mod: 95,,, | Performed by: NURSE PRACTITIONER

## 2022-12-07 NOTE — TELEPHONE ENCOUNTER
Called pt in regards to an appt request we had to see Dr. Ross. Patient stated the appt request was an accident as she saw ALINA Vargas on today.

## 2022-12-07 NOTE — ASSESSMENT & PLAN NOTE
Suspect PTH elevation related to inadequate calcium intake and impaired absorption due to Crohn's disease. Given history of Crohn's disease she may need higher doses of calcium or citracal if PTH and urine calcium are abnormal.  -- No parathyroid adenoma seen on US  -- 24 hour urine was lower end of normal   -- recheck the 24 hour urine now as she has been on caltrate for nearly one year. Consider switching caltrate to citracal if remains lower end of normal  -- Continue to monitor calcium levels

## 2022-12-07 NOTE — PATIENT INSTRUCTIONS
Elevated PTH    Your 24 hour urine was lower end of normal    Lets recheck the 24 hour urine now that you have been on caltrate for nearly one year    Consider switching caltrate to citracal if remains lower end of normal    Elevated PTH likely caused by low urine calcium   Continue to monitor calcium levels    Thyroid    Continue Levothyroxine 112 mcg daily   TSH at goal     Osteoporosis   Continue fosamax

## 2022-12-07 NOTE — ASSESSMENT & PLAN NOTE
Multifactorial with history of Crohn's disease, elevated PTH with normal calcium indicative of inadequate calcium absorption.  This is being managed by her rheumatologist and she is back on Fosamax

## 2022-12-08 ENCOUNTER — TELEPHONE (OUTPATIENT)
Dept: ENDOCRINOLOGY | Facility: CLINIC | Age: 67
End: 2022-12-08
Payer: MEDICARE

## 2022-12-08 ENCOUNTER — TELEPHONE (OUTPATIENT)
Dept: RHEUMATOLOGY | Facility: CLINIC | Age: 67
End: 2022-12-08
Payer: MEDICARE

## 2022-12-08 DIAGNOSIS — M47.816 LUMBAR SPONDYLOSIS: Primary | ICD-10-CM

## 2022-12-08 NOTE — TELEPHONE ENCOUNTER
Please schedule lumbar spine x-rays. Need to get more detailed x-ray than that seen on DXA. No rush. ROBERTO

## 2022-12-09 NOTE — PROGRESS NOTES
The DXA shows osteoporosis. Shows what is likely degenerative changes in low back but should get actual x-ray of the low back. Tila will schedule. Also will discuss likely changing alendronate to denosumab(Prolia) which is injection every 6 months when we see you next month ROBERTO

## 2022-12-12 ENCOUNTER — HOSPITAL ENCOUNTER (OUTPATIENT)
Dept: VASCULAR SURGERY | Facility: CLINIC | Age: 67
Discharge: HOME | End: 2022-12-12
Attending: SURGERY
Payer: MEDICARE

## 2022-12-12 DIAGNOSIS — I65.21 CAROTID STENOSIS, RIGHT: ICD-10-CM

## 2022-12-12 DIAGNOSIS — I65.23 BILATERAL CAROTID ARTERY STENOSIS: Primary | ICD-10-CM

## 2022-12-12 PROCEDURE — 93880 EXTRACRANIAL BILAT STUDY: CPT | Mod: 26,S$PBB,, | Performed by: SURGERY

## 2022-12-12 PROCEDURE — 93880 EXTRACRANIAL BILAT STUDY: CPT | Mod: PBBFAC | Performed by: SURGERY

## 2022-12-12 PROCEDURE — 93880 PR DUPLEX SCAN EXTRACRANIAL,BILAT: ICD-10-PCS | Mod: 26,S$PBB,, | Performed by: SURGERY

## 2022-12-14 ENCOUNTER — HOSPITAL ENCOUNTER (OUTPATIENT)
Dept: RADIOLOGY | Facility: HOSPITAL | Age: 67
Discharge: HOME | End: 2022-12-14
Attending: INTERNAL MEDICINE
Payer: MEDICARE

## 2022-12-14 DIAGNOSIS — M47.816 LUMBAR SPONDYLOSIS: ICD-10-CM

## 2022-12-14 PROCEDURE — 72100 XR LUMBAR SPINE AP AND LATERAL: ICD-10-PCS | Mod: 26,,, | Performed by: RADIOLOGY

## 2022-12-14 PROCEDURE — 72100 X-RAY EXAM L-S SPINE 2/3 VWS: CPT | Mod: TC

## 2022-12-14 PROCEDURE — 72100 X-RAY EXAM L-S SPINE 2/3 VWS: CPT | Mod: 26,,, | Performed by: RADIOLOGY

## 2022-12-19 ENCOUNTER — OFFICE VISIT (OUTPATIENT)
Dept: VASCULAR SURGERY | Facility: CLINIC | Age: 67
End: 2022-12-19
Attending: SURGERY
Payer: MEDICARE

## 2022-12-19 VITALS
BODY MASS INDEX: 32.89 KG/M2 | SYSTOLIC BLOOD PRESSURE: 129 MMHG | HEIGHT: 60 IN | HEART RATE: 63 BPM | DIASTOLIC BLOOD PRESSURE: 58 MMHG | WEIGHT: 167.56 LBS | TEMPERATURE: 98 F

## 2022-12-19 DIAGNOSIS — I65.21 ASYMPTOMATIC STENOSIS OF RIGHT CAROTID ARTERY: Primary | ICD-10-CM

## 2022-12-19 PROCEDURE — 99213 OFFICE O/P EST LOW 20 MIN: CPT | Mod: S$PBB,,, | Performed by: SURGERY

## 2022-12-19 PROCEDURE — 99213 OFFICE O/P EST LOW 20 MIN: CPT | Mod: PBBFAC | Performed by: SURGERY

## 2022-12-19 PROCEDURE — 99999 PR PBB SHADOW E&M-EST. PATIENT-LVL III: ICD-10-PCS | Mod: PBBFAC,,, | Performed by: SURGERY

## 2022-12-19 PROCEDURE — 99999 PR PBB SHADOW E&M-EST. PATIENT-LVL III: CPT | Mod: PBBFAC,,, | Performed by: SURGERY

## 2022-12-19 PROCEDURE — 99213 PR OFFICE/OUTPT VISIT, EST, LEVL III, 20-29 MIN: ICD-10-PCS | Mod: S$PBB,,, | Performed by: SURGERY

## 2022-12-19 RX ORDER — ASPIRIN 81 MG/1
81 TABLET ORAL DAILY
COMMUNITY
Start: 2022-12-19

## 2022-12-19 NOTE — PROGRESS NOTES
VASCULAR SURGERY NOTE    Patient ID: Radha Chiang is a 67 y.o. female.    I. HISTORY     Chief Complaint: carotid artery stenosis    HPI: Radha Chiang is a 67 y.o. female who is here today for established patient appointment.  She returns for eval of carotid stenosis. She has history of right neck/head irradiation for acoustic neuroma years ago. She was found to have right carotid stenosis on CTA in January 2019 and carotid ultrasound performed 10/16/19 and was referred to vascular surgery clinic for evaluation. She has continued annual follow up with me for the past 2 years. Today she reports that she is feeling well albeit a bit more fatigued, especially with walking. Denies any stroke or TIA symptoms. Is still swimming approx 4 times a week and is remaining active. She stopped taking her aspirin.      ALLERGIES: unremarkable    SOCIAL HISTORY: former smoker    FAMILY HISTORY: unremarkable    MEDICATIONS: up to date in EPIC    Past Medical History:   Diagnosis Date    Acid reflux     Anemia 2years ago    Anxiety     Arthritis     Asthma, chronic 4/10/2013    Crohn's disease 1998    Depression     Dry eyes     Fracture of forearm, proximal, open     screws & plate 6/26/13 in [facility]    History of papilledema 11/10/11    Hyperlipidemia 5/21/2018    Lupus     Neuromuscular disorder 12/2009    Neuropathy    Osteoporosis     Stroke 1/29/2013    Thyroid disease         Past Surgical History:   Procedure Laterality Date    ADENOIDECTOMY  1962    BRAIN SURGERY  01/18/2012    Brain Biopsy    BREAST CYST ASPIRATION Left     COLONOSCOPY  2017    [facility] Dr. WILLIS Summers; pan-diverticulosis, rec repeat 3-5 years    INCISION AND DRAINAGE PERIRECTAL ABSCESS  1998    abscess removal    ORIF FOREARM FRACTURE Left 06/23/2013    proximal ulnar and radius       Social History     Tobacco Use   Smoking Status Former    Packs/day: 0.50    Years: 15.00    Pack years: 7.50    Types: Cigarettes    Quit date: 7/5/1984    Years  since quittin.4   Smokeless Tobacco Never   Tobacco Comments    Quit 1984        Review of Systems   Constitutional: Negative for weight loss.   HENT:  Negative for ear pain and nosebleeds.    Eyes:  Negative for discharge and pain.   Cardiovascular:  Negative for chest pain and palpitations.   Respiratory:  Negative for cough, shortness of breath and wheezing.    Endocrine: Negative for cold intolerance, heat intolerance and polyphagia.   Hematologic/Lymphatic: Negative for adenopathy. Does not bruise/bleed easily.   Skin:  Negative for itching and rash.   Musculoskeletal:  Negative for joint swelling and muscle cramps.   Gastrointestinal:  Negative for abdominal pain, diarrhea, nausea and vomiting.   Genitourinary:  Negative for dysuria and flank pain.   Neurological:  Negative for numbness and seizures.     II. PHYSICAL EXAM     Physical Exam  Constitutional:       General: She is not in acute distress.     Appearance: Normal appearance. She is normal weight.   HENT:      Head: Normocephalic and atraumatic.      Nose: Nose normal.      Mouth/Throat:      Mouth: Mucous membranes are moist.      Pharynx: No oropharyngeal exudate.   Eyes:      Extraocular Movements: Extraocular movements intact.      Conjunctiva/sclera: Conjunctivae normal.   Cardiovascular:      Rate and Rhythm: Normal rate and regular rhythm.   Pulmonary:      Effort: Pulmonary effort is normal. No respiratory distress.   Abdominal:      General: There is no distension.      Palpations: Abdomen is soft.   Musculoskeletal:         General: Normal range of motion.      Cervical back: Normal range of motion and neck supple. No rigidity.      Right lower leg: No edema.      Left lower leg: No edema.   Skin:     General: Skin is warm and dry.      Coloration: Skin is not jaundiced or pale.      Findings: No erythema or rash.   Neurological:      General: No focal deficit present.      Mental Status: She is alert and oriented to person, place, and  time. Mental status is at baseline.   Psychiatric:         Mood and Affect: Mood normal.         Behavior: Behavior normal.       III. ASSESSMENT & PLAN (MEDICAL DECISION MAKING)       Imaging Results: (I have personally reviewed all images and provided interpretation below)   Carotid Duplex 12/21/20:     R L   CCA: 105cm/s  ICA PSV: 260cm/s (previous 228cm/s)  ICA EDV: 72cm/s  Vert: antegrade, normal waveform  ICA/CCA ratio: 2.4  Impression: 50-75% stenosis of ICA CCA: 85cm/s  ICA PSV: 108cm/s  ICA EDV:33cm/s  Vert: antegrade, normal waveform  ICA/CCA ratio: 1.4  Impression: less than 50% stenosis of ICA     Carotid Duplex 12/12/22:  R L   CCA PSV: 84cm/s  ICA PSV: 301cm/s  ICA EDV: 46cm/s  Vert: antegrade  ICA/CCA ratio: 3.75  Impression: 70-79% stenosis of ICA CCA PSV: 108cm/s  ICA PSV: 106cm/s  ICA EDV: 30cm/s  Vert: antegrade  ICA/CCA ratio: 1  Impression: less than 50% stenosis         Assessment/Diagnosis and Plan:    1. Asymptomatic stenosis of right carotid artery          67 y.o. female here for yearly follow up for asymptomatic right carotid stenosis. Her her velocities have increased slightly since last year, but she remains asymptomatic.  Velocities are still below threshold for 80% stenosis.  Due to h/o radiation, would recommend treatment with carotid stent or TCAR if her stenosis worsens to more than 80% or she becomes symptomatic.  Until then continue best medical treatment.    -ASA 81 mg daily  -continue rosuvastatin  -goal blood pressure less than 140/90  -RTC in 1 year for repeat carotid duplex    JACK Corona II, MD, [facility]  Vascular Surgeon  [facility]

## 2022-12-21 DIAGNOSIS — M32.8 OTHER FORMS OF SYSTEMIC LUPUS ERYTHEMATOSUS, UNSPECIFIED ORGAN INVOLVEMENT STATUS: ICD-10-CM

## 2022-12-21 RX ORDER — HYDROXYCHLOROQUINE SULFATE 200 MG/1
200 TABLET, FILM COATED ORAL 2 TIMES DAILY
Qty: 180 TABLET | Refills: 0 | Status: SHIPPED | OUTPATIENT
Start: 2022-12-21 | End: 2023-01-25 | Stop reason: SDUPTHER

## 2022-12-26 ENCOUNTER — OFFICE VISIT (OUTPATIENT)
Dept: URGENT CARE | Facility: CLINIC | Age: 67
End: 2022-12-26
Payer: MEDICARE

## 2022-12-26 VITALS
DIASTOLIC BLOOD PRESSURE: 86 MMHG | WEIGHT: 168 LBS | BODY MASS INDEX: 32.98 KG/M2 | HEIGHT: 60 IN | HEART RATE: 65 BPM | OXYGEN SATURATION: 98 % | RESPIRATION RATE: 20 BRPM | TEMPERATURE: 98 F | SYSTOLIC BLOOD PRESSURE: 132 MMHG

## 2022-12-26 DIAGNOSIS — B96.89 ACUTE BRONCHITIS, BACTERIAL: Primary | ICD-10-CM

## 2022-12-26 DIAGNOSIS — R05.9 COUGH, UNSPECIFIED TYPE: ICD-10-CM

## 2022-12-26 DIAGNOSIS — J20.8 ACUTE BRONCHITIS, BACTERIAL: Primary | ICD-10-CM

## 2022-12-26 LAB
CTP QC/QA: YES
CTP QC/QA: YES
POC MOLECULAR INFLUENZA A AGN: NEGATIVE
POC MOLECULAR INFLUENZA B AGN: NEGATIVE
SARS-COV-2 AG RESP QL IA.RAPID: NEGATIVE

## 2022-12-26 PROCEDURE — 99213 OFFICE O/P EST LOW 20 MIN: CPT | Mod: CR,S$GLB,, | Performed by: NURSE PRACTITIONER

## 2022-12-26 PROCEDURE — 99213 PR OFFICE/OUTPT VISIT, EST, LEVL III, 20-29 MIN: ICD-10-PCS | Mod: CR,S$GLB,, | Performed by: NURSE PRACTITIONER

## 2022-12-26 PROCEDURE — 87502 POCT INFLUENZA A/B MOLECULAR: ICD-10-PCS | Mod: QW,S$GLB,, | Performed by: NURSE PRACTITIONER

## 2022-12-26 PROCEDURE — 71046 X-RAY EXAM CHEST 2 VIEWS: CPT | Mod: S$GLB,,, | Performed by: RADIOLOGY

## 2022-12-26 PROCEDURE — 87811 SARS CORONAVIRUS 2 ANTIGEN POCT, MANUAL READ: ICD-10-PCS | Mod: QW,S$GLB,, | Performed by: NURSE PRACTITIONER

## 2022-12-26 PROCEDURE — 87502 INFLUENZA DNA AMP PROBE: CPT | Mod: QW,S$GLB,, | Performed by: NURSE PRACTITIONER

## 2022-12-26 PROCEDURE — 71046 XR CHEST PA AND LATERAL: ICD-10-PCS | Mod: S$GLB,,, | Performed by: RADIOLOGY

## 2022-12-26 PROCEDURE — 87811 SARS-COV-2 COVID19 W/OPTIC: CPT | Mod: QW,S$GLB,, | Performed by: NURSE PRACTITIONER

## 2022-12-26 RX ORDER — AMOXICILLIN AND CLAVULANATE POTASSIUM 875; 125 MG/1; MG/1
1 TABLET, FILM COATED ORAL 2 TIMES DAILY
Qty: 20 TABLET | Refills: 0 | Status: SHIPPED | OUTPATIENT
Start: 2022-12-26 | End: 2023-01-05

## 2022-12-26 RX ORDER — BENZONATATE 200 MG/1
200 CAPSULE ORAL 3 TIMES DAILY PRN
Qty: 30 CAPSULE | Refills: 0 | Status: SHIPPED | OUTPATIENT
Start: 2022-12-26 | End: 2023-01-05

## 2022-12-26 RX ORDER — ALBUTEROL SULFATE 90 UG/1
1-2 AEROSOL, METERED RESPIRATORY (INHALATION) EVERY 6 HOURS PRN
Qty: 6.7 G | Refills: 0 | Status: SHIPPED | OUTPATIENT
Start: 2022-12-26 | End: 2024-01-31 | Stop reason: SDUPTHER

## 2022-12-26 RX ORDER — PROMETHAZINE HYDROCHLORIDE AND DEXTROMETHORPHAN HYDROBROMIDE 6.25; 15 MG/5ML; MG/5ML
5 SYRUP ORAL NIGHTLY PRN
Qty: 120 ML | Refills: 0 | Status: SHIPPED | OUTPATIENT
Start: 2022-12-26 | End: 2023-01-05

## 2022-12-26 NOTE — PROGRESS NOTES
Subjective:       Patient ID: Radha Chiang is a 67 y.o. female.    Vitals:  height is 5' (1.524 m) and weight is 76.2 kg (168 lb). Her temperature is 98.4 °F (36.9 °C). Her blood pressure is 132/86 and her pulse is 65. Her respiration is 20 and oxygen saturation is 98%.     Chief Complaint: Cough    Pt presents with complaint of cough, congestion, headache, fatigue x12 days.  Pt states she has taken two at home covid tests both resulting negative.  Pt states she has been taking mucinex, derek seltzer and dayquil for her symptoms  Provider note begins below    Patient states her family has also been sick.  They are getting better.  She is a history of lupus.  Reports blood-tinged sputum.    Cough  This is a new problem. The current episode started 1 to 4 weeks ago. The problem has been unchanged. The problem occurs every few minutes. The cough is Non-productive. Associated symptoms include headaches, hemoptysis, nasal congestion and wheezing. Pertinent negatives include no chest pain, fever or shortness of breath. Nothing aggravates the symptoms. Treatments tried: derek seltzer, mucinex, dayquil.     Constitution: Negative for sweating, fatigue and fever.   Cardiovascular:  Negative for chest pain and sob on exertion.   Respiratory:  Positive for cough, sputum production, bloody sputum and wheezing. Negative for shortness of breath and asthma.    Gastrointestinal:  Negative for nausea, vomiting, constipation and diarrhea.   Allergic/Immunologic: Negative for asthma.   Neurological:  Positive for headaches.     Objective:      Physical Exam   Constitutional: She is oriented to person, place, and time.   HENT:   Head: Normocephalic and atraumatic.   Ears:   Right Ear: Tympanic membrane, external ear and ear canal normal. Tympanic membrane is not erythematous and not bulging.   Left Ear: Tympanic membrane is scarred. Tympanic membrane is not erythematous and not bulging.   Nose: Mucosal edema, rhinorrhea and purulent  "discharge present.   Mouth/Throat: Uvula is midline and oropharynx is clear and moist.   Cardiovascular: Normal rate, regular rhythm and normal heart sounds.   Pulmonary/Chest: Effort normal. No respiratory distress. She has wheezes.   Abdominal: Normal appearance.   Neurological: She is alert and oriented to person, place, and time.   Skin: Skin is warm and dry.   Psychiatric: Her behavior is normal. Mood normal.         Results for orders placed or performed in visit on 12/26/22   SARS Coronavirus 2 Antigen, POCT Manual Read   Result Value Ref Range    SARS Coronavirus 2 Antigen Negative Negative     Acceptable Yes    POCT Influenza A/B MOLECULAR   Result Value Ref Range    POC Molecular Influenza A Ag Negative Negative, Not Reported    POC Molecular Influenza B Ag Negative Negative, Not Reported     Acceptable Yes      *Note: Due to a large number of results and/or encounters for the requested time period, some results have not been displayed. A complete set of results can be found in Results Review.        X-Ray Chest PA And Lateral    Result Date: 12/26/2022  EXAMINATION: XR CHEST PA AND LATERAL CLINICAL HISTORY: Provided history is "  Cough, unspecified". TECHNIQUE: Frontal and lateral views of the chest were performed. COMPARISON: 10/18/2021. FINDINGS: Cardiac silhouette is not enlarged.  Calcified granulomata again noted.  No focal consolidation.  No sizable pleural effusion.  No pneumothorax.          Assessment:       1. Acute bronchitis, bacterial    2. Cough, unspecified type          Plan:       Flu test negative   COVID test negative   Chest x-ray no signs of pneumonia.  Calcified granulomata  Antibiotics and cough suppressants as well as inhaler.    Follow-up if symptoms worsen or do not improve.          Acute bronchitis, bacterial  -     amoxicillin-clavulanate 875-125mg (AUGMENTIN) 875-125 mg per tablet; Take 1 tablet by mouth 2 (two) times daily. for 10 days  " Dispense: 20 tablet; Refill: 0  -     benzonatate (TESSALON) 200 MG capsule; Take 1 capsule (200 mg total) by mouth 3 (three) times daily as needed for Cough.  Dispense: 30 capsule; Refill: 0  -     promethazine-dextromethorphan (PROMETHAZINE-DM) 6.25-15 mg/5 mL Syrp; Take 5 mLs by mouth nightly as needed (cough).  Dispense: 120 mL; Refill: 0  -     albuterol (VENTOLIN HFA) 90 mcg/actuation inhaler; Inhale 1-2 puffs into the lungs every 6 (six) hours as needed for Wheezing or Shortness of Breath (cough). Rescue  Dispense: 6.7 g; Refill: 0    Cough, unspecified type  -     SARS Coronavirus 2 Antigen, POCT Manual Read  -     POCT Influenza A/B MOLECULAR  -     X-Ray Chest PA And Lateral; Future; Expected date: 12/26/2022

## 2023-01-09 ENCOUNTER — PATIENT MESSAGE (OUTPATIENT)
Dept: ENDOCRINOLOGY | Facility: CLINIC | Age: 68
End: 2023-01-09
Payer: MEDICARE

## 2023-01-11 ENCOUNTER — OFFICE VISIT (OUTPATIENT)
Dept: OPTOMETRY | Facility: CLINIC | Age: 68
End: 2023-01-11
Payer: MEDICARE

## 2023-01-11 DIAGNOSIS — Z97.3 WEARS CONTACT LENSES: ICD-10-CM

## 2023-01-11 DIAGNOSIS — H25.13 NUCLEAR SCLEROSIS OF BOTH EYES: ICD-10-CM

## 2023-01-11 DIAGNOSIS — M32.9 SYSTEMIC LUPUS ERYTHEMATOSUS, UNSPECIFIED SLE TYPE, UNSPECIFIED ORGAN INVOLVEMENT STATUS: ICD-10-CM

## 2023-01-11 DIAGNOSIS — H52.13 MYOPIA WITH PRESBYOPIA OF BOTH EYES: Primary | ICD-10-CM

## 2023-01-11 DIAGNOSIS — H52.4 MYOPIA WITH PRESBYOPIA OF BOTH EYES: Primary | ICD-10-CM

## 2023-01-11 DIAGNOSIS — H52.13 MYOPIA WITH PRESBYOPIA OF BOTH EYES: ICD-10-CM

## 2023-01-11 DIAGNOSIS — H52.4 MYOPIA WITH PRESBYOPIA OF BOTH EYES: ICD-10-CM

## 2023-01-11 DIAGNOSIS — Z79.899 LONG-TERM USE OF PLAQUENIL: Primary | ICD-10-CM

## 2023-01-11 PROCEDURE — 99499 UNLISTED E&M SERVICE: CPT | Mod: S$PBB,,, | Performed by: OPTOMETRIST

## 2023-01-11 PROCEDURE — 92083 HUMPHREY VISUAL FIELD - OU - BOTH EYES: ICD-10-PCS | Mod: 26,S$PBB,, | Performed by: OPTOMETRIST

## 2023-01-11 PROCEDURE — 92134 POSTERIOR SEGMENT OCT RETINA (OCULAR COHERENCE TOMOGRAPHY)-BOTH EYES: ICD-10-PCS | Mod: 26,S$PBB,, | Performed by: OPTOMETRIST

## 2023-01-11 PROCEDURE — 92014 COMPRE OPH EXAM EST PT 1/>: CPT | Mod: S$PBB,,, | Performed by: OPTOMETRIST

## 2023-01-11 PROCEDURE — 99999 PR PBB SHADOW E&M-EST. PATIENT-LVL III: CPT | Mod: PBBFAC,,, | Performed by: OPTOMETRIST

## 2023-01-11 PROCEDURE — 92015 PR REFRACTION: ICD-10-PCS | Mod: ,,, | Performed by: OPTOMETRIST

## 2023-01-11 PROCEDURE — 92014 PR EYE EXAM, EST PATIENT,COMPREHESV: ICD-10-PCS | Mod: S$PBB,,, | Performed by: OPTOMETRIST

## 2023-01-11 PROCEDURE — 99499 NO LOS: ICD-10-PCS | Mod: S$PBB,,, | Performed by: OPTOMETRIST

## 2023-01-11 PROCEDURE — 99999 PR PBB SHADOW E&M-EST. PATIENT-LVL III: ICD-10-PCS | Mod: PBBFAC,,, | Performed by: OPTOMETRIST

## 2023-01-11 PROCEDURE — 92083 EXTENDED VISUAL FIELD XM: CPT | Mod: PBBFAC | Performed by: OPTOMETRIST

## 2023-01-11 PROCEDURE — 92310 PR CONTACT LENS FITTING (NO CHANGE): ICD-10-PCS | Mod: CSM,,, | Performed by: OPTOMETRIST

## 2023-01-11 PROCEDURE — 92134 CPTRZ OPH DX IMG PST SGM RTA: CPT | Mod: PBBFAC | Performed by: OPTOMETRIST

## 2023-01-11 PROCEDURE — 92015 DETERMINE REFRACTIVE STATE: CPT | Mod: ,,, | Performed by: OPTOMETRIST

## 2023-01-11 PROCEDURE — 92310 CONTACT LENS FITTING OU: CPT | Mod: CSM,,, | Performed by: OPTOMETRIST

## 2023-01-11 PROCEDURE — 99213 OFFICE O/P EST LOW 20 MIN: CPT | Mod: PBBFAC | Performed by: OPTOMETRIST

## 2023-01-11 RX ORDER — RIBOFLAVIN (VITAMIN B2) 100 MG
TABLET ORAL
COMMUNITY
Start: 2022-03-01 | End: 2023-06-07

## 2023-01-11 RX ORDER — HYDROCORTISONE ACETATE SUPPOSITORY 30 MG/1
SUPPOSITORY RECTAL
COMMUNITY
Start: 2022-10-17 | End: 2023-03-23

## 2023-01-11 RX ORDER — NAPROXEN SODIUM 220 MG
TABLET ORAL
COMMUNITY
Start: 2022-08-08 | End: 2023-05-08

## 2023-01-11 RX ORDER — FAMOTIDINE 40 MG/1
40 TABLET, FILM COATED ORAL 2 TIMES DAILY
COMMUNITY
Start: 2022-12-12 | End: 2023-03-23

## 2023-01-11 RX ORDER — SODIUM PICOSULFATE, MAGNESIUM OXIDE, AND ANHYDROUS CITRIC ACID 10; 3.5; 12 MG/160ML; G/160ML; G/160ML
LIQUID ORAL
COMMUNITY
Start: 2022-12-23 | End: 2023-03-23

## 2023-01-11 RX ORDER — ALPHA LIPOIC ACID 300 MG
CAPSULE ORAL
COMMUNITY
Start: 2022-04-01

## 2023-01-11 RX ORDER — ALPRAZOLAM 0.25 MG/1
1 TABLET ORAL EVERY 8 HOURS PRN
COMMUNITY
Start: 2022-06-25 | End: 2023-07-19

## 2023-01-11 RX ORDER — EPINEPHRINE 0.22MG
AEROSOL WITH ADAPTER (ML) INHALATION
COMMUNITY
Start: 2022-03-01

## 2023-01-11 NOTE — PROGRESS NOTES
Assessment /Plan     For exam results, see Encounter Report.    Myopia with presbyopia of both eyes    Wears contact lenses            1-2.  See note with same date.

## 2023-01-11 NOTE — PROGRESS NOTES
HPI    Last eye exam was 12/8/21 with Dr. Monge.  Patient states no vision changes since last exam. Removes and replaces   SCL's daily-only wears them socially and ok on supply.  Patient denies diplopia, headaches, flashes/floaters, and pain.    Plaquenil 200 mg Twice Daily PO    Last edited by Joanie Cesar MA on 1/11/2023  8:51 AM.            Assessment /Plan     For exam results, see Encounter Report.    Long-term use of Plaquenil  -     Ocampo Visual Field - OU - Extended - Both Eyes  -     OCT- Retina    Systemic lupus erythematosus, unspecified SLE type, unspecified organ involvement status  -     Ocampo Visual Field - OU - Extended - Both Eyes  -     OCT- Retina    Nuclear sclerosis of both eyes    Myopia with presbyopia of both eyes    Wears contact lenses                1-2. All testing normal OU-no retinopathy.  Monitor yearly.  3.  Educated on cataracts and affects on vision.  Early-monitor.  4-5.  Bifocal and contact lens rx given.  Retina flat and intact OU--no holes, tears, breaks, or RDs.

## 2023-01-12 ENCOUNTER — PATIENT MESSAGE (OUTPATIENT)
Dept: ENDOCRINOLOGY | Facility: CLINIC | Age: 68
End: 2023-01-12
Payer: MEDICARE

## 2023-01-12 ENCOUNTER — TELEPHONE (OUTPATIENT)
Dept: ENDOCRINOLOGY | Facility: CLINIC | Age: 68
End: 2023-01-12
Payer: MEDICARE

## 2023-01-13 ENCOUNTER — PATIENT MESSAGE (OUTPATIENT)
Dept: OPTOMETRY | Facility: CLINIC | Age: 68
End: 2023-01-13
Payer: MEDICARE

## 2023-01-18 ENCOUNTER — TELEPHONE (OUTPATIENT)
Dept: SURGERY | Facility: CLINIC | Age: 68
End: 2023-01-18
Payer: MEDICARE

## 2023-01-18 ENCOUNTER — TELEPHONE (OUTPATIENT)
Dept: ENDOCRINOLOGY | Facility: CLINIC | Age: 68
End: 2023-01-18
Payer: MEDICARE

## 2023-01-18 ENCOUNTER — OFFICE VISIT (OUTPATIENT)
Dept: INTERNAL MEDICINE | Facility: CLINIC | Age: 68
End: 2023-01-18
Payer: MEDICARE

## 2023-01-18 VITALS
BODY MASS INDEX: 31.41 KG/M2 | SYSTOLIC BLOOD PRESSURE: 122 MMHG | WEIGHT: 160 LBS | OXYGEN SATURATION: 100 % | HEART RATE: 65 BPM | DIASTOLIC BLOOD PRESSURE: 80 MMHG | HEIGHT: 60 IN

## 2023-01-18 DIAGNOSIS — I77.9 CAROTID ARTERY DISEASE WITHOUT CEREBRAL INFARCTION: ICD-10-CM

## 2023-01-18 DIAGNOSIS — D84.821 DRUG-INDUCED IMMUNODEFICIENCY: ICD-10-CM

## 2023-01-18 DIAGNOSIS — Z12.11 COLON CANCER SCREENING: ICD-10-CM

## 2023-01-18 DIAGNOSIS — Z79.899 DRUG-INDUCED IMMUNODEFICIENCY: ICD-10-CM

## 2023-01-18 DIAGNOSIS — D33.3 RIGHT ACOUSTIC NEUROMA: ICD-10-CM

## 2023-01-18 DIAGNOSIS — K50.90 CROHN'S DISEASE WITHOUT COMPLICATION, UNSPECIFIED GASTROINTESTINAL TRACT LOCATION: ICD-10-CM

## 2023-01-18 DIAGNOSIS — H91.90 HEARING LOSS, UNSPECIFIED HEARING LOSS TYPE, UNSPECIFIED LATERALITY: ICD-10-CM

## 2023-01-18 DIAGNOSIS — E78.5 HYPERLIPIDEMIA, UNSPECIFIED HYPERLIPIDEMIA TYPE: Primary | ICD-10-CM

## 2023-01-18 DIAGNOSIS — R73.9 HYPERGLYCEMIA: ICD-10-CM

## 2023-01-18 DIAGNOSIS — E21.3 HYPERPARATHYROIDISM: ICD-10-CM

## 2023-01-18 DIAGNOSIS — Z12.31 ENCOUNTER FOR SCREENING MAMMOGRAM FOR BREAST CANCER: ICD-10-CM

## 2023-01-18 DIAGNOSIS — K64.9 HEMORRHOIDS, UNSPECIFIED HEMORRHOID TYPE: ICD-10-CM

## 2023-01-18 DIAGNOSIS — E03.9 HYPOTHYROIDISM, UNSPECIFIED TYPE: ICD-10-CM

## 2023-01-18 DIAGNOSIS — M32.19 OTHER SYSTEMIC LUPUS ERYTHEMATOSUS WITH OTHER ORGAN INVOLVEMENT: ICD-10-CM

## 2023-01-18 DIAGNOSIS — M35.9 POLYNEUROPATHY IN COLLAGEN VASCULAR DISEASE: ICD-10-CM

## 2023-01-18 DIAGNOSIS — Z13.1 SCREENING FOR DIABETES MELLITUS (DM): ICD-10-CM

## 2023-01-18 DIAGNOSIS — G63 POLYNEUROPATHY IN COLLAGEN VASCULAR DISEASE: ICD-10-CM

## 2023-01-18 DIAGNOSIS — K59.00 CONSTIPATION, UNSPECIFIED CONSTIPATION TYPE: ICD-10-CM

## 2023-01-18 PROCEDURE — 99999 PR PBB SHADOW E&M-EST. PATIENT-LVL V: CPT | Mod: PBBFAC,,, | Performed by: INTERNAL MEDICINE

## 2023-01-18 PROCEDURE — 99999 PR PBB SHADOW E&M-EST. PATIENT-LVL V: ICD-10-PCS | Mod: PBBFAC,,, | Performed by: INTERNAL MEDICINE

## 2023-01-18 PROCEDURE — 99215 OFFICE O/P EST HI 40 MIN: CPT | Mod: PBBFAC | Performed by: INTERNAL MEDICINE

## 2023-01-18 PROCEDURE — 99214 PR OFFICE/OUTPT VISIT, EST, LEVL IV, 30-39 MIN: ICD-10-PCS | Mod: S$PBB,,, | Performed by: INTERNAL MEDICINE

## 2023-01-18 PROCEDURE — 99214 OFFICE O/P EST MOD 30 MIN: CPT | Mod: S$PBB,,, | Performed by: INTERNAL MEDICINE

## 2023-01-18 NOTE — PROGRESS NOTES
Subjective:       Patient ID: Radha Chiang is a 67 y.o. female.    Chief Complaint: Annual Exam and GI Problem  This is a 67-year-old who presents today for checkup she reports that she had spent about 6 months out of town traveling went swimming where she was and stayed pretty active in general she has been doing well she is had some trouble with fatigue and also has had some issues with her bowels has some constipation and has hemorrhoid issues she went to see her outlying gastroenterologist at  and reports that they plan a colonoscopy in March.  She would like an order placed in case she can do sooner as she did have a history of Crohn's disease in the past although has been in remission for quite some time.  She continues to follow with rheumatology and remains on Plaquenil.  She also has been feeling tired but stays pretty active not sure why she has been tired.  She did see her vascular doctor for her carotid and she reports they continue to monitor she is tolerating her statin.  She reports has had some intermittent asthma symptoms she has inhaler she uses when needed.     GI Problem  Primary symptoms do not include vomiting, diarrhea, dysuria or arthralgias.   The illness is also significant for constipation.   Review of Systems   Constitutional:  Negative for activity change and unexpected weight change.   HENT:  Positive for hearing loss. Negative for rhinorrhea and trouble swallowing.    Eyes:  Negative for discharge and visual disturbance.   Respiratory:  Positive for chest tightness and wheezing.    Cardiovascular:  Positive for chest pain. Negative for palpitations.   Gastrointestinal:  Positive for constipation. Negative for blood in stool, diarrhea and vomiting.        Hem orrhoid     Endocrine: Negative for polydipsia and polyuria.   Genitourinary:  Negative for difficulty urinating, dysuria, hematuria and menstrual problem.   Musculoskeletal:  Negative for arthralgias, joint swelling and  neck pain.   Neurological:  Positive for weakness. Negative for headaches.   Psychiatric/Behavioral:  Negative for confusion and dysphoric mood.      Objective:    Blood pressure 122/80, pulse 65, height 5' (1.524 m), weight 72.6 kg (160 lb), SpO2 100 %.   Physical Exam  Constitutional:       General: She is not in acute distress.  HENT:      Head: Normocephalic.      Comments: Hearing aids      Mouth/Throat:      Pharynx: Oropharynx is clear.   Eyes:      General: No scleral icterus.  Cardiovascular:      Rate and Rhythm: Normal rate and regular rhythm.      Heart sounds: Normal heart sounds. No murmur heard.    No friction rub. No gallop.   Pulmonary:      Effort: Pulmonary effort is normal. No respiratory distress.      Breath sounds: Normal breath sounds.   Abdominal:      General: Bowel sounds are normal.      Palpations: Abdomen is soft. There is no mass.      Tenderness: There is no abdominal tenderness.   Genitourinary:     Comments: hemorrhoids  Musculoskeletal:      Cervical back: Neck supple.   Skin:     Findings: No erythema.   Neurological:      Mental Status: She is alert.   Psychiatric:         Mood and Affect: Mood normal.       Assessment:       1. Hyperlipidemia, unspecified hyperlipidemia type    2. Hypothyroidism, unspecified type    3. Right acoustic neuroma    4. Hearing loss, unspecified hearing loss type, unspecified laterality    5. Colon cancer screening    6. Screening for diabetes mellitus (DM)    7. Hyperglycemia    8. Hemorrhoids, unspecified hemorrhoid type    9. Constipation, unspecified constipation type    10. Hyperparathyroidism    11. Other systemic lupus erythematosus with other organ involvement    12. Carotid artery disease without cerebral infarction    13. 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 Crohn's disease without complication, unspecified gastrointestinal tract location    15. Drug-induced immunodeficiency          Plan:       Radha was seen today for annual  exam and gi problem.    Diagnoses and all orders for this visit:    Hyperlipidemia, unspecified hyperlipidemia type  Remains on statin will continue  -     Lipid Panel; Future      Right acoustic neuroma  Hearing loss, unspecified hearing loss type, unspecified laterality  History of she is hearing aids has  had outlying treatmetn [facility]  For history of migraine she is following with Neurology    Colon cancer screening  -     Ambulatory referral/consult to Endo Procedure ; Future    Screening for diabetes mellitus (DM)  Hyperglycemia  With next labs  -     Hemoglobin A1C; Future    Hemorrhoids, unspecified hemorrhoid typ  Constipation, unspecified constipation type  Discussed with patient conservative measures MiraLax stool softener  She is seen an outlying GI doctor and reports planned for colonoscopy would like an order placed if she can do sooner but also   Would like a colon rectal follow-up due to her persistent constipation and hemorrhoids referral placed  -     Ambulatory referral/consult to Colorectal Surgery; Future    Hyperparathyroidism endocrinology  Hypothyroidism continues to follow with endocrinology recent labs reviewed  History of to continues to follow with    Other systemic lupus erythematosus with other organ involvement  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   History of she continues to follow with rheumatology currently on Plaquenil    Carotid artery disease without cerebral infarction still has in the other issue is sometimes they  History of patient continues to take her cholesterol medicine had recent vascular appointment for surveillance    Crohn's disease without complication, unspecified gastrointestinal tract location  Prior history of she is had no Crohn's symptoms does have some hemorrhoids she had a GI appointment at [facility] and has a colonoscopy planned did put a referral in if she decided to do here    Patient reports history of asthma intermittent symptoms  conservative measures    A recent labs reviewed update additional labs  Discussed gyn appointment  She is up-to-date on her mammogram order placed for her to schedule annual when due    She continues to stay active with swimming several times a week                  Answers submitted by the patient for this visit:  Review of Systems Questionnaire (Submitted on 1/16/2023)  activity change: No  unexpected weight change: No  neck pain: No  hearing loss: Yes  rhinorrhea: No  trouble swallowing: No  eye discharge: No  visual disturbance: No  chest tightness: Yes  wheezing: Yes  chest pain: Yes  palpitations: No  blood in stool: No  constipation: Yes  vomiting: No  diarrhea: No  polydipsia: No  polyuria: No  difficulty urinating: No  hematuria: No  menstrual problem: No  dysuria: No  joint swelling: No  arthralgias: No  headaches: No  weakness: Yes  confusion: No  dysphoric mood: No

## 2023-01-19 ENCOUNTER — OFFICE VISIT (OUTPATIENT)
Dept: SURGERY | Facility: CLINIC | Age: 68
End: 2023-01-19
Payer: MEDICARE

## 2023-01-19 VITALS
SYSTOLIC BLOOD PRESSURE: 120 MMHG | HEIGHT: 60 IN | HEART RATE: 65 BPM | WEIGHT: 160.06 LBS | DIASTOLIC BLOOD PRESSURE: 80 MMHG | BODY MASS INDEX: 31.42 KG/M2

## 2023-01-19 DIAGNOSIS — K62.89 ANAL PAIN: Primary | ICD-10-CM

## 2023-01-19 DIAGNOSIS — K59.00 CONSTIPATION, UNSPECIFIED CONSTIPATION TYPE: ICD-10-CM

## 2023-01-19 DIAGNOSIS — K64.9 HEMORRHOIDS, UNSPECIFIED HEMORRHOID TYPE: ICD-10-CM

## 2023-01-19 PROCEDURE — 99212 PR OFFICE/OUTPT VISIT, EST, LEVL II, 10-19 MIN: ICD-10-PCS | Mod: S$PBB,,, | Performed by: NURSE PRACTITIONER

## 2023-01-19 PROCEDURE — 99999 PR PBB SHADOW E&M-EST. PATIENT-LVL IV: CPT | Mod: PBBFAC,,, | Performed by: NURSE PRACTITIONER

## 2023-01-19 PROCEDURE — 99214 OFFICE O/P EST MOD 30 MIN: CPT | Mod: PBBFAC | Performed by: NURSE PRACTITIONER

## 2023-01-19 PROCEDURE — 99212 OFFICE O/P EST SF 10 MIN: CPT | Mod: S$PBB,,, | Performed by: NURSE PRACTITIONER

## 2023-01-19 PROCEDURE — 99999 PR PBB SHADOW E&M-EST. PATIENT-LVL IV: ICD-10-PCS | Mod: PBBFAC,,, | Performed by: NURSE PRACTITIONER

## 2023-01-19 NOTE — PROGRESS NOTES
"CRS Office Visit History and Physical    Referring Md:   Merry Espinal Md  [id number] [place] [address]    SUBJECTIVE:     Chief Complaint: hemorrhoids    History of Present Illness:  She presents today for continued hemorrhoidal irritation.   Having a hard difficult to pass bm q2-3 days.   Drinks "calm tea" if no bm in 3 days.     Reports saw a CR surgeon in [place] to have hemorrhoids removed and they did not want to operate given hx of crohn's and fistula.     Would like to see someone to discuss removal as she feels there is something in the rectum that stool has to move around to be able to pass.   Due for screening colonoscopy. Schedule with [facility] in March 2023, however would like to move all care over to [facility].       7/2022 HPI  The patient is new patient to this practice.   Course is as follows:  Patient is a 67 y.o. female with hx of Crohn's presents with hemorrhoidal bleeding.  Symptoms have been present for a few weeks.  Has tried anusol cream and tucks pads with some improvement although some bleeding still noted.  Previous anorectal procedures: yes, Dr. Bergman with abscess, fistula, setons in late 90's  confirms straining/prolonged time on toilet with bowel movements, although much improved with mag supplement recommended d/t HAs  is not currently taking fiber supplement or stool softener  Blood thinners: No    Last Colonoscopy: 5/12/2020 at [facility]  - 6 mm ileocecal valve polyp  - repeat in 3-5 years  Family history of colorectal cancer or IBD: none.    Review of patient's allergies indicates:   Allergen Reactions    Bactrim [sulfamethoxazole-trimethoprim] Nausea And Vomiting     Nausea, ? rash  Nausea, ? rash       Past Medical History:   Diagnosis Date    Acid reflux     Anemia 2years ago    Anxiety     Arthritis     Asthma, chronic 4/10/2013    Crohn's disease 1998    Depression     Dry eyes     Fracture of forearm, proximal, open     screws & plate 6/26/13 in [place]    History of " papilledema 11/10/11    Hyperlipidemia 2018    Lupus     Neuromuscular disorder 2009    Neuropathy    Osteoporosis     Stroke 2013    Thyroid disease      Past Surgical History:   Procedure Laterality Date    ADENOIDECTOMY      BRAIN SURGERY  2012    Brain Biopsy    BREAST CYST ASPIRATION Left     COLONOSCOPY      [facility] Dr. WILLIS Summers; pan-diverticulosis, rec repeat 3-5 years    INCISION AND DRAINAGE PERIRECTAL ABSCESS  1998    abscess removal    ORIF FOREARM FRACTURE Left 2013    proximal ulnar and radius     Family History   Problem Relation Age of Onset    Cancer Mother 64        small cell lung cancer; smoker    Heart disease Father 64        acute MI; 3v CABG    Sjogren's syndrome Sister     Allergies Sister     Cancer Maternal Grandmother     COPD Maternal Grandfather     Cancer Paternal Grandmother     Stroke Paternal Grandmother     Ovarian cancer Paternal Grandmother     Heart disease Paternal Grandfather     Mental retardation Brother     Hypertension Sister     Lupus Paternal Uncle     Angioedema Neg Hx     Asthma Neg Hx     Atopy Neg Hx     Eczema Neg Hx     Immunodeficiency Neg Hx     Rhinitis Neg Hx     Urticaria Neg Hx      Social History     Tobacco Use    Smoking status: Former     Packs/day: 0.50     Years: 15.00     Pack years: 7.50     Types: Cigarettes     Quit date: 1984     Years since quittin.5    Smokeless tobacco: Never    Tobacco comments:     Quit    Substance Use Topics    Alcohol use: Yes     Comment: less than one per week    Drug use: No        Review of Systems:  Review of Systems   Gastrointestinal:  Positive for blood in stool and constipation.     OBJECTIVE:     Vital Signs (Most Recent)  Blood Pressure 120/80 (BP Location: Right arm, Patient Position: Sitting, BP Method: Large (Automatic))   Pulse 65   Height 5' (1.524 m)   Weight 72.6 kg (160 lb 0.9 oz)   Body Mass Index 31.26 kg/m²     Physical Exam:  General: Patient  Refused female in no distress   Neuro: Alert and oriented to person, place, and time.  Moves all extremities.     HEENT: No icterus.  Trachea midline  Respiratory: Respirations are even and unlabored, no cough or audible wheezing  Skin: Warm dry and intact, No visible rashes, no jaundice    Labs reviewed today:  Lab Results   Component Value Date    WBC 5.33 12/06/2022    HGB 12.7 12/06/2022    HCT 39.8 12/06/2022     12/06/2022    CHOL 134 10/20/2021    TRIG 59 10/20/2021    HDL 81 (H) 10/20/2021    ALT 19 12/06/2022    AST 25 12/06/2022     12/06/2022     12/06/2022    K 4.4 12/06/2022    K 4.4 12/06/2022     12/06/2022     12/06/2022    CREATININE 0.9 12/06/2022    CREATININE 0.9 12/06/2022    BUN 15 12/06/2022    BUN 15 12/06/2022    CO2 26 12/06/2022    CO2 26 12/06/2022    TSH 2.234 04/07/2022    INR 1.1 01/27/2019    HGBA1C 5.3 10/16/2018       Imaging reviewed today:  none    Endoscopy reviewed today:  Last Colonoscopy: 5/12/2020 at [facility]  - 6 mm ileocecal valve polyp  - repeat in 3-5 years    Anorectal Exam:    Anal Skin: external hemorrhoids, x1 hypertrophied anal papilla    Digital Rectal Exam:  Resting Tone normal  Squeeze normal  Relaxation with bear down present  Mass none  Rectocele  absent  Tenderness present  - no fissure visualized      ASSESSMENT/PLAN:     Radha was seen today for hemorrhoids.    Diagnoses and all orders for this visit:    Anal pain    Hemorrhoids, unspecified hemorrhoid type  -     Ambulatory referral/consult to Colorectal Surgery    Constipation, unspecified constipation type  -     Ambulatory referral/consult to Colorectal Surgery      The patient was instructed to:  F/u appt with MD made for tomorrow as she would like to discuss sx and colonoscopy at same time.  Prefers female provider.       Zuleyka Thapa, LAZARO-MARY ANN  Colon and Rectal Surgery

## 2023-01-20 ENCOUNTER — OFFICE VISIT (OUTPATIENT)
Dept: SURGERY | Facility: CLINIC | Age: 68
End: 2023-01-20
Payer: MEDICARE

## 2023-01-20 VITALS
OXYGEN SATURATION: 97 % | BODY MASS INDEX: 31.77 KG/M2 | HEIGHT: 60 IN | WEIGHT: 161.81 LBS | SYSTOLIC BLOOD PRESSURE: 113 MMHG | RESPIRATION RATE: 19 BRPM | DIASTOLIC BLOOD PRESSURE: 58 MMHG

## 2023-01-20 DIAGNOSIS — K50.114 CROHN'S COLITIS, WITH ABSCESS: ICD-10-CM

## 2023-01-20 DIAGNOSIS — K64.9 HEMORRHOIDS, UNSPECIFIED HEMORRHOID TYPE: Primary | ICD-10-CM

## 2023-01-20 PROCEDURE — 99999 PR PBB SHADOW E&M-EST. PATIENT-LVL III: ICD-10-PCS | Mod: PBBFAC,,, | Performed by: SURGERY

## 2023-01-20 PROCEDURE — 99999 PR PBB SHADOW E&M-EST. PATIENT-LVL III: CPT | Mod: PBBFAC,,, | Performed by: SURGERY

## 2023-01-20 PROCEDURE — 99213 OFFICE O/P EST LOW 20 MIN: CPT | Mod: S$PBB,,, | Performed by: SURGERY

## 2023-01-20 PROCEDURE — 99213 OFFICE O/P EST LOW 20 MIN: CPT | Mod: PBBFAC | Performed by: SURGERY

## 2023-01-20 PROCEDURE — 99213 PR OFFICE/OUTPT VISIT, EST, LEVL III, 20-29 MIN: ICD-10-PCS | Mod: S$PBB,,, | Performed by: SURGERY

## 2023-01-20 RX ORDER — SODIUM, POTASSIUM,MAG SULFATES 17.5-3.13G
1 SOLUTION, RECONSTITUTED, ORAL ORAL DAILY
Qty: 1 KIT | Refills: 0 | Status: SHIPPED | OUTPATIENT
Start: 2023-01-20 | End: 2023-01-22

## 2023-01-20 NOTE — PROGRESS NOTES
CRS Office Visit History and Physical    Referring Md:   No referring provider defined for this encounter.    SUBJECTIVE:     Chief Complaint: hemorrhoids, crohns     History of Present Illness:  The patient is an established patient to this practice.   Course is as follows:  Radha Chiang is a 67 y.o. female presents with rectal pain and bleeding.  She has a remote history of crohns that was diagnosed 24 years ago after she presented with perirectal abscess and fistula.  Treated by Dr. Bergman.  Patient reports that she went to [facility] for confirmatory diagnosis.  Was on medication but has not required treatment in some time.  Reports worsening constipation over the last few months.  Has bowel movement every 2-3 days.  Uses stool softener and a supplement called calm.  Reports that her bowel movements are very firm, and associated with a tearing sensation and blood.  Feels as though there is a blockage.      Last Colonoscopy: 2020    Review of patient's allergies indicates:   Allergen Reactions    Bactrim [sulfamethoxazole-trimethoprim] Nausea And Vomiting     Nausea, ? rash  Nausea, ? rash       Past Medical History:   Diagnosis Date    Acid reflux     Anemia 2years ago    Anxiety     Arthritis     Asthma, chronic 4/10/2013    Crohn's disease 1998    Depression     Dry eyes     Fracture of forearm, proximal, open     screws & plate 6/26/13 in [facility]    History of papilledema 11/10/11    Hyperlipidemia 5/21/2018    Lupus     Neuromuscular disorder 12/2009    Neuropathy    Osteoporosis     Stroke 1/29/2013    Thyroid disease      Past Surgical History:   Procedure Laterality Date    ADENOIDECTOMY  1962    BRAIN SURGERY  01/18/2012    Brain Biopsy    BREAST CYST ASPIRATION Left     COLONOSCOPY  2017    [facility] Dr. WILLIS Summers; pan-diverticulosis, rec repeat 3-5 years    INCISION AND DRAINAGE PERIRECTAL ABSCESS  1998    abscess removal    ORIF FOREARM FRACTURE Left 06/23/2013    proximal ulnar and radius     Family  History   Problem Relation Age of Onset    Cancer Mother 64        small cell lung cancer; smoker    Heart disease Father 64        acute MI; 3v CABG    Sjogren's syndrome Sister     Allergies Sister     Cancer Maternal Grandmother     COPD Maternal Grandfather     Cancer Paternal Grandmother     Stroke Paternal Grandmother     Ovarian cancer Paternal Grandmother     Heart disease Paternal Grandfather     Mental retardation Brother     Hypertension Sister     Lupus Paternal Uncle     Angioedema Neg Hx     Asthma Neg Hx     Atopy Neg Hx     Eczema Neg Hx     Immunodeficiency Neg Hx     Rhinitis Neg Hx     Urticaria Neg Hx      Social History     Tobacco Use    Smoking status: Former     Packs/day: 0.50     Years: 15.00     Pack years: 7.50     Types: Cigarettes     Quit date: 1984     Years since quittin.5    Smokeless tobacco: Never    Tobacco comments:     Quit    Substance Use Topics    Alcohol use: Yes     Comment: less than one per week    Drug use: No        Review of Systems:  Review of Systems   All other systems reviewed and are negative.    OBJECTIVE:     Vital Signs (Most Recent)  BP (!) 113/58 (BP Location: Left arm, Patient Position: Sitting)   Resp 19   Ht 5' (1.524 m)   Wt 73.4 kg (161 lb 12.8 oz)   SpO2 97%   BMI 31.60 kg/m²     Physical Exam:  General: 67 y.o. female in no distress   Neuro: alert and oriented x 4.  Moves all extremities.     HEENT: normocephalic, atraumatic, PERRL, EOMI   Respiratory: respirations are even and unlabored  Cardiac: regular rate and rhythm  Abdomen: soft, NTND  Extremities: Warm dry and intact  Skin: no rashes  Anorectal: scar with prominent sphincter complex and thinned perineal body anteriorly, external hemorrhoid in the right lateral position with focal area of erythematous tissue      Labs: NA    Imaging: NA      ASSESSMENT/PLAN:     Diagnoses and all orders for this visit:    Hemorrhoids, unspecified hemorrhoid type    Crohn's colitis, with  abscess  -     Case Request Endoscopy: COLONOSCOPY    Other orders  -     sodium,potassium,mag sulfates (SUPREP BOWEL PREP KIT) 17.5-3.13-1.6 gram SolR; Take 177 mLs by mouth once daily. for 2 days        67 y.o. female with remote history of crohns disease who now presents with constipation, rectal pain and bleeding with defecation     - Patient has remote history of crohns, but has not required any medical therapy in quite some time.  She is due for a colonoscopy, will repeat to assess status of IBD and evaluate for underlying cause of change in bowel habits   - If no evidence of active disease, may consider very limited excisional hemorrhoidectomy  - Will change bowel regimen.  Start daily fiber supplement and miralax as needed.  Take capful miralax daily if no bowel movement in 24-48 hours     Reina Crabtree MD  Staff Surgeon  Colon & Rectal Surgery

## 2023-01-23 ENCOUNTER — OFFICE VISIT (OUTPATIENT)
Dept: DERMATOLOGY | Facility: CLINIC | Age: 68
End: 2023-01-23
Payer: MEDICARE

## 2023-01-23 ENCOUNTER — LAB VISIT (OUTPATIENT)
Dept: LAB | Facility: HOSPITAL | Age: 68
End: 2023-01-23
Attending: INTERNAL MEDICINE
Payer: MEDICARE

## 2023-01-23 DIAGNOSIS — Z12.83 SKIN CANCER SCREENING: ICD-10-CM

## 2023-01-23 DIAGNOSIS — E78.5 HYPERLIPIDEMIA, UNSPECIFIED HYPERLIPIDEMIA TYPE: ICD-10-CM

## 2023-01-23 DIAGNOSIS — D22.9 MULTIPLE BENIGN NEVI: ICD-10-CM

## 2023-01-23 DIAGNOSIS — L82.1 SK (SEBORRHEIC KERATOSIS): ICD-10-CM

## 2023-01-23 DIAGNOSIS — L57.0 AK (ACTINIC KERATOSIS): ICD-10-CM

## 2023-01-23 DIAGNOSIS — R73.9 HYPERGLYCEMIA: ICD-10-CM

## 2023-01-23 DIAGNOSIS — L81.4 LENTIGINES: Primary | ICD-10-CM

## 2023-01-23 LAB
CHOLEST SERPL-MCNC: 112 MG/DL (ref 120–199)
CHOLEST/HDLC SERPL: 2 {RATIO} (ref 2–5)
ESTIMATED AVG GLUCOSE: 103 MG/DL (ref 68–131)
HBA1C MFR BLD: 5.2 % (ref 4–5.6)
HDLC SERPL-MCNC: 57 MG/DL (ref 40–75)
HDLC SERPL: 50.9 % (ref 20–50)
LDLC SERPL CALC-MCNC: 46.8 MG/DL (ref 63–159)
NONHDLC SERPL-MCNC: 55 MG/DL
TRIGL SERPL-MCNC: 41 MG/DL (ref 30–150)

## 2023-01-23 PROCEDURE — 17000 DESTRUCT PREMALG LESION: CPT | Mod: S$PBB,,, | Performed by: DERMATOLOGY

## 2023-01-23 PROCEDURE — 99213 OFFICE O/P EST LOW 20 MIN: CPT | Mod: 25,S$PBB,, | Performed by: DERMATOLOGY

## 2023-01-23 PROCEDURE — 83036 HEMOGLOBIN GLYCOSYLATED A1C: CPT | Performed by: INTERNAL MEDICINE

## 2023-01-23 PROCEDURE — 17000 PR DESTRUCTION(LASER SURGERY,CRYOSURGERY,CHEMOSURGERY),PREMALIGNANT LESIONS,FIRST LESION: ICD-10-PCS | Mod: S$PBB,,, | Performed by: DERMATOLOGY

## 2023-01-23 PROCEDURE — 99213 PR OFFICE/OUTPT VISIT, EST, LEVL III, 20-29 MIN: ICD-10-PCS | Mod: 25,S$PBB,, | Performed by: DERMATOLOGY

## 2023-01-23 PROCEDURE — 80061 LIPID PANEL: CPT | Performed by: INTERNAL MEDICINE

## 2023-01-23 PROCEDURE — 36415 COLL VENOUS BLD VENIPUNCTURE: CPT | Performed by: INTERNAL MEDICINE

## 2023-01-23 PROCEDURE — 99999 PR PBB SHADOW E&M-EST. PATIENT-LVL III: ICD-10-PCS | Mod: PBBFAC,,, | Performed by: DERMATOLOGY

## 2023-01-23 PROCEDURE — 99213 OFFICE O/P EST LOW 20 MIN: CPT | Mod: PBBFAC | Performed by: DERMATOLOGY

## 2023-01-23 PROCEDURE — 99999 PR PBB SHADOW E&M-EST. PATIENT-LVL III: CPT | Mod: PBBFAC,,, | Performed by: DERMATOLOGY

## 2023-01-23 PROCEDURE — 17000 DESTRUCT PREMALG LESION: CPT | Mod: PBBFAC | Performed by: DERMATOLOGY

## 2023-01-23 NOTE — PATIENT INSTRUCTIONS
CRYOSURGERY      Your doctor has used a method called cryosurgery to treat your skin condition. Cryosurgery refers to the use of very cold substances to treat a variety of skin conditions such as warts, pre-skin cancers, molluscum contagiosum, sun spots, and several benign growths. The substance we use in cryosurgery is liquid nitrogen and is so cold (-195 degrees Celsius) that is burns when administered.     Following treatment in the office, the skin may immediately burn and become red. You may find the area around the lesion is affected as well. It is sometimes necessary to treat not only the lesion, but a small area of the surrounding normal skin to achieve a good response.     A blister, and even a blood filled blister, may form after treatment.   This is a normal response. If the blister is painful, it is acceptable to sterilize a needle and with rubbing alcohol and gently pop the blister. It is important that you gently wash the area with soap and warm water as the blister fluid may contain wart virus if a wart was treated. Do no remove the roof of the blister.     The area treated can take anywhere from 1-3 weeks to heal. Healing time depends on the kind skin lesion treated, the location, and how aggressively the lesion was treated. It is recommended that the areas treated are covered with Vaseline or bacitracin ointment and a band-aid. If a band-aid is not practical, just ointment applied several times per day will do. Keeping these areas moist will speed the healing time.    Treatment with liquid nitrogen can leave a scar. In dark skin, it may be a light or dark scar, in light skin it may be a white or pink scar. These will generally fade with time, but may never go away completely.     If you have any concerns after your treatment, please feel free to call the office.       [address] [facility] [place] [address]/ [phone] FAX/ [email or web address]     Sun Protection      The [facility]  [facility] D[facility]at[facility] would like to remind you of the importance of sun protection all year round and particularly during the summer when the suns rays are the strongest. It has been proven that both acute and chronic sun exposure damages our cells and leads to skin cancer. Beyond skin cancer, the sun causes 90% of the symptoms of premature skin aging, including wrinkles, lentigines (brown spots), and thin, easily bruised skin. Proper sun protection can help prevent these unwanted conditions.    Many patients report that they dont go in the sun. It has been shown that the average person receives 18 hours of incidental sun exposure per week during activities such as walking through parking lots, driving, or sitting next to windows. This accumulates to several bad sunburns per year!    In choosing sunscreen, you want one that protects against both UVA and UVB rays (broad spectrum). It is recommended that you use one of SPF 30 or higher. It is important to apply the sunscreen about 20 minutes prior to sun exposure. Most sunscreens are chemical sunscreens and a reaction must take place in the skin so that they are effective. If they are applied and then you are immediately exposed to the sun or start sweating, this reaction has not had time to take place and you are therefore unprotected. Sunscreen needs to be reapplied every 2 hours if you are participating in water sports or sweating. We recommend Elta MD or CeraVe sunscreens for daily use; however there are many options and it is most important for you to find one that you will use on a consistent basis.    If you have sensitive skin, you may do best with a sunscreen that contains only physical blockers in the active ingredient section. The only physical blockers available in the [place] currently are titanium dioxide or zinc oxide. These are typically thicker and harder to apply, however they afford very good protection. Neutrogena Sensitive Skin, Blue Lizard  Sensitive Skin (pink top) or Neutrogena Pure and Free are popular ones.     Aside from sunscreen, clothes with UV protection (UPF), wide brimmed hats, and sunglasses are other means of sun protection that we recommend.      Based on a recent study (6/2021) and out of an abundance of caution, we are recommending that you AVOID the following sunscreens as they may contain the carcinogen, benzene:    Spray and gel sunscreens  Any [facility] or [employer] brands as well as Max Block and TopCare brands   Neutrogena Ultra Sheer Dry-touch Water Resistant Sunscreen LOTION SPF 70   Neutrogena Sheer Zinc Dry-touch Face Sunscreen LOTION SPF 50   5.   Aveeno Baby Continuous Protection Sensitive Skin Sunscreen LOTION - Broad Spectrum SPF 50    Please note that Benzene is not an ingredient or the degradation product of any ingredient in any sunscreen. This study suggested that the findings are a result of contamination in the manufacturing process. At this point, we don't know how effectively Benzene gets through the skin, if it gets absorbed systemically, and what effects it may have.     We do know that ultraviolet radiation is a well-established carcinogen. Please use daily sun protection/avoidance and use of at least SPF 30, broad-spectrum sunscreen not listed above.                       [facility]  [facility] - DERMATOLOGY [address]  [facility] [address]  Dept: [phone]  Dept Fax: [phone]

## 2023-01-23 NOTE — PROGRESS NOTES
Subjective:       Patient ID:  Radha Chiang is a 67 y.o. female who presents for   Chief Complaint   Patient presents with    Lesion     FACE AND BACK     Lesion  Pt here today to get moles checked on her back x years with no symptoms and no tx.  Patient with new complaint of lesion(s)  Location: R cheek  Duration: few year  Symptoms: none  Relieving factors/Previous treatments: none    Pt has a history of  moderate sun exposure in the past.   Pt recalls several blistering sunburns in the past- yes  Pt has history of tanning bed use- no  Pt has  had moles removed in the past- no  Pt has history of melanoma in first degree relatives-  No     Pt presents today for TBSE.     Has a hx of SLE, occasionally gets flares of butterfly rash - on plaquenil.     No personal history of skin cancer or atypical moles.    Review of Systems   Constitutional:  Negative for fever and chills.   Respiratory:  Negative for cough and shortness of breath.    Gastrointestinal:  Negative for nausea and vomiting.   Musculoskeletal:  Negative for joint swelling and arthralgias.   Skin:  Negative for daily sunscreen use, activity-related sunscreen use, recent sunburn and wears hat.   Hematologic/Lymphatic: Does not bruise/bleed easily.      Objective:    Physical Exam   Constitutional: She appears well-developed and well-nourished. No distress.   Neurological: She is alert and oriented to person, place, and time. She is not disoriented.   Psychiatric: She has a normal mood and affect.   Skin:   Areas Examined (abnormalities noted in diagram):   Scalp / Hair Palpated and Inspected  Head / Face Inspection Performed  Neck Inspection Performed  Chest / Axilla Inspection Performed  Abdomen Inspection Performed  Genitals / Buttocks / Groin Inspection Performed  Back Inspection Performed  RUE Inspected  LUE Inspection Performed  RLE Inspected  LLE Inspection Performed  Nails and Digits Inspection Performed                 Diagram Legend      Erythematous scaling macule/papule c/w actinic keratosis       Vascular papule c/w angioma      Pigmented verrucoid papule/plaque c/w seborrheic keratosis      Yellow umbilicated papule c/w sebaceous hyperplasia      Irregularly shaped tan macule c/w lentigo     1-2 mm smooth white papules consistent with Milia      Movable subcutaneous cyst with punctum c/w epidermal inclusion cyst      Subcutaneous movable cyst c/w pilar cyst      Firm pink to brown papule c/w dermatofibroma      Pedunculated fleshy papule(s) c/w skin tag(s)      Evenly pigmented macule c/w junctional nevus     Mildly variegated pigmented, slightly irregular-bordered macule c/w mildly atypical nevus      Flesh colored to evenly pigmented papule c/w intradermal nevus       Pink pearly papule/plaque c/w basal cell carcinoma      Erythematous hyperkeratotic cursted plaque c/w SCC      Surgical scar with no sign of skin cancer recurrence      Open and closed comedones      Inflammatory papules and pustules      Verrucoid papule consistent consistent with wart     Erythematous eczematous patches and plaques     Dystrophic onycholytic nail with subungual debris c/w onychomycosis     Umbilicated papule    Erythematous-base heme-crusted tan verrucoid plaque consistent with inflamed seborrheic keratosis     Erythematous Silvery Scaling Plaque c/w Psoriasis     See annotation      Assessment / Plan:        Lentigines  These are benign sun spots which should be monitored for changes. Patient instructed in importance of daily broad spectrum sunscreen use with spf at least 30. Sun avoidance and topical protection/protective clothing discussed.    Multiple benign nevi  Benign-appearing nevi present on exam today. Reassurance provided. Periodically examine moles and return to clinic if any moles change or become symptomatic (bleeding, itching, pain, etc).    SK (seborrheic keratosis)  These are benign inherited growths without a malignant potential. Reassurance  given to patient. No treatment is necessary.   Treatment of benign, asymptomatic lesions may be considered cosmetic.  Warned about risk of hypo- or hyperpigmentation with treatment and risk of recurrence.    AK (actinic keratosis)  Cryosurgery Procedure Note    Verbal consent from the patient is obtained including, but not limited to, risk of hypopigmentation/hyperpigmentation, scar, recurrence of lesion. The patient is aware of the precancerous quality and need for treatment of these lesions. Liquid nitrogen cryosurgery is applied to the 1 actinic keratosis, as detailed in the physical exam, to produce a freeze injury. The patient is aware that blisters may form and is instructed on wound care with gentle cleansing and use of vaseline ointment to keep moist until healed. The patient is supplied a handout on cryosurgery and is instructed to call if lesions do not completely resolve.    Skin cancer screening  Upper body skin examination performed today including at least 6 points as noted in physical examination. No lesions suspicious for malignancy noted.  Patient instructed in importance of daily broad spectrum sunscreen use with spf at least 30. Sun avoidance and topical protection/protective clothing discussed.    Follow up in about 6 months (around 7/23/2023) for skin check or sooner for any concerns.

## 2023-01-25 ENCOUNTER — OFFICE VISIT (OUTPATIENT)
Dept: RHEUMATOLOGY | Facility: CLINIC | Age: 68
End: 2023-01-25
Payer: MEDICARE

## 2023-01-25 VITALS
DIASTOLIC BLOOD PRESSURE: 67 MMHG | HEIGHT: 60 IN | WEIGHT: 160 LBS | SYSTOLIC BLOOD PRESSURE: 115 MMHG | HEART RATE: 55 BPM | BODY MASS INDEX: 31.41 KG/M2

## 2023-01-25 DIAGNOSIS — M81.8 OTHER OSTEOPOROSIS WITHOUT CURRENT PATHOLOGICAL FRACTURE: Primary | ICD-10-CM

## 2023-01-25 DIAGNOSIS — M32.8 OTHER FORMS OF SYSTEMIC LUPUS ERYTHEMATOSUS, UNSPECIFIED ORGAN INVOLVEMENT STATUS: ICD-10-CM

## 2023-01-25 DIAGNOSIS — E55.9 VITAMIN D DEFICIENCY: ICD-10-CM

## 2023-01-25 DIAGNOSIS — M32.9 SYSTEMIC LUPUS ERYTHEMATOSUS, UNSPECIFIED SLE TYPE, UNSPECIFIED ORGAN INVOLVEMENT STATUS: ICD-10-CM

## 2023-01-25 PROCEDURE — 99214 PR OFFICE/OUTPT VISIT, EST, LEVL IV, 30-39 MIN: ICD-10-PCS | Mod: S$PBB,,, | Performed by: INTERNAL MEDICINE

## 2023-01-25 PROCEDURE — 99999 PR PBB SHADOW E&M-EST. PATIENT-LVL IV: CPT | Mod: PBBFAC,,, | Performed by: INTERNAL MEDICINE

## 2023-01-25 PROCEDURE — 99214 OFFICE O/P EST MOD 30 MIN: CPT | Mod: PBBFAC | Performed by: INTERNAL MEDICINE

## 2023-01-25 PROCEDURE — 99999 PR PBB SHADOW E&M-EST. PATIENT-LVL IV: ICD-10-PCS | Mod: PBBFAC,,, | Performed by: INTERNAL MEDICINE

## 2023-01-25 PROCEDURE — 99214 OFFICE O/P EST MOD 30 MIN: CPT | Mod: S$PBB,,, | Performed by: INTERNAL MEDICINE

## 2023-01-25 RX ORDER — ERGOCALCIFEROL 1.25 MG/1
50000 CAPSULE ORAL
Qty: 12 CAPSULE | Refills: 3 | Status: SHIPPED | OUTPATIENT
Start: 2023-01-25 | End: 2024-03-25 | Stop reason: SDUPTHER

## 2023-01-25 RX ORDER — HYDROXYCHLOROQUINE SULFATE 200 MG/1
200 TABLET, FILM COATED ORAL 2 TIMES DAILY
Qty: 180 TABLET | Refills: 2 | Status: SHIPPED | OUTPATIENT
Start: 2023-01-25 | End: 2023-05-22 | Stop reason: SDUPTHER

## 2023-01-25 ASSESSMENT — ROUTINE ASSESSMENT OF PATIENT INDEX DATA (RAPID3)
PAIN SCORE: 2
PSYCHOLOGICAL DISTRESS SCORE: 1.1
AM STIFFNESS SCORE: 0, NO
TOTAL RAPID3 SCORE: 0.67
FATIGUE SCORE: 0.5
MDHAQ FUNCTION SCORE: 0
PATIENT GLOBAL ASSESSMENT SCORE: 0

## 2023-01-25 ASSESSMENT — SYSTEMIC LUPUS ERYTHEMATOSUS DISEASE ACTIVITY INDEX (SLEDAI): TOTAL_SCORE: 2

## 2023-01-25 NOTE — PROGRESS NOTES
Subjective:       Patient ID: Radha Chiang is a 67 y.o. female.    Chief Complaint: SLE; Low bone density    HPI  LCV 4/12/22 since then, patient reports that she is doing well. No flares of lupus, does have some mild aches in knees and hips occ takes aleve with significant relief. Continues to take hydroxychloroquine 200mg BID. Had normal eye exam 1/11/23. She states that she has had difficulty with compliance of alendronate and ergocalciferol, as she often forgets to take these weekly and has had stomach discomfort with fosamax.  Otherwise she has no complaints today.    Exercise: swim 5x week; walking, yoga  Diet: yogurt, fruit, chicken, vegetables    Review of Systems   Constitutional:  Negative for fatigue and fever.   HENT:  Negative for mouth sores and trouble swallowing.    Respiratory:  Positive for chest tightness. Negative for shortness of breath.    Gastrointestinal:  Positive for constipation. Negative for diarrhea.   Genitourinary:  Negative for dysuria.   Musculoskeletal:  Positive for arthralgias. Negative for back pain and myalgias.   Skin:  Negative for rash.   Neurological:  Negative for dizziness and headaches.   Hematological:  Does not bruise/bleed easily.       Objective:   /67   Pulse (!) 55   Ht 5' (1.524 m)   Wt 72.6 kg (160 lb)   BMI 31.25 kg/m²      Physical Exam   Constitutional: She is oriented to person, place, and time. normal appearance.   HENT:   Head: Normocephalic.   Mouth/Throat: Mucous membranes are moist. Oropharynx is clear.   Eyes: Pupils are equal, round, and reactive to light. Conjunctivae are normal.   Cardiovascular: Normal rate, regular rhythm, normal heart sounds and normal pulses.   Pulmonary/Chest: Effort normal and breath sounds normal. She has no wheezes. She has no rhonchi.   Musculoskeletal:         General: No swelling or tenderness. Normal range of motion.      Cervical back: Normal range of motion.      Comments: B/l Jaccoud's arthropathy     Neurological: She is alert and oriented to person, place, and time.   Skin: Skin is warm. No rash noted. No erythema.   Psychiatric: Her behavior is normal. Mood, judgment and thought content normal.       Right Side Rheumatological Exam     Muscle Strength (0-5 scale):  Deltoid:  5  Biceps: 5/5   Triceps:  5  : 5/5   Iliopsoas: 5  Quadriceps:  5   Distal Lower Extremity: 5    Left Side Rheumatological Exam     Muscle Strength (0-5 scale):  Deltoid:  5  Biceps: 5/5   Triceps:  5  :  5/5   Iliopsoas: 5  Quadriceps:  5   Distal Lower Extremity: 5          Latest Reference Range & Units 12/06/22 10:24   WBC 3.90 - 12.70 K/uL 5.33   RBC 4.00 - 5.40 M/uL 4.04   Hemoglobin 12.0 - 16.0 g/dL 12.7   Hematocrit 37.0 - 48.5 % 39.8   MCV 82 - 98 fL 99 (H)   MCH 27.0 - 31.0 pg 31.4 (H)   MCHC 32.0 - 36.0 g/dL 31.9 (L)   RDW 11.5 - 14.5 % 12.1   Platelets 150 - 450 K/uL 312   MPV 9.2 - 12.9 fL 11.5   Gran % 38.0 - 73.0 % 60.2   Lymph % 18.0 - 48.0 % 26.8   Mono % 4.0 - 15.0 % 9.8   Eosinophil % 0.0 - 8.0 % 1.7   Basophil % 0.0 - 1.9 % 1.3   Immature Granulocytes 0.0 - 0.5 % 0.2   Gran # (ANC) 1.8 - 7.7 K/uL 3.2   Lymph # 1.0 - 4.8 K/uL 1.4   Mono # 0.3 - 1.0 K/uL 0.5   Eos # 0.0 - 0.5 K/uL 0.1   Baso # 0.00 - 0.20 K/uL 0.07   Immature Grans (Abs) 0.00 - 0.04 K/uL 0.01   nRBC 0 /100 WBC 0   (H): Data is abnormally high  (L): Data is abnormally low     Latest Reference Range & Units 12/06/22 10:24   Sed Rate 0 - 36 mm/Hr 24   CRP 0.0 - 8.2 mg/L 6.3   PTH 9.0 - 77.0 pg/mL 81.3 (H)   ds DNA Ab Negative 1:10  Positive !   DNA Titer  1:320   Complement (C-3) 50 - 180 mg/dL 111   Complement (C-4) 11 - 44 mg/dL 26   (H): Data is abnormally high  !: Data is abnormal    Lab Visit on 01/23/2023   Component Date Value Ref Range Status    Urine Volume 01/23/2023 2300  mL Final    Urine Collection Duration 01/23/2023 24  Hr Final    Calcium, Urine 01/23/2023 7.1  0.0 - 15.0 mg/dL Final    Calcium, 24 Hr Urine 01/23/2023 7  4 - 12  mg/Hr Final    Calcium, Urine (mg/spec) 01/23/2023 163  mg/Spec Final    Urine Volume 01/23/2023 2300  mL Final    Urine Collection Duration 01/23/2023 24  Hr Final    Creatinine, Urine 01/23/2023 61.0  15.0 - 325.0 mg/dL Final    Creatinine, Timed Urine 01/23/2023 58.5  40.0 - 75.0 mg/Hr Final    Creatinine, Urinr (mg/spec) 01/23/2023 1403.0  mg/Spec Final   Lab Visit on 01/23/2023   Component Date Value Ref Range Status    Cholesterol 01/23/2023 112 (L)  120 - 199 mg/dL Final    Comment: The National Cholesterol Education Program (NCEP) has set the  following guidelines (reference ranges) for Cholesterol:  Optimal.....................<200 mg/dL  Borderline High.............200-239 mg/dL  High........................> or = 240 mg/dL      Triglycerides 01/23/2023 41  30 - 150 mg/dL Final    Comment: The National Cholesterol Education Program (NCEP) has set the  following guidelines (reference values) for triglycerides:  Normal......................<150 mg/dL  Borderline High.............150-199 mg/dL  High........................200-499 mg/dL      HDL 01/23/2023 57  40 - 75 mg/dL Final    Comment: The National Cholesterol Education Program (NCEP) has set the  following guidelines (reference values) for HDL Cholesterol:  Low...............<40 mg/dL  Optimal...........>60 mg/dL      LDL Cholesterol 01/23/2023 46.8 (L)  63.0 - 159.0 mg/dL Final    Comment: The National Cholesterol Education Program (NCEP) has set the  following guidelines (reference values) for LDL Cholesterol:  Optimal.......................<130 mg/dL  Borderline High...............130-159 mg/dL  High..........................160-189 mg/dL  Very High.....................>190 mg/dL      HDL/Cholesterol Ratio 01/23/2023 50.9 (H)  20.0 - 50.0 % Final    Total Cholesterol/HDL Ratio 01/23/2023 2.0  2.0 - 5.0 Final    Non-HDL Cholesterol 01/23/2023 55  mg/dL Final    Comment: Risk category and Non-HDL cholesterol goals:  Coronary heart disease (CHD)or  equivalent (10-year risk of CHD >20%):  Non-HDL cholesterol goal     <130 mg/dL  Two or more CHD risk factors and 10-year risk of CHD <= 20%:  Non-HDL cholesterol goal     <160 mg/dL  0 to 1 CHD risk factor:  Non-HDL cholesterol goal     <190 mg/dL      Hemoglobin A1C 01/23/2023 5.2  4.0 - 5.6 % Final    Comment: ADA Screening Guidelines:  5.7-6.4%  Consistent with prediabetes  >or=6.5%  Consistent with diabetes    High levels of fetal hemoglobin interfere with the HbA1C  assay. Heterozygous hemoglobin variants (HbS, HgC, etc)do  not significantly interfere with this assay.   However, presence of multiple variants may affect accuracy.      Estimated Avg Glucose 01/23/2023 103  68 - 131 mg/dL Final        EXAMINATION:  XR LUMBAR SPINE AP AND LATERAL     CLINICAL HISTORY:  Spondylosis without myelopathy or radiculopathy, lumbar region     TECHNIQUE:  AP, lateral and spot images were performed of the lumbar spine.     COMPARISON:  None     FINDINGS:  Mild DJD.  The L3/L4 and the L5/S1 disc spaces are narrowed.  There is a few mm the L3/L4 retrolisthesis.  No fracture or dislocation.  No bone destruction identified     Impression:  See above        Electronically signed by: Jak Hutton MD  Date:                                            12/14/2022  Time:                                           11:02     Assessment:       SLE SLEDAI = 2 (+dsDNA)  Jaccoud's arthropathy, asymptomatic  low bone density DXA 12/8/22: FRAX hip 3.2% major 18%.     Plan:       Problem List Items Addressed This Visit          Immunology/Multi System    Systemic lupus erythematosus - Primary    Relevant Orders    Urinalysis    C3 Complement    Anti-DNA Ab, Double-Stranded    Comprehensive Metabolic Panel    CBC Auto Differential    Protein/Creatinine Ratio, Urine    C4 Complement    Sedimentation rate    C-Reactive Protein     UA today  *bivalent covid vaccination  Continue plaquenil 200mg twice a day  Continue Vitamin D2 50,000 units once a  week  Dietary calcium 1200mg daily  Prolia injection to be scheduled once insurance approval  Continue swimming, walking, yoga  RTC in 6 months time with standing labs

## 2023-01-25 NOTE — PATIENT INSTRUCTIONS
Urinalysis today  *bivalent covid vaccination  Continue plaquenil 200mg twice a day  Continue Vitamin D 50,000 units once a week  Dietary calcium 1200mg daily  Prolia injection schedule once insurance approval  Continue swimming, walking, yoga  RTC in 6 months time with standing labs

## 2023-01-25 NOTE — PROGRESS NOTES
I have personally taken the history and examined the patient and agree with the resident,s note as stated above       Latest Reference Range & Units 01/23/23 08:34   Hemoglobin A1C External 4.0 - 5.6 % 5.2      Latest Reference Range & Units 01/23/23 08:34   Cholesterol 120 - 199 mg/dL 112 (L)   HDL 40 - 75 mg/dL 57   HDL/Cholesterol Ratio 20.0 - 50.0 % 50.9 (H)   LDL Cholesterol External 63.0 - 159.0 mg/dL 46.8 (L)   Non-HDL Cholesterol mg/dL 55   Total Cholesterol/HDL Ratio 2.0 - 5.0  2.0   Triglycerides 30 - 150 mg/dL 41   (L): Data is abnormally low  (H): Data is abnormally high       brain MRI done in [place] as per above, unchanged from 1 year earlier and was fowarded to her Neurosurgeon at [facility] who had treated the acoustic neuroma with gamma knife.  History of CNS lupus 2014  treated with cyclophosphamide IV x 6 months, then mycophenolate. Saw Dr. Saucedo 12/20/21, no evidence of MS, right acoustic schwannoma, headaches,             Osteoporosis DXA 12/8/22: FRAX hip 3.2% major 18%. Discussed alendronate which she has taken intermittently in past and tolerated. However she has taken oral bisphosphonates for > 5 years total, so best to change to denosumab. Risks and benefits discussed. ACR Factsheet provided. She would like to read before proceeding but agreed to my ordering and getting insurance approval;  SLE SLEDAI: 2(dsDNA)   APS panel negative   Intermittent Dizziness/lightheadness,resolved, here and [facility] attributed to acoustic  neuroma   migraine headaches  Chronic hearing loss right ear  Jaccoud's arthropathy, asymptomatic  Vitamin D deficiency level 41, perfect  Crohn's in remission  Chronically minimally intermittently eelevated alk phos now normal        UA today  Cont hydroxychloroquine 200mg twice daily, saw Dr. Monge in Optometry 1/11/23  Cont vitamin D2 50,000 units once a week.   *bivalent Covid vaccine  Stop alendronate, change to denosumab 60mg sc q 6 months per above. Rx in Therapy Plan and p.a.  requested  RTC 6 months with standing lupus labs

## 2023-01-27 ENCOUNTER — PATIENT MESSAGE (OUTPATIENT)
Dept: RHEUMATOLOGY | Facility: CLINIC | Age: 68
End: 2023-01-27
Payer: MEDICARE

## 2023-02-02 ENCOUNTER — PATIENT MESSAGE (OUTPATIENT)
Dept: ENDOSCOPY | Facility: OTHER | Age: 68
End: 2023-02-02
Payer: MEDICARE

## 2023-02-02 RX ORDER — SODIUM, POTASSIUM,MAG SULFATES 17.5-3.13G
1 SOLUTION, RECONSTITUTED, ORAL ORAL DAILY
Qty: 1 KIT | Refills: 0 | Status: SHIPPED | OUTPATIENT
Start: 2023-02-02 | End: 2023-02-04

## 2023-02-08 ENCOUNTER — TELEPHONE (OUTPATIENT)
Dept: SURGERY | Facility: CLINIC | Age: 68
End: 2023-02-08
Payer: MEDICARE

## 2023-02-09 ENCOUNTER — HOSPITAL ENCOUNTER (OUTPATIENT)
Facility: OTHER | Age: 68
Discharge: HOME | End: 2023-02-09
Attending: SURGERY | Admitting: SURGERY
Payer: MEDICARE

## 2023-02-09 ENCOUNTER — ANESTHESIA (OUTPATIENT)
Dept: ENDOSCOPY | Facility: OTHER | Age: 68
End: 2023-02-09
Payer: MEDICARE

## 2023-02-09 ENCOUNTER — ANESTHESIA EVENT (OUTPATIENT)
Dept: ENDOSCOPY | Facility: OTHER | Age: 68
End: 2023-02-09
Payer: MEDICARE

## 2023-02-09 VITALS
HEART RATE: 68 BPM | SYSTOLIC BLOOD PRESSURE: 130 MMHG | TEMPERATURE: 98 F | RESPIRATION RATE: 18 BRPM | OXYGEN SATURATION: 98 % | DIASTOLIC BLOOD PRESSURE: 68 MMHG

## 2023-02-09 DIAGNOSIS — Z12.11 ENCOUNTER FOR SCREENING COLONOSCOPY: ICD-10-CM

## 2023-02-09 PROCEDURE — 63600175 PHARM REV CODE 636 W HCPCS: Performed by: ANESTHESIOLOGY

## 2023-02-09 PROCEDURE — 25000003 PHARM REV CODE 250: Performed by: NURSE ANESTHETIST, CERTIFIED REGISTERED

## 2023-02-09 PROCEDURE — 63600175 PHARM REV CODE 636 W HCPCS: Performed by: NURSE ANESTHETIST, CERTIFIED REGISTERED

## 2023-02-09 PROCEDURE — G0105 COLORECTAL SCRN; HI RISK IND: ICD-10-PCS | Mod: ,,, | Performed by: SURGERY

## 2023-02-09 PROCEDURE — 25000003 PHARM REV CODE 250: Performed by: SURGERY

## 2023-02-09 PROCEDURE — 37000008 HC ANESTHESIA 1ST 15 MINUTES: Performed by: SURGERY

## 2023-02-09 PROCEDURE — G0105 COLORECTAL SCRN; HI RISK IND: HCPCS | Performed by: SURGERY

## 2023-02-09 PROCEDURE — 37000009 HC ANESTHESIA EA ADD 15 MINS: Performed by: SURGERY

## 2023-02-09 PROCEDURE — G0105 COLORECTAL SCRN; HI RISK IND: HCPCS | Mod: ,,, | Performed by: SURGERY

## 2023-02-09 RX ORDER — PROPOFOL 10 MG/ML
VIAL (ML) INTRAVENOUS
Status: DISCONTINUED | OUTPATIENT
Start: 2023-02-09 | End: 2023-02-09

## 2023-02-09 RX ORDER — SODIUM CHLORIDE 0.9 % (FLUSH) 0.9 %
3 SYRINGE (ML) INJECTION
Status: CANCELLED | OUTPATIENT
Start: 2023-02-09

## 2023-02-09 RX ORDER — LIDOCAINE HYDROCHLORIDE 20 MG/ML
INJECTION INTRAVENOUS
Status: DISCONTINUED | OUTPATIENT
Start: 2023-02-09 | End: 2023-02-09

## 2023-02-09 RX ORDER — MEPERIDINE HYDROCHLORIDE 25 MG/ML
12.5 INJECTION INTRAMUSCULAR; INTRAVENOUS; SUBCUTANEOUS ONCE AS NEEDED
Status: CANCELLED | OUTPATIENT
Start: 2023-02-09 | End: 2023-02-10

## 2023-02-09 RX ORDER — OXYCODONE HYDROCHLORIDE 5 MG/1
5 TABLET ORAL
Status: CANCELLED | OUTPATIENT
Start: 2023-02-09

## 2023-02-09 RX ORDER — HYDROMORPHONE HYDROCHLORIDE 2 MG/ML
0.4 INJECTION, SOLUTION INTRAMUSCULAR; INTRAVENOUS; SUBCUTANEOUS EVERY 5 MIN PRN
Status: CANCELLED | OUTPATIENT
Start: 2023-02-09

## 2023-02-09 RX ORDER — PROCHLORPERAZINE EDISYLATE 5 MG/ML
5 INJECTION INTRAMUSCULAR; INTRAVENOUS EVERY 30 MIN PRN
Status: CANCELLED | OUTPATIENT
Start: 2023-02-09

## 2023-02-09 RX ORDER — DEXTROMETHORPHAN/PSEUDOEPHED 2.5-7.5/.8
40 DROPS ORAL
Status: DISCONTINUED | OUTPATIENT
Start: 2023-02-09 | End: 2023-02-10 | Stop reason: HOSPADM

## 2023-02-09 RX ADMIN — PROPOFOL 50 MG: 10 INJECTION, EMULSION INTRAVENOUS at 08:02

## 2023-02-09 RX ADMIN — SODIUM CHLORIDE, SODIUM LACTATE, POTASSIUM CHLORIDE, AND CALCIUM CHLORIDE: .6; .31; .03; .02 INJECTION, SOLUTION INTRAVENOUS at 08:02

## 2023-02-09 RX ADMIN — LIDOCAINE HYDROCHLORIDE 50 MG: 20 INJECTION, SOLUTION INTRAVENOUS at 08:02

## 2023-02-09 RX ADMIN — SIMETHICONE 40 MG: 20 SUSPENSION/ DROPS ORAL at 08:02

## 2023-02-09 NOTE — ANESTHESIA PREPROCEDURE EVALUATION
02/09/2023  Radha Chiang is a 67 y.o., female.      Pre-op Assessment    I have reviewed the Patient Summary Reports.     I have reviewed the Nursing Notes. I have reviewed the NPO Status.   I have reviewed the Medications.     Review of Systems  Anesthesia Hx:  No problems with previous Anesthesia  Denies Family Hx of Anesthesia complications.   Denies Personal Hx of Anesthesia complications.   Social:  Non-Smoker    Hematology/Oncology:     Oncology Normal    -- Anemia:   EENT/Dental:  EENT/Dental Normal Cant hear from right ear   Cardiovascular:  Cardiovascular Normal Exercise tolerance: poor     Pulmonary:   Shortness of breath    Renal/:  Renal/ Normal     Hepatic/GI:   GERD Crohns   Musculoskeletal:  Musculoskeletal Normal    Neurological:   CVA, residual symptoms Right side weakness  Peripheral Neuropathy    Endocrine:   Hypothyroidism Lupus   Dermatological:  Skin Normal    Psych:  Psychiatric Normal           Physical Exam  General: Well nourished, Cooperative, Oriented and Alert    Airway:  Mouth Opening: Normal  TM Distance: Normal  Neck ROM: Normal ROM    Dental:  Intact        Anesthesia Plan  Type of Anesthesia, risks & benefits discussed:    Anesthesia Type: Gen Natural Airway  Intra-op Monitoring Plan: Standard ASA Monitors  Post Op Pain Control Plan: multimodal analgesia  Induction:  IV  Airway Plan: Video and Direct  Informed Consent: Informed consent signed with the Patient and all parties understand the risks and agree with anesthesia plan.  All questions answered.   ASA Score: 3  Day of Surgery Review of History & Physical: H&P Update referred to the surgeon/provider.    Ready For Surgery From Anesthesia Perspective.     .

## 2023-02-09 NOTE — H&P
CRS Office Visit History and Physical    Referring Md:   Reina Crabtree Md  [address] [place] [address]    SUBJECTIVE:     Chief Complaint: colonoscopy    History of Present Illness:    Radha Chaing is a 67 y.o. female presents for colonoscopy.  Prep completed.  Consent signed.         Review of patient's allergies indicates:   Allergen Reactions    Bactrim [sulfamethoxazole-trimethoprim] Nausea And Vomiting     Nausea, ? rash  Nausea, ? rash       Past Medical History:   Diagnosis Date    Acid reflux     Anemia 2years ago    Anxiety     Arthritis     Asthma, chronic 4/10/2013    Crohn's disease 1998    Depression     Dry eyes     Fracture of forearm, proximal, open     screws & plate 6/26/13 in [facility]    History of papilledema 11/10/11    Hyperlipidemia 5/21/2018    Lupus     Neuromuscular disorder 12/2009    Neuropathy    Osteoporosis     Stroke 1/29/2013    Thyroid disease      Past Surgical History:   Procedure Laterality Date    ADENOIDECTOMY  1962    BRAIN SURGERY  01/18/2012    Brain Biopsy    BREAST CYST ASPIRATION Left     COLONOSCOPY  2017    [facility] Dr. WILLIS Summers; pan-diverticulosis, rec repeat 3-5 years    INCISION AND DRAINAGE PERIRECTAL ABSCESS  1998    abscess removal    ORIF FOREARM FRACTURE Left 06/23/2013    proximal ulnar and radius     Family History   Problem Relation Age of Onset    Cancer Mother 64        small cell lung cancer; smoker    Heart disease Father 64        acute MI; 3v CABG    Sjogren's syndrome Sister     Allergies Sister     Cancer Maternal Grandmother     COPD Maternal Grandfather     Cancer Paternal Grandmother     Stroke Paternal Grandmother     Ovarian cancer Paternal Grandmother     Heart disease Paternal Grandfather     Mental retardation Brother     Hypertension Sister     Lupus Paternal Uncle     Angioedema Neg Hx     Asthma Neg Hx     Atopy Neg Hx     Eczema Neg Hx     Immunodeficiency Neg Hx     Rhinitis Neg Hx     Urticaria Neg Hx      Social  History     Tobacco Use    Smoking status: Former     Packs/day: 0.50     Years: 15.00     Pack years: 7.50     Types: Cigarettes     Quit date: 1984     Years since quittin.6    Smokeless tobacco: Never    Tobacco comments:     Quit    Substance Use Topics    Alcohol use: Yes     Comment: less than one per week    Drug use: No        Review of Systems:  Review of Systems   All other systems reviewed and are negative.    OBJECTIVE:     Vital Signs (Most Recent)  BP (!) 121/59 (BP Location: Right arm, Patient Position: Lying)   Pulse 72   Temp 97.8 °F (36.6 °C) (Oral)   Resp 16   SpO2 97%   Breastfeeding No     Physical Exam:  General: 67 y.o. female in no distress   Neuro: alert and oriented x 4.  Moves all extremities.     HEENT: normocephalic, atraumatic, PERRL, EOMI   Respiratory: respirations are even and unlabored  Cardiac: regular rate and rhythm  Abdomen: soft, NTND  Extremities: Warm dry and intact  Skin: no rashes            ASSESSMENT/PLAN:         67 y.o. female who presents for colonoscopy    - consent obtained.  Proceed.     Reina Crabtree MD  Staff Surgeon  Colon & Rectal Surgery

## 2023-02-09 NOTE — [employer] PATIENT INSTRUCTIONS
Discharge Summary/Instructions after an Endoscopic Procedure  Patient Name: Radha Chiang  Patient MRN: [id number]  Patient YOB: 1955 Thursday, February 9, 2023  Reina Crabtree MD  RESTRICTIONS:  During your procedure today, you received medications for sedation.  These   medications may affect your judgment, balance and coordination.  Therefore,   for 24 hours, you have the following restrictions:   - DO NOT drive a car, operate machinery, make legal/financial decisions,   sign important papers or drink alcohol.    ACTIVITY:  Today: no heavy lifting, straining or running due to procedural   sedation/anesthesia.  The following day: return to full activity including work.  DIET:  Eat and drink normally unless instructed otherwise.     TREATMENT FOR COMMON SIDE EFFECTS:  - Mild abdominal pain, nausea, belching, bloating or excessive gas:  rest,   eat lightly and use a heating pad.  - Sore Throat: treat with throat lozenges and/or gargle with warm salt   water.  - Because air was used during the procedure, expelling large amounts of air   from your rectum or belching is normal.  - If a bowel prep was taken, you may not have a bowel movement for 1-3 days.    This is normal.  SYMPTOMS TO WATCH FOR AND REPORT TO YOUR PHYSICIAN:  1. Abdominal pain or bloating, other than gas cramps.  2. Chest pain.  3. Back pain.  4. Signs of infection such as: chills or fever occurring within 24 hours   after the procedure.  5. Rectal bleeding, which would show as bright red, maroon, or black stools.   (A tablespoon of blood from the rectum is not serious, especially if   hemorrhoids are present.)  6. Vomiting.  7. Weakness or dizziness.  GO DIRECTLY TO THE NEAREST EMERGENCY ROOM IF YOU HAVE ANY OF THE FOLLOWING:      Difficulty breathing              Chills and/or fever over 101 F   Persistent vomiting and/or vomiting blood   Severe abdominal pain   Severe chest pain   Black, tarry stools   Bleeding- more than one  tablespoon   Any other symptom or condition that you feel may need urgent attention  Your doctor recommends these additional instructions:  If any biopsies were taken, your doctors clinic will contact you in 1 to 2   weeks with any results.  - Patient has a contact number available for emergencies.  The signs and   symptoms of potential delayed complications were discussed with the   patient.  Return to normal activities tomorrow.  Written discharge   instructions were provided to the patient.   - Resume previous diet.   - Continue present medications.   - Discharge patient to home (ambulatory).  For questions, problems or results please call your physician - Reina Crabtree MD at Work:  [phone].  [facility], EMERGENCY ROOM PHONE NUMBER: [phone], [facility]   [phone].  IF A COMPLICATION OR EMERGENCY SITUATION ARISES AND YOU ARE UNABLE TO REACH   YOUR PHYSICIAN - GO DIRECTLY TO THE EMERGENCY ROOM.  MD Reina Renee MD  2/9/2023 9:11:36 AM  This report has been verified and signed electronically.  Dear patient,  As a result of recent federal legislation (The Federal Cures Act), you may   receive lab or pathology results from your procedure in your [facility]   account before your physician is able to contact you. Your physician or   their representative will relay the results to you with their   recommendations at their soonest availability.  Thank you,  [employer]

## 2023-02-09 NOTE — DISCHARGE SUMMARY
[facility] - Endoscopy  Discharge Note  Short Stay    Procedure(s) (LRB):  COLONOSCOPY (N/A)      OUTCOME: Patient tolerated treatment/procedure well without complication and is now ready for discharge.    DISPOSITION: Home or Self Care    FINAL DIAGNOSIS:  <principal problem not specified>    FOLLOWUP: In clinic    DISCHARGE INSTRUCTIONS:    - Follow up in clinic in 2-3 weeks to discuss next steps   - can use over the counter calmoseptine for local pain control       Clinical Reference Documents Added to Patient Instructions         Document    COLONOSCOPY DISCHARGE INSTRUCTIONS (ENGLISH)            Reina Crabtree MD  Staff Surgeon   Colon & Rectal Surgery

## 2023-02-09 NOTE — ANESTHESIA POSTPROCEDURE EVALUATION
Anesthesia Post Evaluation    Patient: Radha Chiang    Procedure(s) Performed: Procedure(s) (LRB):  COLONOSCOPY (N/A)    Final Anesthesia Type: MAC      Patient location during evaluation: [facility]  Patient participation: Yes- Able to Participate  Level of consciousness: awake and alert  Post-procedure vital signs: reviewed and stable  Pain management: adequate  Airway patency: patent    PONV status at discharge: No PONV  Anesthetic complications: no      Cardiovascular status: blood pressure returned to baseline  Respiratory status: unassisted and spontaneous ventilation  Hydration status: euvolemic  Follow-up not needed.          Vitals Value Taken Time   /51 02/09/23 0918   Temp NA 02/09/23 0918   Pulse 73 02/09/23 0918   Resp 18 02/09/23 0918   SpO2 99 02/09/23 0918         No case tracking events are documented in the log.      Pain/Kiersten Score: No data recorded

## 2023-02-09 NOTE — PLAN OF CARE
Radha Chiang has met all discharge criteria from Phase II. Vital Signs are stable, ambulating  without difficulty. Discharge instructions given, patient verbalized understanding. Discharged from facility via wheelchair in stable condition.

## 2023-02-10 ENCOUNTER — PATIENT MESSAGE (OUTPATIENT)
Dept: SURGERY | Facility: CLINIC | Age: 68
End: 2023-02-10
Payer: MEDICARE

## 2023-02-27 ENCOUNTER — OFFICE VISIT (OUTPATIENT)
Dept: SURGERY | Facility: CLINIC | Age: 68
End: 2023-02-27
Payer: MEDICARE

## 2023-02-27 VITALS
DIASTOLIC BLOOD PRESSURE: 58 MMHG | RESPIRATION RATE: 19 BRPM | SYSTOLIC BLOOD PRESSURE: 118 MMHG | HEIGHT: 60 IN | WEIGHT: 161.5 LBS | OXYGEN SATURATION: 95 % | HEART RATE: 65 BPM | BODY MASS INDEX: 31.71 KG/M2

## 2023-02-27 DIAGNOSIS — K21.9 GASTROESOPHAGEAL REFLUX DISEASE, UNSPECIFIED WHETHER ESOPHAGITIS PRESENT: Primary | ICD-10-CM

## 2023-02-27 PROCEDURE — 99213 OFFICE O/P EST LOW 20 MIN: CPT | Mod: S$PBB,,, | Performed by: SURGERY

## 2023-02-27 PROCEDURE — 99214 OFFICE O/P EST MOD 30 MIN: CPT | Mod: PBBFAC | Performed by: SURGERY

## 2023-02-27 PROCEDURE — 99999 PR PBB SHADOW E&M-EST. PATIENT-LVL IV: ICD-10-PCS | Mod: PBBFAC,,, | Performed by: SURGERY

## 2023-02-27 PROCEDURE — 99999 PR PBB SHADOW E&M-EST. PATIENT-LVL IV: CPT | Mod: PBBFAC,,, | Performed by: SURGERY

## 2023-02-27 PROCEDURE — 99213 PR OFFICE/OUTPT VISIT, EST, LEVL III, 20-29 MIN: ICD-10-PCS | Mod: S$PBB,,, | Performed by: SURGERY

## 2023-02-27 NOTE — PROGRESS NOTES
Colon & Rectal Surgery Clinic Follow Up    HPI:   Radha Chiang is a 68 y.o. female who presents for follow up of hemorrhoids in the setting of prior crohns disease.  She reports that her bowel movements have significantly improved since her last visit.  She remains on a bowel regimen as needed.  She reports some discomfort in the perineum and with her hemorrhoid.        Objective:   Vitals:    02/27/23 1349   BP: (!) 118/58   Pulse: 65   Resp: 19        Physical Exam   Gen: well developed female, NAD  HEENT: normocephalic, atraumatic, PERRL, EOMI   CV: RRR, no murmurs  Resp: nonlabored, CTAB   Abd: soft, NTND   MSK: no gross deformities, no cyanosis or edema   Anorectal: nonthrombosed external hemorrhoid in the right anterolateral position with stable and small focus of erythema/ulceration    Assessment and Plan:   Radha Chiang  is a 68 y.o. female who presents for follow up of constipation and hemorrhoids in the setting of prior crohns disease    - Colonoscopy without any evidence of active inflammatory bowel disease  - bowel movements improved with bowel regimen   - hemorrhoid with mild irritation, will start calmoseptine for local relief and skin barrier.  Patient does not want to pursue excisional hemorrhoidectomy at this time.   - Follow up in 4-6 weeks if no improvement in symptoms.       Reina Crabtree MD  Staff Surgeon   Colon & Rectal Surgery

## 2023-02-28 ENCOUNTER — PATIENT MESSAGE (OUTPATIENT)
Dept: NEUROLOGY | Facility: CLINIC | Age: 68
End: 2023-02-28
Payer: MEDICARE

## 2023-03-23 ENCOUNTER — OFFICE VISIT (OUTPATIENT)
Dept: GASTROENTEROLOGY | Facility: CLINIC | Age: 68
End: 2023-03-23
Payer: MEDICARE

## 2023-03-23 ENCOUNTER — INFUSION (OUTPATIENT)
Dept: INFECTIOUS DISEASES | Facility: HOSPITAL | Age: 68
End: 2023-03-23
Attending: INTERNAL MEDICINE
Payer: MEDICARE

## 2023-03-23 ENCOUNTER — PATIENT MESSAGE (OUTPATIENT)
Dept: GASTROENTEROLOGY | Facility: CLINIC | Age: 68
End: 2023-03-23

## 2023-03-23 VITALS
OXYGEN SATURATION: 98 % | WEIGHT: 160.94 LBS | SYSTOLIC BLOOD PRESSURE: 136 MMHG | HEART RATE: 65 BPM | BODY MASS INDEX: 31.43 KG/M2 | DIASTOLIC BLOOD PRESSURE: 60 MMHG | TEMPERATURE: 97 F | RESPIRATION RATE: 16 BRPM

## 2023-03-23 DIAGNOSIS — K21.9 GASTROESOPHAGEAL REFLUX DISEASE, UNSPECIFIED WHETHER ESOPHAGITIS PRESENT: ICD-10-CM

## 2023-03-23 DIAGNOSIS — M81.8 OTHER OSTEOPOROSIS WITHOUT CURRENT PATHOLOGICAL FRACTURE: Primary | ICD-10-CM

## 2023-03-23 DIAGNOSIS — K21.9 GASTROESOPHAGEAL REFLUX DISEASE, UNSPECIFIED WHETHER ESOPHAGITIS PRESENT: Primary | ICD-10-CM

## 2023-03-23 PROCEDURE — 99203 OFFICE O/P NEW LOW 30 MIN: CPT | Mod: 95,,, | Performed by: NURSE PRACTITIONER

## 2023-03-23 PROCEDURE — 99203 PR OFFICE/OUTPT VISIT, NEW, LEVL III, 30-44 MIN: ICD-10-PCS | Mod: 95,,, | Performed by: NURSE PRACTITIONER

## 2023-03-23 PROCEDURE — 96372 THER/PROPH/DIAG INJ SC/IM: CPT

## 2023-03-23 PROCEDURE — 63600175 PHARM REV CODE 636 W HCPCS: Mod: JZ,JG | Performed by: INTERNAL MEDICINE

## 2023-03-23 RX ADMIN — DENOSUMAB 60 MG: 60 INJECTION SUBCUTANEOUS at 01:03

## 2023-03-23 NOTE — PROGRESS NOTES
Clinic Consult:  [facility] Consultation Note    Reason for Consult:  The encounter diagnosis was Gastroesophageal reflux disease, unspecified whether esophagitis present.    PCP: Merry Espinal   [address] / [place] [address]    The patient location is: [place]   The chief complaint leading to consultation is: GERD    Visit type: audiovisual    Face to Face time with patient: 15 minutes   20 minutes of total time spent on the encounter, which includes face to face time and non-face to face time preparing to see the patient (eg, review of tests), Obtaining and/or reviewing separately obtained history, Documenting clinical information in the electronic or other health record, Independently interpreting results (not separately reported) and communicating results to the patient/family/caregiver, or Care coordination (not separately reported).         Each patient to whom he or she provides medical services by telemedicine is:  (1) informed of the relationship between the physician and patient and the respective role of any other health care provider with respect to management of the patient; and (2) notified that he or she may decline to receive medical services by telemedicine and may withdraw from such care at any time.    Notes:       HPI:  This is a 68 y.o. female here for evaluation of GERD.   Chronic GERD. Has been on Nexium x 10+ years.   She has osteoporosis and wants to get off of Nexium.   She has not been taking it regularly and has breakthrough symptoms almost daily when not taking it. She has tried Pepcid but stays she did not tolerate it well. She has Tagamet at home.     Review of Systems   Constitutional:  Negative for fever and weight loss.   HENT:  Negative for sore throat.    Respiratory:  Negative for cough, shortness of breath and wheezing.    Cardiovascular:  Negative for chest pain and palpitations.   Gastrointestinal:  Positive for heartburn. Negative for  abdominal pain, blood in stool, constipation, diarrhea, melena, nausea and vomiting.   Genitourinary:  Negative for dysuria and frequency.   Skin:  Negative for itching and rash.   Neurological:  Negative for dizziness, speech change, seizures, loss of consciousness and headaches.     Medical History:  has a past medical history of Acid reflux, Anemia (2years ago), Anxiety, Arthritis, Asthma, chronic (4/10/2013), Crohn's disease (1998), Depression, Dry eyes, Fracture of forearm, proximal, open, History of papilledema (11/10/11), Hyperlipidemia (5/21/2018), Lupus, Neuromuscular disorder (12/2009), Osteoporosis, Stroke (1/29/2013), and Thyroid disease.    Surgical History:  has a past surgical history that includes Brain surgery (01/18/2012); Incision and drainage perirectal abscess (1998); Adenoidectomy (1962); Colonoscopy (2017); ORIF forearm fracture (Left, 06/23/2013); Breast cyst aspiration (Left); and Colonoscopy (N/A, 2/9/2023).    Family History: family history includes Allergies in her sister; COPD in her maternal grandfather; Cancer in her maternal grandmother and paternal grandmother; Cancer (age of onset: 64) in her mother; Heart disease in her paternal grandfather; Heart disease (age of onset: 64) in her father; Hypertension in her sister; Lupus in her paternal uncle; Mental retardation in her brother; Ovarian cancer in her paternal grandmother; Sjogren's syndrome in her sister; Stroke in her paternal grandmother..     Social History:  reports that she quit smoking about 38 years ago. Her smoking use included cigarettes. She has a 7.50 pack-year smoking history. She has never used smokeless tobacco. She reports current alcohol use. She reports that she does not use drugs.    Allergies: Reviewed    Home Medications:   Current Outpatient Medications on File Prior to Visit   Medication Sig Dispense Refill    albuterol (VENTOLIN HFA) 90 mcg/actuation inhaler Inhale 1-2 puffs into the lungs every 6 (six) hours  as needed for Wheezing or Shortness of Breath (cough). Rescue 6.7 g 0    alpha lipoic acid 300 mg Cap       ALPRAZolam (XANAX) 0.25 MG tablet Take 1 tablet by mouth every 8 (eight) hours as needed.      aspirin (ECOTRIN) 81 MG EC tablet Take 1 tablet (81 mg total) by mouth once daily.      b complex vitamins tablet Take 1 tablet by mouth 3 (three) times daily.       CLENPIQ 10 mg-3.5 gram -12 gram/160 mL Soln SMARTSI Bottle(s) By Mouth As Directed      coenzyme Q10 100 mg capsule       ergocalciferol (ERGOCALCIFEROL) 50,000 unit Cap Take 1 capsule (50,000 Units total) by mouth every 7 days. 12 capsule 3    esomeprazole (NEXIUM) 20 MG capsule Take 1 capsule (20 mg total) by mouth before breakfast. 90 capsule 1    famotidine (PEPCID) 40 MG tablet Take 40 mg by mouth 2 (two) times daily.      fish oil-omega-3 fatty acids 300-1,000 mg capsule Take 1 g by mouth 2 (two) times daily.      hydrocortisone acetate 30 mg Supp UNWRAP AND INSERT 1 SUPPOSITORY RECTALLY TWICE DAILY AS DIRECTED AS NEEDED FOR HEMORRHOIDS      hydrOXYchloroQUINE (PLAQUENIL) 200 mg tablet Take 1 tablet (200 mg total) by mouth 2 (two) times daily. Brand name only 180 tablet 2    levothyroxine (SYNTHROID) 112 MCG tablet Take 1 tablet (112 mcg total) by mouth once daily. 90 tablet 3    magnesium oxide (MAG-OX) 200 MG tablet       multivitamin (THERAGRAN) per tablet Take 1 tablet by mouth before breakfast.      naproxen sodium (ANAPROX) 220 MG tablet 1-2 capsules(220-440mg) by mouth in the 1st hr, then 220 mg(1 caplet) by mouth every 8-12 hrs as needed for 7 days.   Max 660 mg/day.      riboflavin, vitamin B2, (VITAMIN B-2) 100 mg Tab tablet       rosuvastatin (CRESTOR) 20 MG tablet TAKE 1 TABLET(20 MG) BY MOUTH EVERY DAY 90 tablet 0     No current facility-administered medications on file prior to visit.       Physical Exam:  There were no vitals taken for this visit.  There is no height or weight on file to calculate BMI.  Physical  Exam  Constitutional:       General: She is not in acute distress.  HENT:      Head: Normocephalic.   Neurological:      General: No focal deficit present.      Mental Status: She is alert.   Psychiatric:         Mood and Affect: Mood normal.         Judgment: Judgment normal.       Labs: Pertinent labs reviewed.    Assessment:  1. Gastroesophageal reflux disease, unspecified whether esophagitis present        Recommendations:   - would recommend Nexium 20 mg daily given frequency of symptoms  - she would like to try Tagamet 200 mg BID   - she will notify me if GERD does not become controlled on Tagamet.     Gastroesophageal reflux disease, unspecified whether esophagitis present  -     Ambulatory referral/consult to Gastroenterology        Follow up if symptoms worsen or fail to improve.    Thank you so much for allowing me to participate in the care of KAREN Pruett

## 2023-03-23 NOTE — PROGRESS NOTES
Pt received 1st prolia inj to left arm SQ, pt takes calcium and VIT D, pt denies dental surgery < 3 months, pt observed for 15 min then left in NAD

## 2023-03-29 ENCOUNTER — PATIENT MESSAGE (OUTPATIENT)
Dept: GASTROENTEROLOGY | Facility: CLINIC | Age: 68
End: 2023-03-29
Payer: MEDICARE

## 2023-03-29 ENCOUNTER — TELEPHONE (OUTPATIENT)
Dept: GASTROENTEROLOGY | Facility: CLINIC | Age: 68
End: 2023-03-29
Payer: MEDICARE

## 2023-03-29 RX ORDER — HYDROGEN PEROXIDE 3 %
20 SOLUTION, NON-ORAL MISCELLANEOUS
Qty: 90 CAPSULE | Refills: 3 | Status: ON HOLD | OUTPATIENT
Start: 2023-03-29 | End: 2023-04-27 | Stop reason: SDUPTHER

## 2023-03-30 ENCOUNTER — PES CALL (OUTPATIENT)
Dept: ADMINISTRATIVE | Facility: CLINIC | Age: 68
End: 2023-03-30
Payer: MEDICARE

## 2023-04-08 ENCOUNTER — PATIENT MESSAGE (OUTPATIENT)
Dept: GASTROENTEROLOGY | Facility: CLINIC | Age: 68
End: 2023-04-08
Payer: MEDICARE

## 2023-04-11 ENCOUNTER — CLINICAL SUPPORT (OUTPATIENT)
Dept: ENDOSCOPY | Facility: HOSPITAL | Age: 68
End: 2023-04-11
Attending: INTERNAL MEDICINE
Payer: MEDICARE

## 2023-04-11 DIAGNOSIS — Z12.11 COLON CANCER SCREENING: ICD-10-CM

## 2023-04-14 ENCOUNTER — PATIENT MESSAGE (OUTPATIENT)
Dept: ENDOSCOPY | Facility: HOSPITAL | Age: 68
End: 2023-04-14
Payer: MEDICARE

## 2023-04-17 ENCOUNTER — PATIENT MESSAGE (OUTPATIENT)
Dept: ENDOSCOPY | Facility: HOSPITAL | Age: 68
End: 2023-04-17
Payer: MEDICARE

## 2023-04-20 ENCOUNTER — PATIENT MESSAGE (OUTPATIENT)
Dept: INTERNAL MEDICINE | Facility: CLINIC | Age: 68
End: 2023-04-20
Payer: MEDICARE

## 2023-04-20 DIAGNOSIS — M25.511 BILATERAL SHOULDER PAIN, UNSPECIFIED CHRONICITY: Primary | ICD-10-CM

## 2023-04-20 DIAGNOSIS — M25.512 BILATERAL SHOULDER PAIN, UNSPECIFIED CHRONICITY: Primary | ICD-10-CM

## 2023-04-21 ENCOUNTER — PATIENT MESSAGE (OUTPATIENT)
Dept: ENDOSCOPY | Facility: HOSPITAL | Age: 68
End: 2023-04-21
Payer: MEDICARE

## 2023-04-21 ENCOUNTER — PATIENT MESSAGE (OUTPATIENT)
Dept: INTERNAL MEDICINE | Facility: CLINIC | Age: 68
End: 2023-04-21
Payer: MEDICARE

## 2023-04-26 RX ORDER — SODIUM CHLORIDE 9 MG/ML
INJECTION, SOLUTION INTRAVENOUS CONTINUOUS
Status: CANCELLED | OUTPATIENT
Start: 2023-04-26

## 2023-04-26 RX ORDER — SODIUM CHLORIDE 0.9 % (FLUSH) 0.9 %
10 SYRINGE (ML) INJECTION
Status: CANCELLED | OUTPATIENT
Start: 2023-04-26

## 2023-04-27 ENCOUNTER — ANESTHESIA EVENT (OUTPATIENT)
Dept: ENDOSCOPY | Facility: HOSPITAL | Age: 68
End: 2023-04-27
Payer: MEDICARE

## 2023-04-27 ENCOUNTER — HOSPITAL ENCOUNTER (OUTPATIENT)
Facility: HOSPITAL | Age: 68
Discharge: HOME | End: 2023-04-27
Attending: INTERNAL MEDICINE | Admitting: INTERNAL MEDICINE
Payer: MEDICARE

## 2023-04-27 ENCOUNTER — ANESTHESIA (OUTPATIENT)
Dept: ENDOSCOPY | Facility: HOSPITAL | Age: 68
End: 2023-04-27
Payer: MEDICARE

## 2023-04-27 VITALS
OXYGEN SATURATION: 99 % | TEMPERATURE: 98 F | RESPIRATION RATE: 25 BRPM | HEART RATE: 62 BPM | BODY MASS INDEX: 31.02 KG/M2 | SYSTOLIC BLOOD PRESSURE: 140 MMHG | WEIGHT: 158 LBS | DIASTOLIC BLOOD PRESSURE: 73 MMHG | HEIGHT: 60 IN

## 2023-04-27 DIAGNOSIS — K21.9 GERD (GASTROESOPHAGEAL REFLUX DISEASE): ICD-10-CM

## 2023-04-27 DIAGNOSIS — K21.9 GASTROESOPHAGEAL REFLUX DISEASE, UNSPECIFIED WHETHER ESOPHAGITIS PRESENT: ICD-10-CM

## 2023-04-27 PROCEDURE — 43239 EGD BIOPSY SINGLE/MULTIPLE: CPT | Performed by: INTERNAL MEDICINE

## 2023-04-27 PROCEDURE — 37000008 HC ANESTHESIA 1ST 15 MINUTES: Performed by: INTERNAL MEDICINE

## 2023-04-27 PROCEDURE — 88342 CHG IMMUNOCYTOCHEMISTRY: ICD-10-PCS | Mod: 26,,, | Performed by: PATHOLOGY

## 2023-04-27 PROCEDURE — 43239 PR EGD, FLEX, W/BIOPSY, SGL/MULTI: ICD-10-PCS | Mod: ,,, | Performed by: INTERNAL MEDICINE

## 2023-04-27 PROCEDURE — 88305 TISSUE EXAM BY PATHOLOGIST: ICD-10-PCS | Mod: 26,,, | Performed by: PATHOLOGY

## 2023-04-27 PROCEDURE — 43239 EGD BIOPSY SINGLE/MULTIPLE: CPT | Mod: ,,, | Performed by: INTERNAL MEDICINE

## 2023-04-27 PROCEDURE — 88342 IMHCHEM/IMCYTCHM 1ST ANTB: CPT | Performed by: PATHOLOGY

## 2023-04-27 PROCEDURE — E9220 PRA ENDO ANESTHESIA: ICD-10-PCS | Mod: ,,, | Performed by: NURSE ANESTHETIST, CERTIFIED REGISTERED

## 2023-04-27 PROCEDURE — 88305 TISSUE EXAM BY PATHOLOGIST: CPT | Performed by: PATHOLOGY

## 2023-04-27 PROCEDURE — E9220 PRA ENDO ANESTHESIA: HCPCS | Mod: ,,, | Performed by: NURSE ANESTHETIST, CERTIFIED REGISTERED

## 2023-04-27 PROCEDURE — 25000003 PHARM REV CODE 250: Performed by: NURSE ANESTHETIST, CERTIFIED REGISTERED

## 2023-04-27 PROCEDURE — 27201012 HC FORCEPS, HOT/COLD, DISP: Performed by: INTERNAL MEDICINE

## 2023-04-27 PROCEDURE — 88305 TISSUE EXAM BY PATHOLOGIST: CPT | Mod: 26,,, | Performed by: PATHOLOGY

## 2023-04-27 PROCEDURE — 63600175 PHARM REV CODE 636 W HCPCS: Performed by: NURSE ANESTHETIST, CERTIFIED REGISTERED

## 2023-04-27 PROCEDURE — 88342 IMHCHEM/IMCYTCHM 1ST ANTB: CPT | Mod: 26,,, | Performed by: PATHOLOGY

## 2023-04-27 PROCEDURE — 37000009 HC ANESTHESIA EA ADD 15 MINS: Performed by: INTERNAL MEDICINE

## 2023-04-27 RX ORDER — PROPOFOL 10 MG/ML
VIAL (ML) INTRAVENOUS CONTINUOUS PRN
Status: DISCONTINUED | OUTPATIENT
Start: 2023-04-27 | End: 2023-04-27

## 2023-04-27 RX ORDER — SODIUM CHLORIDE 9 MG/ML
INJECTION, SOLUTION INTRAVENOUS CONTINUOUS
Status: DISCONTINUED | OUTPATIENT
Start: 2023-04-27 | End: 2023-04-27 | Stop reason: HOSPADM

## 2023-04-27 RX ORDER — HYDROGEN PEROXIDE 3 %
40 SOLUTION, NON-ORAL MISCELLANEOUS
Qty: 90 CAPSULE | Refills: 3 | Status: SHIPPED | OUTPATIENT
Start: 2023-04-27 | End: 2024-04-03 | Stop reason: SDUPTHER

## 2023-04-27 RX ORDER — PROPOFOL 10 MG/ML
VIAL (ML) INTRAVENOUS
Status: DISCONTINUED | OUTPATIENT
Start: 2023-04-27 | End: 2023-04-27

## 2023-04-27 RX ORDER — LIDOCAINE HYDROCHLORIDE 20 MG/ML
INJECTION INTRAVENOUS
Status: DISCONTINUED | OUTPATIENT
Start: 2023-04-27 | End: 2023-04-27

## 2023-04-27 RX ADMIN — LIDOCAINE HYDROCHLORIDE 50 MG: 20 INJECTION INTRAVENOUS at 11:04

## 2023-04-27 RX ADMIN — SODIUM CHLORIDE: 9 INJECTION, SOLUTION INTRAVENOUS at 11:04

## 2023-04-27 RX ADMIN — Medication 150 MCG/KG/MIN: at 11:04

## 2023-04-27 RX ADMIN — PROPOFOL 50 MG: 10 INJECTION, EMULSION INTRAVENOUS at 11:04

## 2023-04-27 NOTE — H&P
Short Stay Endoscopy History and Physical    PCP - Merry Espinal MD     Procedure - EGD  ASA - per anesthesia  Mallampati - per anesthesia  History of Anesthesia problems - no  Family history Anesthesia problems -  no   Plan of anesthesia - General    HPI:  This is a 68 y.o. female here for evaluation of :     GERD    ROS:  Constitutional: No fevers, chills, No weight loss  CV: No chest pain  Pulm: No cough, No shortness of breath  Ophtho: No vision changes  GI: see HPI  Derm: No rash    Medical History:  has a past medical history of Acid reflux, Anemia (2years ago), Anxiety, Arthritis, Asthma, chronic (4/10/2013), Crohn's disease (1998), Depression, Dry eyes, Fracture of forearm, proximal, open, History of papilledema (11/10/11), Hyperlipidemia (5/21/2018), Lupus, Neuromuscular disorder (12/2009), Osteoporosis, Stroke (1/29/2013), and Thyroid disease.    Surgical History:  has a past surgical history that includes Brain surgery (01/18/2012); Incision and drainage perirectal abscess (1998); Adenoidectomy (1962); Colonoscopy (2017); ORIF forearm fracture (Left, 06/23/2013); Breast cyst aspiration (Left); and Colonoscopy (N/A, 2/9/2023).    Family History: family history includes Allergies in her sister; COPD in her maternal grandfather; Cancer in her maternal grandmother and paternal grandmother; Cancer (age of onset: 64) in her mother; Heart disease in her paternal grandfather; Heart disease (age of onset: 64) in her father; Hypertension in her sister; Lupus in her paternal uncle; Mental retardation in her brother; Ovarian cancer in her paternal grandmother; Sjogren's syndrome in her sister; Stroke in her paternal grandmother.. Otherwise no colon cancer, inflammatory bowel disease, or GI malignancies.    Social History:  reports that she quit smoking about 38 years ago. Her smoking use included cigarettes. She has a 7.50 pack-year smoking history. She has never used smokeless tobacco. She reports current  alcohol use. She reports that she does not use drugs.    Review of patient's allergies indicates:   Allergen Reactions    Bactrim [sulfamethoxazole-trimethoprim] Nausea And Vomiting     Nausea, ? rash  Nausea, ? rash       Medications:   Medications Prior to Admission   Medication Sig Dispense Refill Last Dose    albuterol (VENTOLIN HFA) 90 mcg/actuation inhaler Inhale 1-2 puffs into the lungs every 6 (six) hours as needed for Wheezing or Shortness of Breath (cough). Rescue 6.7 g 0     alpha lipoic acid 300 mg Cap        ALPRAZolam (XANAX) 0.25 MG tablet Take 1 tablet by mouth every 8 (eight) hours as needed.       aspirin (ECOTRIN) 81 MG EC tablet Take 1 tablet (81 mg total) by mouth once daily.       b complex vitamins tablet Take 1 tablet by mouth 3 (three) times daily.        coenzyme Q10 100 mg capsule        ergocalciferol (ERGOCALCIFEROL) 50,000 unit Cap Take 1 capsule (50,000 Units total) by mouth every 7 days. 12 capsule 3     esomeprazole (NEXIUM) 20 MG capsule Take 1 capsule (20 mg total) by mouth before breakfast. 90 capsule 3     fish oil-omega-3 fatty acids 300-1,000 mg capsule Take 1 g by mouth 2 (two) times daily.       hydrOXYchloroQUINE (PLAQUENIL) 200 mg tablet Take 1 tablet (200 mg total) by mouth 2 (two) times daily. Brand name only 180 tablet 2     levothyroxine (SYNTHROID) 112 MCG tablet Take 1 tablet (112 mcg total) by mouth once daily. 90 tablet 3     magnesium oxide (MAG-OX) 200 MG tablet        multivitamin (THERAGRAN) per tablet Take 1 tablet by mouth before breakfast.       naproxen sodium (ANAPROX) 220 MG tablet 1-2 capsules(220-440mg) by mouth in the 1st hr, then 220 mg(1 caplet) by mouth every 8-12 hrs as needed for 7 days.   Max 660 mg/day.       riboflavin, vitamin B2, (VITAMIN B-2) 100 mg Tab tablet        rosuvastatin (CRESTOR) 20 MG tablet TAKE 1 TABLET(20 MG) BY MOUTH EVERY DAY 90 tablet 0        Physical Exam:    Vital Signs: There were no vitals filed for this visit.    Gen:  NAD, lying comfortably  HENT: NCAT, oropharynx clear  Eyes: anicteric sclerae, EOMI grossly  Neck: supple, no visible masses/goiter  Cardiac: RRR  Lungs: non-labored breathing  Abd: soft, NT/ND, normoactive BS  Ext: no LE edema, warm, well perfused  Skin: skin intact on exposed body parts, no visible rashes, lesions  Neuro: A&Ox4, neuro exam grossly intact, moves all extremities  Psych: appropriate mood, affect      Labs:  Lab Results   Component Value Date    WBC 5.33 12/06/2022    HGB 12.7 12/06/2022    HCT 39.8 12/06/2022     12/06/2022    CHOL 112 (L) 01/23/2023    TRIG 41 01/23/2023    HDL 57 01/23/2023    ALT 19 12/06/2022    AST 25 12/06/2022     12/06/2022     12/06/2022    K 4.4 12/06/2022    K 4.4 12/06/2022     12/06/2022     12/06/2022    CREATININE 0.9 12/06/2022    CREATININE 0.9 12/06/2022    BUN 15 12/06/2022    BUN 15 12/06/2022    CO2 26 12/06/2022    CO2 26 12/06/2022    TSH 2.234 04/07/2022    INR 1.1 01/27/2019    HGBA1C 5.2 01/23/2023       Plan:  EGD for GERD    I have explained the risks and benefits of endoscopy procedures to the patient including but not limited to bleeding, perforation, infection, and death.  The patient was asked if they understand and allowed to ask any further questions to their satisfaction.      Jose Miguel Martino MD

## 2023-04-27 NOTE — PROVATION PATIENT INSTRUCTIONS
Discharge Summary/Instructions after an Endoscopic Procedure  Patient Name: Radha Chiang  Patient MRN: [id number]  Patient YOB: 1955 Thursday, April 27, 2023  Martir Braxton MD  Dear patient,  As a result of recent federal legislation (The Federal Cures Act), you may   receive lab or pathology results from your procedure in your [facility]   account before your physician is able to contact you. Your physician or   their representative will relay the results to you with their   recommendations at their soonest availability.  Thank you,  RESTRICTIONS:  During your procedure today, you received medications for sedation.  These   medications may affect your judgment, balance and coordination.  Therefore,   for 24 hours, you have the following restrictions:   - DO NOT drive a car, operate machinery, make legal/financial decisions,   sign important papers or drink alcohol.    ACTIVITY:  Today: no heavy lifting, straining or running due to procedural   sedation/anesthesia.  The following day: return to full activity including work.  DIET:  Eat and drink normally unless instructed otherwise.     TREATMENT FOR COMMON SIDE EFFECTS:  - Mild abdominal pain, nausea, belching, bloating or excessive gas:  rest,   eat lightly and use a heating pad.  - Sore Throat: treat with throat lozenges and/or gargle with warm salt   water.  - Because air was used during the procedure, expelling large amounts of air   from your rectum or belching is normal.  - If a bowel prep was taken, you may not have a bowel movement for 1-3 days.    This is normal.  SYMPTOMS TO WATCH FOR AND REPORT TO YOUR PHYSICIAN:  1. Abdominal pain or bloating, other than gas cramps.  2. Chest pain.  3. Back pain.  4. Signs of infection such as: chills or fever occurring within 24 hours   after the procedure.  5. Rectal bleeding, which would show as bright red, maroon, or black stools.   (A tablespoon of blood from the rectum is not serious, especially if    hemorrhoids are present.)  6. Vomiting.  7. Weakness or dizziness.  GO DIRECTLY TO THE NEAREST EMERGENCY ROOM IF YOU HAVE ANY OF THE FOLLOWING:      Difficulty breathing              Chills and/or fever over 101 F   Persistent vomiting and/or vomiting blood   Severe abdominal pain   Severe chest pain   Black, tarry stools   Bleeding- more than one tablespoon   Any other symptom or condition that you feel may need urgent attention  Your doctor recommends these additional instructions:  If any biopsies were taken, your doctors clinic will contact you in 1 to 2   weeks with any results.  - Discharge patient to home.   - Patient has a contact number available for emergencies.  The signs and   symptoms of potential delayed complications were discussed with the   patient.  Return to normal activities tomorrow.  Written discharge   instructions were provided to the patient.   - Resume previous diet.   - Use Nexium (esomeprazole) 40 mg PO daily for 12 weeks.   - Await pathology results.   - Repeat upper endoscopy in 12 weeks to check healing.   - Return to GI clinic.   For questions, problems or results please call your physician - Martir Braxton MD at Work:  [phone].  [facility] [place], EMERGENCY ROOM PHONE NUMBER: [phone]  IF A COMPLICATION OR EMERGENCY SITUATION ARISES AND YOU ARE UNABLE TO REACH   YOUR PHYSICIAN - GO DIRECTLY TO THE EMERGENCY ROOM.  Martir Braxton MD  4/27/2023 11:26:32 AM  This report has been verified and signed electronically.  Dear patient,  As a result of recent federal legislation (The Federal Cures Act), you may   receive lab or pathology results from your procedure in your [facility]   account before your physician is able to contact you. Your physician or   their representative will relay the results to you with their   recommendations at their soonest availability.  Thank you,  PROVATION

## 2023-04-27 NOTE — TRANSFER OF CARE
Anesthesia Transfer of Care Note    Patient: Radha Chiang    Procedure(s) Performed: Procedure(s) (LRB):  ESOPHAGOGASTRODUODENOSCOPY (EGD) (N/A)    Patient location: PACU    Anesthesia Type: general    Transport from OR: Transported from OR on 6-10 L/min O2 by face mask with adequate spontaneous ventilation    Post pain: adequate analgesia    Post assessment: no apparent anesthetic complications and tolerated procedure well    Post vital signs: stable    Level of consciousness: awake, alert and oriented    Nausea/Vomiting: no nausea/vomiting    Complications: none    Transfer of care protocol was followed      Last vitals:   Visit Vitals  /63 (BP Location: Left arm, Patient Position: Lying)   Pulse 66   Temp 36.8 °C (98.2 °F) (Temporal)   Resp 18   Ht 5' (1.524 m)   Wt 71.7 kg (158 lb)   SpO2 95%   Breastfeeding No   BMI 30.86 kg/m²

## 2023-04-27 NOTE — ANESTHESIA POSTPROCEDURE EVALUATION
Anesthesia Post Evaluation    Patient: Radha Chiang    Procedure(s) Performed: Procedure(s) (LRB):  ESOPHAGOGASTRODUODENOSCOPY (EGD) (N/A)    Final Anesthesia Type: general      Patient location during evaluation: GI PACU  Patient participation: Yes- Able to Participate  Level of consciousness: awake and alert  Post-procedure vital signs: reviewed and stable  Pain management: adequate  Airway patency: patent    PONV status at discharge: No PONV  Anesthetic complications: no      Cardiovascular status: hemodynamically stable  Respiratory status: spontaneous ventilation and unassisted  Hydration status: euvolemic  Follow-up not needed.          Vitals Value Taken Time   /73 04/27/23 1200   Temp 36.8 °C (98.2 °F) 04/27/23 1133   Pulse 62 04/27/23 1200   Resp 25 04/27/23 1200   SpO2 99 % 04/27/23 1200         Event Time   Out of Recovery 12:02:32         Pain/Kiersten Score: Kiersten Score: 10 (4/27/2023 11:34 AM)

## 2023-04-27 NOTE — ANESTHESIA PREPROCEDURE EVALUATION
04/27/2023  Radha Chiang is a 68 y.o., female.      Pre-op Assessment    I have reviewed the Patient Summary Reports.     I have reviewed the Nursing Notes. I have reviewed the NPO Status.   I have reviewed the Medications.     Review of Systems  Anesthesia Hx:  No problems with previous Anesthesia  Denies Family Hx of Anesthesia complications.   Denies Personal Hx of Anesthesia complications.   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    Cardiovascular:  Cardiovascular Normal Exercise tolerance: poor     Pulmonary:   Asthma Shortness of breath    Renal/:  Renal/ Normal     Hepatic/GI:   GERD Liver Disease, (hepatomegaly ) Crohns   Musculoskeletal:   Arthritis     Neurological:   CVA, residual symptoms Right side weakness  Peripheral Neuropathy    Endocrine:   Hypothyroidism Lupus   Dermatological:  Skin Normal    Psych:   Psychiatric History depression             Anesthesia Plan  Type of Anesthesia, risks & benefits discussed:    Anesthesia Type: Gen Natural Airway  Intra-op Monitoring Plan: Standard ASA Monitors  Post Op Pain Control Plan: multimodal analgesia  Induction:  IV  ASA Score: 3  Day of Surgery Review of History & Physical: H&P Update referred to the surgeon/provider.    Ready For Surgery From Anesthesia Perspective.     .

## 2023-05-02 ENCOUNTER — PATIENT MESSAGE (OUTPATIENT)
Dept: GASTROENTEROLOGY | Facility: CLINIC | Age: 68
End: 2023-05-02
Payer: MEDICARE

## 2023-05-02 DIAGNOSIS — K21.00 GASTROESOPHAGEAL REFLUX DISEASE WITH ESOPHAGITIS WITHOUT HEMORRHAGE: Primary | ICD-10-CM

## 2023-05-04 LAB
FINAL PATHOLOGIC DIAGNOSIS: NORMAL
GROSS: NORMAL
Lab: NORMAL

## 2023-05-05 ENCOUNTER — TELEPHONE (OUTPATIENT)
Dept: ADMINISTRATIVE | Facility: CLINIC | Age: 68
End: 2023-05-05
Payer: MEDICARE

## 2023-05-05 ENCOUNTER — PATIENT MESSAGE (OUTPATIENT)
Dept: GASTROENTEROLOGY | Facility: CLINIC | Age: 68
End: 2023-05-05
Payer: MEDICARE

## 2023-05-08 ENCOUNTER — PATIENT MESSAGE (OUTPATIENT)
Dept: NEUROLOGY | Facility: CLINIC | Age: 68
End: 2023-05-08
Payer: MEDICARE

## 2023-05-08 ENCOUNTER — PATIENT MESSAGE (OUTPATIENT)
Dept: GASTROENTEROLOGY | Facility: CLINIC | Age: 68
End: 2023-05-08
Payer: MEDICARE

## 2023-05-08 ENCOUNTER — OFFICE VISIT (OUTPATIENT)
Dept: INTERNAL MEDICINE | Facility: CLINIC | Age: 68
End: 2023-05-08
Payer: MEDICARE

## 2023-05-08 ENCOUNTER — TELEPHONE (OUTPATIENT)
Dept: RHEUMATOLOGY | Facility: CLINIC | Age: 68
End: 2023-05-08
Payer: MEDICARE

## 2023-05-08 ENCOUNTER — PATIENT MESSAGE (OUTPATIENT)
Dept: RHEUMATOLOGY | Facility: CLINIC | Age: 68
End: 2023-05-08
Payer: MEDICARE

## 2023-05-08 VITALS
DIASTOLIC BLOOD PRESSURE: 70 MMHG | HEART RATE: 53 BPM | WEIGHT: 161.63 LBS | BODY MASS INDEX: 31.56 KG/M2 | SYSTOLIC BLOOD PRESSURE: 128 MMHG | OXYGEN SATURATION: 96 %

## 2023-05-08 DIAGNOSIS — D84.821 DRUG-INDUCED IMMUNODEFICIENCY: ICD-10-CM

## 2023-05-08 DIAGNOSIS — G60.9 HEREDITARY AND IDIOPATHIC NEUROPATHY, UNSPECIFIED: ICD-10-CM

## 2023-05-08 DIAGNOSIS — R20.2 PARESTHESIA: Primary | ICD-10-CM

## 2023-05-08 DIAGNOSIS — Z00.00 ENCOUNTER FOR PREVENTIVE HEALTH EXAMINATION: Primary | ICD-10-CM

## 2023-05-08 DIAGNOSIS — Z79.899 ENCOUNTER FOR MONITORING DENOSUMAB THERAPY: ICD-10-CM

## 2023-05-08 DIAGNOSIS — Z51.81 ENCOUNTER FOR MONITORING DENOSUMAB THERAPY: ICD-10-CM

## 2023-05-08 DIAGNOSIS — E66.9 OBESITY (BMI 30-39.9): ICD-10-CM

## 2023-05-08 DIAGNOSIS — M32.9 SYSTEMIC LUPUS ERYTHEMATOSUS, UNSPECIFIED SLE TYPE, UNSPECIFIED ORGAN INVOLVEMENT STATUS: ICD-10-CM

## 2023-05-08 DIAGNOSIS — K59.00 CONSTIPATION, UNSPECIFIED CONSTIPATION TYPE: ICD-10-CM

## 2023-05-08 DIAGNOSIS — Z79.899 DRUG-INDUCED IMMUNODEFICIENCY: ICD-10-CM

## 2023-05-08 DIAGNOSIS — M35.9 POLYNEUROPATHY IN COLLAGEN VASCULAR DISEASE: ICD-10-CM

## 2023-05-08 DIAGNOSIS — G62.9 NEUROPATHY: ICD-10-CM

## 2023-05-08 DIAGNOSIS — J84.10 CALCIFIED GRANULOMA OF LUNG: ICD-10-CM

## 2023-05-08 DIAGNOSIS — E21.3 HYPERPARATHYROIDISM: ICD-10-CM

## 2023-05-08 DIAGNOSIS — G63 POLYNEUROPATHY IN COLLAGEN VASCULAR DISEASE: ICD-10-CM

## 2023-05-08 DIAGNOSIS — K50.90 CROHN'S DISEASE WITHOUT COMPLICATION, UNSPECIFIED GASTROINTESTINAL TRACT LOCATION: ICD-10-CM

## 2023-05-08 PROCEDURE — G0439 PR MEDICARE ANNUAL WELLNESS SUBSEQUENT VISIT: ICD-10-PCS | Mod: ,,, | Performed by: NURSE PRACTITIONER

## 2023-05-08 PROCEDURE — 99999 PR PBB SHADOW E&M-EST. PATIENT-LVL III: ICD-10-PCS | Mod: PBBFAC,,, | Performed by: NURSE PRACTITIONER

## 2023-05-08 PROCEDURE — 99999 PR PBB SHADOW E&M-EST. PATIENT-LVL III: CPT | Mod: PBBFAC,,, | Performed by: NURSE PRACTITIONER

## 2023-05-08 PROCEDURE — 99213 OFFICE O/P EST LOW 20 MIN: CPT | Mod: PBBFAC | Performed by: NURSE PRACTITIONER

## 2023-05-08 PROCEDURE — G0439 PPPS, SUBSEQ VISIT: HCPCS | Mod: ,,, | Performed by: NURSE PRACTITIONER

## 2023-05-08 NOTE — PATIENT INSTRUCTIONS
Counseling and Referral of Other Preventative  (Italic type indicates deductible and co-insurance are waived)    Patient Name: Radha Chiang  Today's Date: 5/8/2023    Health Maintenance       Date Due Completion Date    Cervical Cancer Screening 01/13/2021 1/13/2016    Override on 1/21/2011: Done    Mammogram 03/29/2023 3/29/2022    TETANUS VACCINE 01/23/2024 1/23/2014    High Dose Statin 04/27/2024 4/27/2023    Aspirin/Antiplatelet Therapy 04/27/2024 4/27/2023    DEXA Scan 12/06/2024 12/6/2022    Pneumococcal Vaccines (Age 65+) (3 - PPSV23 if available, else PCV20) 11/18/2025 11/18/2020    Hemoglobin A1c (Diabetic Prevention Screening) 01/23/2026 1/23/2023    Colorectal Cancer Screening 02/09/2026 2/9/2023    Override on 5/12/2020: Done (ej colonoscopy polyp repeaet 3-5 years)    Override on 1/26/2017: Done    Override on 8/11/2008: Done    Lipid Panel 01/23/2028 1/23/2023        Orders Placed This Encounter   Procedures    Ambulatory referral/consult to Gastroenterology    Ambulatory referral/consult to Neurology     The following information is provided to all patients.  This information is to help you find resources for any of the problems found today that may be affecting your health:                Living healthy guide: www.[facility].[place].gov      Understanding Diabetes: www.diabetes.org      Eating healthy: www.cdc.gov/healthyweight      CDC home safety checklist: www.cdc.gov/steadi/patient.html      Agency on Aging: www.goea.[place].[facility]      Alcoholics anonymous (AA): www.aa.org      Physical Activity: www.jamilah.nih.gov/tp8zzsw      Tobacco use: www.quitwithusla.org

## 2023-05-08 NOTE — TELEPHONE ENCOUNTER
Please schedule standing lupus labs ordered in Jan and also the labs ordered today.Thank you ROBERTO

## 2023-05-08 NOTE — PROGRESS NOTES
Radha Chiang presented for a  Medicare AWV and comprehensive Health Risk Assessment today. The following components were reviewed and updated:    Medical history  Family History  Social history  Allergies and Current Medications  Health Risk Assessment  Health Maintenance  Care Team         ** See Completed Assessments for Annual Wellness Visit within the encounter summary.**         The following assessments were completed:  Living Situation  CAGE  Depression Screening  Timed Get Up and Go  Whisper Test  Cognitive Function Screening  Nutrition Screening  ADL Screening  PAQ Screening          Vitals:    05/08/23 1103   BP: 128/70   Pulse: (!) 53   SpO2: 96%   Weight: 73.3 kg (161 lb 9.6 oz)     Body mass index is 31.56 kg/m².  Physical Exam  Vitals and nursing note reviewed.   Constitutional:       Appearance: She is well-developed.   HENT:      Head: Normocephalic and atraumatic.      Right Ear: External ear normal.      Left Ear: External ear normal.      Nose: Nose normal.   Eyes:      Pupils: Pupils are equal, round, and reactive to light.   Cardiovascular:      Rate and Rhythm: Normal rate and regular rhythm.      Heart sounds: Normal heart sounds.   Pulmonary:      Effort: Pulmonary effort is normal.      Breath sounds: Normal breath sounds.   Abdominal:      General: Bowel sounds are normal.      Palpations: Abdomen is soft.   Musculoskeletal:         General: Normal range of motion.      Cervical back: Normal range of motion.   Skin:     General: Skin is warm and dry.   Neurological:      Mental Status: She is alert and oriented to person, place, and time.             Diagnoses and health risks identified today and associated recommendations/orders:    1. Encounter for preventive health examination  Health Maintenance updated   Records reviewed   Exam done     2. Constipation, unspecified constipation type  - Ambulatory referral/consult to Gastroenterology; Future    3. Crohn's disease without  complication, unspecified gastrointestinal tract location  - Ambulatory referral/consult to Gastroenterology; Future    4. 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 Obesity (BMI 30-39.9)  BMI reviewed.     6. Systemic lupus erythematosus, unspecified SLE type, unspecified organ involvement status  Stable and chronic. Continue current medications. Followed by PCP and rheumatology.    7. Calcified granuloma of lung  Noted on CXR 10/18/2021. Stable and chronic.    8. Hyperparathyroidism  Stable and chronic. Followed by PCP.     9. Drug-induced immunodeficiency  Stable and chronic. Continue current medications. Followed by PCP and rheumatology.    Counseling and Referral of Other Preventative  (Italic type indicates deductible and co-insurance are waived)    Patient Name: Radha Chiang  Today's Date: 5/8/2023    Health Maintenance         Date Due Completion Date    Cervical Cancer Screening 01/13/2021 1/13/2016    Override on 1/21/2011: Done    Mammogram 03/29/2023 3/29/2022    TETANUS VACCINE 01/23/2024 1/23/2014    High Dose Statin 04/27/2024 4/27/2023    Aspirin/Antiplatelet Therapy 04/27/2024 4/27/2023    DEXA Scan 12/06/2024 12/6/2022    Pneumococcal Vaccines (Age 65+) (3 - PPSV23 if available, else PCV20) 11/18/2025 11/18/2020    Hemoglobin A1c (Diabetic Prevention Screening) 01/23/2026 1/23/2023    Colorectal Cancer Screening 02/09/2026 2/9/2023    Override on 5/12/2020: Done (ej colonoscopy polyp repeaet 3-5 years)    Override on 1/26/2017: Done    Override on 8/11/2008: Done    Lipid Panel 01/23/2028 1/23/2023          Orders Placed This Encounter   Procedures    Ambulatory referral/consult to Gastroenterology    Ambulatory referral/consult to Neurology     Provided Radha with a 5-10 year written screening schedule and personal prevention plan. Recommendations were developed using the USPSTF age appropriate recommendations. Education, counseling,  and referrals were provided as needed. After Visit Summary printed and given to patient which includes a list of additional screenings\tests needed.    Follow up in about 5 months (around 9/29/2023) for follow up with PCP.    Mercy Young, NP      I offered to discuss advanced care planning, including how to pick a person who would make decisions for you if you were unable to make them for yourself, called a health care power of , and what kind of decisions you might make such as use of life sustaining treatments such as ventilators and tube feeding when faced with a life limiting illness recorded on a living will that they will need to know. (How you want to be cared for as you near the end of your natural life)     X  Patient has advanced directives written and agrees to provide copies to the institution.    Review for Opioid Screening: Pt does not have Rx for Opioids     Review for Substance Use Disorders: Patient does not use substance

## 2023-05-10 ENCOUNTER — LAB VISIT (OUTPATIENT)
Dept: LAB | Facility: HOSPITAL | Age: 68
End: 2023-05-10
Payer: MEDICARE

## 2023-05-10 DIAGNOSIS — G60.9 HEREDITARY AND IDIOPATHIC NEUROPATHY, UNSPECIFIED: ICD-10-CM

## 2023-05-10 DIAGNOSIS — R20.2 PARESTHESIA: ICD-10-CM

## 2023-05-10 DIAGNOSIS — M32.9 SYSTEMIC LUPUS ERYTHEMATOSUS, UNSPECIFIED SLE TYPE, UNSPECIFIED ORGAN INVOLVEMENT STATUS: ICD-10-CM

## 2023-05-10 DIAGNOSIS — Z79.899 ENCOUNTER FOR MONITORING DENOSUMAB THERAPY: ICD-10-CM

## 2023-05-10 DIAGNOSIS — Z51.81 ENCOUNTER FOR MONITORING DENOSUMAB THERAPY: ICD-10-CM

## 2023-05-10 DIAGNOSIS — G62.9 NEUROPATHY: ICD-10-CM

## 2023-05-10 DIAGNOSIS — N18.31 STAGE 3A CHRONIC KIDNEY DISEASE: ICD-10-CM

## 2023-05-10 LAB
ALBUMIN SERPL BCP-MCNC: 3.8 G/DL (ref 3.5–5.2)
ALBUMIN SERPL BCP-MCNC: 3.8 G/DL (ref 3.5–5.2)
ALP SERPL-CCNC: 116 U/L (ref 55–135)
ALT SERPL W/O P-5'-P-CCNC: 19 U/L (ref 10–44)
ANION GAP SERPL CALC-SCNC: 10 MMOL/L (ref 8–16)
ANION GAP SERPL CALC-SCNC: 10 MMOL/L (ref 8–16)
AST SERPL-CCNC: 30 U/L (ref 10–40)
BASOPHILS # BLD AUTO: 0.06 K/UL (ref 0–0.2)
BASOPHILS NFR BLD: 1 % (ref 0–1.9)
BILIRUB SERPL-MCNC: 0.3 MG/DL (ref 0.1–1)
BUN SERPL-MCNC: 18 MG/DL (ref 8–23)
BUN SERPL-MCNC: 18 MG/DL (ref 8–23)
C3 SERPL-MCNC: 107 MG/DL (ref 50–180)
C4 SERPL-MCNC: 24 MG/DL (ref 11–44)
CALCIUM SERPL-MCNC: 10 MG/DL (ref 8.7–10.5)
CALCIUM SERPL-MCNC: 10 MG/DL (ref 8.7–10.5)
CHLORIDE SERPL-SCNC: 108 MMOL/L (ref 95–110)
CHLORIDE SERPL-SCNC: 108 MMOL/L (ref 95–110)
CO2 SERPL-SCNC: 23 MMOL/L (ref 23–29)
CO2 SERPL-SCNC: 23 MMOL/L (ref 23–29)
CREAT SERPL-MCNC: 0.8 MG/DL (ref 0.5–1.4)
CREAT SERPL-MCNC: 0.8 MG/DL (ref 0.5–1.4)
CRP SERPL-MCNC: 1.9 MG/L (ref 0–8.2)
DIFFERENTIAL METHOD: ABNORMAL
EOSINOPHIL # BLD AUTO: 0.1 K/UL (ref 0–0.5)
EOSINOPHIL NFR BLD: 1.6 % (ref 0–8)
ERYTHROCYTE [DISTWIDTH] IN BLOOD BY AUTOMATED COUNT: 12.2 % (ref 11.5–14.5)
ERYTHROCYTE [SEDIMENTATION RATE] IN BLOOD BY PHOTOMETRIC METHOD: 20 MM/HR (ref 0–36)
EST. GFR  (NO RACE VARIABLE): >60 ML/MIN/1.73 M^2
EST. GFR  (NO RACE VARIABLE): >60 ML/MIN/1.73 M^2
GLUCOSE SERPL-MCNC: 74 MG/DL (ref 70–110)
GLUCOSE SERPL-MCNC: 74 MG/DL (ref 70–110)
HCT VFR BLD AUTO: 40.5 % (ref 37–48.5)
HGB BLD-MCNC: 12.6 G/DL (ref 12–16)
IMM GRANULOCYTES # BLD AUTO: 0.01 K/UL (ref 0–0.04)
IMM GRANULOCYTES NFR BLD AUTO: 0.2 % (ref 0–0.5)
LYMPHOCYTES # BLD AUTO: 1.5 K/UL (ref 1–4.8)
LYMPHOCYTES NFR BLD: 24.3 % (ref 18–48)
MAGNESIUM SERPL-MCNC: 1.8 MG/DL (ref 1.6–2.6)
MCH RBC QN AUTO: 30.5 PG (ref 27–31)
MCHC RBC AUTO-ENTMCNC: 31.1 G/DL (ref 32–36)
MCV RBC AUTO: 98 FL (ref 82–98)
MONOCYTES # BLD AUTO: 0.5 K/UL (ref 0.3–1)
MONOCYTES NFR BLD: 8.5 % (ref 4–15)
NEUTROPHILS # BLD AUTO: 3.9 K/UL (ref 1.8–7.7)
NEUTROPHILS NFR BLD: 64.4 % (ref 38–73)
NRBC BLD-RTO: 0 /100 WBC
PHOSPHATE SERPL-MCNC: 3.3 MG/DL (ref 2.7–4.5)
PLATELET # BLD AUTO: 310 K/UL (ref 150–450)
PMV BLD AUTO: 11.7 FL (ref 9.2–12.9)
POTASSIUM SERPL-SCNC: 4.8 MMOL/L (ref 3.5–5.1)
POTASSIUM SERPL-SCNC: 4.8 MMOL/L (ref 3.5–5.1)
PROT SERPL-MCNC: 7.2 G/DL (ref 6–8.4)
RBC # BLD AUTO: 4.13 M/UL (ref 4–5.4)
SODIUM SERPL-SCNC: 141 MMOL/L (ref 136–145)
SODIUM SERPL-SCNC: 141 MMOL/L (ref 136–145)
TSH SERPL DL<=0.005 MIU/L-ACNC: 3.02 UIU/ML (ref 0.4–4)
VIT B12 SERPL-MCNC: 544 PG/ML (ref 210–950)
WBC # BLD AUTO: 6.09 K/UL (ref 3.9–12.7)

## 2023-05-10 PROCEDURE — 86225 DNA ANTIBODY NATIVE: CPT | Performed by: INTERNAL MEDICINE

## 2023-05-10 PROCEDURE — 86225 DNA ANTIBODY NATIVE: CPT | Mod: 59 | Performed by: INTERNAL MEDICINE

## 2023-05-10 PROCEDURE — 82607 VITAMIN B-12: CPT | Performed by: INTERNAL MEDICINE

## 2023-05-10 PROCEDURE — 86160 COMPLEMENT ANTIGEN: CPT | Mod: 59 | Performed by: INTERNAL MEDICINE

## 2023-05-10 PROCEDURE — 86140 C-REACTIVE PROTEIN: CPT | Performed by: INTERNAL MEDICINE

## 2023-05-10 PROCEDURE — 86334 PATHOLOGIST INTERPRETATION IFE: ICD-10-PCS | Mod: 26,,, | Performed by: PATHOLOGY

## 2023-05-10 PROCEDURE — 85025 COMPLETE CBC W/AUTO DIFF WBC: CPT | Performed by: INTERNAL MEDICINE

## 2023-05-10 PROCEDURE — 86334 IMMUNOFIX E-PHORESIS SERUM: CPT | Mod: 26,,, | Performed by: PATHOLOGY

## 2023-05-10 PROCEDURE — 85652 RBC SED RATE AUTOMATED: CPT | Performed by: INTERNAL MEDICINE

## 2023-05-10 PROCEDURE — 84207 ASSAY OF VITAMIN B-6: CPT | Performed by: INTERNAL MEDICINE

## 2023-05-10 PROCEDURE — 84443 ASSAY THYROID STIM HORMONE: CPT | Performed by: INTERNAL MEDICINE

## 2023-05-10 PROCEDURE — 80053 COMPREHEN METABOLIC PANEL: CPT | Performed by: INTERNAL MEDICINE

## 2023-05-10 PROCEDURE — 83735 ASSAY OF MAGNESIUM: CPT | Performed by: INTERNAL MEDICINE

## 2023-05-10 PROCEDURE — 86334 IMMUNOFIX E-PHORESIS SERUM: CPT | Performed by: INTERNAL MEDICINE

## 2023-05-10 PROCEDURE — 84100 ASSAY OF PHOSPHORUS: CPT | Performed by: INTERNAL MEDICINE

## 2023-05-10 PROCEDURE — 86160 COMPLEMENT ANTIGEN: CPT | Performed by: INTERNAL MEDICINE

## 2023-05-10 PROCEDURE — 36415 COLL VENOUS BLD VENIPUNCTURE: CPT | Performed by: INTERNAL MEDICINE

## 2023-05-10 PROCEDURE — 84425 ASSAY OF VITAMIN B-1: CPT | Performed by: INTERNAL MEDICINE

## 2023-05-11 ENCOUNTER — PATIENT MESSAGE (OUTPATIENT)
Dept: RHEUMATOLOGY | Facility: CLINIC | Age: 68
End: 2023-05-11
Payer: MEDICARE

## 2023-05-11 ENCOUNTER — PATIENT MESSAGE (OUTPATIENT)
Dept: NEUROLOGY | Facility: CLINIC | Age: 68
End: 2023-05-11
Payer: MEDICARE

## 2023-05-11 LAB
INTERPRETATION SERPL IFE-IMP: NORMAL
PATHOLOGIST INTERPRETATION IFE: NORMAL

## 2023-05-12 LAB
DNA TITER: NORMAL
DSDNA AB SER-ACNC: POSITIVE [IU]/ML

## 2023-05-14 ENCOUNTER — PATIENT MESSAGE (OUTPATIENT)
Dept: INTERNAL MEDICINE | Facility: CLINIC | Age: 68
End: 2023-05-14
Payer: MEDICARE

## 2023-05-14 ENCOUNTER — NURSE TRIAGE (OUTPATIENT)
Dept: ADMINISTRATIVE | Facility: CLINIC | Age: 68
End: 2023-05-14
Payer: MEDICARE

## 2023-05-14 NOTE — TELEPHONE ENCOUNTER
Patient c/o a cough, headache, congestion, fatigue, body aches, chills. Has a hx of lupus. Symptoms started yesterday. Tested positive for covid today. Advised per protocol to contact PCP within 24 hours. Patient plans to go to the nearest  today.  Advised the patient to call back with any further questions or if symptoms worsen.      Reason for Disposition   [1] HIGH RISK patient (e.g., weak immune system, age > 64 years, obesity with BMI 30 or higher, pregnant, chronic lung disease or other chronic medical condition) AND [2] COVID symptoms (e.g., cough, fever)  (Exceptions: Already seen by PCP and no new or worsening symptoms.)    Additional Information   Negative: SEVERE difficulty breathing (e.g., struggling for each breath, speaks in single words)   Negative: Difficult to awaken or acting confused (e.g., disoriented, slurred speech)   Negative: Bluish (or gray) lips or face now   Negative: Shock suspected (e.g., cold/pale/clammy skin, too weak to stand, low BP, rapid pulse)   Negative: Sounds like a life-threatening emergency to the triager   Negative: SEVERE or constant chest pain or pressure  (Exception: Mild central chest pain, present only when coughing.)   Negative: MODERATE difficulty breathing (e.g., speaks in phrases, SOB even at rest, pulse 100-120)   Negative: [1] Headache AND [2] stiff neck (can't touch chin to chest)   Negative: Oxygen level (e.g., pulse oximetry) 90 percent or lower   Negative: Chest pain or pressure  (Exception: MILD central chest pain, present only when coughing.)   Negative: [1] Drinking very little AND [2] dehydration suspected (e.g., no urine > 12 hours, very dry mouth, very lightheaded)   Negative: Patient sounds very sick or weak to the triager   Negative: MILD difficulty breathing (e.g., minimal/no SOB at rest, SOB with walking, pulse <100)   Negative: Fever > 103 F (39.4 C)   Negative: [1] Fever > 101 F (38.3 C) AND [2] age > 60 years   Negative: [1] Fever > 100.0 F  (37.8 C) AND [2] bedridden (e.g., CVA, chronic illness, recovering from surgery)   Negative: Oxygen level (e.g., pulse oximetry) 91 to 94 percent    Protocols used: Coronavirus (COVID-19) Diagnosed or [id number]

## 2023-05-15 ENCOUNTER — TELEPHONE (OUTPATIENT)
Dept: INTERNAL MEDICINE | Facility: CLINIC | Age: 68
End: 2023-05-15
Payer: MEDICARE

## 2023-05-15 ENCOUNTER — TELEPHONE (OUTPATIENT)
Dept: RHEUMATOLOGY | Facility: CLINIC | Age: 68
End: 2023-05-15
Payer: MEDICARE

## 2023-05-15 ENCOUNTER — PATIENT MESSAGE (OUTPATIENT)
Dept: INTERNAL MEDICINE | Facility: CLINIC | Age: 68
End: 2023-05-15
Payer: MEDICARE

## 2023-05-15 ENCOUNTER — PATIENT MESSAGE (OUTPATIENT)
Dept: RHEUMATOLOGY | Facility: CLINIC | Age: 68
End: 2023-05-15
Payer: MEDICARE

## 2023-05-15 DIAGNOSIS — Z12.31 ENCOUNTER FOR SCREENING MAMMOGRAM FOR BREAST CANCER: Primary | ICD-10-CM

## 2023-05-15 RX ORDER — BENZONATATE 200 MG/1
200 CAPSULE ORAL 3 TIMES DAILY PRN
Qty: 30 CAPSULE | Refills: 1 | Status: SHIPPED | OUTPATIENT
Start: 2023-05-15 | End: 2023-06-07

## 2023-05-16 ENCOUNTER — PATIENT MESSAGE (OUTPATIENT)
Dept: RHEUMATOLOGY | Facility: CLINIC | Age: 68
End: 2023-05-16
Payer: MEDICARE

## 2023-05-16 LAB
PYRIDOXAL SERPL-MCNC: 66 UG/L (ref 5–50)
VIT B1 BLD-MCNC: 119 UG/L (ref 38–122)

## 2023-05-22 ENCOUNTER — OFFICE VISIT (OUTPATIENT)
Dept: RHEUMATOLOGY | Facility: CLINIC | Age: 68
End: 2023-05-22
Payer: MEDICARE

## 2023-05-22 VITALS
SYSTOLIC BLOOD PRESSURE: 104 MMHG | WEIGHT: 160.38 LBS | BODY MASS INDEX: 31.49 KG/M2 | DIASTOLIC BLOOD PRESSURE: 58 MMHG | HEIGHT: 60 IN | HEART RATE: 66 BPM

## 2023-05-22 DIAGNOSIS — M32.8 OTHER FORMS OF SYSTEMIC LUPUS ERYTHEMATOSUS, UNSPECIFIED ORGAN INVOLVEMENT STATUS: ICD-10-CM

## 2023-05-22 DIAGNOSIS — M81.0 OSTEOPOROSIS, UNSPECIFIED OSTEOPOROSIS TYPE, UNSPECIFIED PATHOLOGICAL FRACTURE PRESENCE: Primary | ICD-10-CM

## 2023-05-22 PROCEDURE — 99214 OFFICE O/P EST MOD 30 MIN: CPT | Mod: PBBFAC | Performed by: INTERNAL MEDICINE

## 2023-05-22 PROCEDURE — 99999 PR PBB SHADOW E&M-EST. PATIENT-LVL IV: ICD-10-PCS | Mod: PBBFAC,,, | Performed by: INTERNAL MEDICINE

## 2023-05-22 PROCEDURE — 99214 PR OFFICE/OUTPT VISIT, EST, LEVL IV, 30-39 MIN: ICD-10-PCS | Mod: S$PBB,,, | Performed by: INTERNAL MEDICINE

## 2023-05-22 PROCEDURE — 99999 PR PBB SHADOW E&M-EST. PATIENT-LVL IV: CPT | Mod: PBBFAC,,, | Performed by: INTERNAL MEDICINE

## 2023-05-22 PROCEDURE — 99214 OFFICE O/P EST MOD 30 MIN: CPT | Mod: S$PBB,,, | Performed by: INTERNAL MEDICINE

## 2023-05-22 RX ORDER — HYDROXYCHLOROQUINE SULFATE 200 MG/1
200 TABLET, FILM COATED ORAL 2 TIMES DAILY
Qty: 180 TABLET | Refills: 2 | Status: SHIPPED | OUTPATIENT
Start: 2023-05-22 | End: 2023-09-11 | Stop reason: SDUPTHER

## 2023-05-22 ASSESSMENT — ROUTINE ASSESSMENT OF PATIENT INDEX DATA (RAPID3)
TOTAL RAPID3 SCORE: 3.17
PATIENT GLOBAL ASSESSMENT SCORE: 6
FATIGUE SCORE: 8.5
WHEN YOU AWAKENED IN THE MORNING OVER THE LAST WEEK, PLEASE INDICATE THE AMOUNT OF TIME IT TAKES UNTIL YOU ARE AS LIMBER AS YOU WILL BE FOR THE DAY: 5 MIN
PAIN SCORE: 2.5
AM STIFFNESS SCORE: 1, YES
MDHAQ FUNCTION SCORE: 0.3
PSYCHOLOGICAL DISTRESS SCORE: 2.2

## 2023-05-22 ASSESSMENT — SYSTEMIC LUPUS ERYTHEMATOSUS DISEASE ACTIVITY INDEX (SLEDAI): TOTAL_SCORE: 2

## 2023-05-22 NOTE — PROGRESS NOTES
Subjective:      Patient ID: Radha Chiang is a 68 y.o. female.    Chief Complaint: SLE; osteoporosis    HPI had Prolia 3/23/23 had some stiffness few days after dose, now gradually improving since. Had Covid  5/13/23 fever, sore throat, severe headache chest congestion resolved in 3 days Completed Paxlovid, tested negative 2 days Still fatigue, has to nap.  Resumed swimming.   Review of Systems   Constitutional:  Positive for fatigue (8.5/10). Negative for appetite change, fever and unexpected weight change.   HENT:  Negative for mouth sores.    Eyes:  Negative for visual disturbance.   Respiratory:  Positive for cough (resolved, Covid) and shortness of breath (resolved Covid). Negative for wheezing.    Cardiovascular:  Positive for chest pain (resolved Covid). Negative for palpitations.   Gastrointestinal:  Positive for diarrhea. Negative for abdominal pain, anal bleeding, blood in stool, constipation, nausea and vomiting.   Genitourinary:  Negative for dysuria, frequency and urgency.   Musculoskeletal:  Negative for arthralgias, back pain, gait problem, joint swelling, myalgias, neck pain and neck stiffness.   Skin:  Negative for rash.   Neurological:  Positive for headaches. Negative for weakness and numbness.   Hematological:  Negative for adenopathy. Does not bruise/bleed easily.   Psychiatric/Behavioral:  Negative for sleep disturbance. The patient is not nervous/anxious.       Objective:   BP (!) 104/58   Pulse 66   Ht 5' (1.524 m)   Wt 72.7 kg (160 lb 6.2 oz)   BMI 31.32 kg/m²   Physical Exam   Constitutional: She is oriented to person, place, and time.   HENT:   Head: Normocephalic and atraumatic.   Mouth/Throat: Oropharynx is clear and moist.   Eyes: Conjunctivae are normal.   Neck: No thyromegaly present.   Cardiovascular: Normal rate, regular rhythm and normal heart sounds. Exam reveals no gallop and no friction rub.   No murmur heard.  Pulmonary/Chest: Breath sounds normal. She has no wheezes.  She has no rales. She exhibits no tenderness.   Musculoskeletal:      Cervical back: Normal range of motion and neck supple.      Comments: Jaccoud's reducible ulnar drift, non-tender   Lymphadenopathy:     She has no cervical adenopathy.   Neurological: She is alert and oriented to person, place, and time. She displays normal reflexes. Gait normal.   Nl motor strength UE and LE bilateral   Skin: Skin is warm and dry. No rash noted.        Assessment:   Osteoporosis DXA 12/8/22: FRAX hip 3.2% major 18%. Discussed alendronate which she has taken intermittently in past and tolerated. However she has taken oral bisphosphonates for > 5 years total, so best to change to denosumab. 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    Plan:   Cont hydroxychloroquine 200mg twice daily with annual Ophthalmology check   Denosumab 60mg sc 9/27/23 with renal function panel one week prior  Bivalent Covid vaccine booster in 3 months  RTC  6months with standing lupus labs.

## 2023-05-29 ENCOUNTER — TELEPHONE (OUTPATIENT)
Dept: ENDOSCOPY | Facility: HOSPITAL | Age: 68
End: 2023-05-29
Payer: MEDICARE

## 2023-05-29 VITALS — WEIGHT: 160 LBS | BODY MASS INDEX: 31.41 KG/M2 | HEIGHT: 60 IN

## 2023-05-29 DIAGNOSIS — K20.90 ESOPHAGITIS: Primary | ICD-10-CM

## 2023-05-29 RX ORDER — ROSUVASTATIN CALCIUM 20 MG/1
TABLET, COATED ORAL
Qty: 90 TABLET | Refills: 2 | Status: SHIPPED | OUTPATIENT
Start: 2023-05-29

## 2023-05-29 NOTE — TELEPHONE ENCOUNTER
Refill Decision Note   Radhaana Chiang  is requesting a refill authorization.  Brief Assessment and Rationale for Refill:  Approve     Medication Therapy Plan:       Medication Reconciliation Completed: No   Comments:     No Care Gaps recommended.     Note composed:12:42 PM 05/29/2023

## 2023-05-29 NOTE — TELEPHONE ENCOUNTER
"From: Jose Miguel Martino MD   Sent: 2023  11:24 AM CDT   To: [facility] Scheduling Anticoag   Subject: EGD                                               Procedure: EGD     Diagnosis: Esophagitis     Procedure Timin-12 weeks     *If within 4 weeks selected, please ambrocio as high priority*     *If greater than 12 weeks, please select "5-12 weeks" and delay sending until 2 months prior to requested date*     Provider: Any endoscopist     Location: No Preference     Additional Scheduling Information: No scheduling concerns     Prep Specifications:N/A     Have you attached a patient to this message: Yes   "

## 2023-05-29 NOTE — TELEPHONE ENCOUNTER
No care due was identified.  [facility] Embedded Care Due Messages. Reference number: [id number].   5/28/2023 7:31:35 PM CDT

## 2023-05-31 ENCOUNTER — PATIENT MESSAGE (OUTPATIENT)
Dept: NEUROLOGY | Facility: CLINIC | Age: 68
End: 2023-05-31
Payer: MEDICARE

## 2023-06-07 ENCOUNTER — OFFICE VISIT (OUTPATIENT)
Dept: GASTROENTEROLOGY | Facility: CLINIC | Age: 68
End: 2023-06-07
Payer: MEDICARE

## 2023-06-07 DIAGNOSIS — K59.00 CONSTIPATION, UNSPECIFIED CONSTIPATION TYPE: ICD-10-CM

## 2023-06-07 DIAGNOSIS — K21.00 GASTROESOPHAGEAL REFLUX DISEASE WITH ESOPHAGITIS WITHOUT HEMORRHAGE: Primary | ICD-10-CM

## 2023-06-07 PROCEDURE — 99214 PR OFFICE/OUTPT VISIT, EST, LEVL IV, 30-39 MIN: ICD-10-PCS | Mod: 95,,,

## 2023-06-07 PROCEDURE — 99214 OFFICE O/P EST MOD 30 MIN: CPT | Mod: 95,,,

## 2023-06-07 NOTE — PROGRESS NOTES
The patient location is: [place]   The chief complaint leading to consultation is: EGD followup/GERD     Visit type: audiovisual     Face to Face time with patient: 20 minutes  20 minutes of total time spent on the encounter, which includes face to face time and non-face to face time preparing to see the patient (eg, review of tests), Obtaining and/or reviewing separately obtained history, Documenting clinical information in the electronic or other health record, Independently interpreting results (not separately reported) and communicating results to the patient/family/caregiver, or Care coordination (not separately reported).      Each patient to whom he or she provides medical services by telemedicine is:  (1) informed of the relationship between the physician and patient and the respective role of any other health care provider with respect to management of the patient; and (2) notified that he or she may decline to receive medical services by telemedicine and may withdraw from such care at any time.                                                         Gastroenterology Clinic Consultation Note    Reason for Visit:  The primary encounter diagnosis was Gastroesophageal reflux disease with esophagitis without hemorrhage. A diagnosis of Constipation, unspecified constipation type was also pertinent to this visit.    PCP:   Merry Espinal   [address] / [place] [address]      Initial HPI   This is a 68 y.o. female presenting for followup for her GERD. S/p EGD 4/27 that showed Grade D esophagitis and erythema in her stomach. Was trying to get off of Nexium due to her osteoporosis, however, her dose was increased to 80mg daily due to the findings on her EGD. Patient reports taking both tablets in the AM because she did not know how to take them. Reports good symptom control currently. Denies hematemesis, nausea, vomiting, melena. Scheduled for her followup EGD in mid-July.   Patient also report  intermittent constipation about once a month. Takes fiber and miralax and states that this helps her constipation. Denies blood in stool, unintentional weight loss.       ROS:  Review of Systems   Constitutional:  Negative for chills, diaphoresis, fever, malaise/fatigue and weight loss.   HENT:  Negative for sore throat.    Gastrointestinal:  Positive for heartburn. Negative for abdominal pain, blood in stool, constipation, diarrhea, melena, nausea and vomiting.   Skin:  Negative for itching and rash.   Neurological:  Negative for dizziness, loss of consciousness and weakness.      Medical History:  has a past medical history of Acid reflux, Anemia (2years ago), Anxiety, Arthritis, Asthma, chronic (4/10/2013), Crohn's disease (1998), Depression, Dry eyes, Fracture of forearm, proximal, open, History of papilledema (11/10/11), Hyperlipidemia (5/21/2018), Lupus, Neuromuscular disorder (12/2009), Osteoporosis, Stroke (1/29/2013), and Thyroid disease.    Surgical History:  has a past surgical history that includes Brain surgery (01/18/2012); Incision and drainage perirectal abscess (1998); Adenoidectomy (1962); Colonoscopy (2017); ORIF forearm fracture (Left, 06/23/2013); Breast cyst aspiration (Left); Colonoscopy (N/A, 2/9/2023); and Esophagogastroduodenoscopy (N/A, 4/27/2023).    Family History: family history includes Allergies in her sister; COPD in her maternal grandfather; Cancer in her maternal grandmother and paternal grandmother; Cancer (age of onset: 64) in her mother; Heart disease in her paternal grandfather; Heart disease (age of onset: 64) in her father; Hypertension in her sister; Lupus in her paternal uncle; Mental retardation in her brother; Ovarian cancer in her paternal grandmother; Sjogren's syndrome in her sister; Stroke in her paternal grandmother..       Review of patient's allergies indicates:   Allergen Reactions    Bactrim [sulfamethoxazole-trimethoprim] Nausea And Vomiting     Nausea, ?  rash  Nausea, ? rash       Current Outpatient Medications on File Prior to Visit   Medication Sig Dispense Refill    rosuvastatin (CRESTOR) 20 MG tablet TAKE 1 TABLET(20 MG) BY MOUTH EVERY DAY 90 tablet 2    albuterol (VENTOLIN HFA) 90 mcg/actuation inhaler Inhale 1-2 puffs into the lungs every 6 (six) hours as needed for Wheezing or Shortness of Breath (cough). Rescue 6.7 g 0    alpha lipoic acid 300 mg Cap       ALPRAZolam (XANAX) 0.25 MG tablet Take 1 tablet by mouth every 8 (eight) hours as needed.      aspirin (ECOTRIN) 81 MG EC tablet Take 1 tablet (81 mg total) by mouth once daily.      b complex vitamins tablet Take 1 tablet by mouth 3 (three) times daily.       benzonatate (TESSALON) 200 MG capsule Take 1 capsule (200 mg total) by mouth 3 (three) times daily as needed for Cough. 30 capsule 1    coenzyme Q10 100 mg capsule       ergocalciferol (ERGOCALCIFEROL) 50,000 unit Cap Take 1 capsule (50,000 Units total) by mouth every 7 days. 12 capsule 3    esomeprazole (NEXIUM) 20 MG capsule Take 2 capsules (40 mg total) by mouth before breakfast. 90 capsule 3    fish oil-omega-3 fatty acids 300-1,000 mg capsule Take 1 g by mouth 2 (two) times daily.      hydrOXYchloroQUINE (PLAQUENIL) 200 mg tablet Take 1 tablet (200 mg total) by mouth 2 (two) times daily. Brand name only 180 tablet 2    levothyroxine (SYNTHROID) 112 MCG tablet Take 1 tablet (112 mcg total) by mouth once daily. 90 tablet 3    magnesium oxide (MAG-OX) 200 MG tablet       multivitamin (THERAGRAN) per tablet Take 1 tablet by mouth before breakfast.      riboflavin, vitamin B2, (VITAMIN B-2) 100 mg Tab tablet        No current facility-administered medications on file prior to visit.         Objective Findings:    Vital Signs:  There were no vitals taken for this visit.  There is no height or weight on file to calculate BMI.    Physical Exam:  Physical Exam--deferred due to virtual visit    Labs:  Lab Results   Component Value Date    WBC 6.09  05/10/2023    HGB 12.6 05/10/2023    HCT 40.5 05/10/2023     05/10/2023    CRP 1.9 05/10/2023    CHOL 112 (L) 01/23/2023    TRIG 41 01/23/2023    HDL 57 01/23/2023    ALKPHOS 116 05/10/2023    ALT 19 05/10/2023    AST 30 05/10/2023     05/10/2023     05/10/2023    K 4.8 05/10/2023    K 4.8 05/10/2023     05/10/2023     05/10/2023    CREATININE 0.8 05/10/2023    CREATININE 0.8 05/10/2023    BUN 18 05/10/2023    BUN 18 05/10/2023    CO2 23 05/10/2023    CO2 23 05/10/2023    TSH 3.025 05/10/2023    INR 1.1 01/27/2019    HGBA1C 5.2 01/23/2023       Imaging reviewed: No pertinent imaging reviewed       Endoscopy reviewed: EGD 4/27/2023  Impression:            - LA Grade B reflux esophagitis with no bleeding.                          - 4 cm hiatal hernia.                          - Erythematous mucosa in the antrum. Biopsied.                          - Normal examined duodenum.   Recommendation:        - Discharge patient to home.                          - Patient has a contact number available for                          emergencies. The signs and symptoms of potential                          delayed complications were discussed with the                          patient. Return to normal activities tomorrow.                          Written discharge instructions were provided to                          the patient.                          - Resume previous diet.                          - Use Nexium (esomeprazole) 40 mg PO daily for 12                          weeks.                          - Await pathology results.                          - Repeat upper endoscopy in 12 weeks to check                          healing.                          - Return to GI clinic.   Attending Participation:        I was present and participated during the entire procedure,        including non-wade portions.   Martir Braxton MD   4/27/2023 11:26:32 AM     Colonoscopy 2/9/2023  Impression:             - Hemorrhoids found on perianal exam.                          - The entire examined colon is normal.                          - No specimens collected.   Recommendation:        - Patient has a contact number available for                          emergencies. The signs and symptoms of potential                          delayed complications were discussed with the                          patient. Return to normal activities tomorrow.                          Written discharge instructions were provided to                          the patient.                          - Resume previous diet.                          - Continue present medications.                          - Discharge patient to home (ambulatory).   Attending Participation:        I personally performed the entire procedure.   MD Reina Renee MD   2/9/2023 9:11:36 AM     Colonoscopy 5/12/2020  -One 6mm polyp at the ileocecal valve, removed with a cold snare. Resected and retrieved.  -Normal mucosa in the entire examined colon. Biopsied.  -Repeat Colonoscopy 3-5 years for surveillance based on pathology results.     Assessment:  1. Gastroesophageal reflux disease with esophagitis without hemorrhage    2. Constipation, unspecified constipation type             Plan:  Patient recommended to take her Nexium once in the AM and once in the PM. Was taking both doses in the AM because she was unaware. Will send GERD diet recommendations as well. Can consider dietician referral pending repeat EGD results. Patient would like to discuss weaning off of Nexium, however, based on results of last EGD she should continue taking as instructed. Will discuss at post EGD followup appt if clinically feasible.   Recommended daily bowel regimen to decrease her instances of constipation.  RTC post EGD    Thank you for allowing me to participate in this patient's care.    Sincerely,     Dayan Beaver NP  Gastroenterology Department  [facility]

## 2023-06-07 NOTE — PATIENT INSTRUCTIONS
--Can take Miralax up to three times daily. Recommend starting with once daily dosing and then increasing as needed.  --For GERD/Reflux:     Take your PPI 30-45 minutes before your first protein containing meal (breakfast) every day, then second dose 3--45 minutes prior to dinner. Take twice daily for 12 weeks. If symptoms improve ok discontinue an use PPI as needed for symptoms.     Remain upright for at least 3 hours after eating.      Elevate the head of the bed for nighttime.      Avoid foods that you have noticed make your symptoms worse (possible triggers include: peppermint, alcohol, chocolate, caffeine, spicy foods, greasy/fried foods, acidic foods-citrus).    [facility]  High Fiber Diet    25-30 grams of fiber per day is recommended  Fiber cereal = 5 grams (Raisin Bran, Shredded Wheat, Grape Nuts)  Konsyl 1 teaspoon = 6 grams  Metamucil 1 tablespoon = 3 grams  Citrucel 1 tablespoon = 2 grams  Fiber Choice = 3-4 per day    Drink at least 4-5 glasses of liquids per day or the fiber can be constipating rather then stimulating to your gut.  Boil and bake potatoes in their skin. Eat the skin, too.  Include fresh fruits and raw vegetables in your daily diet. Raw fruits and vegetables have more useful fiber than those that have been peeled, cooked, pureed, or otherwise processed.  Eat a wide variety of fibrous foods in reasonable amounts. Increase fiber intake slowly especially if you have been on a low-fiber diet.  Eat more legumes-peas, beans, soybeans.  For snacks, try dried fruit, whole wheat and rye crackers.  Avoid instant-cook hot cereals. Use the longer cooking cereals.  Use bran whenever possible. Sprinkle it on top of cereal, mix it into mashed potatoes or hamburger meat, or use it in combination dishes such as meat loaf.   Substitute whole grain, whole wheat and bran products for white flour products.  Eat slowly and chew your food thoroughly.        Start daily fiber.  Take 1 tsp of  fiber powder (psyllium or other sugar-free powder).  Mix in 8 oz of water.  Take x 3-5 days.  Then, increase fiber by 1 tsp every 3-5 days until stool is easy to pass.  Stop and continue at that dose.   Do not exceed 6 tsps/day. GOAL:  More well-formed stool (one continuous well-formed piece vs. Pellets) and minimize straining with initiation.        Foods High in Fiber    This diet furnishes adequate amounts of all the essential nutrients needed by the body and a very liberal fiber or roughage content. Roughage is indigestible fiber found in fruits, vegetables and whole grain cereals. It provides bulk to the large intestine and, accompanied by an adequate fluid intake, is a stimulant to elimination. Regular eating and elimination habits are vital to good health.     Fruits:  Use all fruits and juices liberally; fresh, cooked, dried or canned. Eat fruit raw and with skins when possible. Have at least four servings of fruit daily including a citrus fruit and a stewed dried fruit. Hard seeds of fruits (berries, figs, Grapes, mangoes, tomatoes) etc. may be removed.    Vegetables: Use all vegetables liberally. Green leafy vegetables, such as cabbage, spinach, lettuce, broccoli, and other greens are particularly good.    Potato: As desired. Serve baked frequently and eat the skin. Other starchy foods such as rice, macaroni, etc., may be occasionally substituted. Chew popcorn well and do not eat hard kernels.    Meat, Fish, Poultry: One or two servings daily.    Eggs: One daily if you are not on a low cholesterol diet.    Milk: Include at least one-half pint daily. Whole milk or skimmed may be used.     Cereals: Use whole grain breads and cereals such as bran, bran flakes, grape nut flakes, puffed wheat, oatmeal, Wheaties, etc., as much as possible. Refined breaks and cereals are not restricted; however, they do not contain the bulk necessary for this diet.     Sugars, Sweets: Use very moderately. Depend on fruit as  dessert.    Fats: Use butter or margarine as desired. Oil or dressing on salads as desired. Fried foods may be used in moderation. Nuts may be eaten if you chew them well and may be ground or finely chopped for cooking.   Sample Menu                                                                                 Breakfast                          Lunch  Orange juice, 4 ounces                                                Vegetable soup                    Stewed fruit                                                                 Fresh fruit plate with cottage cheese  Shredded wheat                                                           Whole wheat toast  Scrambled eggs                                                           Butter  Whole wheat toast                                                       Coffee or tea  Dinner                                                                         Bedtime  Large glass tomato juice                                             1 glass milk  Roast beef                                                                   stewed fruit  Baked potato with skin  Buttered spinach  Raw vegetable salad  Baked apple with skin   Coffee or tea

## 2023-06-23 ENCOUNTER — TELEPHONE (OUTPATIENT)
Dept: NEUROLOGY | Facility: CLINIC | Age: 68
End: 2023-06-23
Payer: MEDICARE

## 2023-06-23 NOTE — TELEPHONE ENCOUNTER
Called pt to r/s her 6/28 VV with ROBERTO. I attempted to schedule pt on 6/28 using a split NP slot and pt agreed. Pt mentioned she is currently in [place] and I told pt we cannot do that appt. I explained that since Dr. Saucedo is not licensed in [place], she cannot do a virtual. Pt said she is back in town in July. I offered pt appt on 7/19 at 8:00 AM and pt agreed. Will get Dom to schedule.

## 2023-07-19 ENCOUNTER — OFFICE VISIT (OUTPATIENT)
Dept: NEUROLOGY | Facility: CLINIC | Age: 68
End: 2023-07-19
Payer: MEDICARE

## 2023-07-19 ENCOUNTER — HOSPITAL ENCOUNTER (OUTPATIENT)
Dept: RADIOLOGY | Facility: HOSPITAL | Age: 68
Discharge: HOME | End: 2023-07-19
Attending: INTERNAL MEDICINE
Payer: MEDICARE

## 2023-07-19 VITALS
HEART RATE: 54 BPM | SYSTOLIC BLOOD PRESSURE: 110 MMHG | DIASTOLIC BLOOD PRESSURE: 67 MMHG | WEIGHT: 160.06 LBS | HEIGHT: 60 IN | BODY MASS INDEX: 31.42 KG/M2

## 2023-07-19 DIAGNOSIS — Z12.31 ENCOUNTER FOR SCREENING MAMMOGRAM FOR BREAST CANCER: ICD-10-CM

## 2023-07-19 DIAGNOSIS — M35.9 POLYNEUROPATHY IN COLLAGEN VASCULAR DISEASE: Primary | ICD-10-CM

## 2023-07-19 DIAGNOSIS — G63 POLYNEUROPATHY IN COLLAGEN VASCULAR DISEASE: Primary | ICD-10-CM

## 2023-07-19 DIAGNOSIS — G43.109 MIGRAINE WITH AURA AND WITHOUT STATUS MIGRAINOSUS, NOT INTRACTABLE: ICD-10-CM

## 2023-07-19 PROCEDURE — 77063 MAMMO DIGITAL SCREENING BILAT WITH TOMO: ICD-10-PCS | Mod: 26,,, | Performed by: RADIOLOGY

## 2023-07-19 PROCEDURE — 77063 BREAST TOMOSYNTHESIS BI: CPT | Mod: 26,,, | Performed by: RADIOLOGY

## 2023-07-19 PROCEDURE — 99999 PR PBB SHADOW E&M-EST. PATIENT-LVL III: CPT | Mod: PBBFAC,,, | Performed by: STUDENT IN AN ORGANIZED HEALTH CARE EDUCATION/TRAINING PROGRAM

## 2023-07-19 PROCEDURE — 99213 OFFICE O/P EST LOW 20 MIN: CPT | Mod: S$PBB,,, | Performed by: STUDENT IN AN ORGANIZED HEALTH CARE EDUCATION/TRAINING PROGRAM

## 2023-07-19 PROCEDURE — 99213 PR OFFICE/OUTPT VISIT, EST, LEVL III, 20-29 MIN: ICD-10-PCS | Mod: S$PBB,,, | Performed by: STUDENT IN AN ORGANIZED HEALTH CARE EDUCATION/TRAINING PROGRAM

## 2023-07-19 PROCEDURE — 99213 OFFICE O/P EST LOW 20 MIN: CPT | Mod: PBBFAC | Performed by: STUDENT IN AN ORGANIZED HEALTH CARE EDUCATION/TRAINING PROGRAM

## 2023-07-19 PROCEDURE — 77067 MAMMO DIGITAL SCREENING BILAT WITH TOMO: ICD-10-PCS | Mod: 26,,, | Performed by: RADIOLOGY

## 2023-07-19 PROCEDURE — 77067 SCR MAMMO BI INCL CAD: CPT | Mod: TC

## 2023-07-19 PROCEDURE — 77067 SCR MAMMO BI INCL CAD: CPT | Mod: 26,,, | Performed by: RADIOLOGY

## 2023-07-19 PROCEDURE — 99999 PR PBB SHADOW E&M-EST. PATIENT-LVL III: ICD-10-PCS | Mod: PBBFAC,,, | Performed by: STUDENT IN AN ORGANIZED HEALTH CARE EDUCATION/TRAINING PROGRAM

## 2023-07-19 NOTE — PROGRESS NOTES
[facility]  Follow Up Patient Visit      Disease Summary     Principle neurological diagnosis: R acoustic schwannoma, headaches, neuropathy  Last MRI Brain: 9/10/21 stable schwannoma  Last MRI C-spine: NA  CSF 21: No OCB. 50 protein, 55 glucose, B12 544, Normal copper, vitamin E, SPEP    Interval history:     She was taking Magnesium ascorbate 2000mg daily (136mg of elemental magnesium) because she couldn't find other forms at her pharmacy.     She is on CoQ10, B12, ALA    Headaches are rare, sometimes with rare visual aura.     She has neuropathy involving legs and arms that sometimes act up, not persistent. Did not find gabapentin helpful in the past. Couldn't tolerate cymbalta    She is swimming 3-4x/week.    Going back to [place] next week after her EGD    ROS:     SOCIAL HISTORY  Social History     Tobacco Use    Smoking status: Former     Packs/day: 0.50     Years: 15.00     Pack years: 7.50     Types: Cigarettes     Quit date: 1984     Years since quittin.0    Smokeless tobacco: Never    Tobacco comments:     Quit    Substance Use Topics    Alcohol use: Yes     Comment: less than one per week    Drug use: No     Exam:     Vitals:    23 0817   BP: 110/67   BP Location: Left arm   Patient Position: Sitting   BP Method: Medium (Automatic)   Pulse: (!) 54   Weight: 72.6 kg (160 lb 0.9 oz)   Height: 5' (1.524 m)          In general, the patient is well nourished and appears to be in no acute distress.          MENTAL STATUS: language is fluent, normal verbal comprehension, fund of knowlege is appropriate by vocabulary.     CRANIAL NERVE EXAM: EOMI, face symmetric, no dysarthria.     Imaging (personally reviewed):     NA      Labs:     Lab Results   Component Value Date    KUZTQPMF98ED 41 10/20/2021    TIYWLFSI15AN 44 2019    ZKJXLHJE88AK 27 (L) 10/16/2018     No results found for: JCVINDEX, JCVANTIBODY  No results found for: HO4RVNGZ, ABSOLUTECD3, BQ7FAWIV, ABSOLUTECD8,  AB5CVLGT, ABSOLUTECD4, LABCD48  Lab Results   Component Value Date    WBC 6.09 05/10/2023    RBC 4.13 05/10/2023    HGB 12.6 05/10/2023    HCT 40.5 05/10/2023    MCV 98 05/10/2023    MCH 30.5 05/10/2023    MCHC 31.1 (L) 05/10/2023    RDW 12.2 05/10/2023     05/10/2023    MPV 11.7 05/10/2023    GRAN 3.9 05/10/2023    GRAN 64.4 05/10/2023    LYMPH 1.5 05/10/2023    LYMPH 24.3 05/10/2023    MONO 0.5 05/10/2023    MONO 8.5 05/10/2023    EOS 0.1 05/10/2023    BASO 0.06 05/10/2023    EOSINOPHIL 1.6 05/10/2023    BASOPHIL 1.0 05/10/2023     Sodium   Date Value Ref Range Status   05/10/2023 141 136 - 145 mmol/L Final   05/10/2023 141 136 - 145 mmol/L Final     Potassium   Date Value Ref Range Status   05/10/2023 4.8 3.5 - 5.1 mmol/L Final   05/10/2023 4.8 3.5 - 5.1 mmol/L Final     Chloride   Date Value Ref Range Status   05/10/2023 108 95 - 110 mmol/L Final   05/10/2023 108 95 - 110 mmol/L Final     CO2   Date Value Ref Range Status   05/10/2023 23 23 - 29 mmol/L Final   05/10/2023 23 23 - 29 mmol/L Final     Glucose   Date Value Ref Range Status   05/10/2023 74 70 - 110 mg/dL Final   05/10/2023 74 70 - 110 mg/dL Final     BUN   Date Value Ref Range Status   05/10/2023 18 8 - 23 mg/dL Final   05/10/2023 18 8 - 23 mg/dL Final     Creatinine   Date Value Ref Range Status   05/10/2023 0.8 0.5 - 1.4 mg/dL Final   05/10/2023 0.8 0.5 - 1.4 mg/dL Final     Calcium   Date Value Ref Range Status   05/10/2023 10.0 8.7 - 10.5 mg/dL Final   05/10/2023 10.0 8.7 - 10.5 mg/dL Final     Total Protein   Date Value Ref Range Status   05/10/2023 7.2 6.0 - 8.4 g/dL Final     Albumin   Date Value Ref Range Status   05/10/2023 3.8 3.5 - 5.2 g/dL Final   05/10/2023 3.8 3.5 - 5.2 g/dL Final     Total Bilirubin   Date Value Ref Range Status   05/10/2023 0.3 0.1 - 1.0 mg/dL Final     Comment:     For infants and newborns, interpretation of results should be based  on gestational age, weight and in agreement with  clinical  observations.    Premature Infant recommended reference ranges:  Up to 24 hours.............<8.0 mg/dL  Up to 48 hours............<12.0 mg/dL  3-5 days..................<15.0 mg/dL  6-29 days.................<15.0 mg/dL       Alkaline Phosphatase   Date Value Ref Range Status   05/10/2023 116 55 - 135 U/L Final     AST   Date Value Ref Range Status   05/10/2023 30 10 - 40 U/L Final     ALT   Date Value Ref Range Status   05/10/2023 19 10 - 44 U/L Final     Anion Gap   Date Value Ref Range Status   05/10/2023 10 8 - 16 mmol/L Final   05/10/2023 10 8 - 16 mmol/L Final     eGFR if    Date Value Ref Range Status   07/14/2022 >60.0 >60 mL/min/1.73 m^2 Final     eGFR if non    Date Value Ref Range Status   07/14/2022 59.3 (A) >60 mL/min/1.73 m^2 Final     Comment:     Calculation used to obtain the estimated glomerular filtration  rate (eGFR) is the CKD-EPI equation.                   Diagnosis/Assessment/Plan:     Headaches and neuropathy, no concerning features on imaging or exam previously.    -Switch to magnesium glycinate 400mg daily  -Continue CoQ10, ALA, and B12 supplement  -Ibuprofen as needed for occasional headaches  -Discussed lifestyle changes for nerve health, including continuing swimming  -Return to clinic as needed.      Total time spent with the patient: 25 minutes, including face to face consultation, chart review and coordination of care, on the day of the visit. This includes face to face time and non-face to face time preparing to see the patient (eg, review of tests), obtaining and/or reviewing separately obtained history, documenting clinical information in the electronic or other health record, independently interpreting resultsand communicating results to the patient/family/caregiver, or care coordination.         Rochelle Saucedo MD, MSc  Attending neurologist

## 2023-07-20 RX ORDER — SODIUM CHLORIDE 0.9 % (FLUSH) 0.9 %
10 SYRINGE (ML) INJECTION
Status: CANCELLED | OUTPATIENT
Start: 2023-07-20

## 2023-07-20 RX ORDER — SODIUM CHLORIDE 9 MG/ML
INJECTION, SOLUTION INTRAVENOUS CONTINUOUS
Status: CANCELLED | OUTPATIENT
Start: 2023-07-20

## 2023-07-21 ENCOUNTER — ANESTHESIA EVENT (OUTPATIENT)
Dept: ENDOSCOPY | Facility: HOSPITAL | Age: 68
End: 2023-07-21
Payer: MEDICARE

## 2023-07-21 ENCOUNTER — ANESTHESIA (OUTPATIENT)
Dept: ENDOSCOPY | Facility: HOSPITAL | Age: 68
End: 2023-07-21
Payer: MEDICARE

## 2023-07-21 ENCOUNTER — HOSPITAL ENCOUNTER (OUTPATIENT)
Facility: HOSPITAL | Age: 68
Discharge: HOME | End: 2023-07-21
Attending: INTERNAL MEDICINE | Admitting: INTERNAL MEDICINE
Payer: MEDICARE

## 2023-07-21 VITALS
OXYGEN SATURATION: 98 % | TEMPERATURE: 98 F | HEART RATE: 61 BPM | DIASTOLIC BLOOD PRESSURE: 51 MMHG | RESPIRATION RATE: 16 BRPM | SYSTOLIC BLOOD PRESSURE: 108 MMHG

## 2023-07-21 DIAGNOSIS — K21.9 GERD (GASTROESOPHAGEAL REFLUX DISEASE): ICD-10-CM

## 2023-07-21 PROCEDURE — 25000003 PHARM REV CODE 250: Performed by: NURSE ANESTHETIST, CERTIFIED REGISTERED

## 2023-07-21 PROCEDURE — 63600175 PHARM REV CODE 636 W HCPCS: Performed by: NURSE ANESTHETIST, CERTIFIED REGISTERED

## 2023-07-21 PROCEDURE — E9220 PRA ENDO ANESTHESIA: ICD-10-PCS | Mod: ,,, | Performed by: NURSE ANESTHETIST, CERTIFIED REGISTERED

## 2023-07-21 PROCEDURE — 43235 EGD DIAGNOSTIC BRUSH WASH: CPT | Mod: ,,, | Performed by: INTERNAL MEDICINE

## 2023-07-21 PROCEDURE — 43235 PR EGD, FLEX, DIAGNOSTIC: ICD-10-PCS | Mod: ,,, | Performed by: INTERNAL MEDICINE

## 2023-07-21 PROCEDURE — 43235 EGD DIAGNOSTIC BRUSH WASH: CPT | Performed by: INTERNAL MEDICINE

## 2023-07-21 PROCEDURE — 37000009 HC ANESTHESIA EA ADD 15 MINS: Performed by: INTERNAL MEDICINE

## 2023-07-21 PROCEDURE — 37000008 HC ANESTHESIA 1ST 15 MINUTES: Performed by: INTERNAL MEDICINE

## 2023-07-21 PROCEDURE — E9220 PRA ENDO ANESTHESIA: HCPCS | Mod: ,,, | Performed by: NURSE ANESTHETIST, CERTIFIED REGISTERED

## 2023-07-21 RX ORDER — LIDOCAINE HYDROCHLORIDE 20 MG/ML
INJECTION INTRAVENOUS
Status: DISCONTINUED | OUTPATIENT
Start: 2023-07-21 | End: 2023-07-21

## 2023-07-21 RX ORDER — PROPOFOL 10 MG/ML
VIAL (ML) INTRAVENOUS CONTINUOUS PRN
Status: DISCONTINUED | OUTPATIENT
Start: 2023-07-21 | End: 2023-07-21

## 2023-07-21 RX ORDER — PROPOFOL 10 MG/ML
VIAL (ML) INTRAVENOUS
Status: DISCONTINUED | OUTPATIENT
Start: 2023-07-21 | End: 2023-07-21

## 2023-07-21 RX ORDER — SODIUM CHLORIDE 9 MG/ML
INJECTION, SOLUTION INTRAVENOUS CONTINUOUS
Status: DISCONTINUED | OUTPATIENT
Start: 2023-07-21 | End: 2023-07-21 | Stop reason: HOSPADM

## 2023-07-21 RX ADMIN — SODIUM CHLORIDE: 0.9 INJECTION, SOLUTION INTRAVENOUS at 07:07

## 2023-07-21 RX ADMIN — Medication 225 MCG/KG/MIN: at 08:07

## 2023-07-21 RX ADMIN — GLYCOPYRROLATE 0.1 MG: 0.2 INJECTION, SOLUTION INTRAMUSCULAR; INTRAVENOUS at 08:07

## 2023-07-21 RX ADMIN — LIDOCAINE HYDROCHLORIDE 100 MG: 20 INJECTION INTRAVENOUS at 08:07

## 2023-07-21 RX ADMIN — PROPOFOL 100 MG: 10 INJECTION, EMULSION INTRAVENOUS at 08:07

## 2023-07-21 NOTE — ANESTHESIA POSTPROCEDURE EVALUATION
Anesthesia Post Evaluation    Patient: Radha Chiang    Procedure(s) Performed: Procedure(s) (LRB):  EGD (ESOPHAGOGASTRODUODENOSCOPY) (N/A)    Final Anesthesia Type: general      Patient location during evaluation: GI PACU  Patient participation: Yes- Able to Participate  Level of consciousness: awake and alert  Post-procedure vital signs: reviewed and stable  Pain management: adequate  Airway patency: patent  SURENDRA mitigation strategies: Multimodal analgesia  PONV status at discharge: No PONV  Anesthetic complications: no      Cardiovascular status: stable  Respiratory status: unassisted and spontaneous ventilation  Hydration status: euvolemic  Follow-up not needed.          Vitals Value Taken Time   /51 07/21/23 0843   Temp  07/21/23 1020   Pulse 61 07/21/23 0843   Resp 16 07/21/23 0843   SpO2 98 % 07/21/23 0843         No case tracking events are documented in the log.      Pain/Kiersten Score: Kiersten Score: 10 (7/21/2023  8:27 AM)

## 2023-07-21 NOTE — PROVATION PATIENT INSTRUCTIONS
Discharge Summary/Instructions after an Endoscopic Procedure  Patient Name: Radha Chiang  Patient MRN: [id number]  Patient YOB: 1955 Friday, July 21, 2023  Martir Braxton MD  Dear patient,  As a result of recent federal legislation (The Federal Cures Act), you may   receive lab or pathology results from your procedure in your [facility]   account before your physician is able to contact you. Your physician or   their representative will relay the results to you with their   recommendations at their soonest availability.  Thank you,  RESTRICTIONS:  During your procedure today, you received medications for sedation.  These   medications may affect your judgment, balance and coordination.  Therefore,   for 24 hours, you have the following restrictions:   - DO NOT drive a car, operate machinery, make legal/financial decisions,   sign important papers or drink alcohol.    ACTIVITY:  Today: no heavy lifting, straining or running due to procedural   sedation/anesthesia.  The following day: return to full activity including work.  DIET:  Eat and drink normally unless instructed otherwise.     TREATMENT FOR COMMON SIDE EFFECTS:  - Mild abdominal pain, nausea, belching, bloating or excessive gas:  rest,   eat lightly and use a heating pad.  - Sore Throat: treat with throat lozenges and/or gargle with warm salt   water.  - Because air was used during the procedure, expelling large amounts of air   from your rectum or belching is normal.  - If a bowel prep was taken, you may not have a bowel movement for 1-3 days.    This is normal.  SYMPTOMS TO WATCH FOR AND REPORT TO YOUR PHYSICIAN:  1. Abdominal pain or bloating, other than gas cramps.  2. Chest pain.  3. Back pain.  4. Signs of infection such as: chills or fever occurring within 24 hours   after the procedure.  5. Rectal bleeding, which would show as bright red, maroon, or black stools.   (A tablespoon of blood from the rectum is not serious, especially if    hemorrhoids are present.)  6. Vomiting.  7. Weakness or dizziness.  GO DIRECTLY TO THE NEAREST EMERGENCY ROOM IF YOU HAVE ANY OF THE FOLLOWING:      Difficulty breathing              Chills and/or fever over 101 F   Persistent vomiting and/or vomiting blood   Severe abdominal pain   Severe chest pain   Black, tarry stools   Bleeding- more than one tablespoon   Any other symptom or condition that you feel may need urgent attention  Your doctor recommends these additional instructions:  If any biopsies were taken, your doctors clinic will contact you in 1 to 2   weeks with any results.  - Discharge patient to home (ambulatory).   - Resume previous diet; Discharge to home (ambulatory); Resume outpatient   medications  - Return to primary care physician as previously scheduled.   - Return to GI clinic at appointment to be scheduled.  For questions, problems or results please call your physician - Martir Braxton MD at Work:  [phone].  [facility], EMERGENCY ROOM PHONE NUMBER: [phone]  IF A COMPLICATION OR EMERGENCY SITUATION ARISES AND YOU ARE UNABLE TO REACH   YOUR PHYSICIAN - GO DIRECTLY TO THE EMERGENCY ROOM.  Martir Braxton MD  7/21/2023 8:25:25 AM  This report has been verified and signed electronically.  Dear patient,  As a result of recent federal legislation (The Federal Cures Act), you may   receive lab or pathology results from your procedure in your [facility]   account before your physician is able to contact you. Your physician or   their representative will relay the results to you with their   recommendations at their soonest availability.  Thank you,  PROVATION

## 2023-07-21 NOTE — ANESTHESIA PREPROCEDURE EVALUATION
07/21/2023  Radha Chiang is a 68 y.o., female.    Pre-operative evaluation for Procedure(s) (LRB):  EGD (ESOPHAGOGASTRODUODENOSCOPY) (N/A)    Radha Chiang is a 68 y.o. 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    Hypothyroid    Crohn's disease    Asthma, chronic    Hearing loss of right ear    Right-sided tinnitus    Elevated alkaline phosphatase level    Right acoustic neuroma    Pancolonic diverticulosis    Abnormal finding on MRI of brain    Vitamin D deficiency    Osteoarthritis of left knee    Vertigo    Obesity (BMI 30-39.9)    History of tobacco use    Dizziness    Carotid artery disease without cerebral infarction    Carotid stenosis, right    Bilateral carotid artery stenosis    Vestibular dizziness    Impairment of balance    Wears contact lenses    Elevated parathyroid hormone    Hyperparathyroidism    Hemorrhoids    Crohn's colitis, with abscess    Other osteoporosis without current pathological fracture       Review of patient's allergies indicates:   Allergen Reactions    Bactrim [sulfamethoxazole-trimethoprim] Nausea And Vomiting     Nausea, ? rash  Nausea, ? rash       No current facility-administered medications on file prior to encounter.     Current Outpatient Medications on File Prior to Encounter   Medication Sig Dispense Refill    albuterol (VENTOLIN HFA) 90 mcg/actuation inhaler Inhale 1-2 puffs into the lungs every 6 (six) hours as needed for Wheezing or Shortness of Breath (cough). Rescue 6.7 g 0    alpha lipoic acid 300 mg Cap       aspirin (ECOTRIN) 81 MG EC tablet Take 1 tablet (81 mg total) by mouth once daily.      coenzyme Q10 100 mg capsule       ergocalciferol  (ERGOCALCIFEROL) 50,000 unit Cap Take 1 capsule (50,000 Units total) by mouth every 7 days. 12 capsule 3    esomeprazole (NEXIUM) 20 MG capsule Take 2 capsules (40 mg total) by mouth before breakfast. 90 capsule 3    fish oil-omega-3 fatty acids 300-1,000 mg capsule Take 1 g by mouth 2 (two) times daily.      hydrOXYchloroQUINE (PLAQUENIL) 200 mg tablet Take 1 tablet (200 mg total) by mouth 2 (two) times daily. Brand name only 180 tablet 2    levothyroxine (SYNTHROID) 112 MCG tablet Take 1 tablet (112 mcg total) by mouth once daily. 90 tablet 3    magnesium oxide (MAG-OX) 200 MG tablet       multivitamin (THERAGRAN) per tablet Take 1 tablet by mouth before breakfast.      rosuvastatin (CRESTOR) 20 MG tablet TAKE 1 TABLET(20 MG) BY MOUTH EVERY DAY 90 tablet 2       Past Surgical History:   Procedure Laterality Date    ADENOIDECTOMY      BRAIN SURGERY  2012    Brain Biopsy    BREAST CYST ASPIRATION Left     COLONOSCOPY      [facility] Dr. WILLIS Summers; pan-diverticulosis, rec repeat 3-5 years    COLONOSCOPY N/A 2023    Procedure: COLONOSCOPY;  Surgeon: Reina Crabtree MD;  [facility];  Service: Colon and Rectal;  Laterality: N/A;    ESOPHAGOGASTRODUODENOSCOPY N/A 2023    Procedure: ESOPHAGOGASTRODUODENOSCOPY (EGD);  Surgeon: Martir Braxton MD;  Location: [address]);  Service: Endoscopy;  Laterality: N/A;   Pre-call attempted, pt had phone trouble kls    INCISION AND DRAINAGE PERIRECTAL ABSCESS      abscess removal    ORIF FOREARM FRACTURE Left 2013    proximal ulnar and radius       Social History     Socioeconomic History    Marital status: Single   Occupational History    Occupation: human resources   Tobacco Use    Smoking status: Former     Packs/day: 0.50     Years: 15.00     Pack years: 7.50     Types: Cigarettes     Quit date: 1984     Years since quittin.0    Smokeless tobacco: Never    Tobacco comments:     Quit    Substance  and Sexual Activity    Alcohol use: Yes     Comment: less than one per week    Drug use: No    Sexual activity: Never   Social History Narrative    [place] in [place], rest of the year in [facility]    Enjoys traveling; Going to [facility] x 1 month 2018    Regular exercise     Social Determinants of Health     Financial Resource Strain: Low Risk     Difficulty of Paying Living Expenses: Not hard at all   Food Insecurity: No Food Insecurity    Worried About Running Out of Food in the Last Year: Never true    Ran Out of Food in the Last Year: Never true   Transportation Needs: No Transportation Needs    Lack of Transportation (Medical): No    Lack of Transportation (Non-Medical): No   Physical Activity: Sufficiently Active    Days of Exercise per Week: 3 days    Minutes of Exercise per Session: 60 min   Stress: No Stress Concern Present    Feeling of Stress : Not at all   Social Connections: Unknown    Frequency of Communication with Friends and Family: Three times a week    Frequency of Social Gatherings with Friends and Family: Three times a week    Active Member of Clubs or Organizations: Yes    Attends Club or Organization Meetings: More than 4 times per year    Marital Status: Never    Housing Stability: Low Risk     Unable to Pay for Housing in the Last Year: No    Number of Places Lived in the Last Year: 1    Unstable Housing in the Last Year: No         CBC: No results for input(s): WBC, RBC, HGB, HCT, PLT, MCV, MCH, MCHC in the last 72 hours.    CMP: No results for input(s): NA, K, CL, CO2, BUN, CREATININE, GLU, MG, PHOS, CALCIUM, ALBUMIN, PROT, ALKPHOS, ALT, AST, BILITOT in the last 72 hours.    INR  No results for input(s): PT, INR, PROTIME, APTT in the last 72 hours.        Diagnostic Studies:      EKD Echo:  No results found. However, due to the size of the patient record, not all encounters were searched. Please check Results Review for a complete set of  results.        Pre-op Assessment    I have reviewed the Patient Summary Reports.     I have reviewed the Nursing Notes. I have reviewed the NPO Status.   I have reviewed the Medications.     Review of Systems  Anesthesia Hx:  No problems with previous Anesthesia  Denies Family Hx of Anesthesia complications.   Denies Personal Hx of Anesthesia complications.   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    Cardiovascular:   Exercise tolerance: good    Pulmonary:   Asthma Shortness of breath    Renal/:  Renal/ Normal     Hepatic/GI:   GERD Liver Disease, (hepatomegaly ) Crohns   Musculoskeletal:   Arthritis     Neurological:   CVA, residual symptoms Headaches Right side weakness  Peripheral Neuropathy    Endocrine:   Hypothyroidism Lupus   Dermatological:  Skin Normal    Psych:   Psychiatric History depression          Physical Exam  General: Well nourished, Cooperative and Alert    Airway:  Mallampati: II   Mouth Opening: Normal  TM Distance: Normal  Tongue: Normal  Neck ROM: Normal ROM    Dental:    Chest/Lungs:  Normal Respiratory Rate    Heart:  Rate: Normal        Anesthesia Plan  Type of Anesthesia, risks & benefits discussed:    Anesthesia Type: Gen ETT, Gen Natural Airway  Intra-op Monitoring Plan: Standard ASA Monitors  Post Op Pain Control Plan: multimodal analgesia and IV/PO Opioids PRN  Induction:  IV  Airway Plan: Direct, Post-Induction  Informed Consent: Informed consent signed with the Patient and all parties understand the risks and agree with anesthesia plan.  All questions answered.   ASA Score: 3  Day of Surgery Review of History & Physical: H&P Update referred to the surgeon/provider.    Ready For Surgery From Anesthesia Perspective.     .

## 2023-07-21 NOTE — TRANSFER OF CARE
Anesthesia Transfer of Care Note    Patient: Radha Chiang    Procedure(s) Performed: Procedure(s) (LRB):  EGD (ESOPHAGOGASTRODUODENOSCOPY) (N/A)    Patient location: GI    Anesthesia Type: general    Transport from OR: Transported from OR on room air with adequate spontaneous ventilation    Post pain: adequate analgesia    Post assessment: no apparent anesthetic complications    Post vital signs: stable    Level of consciousness: awake    Nausea/Vomiting: no nausea/vomiting    Complications: none    Transfer of care protocol was followed      Last vitals:   Visit Vitals  /62   Pulse (!) 52   Temp 36.8 °C (98.2 °F) (Skin)   Resp 17   SpO2 95%

## 2023-07-21 NOTE — H&P
Short Stay Endoscopy History and Physical    PCP - Merry Espinal MD  Referring Physician - Jose Miguel Martino MD  [address] [place] [address]    Procedure - EGD  ASA - per anesthesia  Mallampati - per anesthesia  History of Anesthesia problems - no  Family history Anesthesia problems -  no   Plan of anesthesia - General    HPI:  This is a 68 y.o. female here for evaluation of: f/u esophagitis    Reflux - POS  Dysphagia - no  Abdominal pain - no  Diarrhea - no    ROS:  Constitutional: No fevers, chills, No weight loss  CV: No chest pain  Pulm: No cough, No shortness of breath  GI: see HPI    Medical History:  has a past medical history of Acid reflux, Anemia (2years ago), Anxiety, Arthritis, Asthma, chronic (4/10/2013), Crohn's disease (1998), Depression, Dry eyes, Fracture of forearm, proximal, open, History of papilledema (11/10/11), Hyperlipidemia (5/21/2018), Lupus, Neuromuscular disorder (12/2009), Osteoporosis, Stroke (1/29/2013), and Thyroid disease.    Surgical History:  has a past surgical history that includes Brain surgery (01/18/2012); Incision and drainage perirectal abscess (1998); Adenoidectomy (1962); Colonoscopy (2017); ORIF forearm fracture (Left, 06/23/2013); Breast cyst aspiration (Left); Colonoscopy (N/A, 2/9/2023); and Esophagogastroduodenoscopy (N/A, 4/27/2023).    Family History: family history includes Allergies in her sister; COPD in her maternal grandfather; Cancer in her maternal grandmother and paternal grandmother; Cancer (age of onset: 64) in her mother; Heart disease in her paternal grandfather; Heart disease (age of onset: 64) in her father; Hypertension in her sister; Lupus in her paternal uncle; Mental retardation in her brother; Ovarian cancer in her paternal grandmother; Sjogren's syndrome in her sister; Stroke in her paternal grandmother..    Social History:  reports that she quit smoking about 39 years ago. Her smoking use included cigarettes. She has  a 7.50 pack-year smoking history. She has never used smokeless tobacco. She reports current alcohol use. She reports that she does not use drugs.    Review of patient's allergies indicates:   Allergen Reactions    Bactrim [sulfamethoxazole-trimethoprim] Nausea And Vomiting     Nausea, ? rash  Nausea, ? rash       Medications:   Medications Prior to Admission   Medication Sig Dispense Refill Last Dose    aspirin (ECOTRIN) 81 MG EC tablet Take 1 tablet (81 mg total) by mouth once daily.   Past Week    calcium carbonate/vitamin D3 (CALTRATE 600 + D ORAL) Take 2 tablets by mouth once daily.   7/20/2023    coenzyme Q10 100 mg capsule    7/20/2023    ergocalciferol (ERGOCALCIFEROL) 50,000 unit Cap Take 1 capsule (50,000 Units total) by mouth every 7 days. 12 capsule 3 7/20/2023    esomeprazole (NEXIUM) 20 MG capsule Take 2 capsules (40 mg total) by mouth before breakfast. 90 capsule 3 7/20/2023    fish oil-omega-3 fatty acids 300-1,000 mg capsule Take 1 g by mouth 2 (two) times daily.   7/20/2023    hydrOXYchloroQUINE (PLAQUENIL) 200 mg tablet Take 1 tablet (200 mg total) by mouth 2 (two) times daily. Brand name only 180 tablet 2 7/20/2023    levothyroxine (SYNTHROID) 112 MCG tablet Take 1 tablet (112 mcg total) by mouth once daily. 90 tablet 3 7/20/2023    magnesium oxide (MAG-OX) 200 MG tablet    7/20/2023    multivitamin (THERAGRAN) per tablet Take 1 tablet by mouth before breakfast.   7/20/2023    rosuvastatin (CRESTOR) 20 MG tablet TAKE 1 TABLET(20 MG) BY MOUTH EVERY DAY 90 tablet 2 7/20/2023    albuterol (VENTOLIN HFA) 90 mcg/actuation inhaler Inhale 1-2 puffs into the lungs every 6 (six) hours as needed for Wheezing or Shortness of Breath (cough). Rescue 6.7 g 0     alpha lipoic acid 300 mg Cap           Physical Exam:    Vital Signs:   Vitals:    07/21/23 0758   BP: 117/62   Pulse:    Resp:    Temp:        General Appearance: Well appearing in no acute distress  Lungs: no labored breathing  CVS:  regular  rate  Abdomen: non tender    Labs:  Lab Results   Component Value Date    WBC 6.09 05/10/2023    HGB 12.6 05/10/2023    HCT 40.5 05/10/2023     05/10/2023    CHOL 112 (L) 01/23/2023    TRIG 41 01/23/2023    HDL 57 01/23/2023    ALT 19 05/10/2023    AST 30 05/10/2023     05/10/2023     05/10/2023    K 4.8 05/10/2023    K 4.8 05/10/2023     05/10/2023     05/10/2023    CREATININE 0.8 05/10/2023    CREATININE 0.8 05/10/2023    BUN 18 05/10/2023    BUN 18 05/10/2023    CO2 23 05/10/2023    CO2 23 05/10/2023    TSH 3.025 05/10/2023    INR 1.1 01/27/2019    HGBA1C 5.2 01/23/2023       I have explained the risks and benefits of this endoscopic procedure to the patient including but not limited to bleeding, inflammation, infection, perforation, and death.      Martir Braxton MD

## 2023-08-03 ENCOUNTER — PATIENT MESSAGE (OUTPATIENT)
Dept: ENDOSCOPY | Facility: HOSPITAL | Age: 68
End: 2023-08-03
Payer: MEDICARE

## 2023-08-03 ENCOUNTER — TELEPHONE (OUTPATIENT)
Dept: ENDOSCOPY | Facility: HOSPITAL | Age: 68
End: 2023-08-03
Payer: MEDICARE

## 2023-08-03 NOTE — TELEPHONE ENCOUNTER
----- Message from Soraya Enamorado MD sent at 7/21/2023 11:32 AM CDT -----  Regarding: Clinic appt  Hello, can you please schedule a clinic appt with me in September? Thanks you

## 2023-08-23 NOTE — ADDENDUM NOTE
Addendum  created 08/23/23 1738 by Muriel Espinal CRNA    Attestation recorded in Intraprocedure, Intraprocedure Attestations filed

## 2023-08-23 NOTE — ADDENDUM NOTE
Addendum  created 08/23/23 1758 by Muriel Espinal CRNA    Attestation recorded in Intraprocedure, Intraprocedure Attestations filed

## 2023-09-05 ENCOUNTER — PATIENT MESSAGE (OUTPATIENT)
Dept: GASTROENTEROLOGY | Facility: CLINIC | Age: 68
End: 2023-09-05
Payer: MEDICARE

## 2023-09-06 ENCOUNTER — PATIENT MESSAGE (OUTPATIENT)
Dept: RHEUMATOLOGY | Facility: CLINIC | Age: 68
End: 2023-09-06
Payer: MEDICARE

## 2023-09-07 ENCOUNTER — PATIENT MESSAGE (OUTPATIENT)
Dept: RHEUMATOLOGY | Facility: CLINIC | Age: 68
End: 2023-09-07
Payer: MEDICARE

## 2023-09-08 ENCOUNTER — PATIENT MESSAGE (OUTPATIENT)
Dept: INTERNAL MEDICINE | Facility: CLINIC | Age: 68
End: 2023-09-08
Payer: MEDICARE

## 2023-09-09 ENCOUNTER — PATIENT MESSAGE (OUTPATIENT)
Dept: INTERNAL MEDICINE | Facility: CLINIC | Age: 68
End: 2023-09-09
Payer: MEDICARE

## 2023-09-09 ENCOUNTER — PATIENT MESSAGE (OUTPATIENT)
Dept: RHEUMATOLOGY | Facility: CLINIC | Age: 68
End: 2023-09-09
Payer: MEDICARE

## 2023-09-11 DIAGNOSIS — M32.8 OTHER FORMS OF SYSTEMIC LUPUS ERYTHEMATOSUS, UNSPECIFIED ORGAN INVOLVEMENT STATUS: ICD-10-CM

## 2023-09-11 RX ORDER — HYDROXYCHLOROQUINE SULFATE 200 MG/1
300 TABLET, FILM COATED ORAL DAILY
Qty: 135 TABLET | Refills: 2 | Status: SHIPPED | OUTPATIENT
Start: 2023-09-11 | End: 2023-11-06

## 2023-09-16 NOTE — TELEPHONE ENCOUNTER
No care due was identified.  [facility] Embedded Care Due Messages. Reference number: [id number].   9/16/2023 5:26:52 AM CDT

## 2023-09-17 RX ORDER — LEVOTHYROXINE SODIUM 112 UG/1
112 TABLET ORAL
Qty: 90 TABLET | Refills: 1 | Status: SHIPPED | OUTPATIENT
Start: 2023-09-17 | End: 2024-03-14

## 2023-09-17 NOTE — TELEPHONE ENCOUNTER
Refill Decision Note   Radha Chiang  is requesting a refill authorization.  Brief Assessment and Rationale for Refill:  Approve     Medication Therapy Plan:         Comments:     Note composed:2:52 AM 09/17/2023

## 2023-09-21 ENCOUNTER — PATIENT MESSAGE (OUTPATIENT)
Dept: RHEUMATOLOGY | Facility: CLINIC | Age: 68
End: 2023-09-21
Payer: MEDICARE

## 2023-09-26 ENCOUNTER — OFFICE VISIT (OUTPATIENT)
Dept: INTERNAL MEDICINE | Facility: CLINIC | Age: 68
End: 2023-09-26
Payer: MEDICARE

## 2023-09-26 ENCOUNTER — HOSPITAL ENCOUNTER (OUTPATIENT)
Dept: RADIOLOGY | Facility: HOSPITAL | Age: 68
Discharge: HOME | End: 2023-09-26
Attending: INTERNAL MEDICINE
Payer: MEDICARE

## 2023-09-26 ENCOUNTER — PATIENT MESSAGE (OUTPATIENT)
Dept: RHEUMATOLOGY | Facility: CLINIC | Age: 68
End: 2023-09-26
Payer: MEDICARE

## 2023-09-26 VITALS
WEIGHT: 164.69 LBS | OXYGEN SATURATION: 98 % | HEIGHT: 60 IN | BODY MASS INDEX: 32.33 KG/M2 | HEART RATE: 59 BPM | DIASTOLIC BLOOD PRESSURE: 70 MMHG | SYSTOLIC BLOOD PRESSURE: 120 MMHG

## 2023-09-26 DIAGNOSIS — M19.90 OSTEOARTHRITIS, UNSPECIFIED OSTEOARTHRITIS TYPE, UNSPECIFIED SITE: ICD-10-CM

## 2023-09-26 DIAGNOSIS — M21.961 DEFORMITY OF RIGHT FOOT: ICD-10-CM

## 2023-09-26 DIAGNOSIS — E03.9 HYPOTHYROIDISM, UNSPECIFIED TYPE: Primary | ICD-10-CM

## 2023-09-26 DIAGNOSIS — D33.3 RIGHT ACOUSTIC NEUROMA: ICD-10-CM

## 2023-09-26 DIAGNOSIS — M25.50 ARTHRALGIA, UNSPECIFIED JOINT: ICD-10-CM

## 2023-09-26 DIAGNOSIS — E78.5 HYPERLIPIDEMIA, UNSPECIFIED HYPERLIPIDEMIA TYPE: ICD-10-CM

## 2023-09-26 DIAGNOSIS — M32.9 SYSTEMIC LUPUS ERYTHEMATOSUS, UNSPECIFIED SLE TYPE, UNSPECIFIED ORGAN INVOLVEMENT STATUS: ICD-10-CM

## 2023-09-26 DIAGNOSIS — E66.9 CLASS 1 OBESITY WITH BODY MASS INDEX (BMI) OF 32.0 TO 32.9 IN ADULT, UNSPECIFIED OBESITY TYPE, UNSPECIFIED WHETHER SERIOUS COMORBIDITY PRESENT: ICD-10-CM

## 2023-09-26 PROCEDURE — 99999 PR PBB SHADOW E&M-EST. PATIENT-LVL V: CPT | Mod: PBBFAC,,, | Performed by: INTERNAL MEDICINE

## 2023-09-26 PROCEDURE — 99215 OFFICE O/P EST HI 40 MIN: CPT | Mod: PBBFAC | Performed by: INTERNAL MEDICINE

## 2023-09-26 PROCEDURE — 99214 PR OFFICE/OUTPT VISIT, EST, LEVL IV, 30-39 MIN: ICD-10-PCS | Mod: S$PBB,,, | Performed by: INTERNAL MEDICINE

## 2023-09-26 PROCEDURE — 99999 PR PBB SHADOW E&M-EST. PATIENT-LVL V: ICD-10-PCS | Mod: PBBFAC,,, | Performed by: INTERNAL MEDICINE

## 2023-09-26 PROCEDURE — 73630 X-RAY EXAM OF FOOT: CPT | Mod: TC,RT

## 2023-09-26 PROCEDURE — 73630 X-RAY EXAM OF FOOT: CPT | Mod: 26,RT,, | Performed by: RADIOLOGY

## 2023-09-26 PROCEDURE — 99214 OFFICE O/P EST MOD 30 MIN: CPT | Mod: S$PBB,,, | Performed by: INTERNAL MEDICINE

## 2023-09-26 PROCEDURE — 73630 XR FOOT COMPLETE 3 VIEW RIGHT: ICD-10-PCS | Mod: 26,RT,, | Performed by: RADIOLOGY

## 2023-09-26 NOTE — PROGRESS NOTES
Subjective:       Patient ID: Radha Chiang is a 68 y.o. female.    Chief Complaint: Annual Exam  This is a 68-year-old who presents today for checkup she reports that she is been having some issues more recently with joint pains she does have history of lupus and was concerned as would like to do some labs today while she is here she had a chemistry lab earlier but wanted to do some of her inflammatory labs because some changes in her symptoms especially hand stiffness more recently.  She does take Plaquenil and continues to follow with rheumatology.  She continues to travel.  Reports when she was out of town she was having some issues with her shoulder they did some x-rays and told her she had some osteoarthritis she does have discomfort when she sleeps on 1 side.  She has been taking her cholesterol medication regularly  She also like to consider seeing bariatric medicine for consultation regarding getting her weight down to help with her arthritis.    HPI  Review of Systems   Constitutional:  Negative for fever.   Respiratory:  Negative for cough, shortness of breath and wheezing.    Cardiovascular:  Negative for chest pain and palpitations.   Gastrointestinal:  Negative for abdominal pain and constipation.   Musculoskeletal:  Positive for arthralgias.   Neurological:  Negative for dizziness.       Objective:    Blood pressure 120/70, pulse (!) 59, height 5' (1.524 m), weight 74.7 kg (164 lb 10.9 oz), SpO2 98 %.   Physical Exam  Constitutional:       General: She is not in acute distress.  HENT:      Head: Normocephalic.      Mouth/Throat:      Pharynx: Oropharynx is clear.   Eyes:      General: No scleral icterus.  Cardiovascular:      Rate and Rhythm: Normal rate and regular rhythm.      Heart sounds: Normal heart sounds. No murmur heard.     No friction rub. No gallop.      Comments: Breast : normal no masses or tenderness      Pulmonary:      Effort: Pulmonary effort is normal. No respiratory distress.       Breath sounds: Normal breath sounds.   Abdominal:      General: Bowel sounds are normal.      Palpations: Abdomen is soft. There is no mass.      Tenderness: There is no abdominal tenderness.   Musculoskeletal:      Cervical back: Neck supple.      Comments: Hand deformities some localized swelling pain with fist     Shoulder normal rom    Right foot bony prominance non tender    Skin:     Findings: No erythema.   Neurological:      Mental Status: She is alert.   Psychiatric:         Mood and Affect: Mood normal.         Assessment:       1. Hypothyroidism, unspecified type    2. Systemic lupus erythematosus, unspecified SLE type, unspecified organ involvement status    3. Hyperlipidemia, unspecified hyperlipidemia type    4. Class 1 obesity with body mass index (BMI) of 32.0 to 32.9 in adult, unspecified obesity type, unspecified whether serious comorbidity present    5. Arthralgia, unspecified joint    6. Deformity of right foot    7. Right acoustic neuroma        Plan:       Radha was seen today for annual exam.    Diagnoses and all orders for this visit:    Hypothyroidism, unspecified type  Continue current regimen recent thyroid levels reviewed    Systemic lupus erythematosus, unspecified SLE type, unspecified organ involvement status  With arthralgias she remains on Plaquenil will add some additional blood work    Hyperlipidemia, unspecified hyperlipidemia type  She remains on statin  -     Lipid Panel; Future  -     C-REACTIVE PROTEIN; Future  -     Sedimentation rate; Future  -     Rheumatoid Factor; Future  -     ANTI-DNA ANTIBODY, DOUBLE-STRANDED; Future  -     CK; Future  -     C3 COMPLEMENT; Future  -     C4 COMPLEMENT; Future    Class 1 obesity with body mass index (BMI) of 32.0 to 32.9 in adult, unspecified obesity type, unspecified whether serious comorbidity present  -     Ambulatory referral/consult to Bariatric Medicine; Future    Arthralgia, unspecified joint  She continues to follow with  rheumatology will update  Osteoarthritis of the shoulder we discussed she will call if she would like ortho consult  -     C-REACTIVE PROTEIN; Future  -     Sedimentation rate; Future  -     Rheumatoid Factor; Future  -     ANTI-DNA ANTIBODY, DOUBLE-STRANDED; Future  -     CK; Future  -     C3 COMPLEMENT; Future  -     C4 COMPLEMENT; Future    Deformity of right foot  Some bony prominence x-ray and review  -     X-Ray Foot Complete 3 view Right; Future    History of Crohn's disease in remission she had a recent colonoscopy    Right acoustic neuroma  History of with hearing loss she was treated at the [facility] previously and has an outlying neurologist for her migraines that she sees    She is up-to-date on her mammogram  She had her flu shot plans get COVID booster when available

## 2023-09-27 ENCOUNTER — OFFICE VISIT (OUTPATIENT)
Dept: GASTROENTEROLOGY | Facility: CLINIC | Age: 68
End: 2023-09-27
Payer: MEDICARE

## 2023-09-27 ENCOUNTER — INFUSION (OUTPATIENT)
Dept: INFECTIOUS DISEASES | Facility: HOSPITAL | Age: 68
End: 2023-09-27
Attending: INTERNAL MEDICINE
Payer: MEDICARE

## 2023-09-27 ENCOUNTER — TELEPHONE (OUTPATIENT)
Dept: BARIATRICS | Facility: CLINIC | Age: 68
End: 2023-09-27
Payer: MEDICARE

## 2023-09-27 VITALS
DIASTOLIC BLOOD PRESSURE: 67 MMHG | WEIGHT: 162.69 LBS | HEART RATE: 63 BPM | SYSTOLIC BLOOD PRESSURE: 133 MMHG | HEIGHT: 60 IN | BODY MASS INDEX: 31.94 KG/M2

## 2023-09-27 VITALS
SYSTOLIC BLOOD PRESSURE: 127 MMHG | OXYGEN SATURATION: 98 % | BODY MASS INDEX: 31.68 KG/M2 | DIASTOLIC BLOOD PRESSURE: 58 MMHG | RESPIRATION RATE: 16 BRPM | WEIGHT: 161.38 LBS | HEIGHT: 60 IN | TEMPERATURE: 99 F | HEART RATE: 70 BPM

## 2023-09-27 DIAGNOSIS — K64.9 HEMORRHOIDS, UNSPECIFIED HEMORRHOID TYPE: Primary | ICD-10-CM

## 2023-09-27 DIAGNOSIS — M81.8 OTHER OSTEOPOROSIS WITHOUT CURRENT PATHOLOGICAL FRACTURE: Primary | ICD-10-CM

## 2023-09-27 DIAGNOSIS — K21.00 GASTROESOPHAGEAL REFLUX DISEASE WITH ESOPHAGITIS WITHOUT HEMORRHAGE: Primary | ICD-10-CM

## 2023-09-27 PROCEDURE — 96372 THER/PROPH/DIAG INJ SC/IM: CPT

## 2023-09-27 PROCEDURE — 63600175 PHARM REV CODE 636 W HCPCS: Mod: JZ,JG | Performed by: INTERNAL MEDICINE

## 2023-09-27 PROCEDURE — 99214 PR OFFICE/OUTPT VISIT, EST, LEVL IV, 30-39 MIN: ICD-10-PCS | Mod: S$PBB,GC,, | Performed by: STUDENT IN AN ORGANIZED HEALTH CARE EDUCATION/TRAINING PROGRAM

## 2023-09-27 PROCEDURE — 99999 PR PBB SHADOW E&M-EST. PATIENT-LVL III: ICD-10-PCS | Mod: PBBFAC,GC,, | Performed by: STUDENT IN AN ORGANIZED HEALTH CARE EDUCATION/TRAINING PROGRAM

## 2023-09-27 PROCEDURE — 99999 PR PBB SHADOW E&M-EST. PATIENT-LVL III: CPT | Mod: PBBFAC,GC,, | Performed by: STUDENT IN AN ORGANIZED HEALTH CARE EDUCATION/TRAINING PROGRAM

## 2023-09-27 PROCEDURE — 99214 OFFICE O/P EST MOD 30 MIN: CPT | Mod: S$PBB,GC,, | Performed by: STUDENT IN AN ORGANIZED HEALTH CARE EDUCATION/TRAINING PROGRAM

## 2023-09-27 PROCEDURE — 99213 OFFICE O/P EST LOW 20 MIN: CPT | Mod: 25,PBBFAC | Performed by: STUDENT IN AN ORGANIZED HEALTH CARE EDUCATION/TRAINING PROGRAM

## 2023-09-27 RX ADMIN — DENOSUMAB 60 MG: 60 INJECTION SUBCUTANEOUS at 01:09

## 2023-09-27 NOTE — PROGRESS NOTES
GENERAL GI PATIENT INTAKE:    COVID symptoms in the last 7 days (runny nose, sore throat, congestion, cough, fever): No  PCP: Merry Espinal  If not PCP-  number given to establish [phone]: N/A    ALLERGIES REVIEWED:  Yes    CHIEF COMPLAINT:    Chief Complaint   Patient presents with    Follow-up       VITAL SIGNS:  /67   Pulse 63   Ht 5' (1.524 m)   Wt 73.8 kg (162 lb 11.2 oz)   BMI 31.78 kg/m²      Change in medical, surgical, family or social history: No      REVIEWED MEDICATION LIST RECONCILED INCLUDING ABOVE MEDS:  Yes,

## 2023-09-27 NOTE — PROGRESS NOTES
Pt arrived for Prolia injection. Confirms taking Vit D/Calcium supplement. Had cavities filled in July but denies any dental procedures that drilled into bone. States dentist is aware she is receiving Prolia.     Limited head-to-toe assessment due to privacy issues and visit reason though the opportunity was given for patient to express any concerns

## 2023-09-27 NOTE — PROGRESS NOTES
[facility]    Reason for visit: The encounter diagnosis was Gastroesophageal reflux disease with esophagitis without hemorrhage.  Referring Provider/PCP: Merry Espinal MD    History of Present Illness:  Radha Chiang is a 68 y.o. female with a history of GERD, LA Grade B esophagitis who is presenting for follow up of esophagitis. She underwent EGD on 4/27/23 which showed LA Grade B esophagitis and a 4 cm hiatal hernia. She was started on Protonix 40 mg BID. Repeat EGD 7/21/23 with no evidence of esophagitis. She was switched to 40 mg daily,which has been controlling her symptoms majority of the time. She does have some episodes of reflux with certain foods. No dysphagia or odynophagia.     PEndoHx:  Colonoscopy 2/9/2023: normal.   EGDs: see above.    Review of Systems   Gastrointestinal:  Positive for heartburn. Negative for abdominal pain, nausea and vomiting.       Medical History:  Past Medical History:   Diagnosis Date    Acid reflux     Anemia 2years ago    Anxiety     Arthritis     Asthma, chronic 4/10/2013    Crohn's disease 1998    Depression     Dry eyes     Fracture of forearm, proximal, open     screws & plate 6/26/13 in [facility]    History of papilledema 11/10/11    Hyperlipidemia 5/21/2018    Lupus     Neuromuscular disorder 12/2009    Neuropathy    Osteoporosis     Stroke 1/29/2013    Thyroid disease        Past Surgical History:   Procedure Laterality Date    ADENOIDECTOMY  1962    BRAIN SURGERY  01/18/2012    Brain Biopsy    BREAST CYST ASPIRATION Left     COLONOSCOPY  2017    [facility] Dr. WILLIS Summers; pan-diverticulosis, rec repeat 3-5 years    COLONOSCOPY N/A 2/9/2023    Procedure: COLONOSCOPY;  Surgeon: Reina Crabtree MD;  Location: [facility];  Service: Colon and Rectal;  Laterality: N/A;    ESOPHAGOGASTRODUODENOSCOPY N/A 4/27/2023    Procedure: ESOPHAGOGASTRODUODENOSCOPY (EGD);  Surgeon: Martir Braxton MD;  Location: [facility] ([address] [place] [address]);  Service: Endoscopy;   Laterality: N/A;   Pre-call attempted, pt had phone trouble kls    ESOPHAGOGASTRODUODENOSCOPY N/A 2023    Procedure: EGD (ESOPHAGOGASTRODUODENOSCOPY);  Surgeon: Martir Braxton MD;  Location: [facility] ([address] [place] [address]);  Service: Endoscopy;  Laterality: N/A;  12wks to check healing, pt request Dr. Braxton, Inst portal. Referral: REYNA MCKENZIE, Proc order tele enc 23-LW    INCISION AND DRAINAGE PERIRECTAL ABSCESS      abscess removal    ORIF FOREARM FRACTURE Left 2013    proximal ulnar and radius       Family History   Problem Relation Age of Onset    Cancer Mother 64        small cell lung cancer; smoker    Heart disease Father 64        acute MI; 3v CABG    Sjogren's syndrome Sister     Allergies Sister     Cancer Maternal Grandmother     COPD Maternal Grandfather     Cancer Paternal Grandmother     Stroke Paternal Grandmother     Ovarian cancer Paternal Grandmother     Heart disease Paternal Grandfather     Mental retardation Brother     Hypertension Sister     Lupus Paternal Uncle     Angioedema Neg Hx     Asthma Neg Hx     Atopy Neg Hx     Eczema Neg Hx     Immunodeficiency Neg Hx     Rhinitis Neg Hx     Urticaria Neg Hx        Social History     Socioeconomic History    Marital status: Single   Occupational History    Occupation: human resources   Tobacco Use    Smoking status: Former     Current packs/day: 0.00     Average packs/day: 0.5 packs/day for 15.0 years (7.5 ttl pk-yrs)     Types: Cigarettes     Start date: 1969     Quit date: 1984     Years since quittin.2    Smokeless tobacco: Never    Tobacco comments:     Quit    Substance and Sexual Activity    Alcohol use: Yes     Comment: less than one per week    Drug use: No    Sexual activity: Never   Social History Narrative    [place] in [place], rest of the year in [facility]    Enjoys traveling; Going to [facility] x 1 month 2018    Regular exercise     Social Determinants of Health     Financial Resource Strain: Low Risk   (9/19/2023)    Overall Financial Resource Strain (CARDIA)     Difficulty of Paying Living Expenses: Not hard at all   Food Insecurity: No Food Insecurity (9/19/2023)    Hunger Vital Sign     Worried About Running Out of Food in the Last Year: Never true     Ran Out of Food in the Last Year: Never true   Transportation Needs: No Transportation Needs (9/19/2023)    PRAPARE - Transportation     Lack of Transportation (Medical): No     Lack of Transportation (Non-Medical): No   Physical Activity: Sufficiently Active (9/19/2023)    Exercise Vital Sign     Days of Exercise per Week: 3 days     Minutes of Exercise per Session: 60 min   Stress: No Stress Concern Present (9/19/2023)    [employer] [place] [employer] - Occupational Stress Questionnaire     Feeling of Stress : Only a little   Social Connections: Unknown (9/19/2023)    Social Connection and Isolation Panel [NHANES]     Frequency of Communication with Friends and Family: Three times a week     Frequency of Social Gatherings with Friends and Family: Twice a week     Active Member of Clubs or Organizations: Yes     Attends Club or Organization Meetings: More than 4 times per year     Marital Status: Living with partner   Housing Stability: Low Risk  (9/19/2023)    Housing Stability Vital Sign     Unable to Pay for Housing in the Last Year: No     Number of Places Lived in the Last Year: 1     Unstable Housing in the Last Year: No       Current Outpatient Medications on File Prior to Visit   Medication Sig Dispense Refill    alpha lipoic acid 300 mg Cap       aspirin (ECOTRIN) 81 MG EC tablet Take 1 tablet (81 mg total) by mouth once daily.      calcium carbonate/vitamin D3 (CALTRATE 600 + D ORAL) Take 2 tablets by mouth once daily.      coenzyme Q10 100 mg capsule       ergocalciferol (ERGOCALCIFEROL) 50,000 unit Cap Take 1 capsule (50,000 Units total) by mouth every 7 days. 12 capsule 3    esomeprazole (NEXIUM) 20 MG capsule Take 2 capsules (40 mg  total) by mouth before breakfast. 90 capsule 3    fish oil-omega-3 fatty acids 300-1,000 mg capsule Take 1 g by mouth 2 (two) times daily.      hydrOXYchloroQUINE (PLAQUENIL) 200 mg tablet Take 1.5 tablets (300 mg total) by mouth once daily. 135 tablet 2    levothyroxine (SYNTHROID) 112 MCG tablet TAKE 1 TABLET(112 MCG) BY MOUTH EVERY DAY 90 tablet 1    magnesium oxide (MAG-OX) 200 MG tablet       multivitamin (THERAGRAN) per tablet Take 1 tablet by mouth before breakfast.      rosuvastatin (CRESTOR) 20 MG tablet TAKE 1 TABLET(20 MG) BY MOUTH EVERY DAY 90 tablet 2    albuterol (VENTOLIN HFA) 90 mcg/actuation inhaler Inhale 1-2 puffs into the lungs every 6 (six) hours as needed for Wheezing or Shortness of Breath (cough). Rescue 6.7 g 0     Current Facility-Administered Medications on File Prior to Visit   Medication Dose Route Frequency Provider Last Rate Last Admin    [COMPLETED] denosumab (PROLIA) injection 60 mg  60 mg Subcutaneous 1 time in Clinic/HOD William Rios MD   60 mg at 09/27/23 1326       Review of patient's allergies indicates:   Allergen Reactions    Bactrim [sulfamethoxazole-trimethoprim] Nausea And Vomiting     Nausea, ? rash  Nausea, ? rash     Physical Exam  Vitals reviewed.   Constitutional:       General: She is not in acute distress.     Appearance: She is not ill-appearing.   Eyes:      Extraocular Movements: Extraocular movements intact.   Pulmonary:      Effort: Pulmonary effort is normal.   Neurological:      Mental Status: She is alert. Mental status is at baseline.         Laboratory:  Lab Results   Component Value Date     09/26/2023    K 4.5 09/26/2023     09/26/2023    CO2 26 09/26/2023    BUN 14 09/26/2023    CREATININE 1.0 09/26/2023    CALCIUM 9.3 09/26/2023    ANIONGAP 7 (L) 09/26/2023    ESTGFRAFRICA >60.0 07/14/2022    EGFRNONAA 59.3 (A) 07/14/2022       Lab Results   Component Value Date    ALT 19 05/10/2023    AST 30 05/10/2023    GGT 22 03/19/2021    ALKPHOS  116 05/10/2023    BILITOT 0.3 05/10/2023    ALBUMIN 3.8 09/26/2023       Lab Results   Component Value Date    WBC 6.09 05/10/2023    HGB 12.6 05/10/2023    HCT 40.5 05/10/2023    MCV 98 05/10/2023     05/10/2023       Assessment:  Radha Chiang is a 68 y.o. female with LA Grade B esophagitis resolved with PPI on follow up EGD 7/2023 presenting for follow up of GERD and esophagitis. Symptoms mostly well controlled with 40 mg PPI daily, occasional breakthrough with known trigger foods.     Problems:  LA Grade B esophagitis-resolved  GERD      Plan:  Continue PPI at lowest effective dose. If GERD is well controlled, she can consider decreasing current dose as long as symptoms remain controlled.  Can use TUMS, H2 blocers before meals prn or for breakthrough symptoms.   Follow up if symptoms worsen or fail to improve.    Soraya Enamorado MD  Gastroenterology Fellow    Patient discussed with Dr. Nam Weaver.

## 2023-09-28 NOTE — PROGRESS NOTES
Short Stay Endoscopy   Pre-Procedure Note    PCP - Merry Espinal MD  Referring Physician - No referring provider defined for this encounter.    Procedure - Endoscopy    ASA - per anesthesia  Mallampati - per anesthesia    HPI  68 y.o. female scheduled for endoscopy for evaluation of    1. Gastroesophageal reflux disease with esophagitis without hemorrhage         Medical History:  has a past medical history of Acid reflux, Anemia (2years ago), Anxiety, Arthritis, Asthma, chronic (4/10/2013), Crohn's disease (1998), Depression, Dry eyes, Fracture of forearm, proximal, open, History of papilledema (11/10/11), Hyperlipidemia (5/21/2018), Lupus, Neuromuscular disorder (12/2009), Osteoporosis, Stroke (1/29/2013), and Thyroid disease.    Surgical History:  has a past surgical history that includes Brain surgery (01/18/2012); Incision and drainage perirectal abscess (1998); Adenoidectomy (1962); Colonoscopy (2017); ORIF forearm fracture (Left, 06/23/2013); Breast cyst aspiration (Left); Colonoscopy (N/A, 2/9/2023); Esophagogastroduodenoscopy (N/A, 4/27/2023); and Esophagogastroduodenoscopy (N/A, 7/21/2023).    Family History: family history includes Allergies in her sister; COPD in her maternal grandfather; Cancer in her maternal grandmother and paternal grandmother; Cancer (age of onset: 64) in her mother; Heart disease in her paternal grandfather; Heart disease (age of onset: 64) in her father; Hypertension in her sister; Lupus in her paternal uncle; Mental retardation in her brother; Ovarian cancer in her paternal grandmother; Sjogren's syndrome in her sister; Stroke in her paternal grandmother..    Social History:  reports that she quit smoking about 39 years ago. Her smoking use included cigarettes. She started smoking about 54 years ago. She has a 7.5 pack-year smoking history. She has never used smokeless tobacco. She reports current alcohol use. She reports that she does not use drugs.    Review of  patient's allergies indicates:   Allergen Reactions    Bactrim [sulfamethoxazole-trimethoprim] Nausea And Vomiting     Nausea, ? rash  Nausea, ? rash       Medications:   (Not in a hospital admission)        Labs:  Lab Results   Component Value Date    WBC 6.09 05/10/2023    HGB 12.6 05/10/2023    HCT 40.5 05/10/2023     05/10/2023    CHOL 112 (L) 01/23/2023    TRIG 41 01/23/2023    HDL 57 01/23/2023    ALT 19 05/10/2023    AST 30 05/10/2023     09/26/2023    K 4.5 09/26/2023     09/26/2023    CREATININE 1.0 09/26/2023    BUN 14 09/26/2023    CO2 26 09/26/2023    TSH 3.025 05/10/2023    INR 1.1 01/27/2019    HGBA1C 5.2 01/23/2023       Risks and benefits of this endoscopic procedure, including but not limited to bleeding, inflammation, infection, perforation, and death, explained to the patient prior to procedure      Nam Weaver MD

## 2023-10-19 ENCOUNTER — PATIENT MESSAGE (OUTPATIENT)
Dept: RHEUMATOLOGY | Facility: CLINIC | Age: 68
End: 2023-10-19
Payer: MEDICARE

## 2023-10-20 ENCOUNTER — TELEPHONE (OUTPATIENT)
Dept: ENDOSCOPY | Facility: HOSPITAL | Age: 68
End: 2023-10-20

## 2023-10-20 ENCOUNTER — CLINICAL SUPPORT (OUTPATIENT)
Dept: ENDOSCOPY | Facility: HOSPITAL | Age: 68
End: 2023-10-20
Payer: MEDICARE

## 2023-10-20 DIAGNOSIS — K21.00 GASTROESOPHAGEAL REFLUX DISEASE WITH ESOPHAGITIS WITHOUT HEMORRHAGE: ICD-10-CM

## 2023-10-20 NOTE — PLAN OF CARE
Contacted patient to schedule EGD. Patient states she already had procedure done on 7/21/23 at [facility] with Dr HANNAH Braxton. Same verified by writer. Understanding verbalized.

## 2023-10-20 NOTE — TELEPHONE ENCOUNTER
Contacted patient to schedule EGD. Patient states she already had procedure done on 7/21/23 at [facility] with Dr HANNAH Braxton. Same verified by writer. Understanding verbalized.

## 2023-11-06 ENCOUNTER — OFFICE VISIT (OUTPATIENT)
Dept: RHEUMATOLOGY | Facility: CLINIC | Age: 68
End: 2023-11-06
Payer: MEDICARE

## 2023-11-06 ENCOUNTER — HOSPITAL ENCOUNTER (OUTPATIENT)
Dept: RADIOLOGY | Facility: HOSPITAL | Age: 68
Discharge: HOME | End: 2023-11-06
Attending: INTERNAL MEDICINE
Payer: MEDICARE

## 2023-11-06 VITALS
SYSTOLIC BLOOD PRESSURE: 113 MMHG | WEIGHT: 166.25 LBS | HEART RATE: 62 BPM | HEIGHT: 60 IN | DIASTOLIC BLOOD PRESSURE: 62 MMHG | BODY MASS INDEX: 32.64 KG/M2

## 2023-11-06 DIAGNOSIS — Z79.899 OTHER LONG TERM (CURRENT) DRUG THERAPY: ICD-10-CM

## 2023-11-06 DIAGNOSIS — M32.9 LUPUS ARTHRITIS: ICD-10-CM

## 2023-11-06 DIAGNOSIS — D64.9 ANEMIA, UNSPECIFIED TYPE: Primary | ICD-10-CM

## 2023-11-06 DIAGNOSIS — M32.8 OTHER FORMS OF SYSTEMIC LUPUS ERYTHEMATOSUS, UNSPECIFIED ORGAN INVOLVEMENT STATUS: ICD-10-CM

## 2023-11-06 DIAGNOSIS — R06.02 SOB (SHORTNESS OF BREATH): ICD-10-CM

## 2023-11-06 PROCEDURE — 71046 X-RAY EXAM CHEST 2 VIEWS: CPT | Mod: TC

## 2023-11-06 PROCEDURE — 99214 OFFICE O/P EST MOD 30 MIN: CPT | Mod: PBBFAC | Performed by: INTERNAL MEDICINE

## 2023-11-06 PROCEDURE — 99999 PR PBB SHADOW E&M-EST. PATIENT-LVL IV: CPT | Mod: PBBFAC,,, | Performed by: INTERNAL MEDICINE

## 2023-11-06 PROCEDURE — 99214 OFFICE O/P EST MOD 30 MIN: CPT | Mod: S$PBB,,, | Performed by: INTERNAL MEDICINE

## 2023-11-06 PROCEDURE — 99214 PR OFFICE/OUTPT VISIT, EST, LEVL IV, 30-39 MIN: ICD-10-PCS | Mod: S$PBB,,, | Performed by: INTERNAL MEDICINE

## 2023-11-06 PROCEDURE — 71046 X-RAY EXAM CHEST 2 VIEWS: CPT | Mod: 26,,, | Performed by: RADIOLOGY

## 2023-11-06 PROCEDURE — 99999 PR PBB SHADOW E&M-EST. PATIENT-LVL IV: ICD-10-PCS | Mod: PBBFAC,,, | Performed by: INTERNAL MEDICINE

## 2023-11-06 PROCEDURE — 71046 XR CHEST PA AND LATERAL: ICD-10-PCS | Mod: 26,,, | Performed by: RADIOLOGY

## 2023-11-06 RX ORDER — DICLOFENAC SODIUM 10 MG/G
2 GEL TOPICAL 4 TIMES DAILY PRN
Qty: 3 EACH | Refills: 2 | Status: SHIPPED | OUTPATIENT
Start: 2023-11-06 | End: 2024-02-25

## 2023-11-06 RX ORDER — HYDROXYCHLOROQUINE SULFATE 300 MG/1
300 TABLET ORAL DAILY
Qty: 90 TABLET | Refills: 1 | Status: SHIPPED | OUTPATIENT
Start: 2023-11-06 | End: 2023-11-15

## 2023-11-06 ASSESSMENT — ROUTINE ASSESSMENT OF PATIENT INDEX DATA (RAPID3)
PATIENT GLOBAL ASSESSMENT SCORE: 9
PSYCHOLOGICAL DISTRESS SCORE: 1.1
PAIN SCORE: 8
AM STIFFNESS SCORE: 1, YES
FATIGUE SCORE: 9
WHEN YOU AWAKENED IN THE MORNING OVER THE LAST WEEK, PLEASE INDICATE THE AMOUNT OF TIME IT TAKES UNTIL YOU ARE AS LIMBER AS YOU WILL BE FOR THE DAY: VARIES
MDHAQ FUNCTION SCORE: 0.6
TOTAL RAPID3 SCORE: 6.33

## 2023-11-06 ASSESSMENT — SYSTEMIC LUPUS ERYTHEMATOSUS DISEASE ACTIVITY INDEX (SLEDAI): TOTAL_SCORE: 6

## 2023-11-07 ENCOUNTER — PATIENT MESSAGE (OUTPATIENT)
Dept: GASTROENTEROLOGY | Facility: CLINIC | Age: 68
End: 2023-11-07
Payer: MEDICARE

## 2023-11-07 ENCOUNTER — HOSPITAL ENCOUNTER (OUTPATIENT)
Dept: PULMONOLOGY | Facility: CLINIC | Age: 68
Discharge: HOME | End: 2023-11-07
Payer: MEDICARE

## 2023-11-07 ENCOUNTER — TELEPHONE (OUTPATIENT)
Dept: RHEUMATOLOGY | Facility: CLINIC | Age: 68
End: 2023-11-07
Payer: MEDICARE

## 2023-11-07 ENCOUNTER — PATIENT MESSAGE (OUTPATIENT)
Dept: RHEUMATOLOGY | Facility: CLINIC | Age: 68
End: 2023-11-07
Payer: MEDICARE

## 2023-11-07 ENCOUNTER — PATIENT MESSAGE (OUTPATIENT)
Dept: INTERNAL MEDICINE | Facility: CLINIC | Age: 68
End: 2023-11-07
Payer: MEDICARE

## 2023-11-07 ENCOUNTER — OFFICE VISIT (OUTPATIENT)
Dept: SURGERY | Facility: CLINIC | Age: 68
End: 2023-11-07
Payer: MEDICARE

## 2023-11-07 VITALS
DIASTOLIC BLOOD PRESSURE: 78 MMHG | WEIGHT: 166.69 LBS | SYSTOLIC BLOOD PRESSURE: 140 MMHG | BODY MASS INDEX: 32.55 KG/M2 | HEART RATE: 61 BPM

## 2023-11-07 VITALS — WEIGHT: 167.31 LBS | BODY MASS INDEX: 32.85 KG/M2 | HEIGHT: 60 IN

## 2023-11-07 DIAGNOSIS — R06.02 SOB (SHORTNESS OF BREATH): ICD-10-CM

## 2023-11-07 DIAGNOSIS — K64.9 HEMORRHOIDS, UNSPECIFIED HEMORRHOID TYPE: ICD-10-CM

## 2023-11-07 DIAGNOSIS — M32.9 SYSTEMIC LUPUS ERYTHEMATOSUS, UNSPECIFIED SLE TYPE, UNSPECIFIED ORGAN INVOLVEMENT STATUS: Primary | ICD-10-CM

## 2023-11-07 DIAGNOSIS — R06.09 DOE (DYSPNEA ON EXERTION): ICD-10-CM

## 2023-11-07 DIAGNOSIS — J98.4 RESTRICTIVE LUNG DISEASE: ICD-10-CM

## 2023-11-07 PROCEDURE — 94010 BREATHING CAPACITY TEST: CPT | Mod: PBBFAC | Performed by: INTERNAL MEDICINE

## 2023-11-07 PROCEDURE — 94729 DIFFUSING CAPACITY: CPT | Mod: PBBFAC | Performed by: INTERNAL MEDICINE

## 2023-11-07 PROCEDURE — 94618 PULMONARY STRESS TESTING: CPT | Mod: PBBFAC | Performed by: INTERNAL MEDICINE

## 2023-11-07 PROCEDURE — 94729 PR C02/MEMBANE DIFFUSE CAPACITY: ICD-10-PCS | Mod: 26,S$PBB,, | Performed by: INTERNAL MEDICINE

## 2023-11-07 PROCEDURE — 99213 OFFICE O/P EST LOW 20 MIN: CPT | Mod: 25,S$PBB,, | Performed by: NURSE PRACTITIONER

## 2023-11-07 PROCEDURE — 46600 PR DIAG2STIC A2SCOPY: ICD-10-PCS | Mod: S$PBB,,, | Performed by: NURSE PRACTITIONER

## 2023-11-07 PROCEDURE — 94727 GAS DIL/WSHOT DETER LNG VOL: CPT | Mod: PBBFAC | Performed by: INTERNAL MEDICINE

## 2023-11-07 PROCEDURE — 46600 DIAGNOSTIC ANOSCOPY SPX: CPT | Mod: S$PBB,,, | Performed by: NURSE PRACTITIONER

## 2023-11-07 PROCEDURE — 94618 PULMONARY STRESS TESTING: CPT | Mod: 26,S$PBB,, | Performed by: INTERNAL MEDICINE

## 2023-11-07 PROCEDURE — 94727 GAS DIL/WSHOT DETER LNG VOL: CPT | Mod: 26,S$PBB,, | Performed by: INTERNAL MEDICINE

## 2023-11-07 PROCEDURE — 99213 OFFICE O/P EST LOW 20 MIN: CPT | Mod: PBBFAC | Performed by: NURSE PRACTITIONER

## 2023-11-07 PROCEDURE — 94010 BREATHING CAPACITY TEST: CPT | Mod: 26,S$PBB,59, | Performed by: INTERNAL MEDICINE

## 2023-11-07 PROCEDURE — 94729 DIFFUSING CAPACITY: CPT | Mod: 26,S$PBB,, | Performed by: INTERNAL MEDICINE

## 2023-11-07 PROCEDURE — 46600 DIAGNOSTIC ANOSCOPY SPX: CPT | Mod: PBBFAC | Performed by: NURSE PRACTITIONER

## 2023-11-07 PROCEDURE — 99999 PR PBB SHADOW E&M-EST. PATIENT-LVL III: ICD-10-PCS | Mod: PBBFAC,,, | Performed by: NURSE PRACTITIONER

## 2023-11-07 PROCEDURE — 94618 PULMONARY STRESS TESTING: ICD-10-PCS | Mod: 26,S$PBB,, | Performed by: INTERNAL MEDICINE

## 2023-11-07 PROCEDURE — 94727 PR PULM FUNCTION TEST BY GAS: ICD-10-PCS | Mod: 26,S$PBB,, | Performed by: INTERNAL MEDICINE

## 2023-11-07 PROCEDURE — 99213 PR OFFICE/OUTPT VISIT, EST, LEVL III, 20-29 MIN: ICD-10-PCS | Mod: 25,S$PBB,, | Performed by: NURSE PRACTITIONER

## 2023-11-07 PROCEDURE — 94010 BREATHING CAPACITY TEST: ICD-10-PCS | Mod: 26,S$PBB,59, | Performed by: INTERNAL MEDICINE

## 2023-11-07 PROCEDURE — 99999 PR PBB SHADOW E&M-EST. PATIENT-LVL III: CPT | Mod: PBBFAC,,, | Performed by: NURSE PRACTITIONER

## 2023-11-07 NOTE — TELEPHONE ENCOUNTER
Called and spoke with pt  Discussed she had colonoscopy in February  Had ugi in April and July discussed follow up gi consider options  Due to anemia now iron stores normal but ferritin low davide  She can start iron daily  discussed for her fatigue  And discussed hematology consult for evaluation of her anemia  She will call if she would like to do so she remains on ppi for her   Esophogitis

## 2023-11-07 NOTE — PROGRESS NOTES
The breathing tests show mild restriction, chest x-ray fine. Suggest high resolution CT scan of chest and then see Pulmonary to review  Tila will schedule RJWILLIAM

## 2023-11-07 NOTE — PROGRESS NOTES
CRS Office Visit History and Physical    Referring Md:   Merry Espinal Md  [address] [place] [address]    SUBJECTIVE:     2/27/2023 HPI w Dr. Leyda Chiang is a 68 y.o. female who presents for follow up of hemorrhoids in the setting of prior crohns disease.  She reports that her bowel movements have significantly improved since her last visit.  She remains on a bowel regimen as needed.  She reports some discomfort in the perineum and with her hemorrhoid.      Interval hx 11/7/2023  Has SLE, on plaquenil  She reports she spends half her time in [place], has seen CRS there and was told not candidate for surgery.  Denies pain.   She reports constipation is resolved on bowel regimen.   She reports she is here today for more tissue on the outside of the rectum that she can push back in, wants to make sure theres nothing new going on  Has some blood on the outside of the stool. This started last week.    C scope 2/2023 reviewed     - Hemorrhoids found on perianal exam.                          - The entire examined colon is normal.                          - No specimens collected.    Review of patient's allergies indicates:   Allergen Reactions    Bactrim [sulfamethoxazole-trimethoprim] Nausea And Vomiting     Nausea, ? rash  Nausea, ? rash       Past Medical History:   Diagnosis Date    Acid reflux     Anemia 2years ago    Anxiety     Arthritis     Asthma, chronic 4/10/2013    Crohn's disease 1998    Depression     Dry eyes     Fracture of forearm, proximal, open     screws & plate 6/26/13 in [facility]    History of papilledema 11/10/11    Hyperlipidemia 5/21/2018    Lupus     Neuromuscular disorder 12/2009    Neuropathy    Osteoporosis     Stroke 1/29/2013    Thyroid disease      Past Surgical History:   Procedure Laterality Date    ADENOIDECTOMY  1962    BRAIN SURGERY  01/18/2012    Brain Biopsy    BREAST CYST ASPIRATION Left     COLONOSCOPY  2017    [facility] Dr. WILLIS Summers;  pan-diverticulosis, rec repeat 3-5 years    COLONOSCOPY N/A 2023    Procedure: COLONOSCOPY;  Surgeon: Reina Crabtree MD;  Location: [facility];  Service: Colon and Rectal;  Laterality: N/A;    ESOPHAGOGASTRODUODENOSCOPY N/A 2023    Procedure: ESOPHAGOGASTRODUODENOSCOPY (EGD);  Surgeon: Martir Braxton MD;  Location: [facility] ENDO (4TH FLR);  Service: Endoscopy;  Laterality: N/A;   Pre-call attempted, pt had phone trouble kls    ESOPHAGOGASTRODUODENOSCOPY N/A 2023    Procedure: EGD (ESOPHAGOGASTRODUODENOSCOPY);  Surgeon: Martir Braxton MD;  Location: [facility] ENDO (4TH FLR);  Service: Endoscopy;  Laterality: N/A;  12wks to check healing, pt request Dr. Braxton, Inst portal. Referral: REYNA MCKENZIE, Proc order tele enc 23-LW    INCISION AND DRAINAGE PERIRECTAL ABSCESS      abscess removal    ORIF FOREARM FRACTURE Left 2013    proximal ulnar and radius     Family History   Problem Relation Age of Onset    Cancer Mother 64        small cell lung cancer; smoker    Heart disease Father 64        acute MI; 3v CABG    Sjogren's syndrome Sister     Allergies Sister     Cancer Maternal Grandmother     COPD Maternal Grandfather     Cancer Paternal Grandmother     Stroke Paternal Grandmother     Ovarian cancer Paternal Grandmother     Heart disease Paternal Grandfather     Intellectual disability Brother     Hypertension Sister     Lupus Paternal Uncle     Angioedema Neg Hx     Asthma Neg Hx     Atopy Neg Hx     Eczema Neg Hx     Immunodeficiency Neg Hx     Rhinitis Neg Hx     Urticaria Neg Hx      Social History     Tobacco Use    Smoking status: Former     Current packs/day: 0.00     Average packs/day: 0.5 packs/day for 15.0 years (7.5 ttl pk-yrs)     Types: Cigarettes     Start date: 1969     Quit date: 1984     Years since quittin.3    Smokeless tobacco: Never    Tobacco comments:     Quit    Substance Use Topics    Alcohol use: Yes     Comment: less than one per week    Drug use: No         Review of Systems:  ROS    OBJECTIVE:     Vital Signs (Most Recent)  BP (!) 140/78   Pulse 61   Wt 75.6 kg (166 lb 10.7 oz)   BMI 32.55 kg/m²     Physical Exam:  General: Patient Refused female in no distress   Neuro: Alert and oriented to person, place, and time.  Moves all extremities.     HEENT: No icterus.  Trachea midline  Respiratory: Respirations are even and unlabored, no cough or audible wheezing  Skin: Warm dry and intact, No visible rashes, no jaundice    Labs reviewed today:  Lab Results   Component Value Date    WBC 5.36 11/06/2023    HGB 11.6 (L) 11/06/2023    HCT 36.7 (L) 11/06/2023     11/06/2023    CHOL 112 (L) 01/23/2023    TRIG 41 01/23/2023    HDL 57 01/23/2023    ALT 15 11/06/2023    AST 26 11/06/2023     11/06/2023    K 4.9 11/06/2023     11/06/2023    CREATININE 0.9 11/06/2023    BUN 10 11/06/2023    CO2 27 11/06/2023    TSH 3.025 05/10/2023    INR 1.1 01/27/2019    HGBA1C 5.2 01/23/2023       Anorectal Exam:    Anal Skin: +external hemorhoids, prolapsing internal hemorrhoids, +anal papillae    Digital Rectal Exam:  Resting Tone normal  Mass hemorrhoids  Rectocele  absent  Tenderness  absent    Anoscopy:  Verbal consent was obtained.   Clear plastic anoscope inserted.    Hemorrhoids  present  Stigmata of bleeding  present  Stigmata of prolapsed  present  Distal rectal mucosa normal      ASSESSMENT/PLAN:     Diagnoses and all orders for this visit:    Hemorrhoids, unspecified hemorrhoid type  -     Ambulatory referral/consult to Colorectal Surgery    68 y.o. F here today for concern for prolapsing tissue . She has see Elier FULLER and Leyda ROMO in our department for hemorrhoids in the past. Also so GREER ROMO in [place] for the same  Today we discussed hemorrhoids  Will have her f/u with Dr Crabtree to discuss hemorrhoidectomy since these are bothersome for her.    The patient was instructed to:  Continue bowel regimen  F/u w MD as she is not a candidate for RBL with her  external  disease    Ingrid Cai, FNP-C  Colon and Rectal Surgery

## 2023-11-07 NOTE — PROCEDURES
Radha Chiang is a 68 y.o.  female patient, who presents for a 6 minute walk test ordered by MD Blanca.  The diagnosis is Shortness of Breath; Connective Tissue Disease.  The patient's BMI is 32.7 kg/m2.  Predicted distance (lower limit of normal) is 289.17 meters.      Test Results:    The test was completed without stopping. The total time walked was 360 seconds. During walking, the patient reported:  Dyspnea. The patient used no assistive devices during testing.     11/07/2023---------Distance: 457.2 meters (1500 feet)     O2 Sat % Supplemental Oxygen Heart Rate Blood Pressure Flynn Scale   Pre-exercise  (Resting) 98 % Room Air 56 bpm 122/58 mmHg 0   During Exercise 96 % Room Air 89 bpm 148/66 mmHg 4   Post-exercise  (Recovery) 97 % Room Air  69 bpm       Recovery Time: 76 seconds    Performing nurse/tech: Wojciech MCMAHON      PREVIOUS STUDY:   01/23/2014---------Distance: 624.84 meters (2050 feet)         O2 Sat % Supplemental Oxygen Heart Rate Blood Pressure Flynn Scale   Pre-exercise  (Resting) 96 % Room Air 75 bpm 121/56 mmHg 0.5   During Exercise 98 % Room Air 145 bpm 139/98 mmHg 4   Post-exercise  (Recovery) 98 % Room Air  115 bpm 118/49 mmHg        CLINICAL INTERPRETATION:  Six minute walk distance is 457.2 meters (1500 feet) with somewhat heavy dyspnea.  During exercise, there was no significant desaturation while breathing room air.  Blood pressure remained stable and Heart rate increased significantly with walking.  Bradycardia was present prior to exercise.  The patient did not report non-pulmonary symptoms during exercise.  Since the previous study in January 2014, exercise capacity is significantly worse.  Based upon age and body mass index, exercise capacity is normal.

## 2023-11-08 ENCOUNTER — TELEPHONE (OUTPATIENT)
Dept: PULMONOLOGY | Facility: CLINIC | Age: 68
End: 2023-11-08
Payer: MEDICARE

## 2023-11-08 ENCOUNTER — HOSPITAL ENCOUNTER (OUTPATIENT)
Dept: RADIOLOGY | Facility: HOSPITAL | Age: 68
Discharge: HOME | End: 2023-11-08
Attending: INTERNAL MEDICINE
Payer: MEDICARE

## 2023-11-08 DIAGNOSIS — R06.09 DOE (DYSPNEA ON EXERTION): ICD-10-CM

## 2023-11-08 DIAGNOSIS — M32.9 SYSTEMIC LUPUS ERYTHEMATOSUS, UNSPECIFIED SLE TYPE, UNSPECIFIED ORGAN INVOLVEMENT STATUS: ICD-10-CM

## 2023-11-08 DIAGNOSIS — J98.4 RESTRICTIVE LUNG DISEASE: ICD-10-CM

## 2023-11-08 PROCEDURE — 71250 CT THORAX DX C-: CPT | Mod: 26,,, | Performed by: STUDENT IN AN ORGANIZED HEALTH CARE EDUCATION/TRAINING PROGRAM

## 2023-11-08 PROCEDURE — 71250 CT CHEST WITHOUT CONTRAST: ICD-10-PCS | Mod: 26,,, | Performed by: STUDENT IN AN ORGANIZED HEALTH CARE EDUCATION/TRAINING PROGRAM

## 2023-11-08 PROCEDURE — 71250 CT THORAX DX C-: CPT | Mod: TC

## 2023-11-08 NOTE — TELEPHONE ENCOUNTER
Received a message that Mrs Leija needs a visit with Pulmonary, a referral from Dr Rios. Pt is doing  PFTS today and I called and left a message that Mrs Yun can see Dr Perales 12-06-23 at 11am and I will watch for a sooner appointment and call her back.. Sachi Santos

## 2023-11-08 NOTE — TELEPHONE ENCOUNTER
----- Message from Naya Robert MA sent at 11/7/2023  3:56 PM CST -----  Regarding: FW: sooner appt  Contact: [phone]    ----- Message -----  From: Juanita Valdez  Sent: 11/7/2023   2:38 PM CST  [facility]  Subject: sooner appt                                      Sooner Appt Request:  Caller requesting a sooner appt.  Caller declined 1st available appt and wait list.    Request message be sent to doctor.     Name of Caller: Tila wlals/ Dr. Rios     When is 1st available appt: nothing in epic     Symptoms: lung disease     Call back # [phone]  Additional Info: (optional): pt needs to be seen as soon as possible. Please give pt a call back to schedule thanks.

## 2023-11-09 DIAGNOSIS — I65.21 CAROTID STENOSIS, RIGHT: Primary | ICD-10-CM

## 2023-11-11 ENCOUNTER — PATIENT MESSAGE (OUTPATIENT)
Dept: INTERNAL MEDICINE | Facility: CLINIC | Age: 68
End: 2023-11-11
Payer: MEDICARE

## 2023-11-13 ENCOUNTER — PATIENT MESSAGE (OUTPATIENT)
Dept: ADMINISTRATIVE | Facility: HOSPITAL | Age: 68
End: 2023-11-13
Payer: MEDICARE

## 2023-11-13 DIAGNOSIS — R06.00 DYSPNEA, UNSPECIFIED TYPE: ICD-10-CM

## 2023-11-13 DIAGNOSIS — Z12.11 COLON CANCER SCREENING: Primary | ICD-10-CM

## 2023-11-13 DIAGNOSIS — R79.9 ABNORMAL BLOOD CHEMISTRY: ICD-10-CM

## 2023-11-13 DIAGNOSIS — D64.9 ANEMIA, UNSPECIFIED TYPE: ICD-10-CM

## 2023-11-13 NOTE — TELEPHONE ENCOUNTER
Called and spoke with pt no significant concern  Will recheck as requeested  Pt wants cologuard discussed since had colonoscopy February  May not be covered but order entered as per request  If not she will call for alternative  Discussed back to gi for re-scope if needed she declines at this time  She has had hemorrohids but no recent bleeding  And no obvious bleeding that she has seen     Please call and schedule chemistry lab as requested

## 2023-11-14 ENCOUNTER — LAB VISIT (OUTPATIENT)
Dept: LAB | Facility: HOSPITAL | Age: 68
End: 2023-11-14
Attending: INTERNAL MEDICINE
Payer: MEDICARE

## 2023-11-14 DIAGNOSIS — R79.9 ABNORMAL BLOOD CHEMISTRY: ICD-10-CM

## 2023-11-14 LAB
ANION GAP SERPL CALC-SCNC: 8 MMOL/L (ref 8–16)
BUN SERPL-MCNC: 16 MG/DL (ref 8–23)
CALCIUM SERPL-MCNC: 9.6 MG/DL (ref 8.7–10.5)
CHLORIDE SERPL-SCNC: 106 MMOL/L (ref 95–110)
CO2 SERPL-SCNC: 26 MMOL/L (ref 23–29)
CREAT SERPL-MCNC: 0.8 MG/DL (ref 0.5–1.4)
EST. GFR  (NO RACE VARIABLE): >60 ML/MIN/1.73 M^2
GLUCOSE SERPL-MCNC: 109 MG/DL (ref 70–110)
POTASSIUM SERPL-SCNC: 4.4 MMOL/L (ref 3.5–5.1)
SODIUM SERPL-SCNC: 140 MMOL/L (ref 136–145)

## 2023-11-14 PROCEDURE — 80048 BASIC METABOLIC PNL TOTAL CA: CPT | Performed by: INTERNAL MEDICINE

## 2023-11-14 PROCEDURE — 36415 COLL VENOUS BLD VENIPUNCTURE: CPT | Performed by: INTERNAL MEDICINE

## 2023-11-15 ENCOUNTER — PATIENT MESSAGE (OUTPATIENT)
Dept: RHEUMATOLOGY | Facility: CLINIC | Age: 68
End: 2023-11-15
Payer: MEDICARE

## 2023-11-15 DIAGNOSIS — M32.8 OTHER FORMS OF SYSTEMIC LUPUS ERYTHEMATOSUS, UNSPECIFIED ORGAN INVOLVEMENT STATUS: ICD-10-CM

## 2023-11-15 RX ORDER — HYDROXYCHLOROQUINE SULFATE 300 MG/1
300 TABLET ORAL DAILY
Qty: 90 TABLET | Refills: 1 | OUTPATIENT
Start: 2023-11-15

## 2023-11-15 RX ORDER — HYDROXYCHLOROQUINE SULFATE 200 MG/1
300 TABLET, FILM COATED ORAL DAILY
Qty: 135 TABLET | Refills: 2 | Status: SHIPPED | OUTPATIENT
Start: 2023-11-15 | End: 2023-11-16 | Stop reason: SDUPTHER

## 2023-11-16 ENCOUNTER — PATIENT MESSAGE (OUTPATIENT)
Dept: INTERNAL MEDICINE | Facility: CLINIC | Age: 68
End: 2023-11-16
Payer: MEDICARE

## 2023-11-16 DIAGNOSIS — M32.8 OTHER FORMS OF SYSTEMIC LUPUS ERYTHEMATOSUS, UNSPECIFIED ORGAN INVOLVEMENT STATUS: ICD-10-CM

## 2023-11-16 RX ORDER — HYDROXYCHLOROQUINE SULFATE 200 MG/1
TABLET, FILM COATED ORAL
Qty: 135 TABLET | Refills: 2 | Status: SHIPPED | OUTPATIENT
Start: 2023-11-16 | End: 2024-03-25 | Stop reason: SDUPTHER

## 2023-11-16 NOTE — PROGRESS NOTES
The CT chest does not show any findings of lupus lung disease, nor any explanation of your shortness of breath. Will copy Dr. Amaral your internist as she may want to consider some heart evaluation such as the stress test you had in 2019 as cardiac disease can be associated with shortness of breath even in the absence of chest pain/discomfort. ROBERTO

## 2023-11-29 ENCOUNTER — TELEPHONE (OUTPATIENT)
Dept: RHEUMATOLOGY | Facility: CLINIC | Age: 68
End: 2023-11-29
Payer: MEDICARE

## 2023-11-29 ENCOUNTER — TELEPHONE (OUTPATIENT)
Dept: SURGERY | Facility: CLINIC | Age: 68
End: 2023-11-29
Payer: MEDICARE

## 2023-11-29 LAB — NONINV COLON CA DNA+OCC BLD SCRN STL QL: POSITIVE

## 2023-12-01 ENCOUNTER — OFFICE VISIT (OUTPATIENT)
Dept: SURGERY | Facility: CLINIC | Age: 68
End: 2023-12-01
Payer: MEDICARE

## 2023-12-01 VITALS
RESPIRATION RATE: 19 BRPM | BODY MASS INDEX: 32.98 KG/M2 | DIASTOLIC BLOOD PRESSURE: 59 MMHG | WEIGHT: 168 LBS | OXYGEN SATURATION: 95 % | HEIGHT: 60 IN | HEART RATE: 60 BPM | SYSTOLIC BLOOD PRESSURE: 120 MMHG

## 2023-12-01 DIAGNOSIS — M62.89 PELVIC FLOOR DYSFUNCTION: Primary | ICD-10-CM

## 2023-12-01 PROCEDURE — 99213 OFFICE O/P EST LOW 20 MIN: CPT | Mod: S$PBB,,, | Performed by: SURGERY

## 2023-12-01 PROCEDURE — 99999 PR PBB SHADOW E&M-EST. PATIENT-LVL IV: CPT | Mod: PBBFAC,,, | Performed by: SURGERY

## 2023-12-01 PROCEDURE — 99213 PR OFFICE/OUTPT VISIT, EST, LEVL III, 20-29 MIN: ICD-10-PCS | Mod: S$PBB,,, | Performed by: SURGERY

## 2023-12-01 PROCEDURE — 99214 OFFICE O/P EST MOD 30 MIN: CPT | Mod: PBBFAC | Performed by: SURGERY

## 2023-12-01 PROCEDURE — 99999 PR PBB SHADOW E&M-EST. PATIENT-LVL IV: ICD-10-PCS | Mod: PBBFAC,,, | Performed by: SURGERY

## 2023-12-01 NOTE — PROGRESS NOTES
Colon & Rectal Surgery Clinic Follow Up    HPI:   Radha Chiang is a 68 y.o. female who presents for follow up of hemorrhoids and perineal discomfort.  Reports constipation is improved with colace.  Taking docusate daily and having bowel movement every other day.  Very occasional bright red blood per rectum.  Still having some discomfort in the perineum.  Recently had labs showing anemia, no evidence of iron deficiency.  Took a cologuard test which was positive despite normal colonoscopy 2/2023. EGD 4/27/23 with esophagitis and mild gastritis with hiatal hernia.       Objective:   Vitals:    12/01/23 0858   BP: (!) 120/59   Pulse: 60   Resp: 19        Physical Exam     A chaperone was present for the anorectal portion of the exam    Gen: well developed female, NAD  HEENT: normocephalic, atraumatic, PERRL, EOMI   CV :RRR, no murmurs  Resp: nonlabored, CTAB   Abd: soft, NTND   MSK: no gross deformities, no cyanosis or edema   Anorectal: external hemorrhoid in the right lateral position with small hemorrhagic focus, RAMON with decreased resting tone, use of gluteal muscles with squeeze, descent with bear down, firm stool in rectal vault.     Assessment and Plan:   Radha Chiang  is a 68 y.o. female who presents for follow up of hemorrhoids in the setting of prior crohns disease    - patient with stable appearance of external hemorrhoid, very firm stool in rectal vault   - we discussed need for improved stool caliber and treatment of constipation  - has previously tried fiber with inadequate water intake.  Discussed addition of fiber and miralax to bowel regimen with increased water intake.   - We discussed surgical options if hemorrhoid remains symptomatic.  She has a remote history of crohns disease but has not been on medication in decades.  Inflammatory markers are negative  - Patient with positive cologuard in setting of UTD colonoscopy.  I suspect this is a false positive as patient had an excellent prep and  colonoscopy was complete. However, we discussed possibility of repeat colonoscopy if ongoing anemia or any changes in symptoms  - patient will consider options and follow up in January       Reina Crabtree MD  Staff Surgeon   Colon & Rectal Surgery

## 2023-12-04 ENCOUNTER — TELEPHONE (OUTPATIENT)
Dept: INTERNAL MEDICINE | Facility: CLINIC | Age: 68
End: 2023-12-04
Payer: MEDICARE

## 2023-12-04 PROBLEM — R19.5 POSITIVE COLORECTAL CANCER SCREENING USING COLOGUARD TEST: Status: ACTIVE | Noted: 2023-12-04

## 2023-12-21 ENCOUNTER — OFFICE VISIT (OUTPATIENT)
Dept: VASCULAR SURGERY | Facility: CLINIC | Age: 68
End: 2023-12-21
Attending: SURGERY
Payer: MEDICARE

## 2023-12-21 ENCOUNTER — HOSPITAL ENCOUNTER (OUTPATIENT)
Dept: VASCULAR SURGERY | Facility: CLINIC | Age: 68
Discharge: HOME | End: 2023-12-21
Attending: SURGERY
Payer: MEDICARE

## 2023-12-21 VITALS
DIASTOLIC BLOOD PRESSURE: 63 MMHG | HEIGHT: 60 IN | TEMPERATURE: 99 F | BODY MASS INDEX: 32.02 KG/M2 | HEART RATE: 54 BPM | SYSTOLIC BLOOD PRESSURE: 132 MMHG | WEIGHT: 163.13 LBS

## 2023-12-21 DIAGNOSIS — I65.21 CAROTID STENOSIS, RIGHT: ICD-10-CM

## 2023-12-21 DIAGNOSIS — I65.23 BILATERAL CAROTID ARTERY STENOSIS: Primary | ICD-10-CM

## 2023-12-21 DIAGNOSIS — I65.21 ASYMPTOMATIC STENOSIS OF RIGHT CAROTID ARTERY: Primary | ICD-10-CM

## 2023-12-21 PROCEDURE — 99213 PR OFFICE/OUTPT VISIT, EST, LEVL III, 20-29 MIN: ICD-10-PCS | Mod: S$PBB,,, | Performed by: SURGERY

## 2023-12-21 PROCEDURE — 93880 EXTRACRANIAL BILAT STUDY: CPT | Mod: 26,S$PBB,, | Performed by: SURGERY

## 2023-12-21 PROCEDURE — 99213 OFFICE O/P EST LOW 20 MIN: CPT | Mod: PBBFAC,25 | Performed by: SURGERY

## 2023-12-21 PROCEDURE — 99213 OFFICE O/P EST LOW 20 MIN: CPT | Mod: S$PBB,,, | Performed by: SURGERY

## 2023-12-21 PROCEDURE — 99999 PR PBB SHADOW E&M-EST. PATIENT-LVL III: ICD-10-PCS | Mod: PBBFAC,,, | Performed by: SURGERY

## 2023-12-21 PROCEDURE — 99999 PR PBB SHADOW E&M-EST. PATIENT-LVL III: CPT | Mod: PBBFAC,,, | Performed by: SURGERY

## 2023-12-21 PROCEDURE — 93880 PR DUPLEX SCAN EXTRACRANIAL,BILAT: ICD-10-PCS | Mod: 26,S$PBB,, | Performed by: SURGERY

## 2023-12-21 PROCEDURE — 93880 EXTRACRANIAL BILAT STUDY: CPT | Mod: PBBFAC | Performed by: SURGERY

## 2023-12-22 ENCOUNTER — HOSPITAL ENCOUNTER (OUTPATIENT)
Dept: CARDIOLOGY | Facility: CLINIC | Age: 68
Discharge: HOME | End: 2023-12-22
Payer: MEDICARE

## 2023-12-22 ENCOUNTER — OFFICE VISIT (OUTPATIENT)
Dept: CARDIOLOGY | Facility: CLINIC | Age: 68
End: 2023-12-22
Payer: MEDICARE

## 2023-12-22 VITALS
DIASTOLIC BLOOD PRESSURE: 58 MMHG | BODY MASS INDEX: 32.02 KG/M2 | WEIGHT: 163.13 LBS | HEART RATE: 67 BPM | HEIGHT: 60 IN | SYSTOLIC BLOOD PRESSURE: 125 MMHG | OXYGEN SATURATION: 96 %

## 2023-12-22 DIAGNOSIS — J45.909 CHRONIC ASTHMA WITHOUT COMPLICATION, UNSPECIFIED ASTHMA SEVERITY, UNSPECIFIED WHETHER PERSISTENT: ICD-10-CM

## 2023-12-22 DIAGNOSIS — R00.2 PALPITATIONS: ICD-10-CM

## 2023-12-22 DIAGNOSIS — R06.09 DYSPNEA ON EXERTION: Primary | ICD-10-CM

## 2023-12-22 DIAGNOSIS — R00.2 PALPITATIONS: Primary | ICD-10-CM

## 2023-12-22 DIAGNOSIS — M32.9 SYSTEMIC LUPUS ERYTHEMATOSUS, UNSPECIFIED SLE TYPE, UNSPECIFIED ORGAN INVOLVEMENT STATUS: ICD-10-CM

## 2023-12-22 DIAGNOSIS — I77.9 CAROTID ARTERY DISEASE WITHOUT CEREBRAL INFARCTION: ICD-10-CM

## 2023-12-22 PROCEDURE — 99214 OFFICE O/P EST MOD 30 MIN: CPT | Mod: PBBFAC | Performed by: PHYSICIAN ASSISTANT

## 2023-12-22 PROCEDURE — 99214 OFFICE O/P EST MOD 30 MIN: CPT | Mod: S$PBB,25,, | Performed by: PHYSICIAN ASSISTANT

## 2023-12-22 PROCEDURE — 99999 PR PBB SHADOW E&M-EST. PATIENT-LVL IV: ICD-10-PCS | Mod: PBBFAC,,, | Performed by: PHYSICIAN ASSISTANT

## 2023-12-22 PROCEDURE — 93010 ELECTROCARDIOGRAM REPORT: CPT | Mod: S$PBB,,, | Performed by: INTERNAL MEDICINE

## 2023-12-22 PROCEDURE — 93010 EKG 12-LEAD: ICD-10-PCS | Mod: S$PBB,,, | Performed by: INTERNAL MEDICINE

## 2023-12-22 PROCEDURE — 93005 ELECTROCARDIOGRAM TRACING: CPT | Mod: PBBFAC | Performed by: INTERNAL MEDICINE

## 2023-12-22 PROCEDURE — 99214 PR OFFICE/OUTPT VISIT, EST, LEVL IV, 30-39 MIN: ICD-10-PCS | Mod: S$PBB,25,, | Performed by: PHYSICIAN ASSISTANT

## 2023-12-22 PROCEDURE — 99999 PR PBB SHADOW E&M-EST. PATIENT-LVL IV: CPT | Mod: PBBFAC,,, | Performed by: PHYSICIAN ASSISTANT

## 2023-12-22 NOTE — PROGRESS NOTES
Cardiology Clinic Note  Reason for Visit: Shortness of breath     HPI:     PMHx:  SLE  Crohn's disease  Asthma  Carotid stenosis  - follows with Dr. Corona  History of cigarette smoking   - quit in 1984  HLD      Radha Chiang is a 68 y.o. F, who presents with complaint of shortness of breath for the past several years. She states that her asthma had gotten worse over the past month, needing to use inhaler more often. Her shortness of breath occurs when she walks long distances (3 miles). It does not occur at rest, and does not occur when she is swimming for exercise. It is not worsened when she lays down. She had no peripheral edema. She denies chest pain. She has a brief fluttering sensation in her chest on average once per week. She smoked for 15 years, and quit in 1984. She is scheduled to establish with pulmonology at the end of Jan.       ROS:    Pertinent ROS included in HPI and below.  PMH:     Past Medical History:   Diagnosis Date    Acid reflux     Anemia 2years ago    Anxiety     Arthritis     Asthma, chronic 4/10/2013    Crohn's disease 1998    Depression     Dry eyes     Fracture of forearm, proximal, open     screws & plate 6/26/13 in [facility]    History of papilledema 11/10/11    Hyperlipidemia 5/21/2018    Lupus     Neuromuscular disorder 12/2009    Neuropathy    Osteoporosis     Stroke 1/29/2013    Thyroid disease      Past Surgical History:   Procedure Laterality Date    ADENOIDECTOMY  1962    BRAIN SURGERY  01/18/2012    Brain Biopsy    BREAST CYST ASPIRATION Left     COLONOSCOPY  2017    [facility] Dr. WILLIS Summers; pan-diverticulosis, rec repeat 3-5 years    COLONOSCOPY N/A 2/9/2023    Procedure: COLONOSCOPY;  Surgeon: Reina Crabtree MD;  Location: [facility];  Service: Colon and Rectal;  Laterality: N/A;    ESOPHAGOGASTRODUODENOSCOPY N/A 4/27/2023    Procedure: ESOPHAGOGASTRODUODENOSCOPY (EGD);  Surgeon: Martir Braxton MD;  Location: [facility] ([address] [place] [address]);  Service: Endoscopy;  Laterality:  N/A;  4/21 Pre-call attempted, pt had phone trouble kls    ESOPHAGOGASTRODUODENOSCOPY N/A 7/21/2023    Procedure: EGD (ESOPHAGOGASTRODUODENOSCOPY);  Surgeon: Martir Braxton MD;  Location: [facility] ([address] [place] [address]);  Service: Endoscopy;  Laterality: N/A;  12wks to check healing, pt request Dr. Braxton, [facility] portal. Referral: REYNA MCKENZIE, Proc order tele enc 5/29/23-LW    INCISION AND DRAINAGE PERIRECTAL ABSCESS  1998    abscess removal    ORIF FOREARM FRACTURE Left 06/23/2013    proximal ulnar and radius     Allergies:     Review of patient's allergies indicates:   Allergen Reactions    Bactrim [sulfamethoxazole-trimethoprim] Nausea And Vomiting     Nausea, ? rash  Nausea, ? rash     Medications:     Current Outpatient Medications on File Prior to Visit   Medication Sig Dispense Refill    alpha lipoic acid 300 mg Cap       aspirin (ECOTRIN) 81 MG EC tablet Take 1 tablet (81 mg total) by mouth once daily.      calcium carbonate/vitamin D3 (CALTRATE 600 + D ORAL) Take 2 tablets by mouth once daily.      coenzyme Q10 100 mg capsule       diclofenac sodium (VOLTAREN) 1 % Gel Apply 2 g topically 4 (four) times daily as needed. 3 each 2    ergocalciferol (ERGOCALCIFEROL) 50,000 unit Cap Take 1 capsule (50,000 Units total) by mouth every 7 days. 12 capsule 3    esomeprazole (NEXIUM) 20 MG capsule Take 2 capsules (40 mg total) by mouth before breakfast. 90 capsule 3    fish oil-omega-3 fatty acids 300-1,000 mg capsule Take 1 g by mouth 2 (two) times daily.      hydroxychloroquine (PLAQUENIL) 200 mg tablet Take 400mg daily alternating with 200mg daily Brand name only medically necessary 135 tablet 2    levothyroxine (SYNTHROID) 112 MCG tablet TAKE 1 TABLET(112 MCG) BY MOUTH EVERY DAY 90 tablet 1    magnesium oxide (MAG-OX) 200 MG tablet       multivitamin (THERAGRAN) per tablet Take 1 tablet by mouth before breakfast.      rosuvastatin (CRESTOR) 20 MG tablet TAKE 1 TABLET(20 MG) BY MOUTH EVERY DAY 90 tablet 2    albuterol  (VENTOLIN HFA) 90 mcg/actuation inhaler Inhale 1-2 puffs into the lungs every 6 (six) hours as needed for Wheezing or Shortness of Breath (cough). Rescue 6.7 g 0     No current facility-administered medications on file prior to visit.     Social History:     Social History     Tobacco Use    Smoking status: Former     Current packs/day: 0.00     Average packs/day: 0.5 packs/day for 15.0 years (7.5 ttl pk-yrs)     Types: Cigarettes     Start date: 1969     Quit date: 1984     Years since quittin.4    Smokeless tobacco: Never    Tobacco comments:     Quit    Substance Use Topics    Alcohol use: Yes     Comment: less than one per week     Family History:     Family History   Problem Relation Age of Onset    Cancer Mother 64        small cell lung cancer; smoker    Heart disease Father 64        acute MI; 3v CABG    Sjogren's syndrome Sister     Allergies Sister     Cancer Maternal Grandmother     COPD Maternal Grandfather     Cancer Paternal Grandmother     Stroke Paternal Grandmother     Ovarian cancer Paternal Grandmother     Heart disease Paternal Grandfather     Intellectual disability Brother     Hypertension Sister     Lupus Paternal Uncle     Angioedema Neg Hx     Asthma Neg Hx     Atopy Neg Hx     Eczema Neg Hx     Immunodeficiency Neg Hx     Rhinitis Neg Hx     Urticaria Neg Hx      Physical Exam:   BP (!) 125/58   Pulse 67   Ht 5' (1.524 m)   Wt 74 kg (163 lb 2.3 oz)   SpO2 96%   BMI 31.86 kg/m²      Physical Exam  Vitals and nursing note reviewed.   Constitutional:       Appearance: Normal appearance.   HENT:      Head: Normocephalic and atraumatic.   Neck:      Vascular: Normal carotid pulses. No carotid bruit or hepatojugular reflux.   Cardiovascular:      Rate and Rhythm: Normal rate and regular rhythm.      Chest Wall: PMI is not displaced.      Pulses:           Carotid pulses are 2+ on the right side and 2+ on the left side.       Radial pulses are 2+ on the right side and 2+ on  the left side.        Dorsalis pedis pulses are 2+ on the right side and 2+ on the left side.        Posterior tibial pulses are 2+ on the right side and 2+ on the left side.   Pulmonary:      Effort: Pulmonary effort is normal.      Breath sounds: Normal breath sounds. No wheezing, rhonchi or rales.   Abdominal:      General: Bowel sounds are normal. There is no abdominal bruit.      Palpations: Abdomen is soft. There is no pulsatile mass.      Tenderness: There is no abdominal tenderness.   Feet:      Right foot:      Skin integrity: Skin integrity normal.      Left foot:      Skin integrity: Skin integrity normal.   Skin:     Capillary Refill: Capillary refill takes less than 2 seconds.   Neurological:      General: No focal deficit present.      Mental Status: She is alert.   Psychiatric:         Mood and Affect: Mood and affect normal.         Speech: Speech normal.         Behavior: Behavior is cooperative.         Thought Content: Thought content normal.          Labs:     Blood Tests:  Lab Results   Component Value Date     11/14/2023    K 4.4 11/14/2023     11/14/2023    CO2 26 11/14/2023    BUN 16 11/14/2023    CREATININE 0.8 11/14/2023     11/14/2023    HGBA1C 5.2 01/23/2023    MG 1.8 05/10/2023    AST 26 11/06/2023    ALT 15 11/06/2023    ALBUMIN 4.0 11/06/2023    PROT 7.4 11/06/2023    BILITOT 0.4 11/06/2023    WBC 5.36 11/06/2023    HGB 11.6 (L) 11/06/2023    HCT 36.7 (L) 11/06/2023    MCV 97 11/06/2023     11/06/2023    INR 1.1 01/27/2019    TSH 3.025 05/10/2023       Lab Results   Component Value Date    CHOL 112 (L) 01/23/2023    HDL 57 01/23/2023    TRIG 41 01/23/2023       Lab Results   Component Value Date    LDLCALC 46.8 (L) 01/23/2023         Imaging:     Echocardiogram  None    Stress testing  MASON 10/31/2029  The test was stopped because the patient experienced fatigue.  The patient's exercise capacity was average.  There were no arrhythmias during stress.  The EKG  portion of this study is negative for myocardial ischemia.  Normal left ventricular systolic function. The estimated ejection fraction is 63%  No wall motion abnormalities.  Indeterminate left ventricular diastolic function.  Moderate left atrial enlargement.  Normal right ventricular systolic function.  The stress echo portion of this study is negative for myocardial ischemia.       Cath Lab  None    Other  None    EK2023  Normal sinus rhythm   Nonspecific T wave abnormality   Abnormal ECG   When compared with ECG of 31-OCT-2019 09:39,   No significant change was found     Assessment:     1. Dyspnea on exertion    2. Chronic asthma without complication, unspecified asthma severity, unspecified whether persistent    3. Carotid artery disease without cerebral infarction    4. Systemic lupus erythematosus, unspecified SLE type, unspecified organ involvement status        Plan:     Dyspnea on exertion  -     Ambulatory referral/consult to Cardiology  -     Stress Echo Which stress agent will be used? Treadmill Exercise; Color Flow Doppler? No; Future  Discussed with patient that her symptoms are not obviously cardiac, however I think updating stress testing is warranted to comprehensively rule out. We discussed the benefit of cardiac conditioning.     Chronic asthma without complication, unspecified asthma severity, unspecified whether persistent  Recommend consult with pulmonology and consideration of sleep study     Carotid artery disease without cerebral infarction  Follows with Dr. Corona     Systemic lupus erythematosus, unspecified SLE type, unspecified organ involvement status  Follows with rheumatology     BMI 31  Weight loss through a combination of diet and exercise encouraged  Recommend 150 minutes a week (30 minutes per day, 5 days per week) of moderate intensity exercise        Signed:  Giovana Cuenca PA-C  Cardiology     2023 10:15 AM    Follow-up:     Future Appointments   Date Time  Provider Department Center   1/4/2024  8:00 AM Natalya Matthews, PT, BCB-PMD [facility] UR THER [facility]   1/8/2024 12:45 PM EXERCISE, STRESS ECHO [facility] ECHOSTR [facility]   1/26/2024 11:00 AM Reina Crabtree MD [facility] COLREC [facility]   1/31/2024  9:00 AM Villa Perales MD [facility] PULMSVC [facility]   3/22/2024 10:40 AM Dayan Marie OD [facility] OPTOMTY [facility]   3/25/2024  2:30 PM William Rios MD [facility] RHEUM [facility] Ort   3/28/2024  1:15 PM INJECTION [facility] AMB INF [facility]

## 2023-12-28 NOTE — PROGRESS NOTES
VASCULAR SURGERY NOTE    Patient ID: Radha Chiang is a 68 y.o. female.    I. HISTORY     Chief Complaint: carotid artery stenosis    HPI: Radha Chiang is a 68 y.o. female who is here today for established patient appointment.  She returns for eval of carotid stenosis. She has history of right neck/head irradiation for acoustic neuroma years ago. She was found to have right carotid stenosis on CTA in January 2019 and carotid ultrasound performed 10/16/19 and was referred to vascular surgery clinic for evaluation. She has continued annual follow up with me for the past few years. She denies any significant changes in medications or medical history since her last appointment.      ALLERGIES: unremarkable    SOCIAL HISTORY: former smoker    FAMILY HISTORY: unremarkable    MEDICATIONS: up to date in EPIC    Past Medical History:   Diagnosis Date    Acid reflux     Anemia 2years ago    Anxiety     Arthritis     Asthma, chronic 4/10/2013    Crohn's disease 1998    Depression     Dry eyes     Fracture of forearm, proximal, open     screws & plate 6/26/13 in [facility]    History of papilledema 11/10/11    Hyperlipidemia 5/21/2018    Lupus     Neuromuscular disorder 12/2009    Neuropathy    Osteoporosis     Stroke 1/29/2013    Thyroid disease         Past Surgical History:   Procedure Laterality Date    ADENOIDECTOMY  1962    BRAIN SURGERY  01/18/2012    Brain Biopsy    BREAST CYST ASPIRATION Left     COLONOSCOPY  2017    [facility] Dr. WILLIS Summers; pan-diverticulosis, rec repeat 3-5 years    COLONOSCOPY N/A 2/9/2023    Procedure: COLONOSCOPY;  Surgeon: Reina Crabtree MD;  Location: [facility];  Service: Colon and Rectal;  Laterality: N/A;    ESOPHAGOGASTRODUODENOSCOPY N/A 4/27/2023    Procedure: ESOPHAGOGASTRODUODENOSCOPY (EGD);  Surgeon: Martir Braxton MD;  Location: [address]);  Service: Endoscopy;  Laterality: N/A;  4/21 Pre-call attempted, pt had phone trouble kls    ESOPHAGOGASTRODUODENOSCOPY N/A 7/21/2023     Procedure: EGD (ESOPHAGOGASTRODUODENOSCOPY);  Surgeon: Martir Braxton MD;  Location: [facility] ([address] [place] [address]);  Service: Endoscopy;  Laterality: N/A;  12wks to check healing, pt request Dr. Braxton, [facility] portal. Referral: REYNA MCKENZIE, Proc order tele enc 23-LW    INCISION AND DRAINAGE PERIRECTAL ABSCESS  1998    abscess removal    ORIF FOREARM FRACTURE Left 2013    proximal ulnar and radius       Social History     Tobacco Use   Smoking Status Former    Current packs/day: 0.00    Average packs/day: 0.5 packs/day for 15.0 years (7.5 ttl pk-yrs)    Types: Cigarettes    Start date: 1969    Quit date: 1984    Years since quittin.5   Smokeless Tobacco Never   Tobacco Comments    Quit         Review of Systems   Constitutional: Negative for weight loss.   HENT:  Negative for ear pain and nosebleeds.    Eyes:  Negative for discharge and pain.   Cardiovascular:  Negative for chest pain and palpitations.   Respiratory:  Negative for cough, shortness of breath and wheezing.    Endocrine: Negative for cold intolerance, heat intolerance and polyphagia.   Hematologic/Lymphatic: Negative for adenopathy. Does not bruise/bleed easily.   Skin:  Negative for itching and rash.   Musculoskeletal:  Negative for joint swelling and muscle cramps.   Gastrointestinal:  Negative for abdominal pain, diarrhea, nausea and vomiting.   Genitourinary:  Negative for dysuria and flank pain.   Neurological:  Negative for numbness and seizures.       II. PHYSICAL EXAM     Physical Exam  Constitutional:       General: She is not in acute distress.     Appearance: Normal appearance. She is normal weight.   HENT:      Head: Normocephalic and atraumatic.      Nose: Nose normal.      Mouth/Throat:      Mouth: Mucous membranes are moist.      Pharynx: No oropharyngeal exudate.   Eyes:      Extraocular Movements: Extraocular movements intact.      Conjunctiva/sclera: Conjunctivae normal.   Cardiovascular:      Rate and Rhythm:  Normal rate and regular rhythm.   Pulmonary:      Effort: Pulmonary effort is normal. No respiratory distress.   Abdominal:      General: There is no distension.      Palpations: Abdomen is soft.   Musculoskeletal:         General: Normal range of motion.      Cervical back: Normal range of motion and neck supple. No rigidity.      Right lower leg: No edema.      Left lower leg: No edema.   Skin:     General: Skin is warm and dry.      Coloration: Skin is not jaundiced or pale.      Findings: No erythema or rash.   Neurological:      General: No focal deficit present.      Mental Status: She is alert and oriented to person, place, and time. Mental status is at baseline.   Psychiatric:         Mood and Affect: Mood normal.         Behavior: Behavior normal.         III. ASSESSMENT & PLAN (MEDICAL DECISION MAKING)       Imaging Results: (I have personally reviewed all images and provided interpretation below)   Carotid Duplex 12/21/20:     R L   CCA: 105cm/s  ICA PSV: 260cm/s (previous 228cm/s)  ICA EDV: 72cm/s  Vert: antegrade, normal waveform  ICA/CCA ratio: 2.4  Impression: 50-75% stenosis of ICA CCA: 85cm/s  ICA PSV: 108cm/s  ICA EDV:33cm/s  Vert: antegrade, normal waveform  ICA/CCA ratio: 1.4  Impression: less than 50% stenosis of ICA     Carotid Duplex 12/12/22:  R L   CCA PSV: 84cm/s  ICA PSV: 301cm/s  ICA EDV: 46cm/s  Vert: antegrade  ICA/CCA ratio: 3.75  Impression: 70-79% stenosis of ICA CCA PSV: 108cm/s  ICA PSV: 106cm/s  ICA EDV: 30cm/s  Vert: antegrade  ICA/CCA ratio: 1  Impression: less than 50% stenosis     Carotid Duplex 12/21/23:  R L   CCA PSV: 71cm/s  ICA PSV: 324cm/s  ICA EDV: 78cm/s  Vert: antegrade  ICA/CCA ratio: 4.6  Impression: 60-79% stenosis of ICA CCA PSV: 76cm/s  ICA PSV: 81cm/s  ICA EDV: 25cm/s  Vert: antegrade  ICA/CCA ratio: 1.1  Impression: no hemodynamically significant stenosis         Assessment/Diagnosis and Plan:    1. Asymptomatic stenosis of right carotid artery        68 y.o.  female here for yearly follow up for asymptomatic right carotid stenosis. Her her velocities have progressively increased over the years, but she remains asymptomatic.  Velocities are still below threshold for 80% stenosis.  Due to h/o radiation, would recommend treatment with carotid stent or TCAR if her stenosis worsens to more than 80% or she becomes symptomatic.  Until then continue best medical treatment.     -ASA 81 mg daily  -continue rosuvastatin  -goal blood pressure less than 140/90  -RTC in 6 months for repeat carotid duplex    JACK Corona II, MD, RPVI  Vascular Surgeon  [facility]

## 2024-01-04 ENCOUNTER — CLINICAL SUPPORT (OUTPATIENT)
Dept: REHABILITATION | Facility: HOSPITAL | Age: 69
End: 2024-01-04
Attending: SURGERY
Payer: MEDICARE

## 2024-01-04 DIAGNOSIS — M62.89 PELVIC FLOOR DYSFUNCTION: Primary | ICD-10-CM

## 2024-01-04 PROCEDURE — 97110 THERAPEUTIC EXERCISES: CPT

## 2024-01-04 PROCEDURE — 97162 PT EVAL MOD COMPLEX 30 MIN: CPT

## 2024-01-04 NOTE — PATIENT INSTRUCTIONS
Home Program: 1/4/2024    Kegels in sitting (on toilet if you prefer- make sure that you empty your bladder first!):    1. sit comfortably with legs and buttocks relaxed.  2. Squeeze and LIFT the muscles that stop the flow of urine and passage of gas.  Keep legs and buttock muscles quiet.  3. Hold lift for 5 seconds without holding breath.  4. Release and DROP for 10 seconds.  5. Repeat 10 times, 2 sets, 2 times per day.      No Kegels while urinating!

## 2024-01-04 NOTE — PROGRESS NOTES
[facility]  Pelvic Health Physical Therapy Initial Evaluation    Date: 1/4/2024   Name: Radha Chiang  Clinic Number: [id number]  Therapy Diagnosis:   Encounter Diagnosis   Name Primary?    Pelvic floor dysfunction Yes     Physician: Reina Crabtree MD    Physician Orders: PT Eval and Treat    Medical Diagnosis from Referral: Pelvic floor dysfunction [M62.89]   Evaluation Date: 1/4/2024  Authorization Period Expiration: 12/31/2024  Plan of Care Expiration: 2/15/2024  Visit # / Visits authorized: 1/ 1    Time In: 8:13  Time Out: 8:50  Total Appointment Time (timed & untimed codes): 35 minutes    Precautions: universal    Subjective     Date of onset: about 6 months    History of current condition - Radha reports: that she is afraid that she will break her perineum when she pushes.  For the past 3 weeks, she is having less constipation due to stool softeners (type 5 stools now).  Has stopped taking Miralax.  She also is also making diet changes (more corn and beans, less dairy).  She currently has a hemorrhoid that is not painful.  Has had fissures in the past as well.      OB/GYN History: G0;   Sexually active? Yes  Pain with vaginal exams, intercourse or tampon use? No    Bladder/Bowel History: urinary incontinence and constipation/straining for movement  Frequency of urination:   Daytime: every 3 hours           Nighttime: 2  Difficulty initiating urine stream: no  Urine stream: strong  Complete emptying: Yes  Bladder leakage: Yes  Frequency of incidents: rare- urge leakage  Amount leaked (urine): few drops  Urinary Urgency: No, Able to delay the urge for at least 30 minute(s).  Frequency of bowel movements: once a day  Difficulty initiating BM: No- not at the moment  Quality/Shape of BM: [place] Stool Chart 5  Does Patient Feel Empty after BM? Yes  Fiber Supplements or Laxative Use? Yes  Colon leakage: yes  Frequency of incidents: once per month - walking, with urge  Amount leaked (bowels):  streaking/staining    Pain:  none     Medical History: Radha  has a past medical history of Acid reflux, Anemia (2years ago), Anxiety, Arthritis, Asthma, chronic (4/10/2013), Crohn's disease (1998), Depression, Dry eyes, Fracture of forearm, proximal, open, History of papilledema (11/10/11), Hyperlipidemia (5/21/2018), Lupus, Neuromuscular disorder (12/2009), Osteoporosis, Stroke (1/29/2013), and Thyroid disease.     Surgical History: Radha Chiang  has a past surgical history that includes Brain surgery (01/18/2012); Incision and drainage perirectal abscess (1998); Adenoidectomy (1962); Colonoscopy (2017); ORIF forearm fracture (Left, 06/23/2013); Breast cyst aspiration (Left); Colonoscopy (N/A, 2/9/2023); Esophagogastroduodenoscopy (N/A, 4/27/2023); and Esophagogastroduodenoscopy (N/A, 7/21/2023).    Medications: Radha has a current medication list which includes the following prescription(s): albuterol, alpha lipoic acid, aspirin, calcium carbonate/vitamin d3, coenzyme q10, diclofenac sodium, ergocalciferol, esomeprazole, fish oil-omega-3 fatty acids, hydroxychloroquine, levothyroxine, magnesium oxide, multivitamin, and rosuvastatin.    Allergies:   Review of patient's allergies indicates:   Allergen Reactions    Bactrim [sulfamethoxazole-trimethoprim] Nausea And Vomiting     Nausea, ? rash  Nausea, ? rash        Prior Therapy/Previous treatment included: none  Social History:  lives alone    Current exercise: swimming 3-4 days per week  Occupation: Pt is retired.  Prior Level of Function: could pass stool without difficulty  Current Level of Function: difficulty passing stool.    Types of fluid intake: water, coffee, and tea  Diet: adding more corn and beans   Habitus: well developed, well nourished  Abuse/Neglect: No     Pts goals: to strengthen her pelvic floor to support the perineum when passing stool.    OBJECTIVE     ORTHO SCREEN  Posture in sitting: WNL  Posture in standing: WNL  Pelvic alignment: no  sign of deviations noted in supine     ABDOMINAL WALL ASSESSMENT  Abdominal strength: Rectus abdominus: 2/5     Transverse abdominus: weakly palpable contraction noted      VAGINAL PELVIC FLOOR EXAM- deferred on this date due to time constraints      TREATMENT     Treatment Time In: 8:40  Treatment Time Out: 8:50  Total Treatment time (time-based codes) separate from Evaluation: 10 minutes.    Therapeutic Exercise to develop strength, endurance, and core stabilization for 10 minutes including:  Kegels Endurance Holds    Patient Education provided:   general anatomy/physiology of urinary/ bowel  system, benefits of treatment, risks of treatment, and alternative methods of treatment were discussed with the pt. Additionally, posture/body mechanices were reviewed.     Home Exercises provided:  Written Home Exercises provided: yes.  Exercises were reviewed and Radha was able to demonstrate them prior to the end of the session.    Radha demonstrated good  understanding of the education provided.     See EMR under Patient Instructions for exercises provided 1/4/2024.    Assessment     Radha is a 68 y.o. female referred to outpatient Physical Therapy with a medical diagnosis of Pelvic floor dysfunction [M62.89] . Pt presents with poor knowledge of body mechanics and posture, decreased pelvic muscle strength, decreased endurance of the pelvic muscles, and dysfunctional defecation.       Pt prognosis is Good.   Pt will benefit from skilled outpatient Physical Therapy to address the deficits stated above and in the chart below, provide pt/family education, and to maximize pt's level of independence.     Plan of care discussed with patient: Yes  Pt's spiritual, cultural and educational needs considered and patient is agreeable to the plan of care and goals as stated below:     Anticipated Barriers for therapy: none    Medical Necessity is demonstrated by the following:    History  Co-morbidities and personal factors that may  impact the plan of care Co-morbidities   history of CVA and brain surgery    Personal Factors  no deficits     moderate   Examination  Body structures and functions, activity limitations and participation restrictions that may impact the plan of care Body Regions/Systems/Functions:  poor knowledge of body mechanics and posture, decreased pelvic muscle strength, decreased endurance of the pelvic muscles, and dysfunctional defecation     Activity Limitations:  bearing down for BM    Participation Restrictions:  all ADLs/iADLs uninterrupted by discomfort associated with chronic constipation and Pelvic pressure with ADLs.     Activity limitations:   Learning and applying knowledge  None    General Tasks and Commands  None    Communication  None    Mobility  None    Self care  None    Domestic Life  None    Interactions/Relationships  None    Life Areas  None    Community and Social Life  None       moderate   Clinical Presentation evolving clinical presentation with changing clinical characteristics moderate   Decision Making/ Complexity Score: moderate       Goals:  Short Term Goals: 2 weeks  Pt to report increased awareness of PFM lift/drop during exercises and functional activities.  Pt to be I with diaphragmatic breathing.      Long Term Goals: 6 weeks   Pt to be able to bear down effectively 80% of the time to enable complete bowel evacuation.    Pt to be able to perform a lifting contraction of the pelvic floor and perineum.      Plan     Plan of care Certification: 1/4/2024 to 2/15/2024.    Outpatient Physical Therapy 1 times per 2 week(s)  for 6 weeks to include the following interventions: therapeutic exercises, therapeutic activity, neuromuscular re-education, manual therapy, modalities PRN, patient/family education, and self care/home management    Natalya Matthews, PT, BCB-PMD

## 2024-01-08 ENCOUNTER — HOSPITAL ENCOUNTER (OUTPATIENT)
Dept: CARDIOLOGY | Facility: HOSPITAL | Age: 69
Discharge: HOME | End: 2024-01-08
Attending: PHYSICIAN ASSISTANT
Payer: MEDICARE

## 2024-01-08 VITALS — WEIGHT: 168 LBS | BODY MASS INDEX: 32.98 KG/M2 | HEIGHT: 60 IN

## 2024-01-08 DIAGNOSIS — R06.09 DYSPNEA ON EXERTION: ICD-10-CM

## 2024-01-08 LAB
ASCENDING AORTA: 2.92 CM
BSA FOR ECHO PROCEDURE: 1.8 M2
CV ECHO LV RWT: 0.3 CM
CV STRESS BASE HR: 60 BPM
DIASTOLIC BLOOD PRESSURE: 66 MMHG
DOP CALC LVOT AREA: 3 CM2
DOP CALC LVOT DIAMETER: 1.96 CM
DOP CALC LVOT PEAK VEL: 1.09 M/S
DOP CALC LVOT STROKE VOLUME: 78.83 CM3
DOP CALCLVOT PEAK VEL VTI: 26.14 CM
E WAVE DECELERATION TIME: 178.1 MSEC
E/A RATIO: 1.09
E/E' RATIO: 10.75 M/S
ECHO LV POSTERIOR WALL: 0.76 CM (ref 0.6–1.1)
FRACTIONAL SHORTENING: 32 % (ref 28–44)
INTERVENTRICULAR SEPTUM: 0.94 CM (ref 0.6–1.1)
LA MAJOR: 5.51 CM
LA MINOR: 5.22 CM
LA WIDTH: 4.7 CM
LEFT ATRIUM SIZE: 3.85 CM
LEFT ATRIUM VOLUME INDEX MOD: 44 ML/M2
LEFT ATRIUM VOLUME INDEX: 47.7 ML/M2
LEFT ATRIUM VOLUME MOD: 76.15 CM3
LEFT ATRIUM VOLUME: 82.46 CM3
LEFT INTERNAL DIMENSION IN SYSTOLE: 3.44 CM (ref 2.1–4)
LEFT VENTRICLE DIASTOLIC VOLUME INDEX: 69.61 ML/M2
LEFT VENTRICLE DIASTOLIC VOLUME: 120.42 ML
LEFT VENTRICLE MASS INDEX: 86 G/M2
LEFT VENTRICLE SYSTOLIC VOLUME INDEX: 28.3 ML/M2
LEFT VENTRICLE SYSTOLIC VOLUME: 48.89 ML
LEFT VENTRICULAR INTERNAL DIMENSION IN DIASTOLE: 5.04 CM (ref 3.5–6)
LEFT VENTRICULAR MASS: 148.83 G
LV LATERAL E/E' RATIO: 8.6 M/S
LV SEPTAL E/E' RATIO: 14.33 M/S
MV A" WAVE DURATION": 6.66 MSEC
MV PEAK A VEL: 0.79 M/S
MV PEAK E VEL: 0.86 M/S
MV STENOSIS PRESSURE HALF TIME: 51.65 MS
MV VALVE AREA P 1/2 METHOD: 4.26 CM2
OHS CV CPX 1 MINUTE RECOVERY HEART RATE: 82 BPM
OHS CV CPX 85 PERCENT MAX PREDICTED HEART RATE MALE: 131
OHS CV CPX ESTIMATED METS: 9
OHS CV CPX MAX PREDICTED HEART RATE: 154
OHS CV CPX PATIENT IS FEMALE: 1
OHS CV CPX PATIENT IS MALE: 0
OHS CV CPX PEAK DIASTOLIC BLOOD PRESSURE: 74 MMHG
OHS CV CPX PEAK HEAR RATE: 133 BPM
OHS CV CPX PEAK RATE PRESSURE PRODUCT: NORMAL
OHS CV CPX PEAK SYSTOLIC BLOOD PRESSURE: 205 MMHG
OHS CV CPX PERCENT MAX PREDICTED HEART RATE ACHIEVED: 90
OHS CV CPX RATE PRESSURE PRODUCT PRESENTING: 7320
PISA TR MAX VEL: 2.29 M/S
PULM VEIN S/D RATIO: 1.42
PV PEAK D VEL: 0.6 M/S
PV PEAK S VEL: 0.85 M/S
RA MAJOR: 4.28 CM
RA PRESSURE ESTIMATED: 3 MMHG
RA WIDTH: 4.33 CM
RIGHT VENTRICULAR END-DIASTOLIC DIMENSION: 3.41 CM
RV TB RVSP: 5 MMHG
SINUS: 3.13 CM
STJ: 2.56 CM
STRESS ECHO POST EXERCISE DUR MIN: 5 MINUTES
STRESS ECHO POST EXERCISE DUR SEC: 43 SECONDS
STRESS ST DEPRESSION: 0.5 MM
SYSTOLIC BLOOD PRESSURE: 122 MMHG
TDI LATERAL: 0.1 M/S
TDI SEPTAL: 0.06 M/S
TDI: 0.08 M/S
TR MAX PG: 21 MMHG
TRICUSPID ANNULAR PLANE SYSTOLIC EXCURSION: 1.85 CM
TV REST PULMONARY ARTERY PRESSURE: 24 MMHG
Z-SCORE OF LEFT VENTRICULAR DIMENSION IN END DIASTOLE: 0.5
Z-SCORE OF LEFT VENTRICULAR DIMENSION IN END SYSTOLE: 1.17

## 2024-01-08 PROCEDURE — 93351 STRESS TTE COMPLETE: CPT

## 2024-01-08 PROCEDURE — 93351 STRESS TTE COMPLETE: CPT | Mod: 26,,, | Performed by: INTERNAL MEDICINE

## 2024-01-09 ENCOUNTER — TELEPHONE (OUTPATIENT)
Dept: INTERNAL MEDICINE | Facility: CLINIC | Age: 69
End: 2024-01-09
Payer: MEDICARE

## 2024-01-09 DIAGNOSIS — D33.3 RIGHT ACOUSTIC NEUROMA: Primary | ICD-10-CM

## 2024-01-09 NOTE — TELEPHONE ENCOUNTER
Pt reports her specialitt request  Repeat eulalia  Hx acoustic neuroma sp radioknife therapy   Order placed as requested

## 2024-01-17 ENCOUNTER — TELEPHONE (OUTPATIENT)
Dept: OPTOMETRY | Facility: CLINIC | Age: 69
End: 2024-01-17
Payer: MEDICARE

## 2024-01-23 ENCOUNTER — CLINICAL SUPPORT (OUTPATIENT)
Dept: REHABILITATION | Facility: HOSPITAL | Age: 69
End: 2024-01-23
Attending: SURGERY
Payer: MEDICARE

## 2024-01-23 DIAGNOSIS — M62.89 PELVIC FLOOR DYSFUNCTION: Primary | ICD-10-CM

## 2024-01-23 PROCEDURE — 97530 THERAPEUTIC ACTIVITIES: CPT

## 2024-01-23 NOTE — PROGRESS NOTES
"  Pelvic Health Physical Therapy   Treatment Note     Name: Radha Chiang  Clinic Number: [id number]    Therapy Diagnosis:   Encounter Diagnosis   Name Primary?    Pelvic floor dysfunction Yes     Physician: Reina Crabtree MD    Visit Date: 1/23/2024    Physician Orders: PT Eval and Treat    Medical Diagnosis from Referral: Pelvic floor dysfunction [M62.89]   Evaluation Date: 1/4/2024  Authorization Period Expiration: 12/31/2024  Plan of Care Expiration: 2/15/2024  Visit # / Visits authorized: 2/ 12  Cancelled Visits: 0  No Show Visits: 0    Time In: 2:20  Time Out: 2:55  Total Billable Time: 35 minutes    Precautions: Standard    Subjective     Pt reports: that she feels a "disconnect" with her pelvic floor when she is pushing to have a BM.  BM's continue to be soft and passable on her current regimen of Miralax.  Not leaking urine at the present time.   She was compliant with home exercise program.  Response to previous treatment: no adverse effects  Functional change: none noted yet    Pain: none     Objective     See EMR under MEDIA for written consent provided 1/23/2024  Chaperone: declined    VAGINAL PELVIC FLOOR EXAM    EXTERNAL ASSESSMENT  Introitus: stenotic  Skin condition: pale, dry  Scarring: none   Sensation: WNL   Pain: none    Voluntary contraction: visible lift  Voluntary relaxation: visible drop  Involuntary contraction: visible lift  Bearing down: nil        INTERNAL ASSESSMENT  Pain: tender areas noted as follows: tenderness noted with palpation of the vaginal walls on both sides.     Sensation: able to localized pressure appropriately   Vaginal vault: WNL   Muscle Bulk: hypertonus   Muscle Power: 2/5       Quality of contraction: slow relaxation   Specificity: patient contracts: gluts   Coordination: tends to hold breath during PFM contraction     Radha participated in dynamic functional therapeutic activities to improve functional performance for 35  minutes, including:  Instruction in proper " bearing down techniques, including diaphragmatic breathing, use of footstool, and placement of towel at the perineum in chair sitting to practice and better feel PFM drop.           Home Exercises Provided and Patient Education Provided     Education provided:   - posture/body mechanices and proper bearing down techniques  Discussed progression of plan of care with patient; educated pt in activity modification; reviewed HEP with pt. Pt demonstrated and verbalized understanding of all instruction and was provided with a handout of HEP (see Patient Instructions).    Written Home Exercises Provided: yes.  Exercises were reviewed and Radha was able to demonstrate them prior to the end of the session.  Radha demonstrated good  understanding of the education provided.     See EMR under Patient Instructions for exercises provided 1/23/2024.    Assessment     Pt's goal was to work on her pelvic floor strength- after today's assessment, I advised her that her issue with evacuation would probably have more to do with her bearing down technique, as well as her ability to manage stool consistency.  She is happy with her assessment, and has tools to work on her stooling technique I'ly.  Noted that she felt her PFM's lifting and dropping better after cues and instruction in sitting today.  No further need for PT services at this time.      Goals: Short Term Goals: 2 weeks  Pt to report increased awareness of PFM lift/drop during exercises and functional activities.  Pt to be I with diaphragmatic breathing.    BOTH MET     Long Term Goals: 6 weeks   Pt to be able to bear down effectively 80% of the time to enable complete bowel evacuation.    Pt to be able to perform a lifting contraction of the pelvic floor and perineum.    BOTH MET  Plan     D/c PT

## 2024-01-23 NOTE — PATIENT INSTRUCTIONS
Home Exercise Program: 01/23/2024    Bowel Movement Body Mechanics  1. Sit on the toilet comfortably with legs and buttocks relaxed.  2. Put your feet on a step stool or squatty potty (knees above hips).  3. Lean forward while keeping your back straight and rest your elbows on your knees.  4. Keep your knees apart.  Take a deep, belly breath in.  Belly moves outward.   5. Exhale like you are blowing out birthday candles (pursed lips) while you gently bear down.  Try to keep your belly hard (not sucked in) as you blow out and push.    6. After 4-5 pushes perform 2-3 quick kegels and then resume pushing with exhalation.     Do not strain and Do not hold your breath.

## 2024-01-24 ENCOUNTER — PATIENT MESSAGE (OUTPATIENT)
Dept: OPTOMETRY | Facility: CLINIC | Age: 69
End: 2024-01-24
Payer: MEDICARE

## 2024-01-25 ENCOUNTER — TELEPHONE (OUTPATIENT)
Dept: SURGERY | Facility: CLINIC | Age: 69
End: 2024-01-25
Payer: MEDICARE

## 2024-01-26 ENCOUNTER — HOSPITAL ENCOUNTER (OUTPATIENT)
Dept: RADIOLOGY | Facility: HOSPITAL | Age: 69
Discharge: HOME | End: 2024-01-26
Attending: INTERNAL MEDICINE
Payer: MEDICARE

## 2024-01-26 ENCOUNTER — OFFICE VISIT (OUTPATIENT)
Dept: SURGERY | Facility: CLINIC | Age: 69
End: 2024-01-26
Payer: MEDICARE

## 2024-01-26 ENCOUNTER — TELEPHONE (OUTPATIENT)
Dept: ENDOSCOPY | Facility: HOSPITAL | Age: 69
End: 2024-01-26
Payer: MEDICARE

## 2024-01-26 VITALS
BODY MASS INDEX: 33.19 KG/M2 | HEIGHT: 60 IN | HEART RATE: 64 BPM | WEIGHT: 169.06 LBS | SYSTOLIC BLOOD PRESSURE: 117 MMHG | DIASTOLIC BLOOD PRESSURE: 68 MMHG

## 2024-01-26 VITALS — HEIGHT: 60 IN | WEIGHT: 169 LBS | BODY MASS INDEX: 33.18 KG/M2

## 2024-01-26 DIAGNOSIS — R19.5 POSITIVE COLORECTAL CANCER SCREENING USING COLOGUARD TEST: Primary | ICD-10-CM

## 2024-01-26 DIAGNOSIS — K64.9 HEMORRHOIDS, UNSPECIFIED HEMORRHOID TYPE: Primary | ICD-10-CM

## 2024-01-26 DIAGNOSIS — D33.3 RIGHT ACOUSTIC NEUROMA: ICD-10-CM

## 2024-01-26 PROCEDURE — 99213 OFFICE O/P EST LOW 20 MIN: CPT | Mod: S$PBB,,, | Performed by: SURGERY

## 2024-01-26 PROCEDURE — 99999 PR PBB SHADOW E&M-EST. PATIENT-LVL III: CPT | Mod: PBBFAC,,, | Performed by: SURGERY

## 2024-01-26 PROCEDURE — 70553 MRI BRAIN STEM W/O & W/DYE: CPT | Mod: 26,,, | Performed by: RADIOLOGY

## 2024-01-26 PROCEDURE — 70553 MRI BRAIN STEM W/O & W/DYE: CPT | Mod: TC

## 2024-01-26 PROCEDURE — 25500020 PHARM REV CODE 255: Performed by: INTERNAL MEDICINE

## 2024-01-26 PROCEDURE — 99213 OFFICE O/P EST LOW 20 MIN: CPT | Mod: PBBFAC,25 | Performed by: SURGERY

## 2024-01-26 PROCEDURE — A9585 GADOBUTROL INJECTION: HCPCS | Performed by: INTERNAL MEDICINE

## 2024-01-26 RX ORDER — GADOBUTROL 604.72 MG/ML
8 INJECTION INTRAVENOUS
Status: COMPLETED | OUTPATIENT
Start: 2024-01-26 | End: 2024-01-26

## 2024-01-26 RX ADMIN — GADOBUTROL 8 ML: 604.72 INJECTION INTRAVENOUS at 05:01

## 2024-01-26 NOTE — TELEPHONE ENCOUNTER
"----- Message from Criselda Zamudio sent at 2024  1:11 PM CST -----    ----- Message -----  From: Reina Crabtree MD  Sent: 2024  10:46 AM CST  To: [facility]    Procedure: Colonoscopy    Diagnosis: positive cologuard    Procedure Timin-12 weeks    #If within 4 weeks selected, please ambrocio as high priority#    #If greater than 12 weeks, please select "5-12 weeks" and delay sending until 3 months prior to requested date#     Provider: Myself    Location: [address]    Additional Scheduling Information: No scheduling concerns    Prep Specifications:Standard prep    Is the patient taking a GLP-1 Agonist:no    Have you attached a patient to this message: yes       "

## 2024-01-29 ENCOUNTER — TELEPHONE (OUTPATIENT)
Dept: INTERNAL MEDICINE | Facility: CLINIC | Age: 69
End: 2024-01-29
Payer: MEDICARE

## 2024-01-29 NOTE — TELEPHONE ENCOUNTER
----- Message from Merry Espinal MD sent at 1/29/2024  3:53 PM CST -----  Called and spoke with pt   Request fax to dr. Evans her specialist  (ph [phone] )call fax     [phone] fax

## 2024-01-31 ENCOUNTER — OFFICE VISIT (OUTPATIENT)
Dept: PULMONOLOGY | Facility: CLINIC | Age: 69
End: 2024-01-31
Payer: MEDICARE

## 2024-01-31 ENCOUNTER — LAB VISIT (OUTPATIENT)
Dept: LAB | Facility: HOSPITAL | Age: 69
End: 2024-01-31
Attending: INTERNAL MEDICINE
Payer: MEDICARE

## 2024-01-31 VITALS
HEART RATE: 59 BPM | SYSTOLIC BLOOD PRESSURE: 148 MMHG | OXYGEN SATURATION: 95 % | WEIGHT: 169.06 LBS | BODY MASS INDEX: 33.19 KG/M2 | HEIGHT: 60 IN | DIASTOLIC BLOOD PRESSURE: 76 MMHG

## 2024-01-31 DIAGNOSIS — R06.09 DOE (DYSPNEA ON EXERTION): Primary | ICD-10-CM

## 2024-01-31 DIAGNOSIS — M32.9 SYSTEMIC LUPUS ERYTHEMATOSUS, UNSPECIFIED SLE TYPE, UNSPECIFIED ORGAN INVOLVEMENT STATUS: ICD-10-CM

## 2024-01-31 DIAGNOSIS — J84.10 CALCIFIED GRANULOMA OF LUNG: ICD-10-CM

## 2024-01-31 DIAGNOSIS — R06.2 WHEEZING: ICD-10-CM

## 2024-01-31 DIAGNOSIS — E66.2 CLASS 1 OBESITY WITH ALVEOLAR HYPOVENTILATION WITHOUT SERIOUS COMORBIDITY WITH BODY MASS INDEX (BMI) OF 33.0 TO 33.9 IN ADULT: ICD-10-CM

## 2024-01-31 LAB — IGE SERPL-ACNC: <35 IU/ML (ref 0–100)

## 2024-01-31 PROCEDURE — 99999 PR PBB SHADOW E&M-EST. PATIENT-LVL IV: CPT | Mod: PBBFAC,,, | Performed by: INTERNAL MEDICINE

## 2024-01-31 PROCEDURE — 36415 COLL VENOUS BLD VENIPUNCTURE: CPT | Performed by: INTERNAL MEDICINE

## 2024-01-31 PROCEDURE — 82785 ASSAY OF IGE: CPT | Performed by: INTERNAL MEDICINE

## 2024-01-31 PROCEDURE — 99214 OFFICE O/P EST MOD 30 MIN: CPT | Mod: PBBFAC | Performed by: INTERNAL MEDICINE

## 2024-01-31 PROCEDURE — 99204 OFFICE O/P NEW MOD 45 MIN: CPT | Mod: S$PBB,,, | Performed by: INTERNAL MEDICINE

## 2024-01-31 RX ORDER — ALBUTEROL SULFATE 90 UG/1
2 AEROSOL, METERED RESPIRATORY (INHALATION) EVERY 6 HOURS PRN
Qty: 18 G | Refills: 11 | Status: SHIPPED | OUTPATIENT
Start: 2024-01-31 | End: 2024-02-25 | Stop reason: SDUPTHER

## 2024-01-31 NOTE — PROGRESS NOTES
H&P Note  [facility]    SUBJECTIVE:     Chief Complaint:   Shortness of breath on exertion    History of Present Illness/Interval History:  Patient is a 68 year-old female here as a referral from her rheumatologist for evaluation of shortness of breath. She has a history of SLE. Her most bothersome symptoms of her SLE are joint pain and fatigue. She takes plaquenil daily. She has been feeling short of breath on exertion for the last two years. She believes this has been worsening. She is bothered by cold environments as well.    She is able to do 3-4 miles swimming without shortness of breath but is getting short of breath when walking up hills. She has intermittent dry cough. She has heard herself wheezing intermittently during physical activities. She denies chest tightness.     She has been taking albuterol rescue inhaler that provides her relief.     No viral illness, fevers, night sweats, or unintentional weight loss.     She worked in the [employer]. She worked in the office setting. She reports possible passive exposure to asbestos there.     Additional Pulmonary History:  Childhood Illnesses:  Asthma  Occupational: Human resources; not currently working   Environmental:  None   Tobacco/Smoking:  Quit in 1984. Smoked for 15 years about 0.5PPD    Review of patient's allergies indicates:   Allergen Reactions    Bactrim [sulfamethoxazole-trimethoprim] Nausea And Vomiting     Nausea, ? rash  Nausea, ? rash       Past Medical History:   Diagnosis Date    Acid reflux     Anemia 2years ago    Anxiety     Arthritis     Asthma, chronic 4/10/2013    Crohn's disease 1998    Depression     Dry eyes     Fracture of forearm, proximal, open     screws & plate 6/26/13 in [facility]    History of papilledema 11/10/11    Hyperlipidemia 5/21/2018    Lupus     Neuromuscular disorder 12/2009    Neuropathy    Osteoporosis     Stroke 1/29/2013    Thyroid disease      Past Surgical History:   Procedure Laterality Date     ADENOIDECTOMY  1962    BRAIN SURGERY  01/18/2012    Brain Biopsy    BREAST CYST ASPIRATION Left     COLONOSCOPY  2017    [facility] Dr. WILLIS Summers; pan-diverticulosis, rec repeat 3-5 years    COLONOSCOPY N/A 2/9/2023    Procedure: COLONOSCOPY;  Surgeon: Reina Crabtree MD;  Location: [facility];  Service: Colon and Rectal;  Laterality: N/A;    ESOPHAGOGASTRODUODENOSCOPY N/A 4/27/2023    Procedure: ESOPHAGOGASTRODUODENOSCOPY (EGD);  Surgeon: Martir Braxton MD;  Location: [facility] (4TH FLR);  Service: Endoscopy;  Laterality: N/A;  4/21 Pre-call attempted, pt had phone trouble kls    ESOPHAGOGASTRODUODENOSCOPY N/A 7/21/2023    Procedure: EGD (ESOPHAGOGASTRODUODENOSCOPY);  Surgeon: Martir Braxton MD;  Location: [facility] (4TH FLR);  Service: Endoscopy;  Laterality: N/A;  12wks to check healing, pt request Dr. Braxton, [facility] portal. Referral: REYNA MCKENZIE, Proc order tele enc 5/29/23-    INCISION AND DRAINAGE PERIRECTAL ABSCESS  1998    abscess removal    ORIF FOREARM FRACTURE Left 06/23/2013    proximal ulnar and radius     Family History   Problem Relation Age of Onset    Cancer Mother 64        small cell lung cancer; smoker    Heart disease Father 64        acute MI; 3v CABG    Sjogren's syndrome Sister     Allergies Sister     Cancer Maternal Grandmother     COPD Maternal Grandfather     Cancer Paternal Grandmother     Stroke Paternal Grandmother     Ovarian cancer Paternal Grandmother     Heart disease Paternal Grandfather     Intellectual disability Brother     Hypertension Sister     Lupus Paternal Uncle     Angioedema Neg Hx     Asthma Neg Hx     Atopy Neg Hx     Eczema Neg Hx     Immunodeficiency Neg Hx     Rhinitis Neg Hx     Urticaria Neg Hx      Social History     Socioeconomic History    Marital status: Single   Occupational History    Occupation: human resources   Tobacco Use    Smoking status: Former     Current packs/day: 0.00     Average packs/day: 0.5 packs/day for 15.0 years (7.5 ttl pk-yrs)      Types: Cigarettes     Start date: 1969     Quit date: 1984     Years since quittin.6    Smokeless tobacco: Never    Tobacco comments:     Quit    Substance and Sexual Activity    Alcohol use: Yes     Comment: less than one per week    Drug use: No    Sexual activity: Never   Social History Narrative    [place] in [place], rest of the year in [facility]    Enjoys traveling; Going to [facility] x 1 month 2018    Regular exercise     Social Determinants of Health     Financial Resource Strain: Low Risk  (2023)    Overall Financial Resource Strain (CARDIA)     Difficulty of Paying Living Expenses: Not hard at all   Food Insecurity: No Food Insecurity (2023)    Hunger Vital Sign     Worried About Running Out of Food in the Last Year: Never true     Ran Out of Food in the Last Year: Never true   Transportation Needs: No Transportation Needs (2023)    PRAPARE - Transportation     Lack of Transportation (Medical): No     Lack of Transportation (Non-Medical): No   Physical Activity: Sufficiently Active (2023)    Exercise Vital Sign     Days of Exercise per Week: 3 days     Minutes of Exercise per Session: 60 min   Stress: No Stress Concern Present (2023)    [employer] - Occupational Stress Questionnaire     Feeling of Stress : Only a little   Social Connections: Unknown (2023)    Social Connection and Isolation Panel [NHANES]     Frequency of Communication with Friends and Family: Three times a week     Frequency of Social Gatherings with Friends and Family: Twice a week     Active Member of Clubs or Organizations: Yes     Attends Club or Organization Meetings: More than 4 times per year     Marital Status: Never    Housing Stability: Low Risk  (2023)    Housing Stability Vital Sign     Unable to Pay for Housing in the Last Year: No     Number of Places Lived in the Last Year: 1     Unstable Housing in the Last Year: No       Review of  Systems:  No pertinent positives.      OBJECTIVE:     Vital Signs  Vitals:    01/31/24 0912   BP: (!) 148/76   Pulse: (!) 59   SpO2: 95%   Weight: 76.7 kg (169 lb 1.5 oz)   Height: 5' (1.524 m)     Body mass index is 33.02 kg/m².    Physical Exam:  Constitutional:       Normal appearance, appears in no acute distress.Mental status alert and oriented.  HENT:      Head: Normocephalic and atraumatic.      Eyelid: No eyelid discharge. Pupils PERRLA.     Nose: No congestion or rhinorrhea.   Cardiovascular:      Rate and Rhythm: Normal rate and regular rhythm.      Pulses: Normal pulses.      Heart sounds: Normal heart sounds. No murmur heard.  Pulmonary:      Effort: Pulmonary effort is normal. No respiratory distress.      Breath sounds: Normal breath sounds. No stridor. No wheezing, rhonchi or rales.     Chest wall: No tenderness.   Abdominal:      General: Abdomen is flat.      Palpations: Abdomen is soft.     Laboratory:  Lab Results   Component Value Date    WBC 5.36 11/06/2023    HGB 11.6 (L) 11/06/2023    HCT 36.7 (L) 11/06/2023    MCV 97 11/06/2023     11/06/2023       Chest Imaging, My Impression:   CT Chest 11/07/2023: no signs of interstitial lung disease;  No reticulations, honeycombing, or bronchiectasis. 2 calcified granulomas on left lung, calcified left hilar adenopathy, calcified granulomas in spleen. These findings were all present on pet/ct from 2010.           1/31/2024     9:12 AM 1/26/2024     1:36 PM 1/26/2024    10:34 AM 1/8/2024     1:35 PM 12/22/2023     2:23 PM 12/21/2023    12:01 PM 12/1/2023     8:58 AM   Pulmonary Studies Review   SpO2 95 %    96 %  95 %   Height 5' (1.524 m) 5' (1.524 m) 5' (1.524 m) 5' (1.524 m) 5' (1.524 m) 5' (1.524 m) 5' (1.524 m)   Weight 76.7 kg (169 lb 1.5 oz) 76.7 kg (169 lb) 76.7 kg (169 lb 1.5 oz) 76.2 kg (168 lb) 74 kg (163 lb 2.3 oz) 74 kg (163 lb 2.3 oz) 76.2 kg (167 lb 15.9 oz)   BMI (Calculated) 33 33 33 32.8 31.9 31.9 32.8   Predicted Distance 287.3  287.3 287.3 288.55 294.16 294.16 288.55   Predicted Distance Meters (Calculated) 431.44 meters 431.54 meters 431.44 meters 432.58 meters 437.62 meters 437.62 meters 432.59 meters     ECHO 01/08/2024:     Stress Protocol: The patient exercised for 5 minutes 43 seconds on a high ramp protocol, corresponding to a functional capacity of 9 METS, achieving a peak heart rate of 133 bpm, which is 90 % of the age predicted maximum heart rate. Their exercise capacity was normal. The patient reported no symptoms during the stress test. The test was stopped because the patient experienced leg fatigue and shortness of breath.    ECG Conclusion: The ECG portion of the study is negative for ischemia.    Right Ventricle: Normal right ventricular cavity size. Wall thickness is normal. Right ventricle wall motion  is normal. Systolic function is normal.    Left Ventricle: The left ventricle is normal in size. Normal wall thickness. Normal wall motion. There is normal systolic function. There is normal diastolic function.    Mitral Valve: There is mild regurgitation.    Pulmonary Artery: The estimated pulmonary artery systolic pressure is 24 mmHg.    Post-stress Impression: The study is negative with no echocardiographic evidence of stress induced ischemia.       ASSESSMENT/PLAN:     1) Dyspnea on exertion  2) Wheezing  - Inderjit pre & post bronchodilator are not suggestive of asthma. IgE & eos are normal. I do not think this is a case of allergic asthma.  - Okay to continue prn albuterol if it improves your symptoms, but there is no need to add additional inhalers at this time.    3) Obesity with alveolar hypoventilation  - Counseled patient on weight loss & lung function.    4) Calcified granulomas of the lung  - These findings were all present on pet/ct from 2010. Probably represent an old histo infection long resolved. No cause for concern.    I will message the patient with the results of her spirometry & IgE level. Will provide  additional instructions at that time.    Total professional time spent for the encounter: 45 minutes  Time was spent preparing to see the patient, reviewing results of prior testing, obtaining and/or reviewing separately obtained history, performing a medically appropriate examination and interview, counseling and educating the patient/family, ordering medications/tests/procedures, referring and communicating with other health care professionals, documenting clinical information in the electronic health record, and independently interpreting results.

## 2024-01-31 NOTE — PROGRESS NOTES
Colon & Rectal Surgery Clinic Follow Up    HPI:   Radha Chiang is a 68 y.o. female who presents for follow up of hemorrhoids.     Taking colace daily.  Takes miralax PRN.  Bowel movements stable.  Still with some irritation.        Objective:   Vitals:    01/26/24 1034   BP: 117/68   Pulse: 64        Physical Exam   Gen: well developed female, NAD  HEENT: normocephalic, atraumatic, PERRL, EOMI   CV: RRR, no murmurs  Resp nonlabored, CTAB   Abd: soft, NTND   MSK: no gross deformities, no cyanosis or edema  Anorectal: external hemorrhoid in RAL position stable, RAMON     Assessment and Plan:   Radha Chiang  is a 68 y.o. female who presents for follow up of hemorrhoids.      - External hemorrhoid unchanged in appearance.  Continue bowel regimen.  Will excise if patient wishes to proceed.    - Can try bidet at home.  Consider calmoseptine BID  - Patient with colonoscopy last year that was normal.  Subsequently had cologuard that was positive.  We discussed options for management.  Patient would like to have follow up colonoscopy.  Message sent to endoscopy scheduling.        Reina Crabtree MD  Staff Surgeon   Colon & Rectal Surgery

## 2024-02-01 ENCOUNTER — HOSPITAL ENCOUNTER (OUTPATIENT)
Dept: PULMONOLOGY | Facility: CLINIC | Age: 69
Discharge: HOME | End: 2024-02-01
Payer: MEDICARE

## 2024-02-01 DIAGNOSIS — R06.2 WHEEZING: ICD-10-CM

## 2024-02-01 LAB
FEF 25 75 LLN: 0.85
FEF 25 75 PRE REF: 101.7 %
FEF 25 75 REF: 1.77
FET100 CHG: -10.7 %
FEV05 LLN: 0.66
FEV05 REF: 1.52
FEV1 CHG: 3.4 %
FEV1 FVC LLN: 66
FEV1 FVC PRE REF: 100.5 %
FEV1 FVC REF: 79
FEV1 LLN: 1.43
FEV1 PRE REF: 100.8 %
FEV1 REF: 1.93
FEV1 VOL CHG: 0.07
FVC CHG: 0.1 %
FVC LLN: 1.81
FVC PRE REF: 99.8 %
FVC REF: 2.45
FVC VOL CHG: 0
PEF LLN: 3.74
PEF PRE REF: 98.6 %
PEF REF: 5.19
PHYSICIAN COMMENT: NORMAL
POST FEF 25 75: 1.98 L/S (ref 0.85–3.06)
POST FET 100: 6.05 SEC
POST FEV1 FVC: 82.25 % (ref 66.22–90.33)
POST FEV1: 2.02 L (ref 1.43–2.42)
POST FEV5: 1.51 L (ref 0.66–2.37)
POST FVC: 2.45 L (ref 1.81–3.12)
POST PEF: 5.18 L/S (ref 3.74–6.65)
PRE FEF 25 75: 1.8 L/S (ref 0.85–3.06)
PRE FET 100: 6.77 SEC
PRE FEV05 REF: 95.3 %
PRE FEV1 FVC: 79.6 % (ref 66.22–90.33)
PRE FEV1: 1.95 L (ref 1.43–2.42)
PRE FEV5: 1.45 L (ref 0.66–2.37)
PRE FVC: 2.45 L (ref 1.81–3.12)
PRE PEF: 5.12 L/S (ref 3.74–6.65)

## 2024-02-01 PROCEDURE — 94060 EVALUATION OF WHEEZING: CPT | Mod: 26,S$PBB,, | Performed by: INTERNAL MEDICINE

## 2024-02-01 PROCEDURE — 94060 EVALUATION OF WHEEZING: CPT | Mod: PBBFAC | Performed by: INTERNAL MEDICINE

## 2024-02-02 NOTE — TELEPHONE ENCOUNTER
Spoke to patient to schedule procedure(s) Colonoscopy       Physician to perform procedure(s) Dr. BAUDILIO Crabtree  Date of Procedure (s) 05/10/2024  Arrival Time 7:00 AM   Time of Procedure(s) 8:00 Am    Location of Procedure(s) [facility]  Type of Rx Prep sent to patient: PEG  Instructions provided to patient via [facility]    Patient was informed on the following information and verbalized understanding. Screening questionnaire reviewed with patient and complete. If procedure requires anesthesia, a responsible adult needs to be present to accompany the patient home, patient cannot drive after receiving anesthesia. Appointment details are tentative, especially check-in time. Patient will receive a prep-op call 7 days prior to confirm check-in time for procedure. If applicable the patient should contact their pharmacy to verify Rx for procedure prep is ready for pick-up. Patient was advised to call the scheduling department at [phone] if pharmacy states no Rx is available. Patient was advised to call the endoscopy scheduling department if any questions or concerns arise.      [facility] Scheduling Department

## 2024-02-25 ENCOUNTER — OFFICE VISIT (OUTPATIENT)
Dept: URGENT CARE | Facility: CLINIC | Age: 69
End: 2024-02-25
Payer: MEDICARE

## 2024-02-25 ENCOUNTER — ON-DEMAND VIRTUAL (OUTPATIENT)
Dept: URGENT CARE | Facility: CLINIC | Age: 69
End: 2024-02-25
Payer: MEDICARE

## 2024-02-25 VITALS
OXYGEN SATURATION: 96 % | HEIGHT: 60 IN | BODY MASS INDEX: 33.19 KG/M2 | DIASTOLIC BLOOD PRESSURE: 72 MMHG | RESPIRATION RATE: 18 BRPM | WEIGHT: 169.06 LBS | HEART RATE: 65 BPM | TEMPERATURE: 99 F | SYSTOLIC BLOOD PRESSURE: 113 MMHG

## 2024-02-25 DIAGNOSIS — H65.01 NON-RECURRENT ACUTE SEROUS OTITIS MEDIA OF RIGHT EAR: ICD-10-CM

## 2024-02-25 DIAGNOSIS — B96.89 BACTERIAL SINUSITIS: ICD-10-CM

## 2024-02-25 DIAGNOSIS — R06.09 DOE (DYSPNEA ON EXERTION): ICD-10-CM

## 2024-02-25 DIAGNOSIS — J18.9 PNEUMONIA OF BOTH LUNGS DUE TO INFECTIOUS ORGANISM, UNSPECIFIED PART OF LUNG: Primary | ICD-10-CM

## 2024-02-25 DIAGNOSIS — R06.02 SHORTNESS OF BREATH: Primary | ICD-10-CM

## 2024-02-25 DIAGNOSIS — J32.9 BACTERIAL SINUSITIS: ICD-10-CM

## 2024-02-25 DIAGNOSIS — J84.10 CALCIFIED GRANULOMA OF LUNG: ICD-10-CM

## 2024-02-25 DIAGNOSIS — R06.2 WHEEZING: ICD-10-CM

## 2024-02-25 PROBLEM — M19.029 ARTHRITIS OF ELBOW: Status: ACTIVE | Noted: 2024-02-25

## 2024-02-25 PROCEDURE — 99213 OFFICE O/P EST LOW 20 MIN: CPT | Mod: 95,,, | Performed by: NURSE PRACTITIONER

## 2024-02-25 PROCEDURE — 99214 OFFICE O/P EST MOD 30 MIN: CPT | Mod: S$GLB,,, | Performed by: FAMILY MEDICINE

## 2024-02-25 PROCEDURE — 71046 X-RAY EXAM CHEST 2 VIEWS: CPT | Mod: S$GLB,,, | Performed by: RADIOLOGY

## 2024-02-25 RX ORDER — BENZONATATE 100 MG/1
100 CAPSULE ORAL 3 TIMES DAILY PRN
COMMUNITY
Start: 2024-02-20 | End: 2024-03-25 | Stop reason: ALTCHOICE

## 2024-02-25 RX ORDER — OFLOXACIN 3 MG/ML
1 SOLUTION/ DROPS OPHTHALMIC
COMMUNITY
Start: 2024-02-20 | End: 2024-02-28

## 2024-02-25 RX ORDER — AZITHROMYCIN 250 MG/1
TABLET, FILM COATED ORAL
Qty: 6 TABLET | Refills: 0 | Status: SHIPPED | OUTPATIENT
Start: 2024-02-25 | End: 2024-03-25 | Stop reason: ALTCHOICE

## 2024-02-25 RX ORDER — MYCOPHENOLATE MOFETIL 500 MG/1
TABLET ORAL
COMMUNITY
End: 2024-03-25 | Stop reason: ALTCHOICE

## 2024-02-25 RX ORDER — METHYLPREDNISOLONE 4 MG/1
TABLET ORAL
Qty: 21 EACH | Refills: 0 | Status: SHIPPED | OUTPATIENT
Start: 2024-02-25 | End: 2024-02-25 | Stop reason: CLARIF

## 2024-02-25 RX ORDER — FLUTICASONE FUROATE AND VILANTEROL 100; 25 UG/1; UG/1
1 POWDER RESPIRATORY (INHALATION) DAILY
Qty: 60 EACH | Refills: 0 | Status: CANCELLED | OUTPATIENT
Start: 2024-02-25

## 2024-02-25 RX ORDER — AMOXICILLIN AND CLAVULANATE POTASSIUM 875; 125 MG/1; MG/1
1 TABLET, FILM COATED ORAL 2 TIMES DAILY
Qty: 20 TABLET | Refills: 0 | Status: SHIPPED | OUTPATIENT
Start: 2024-02-25 | End: 2024-03-25

## 2024-02-25 RX ORDER — ALBUTEROL SULFATE 90 UG/1
2 AEROSOL, METERED RESPIRATORY (INHALATION) EVERY 6 HOURS PRN
Qty: 18 G | Refills: 11 | Status: SHIPPED | OUTPATIENT
Start: 2024-02-25 | End: 2024-03-03

## 2024-02-25 NOTE — PROGRESS NOTES
Subjective:      Patient ID: Radha Chiang is a 69 y.o. female.    Vitals:  height is 5' (1.524 m) and weight is 76.7 kg (169 lb 1.5 oz). Her oral temperature is 98.7 °F (37.1 °C). Her blood pressure is 113/72 and her pulse is 65. Her respiration is 18 and oxygen saturation is 96%.     Chief Complaint: Cough (Covid Positive 2-20.  Took Paxlovid.  Still have cough & lots of mucous...would like xray for pneumonia - Entered by patient) and Covid Positive    Pt presents Covid Positive since 2/20. She has finished the [place] lovid but still having a cough and congestion. Pt thinks it started 2/15. Pt also states she test neg for covid today. No fever. Pt reports fatigue and some wheezing and some chest pain with coughing. Pt feels she never improved with paxlovid and has been getting worse. She reports thick purulent rhinorrhea    Cough  This is a new problem. The current episode started 1 to 4 weeks ago. The problem has been gradually worsening. The problem occurs constantly. The cough is Productive of sputum. Associated symptoms include a fever, nasal congestion, a rash, rhinorrhea, a sore throat and wheezing. Pertinent negatives include no chills. Nothing aggravates the symptoms. Treatments tried: Tylenol , Paxlovid, Mucinex, Tessalon pearls. The treatment provided no relief. Her past medical history is significant for asthma.       Constitution: Positive for fever. Negative for chills.   HENT:  Positive for sore throat.    Respiratory:  Positive for cough and wheezing.    Skin:  Positive for rash.      Objective:     Physical Exam   Constitutional: She is oriented to person, place, and time. She appears well-developed. She is cooperative.  Non-toxic appearance. She does not appear ill. No distress.   HENT:   Head: Normocephalic and atraumatic.   Ears:   Right Ear: Hearing, external ear and ear canal normal.   Left Ear: Hearing, tympanic membrane, external ear and ear canal normal.      Comments: Rt tm is red and  swollen  Nose: Rhinorrhea and congestion present. No mucosal edema or nasal deformity. No epistaxis. Right sinus exhibits no maxillary sinus tenderness and no frontal sinus tenderness. Left sinus exhibits no maxillary sinus tenderness and no frontal sinus tenderness.   Mouth/Throat: Uvula is midline, oropharynx is clear and moist and mucous membranes are normal. Mucous membranes are moist. No trismus in the jaw. Normal dentition. No uvula swelling. No oropharyngeal exudate, posterior oropharyngeal edema or posterior oropharyngeal erythema. Oropharynx is clear.   Eyes: Conjunctivae and lids are normal. Left eye exhibits discharge. No scleral icterus.      Comments: Mild erythema of left eyelids and some debris in corner of eye.    Neck: Trachea normal and phonation normal. Neck supple. No edema present. No erythema present. No neck rigidity present.   Cardiovascular: Normal rate, regular rhythm, normal heart sounds and normal pulses.   Pulmonary/Chest: Effort normal. No respiratory distress. She has no decreased breath sounds. She has rhonchi (anteriorly and posteriorly bilaterally -worse in upper lung zones).   Abdominal: Normal appearance.   Musculoskeletal: Normal range of motion.         General: No deformity or edema. Normal range of motion.   Lymphadenopathy:     She has no cervical adenopathy.   Neurological: She is alert and oriented to person, place, and time. She exhibits normal muscle tone. Coordination normal.   Skin: Skin is warm, dry, intact, not diaphoretic and not pale.   Psychiatric: Her speech is normal and behavior is normal. Judgment and thought content normal.   Nursing note and vitals reviewed.      Assessment:     1. Pneumonia of both lungs due to infectious organism, unspecified part of lung    2. Non-recurrent acute serous otitis media of right ear    3. Bacterial sinusitis        Plan:       Pneumonia of both lungs due to infectious organism, unspecified part of lung  -     X-Ray Chest PA  And Lateral; Future; Expected date: 02/25/2024    Non-recurrent acute serous otitis media of right ear    Bacterial sinusitis    Pt or guardian provided educational materials and instructions regarding their visit diagnosis.

## 2024-02-25 NOTE — PROGRESS NOTES
Subjective:      Patient ID: Radha Chiang is a 69 y.o. female.    Vitals:  vitals were not taken for this visit.     Chief Complaint: Cough      Visit Type: TELE AUDIOVISUAL    Present with the patient at the time of consultation: TELEMED PRESENT WITH PATIENT: None    Past Medical History:   Diagnosis Date    Acid reflux     Anemia 2years ago    Anxiety     Arthritis     Asthma, chronic 4/10/2013    Crohn's disease 1998    Depression     Dry eyes     Fracture of forearm, proximal, open     screws & plate 6/26/13 in [facility]    History of papilledema 11/10/11    Hyperlipidemia 5/21/2018    Lupus     Neuromuscular disorder 12/2009    Neuropathy    Osteoporosis     Stroke 1/29/2013    Thyroid disease      Past Surgical History:   Procedure Laterality Date    ADENOIDECTOMY  1962    BRAIN SURGERY  01/18/2012    Brain Biopsy    BREAST CYST ASPIRATION Left     COLONOSCOPY  2017    [facility] Dr. WILLIS Summers; pan-diverticulosis, rec repeat 3-5 years    COLONOSCOPY N/A 2/9/2023    Procedure: COLONOSCOPY;  Surgeon: Reina Crabtree MD;  Location: [facility];  Service: Colon and Rectal;  Laterality: N/A;    ESOPHAGOGASTRODUODENOSCOPY N/A 4/27/2023    Procedure: ESOPHAGOGASTRODUODENOSCOPY (EGD);  Surgeon: Martir Braxton MD;  Location: [facility] ([facility] FLR);  Service: Endoscopy;  Laterality: N/A;  4/21 Pre-call attempted, pt had phone trouble kls    ESOPHAGOGASTRODUODENOSCOPY N/A 7/21/2023    Procedure: EGD (ESOPHAGOGASTRODUODENOSCOPY);  Surgeon: Martir Braxton MD;  Location: [facility] ([facility] FLR);  Service: Endoscopy;  Laterality: N/A;  12wks to check healing, pt request Dr. Braxton, [facility] portal. Referral: REYNA MCKENZIE, Proc order tele enc 5/29/23-LW    INCISION AND DRAINAGE PERIRECTAL ABSCESS  1998    abscess removal    ORIF FOREARM FRACTURE Left 06/23/2013    proximal ulnar and radius     Review of patient's allergies indicates:   Allergen Reactions    Bactrim [sulfamethoxazole-trimethoprim] Nausea And Vomiting     Nausea, ?  rash  Nausea, ? rash     Current Outpatient Medications on File Prior to Visit   Medication Sig Dispense Refill    alpha lipoic acid 300 mg Cap       aspirin (ECOTRIN) 81 MG EC tablet Take 1 tablet (81 mg total) by mouth once daily.      calcium carbonate/vitamin D3 (CALTRATE 600 + D ORAL) Take 2 tablets by mouth once daily.      coenzyme Q10 100 mg capsule       diclofenac sodium (VOLTAREN) 1 % Gel Apply 2 g topically 4 (four) times daily as needed. 3 each 2    ergocalciferol (ERGOCALCIFEROL) 50,000 unit Cap Take 1 capsule (50,000 Units total) by mouth every 7 days. 12 capsule 3    esomeprazole (NEXIUM) 20 MG capsule Take 2 capsules (40 mg total) by mouth before breakfast. 90 capsule 3    fish oil-omega-3 fatty acids 300-1,000 mg capsule Take 1 g by mouth 2 (two) times daily.      hydroxychloroquine (PLAQUENIL) 200 mg tablet Take 400mg daily alternating with 200mg daily Brand name only medically necessary 135 tablet 2    levothyroxine (SYNTHROID) 112 MCG tablet TAKE 1 TABLET(112 MCG) BY MOUTH EVERY DAY 90 tablet 1    magnesium oxide (MAG-OX) 200 MG tablet       multivitamin (THERAGRAN) per tablet Take 1 tablet by mouth before breakfast.      rosuvastatin (CRESTOR) 20 MG tablet TAKE 1 TABLET(20 MG) BY MOUTH EVERY DAY 90 tablet 2    VENTOLIN HFA 90 mcg/actuation inhaler Inhale 2 puffs into the lungs every 6 (six) hours as needed for Wheezing or Shortness of Breath (cough). Rescue 18 g 11     No current facility-administered medications on file prior to visit.     Family History   Problem Relation Age of Onset    Cancer Mother 64        small cell lung cancer; smoker    Heart disease Father 64        acute MI; 3v CABG    Sjogren's syndrome Sister     Allergies Sister     Cancer Maternal Grandmother     COPD Maternal Grandfather     Cancer Paternal Grandmother     Stroke Paternal Grandmother     Ovarian cancer Paternal Grandmother     Heart disease Paternal Grandfather     Intellectual disability Brother      Hypertension Sister     Lupus Paternal Uncle     Angioedema Neg Hx     Asthma Neg Hx     Atopy Neg Hx     Eczema Neg Hx     Immunodeficiency Neg Hx     Rhinitis Neg Hx     Urticaria Neg Hx              Ohs Peq Odvv Intake    2/25/2024  8:05 AM CST - Filed by Patient   What is your current physical address in the event of a medical emergency? [address]   Are you able to take your vital signs? Yes   Systolic Blood Pressure: 125   Diastolic Blood Pressure: 75   Weight: 162   Height: 5   Pulse: 69   Temperature: 98.3   Respiration rate:    Pulse Oxygen: 91   Please attach any relevant images or files          States was diagnosed with COVID on 2/20/24. Was given paxlovid but symptoms started on 2/15/24 and does not believe it worked. Has cough with phlegm that is all different colors, unable to sleep at night. Worried about pneumonia. Reports is taking fever reducers and thinks temp is normal right now. States when she blows her nose, there are occasional bloody specks. Has taken tessalon perles, which did not help. Also reports wheezing and shortness of breath. O2 sat 91%.    Cough  The current episode started in the past 7 days. The problem has been unchanged. The cough is Productive of sputum. Associated symptoms include a fever, nasal congestion, shortness of breath and wheezing. Pertinent negatives include no hemoptysis or rhinorrhea. She has tried OTC cough suppressant, prescription cough suppressant and rest for the symptoms.       Constitution: Positive for fever. Negative for fatigue.   HENT:  Positive for congestion.    Respiratory:  Positive for cough, sputum production, shortness of breath and wheezing. Negative for bloody sputum.         Objective:   The physical exam was conducted virtually.  Physical Exam   Constitutional: She is oriented to person, place, and time. No distress.   HENT:   Head: Normocephalic.   Eyes: Conjunctivae are normal. No scleral icterus.   Pulmonary/Chest: Effort normal.  No stridor. No respiratory distress. She has wheezes. She has no rhonchi. She has no rales. She exhibits no tenderness.         Comments: + cough    Musculoskeletal: Normal range of motion.         General: Normal range of motion.   Neurological: She is alert and oriented to person, place, and time.   Skin: Skin is not diaphoretic.   Psychiatric: Her behavior is normal. Judgment and thought content normal.       Assessment:     1. Shortness of breath    2. Wheezing        Plan:       Shortness of breath    Wheezing    Other orders  -     Discontinue: methylPREDNISolone (MEDROL DOSEPACK) 4 mg tablet; use as directed  Dispense: 21 each; Refill: 0      Go to the nearest emergency department or urgent care immediately to be evaluated for your symptoms. This telehealth service has limited capabilities and it is felt that you need a more thorough, hands-on examination.    Avoid eating or drinking prior to arriving to the ED/UC.

## 2024-03-11 ENCOUNTER — ON-DEMAND VIRTUAL (OUTPATIENT)
Dept: URGENT CARE | Facility: CLINIC | Age: 69
End: 2024-03-11
Payer: MEDICARE

## 2024-03-11 DIAGNOSIS — J40 BRONCHITIS DUE TO COVID-19 VIRUS: Primary | ICD-10-CM

## 2024-03-11 DIAGNOSIS — U07.1 BRONCHITIS DUE TO COVID-19 VIRUS: Primary | ICD-10-CM

## 2024-03-11 PROCEDURE — 99213 OFFICE O/P EST LOW 20 MIN: CPT | Mod: 95,,, | Performed by: NURSE PRACTITIONER

## 2024-03-11 RX ORDER — PREDNISONE 20 MG/1
20 TABLET ORAL DAILY
Qty: 5 TABLET | Refills: 0 | Status: SHIPPED | OUTPATIENT
Start: 2024-03-11 | End: 2024-03-25

## 2024-03-11 NOTE — PROGRESS NOTES
Subjective:      Patient ID: Radha Chiang is a 69 y.o. female.    Vitals:  vitals were not taken for this visit.     Chief Complaint: Cough      Visit Type: TELE AUDIOVISUAL    Present with the patient at the time of consultation: TELEMED PRESENT WITH PATIENT: None    LOCATION: home    Past Medical History:   Diagnosis Date    Acid reflux     Anemia 2years ago    Anxiety     Arthritis     Asthma, chronic 4/10/2013    Crohn's disease 1998    Depression     Dry eyes     Fracture of forearm, proximal, open     screws & plate 6/26/13 in [facility]    History of papilledema 11/10/11    Hyperlipidemia 5/21/2018    Lupus     Neuromuscular disorder 12/2009    Neuropathy    Osteoporosis     Stroke 1/29/2013    Thyroid disease      Past Surgical History:   Procedure Laterality Date    ADENOIDECTOMY  1962    BRAIN SURGERY  01/18/2012    Brain Biopsy    BREAST CYST ASPIRATION Left     COLONOSCOPY  2017    [facility] Dr. WILLIS Summers; pan-diverticulosis, rec repeat 3-5 years    COLONOSCOPY N/A 2/9/2023    Procedure: COLONOSCOPY;  Surgeon: Reina Crabtree MD;  Location: [facility];  Service: Colon and Rectal;  Laterality: N/A;    ESOPHAGOGASTRODUODENOSCOPY N/A 4/27/2023    Procedure: ESOPHAGOGASTRODUODENOSCOPY (EGD);  Surgeon: Martir Braxton MD;  Location: [facility] (4TH FLR);  Service: Endoscopy;  Laterality: N/A;  4/21 Pre-call attempted, pt had phone trouble kls    ESOPHAGOGASTRODUODENOSCOPY N/A 7/21/2023    Procedure: EGD (ESOPHAGOGASTRODUODENOSCOPY);  Surgeon: Martir Braxton MD;  Location: [facility] (4TH FLR);  Service: Endoscopy;  Laterality: N/A;  12wks to check healing, pt request Dr. Braxton, [facility] portal. Referral: REYNA MCKENZIE, Proc order tele enc 5/29/23-LW    INCISION AND DRAINAGE PERIRECTAL ABSCESS  1998    abscess removal    ORIF FOREARM FRACTURE Left 06/23/2013    proximal ulnar and radius     Review of patient's allergies indicates:   Allergen Reactions    Bactrim [sulfamethoxazole-trimethoprim] Nausea And Vomiting      Nausea, ? rash  Nausea, ? rash     Current Outpatient Medications on File Prior to Visit   Medication Sig Dispense Refill    alpha lipoic acid 300 mg Cap       aspirin (ECOTRIN) 81 MG EC tablet Take 1 tablet (81 mg total) by mouth once daily.      azithromycin (Z-ROSMERY) 250 MG tablet 2 on first day then one tablet a day for 4 days 6 tablet 0    benzonatate (TESSALON) 100 MG capsule Take 100 mg by mouth 3 (three) times daily as needed.      calcium carbonate/vitamin D3 (CALTRATE 600 + D ORAL) Take 2 tablets by mouth once daily.      coenzyme Q10 100 mg capsule       ergocalciferol (ERGOCALCIFEROL) 50,000 unit Cap Take 1 capsule (50,000 Units total) by mouth every 7 days. 12 capsule 3    esomeprazole (NEXIUM) 20 MG capsule Take 2 capsules (40 mg total) by mouth before breakfast. 90 capsule 3    fish oil-omega-3 fatty acids 300-1,000 mg capsule Take 1 g by mouth 2 (two) times daily.      hydroxychloroquine (PLAQUENIL) 200 mg tablet Take 400mg daily alternating with 200mg daily Brand name only medically necessary 135 tablet 2    levothyroxine (SYNTHROID) 112 MCG tablet TAKE 1 TABLET(112 MCG) BY MOUTH EVERY DAY 90 tablet 1    magnesium oxide (MAG-OX) 200 MG tablet       multivitamin (THERAGRAN) per tablet Take 1 tablet by mouth before breakfast.      mycophenolate (CELLCEPT) 500 mg Tab       rosuvastatin (CRESTOR) 20 MG tablet TAKE 1 TABLET(20 MG) BY MOUTH EVERY DAY 90 tablet 2    VENTOLIN HFA 90 mcg/actuation inhaler Inhale 2 puffs into the lungs every 6 (six) hours as needed for Wheezing or Shortness of Breath (cough). Rescue 18 g 11     No current facility-administered medications on file prior to visit.     Family History   Problem Relation Age of Onset    Cancer Mother 64        small cell lung cancer; smoker    Heart disease Father 64        acute MI; 3v CABG    Sjogren's syndrome Sister     Allergies Sister     Cancer Maternal Grandmother     COPD Maternal Grandfather     Cancer Paternal Grandmother     Stroke  Paternal Grandmother     Ovarian cancer Paternal Grandmother     Heart disease Paternal Grandfather     Intellectual disability Brother     Hypertension Sister     Lupus Paternal Uncle     Angioedema Neg Hx     Asthma Neg Hx     Atopy Neg Hx     Eczema Neg Hx     Immunodeficiency Neg Hx     Rhinitis Neg Hx     Urticaria Neg Hx        Medications Ordered                [id number] - [place], [place] - [address]   [address] [address]    Telephone: [phone]   Fax: [phone]   Hours: Not open 24 hours                         E-Prescribed (1 of 1)              predniSONE (DELTASONE) 20 MG tablet    Sig: Take 1 tablet (20 mg total) by mouth once daily. for 5 days       Start: 3/11/24     Quantity: 5 tablet Refills: 0                           Ohs Peq Odvv Intake    3/11/2024  4:23 PM CDT - Filed by Patient   What is your current physical address in the event of a medical emergency? [address].  [place]. [address]   Are you able to take your vital signs? Yes   Systolic Blood Pressure: 112   Diastolic Blood Pressure: 67   Weight: 162   Height: 5   Pulse: 65   Temperature: 98.4   Respiration rate:    Pulse Oxygen: 94   Please attach any relevant images or files          70 yo female with c/o covid positive in feb. And then did a zpack for residual symptoms. She states she is having fever, congestion, coughing. She denies sob and wheezing. She has pmh asthma.         Constitution: Positive for fatigue and fever.   HENT:  Positive for congestion, postnasal drip and sore throat.    Cardiovascular: Negative.    Respiratory:  Positive for cough.    Gastrointestinal: Negative.    Endocrine: negative.   Genitourinary: Negative.  Negative for frequency and urgency.   Musculoskeletal:  Positive for muscle ache.   Skin: Negative.    Allergic/Immunologic: Negative.    Neurological: Negative.    Hematologic/Lymphatic: Negative.     Psychiatric/Behavioral: Negative.          Objective:   The physical exam was conducted virtually.  Physical Exam   Constitutional: She is oriented to person, place, and time. She appears well-developed.   HENT:   Head: Normocephalic and atraumatic.   Ears:   Right Ear: Hearing, tympanic membrane and external ear normal.   Left Ear: Hearing, tympanic membrane and external ear normal.   Nose: Nose normal.   Mouth/Throat: Uvula is midline, oropharynx is clear and moist and mucous membranes are normal.   Eyes: Conjunctivae and EOM are normal. Pupils are equal, round, and reactive to light.   Neck: Neck supple.   Cardiovascular: Normal rate.   Pulmonary/Chest: Effort normal and breath sounds normal.   Musculoskeletal: Normal range of motion.         General: Normal range of motion.   Neurological: She is alert and oriented to person, place, and time.   Skin: Skin is warm.   Psychiatric: Her behavior is normal. Thought content normal.   Nursing note and vitals reviewed.      Assessment:     1. Bronchitis due to COVID-19 virus        Plan:     Patient Instructions   Thank you for choosing [facility]!    Our goal in the [facility] is to always provide outstanding medical care. You may receive a survey by mail or e-mail in the next week regarding your experience today. We would greatly appreciate you completing and returning the survey. Your feedback provides us with a way to recognize our staff who provide very good care, and it helps us learn how to improve when your experience was below our aspiration of excellence.         We appreciate you trusting us with your medical care. We hope you feel better soon. We will be happy to take care of you for all of your future medical needs.    You must understand that you've received an Urgent Care treatment only and that you may be released before all your medical problems are known or treated. You, the patient, will arrange for follow up  care as instructed.    Follow up with your PCP or specialty clinic as directed in the next 1-2 weeks if not improved or as needed.  You can call [phone] to schedule an appointment with the appropriate provider.    If your condition worsens we recommend that you receive another evaluation in person, with your primary care provider, urgent care or at the emergency room immediately or contact your primary medical clinics after hours call service to discuss your concerns.       PLEASE READ YOUR DISCHARGE INSTRUCTIONS ENTIRELY AS IT CONTAINS IMPORTANT INFORMATION.      Please take your steroids to completion.     Use the albuterol inhaler for wheezing.     Do not drive while taking the cough syrup - best to take it at night before going to sleep. However, you can take it during the day (every 4-6 hours) if you do not have to drive or operate machinery. This medication will make you drowsy. Try taking half a dose first to see how it affects you.      Please return or see your primary care doctor if you develop new or worsening symptoms.     Please arrange follow up with your primary medical clinic as soon as possible. You must understand that you've received an Urgent Care treatment only and that you may be released before all of your medical problems are known or treated. You, the patient, will arrange for follow up as instructed. If your symptoms worsen or fail to improve you should go to the Emergency Room.      Bronchitis due to COVID-19 virus  -     predniSONE (DELTASONE) 20 MG tablet; Take 1 tablet (20 mg total) by mouth once daily. for 5 days  Dispense: 5 tablet; Refill: 0

## 2024-03-11 NOTE — PATIENT INSTRUCTIONS
Thank you for choosing [facility]!    Our goal in the [facility] is to always provide outstanding medical care. You may receive a survey by mail or e-mail in the next week regarding your experience today. We would greatly appreciate you completing and returning the survey. Your feedback provides us with a way to recognize our staff who provide very good care, and it helps us learn how to improve when your experience was below our aspiration of excellence.         We appreciate you trusting us with your medical care. We hope you feel better soon. We will be happy to take care of you for all of your future medical needs.    You must understand that you've received an Urgent Care treatment only and that you may be released before all your medical problems are known or treated. You, the patient, will arrange for follow up care as instructed.    Follow up with your PCP or specialty clinic as directed in the next 1-2 weeks if not improved or as needed.  You can call [phone] to schedule an appointment with the appropriate provider.    If your condition worsens we recommend that you receive another evaluation in person, with your primary care provider, urgent care or at the emergency room immediately or contact your primary medical clinics after hours call service to discuss your concerns.       PLEASE READ YOUR DISCHARGE INSTRUCTIONS ENTIRELY AS IT CONTAINS IMPORTANT INFORMATION.      Please take your steroids to completion.     Use the albuterol inhaler for wheezing.     Do not drive while taking the cough syrup - best to take it at night before going to sleep. However, you can take it during the day (every 4-6 hours) if you do not have to drive or operate machinery. This medication will make you drowsy. Try taking half a dose first to see how it affects you.      Please return or see your primary care doctor if you develop new or worsening symptoms.     Please arrange follow  up with your primary medical clinic as soon as possible. You must understand that you've received an Urgent Care treatment only and that you may be released before all of your medical problems are known or treated. You, the patient, will arrange for follow up as instructed. If your symptoms worsen or fail to improve you should go to the Emergency Room.

## 2024-03-13 ENCOUNTER — TELEPHONE (OUTPATIENT)
Dept: INFECTIOUS DISEASES | Facility: HOSPITAL | Age: 69
End: 2024-03-13
Payer: MEDICARE

## 2024-03-13 NOTE — TELEPHONE ENCOUNTER
Called Pt to reschedule Prolia injection apt due to dental work. Left voicemail with call back number [phone]

## 2024-03-14 ENCOUNTER — OFFICE VISIT (OUTPATIENT)
Dept: URGENT CARE | Facility: CLINIC | Age: 69
End: 2024-03-14
Payer: MEDICARE

## 2024-03-14 VITALS
RESPIRATION RATE: 17 BRPM | TEMPERATURE: 98 F | SYSTOLIC BLOOD PRESSURE: 126 MMHG | DIASTOLIC BLOOD PRESSURE: 76 MMHG | HEIGHT: 60 IN | WEIGHT: 169 LBS | OXYGEN SATURATION: 97 % | HEART RATE: 59 BPM | BODY MASS INDEX: 33.18 KG/M2

## 2024-03-14 DIAGNOSIS — R05.9 COUGH IN ADULT: Primary | ICD-10-CM

## 2024-03-14 DIAGNOSIS — J06.9 VIRAL URI: ICD-10-CM

## 2024-03-14 LAB
CTP QC/QA: YES
POC MOLECULAR INFLUENZA A AGN: NEGATIVE
POC MOLECULAR INFLUENZA B AGN: NEGATIVE

## 2024-03-14 PROCEDURE — 87502 INFLUENZA DNA AMP PROBE: CPT | Mod: QW,S$GLB,, | Performed by: NURSE PRACTITIONER

## 2024-03-14 PROCEDURE — 71046 X-RAY EXAM CHEST 2 VIEWS: CPT | Mod: S$GLB,,, | Performed by: RADIOLOGY

## 2024-03-14 PROCEDURE — 99213 OFFICE O/P EST LOW 20 MIN: CPT | Mod: S$GLB,,, | Performed by: NURSE PRACTITIONER

## 2024-03-14 RX ORDER — METHYLPREDNISOLONE 4 MG/1
TABLET ORAL
COMMUNITY
Start: 2024-02-25 | End: 2024-03-25 | Stop reason: ALTCHOICE

## 2024-03-14 RX ORDER — LEVOTHYROXINE SODIUM 112 UG/1
112 TABLET ORAL
Qty: 90 TABLET | Refills: 0 | Status: SHIPPED | OUTPATIENT
Start: 2024-03-14 | End: 2024-05-13

## 2024-03-14 NOTE — PATIENT INSTRUCTIONS
"Cough in adult  -     POCT Influenza A/B MOLECULAR    -     X-Ray Chest PA And Lateral    Viral URI      Continue Prednisone as prescribed.      - Rest at home.     - Drink plenty of fluids so you won't get dehydrated.    Avoid taking Decongestants such as pseudoephedrine (ex. Sudafed) or phenylephrine (ex. Mucinex FastMax, Dayquil, Nyquil, or any combo cold meds that say "cold," "sinus" or "-D").      - Cough recommendations:   Warm tea with honey can help with cough. Honey is a natural cough suppressant.  Tessalon (as prescribed )  or   - Dextromethorphan (DM) is a cough suppressant over the counter (ie. mucinex DM OR delsym).        - Congestion recommendations:      - Mucinex (guaifenesin) twice a day (or as directed) to help loosen mucous.       - Fever/Pain recommendations:  Alternate Tylenol or Ibuprofen as directed for fever/pain.   - Motrin/Ibuprofen every 6-8 hours for pain and inflammation. Do not take ibuprofen if you have a history of GI bleeding, kidney disease, or if you take blood thinners.    - Tylenol/acetaminophen every 6-8 hours for added pain relief.  Avoid tylenol if you have a history of liver disease.       -Sore throat recommendations: Warm fluids, warm salt water gargles, throat lozenges, tea, honey, soup, or drinking something cold or frozen.  Throat lozenges or sprays help reduce pain. Gargling with warm saltwater (1/4 teaspoon of salt in 1/2 cup of warm water) or an OTC anesthetic gargle may be useful for irritation.    Follow up with PCP if symptoms worsen or do not resolve fully.    When to seek medical advice  Call your healthcare provider right away if any of these occur:  Fever that is poorly controlled with OTC fever reducing medication  New or worsening ear pain, sinus pain, or headache  Stiff neck  You can't swallow liquids or you can't open your mouth wide because of throat pain  Signs of dehydration. These include very dark urine or no urine, sunken eyes, and " dizziness.  Trouble breathing or noisy breathing  Muffled voice  Rash

## 2024-03-14 NOTE — PROGRESS NOTES
"Subjective:      Patient ID: Radha Chiang is a 69 y.o. female.    Vitals:  height is 5' (1.524 m) and weight is 76.7 kg (169 lb). Her oral temperature is 98.4 °F (36.9 °C). Her blood pressure is 126/76 and her pulse is 59 (abnormal). Her respiration is 17 and oxygen saturation is 97%.     Chief Complaint: URI      Pt is a 70 yo female with hx asthma presenting with congestion and cough.  Pt had covid 2/20 and took paxlovid but worsened.  She was diagnosed with pneumonia on 2/24 and given Augmentin and azithromycin.  Pt reports symptoms got better, but now it is getting worse again. She did a telehealth visit on 3/11 (3 days ago) and was given prednisone for bronchitis.      URI   This is a new problem. Episode onset: 2 weeks. There has been no fever. Associated symptoms include congestion and coughing. Pertinent negatives include no chest pain, diarrhea, ear pain, headaches, nausea, sinus pain, sore throat or vomiting.       Constitution: Positive for fever ("feels feverish"). Negative for chills and fatigue.   HENT:  Positive for congestion. Negative for ear pain, sinus pain and sore throat.    Cardiovascular:  Negative for chest pain.   Respiratory:  Positive for cough. Negative for sputum production and shortness of breath.    Gastrointestinal:  Negative for nausea, vomiting and diarrhea.   Musculoskeletal:  Positive for muscle ache (mild).   Neurological:  Negative for headaches.      Objective:     Physical Exam   Constitutional: She is oriented to person, place, and time.   HENT:   Head: Normocephalic.   Ears:   Right Ear: Hearing and external ear normal. No no drainage, swelling or tenderness. Tympanic membrane is not erythematous, not retracted and not bulging. No middle ear effusion.   Left Ear: Hearing and external ear normal. No no drainage, swelling or tenderness. Tympanic membrane is not erythematous, not retracted and not bulging.  No middle ear effusion.   Nose: Nose normal. No mucosal edema, " rhinorrhea or purulent discharge. Right sinus exhibits no maxillary sinus tenderness and no frontal sinus tenderness. Left sinus exhibits no maxillary sinus tenderness and no frontal sinus tenderness.   Mouth/Throat: Uvula is midline, oropharynx is clear and moist and mucous membranes are normal. No trismus in the jaw. No uvula swelling. No oropharyngeal exudate, posterior oropharyngeal edema or posterior oropharyngeal erythema.   Cardiovascular: Normal rate and regular rhythm.   Pulmonary/Chest: Effort normal and breath sounds normal. No accessory muscle usage or stridor. No respiratory distress. She has no decreased breath sounds. She has no wheezes. She has no rhonchi. She has no rales.   Neurological: She is alert and oriented to person, place, and time.   Skin: Skin is warm and dry.   Nursing note and vitals reviewed.    X-Ray Chest PA And Lateral    Result Date: 3/14/2024  EXAMINATION: XR CHEST PA AND LATERAL CLINICAL HISTORY: Cough, unspecified TECHNIQUE: PA and lateral views of the chest were performed. COMPARISON: 02/25/2024 FINDINGS: The cardiomediastinal silhouette is not enlarged.  There is no pleural effusion.  The trachea is midline.  The lungs are symmetrically expanded bilaterally with coarse interstitial attenuation.  There is a calcified granuloma projected over the left lower lung zone.  There is mild left basilar subsegmental atelectasis..  No large focal consolidation seen.  There is no pneumothorax.  The osseous structures are remarkable for degenerative change..     1. Chronic appearing interstitial findings, no large focal consolidation. Electronically signed by: Bandar Dubose MD Date:    03/14/2024 Time:    12:44      Assessment:     1. Cough in adult    2. Viral URI        Plan:       Cough in adult  -     POCT Influenza A/B MOLECULAR  -     X-Ray Chest PA And Lateral; Future; Expected date: 03/14/2024    Viral URI      Patient Instructions   Cough in adult  -     POCT Influenza A/B  "MOLECULAR    -     X-Ray Chest PA And Lateral    Viral URI      Continue Prednisone as prescribed.      - Rest at home.     - Drink plenty of fluids so you won't get dehydrated.    Avoid taking Decongestants such as pseudoephedrine (ex. Sudafed) or phenylephrine (ex. Mucinex FastMax, Dayquil, Nyquil, or any combo cold meds that say "cold," "sinus" or "-D").      - Cough recommendations:   Warm tea with honey can help with cough. Honey is a natural cough suppressant.  Tessalon (as prescribed )  or   - Dextromethorphan (DM) is a cough suppressant over the counter (ie. mucinex DM OR delsym).        - Congestion recommendations:      - Mucinex (guaifenesin) twice a day (or as directed) to help loosen mucous.       - Fever/Pain recommendations:  Alternate Tylenol or Ibuprofen as directed for fever/pain.   - Motrin/Ibuprofen every 6-8 hours for pain and inflammation. Do not take ibuprofen if you have a history of GI bleeding, kidney disease, or if you take blood thinners.    - Tylenol/acetaminophen every 6-8 hours for added pain relief.  Avoid tylenol if you have a history of liver disease.       -Sore throat recommendations: Warm fluids, warm salt water gargles, throat lozenges, tea, honey, soup, or drinking something cold or frozen.  Throat lozenges or sprays help reduce pain. Gargling with warm saltwater (1/4 teaspoon of salt in 1/2 cup of warm water) or an OTC anesthetic gargle may be useful for irritation.    Follow up with PCP if symptoms worsen or do not resolve fully.    When to seek medical advice  Call your healthcare provider right away if any of these occur:  Fever that is poorly controlled with OTC fever reducing medication  New or worsening ear pain, sinus pain, or headache  Stiff neck  You can't swallow liquids or you can't open your mouth wide because of throat pain  Signs of dehydration. These include very dark urine or no urine, sunken eyes, and dizziness.  Trouble breathing or noisy breathing  Muffled " voice  Rash

## 2024-03-14 NOTE — TELEPHONE ENCOUNTER
Care Due:                  Date            Visit Type   Department     Provider  --------------------------------------------------------------------------------                                EP -                              PRIMARY      [facility] INTERNAL  Merry Amaral  Last Visit: 09-      CARE ([facility])   MEDICINE       Teddy                              MYCHART                              FOLLOWUP/OF  [facility] PINA Amaral  Next Visit: 04-      FICE VISIT   [facility]       Teddy                                                            Last  Test          Frequency    Reason                     Performed    Due Date  --------------------------------------------------------------------------------    Lipid Panel.  12 months..  rosuvastatin.............  01- 01-    TSH.........  12 months..  levothyroxine............  05- 05-    [facility] Embedded Care Due Messages. Reference number: [id number].   3/14/2024 5:31:54 AM CDT

## 2024-03-14 NOTE — TELEPHONE ENCOUNTER
Provider Staff:  Action required for this patient    Requires labs      Please see care gap opportunities below in Care Due Message.    Thanks!  [facility]     Appointments      Date Provider   Last Visit   9/26/2023 Merry Espinal MD   Next Visit   4/11/2024 Merry Espinal MD     Refill Decision Note   Radha DEVANbebeto  is requesting a refill authorization.  Brief Assessment and Rationale for Refill:  Approve     Medication Therapy Plan: TSH-WNL      Comments:     Note composed:11:04 AM 03/14/2024

## 2024-03-25 ENCOUNTER — LAB VISIT (OUTPATIENT)
Dept: LAB | Facility: HOSPITAL | Age: 69
End: 2024-03-25
Attending: INTERNAL MEDICINE
Payer: MEDICARE

## 2024-03-25 ENCOUNTER — OFFICE VISIT (OUTPATIENT)
Dept: RHEUMATOLOGY | Facility: CLINIC | Age: 69
End: 2024-03-25
Payer: MEDICARE

## 2024-03-25 VITALS
HEIGHT: 60 IN | BODY MASS INDEX: 33.46 KG/M2 | WEIGHT: 170.44 LBS | HEART RATE: 66 BPM | SYSTOLIC BLOOD PRESSURE: 107 MMHG | DIASTOLIC BLOOD PRESSURE: 67 MMHG

## 2024-03-25 DIAGNOSIS — K50.90 CROHN'S DISEASE WITHOUT COMPLICATION, UNSPECIFIED GASTROINTESTINAL TRACT LOCATION: ICD-10-CM

## 2024-03-25 DIAGNOSIS — M32.9 SYSTEMIC LUPUS ERYTHEMATOSUS, UNSPECIFIED SLE TYPE, UNSPECIFIED ORGAN INVOLVEMENT STATUS: ICD-10-CM

## 2024-03-25 DIAGNOSIS — D50.9 IRON DEFICIENCY ANEMIA, UNSPECIFIED IRON DEFICIENCY ANEMIA TYPE: Primary | ICD-10-CM

## 2024-03-25 DIAGNOSIS — M32.8 OTHER FORMS OF SYSTEMIC LUPUS ERYTHEMATOSUS, UNSPECIFIED ORGAN INVOLVEMENT STATUS: ICD-10-CM

## 2024-03-25 DIAGNOSIS — E55.9 VITAMIN D DEFICIENCY: ICD-10-CM

## 2024-03-25 LAB
ALBUMIN SERPL BCP-MCNC: 3.9 G/DL (ref 3.5–5.2)
ALP SERPL-CCNC: 127 U/L (ref 55–135)
ALT SERPL W/O P-5'-P-CCNC: 16 U/L (ref 10–44)
ANION GAP SERPL CALC-SCNC: 9 MMOL/L (ref 8–16)
AST SERPL-CCNC: 25 U/L (ref 10–40)
BASOPHILS # BLD AUTO: 0.11 K/UL (ref 0–0.2)
BASOPHILS NFR BLD: 1.2 % (ref 0–1.9)
BILIRUB SERPL-MCNC: 0.5 MG/DL (ref 0.1–1)
BUN SERPL-MCNC: 12 MG/DL (ref 8–23)
C3 SERPL-MCNC: 106 MG/DL (ref 50–180)
C4 SERPL-MCNC: 31 MG/DL (ref 11–44)
CALCIUM SERPL-MCNC: 9.9 MG/DL (ref 8.7–10.5)
CHLORIDE SERPL-SCNC: 107 MMOL/L (ref 95–110)
CO2 SERPL-SCNC: 25 MMOL/L (ref 23–29)
CREAT SERPL-MCNC: 0.9 MG/DL (ref 0.5–1.4)
CRP SERPL-MCNC: 2.6 MG/L (ref 0–8.2)
DIFFERENTIAL METHOD BLD: ABNORMAL
EOSINOPHIL # BLD AUTO: 0.1 K/UL (ref 0–0.5)
EOSINOPHIL NFR BLD: 1.6 % (ref 0–8)
ERYTHROCYTE [DISTWIDTH] IN BLOOD BY AUTOMATED COUNT: 12.9 % (ref 11.5–14.5)
ERYTHROCYTE [SEDIMENTATION RATE] IN BLOOD BY PHOTOMETRIC METHOD: 23 MM/HR (ref 0–36)
EST. GFR  (NO RACE VARIABLE): >60 ML/MIN/1.73 M^2
GLUCOSE SERPL-MCNC: 105 MG/DL (ref 70–110)
HCT VFR BLD AUTO: 39.5 % (ref 37–48.5)
HGB BLD-MCNC: 12.5 G/DL (ref 12–16)
IMM GRANULOCYTES # BLD AUTO: 0.03 K/UL (ref 0–0.04)
IMM GRANULOCYTES NFR BLD AUTO: 0.3 % (ref 0–0.5)
LYMPHOCYTES # BLD AUTO: 2.2 K/UL (ref 1–4.8)
LYMPHOCYTES NFR BLD: 24 % (ref 18–48)
MCH RBC QN AUTO: 31 PG (ref 27–31)
MCHC RBC AUTO-ENTMCNC: 31.6 G/DL (ref 32–36)
MCV RBC AUTO: 98 FL (ref 82–98)
MONOCYTES # BLD AUTO: 0.7 K/UL (ref 0.3–1)
MONOCYTES NFR BLD: 8.1 % (ref 4–15)
NEUTROPHILS # BLD AUTO: 5.8 K/UL (ref 1.8–7.7)
NEUTROPHILS NFR BLD: 64.8 % (ref 38–73)
NRBC BLD-RTO: 0 /100 WBC
PLATELET # BLD AUTO: 294 K/UL (ref 150–450)
PMV BLD AUTO: 11.3 FL (ref 9.2–12.9)
POTASSIUM SERPL-SCNC: 4.5 MMOL/L (ref 3.5–5.1)
PROT SERPL-MCNC: 7.2 G/DL (ref 6–8.4)
RBC # BLD AUTO: 4.03 M/UL (ref 4–5.4)
SODIUM SERPL-SCNC: 141 MMOL/L (ref 136–145)
WBC # BLD AUTO: 8.96 K/UL (ref 3.9–12.7)

## 2024-03-25 PROCEDURE — 99213 OFFICE O/P EST LOW 20 MIN: CPT | Mod: PBBFAC | Performed by: INTERNAL MEDICINE

## 2024-03-25 PROCEDURE — 86225 DNA ANTIBODY NATIVE: CPT | Mod: 59 | Performed by: INTERNAL MEDICINE

## 2024-03-25 PROCEDURE — 99999 PR PBB SHADOW E&M-EST. PATIENT-LVL III: CPT | Mod: PBBFAC,,, | Performed by: INTERNAL MEDICINE

## 2024-03-25 PROCEDURE — 36415 COLL VENOUS BLD VENIPUNCTURE: CPT | Performed by: INTERNAL MEDICINE

## 2024-03-25 PROCEDURE — 86225 DNA ANTIBODY NATIVE: CPT | Performed by: INTERNAL MEDICINE

## 2024-03-25 PROCEDURE — 86160 COMPLEMENT ANTIGEN: CPT | Performed by: INTERNAL MEDICINE

## 2024-03-25 PROCEDURE — 85025 COMPLETE CBC W/AUTO DIFF WBC: CPT | Performed by: INTERNAL MEDICINE

## 2024-03-25 PROCEDURE — 85652 RBC SED RATE AUTOMATED: CPT | Performed by: INTERNAL MEDICINE

## 2024-03-25 PROCEDURE — 80053 COMPREHEN METABOLIC PANEL: CPT | Performed by: INTERNAL MEDICINE

## 2024-03-25 PROCEDURE — 86140 C-REACTIVE PROTEIN: CPT | Performed by: INTERNAL MEDICINE

## 2024-03-25 PROCEDURE — 99214 OFFICE O/P EST MOD 30 MIN: CPT | Mod: S$PBB,,, | Performed by: INTERNAL MEDICINE

## 2024-03-25 PROCEDURE — 86160 COMPLEMENT ANTIGEN: CPT | Mod: 59 | Performed by: INTERNAL MEDICINE

## 2024-03-25 RX ORDER — HYDROXYCHLOROQUINE SULFATE 200 MG/1
TABLET, FILM COATED ORAL
Qty: 135 TABLET | Refills: 2 | Status: SHIPPED | OUTPATIENT
Start: 2024-03-25 | End: 2024-05-02 | Stop reason: SDUPTHER

## 2024-03-25 RX ORDER — ERGOCALCIFEROL 1.25 MG/1
50000 CAPSULE ORAL
Qty: 12 CAPSULE | Refills: 3 | Status: SHIPPED | OUTPATIENT
Start: 2024-03-25

## 2024-03-25 ASSESSMENT — ROUTINE ASSESSMENT OF PATIENT INDEX DATA (RAPID3)
TOTAL RAPID3 SCORE: 3.78
PAIN SCORE: 6
PATIENT GLOBAL ASSESSMENT SCORE: 5
MDHAQ FUNCTION SCORE: 0.1
AM STIFFNESS SCORE: 1, YES
PSYCHOLOGICAL DISTRESS SCORE: 2.2
FATIGUE SCORE: 5
WHEN YOU AWAKENED IN THE MORNING OVER THE LAST WEEK, PLEASE INDICATE THE AMOUNT OF TIME IT TAKES UNTIL YOU ARE AS LIMBER AS YOU WILL BE FOR THE DAY: 0.5

## 2024-03-25 ASSESSMENT — SYSTEMIC LUPUS ERYTHEMATOSUS DISEASE ACTIVITY INDEX (SLEDAI): TOTAL_SCORE: 0

## 2024-03-25 NOTE — PROGRESS NOTES
Subjective:     Chief Complaint: SLE; osteoporosis      Patient ID: Radha Chiang is a 69 y.o. female.        HPI She reports, she had Covid infection in Feb 2024. Reports tiredness since then intermittently. Also reports pain in both knee joint and swelling in fingers of had intermittently.       Review of Systems   Constitutional:  Positive for fatigue (9/10). Negative for appetite change, fever and unexpected weight change.   HENT:  Negative for mouth sores.    Eyes:  Negative for visual disturbance.   Respiratory:  Negative for wheezing, cough and shortness of breath.     Cardiovascular:  Negative for chest pain and palpitations.   Gastrointestinal:  Negative for abdominal pain, anal bleeding, blood in stool, constipation, diarrhea, nausea and vomiting.   Genitourinary:  Negative for dysuria, frequency and urgency.   Musculoskeletal:  Negative for arthralgias, back pain, gait problem, joint swelling, myalgias, neck pain and neck stiffness.   Skin:  Negative for rash.   Neurological:  Negative for weakness, numbness and headaches.   Hematological:  Negative for adenopathy. Does not bruise/bleed easily.   Psychiatric/Behavioral:  The patient is not nervous/anxious.       Objective:   /67 (BP Location: Left arm, Patient Position: Sitting, BP Method: Medium (Automatic))   Pulse 66   Ht 5' (1.524 m)   Wt 77.3 kg (170 lb 6.7 oz)   BMI 33.28 kg/m²      Physical Exam   Constitutional: She is oriented to person, place, and time.   HENT:   Head: Normocephalic and atraumatic.   Mouth/Throat: Oropharynx is clear and moist.   Eyes: Conjunctivae are normal.   Neck: No thyromegaly present.   Cardiovascular: Normal rate, regular rhythm and normal heart sounds. Exam reveals no gallop and no friction rub.   No murmur heard.  Pulmonary/Chest: Breath sounds normal. She has no wheezes. She has no rales. She exhibits no tenderness.   Abdominal: Soft. She exhibits no distension and no mass. There is no abdominal  tenderness.   Musculoskeletal:      Cervical back: Normal range of motion and neck supple.      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   Neurological: She is alert and oriented to person, place, and time. She displays normal reflexes. Gait normal.   Nl motor strength UE and LE bilateral   Psychiatric: Her behavior is normal. Mood, judgment and thought content normal.           Assessment/Plan     CBC, CMP, ESR, CRP,  dsDNA, UA UPCR, C3 C4 today  Hb 11.6, Iron 72, TIBC 355, underwent Upper GI endoscopy  Colonoscopy scheduled on 5/10/24 (she has h/o Crohn's disease)  Cont hydroxychloroquine 300mg  daily    Ophthalmology check with Dr. Marie scheduled on 5/31/24  Ref to OT  Diclofenac gel 1%  Denosumab 60mg sc with renal function panel one week prior  next dose 4/15/24   Mediterranean diet , discussed, per handout  RTC  6 months with standing lupus labs.           There are no diagnoses linked to this encounter.   Problem List Items Addressed This Visit    None

## 2024-03-25 NOTE — PROGRESS NOTES
I have personally taken the history and examined the patient and agree with the resident,s note as stated above    Rheumatology Questionnaire (Submitted on 3/25/2024)  fever: No  eye redness: No  mouth sores: No  headaches: No  shortness of breath: Yes  chest pain: No  trouble swallowing: Yes  diarrhea: No  constipation: No  unexpected weight change: No  genital sore: No  dysuria: No  During the last 3 days, have you had a skin rash?: No  Bruises or bleeds easily: No  cough: Yes     Latest Reference Range & Units 11/06/23 15:49   Iron 30 - 160 ug/dL 72   TIBC 250 - 450 ug/dL 355   Saturated Iron 20 - 50 % 20   Transferrin 200 - 375 mg/dL 240   Ferritin 20.0 - 300.0 ng/mL 35   Folate 4.0 - 24.0 ng/mL 17.7     PFTs    FVC  FEV1/FVC        TLC        RV       DLco    2/1/24   99.8       80   11/7/23  92.9      80                80.2       56        71.9      EXAMINATION:  XR CHEST PA AND LATERAL     CLINICAL HISTORY:  Pneumonia, unspecified organism     TECHNIQUE:  PA and lateral views of the chest were performed.     COMPARISON:  Chest radiograph from 11/06/2023     FINDINGS:  The lungs are clear, with normal appearance of pulmonary vasculature and no pleural effusion or pneumothorax.     The cardiac silhouette is normal in size. The hilar and mediastinal contours are unremarkable.     Bones are intact.     Impression:     No acute abnormality.        Electronically signed by: Damaso Espinal MD  Date:                                            02/25/2024  Time:                                           11:35                Narrative & Impression  EXAMINATION:  CT CHEST WITHOUT CONTRAST     CLINICAL HISTORY:  SLE with restrictive lung disease on PFTs, r/o ILD;Systemic lupus erythematosus, unspecified     TECHNIQUE:  Images of the chest extending from the supraclavicular regions to the upper abdomen were performed. Images were acquired without intravenous contrast administration. Multiplanar reconstructions were  performed and pushed to PACS.     COMPARISON:  None.     FINDINGS:  Chest wall and lower neck: Scattered bilateral lower neck and axillary subcentimeter lymph nodes     Lungs and pleura: Left-sided calcified granulomas.  Few scattered micro nodules measuring less than 3 mm bilaterally.  No reticulations honeycombing or bronchiectasis.     Mediastinum and tammi: No mediastinal or hilar adenopathy.  Left hilar calcified nodes.     Heart and pericardium: Heart size is normal. No pericardial effusion.     Vessels: Minimal atheromatous disease is seen in the aorta.  The coronary arteries show no atheromatous disease.     Upper abdomen: Small sliding hiatal hernia.  No dilated esophagus.  Splenic calcified granulomas.     Bones: Changes..     Impression:     1. No CT chest findings suspicious for interstitial lung disease  2. Granulomatous disease stigmata.        Electronically signed by: Doc Stuart  Date:                                            11/14/2023    EXAMINATION:  XR CHEST PA AND LATERAL     CLINICAL HISTORY:  Cough, unspecified     TECHNIQUE:  PA and lateral views of the chest were performed.     COMPARISON:  02/25/2024     FINDINGS:  The cardiomediastinal silhouette is not enlarged.  There is no pleural effusion.  The trachea is midline.  The lungs are symmetrically expanded bilaterally with coarse interstitial attenuation.  There is a calcified granuloma projected over the left lower lung zone.  There is mild left basilar subsegmental atelectasis..  No large focal consolidation seen.  There is no pneumothorax.  The osseous structures are remarkable for degenerative change..     Impression:     1. Chronic appearing interstitial findings, no large focal consolidation.      stress echo 1/8/24:    Stress Protocol: The patient exercised for 5 minutes 43 seconds on a high ramp protocol, corresponding to a functional capacity of 9 METS, achieving a peak heart rate of 133 bpm, which is 90 % of the age  predicted maximum heart rate. Their exercise capacity was normal. The patient reported no symptoms during the stress test. The test was stopped because the patient experienced leg fatigue and shortness of breath.    ECG Conclusion: The ECG portion of the study is negative for ischemia.    Right Ventricle: Normal right ventricular cavity size. Wall thickness is normal. Right ventricle wall motion  is normal. Systolic function is normal.    Left Ventricle: The left ventricle is normal in size. Normal wall thickness. Normal wall motion. There is normal systolic function. There is normal diastolic function.    Mitral Valve: There is mild regurgitation.    Pulmonary Artery: The estimated pulmonary artery systolic pressure is 24 mmHg.    Post-stress Impression: The study is negative with no echocardiographic evidence of stress induced ischemia.      Saw Dr. Perales 1/31/24:    1) Dyspnea on exertion  2) Wheezing  - Inderjit pre & post bronchodilator are not suggestive of asthma. IgE & eos are normal. I do not think this is a case of allergic asthma.  - Okay to continue prn albuterol if it improves your symptoms, but there is no need to add additional inhalers at this time.     3) Obesity with alveolar hypoventilation  - Counseled patient on weight loss & lung function.     4) Calcified granulomas of the lung  - These findings were all present on pet/ct from 2010. Probably represent an old histo infection long resolved. No cause for concern.    7/21/23 EGD  Electronically signed by: Bandar Dubose MD  Date:                                            03/14/2024  Time:                                           12:44      Impression:            - Z-line irregular, 33 to 34 cm from the incisors.                          - Esophagogastric landmarks identified.                          - 3 cm hiatal hernia.                          - Previously seen esophagitis appears to have                          healed on current dose of PPI.                           - No gross lesions in the entire stomach.                          - No gross lesions in the entire examined duodenum.                          - No specimens collected.   Recommendation:        - Discharge patient to home         Osteoporosis DXA 12/8/22: FRAX hip 3.2% major 18%. Discussed alendronate which she has taken intermittently in past and tolerated. However she has taken oral bisphosphonates for > 5 years total, so best to change to denosumab. 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  Mild MEENA s/p EGD, to have colonoscopy 5/10/24  S/p Covid 2/15/24 currently only symptoms are arthralgia and fatigue    CBC, CMP, ESR, CRP dsDNA, C3 C4 today  UA UPCR today   Cont hydroxychloroquine 300mg  daily  Ophthalmology check with Dr. Marie 5/31/24  Ref to OT  Diclofenac gel 1%  Denosumab 60mg sc with renal function panel one week prior  next dose 4/15/24   Mediterranean diet , discussed, per handout  Colonoscopy 5/10/24 scheduled  RTC  6 months with standing lupus labs.

## 2024-03-25 NOTE — PROGRESS NOTES
3/25/2024     7:21 AM   Rapid3 Question Responses and Scores   MDHAQ Score 0.1   Psychologic Score 2.2   Pain Score 6   When you awakened in the morning OVER THE LAST WEEK, did you feel stiff? Yes   If Yes, please indicate the number of hours until you are as limber as you will be for the day 0.5   Fatigue Score 5   Global Health Score 5   RAPID3 Score 3.78     Answers submitted by the patient for this visit:  Rheumatology Questionnaire (Submitted on 3/25/2024)  fever: No  eye redness: No  mouth sores: No  headaches: No  shortness of breath: Yes  chest pain: No  trouble swallowing: Yes  diarrhea: No  constipation: No  unexpected weight change: No  genital sore: No  dysuria: No  During the last 3 days, have you had a skin rash?: No  Bruises or bleeds easily: No  cough: Yes

## 2024-03-27 LAB
DNA TITER: NORMAL
DSDNA AB SER-ACNC: POSITIVE [IU]/ML

## 2024-04-02 ENCOUNTER — PATIENT MESSAGE (OUTPATIENT)
Dept: INTERNAL MEDICINE | Facility: CLINIC | Age: 69
End: 2024-04-02
Payer: MEDICARE

## 2024-04-02 ENCOUNTER — TELEPHONE (OUTPATIENT)
Dept: GASTROENTEROLOGY | Facility: CLINIC | Age: 69
End: 2024-04-02
Payer: MEDICARE

## 2024-04-02 DIAGNOSIS — K21.9 GASTROESOPHAGEAL REFLUX DISEASE, UNSPECIFIED WHETHER ESOPHAGITIS PRESENT: ICD-10-CM

## 2024-04-02 NOTE — TELEPHONE ENCOUNTER
----- Message from Sravanthi Madsen RN sent at 4/2/2024  2:01 PM CDT -----  Needs general gi clinic. Previous gi MD Martino.  [place]        Per recommendation, MD Braxton wanted you to follow up in GI clinic. I have sent a message to general GI clinic to schedule. Last EGD was in July with MD Braxton, would benefit to become established with a general GI practitioner.  Thank you,  [place]      ===View-only below this line===      ----- Message -----       From:Radha Chiang       Sent:3/30/2024  8:34 PM CDT         To:Martir Braxton MD    Subject:Esomeprazole Magnesium 20mg    Dr. Braxton....You had performed a test which determined I had a hiatal hernia and needed to continue to take 20 to 40 mg of Esomeprazole daily.  I am in need of a refill and [facility] indicated that my RX was not approved for refill.  It was prescribed by a Dr. Martino, who is not listed as a provider for me.  Can someone from your team pls call Kw[facility] at [phone] so I can  this RX.  Thank you.  Radha Fall

## 2024-04-03 RX ORDER — HYDROGEN PEROXIDE 3 %
40 SOLUTION, NON-ORAL MISCELLANEOUS
Qty: 90 CAPSULE | Refills: 0 | Status: SHIPPED | OUTPATIENT
Start: 2024-04-03 | End: 2024-05-13

## 2024-04-03 NOTE — TELEPHONE ENCOUNTER
No care due was identified.  [facility] Embedded Care Due Messages. Reference number: [id number].   4/03/2024 8:36:48 AM CDT

## 2024-04-09 ENCOUNTER — OFFICE VISIT (OUTPATIENT)
Dept: INTERNAL MEDICINE | Facility: CLINIC | Age: 69
End: 2024-04-09
Payer: MEDICARE

## 2024-04-09 VITALS
SYSTOLIC BLOOD PRESSURE: 118 MMHG | HEIGHT: 60 IN | DIASTOLIC BLOOD PRESSURE: 66 MMHG | WEIGHT: 170.44 LBS | BODY MASS INDEX: 33.46 KG/M2 | HEART RATE: 63 BPM | OXYGEN SATURATION: 98 %

## 2024-04-09 DIAGNOSIS — R05.3 CHRONIC COUGH: Primary | ICD-10-CM

## 2024-04-09 PROCEDURE — 99214 OFFICE O/P EST MOD 30 MIN: CPT | Mod: PBBFAC | Performed by: INTERNAL MEDICINE

## 2024-04-09 PROCEDURE — 99999 PR PBB SHADOW E&M-EST. PATIENT-LVL IV: CPT | Mod: PBBFAC,,, | Performed by: INTERNAL MEDICINE

## 2024-04-09 PROCEDURE — 99213 OFFICE O/P EST LOW 20 MIN: CPT | Mod: S$PBB,,, | Performed by: INTERNAL MEDICINE

## 2024-04-09 RX ORDER — IPRATROPIUM BROMIDE 21 UG/1
2 SPRAY, METERED NASAL 2 TIMES DAILY
Qty: 15 ML | Refills: 0 | Status: SHIPPED | OUTPATIENT
Start: 2024-04-09 | End: 2024-05-31

## 2024-04-09 RX ORDER — PROMETHAZINE HYDROCHLORIDE AND DEXTROMETHORPHAN HYDROBROMIDE 6.25; 15 MG/5ML; MG/5ML
5 SYRUP ORAL EVERY 6 HOURS PRN
Qty: 120 ML | Refills: 0 | Status: SHIPPED | OUTPATIENT
Start: 2024-04-09 | End: 2024-04-19

## 2024-04-09 RX ORDER — FLUTICASONE PROPIONATE 50 MCG
1 SPRAY, SUSPENSION (ML) NASAL DAILY
Qty: 18 G | Refills: 0 | Status: SHIPPED | OUTPATIENT
Start: 2024-04-09 | End: 2024-05-31

## 2024-04-09 RX ORDER — BENZONATATE 200 MG/1
200 CAPSULE ORAL 3 TIMES DAILY PRN
Qty: 60 CAPSULE | Refills: 0 | Status: SHIPPED | OUTPATIENT
Start: 2024-04-09 | End: 2024-04-29

## 2024-04-09 RX ORDER — RESPIRATORY SYNCYTIAL VISUS VACCINE RECOMBINANT, ADJUVANTED 120MCG/0.5
KIT INTRAMUSCULAR
COMMUNITY
Start: 2023-11-17 | End: 2024-05-31

## 2024-04-09 NOTE — PROGRESS NOTES
Subjective:       Patient ID: Radha Chiang is a 69 y.o. female.    Chief Complaint: Cough (Weeks )      Radha Chiang is 69 y.o. female with SLE who presents for coughing fits 4-5x/day X 1 month.  Pt was diagnosed with COVID 19 on Feb 18 and tx with paxlovid.  Pt also had course of antibiotic and steroids after tx for possible secondary pneumonia.  Pt notes improvement of cough during tx but then return of cough after treatment completed.  Pt currently taking tessalon pearls for cough with decrease but not resolution of symptoms.        Review of Systems   Constitutional:  Negative for chills and fever.   HENT:  Positive for postnasal drip and sinus pressure/congestion. Negative for sore throat.    Respiratory:  Positive for cough and shortness of breath (worse from baseline prior to COVID 19).    Cardiovascular:  Negative for chest pain.   Gastrointestinal:  Negative for abdominal pain, constipation, diarrhea, nausea and vomiting.         Objective:      Physical Exam  Vitals reviewed.   Constitutional:       General: She is awake.      Appearance: Normal appearance.   HENT:      Head: Normocephalic and atraumatic.      Mouth/Throat:      Mouth: Mucous membranes are moist.      Pharynx: Oropharynx is clear.   Eyes:      Extraocular Movements: Extraocular movements intact.      Conjunctiva/sclera: Conjunctivae normal.      Pupils: Pupils are equal, round, and reactive to light.   Cardiovascular:      Rate and Rhythm: Normal rate and regular rhythm.      Heart sounds: No murmur heard.     No friction rub. No gallop.   Pulmonary:      Effort: Pulmonary effort is normal.      Breath sounds: Normal breath sounds.   Abdominal:      General: Bowel sounds are normal. There is no distension.      Tenderness: There is no abdominal tenderness. There is no guarding or rebound.   Musculoskeletal:      Right lower leg: No edema.      Left lower leg: No edema.   Lymphadenopathy:      Cervical: No cervical adenopathy.    Neurological:      Mental Status: She is alert and oriented to person, place, and time.   Psychiatric:         Mood and Affect: Mood normal.         Behavior: Behavior is cooperative.         Assessment:       1. Chronic cough        Plan:     Pt with chronic cough unclear etiology.  Pt's lungs are clear to auscultation with 2 recent chest xray that showed NACPD.  Do not think repeat CXR is indicated at this time.  Differential diagnosis includes GERD, post viral cough and post nasal drip.  Pt had recent evaluation for asthma that was negative.  Will tx for possible post viral cough with Atrovent nasal spray 2 sprays per nostril BID.  Will tx for possible post nasal drip with Flonase 1 spray per nostril daily.  Patient was advised to follow up if symptom are not improving or worsened after 2-4 weeks.      Radha was seen today for cough.    Diagnoses and all orders for this visit:    Chronic cough  -     ipratropium (ATROVENT) 21 mcg (0.03 %) nasal spray; 2 sprays by Each Nostril route 2 (two) times daily.  -     fluticasone propionate (FLONASE) 50 mcg/actuation nasal spray; 1 spray (50 mcg total) by Each Nostril route once daily.  -     promethazine-dextromethorphan (PROMETHAZINE-DM) 6.25-15 mg/5 mL Syrp; Take 5 mLs by mouth every 6 (six) hours as needed (cough).  -     benzonatate (TESSALON) 200 MG capsule; Take 1 capsule (200 mg total) by mouth 3 (three) times daily as needed for Cough.

## 2024-04-18 ENCOUNTER — TELEPHONE (OUTPATIENT)
Dept: OPTOMETRY | Facility: CLINIC | Age: 69
End: 2024-04-18
Payer: MEDICARE

## 2024-04-23 ENCOUNTER — PATIENT MESSAGE (OUTPATIENT)
Dept: OPTOMETRY | Facility: CLINIC | Age: 69
End: 2024-04-23
Payer: MEDICARE

## 2024-04-24 ENCOUNTER — PATIENT MESSAGE (OUTPATIENT)
Dept: SURGERY | Facility: CLINIC | Age: 69
End: 2024-04-24
Payer: MEDICARE

## 2024-04-24 ENCOUNTER — TELEPHONE (OUTPATIENT)
Dept: INTERNAL MEDICINE | Facility: CLINIC | Age: 69
End: 2024-04-24
Payer: MEDICARE

## 2024-04-24 DIAGNOSIS — M25.569 KNEE PAIN, UNSPECIFIED CHRONICITY, UNSPECIFIED LATERALITY: Primary | ICD-10-CM

## 2024-04-24 NOTE — TELEPHONE ENCOUNTER
----- Message from Leonela Belcher sent at 4/24/2024  7:31 AM CDT -----  Contact: [phone]  Pt is requesting thsi through the portal please advise    referred Date Range: 4/23/2024 - 4/30/2024Preferred Times: Any TimeReason for visit: Left knee painComments:Referral to ortho for knee pain...determine cause...need xray..or other diagnostic test   ED Provider Note    ER PROVIDER NOTE      CHIEF COMPLAINT  Chief Complaint   Patient presents with   • Flank Pain     c/o left flank pain x 1 day   • Painful Urination     x 2 weeks   • Chills       HPI  Ivis Klein is a 24 y.o. female who presents to the emergency department complaining of urinary frequency and chills.  Patient reports she has had frequency essentially over the last 2 weeks, was seen here a week ago diagnosed with UTI but never picked up her antibiotics, she then came back here, was told she did not have a UTI, she is continued to have some intermittent symptoms.  She has had some mild associated nausea although no vomiting, no diarrhea, no real abdominal pain, some occasional bilateral flank pain.  No known fever.  No vaginal bleeding or discharge, states no chance of pregnancy.  No chest pain, shortness of breath or cough, no headaches or rash    REVIEW OF SYSTEMS  Pertinent positives include urinary frequency. Pertinent negatives include no vaginal discharge or bleeding. See HPI for details. All other systems reviewed and are negative.    PAST MEDICAL HISTORY   has a past medical history of Anxiety (2/16/2017), Arrhythmia, Breath shortness, Graves disease (12/5/2016), Heart valve disease, Hyperthyroidism (6/29/2016), Menarche, Migraine, Pericarditis (06/2016), Pregnancy, Psychiatric problem, Supervision of normal first teen pregnancy, and Vomiting (6/29/2016).    SURGICAL HISTORY   has a past surgical history that includes primary c section (9/8/2013) and thyroidectomy total (Bilateral, 3/22/2017).    FAMILY HISTORY  Family History   Problem Relation Age of Onset   • Cancer Paternal Grandmother 56        breast cancer   • Hyperlipidemia Father        SOCIAL HISTORY  Social History     Socioeconomic History   • Marital status: Single     Spouse name: Not on file   • Number of children: Not on file   • Years of education: Not on file   • Highest education level: Not on file  "  Occupational History   • Not on file   Social Needs   • Financial resource strain: Not on file   • Food insecurity:     Worry: Not on file     Inability: Not on file   • Transportation needs:     Medical: Not on file     Non-medical: Not on file   Tobacco Use   • Smoking status: Never Smoker   • Smokeless tobacco: Never Used   • Tobacco comment: quit in 2012   Substance and Sexual Activity   • Alcohol use: No   • Drug use: Yes     Types: Marijuana, Inhaled     Comment: marijuana weekly   • Sexual activity: Yes     Partners: Male     Birth control/protection: Abstinence     Comment: single   Lifestyle   • Physical activity:     Days per week: Not on file     Minutes per session: Not on file   • Stress: Not on file   Relationships   • Social connections:     Talks on phone: Not on file     Gets together: Not on file     Attends Cheondoism service: Not on file     Active member of club or organization: Not on file     Attends meetings of clubs or organizations: Not on file     Relationship status: Not on file   • Intimate partner violence:     Fear of current or ex partner: Not on file     Emotionally abused: Not on file     Physically abused: Not on file     Forced sexual activity: Not on file   Other Topics Concern   • Not on file   Social History Narrative   • Not on file      Social History     Substance and Sexual Activity   Drug Use Yes   • Types: Marijuana, Inhaled    Comment: marijuana weekly       CURRENT MEDICATIONS  Home Medications     Reviewed by Halle Thornton R.N. (Registered Nurse) on 11/11/19 at 0727  Med List Status: Complete   Medication Last Dose Status   levothyroxine (SYNTHROID) 150 MCG Tab 11/10/2019 Active   ondansetron (ZOFRAN ODT) 4 MG TABLET DISPERSIBLE  Active                ALLERGIES  Allergies   Allergen Reactions   • Nkda [No Known Drug Allergy]        PHYSICAL EXAM  VITAL SIGNS: /68   Pulse 81   Temp 36.4 °C (97.5 °F) (Temporal)   Resp 12   Ht 1.6 m (5' 3\")   Wt 58.4 kg " (128 lb 12 oz)   LMP 10/15/2019 (Exact Date)   SpO2 98%   BMI 22.81 kg/m²   Pulse ox interpretation: I interpret this pulse ox as normal.    Constitutional: Alert in no apparent distress.  HENT: No signs of trauma, Bilateral external ears normal, Nose normal.   Eyes: Pupils are equal and reactive, Conjunctiva normal, Non-icteric.   Neck: Normal range of motion, No tenderness, Supple, No stridor.   Lymphatic: No lymphadenopathy noted.   Cardiovascular: Regular rate and rhythm, no murmurs.   Thorax & Lungs: Normal breath sounds, No respiratory distress, No wheezing, No chest tenderness.   Abdomen: Bowel sounds normal, Soft, No tenderness, No masses, No pulsatile masses. No peritoneal signs.  Skin: Warm, Dry, No erythema, No rash.   Back: No bony tenderness, No CVA tenderness.   Extremities: Intact distal pulses, No edema, No tenderness, No cyanosis, Negative Dwayne's sign.  Musculoskeletal: Good range of motion in all major joints. No tenderness to palpation or major deformities noted.   Neurologic: Alert , Normal motor function, Normal sensory function, No focal deficits noted.   Psychiatric: Affect normal, Judgment normal, Mood normal.     DIAGNOSTIC STUDIES / PROCEDURES      LABS  Labs Reviewed   URINALYSIS - Abnormal; Notable for the following components:       Result Value    Leukocyte Esterase Moderate (*)     Occult Blood Small (*)     All other components within normal limits   URINE MICROSCOPIC (W/UA) - Abnormal; Notable for the following components:    -150 (*)     RBC 10-20 (*)     Hyaline Cast 3-5 (*)     All other components within normal limits   HCG QUALITATIVE UR   REFRACTOMETER SG   URINE CULTURE(NEW)       All labs reviewed by me.    RADIOLOGY  No orders to display     The radiologist's interpretation of all radiological studies have been reviewed by me.    COURSE & MEDICAL DECISION MAKING  Nursing notes, VS, PMSFHx reviewed in chart.    7:44 AM Patient seen and examined at bedside. Patient  will be treated with ibuprofen, Zofran. Ordered for urinalysis, urine pregnancy to evaluate her symptoms.     Review past records, patient was here 5 days ago with similar, had unremarkable CBC, CMP troponin, urinalysis as well as ECG and chest x-ray    Was also seen on November 3 for similar, had unremarkable pelvic exam urinalysis with questionable infection versus contaminant      8:40 AM  Patient reevaluated, updated on results, states she is feeling improved, will plan for discharge      Decision Making:  This is a 24 y.o. female presenting with urinary frequency.  Patient was recently diagnosed with UTI although did not take any antibiotics, returned, and apparently her urinalysis was clear although she still had some persistent symptoms.  Today she is overall well-appearing, afebrile with appropriate vital signs, but does have some findings of urinary tract infection on her urinalysis and with her symptoms we will treat with Macrobid.  At this point I do not feel like this represents pyelonephritis.  She has had prior pelvic examination with the symptoms without evidence of pelvic infectious process.  Additionally given the duration of her symptoms as well as consistent test results, I have ordered for urine culture.  Finally patient did ask for a refill of her thyroid medication which she has been given     The patient will return for new or worsening symptoms and is stable at the time of discharge.    The patient is referred to a primary physician for blood pressure management, diabetic screening, and for all other preventative health concerns.      DISPOSITION:  Patient will be discharged home in stable condition.    FOLLOW UP:  [address] [address]  [phone]          OUTPATIENT MEDICATIONS:  New Prescriptions    NITROFURANTOIN MONOHYD MACRO (MACROBID) 100 MG CAP    Take 1 Cap by mouth 2 times a day for 5 days.         FINAL IMPRESSION  1. Acute cystitis with  hematuria    2. Graves disease    3. TSH elevation    4. Postoperative hypothyroidism         The note accurately reflects work and decisions made by me.  Lucas Telles  11/11/2019  8:42 AM

## 2024-04-24 NOTE — TELEPHONE ENCOUNTER
Pt states of pain in knee. Pt requested appointment and referral. Pt states do she see PCP for evaluation or can she get referral to ortho?

## 2024-04-29 ENCOUNTER — HOSPITAL ENCOUNTER (OUTPATIENT)
Dept: RADIOLOGY | Facility: HOSPITAL | Age: 69
Discharge: HOME | End: 2024-04-29
Attending: PHYSICIAN ASSISTANT
Payer: MEDICARE

## 2024-04-29 ENCOUNTER — OFFICE VISIT (OUTPATIENT)
Dept: ORTHOPEDICS | Facility: CLINIC | Age: 69
End: 2024-04-29
Payer: MEDICARE

## 2024-04-29 ENCOUNTER — TELEPHONE (OUTPATIENT)
Dept: ENDOSCOPY | Facility: HOSPITAL | Age: 69
End: 2024-04-29
Payer: MEDICARE

## 2024-04-29 VITALS — BODY MASS INDEX: 32.93 KG/M2 | HEIGHT: 60 IN | WEIGHT: 167.75 LBS

## 2024-04-29 DIAGNOSIS — Z12.11 COLON CANCER SCREENING: Primary | ICD-10-CM

## 2024-04-29 DIAGNOSIS — M25.569 KNEE PAIN, UNSPECIFIED CHRONICITY, UNSPECIFIED LATERALITY: ICD-10-CM

## 2024-04-29 DIAGNOSIS — M17.12 PRIMARY OSTEOARTHRITIS OF LEFT KNEE: Primary | ICD-10-CM

## 2024-04-29 PROCEDURE — 20610 DRAIN/INJ JOINT/BURSA W/O US: CPT | Mod: S$PBB,LT,, | Performed by: PHYSICIAN ASSISTANT

## 2024-04-29 PROCEDURE — 99999 PR PBB SHADOW E&M-EST. PATIENT-LVL IV: CPT | Mod: PBBFAC,,, | Performed by: PHYSICIAN ASSISTANT

## 2024-04-29 PROCEDURE — 99214 OFFICE O/P EST MOD 30 MIN: CPT | Mod: PBBFAC,25 | Performed by: PHYSICIAN ASSISTANT

## 2024-04-29 PROCEDURE — 20610 DRAIN/INJ JOINT/BURSA W/O US: CPT | Mod: PBBFAC,LT | Performed by: PHYSICIAN ASSISTANT

## 2024-04-29 PROCEDURE — 73562 X-RAY EXAM OF KNEE 3: CPT | Mod: 26,50,, | Performed by: RADIOLOGY

## 2024-04-29 PROCEDURE — 99999PBSHW PR PBB SHADOW TECHNICAL ONLY FILED TO HB: Mod: PBBFAC,,,

## 2024-04-29 PROCEDURE — 99203 OFFICE O/P NEW LOW 30 MIN: CPT | Mod: S$PBB,25,, | Performed by: PHYSICIAN ASSISTANT

## 2024-04-29 PROCEDURE — 73562 X-RAY EXAM OF KNEE 3: CPT | Mod: TC,50

## 2024-04-29 RX ORDER — MELOXICAM 15 MG/1
15 TABLET ORAL DAILY
Qty: 30 TABLET | Refills: 1 | Status: SHIPPED | OUTPATIENT
Start: 2024-04-29

## 2024-04-29 RX ORDER — POLYETHYLENE GLYCOL 3350, SODIUM SULFATE ANHYDROUS, SODIUM BICARBONATE, SODIUM CHLORIDE, POTASSIUM CHLORIDE 236; 22.74; 6.74; 5.86; 2.97 G/4L; G/4L; G/4L; G/4L; G/4L
4 POWDER, FOR SOLUTION ORAL ONCE
Qty: 4000 ML | Refills: 0 | Status: SHIPPED | OUTPATIENT
Start: 2024-04-29 | End: 2024-04-29

## 2024-04-29 RX ORDER — TRIAMCINOLONE ACETONIDE 40 MG/ML
40 INJECTION, SUSPENSION INTRA-ARTICULAR; INTRAMUSCULAR
Status: DISCONTINUED | OUTPATIENT
Start: 2024-04-29 | End: 2024-04-29 | Stop reason: HOSPADM

## 2024-04-29 RX ORDER — LIDOCAINE HYDROCHLORIDE 10 MG/ML
2 INJECTION INFILTRATION; PERINEURAL
Status: DISCONTINUED | OUTPATIENT
Start: 2024-04-29 | End: 2024-04-29 | Stop reason: HOSPADM

## 2024-04-29 RX ADMIN — LIDOCAINE HYDROCHLORIDE 2 ML: 10 INJECTION, SOLUTION INFILTRATION; PERINEURAL at 04:04

## 2024-04-29 RX ADMIN — TRIAMCINOLONE ACETONIDE 40 MG: 40 INJECTION, SUSPENSION INTRA-ARTICULAR; INTRAMUSCULAR at 04:04

## 2024-04-29 NOTE — TELEPHONE ENCOUNTER
Returned pt's call. No answer, left voicemail for pt to call the Endoscopy Scheduling department back as needed, and that prep is being sent to her pharmacy as well as copy of instructions being sent to her portal. Encouraged to call back for any needs.

## 2024-04-29 NOTE — PROCEDURES
Large Joint Aspiration/Injection: L knee    Date/Time: 4/29/2024 4:00 PM    Performed by: Yulisa Quinones PA-C  Authorized by: Yulisa Quinones PA-C    Consent Done?:  Yes (Verbal)  Indications:  Pain  Timeout: prior to procedure the correct patient, procedure, and site was verified    Prep: patient was prepped and draped in usual sterile fashion    Local anesthetic:  Topical anesthetic    Details:  Needle Size:  22 G  Approach:  Anterolateral  Location:  Knee  Site:  L knee  Medications:  40 mg triamcinolone acetonide 40 mg/mL; 2 mL LIDOcaine HCL 10 mg/ml (1%) 10 mg/mL (1 %)  Patient tolerance:  Patient tolerated the procedure well with no immediate complications

## 2024-04-29 NOTE — TELEPHONE ENCOUNTER
----- Message from Jacqueline Estevez RN sent at 4/29/2024  2:18 PM CDT -----  Regarding: FW: Pt called wld like to know what location and time of procedure  Contact: [phone]    ----- Message -----  From: Lucy Meng RN  Sent: 4/29/2024   2:11 PM CDT  To: [facility] Endo Schedulers  Subject: FW: Pt called wld like to know what location#      ----- Message -----  From: Hawa Reyes  Sent: 4/29/2024  11:16 AM CDT  To: [facility] Staff  Subject: Pt called wld like to know what location and#    Name of Who is Calling:VÍCTOR BRYAN [[id number]]        What is the request in detail:Pt called wld like to know what location and time of procedure also needs Rx sent over for procedure. Please advise        Can the clinic reply by [facility]:No        What Number to [facility] Back [facility]: Telephone Information:  Mobile          [phone]

## 2024-04-29 NOTE — PROGRESS NOTES
SUBJECTIVE:     Chief Complaint   Patient presents with    Left Knee - Pain, Swelling       History of Present Illness:  Radha Chiang is a 69 y.o. year old female here with complaints of constant left knee pain which started about 1 week ago.  There was not a recent injury. The pain is located in the posterior aspect of the knee.  The pain is described as achy.  There is not catching or locking.  Aggravating factors include standing and walking, having her knee extended.  Associated symptoms include effusion.   Previous treatments include ice, rest, and brace.  There is not a history of previous injury or surgery to the knee.  The patient does not use an assistive device.    Review of patient's allergies indicates:   Allergen Reactions    Bactrim [sulfamethoxazole-trimethoprim] Nausea And Vomiting     Nausea, ? rash  Nausea, ? rash         Current Outpatient Medications   Medication Sig Dispense Refill    alpha lipoic acid 300 mg Cap       aspirin (ECOTRIN) 81 MG EC tablet Take 1 tablet (81 mg total) by mouth once daily.      benzonatate (TESSALON) 200 MG capsule Take 1 capsule (200 mg total) by mouth 3 (three) times daily as needed for Cough. 60 capsule 0    calcium carbonate/vitamin D3 (CALTRATE 600 + D ORAL) Take 2 tablets by mouth once daily.      coenzyme Q10 100 mg capsule       ergocalciferol (ERGOCALCIFEROL) 50,000 unit Cap Take 1 capsule (50,000 Units total) by mouth every 7 days. 12 capsule 3    esomeprazole (NEXIUM) 20 MG capsule Take 2 capsules (40 mg total) by mouth before breakfast. 90 capsule 0    fish oil-omega-3 fatty acids 300-1,000 mg capsule Take 1 g by mouth 2 (two) times daily.      hydroxychloroquine (PLAQUENIL) 200 mg tablet Take 400mg daily alternating with 200mg daily Brand name only medically necessary 135 tablet 2    levothyroxine (SYNTHROID) 112 MCG tablet TAKE 1 TABLET(112 MCG) BY MOUTH EVERY DAY 90 tablet 0    magnesium oxide (MAG-OX) 200 MG tablet       multivitamin (THERAGRAN)  per tablet Take 1 tablet by mouth before breakfast.      polyethylene glycol (GOLYTELY) 236-22.74-6.74 -5.86 gram suspension Take 4,000 mLs (4 L total) by mouth once. Take as instructed by Endoscopy nurse  for 1 dose 4000 mL 0    rosuvastatin (CRESTOR) 20 MG tablet TAKE 1 TABLET(20 MG) BY MOUTH EVERY DAY 90 tablet 2    AREXVY, PF, 120 mcg/0.5 mL SusR vaccine  (Patient not taking: Reported on 4/9/2024)      fluticasone propionate (FLONASE) 50 mcg/actuation nasal spray 1 spray (50 mcg total) by Each Nostril route once daily. 18 g 0    ipratropium (ATROVENT) 21 mcg (0.03 %) nasal spray 2 sprays by Each Nostril route 2 (two) times daily. 15 mL 0    VENTOLIN HFA 90 mcg/actuation inhaler Inhale 2 puffs into the lungs every 6 (six) hours as needed for Wheezing or Shortness of Breath (cough). Rescue 18 g 11     No current facility-administered medications for this visit.       Past Medical History:   Diagnosis Date    Acid reflux     Anemia 2years ago    Anxiety     Arthritis     Asthma, chronic 4/10/2013    Crohn's disease 1998    Depression     Dry eyes     Fracture of forearm, proximal, open     screws & plate 6/26/13 in [facility]    History of papilledema 11/10/11    Hyperlipidemia 5/21/2018    Lupus     Neuromuscular disorder 12/2009    Neuropathy    Osteoporosis     Stroke 1/29/2013    Thyroid disease        Past Surgical History:   Procedure Laterality Date    ADENOIDECTOMY  1962    BRAIN SURGERY  01/18/2012    Brain Biopsy    BREAST CYST ASPIRATION Left     COLONOSCOPY  2017    A Dr. WILLIS Summers; pan-diverticulosis, rec repeat 3-5 years    COLONOSCOPY N/A 2/9/2023    Procedure: COLONOSCOPY;  Surgeon: Reina Crabtree MD;  Location: [facility];  Service: Colon and Rectal;  Laterality: N/A;    ESOPHAGOGASTRODUODENOSCOPY N/A 4/27/2023    Procedure: ESOPHAGOGASTRODUODENOSCOPY (EGD);  Surgeon: Martir Braxton MD;  Location: [facility] ([address] [place] [address]);  Service: Endoscopy;  Laterality: N/A;  4/21 Pre-call attempted, pt  had phone trouble kls    ESOPHAGOGASTRODUODENOSCOPY N/A 7/21/2023    Procedure: EGD (ESOPHAGOGASTRODUODENOSCOPY);  Surgeon: Martir Braxton MD;  Location: [facility] ([address] [place] [address]);  Service: Endoscopy;  Laterality: N/A;  12wks to check healing, pt request Dr. Braxton, Inst portal. Referral: REYNA MCKENZIE, Proc order tele enc 5/29/23-LW    INCISION AND DRAINAGE PERIRECTAL ABSCESS  1998    abscess removal    ORIF FOREARM FRACTURE Left 06/23/2013    proximal ulnar and radius         Review of Systems:  ROS:  Constitutional: no fever or chills  Eyes: no visual changes  ENT: no nasal congestion or sore throat  Respiratory: no cough or shortness of breath  Musculoskeletal: no arthralgias or myalgias      OBJECTIVE:     PHYSICAL EXAM:  Height: 5' (152.4 cm) Weight: 76.1 kg (167 lb 12.3 oz)   General: Well developed, well nourished, in no acute distress.  Neurological: Mood & affect are normal.  HEENT: NCAT, sclera nonicteric   Lungs: Respirations are equal and unlabored.   CV: 2+ bilateral upper and lower extremity pulses.   Skin: Intact throughout with no rashes, erythema, or lesions  Extremities: No LE edema, no erythema or warmth of the skin in either lower extremity.    left Knee Exam:  Knee Range of Motion: 0-120   Effusion: none  Condition of skin: intact  Location of tenderness: Medial joint line and Lateral joint line   Strength: 5 of 5 quadriceps strength and 5 of 5 hamstring strength  Stability:  stable to testing  Varus /Valgus stress:  normal      Left Hip Examination:  full painless range of motion, without tenderness    IMAGING:    X-rays of the left knee, personally reviewed by me, demonstrate severe degenerative changes with joint space narrowing, osteophyte formation and subchondral sclerosis.  No fracture or dislocation.       ASSESSMENT     1. Primary osteoarthritis of left knee        PLAN:     We discussed with the patient at length all the different treatment options available for the knee including NSAIDS,  acetaminophen, rest, ice, knee strengthening exercise, steroid injections for temporary relief, Viscosupplementation, and knee arthroplasty.     - Left knee CSI performed today  - Meloxicam prescription  - Recommend rest, ice as needed  - Continue brace as needed with activity until knee feeling better  - Follow up if symptoms worsen or fail to improve

## 2024-05-01 ENCOUNTER — PATIENT MESSAGE (OUTPATIENT)
Dept: ORTHOPEDICS | Facility: CLINIC | Age: 69
End: 2024-05-01
Payer: MEDICARE

## 2024-05-02 ENCOUNTER — PATIENT MESSAGE (OUTPATIENT)
Dept: RHEUMATOLOGY | Facility: CLINIC | Age: 69
End: 2024-05-02
Payer: MEDICARE

## 2024-05-02 ENCOUNTER — INFUSION (OUTPATIENT)
Dept: INFECTIOUS DISEASES | Facility: HOSPITAL | Age: 69
End: 2024-05-02
Payer: MEDICARE

## 2024-05-02 ENCOUNTER — OFFICE VISIT (OUTPATIENT)
Dept: ORTHOPEDICS | Facility: CLINIC | Age: 69
End: 2024-05-02
Payer: MEDICARE

## 2024-05-02 VITALS
SYSTOLIC BLOOD PRESSURE: 146 MMHG | HEART RATE: 94 BPM | BODY MASS INDEX: 33.33 KG/M2 | DIASTOLIC BLOOD PRESSURE: 65 MMHG | OXYGEN SATURATION: 97 % | RESPIRATION RATE: 18 BRPM | WEIGHT: 169.75 LBS | TEMPERATURE: 98 F | HEIGHT: 60 IN

## 2024-05-02 DIAGNOSIS — M17.12 PRIMARY OSTEOARTHRITIS OF LEFT KNEE: Primary | ICD-10-CM

## 2024-05-02 DIAGNOSIS — M81.8 OTHER OSTEOPOROSIS WITHOUT CURRENT PATHOLOGICAL FRACTURE: Primary | ICD-10-CM

## 2024-05-02 DIAGNOSIS — M32.8 OTHER FORMS OF SYSTEMIC LUPUS ERYTHEMATOSUS, UNSPECIFIED ORGAN INVOLVEMENT STATUS: ICD-10-CM

## 2024-05-02 DIAGNOSIS — M17.11 PRIMARY OSTEOARTHRITIS OF RIGHT KNEE: ICD-10-CM

## 2024-05-02 PROCEDURE — 96372 THER/PROPH/DIAG INJ SC/IM: CPT

## 2024-05-02 PROCEDURE — 63600175 PHARM REV CODE 636 W HCPCS: Mod: JZ,JG | Performed by: INTERNAL MEDICINE

## 2024-05-02 PROCEDURE — 99213 OFFICE O/P EST LOW 20 MIN: CPT | Mod: S$PBB,,, | Performed by: PHYSICIAN ASSISTANT

## 2024-05-02 RX ORDER — HYDROXYCHLOROQUINE SULFATE 200 MG/1
TABLET, FILM COATED ORAL
Qty: 135 TABLET | Refills: 2 | Status: SHIPPED | OUTPATIENT
Start: 2024-05-02

## 2024-05-02 RX ADMIN — DENOSUMAB 60 MG: 60 INJECTION SUBCUTANEOUS at 03:05

## 2024-05-02 NOTE — PROGRESS NOTES
Patient ID: Radah Chiang is a 69 y.o. female.    Chief Complaint: bilateral knee pain      HISTORY:  Radha Chiang is a 69 y.o. female who returns to me today for follow up of bilateral knee pain.  She was last seen by me 4/29/2024.    She had left knee CSI injection with me 3 days ago.  She has not had any improvement so far- has intermittent pain, different locations in the knee  She is noticing an increase in pain in her right knee. She is interested in trying HA injections       PMH/PSH/FamHx/SocHx:    Unchanged from prior visit.    ROS:  Constitution: Negative for chills, fever and weakness.   Respiratory: Negative for cough and shortness of breath.   Musculoskeletal: Positive for bilateral knee  Psychiatric/Behavioral: The patient is not nervous/anxious.       PHYSICAL EXAM:   Bilateral knee  Skin intact  No warmth or effusion  ROM 0-130  Stable to testing  5/5 quad, 5/5 hamstring    IMAGING: X-rays of the bilateral knee, personally reviewed by me, demonstrate severe degenerative changes with joint space narrowing, osteophyte formation and subchondral sclerosis.  No fracture or dislocation.     ASSESSMENT/PLAN:    69 year old female with bilateral knee OA    - Recommend we give steroid injection a little more time to work.   - Advised to try resting, continue brace as needed  - Will also start meloxicam   - Bilateral knee gel-one injections ordered  - Follow up pending authorization      MEDICAL NECESSITY FOR VISCOSUPPLEMENTATION:  A thorough evaluation of the patient, have determined that viscosupplementation is medically necessary.  The patient has painful DJD of the knee with failure of conservative therapy including lifestyle modifications and rehabilitation exercises.  Oral analgesics/NSAIDs have not adequately controlled symptoms and there is radiographic evidence of joint space narrowing, subchondral sclerosis, and osteophyte formation - Kellgren Fabio grade 4

## 2024-05-02 NOTE — PROGRESS NOTES
Patient arrives for Prolia injection - confirms use of calcium and vitamin D supplements and denies dental procedures over past 3 months - administered per guidelines. Patient tolerated injection well, without issues.    Return appointment provided to patient.    Limited head-to-toe assessment due to privacy issues and visit reason though the opportunity was given for patient to express any concerns.

## 2024-05-03 ENCOUNTER — PATIENT MESSAGE (OUTPATIENT)
Dept: RHEUMATOLOGY | Facility: CLINIC | Age: 69
End: 2024-05-03
Payer: MEDICARE

## 2024-05-06 ENCOUNTER — PATIENT MESSAGE (OUTPATIENT)
Dept: SURGERY | Facility: CLINIC | Age: 69
End: 2024-05-06
Payer: MEDICARE

## 2024-05-07 ENCOUNTER — TELEPHONE (OUTPATIENT)
Dept: ENDOSCOPY | Facility: HOSPITAL | Age: 69
End: 2024-05-07
Payer: MEDICARE

## 2024-05-07 NOTE — TELEPHONE ENCOUNTER
Spoke to pt to reschedule procedure(s) Colonoscopy       Physician to perform procedure(s) Dr. BAUDILIO Crabtree  Date of Procedure (s) 07/24/24  Arrival Time 8:35 AM  Time of Procedure(s) 9:35 AM   Location of Procedure(s) [facility] 4th Floor  Type of Rx Prep sent to patient: PEG  Instructions provided to patient via [facility]    Patient was informed on the following information and verbalized understanding. Screening questionnaire reviewed with patient and complete. If procedure requires anesthesia, a responsible adult needs to be present to accompany the patient home, patient cannot drive after receiving anesthesia. Appointment details are tentative, especially check-in time. Patient will receive a prep-op call 7 days prior to confirm check-in time for procedure. If applicable the patient should contact their pharmacy to verify Rx for procedure prep is ready for pick-up. Patient was advised to call the scheduling department at [phone] if pharmacy states no Rx is available. Patient was advised to call the endoscopy scheduling department if any questions or concerns arise.      [facility] Scheduling Department

## 2024-05-09 ENCOUNTER — OFFICE VISIT (OUTPATIENT)
Dept: ORTHOPEDICS | Facility: CLINIC | Age: 69
End: 2024-05-09
Payer: MEDICARE

## 2024-05-09 DIAGNOSIS — M17.11 PRIMARY OSTEOARTHRITIS OF RIGHT KNEE: Primary | ICD-10-CM

## 2024-05-09 PROCEDURE — 20610 DRAIN/INJ JOINT/BURSA W/O US: CPT | Mod: PBBFAC | Performed by: PHYSICIAN ASSISTANT

## 2024-05-09 PROCEDURE — 20610 DRAIN/INJ JOINT/BURSA W/O US: CPT | Mod: S$PBB,RT,, | Performed by: PHYSICIAN ASSISTANT

## 2024-05-09 PROCEDURE — 99999PBSHW PR PBB SHADOW TECHNICAL ONLY FILED TO HB: Mod: JZ,PBBFAC,,

## 2024-05-09 PROCEDURE — 99499 UNLISTED E&M SERVICE: CPT | Mod: S$PBB,,, | Performed by: PHYSICIAN ASSISTANT

## 2024-05-09 RX ORDER — TRAMADOL HYDROCHLORIDE 50 MG/1
50 TABLET ORAL EVERY 6 HOURS PRN
Qty: 20 TABLET | Refills: 0 | Status: SHIPPED | OUTPATIENT
Start: 2024-05-09

## 2024-05-09 RX ADMIN — Medication 60 MG: at 03:05

## 2024-05-09 NOTE — PROGRESS NOTES
Radha Chiang presents to clinic today for right knee Durolane injection. There has been no significant change in her medical status since her last visit. No fever, chills, malaise, or unexplained weight change.    Allergies, medications, past medical and surgical history were reviewed.    Exam demonstrates there is no effusion in the  right knee, and the skin is intact.    Diagnosis: right knee osteoarthritis    After time out was performed, verbal consent obtained and patient ID, side, and site were verified, the  right  knee was sterilly prepped in the standard fashion.  A 22-gauge needle was introduced into right knee joint from an lilliana-lateral site without complication and knee was then injected with 3 ml of Durolane.  Sterile dressing was applied.  The patient was instructed to resume activities as tolerated and to call with any problems.     We will see Radha Chiang back next week for left knee Durolane injection      Patient traveling out of state later next month- tramadol prescription sent in for her in case she has increased pain while traveling.

## 2024-05-10 ENCOUNTER — PATIENT MESSAGE (OUTPATIENT)
Dept: NEUROLOGY | Facility: CLINIC | Age: 69
End: 2024-05-10
Payer: MEDICARE

## 2024-05-12 DIAGNOSIS — K21.9 GASTROESOPHAGEAL REFLUX DISEASE, UNSPECIFIED WHETHER ESOPHAGITIS PRESENT: ICD-10-CM

## 2024-05-12 NOTE — TELEPHONE ENCOUNTER
No care due was identified.  [facility] Embedded Care Due Messages. Reference number: [id number].   5/12/2024 12:18:32 PM CDT

## 2024-05-12 NOTE — TELEPHONE ENCOUNTER
No care due was identified.  [facility] Embedded Care Due Messages. Reference number: [id number].   5/12/2024 12:19:31 PM CDT

## 2024-05-13 ENCOUNTER — PATIENT MESSAGE (OUTPATIENT)
Dept: ORTHOPEDICS | Facility: CLINIC | Age: 69
End: 2024-05-13
Payer: MEDICARE

## 2024-05-13 ENCOUNTER — PATIENT MESSAGE (OUTPATIENT)
Dept: INTERNAL MEDICINE | Facility: CLINIC | Age: 69
End: 2024-05-13
Payer: MEDICARE

## 2024-05-13 DIAGNOSIS — M17.0 PRIMARY OSTEOARTHRITIS OF BOTH KNEES: Primary | ICD-10-CM

## 2024-05-13 RX ORDER — LEVOTHYROXINE SODIUM 112 UG/1
112 TABLET ORAL
Qty: 90 TABLET | Refills: 0 | Status: SHIPPED | OUTPATIENT
Start: 2024-05-13

## 2024-05-13 RX ORDER — HYDROGEN PEROXIDE 3 %
SOLUTION, NON-ORAL MISCELLANEOUS
Qty: 180 CAPSULE | Refills: 1 | Status: SHIPPED | OUTPATIENT
Start: 2024-05-13

## 2024-05-13 RX ORDER — ROSUVASTATIN CALCIUM 20 MG/1
TABLET, COATED ORAL
Qty: 90 TABLET | Refills: 2 | Status: SHIPPED | OUTPATIENT
Start: 2024-05-13

## 2024-05-13 NOTE — TELEPHONE ENCOUNTER
Refill Routing Note   Medication(s) are not appropriate for processing by [facility] for the following reason(s):        Required labs outdated    [facility] action(s):  Defer               Appointments  past 12m or future 3m with PCP    Date Provider   Last Visit   9/26/2023 Merry Espinal MD   Next Visit   Visit date not found Merry Espinal MD   ED visits in past 90 days: 0        Note composed:10:58 AM 05/13/2024

## 2024-05-13 NOTE — TELEPHONE ENCOUNTER
Refill Routing Note   Medication(s) are not appropriate for processing by [facility] for the following reason(s):        No active prescription written by provider    [facility] action(s):  Defer               Appointments  past 12m or future 3m with PCP    Date Provider   Last Visit   9/26/2023 Merry Espinal MD   Next Visit   5/12/2024 Merry Espinal MD   ED visits in past 90 days: 0        Note composed:6:24 AM 05/13/2024

## 2024-05-14 ENCOUNTER — PATIENT MESSAGE (OUTPATIENT)
Dept: RHEUMATOLOGY | Facility: CLINIC | Age: 69
End: 2024-05-14
Payer: MEDICARE

## 2024-05-16 ENCOUNTER — PATIENT MESSAGE (OUTPATIENT)
Dept: ORTHOPEDICS | Facility: CLINIC | Age: 69
End: 2024-05-16

## 2024-05-16 ENCOUNTER — OFFICE VISIT (OUTPATIENT)
Dept: ORTHOPEDICS | Facility: CLINIC | Age: 69
End: 2024-05-16
Payer: MEDICARE

## 2024-05-16 DIAGNOSIS — M17.0 PRIMARY OSTEOARTHRITIS OF BOTH KNEES: Primary | ICD-10-CM

## 2024-05-16 PROCEDURE — 20610 DRAIN/INJ JOINT/BURSA W/O US: CPT | Mod: PBBFAC,LT

## 2024-05-16 PROCEDURE — 20610 DRAIN/INJ JOINT/BURSA W/O US: CPT | Mod: S$PBB,LT,, | Performed by: PHYSICIAN ASSISTANT

## 2024-05-16 PROCEDURE — 99999PBSHW PR PBB SHADOW TECHNICAL ONLY FILED TO HB: Mod: JZ,PBBFAC,,

## 2024-05-16 PROCEDURE — 99999 PR PBB SHADOW E&M-EST. PATIENT-LVL III: CPT | Mod: PBBFAC,,, | Performed by: PHYSICIAN ASSISTANT

## 2024-05-16 PROCEDURE — 99213 OFFICE O/P EST LOW 20 MIN: CPT | Mod: PBBFAC,25 | Performed by: PHYSICIAN ASSISTANT

## 2024-05-16 PROCEDURE — 99499 UNLISTED E&M SERVICE: CPT | Mod: S$PBB,,, | Performed by: PHYSICIAN ASSISTANT

## 2024-05-16 RX ADMIN — Medication 60 MG: at 12:05

## 2024-05-16 NOTE — PROGRESS NOTES
Radha Chiang presents to clinic today for left knee Durolane injection. There has been no significant change in her medical status since her last visit. No fever, chills, malaise, or unexplained weight change.    Allergies, medications, past medical and surgical history were reviewed.    Exam demonstrates there is no effusion in the  left knee, and the skin is intact.    Diagnosis: left knee osteoarthritis    After time out was performed, verbal consent obtained and patient ID, side, and site were verified, the  left  knee was sterilly prepped in the standard fashion.  A 22-gauge needle was introduced into left knee joint from an lilliana-lateral site without complication and knee was then injected with 3 ml of Durolane.  Sterile dressing was applied.  The patient was instructed to resume activities as tolerated and to call with any problems.     We will see Radha Chiang back for follow up as needed

## 2024-05-21 ENCOUNTER — PATIENT MESSAGE (OUTPATIENT)
Dept: ORTHOPEDICS | Facility: CLINIC | Age: 69
End: 2024-05-21
Payer: MEDICARE

## 2024-05-27 ENCOUNTER — PATIENT MESSAGE (OUTPATIENT)
Dept: RHEUMATOLOGY | Facility: CLINIC | Age: 69
End: 2024-05-27
Payer: MEDICARE

## 2024-05-27 ENCOUNTER — PATIENT MESSAGE (OUTPATIENT)
Dept: NEUROLOGY | Facility: CLINIC | Age: 69
End: 2024-05-27
Payer: MEDICARE

## 2024-05-28 ENCOUNTER — PATIENT MESSAGE (OUTPATIENT)
Dept: ORTHOPEDICS | Facility: CLINIC | Age: 69
End: 2024-05-28
Payer: MEDICARE

## 2024-05-28 DIAGNOSIS — Z00.00 ENCOUNTER FOR MEDICARE ANNUAL WELLNESS EXAM: ICD-10-CM

## 2024-05-31 ENCOUNTER — OFFICE VISIT (OUTPATIENT)
Dept: OPTOMETRY | Facility: CLINIC | Age: 69
End: 2024-05-31
Payer: MEDICARE

## 2024-05-31 ENCOUNTER — CLINICAL SUPPORT (OUTPATIENT)
Dept: OPHTHALMOLOGY | Facility: CLINIC | Age: 69
End: 2024-05-31
Payer: MEDICARE

## 2024-05-31 ENCOUNTER — IMMUNIZATION (OUTPATIENT)
Dept: INTERNAL MEDICINE | Facility: CLINIC | Age: 69
End: 2024-05-31
Payer: MEDICARE

## 2024-05-31 DIAGNOSIS — Z23 NEED FOR VACCINATION: Primary | ICD-10-CM

## 2024-05-31 DIAGNOSIS — H52.13 MYOPIA WITH PRESBYOPIA OF BOTH EYES: ICD-10-CM

## 2024-05-31 DIAGNOSIS — Z46.0 FITTING AND ADJUSTMENT OF SPECTACLES AND CONTACT LENSES: Primary | ICD-10-CM

## 2024-05-31 DIAGNOSIS — Z97.3 WEARS CONTACT LENSES: ICD-10-CM

## 2024-05-31 DIAGNOSIS — Z79.899 LONG-TERM USE OF PLAQUENIL: ICD-10-CM

## 2024-05-31 DIAGNOSIS — M32.9 SYSTEMIC LUPUS ERYTHEMATOSUS, UNSPECIFIED SLE TYPE, UNSPECIFIED ORGAN INVOLVEMENT STATUS: Primary | ICD-10-CM

## 2024-05-31 DIAGNOSIS — H52.4 MYOPIA WITH PRESBYOPIA OF BOTH EYES: ICD-10-CM

## 2024-05-31 PROCEDURE — 92134 CPTRZ OPH DX IMG PST SGM RTA: CPT | Mod: 26,S$PBB,, | Performed by: OPTOMETRIST

## 2024-05-31 PROCEDURE — 92310 CONTACT LENS FITTING OU: CPT | Mod: CSM,S$GLB,, | Performed by: OPTOMETRIST

## 2024-05-31 PROCEDURE — 90480 ADMN SARSCOV2 VAC 1/ONLY CMP: CPT | Mod: PBBFAC

## 2024-05-31 PROCEDURE — 92014 COMPRE OPH EXAM EST PT 1/>: CPT | Mod: S$PBB,,, | Performed by: OPTOMETRIST

## 2024-05-31 PROCEDURE — 91322 SARSCOV2 VAC 50 MCG/0.5ML IM: CPT | Mod: PBBFAC

## 2024-05-31 PROCEDURE — 99999 PR PBB SHADOW E&M-EST. PATIENT-LVL III: CPT | Mod: PBBFAC,,, | Performed by: OPTOMETRIST

## 2024-05-31 PROCEDURE — 92083 EXTENDED VISUAL FIELD XM: CPT | Mod: 26,S$PBB,, | Performed by: OPTOMETRIST

## 2024-05-31 PROCEDURE — 99999PBSHW COVID-19 VAC, MRNA 2023 (MODERNA)(PF) 50 MCG/0.5 ML IM SUSR (12+): Mod: PBBFAC,,,

## 2024-05-31 PROCEDURE — 92015 DETERMINE REFRACTIVE STATE: CPT | Mod: ,,, | Performed by: OPTOMETRIST

## 2024-05-31 PROCEDURE — 99213 OFFICE O/P EST LOW 20 MIN: CPT | Mod: PBBFAC | Performed by: OPTOMETRIST

## 2024-05-31 PROCEDURE — 99499 UNLISTED E&M SERVICE: CPT | Mod: S$PBB,,, | Performed by: OPTOMETRIST

## 2024-05-31 RX ORDER — HYDROCODONE BITARTRATE AND ACETAMINOPHEN 5; 325 MG/1; MG/1
TABLET ORAL
COMMUNITY
Start: 2024-04-13

## 2024-05-31 RX ORDER — POLYETHYLENE GLYCOL-3350 AND ELECTROLYTES 236; 6.74; 5.86; 2.97; 22.74 G/274.31G; G/274.31G; G/274.31G; G/274.31G; G/274.31G
4000 POWDER, FOR SOLUTION ORAL ONCE
COMMUNITY
Start: 2024-04-29

## 2024-05-31 RX ORDER — NIRMATRELVIR AND RITONAVIR 300-100 MG
KIT ORAL
COMMUNITY
Start: 2024-02-20

## 2024-05-31 RX ORDER — IBUPROFEN 600 MG/1
TABLET ORAL
COMMUNITY
Start: 2024-04-13

## 2024-05-31 RX ORDER — CHLORHEXIDINE GLUCONATE ORAL RINSE 1.2 MG/ML
SOLUTION DENTAL
COMMUNITY
Start: 2024-04-13

## 2024-05-31 NOTE — PROGRESS NOTES
HPI    Pt is here today for routine eye exam. Patient denies pain/discomfort.   Patient would like to update glasses and contact rx.   DLS: 2023  (-)Flashes   (+)Floaters: OD longstanding  (-)Diplopia   (-)Headaches   (-)Itching   (-)Tearing  (-)Burning  (+)Dryness   (-)Photophobia  (-)Glare   (-)Blurred VA  Past Eye Sx: (-)  Eye Meds: (-)   Last edited by Dayan Marie, OD on 5/31/2024  4:15 PM.            Assessment /Plan     For exam results, see Encounter Report.    Systemic lupus erythematosus, unspecified SLE type, unspecified organ involvement status    Long-term use of Plaquenil  -     OCT, Retina - OU - Both Eyes  -     Ocampo Visual Field - OU - Extended - Both Eyes    Myopia with presbyopia of both eyes    Wears contact lenses      1-2. All testing normal OU-no retinopathy.  Monitor yearly.    3.   Eyeglass Final Rx       Eyeglass Final Rx         Sphere Cylinder Axis Add    Right -2.50 Sphere  +2.50    Left -1.75 +1.00 140 +2.50      Type: PAL    Expiration Date: 5/31/2025                   4. Updated CL Rx. Initially good comfort and vision. Given CL trials to take home. If happy with comfort and vision of trial lenses, may order annual supply. If issues arise, RTC for CL f/u. Reviewed proper CL care and hygiene. Monitor yearly unless changes noted sooner.      Contact Lens Final Rx       Final Contact Lens Rx         Brand Base Curve Diameter Sphere Add    Right Dailies Total 1 Multifocal 8.5 14.1 -2.25 High    Left Dailies Total 1 Multifocal 8.5 14.1 -1.25 High      Expiration Date: 5/31/2025    Replacement: Daily    Solutions: OptiFree PureMoist    Wearing Schedule: Daily Wear                   RTC in 1 year for annual eye exam unless changes noted sooner.

## 2024-06-01 ENCOUNTER — PATIENT MESSAGE (OUTPATIENT)
Dept: NEUROLOGY | Facility: CLINIC | Age: 69
End: 2024-06-01
Payer: MEDICARE

## 2024-06-05 ENCOUNTER — PATIENT MESSAGE (OUTPATIENT)
Dept: INTERNAL MEDICINE | Facility: CLINIC | Age: 69
End: 2024-06-05
Payer: MEDICARE

## 2024-06-05 ENCOUNTER — PATIENT MESSAGE (OUTPATIENT)
Dept: NEUROLOGY | Facility: CLINIC | Age: 69
End: 2024-06-05
Payer: MEDICARE

## 2024-06-05 DIAGNOSIS — M35.9 POLYNEUROPATHY IN COLLAGEN VASCULAR DISEASE: Primary | ICD-10-CM

## 2024-06-05 DIAGNOSIS — G60.9 IDIOPATHIC NEUROPATHY: ICD-10-CM

## 2024-06-05 DIAGNOSIS — E03.9 HYPOTHYROIDISM, UNSPECIFIED TYPE: ICD-10-CM

## 2024-06-05 DIAGNOSIS — G63 POLYNEUROPATHY IN COLLAGEN VASCULAR DISEASE: Primary | ICD-10-CM

## 2024-06-05 DIAGNOSIS — E56.9 VITAMIN DEFICIENCY: ICD-10-CM

## 2024-06-05 DIAGNOSIS — Z79.899 HIGH RISK MEDICATION USE: ICD-10-CM

## 2024-06-06 ENCOUNTER — PATIENT MESSAGE (OUTPATIENT)
Dept: OPTOMETRY | Facility: CLINIC | Age: 69
End: 2024-06-06
Payer: MEDICARE

## 2024-06-07 ENCOUNTER — LAB VISIT (OUTPATIENT)
Dept: LAB | Facility: HOSPITAL | Age: 69
End: 2024-06-07
Attending: INTERNAL MEDICINE
Payer: MEDICARE

## 2024-06-07 DIAGNOSIS — M35.9 POLYNEUROPATHY IN COLLAGEN VASCULAR DISEASE: ICD-10-CM

## 2024-06-07 DIAGNOSIS — G63 POLYNEUROPATHY IN COLLAGEN VASCULAR DISEASE: ICD-10-CM

## 2024-06-07 DIAGNOSIS — G60.9 IDIOPATHIC NEUROPATHY: ICD-10-CM

## 2024-06-07 DIAGNOSIS — E03.9 HYPOTHYROIDISM, UNSPECIFIED TYPE: ICD-10-CM

## 2024-06-07 DIAGNOSIS — Z79.899 HIGH RISK MEDICATION USE: ICD-10-CM

## 2024-06-07 LAB
ESTIMATED AVG GLUCOSE: 105 MG/DL (ref 68–131)
HBA1C MFR BLD: 5.3 % (ref 4–5.6)
MAGNESIUM SERPL-MCNC: 2 MG/DL (ref 1.6–2.6)
T4 FREE SERPL-MCNC: 1.27 NG/DL (ref 0.71–1.51)
TSH SERPL DL<=0.005 MIU/L-ACNC: 1.67 UIU/ML (ref 0.4–4)
VIT B12 SERPL-MCNC: 1171 PG/ML (ref 210–950)

## 2024-06-07 PROCEDURE — 84425 ASSAY OF VITAMIN B-1: CPT | Performed by: INTERNAL MEDICINE

## 2024-06-07 PROCEDURE — 84443 ASSAY THYROID STIM HORMONE: CPT | Performed by: INTERNAL MEDICINE

## 2024-06-07 PROCEDURE — 83036 HEMOGLOBIN GLYCOSYLATED A1C: CPT | Performed by: INTERNAL MEDICINE

## 2024-06-07 PROCEDURE — 83735 ASSAY OF MAGNESIUM: CPT | Performed by: INTERNAL MEDICINE

## 2024-06-07 PROCEDURE — 82607 VITAMIN B-12: CPT | Performed by: INTERNAL MEDICINE

## 2024-06-07 PROCEDURE — 84439 ASSAY OF FREE THYROXINE: CPT | Performed by: INTERNAL MEDICINE

## 2024-06-07 PROCEDURE — 84207 ASSAY OF VITAMIN B-6: CPT | Performed by: INTERNAL MEDICINE

## 2024-06-10 ENCOUNTER — PATIENT MESSAGE (OUTPATIENT)
Dept: INTERNAL MEDICINE | Facility: CLINIC | Age: 69
End: 2024-06-10
Payer: MEDICARE

## 2024-06-11 LAB
PYRIDOXAL SERPL-MCNC: 37 UG/L (ref 5–50)
VIT B1 BLD-MCNC: 71 UG/L (ref 38–122)

## 2024-06-14 ENCOUNTER — PATIENT MESSAGE (OUTPATIENT)
Dept: SURGERY | Facility: CLINIC | Age: 69
End: 2024-06-14
Payer: MEDICARE

## 2024-06-18 ENCOUNTER — TELEPHONE (OUTPATIENT)
Dept: ENDOSCOPY | Facility: HOSPITAL | Age: 69
End: 2024-06-18
Payer: MEDICARE

## 2024-06-18 NOTE — TELEPHONE ENCOUNTER
Received Called: Pt requesting prep Instructions. Pt stated she never received Instructions on her upcoming procedure. I resend pt Instructions to portal

## 2024-06-23 ENCOUNTER — PATIENT MESSAGE (OUTPATIENT)
Dept: OPTOMETRY | Facility: CLINIC | Age: 69
End: 2024-06-23
Payer: MEDICARE

## 2024-06-24 ENCOUNTER — PATIENT MESSAGE (OUTPATIENT)
Dept: INTERNAL MEDICINE | Facility: CLINIC | Age: 69
End: 2024-06-24
Payer: MEDICARE

## 2024-06-24 DIAGNOSIS — Z12.39 ENCOUNTER FOR BREAST CANCER SCREENING OTHER THAN MAMMOGRAM: Primary | ICD-10-CM

## 2024-06-24 DIAGNOSIS — Z12.31 ENCOUNTER FOR SCREENING MAMMOGRAM FOR BREAST CANCER: ICD-10-CM

## 2024-07-06 ENCOUNTER — PATIENT MESSAGE (OUTPATIENT)
Dept: ORTHOPEDICS | Facility: CLINIC | Age: 69
End: 2024-07-06
Payer: MEDICARE

## 2024-07-17 ENCOUNTER — TELEPHONE (OUTPATIENT)
Dept: ENDOSCOPY | Facility: HOSPITAL | Age: 69
End: 2024-07-17
Payer: MEDICARE

## 2024-07-17 ENCOUNTER — PATIENT MESSAGE (OUTPATIENT)
Dept: ENDOSCOPY | Facility: HOSPITAL | Age: 69
End: 2024-07-17
Payer: MEDICARE

## 2024-07-17 NOTE — TELEPHONE ENCOUNTER
Confirmed  colonoscopy appt for 7/24/24 with pt. Confirmed receipt of prep new instructions placed in portal. Reviewed instructions pt denies taking blood thinning or glp1 medications.Confirmed ride home after procedure.Pt verbalized understanding

## 2024-07-23 ENCOUNTER — PATIENT MESSAGE (OUTPATIENT)
Dept: ORTHOPEDICS | Facility: CLINIC | Age: 69
End: 2024-07-23
Payer: MEDICARE

## 2024-07-23 NOTE — H&P
[facility]  Colorectal Surgery  History & Physical    Patient Name: Radha Chiang  MRN: [id number]  Admission Date: 7/24/2024  Attending Physician: Reina Crabtree MD   Primary Care Provider: Merry Espinal MD    Subjective:     Chief Complaint/Reason for Admission: colonoscopy    History of Present Illness: Ms. Chiang presents for colonoscopy.  Patient had colonoscopy with me 2/2023.  Subsequently had positive cologuard.  Reports that hemorrhoid is more bothersome.  History of crohns but has not required treatment for many years.      PTA Medications   Medication Sig    alpha lipoic acid 300 mg Cap     calcium carbonate/vitamin D3 (CALTRATE 600 + D ORAL) Take 2 tablets by mouth once daily.    coenzyme Q10 100 mg capsule     ergocalciferol (ERGOCALCIFEROL) 50,000 unit Cap Take 1 capsule (50,000 Units total) by mouth every 7 days.    esomeprazole (NEXIUM) 20 MG capsule TAKE 2 CAPSULES BY MOUTH BEFORE BREAKFAST    fish oil-omega-3 fatty acids 300-1,000 mg capsule Take 1 g by mouth 2 (two) times daily.    hydroxychloroquine (PLAQUENIL) 200 mg tablet Take 400mg daily alternating with 200mg daily Brand name only medically necessary    levothyroxine (SYNTHROID) 112 MCG tablet TAKE 1 TABLET(112 MCG) BY MOUTH EVERY DAY    magnesium oxide (MAG-OX) 200 MG tablet     multivitamin (THERAGRAN) per tablet Take 1 tablet by mouth before breakfast.    rosuvastatin (CRESTOR) 20 MG tablet TAKE 1 TABLET(20 MG) BY MOUTH EVERY DAY    aspirin (ECOTRIN) 81 MG EC tablet Take 1 tablet (81 mg total) by mouth once daily.    chlorhexidine (PERIDEX) 0.12 % solution SWISH AND SPIT 15ML IN THE MOUTH OR THROAT IF NEEDED FOR WOUND CARE FOR UP TO 14 DAYS    GAVILYTE-G 236-22.74-6.74 -5.86 gram suspension Take 4,000 mLs by mouth once.    HYDROcodone-acetaminophen (NORCO) 5-325 mg per tablet TAKE 1TABLET BY MOUTH EVERY 6 HOURS IF NEEDED FOR SEVERE PAIN FOR UP TO 5 DAYS    ibuprofen (ADVIL,MOTRIN) 600 MG tablet      meloxicam (MOBIC) 15 MG tablet Take 1 tablet (15 mg total) by mouth once daily.    PAXLOVID 300 mg (150 mg x 2)-100 mg copackaged tablets (EUA) Take by mouth.    traMADoL (ULTRAM) 50 mg tablet Take 1 tablet (50 mg total) by mouth every 6 (six) hours as needed for Pain.    VENTOLIN HFA 90 mcg/actuation inhaler Inhale 2 puffs into the lungs every 6 (six) hours as needed for Wheezing or Shortness of Breath (cough). Rescue       Review of patient's allergies indicates:   Allergen Reactions    Sulfamethoxazole-trimethoprim Nausea And Vomiting, Anaphylaxis, Other (See Comments), Hives, Rash and Swelling     Nausea, ? rash    Cannot remember      Nausea, ? rash Nausea, ? rash      Cannot remember  Nausea, ? rash Nausea, ? rash  Nausea, ? rash       Past Medical History:   Diagnosis Date    Acid reflux     Anemia 2years ago    Anxiety     Arthritis     Asthma, chronic 4/10/2013    Crohn's disease 1998    Depression     Dry eyes     Fracture of forearm, proximal, open     screws & plate 6/26/13 in [facility]    History of papilledema 11/10/11    Hyperlipidemia 5/21/2018    Lupus     Neuromuscular disorder 12/2009    Neuropathy    Osteoporosis     Stroke 1/29/2013    Thyroid disease      Past Surgical History:   Procedure Laterality Date    ADENOIDECTOMY  1962    BRAIN SURGERY  01/18/2012    Brain Biopsy    BREAST CYST ASPIRATION Left     COLONOSCOPY  2017    [facility] Dr. WILLIS Summers; pan-diverticulosis, rec repeat 3-5 years    COLONOSCOPY N/A 2/9/2023    Procedure: COLONOSCOPY;  Surgeon: Reina Crabtree MD;  Location: [facility];  Service: Colon and Rectal;  Laterality: N/A;    ESOPHAGOGASTRODUODENOSCOPY N/A 4/27/2023    Procedure: ESOPHAGOGASTRODUODENOSCOPY (EGD);  Surgeon: Martir Braxton MD;  Location: [address]);  Service: Endoscopy;  Laterality: N/A;  4/21 Pre-call attempted, pt had phone trouble kls    ESOPHAGOGASTRODUODENOSCOPY N/A 7/21/2023    Procedure: EGD (ESOPHAGOGASTRODUODENOSCOPY);  Surgeon: Martir Braxton MD;   Location: [facility] (4TH FLR);  Service: Endoscopy;  Laterality: N/A;  12wks to check healing, pt request Dr. Braxton, Inst portal. Referral: REYNA MCKENZIE Proc order tele enc [id number]    INCISION AND DRAINAGE PERIRECTAL ABSCESS  1998    abscess removal    ORIF FOREARM FRACTURE Left 2013    proximal ulnar and radius     Family History       Problem Relation (Age of Onset)    Allergies Sister    COPD Maternal Grandfather    Cancer Mother (64), Maternal Grandmother, Paternal Grandmother    Heart disease Father (64), Paternal Grandfather    Hypertension Sister    Intellectual disability Brother    Lupus Paternal Uncle    Ovarian cancer Paternal Grandmother    Sjogren's syndrome Sister    Stroke Paternal Grandmother          Tobacco Use    Smoking status: Former     Current packs/day: 0.00     Average packs/day: 0.5 packs/day for 15.0 years (7.5 ttl pk-yrs)     Types: Cigarettes     Start date: 1969     Quit date: 1984     Years since quittin.0    Smokeless tobacco: Never    Tobacco comments:     Quit    Substance and Sexual Activity    Alcohol use: Yes     Comment: less than one per week    Drug use: No    Sexual activity: Never     Review of Systems   All other systems reviewed and are negative.    Objective:     Vital Signs (Most Recent):  Temp: 97.7 °F (36.5 °C) (24 0748)  Pulse: 76 (24 0748)  Resp: 16 (24)  BP: 131/83 (24 07)  SpO2: 95 % (24) Vital Signs (24h Range):  Temp:  [97.7 °F (36.5 °C)] 97.7 °F (36.5 °C)  Pulse:  [76] 76  Resp:  [16] 16  SpO2:  [95 %] 95 %  BP: (131)/(83) 131/83     Weight: 72.6 kg (160 lb)  Body mass index is 31.25 kg/m².    Physical Exam  Vitals reviewed.   Constitutional:       Appearance: Normal appearance.   HENT:      Head: Normocephalic and atraumatic.   Eyes:      Extraocular Movements: Extraocular movements intact.      Pupils: Pupils are equal, round, and reactive to light.   Cardiovascular:      Rate and Rhythm:  Normal rate and regular rhythm.   Pulmonary:      Effort: Pulmonary effort is normal.   Abdominal:      General: Abdomen is flat.      Palpations: Abdomen is soft.   Musculoskeletal:         General: Normal range of motion.   Skin:     General: Skin is warm.      Capillary Refill: Capillary refill takes less than 2 seconds.   Neurological:      General: No focal deficit present.      Mental Status: She is alert and oriented to person, place, and time.   Psychiatric:         Mood and Affect: Mood normal.         Behavior: Behavior normal.       Significant Labs:    NA    Significant Diagnostics:  None    Assessment/Plan:     There are no hospital problems to display for this patient.      Ms. Chiang presents for colonoscopy    - risks, benefits and alternatives of procedure discussed and consent signed  - proceed with colonoscopy    Reina Crabtree MD  Colorectal Surgery  [facility]

## 2024-07-24 ENCOUNTER — TELEPHONE (OUTPATIENT)
Dept: RHEUMATOLOGY | Facility: CLINIC | Age: 69
End: 2024-07-24
Payer: MEDICARE

## 2024-07-24 ENCOUNTER — ANESTHESIA EVENT (OUTPATIENT)
Dept: ENDOSCOPY | Facility: HOSPITAL | Age: 69
End: 2024-07-24
Payer: MEDICARE

## 2024-07-24 ENCOUNTER — HOSPITAL ENCOUNTER (OUTPATIENT)
Facility: HOSPITAL | Age: 69
Discharge: HOME | End: 2024-07-24
Attending: SURGERY | Admitting: SURGERY
Payer: MEDICARE

## 2024-07-24 ENCOUNTER — ANESTHESIA (OUTPATIENT)
Dept: ENDOSCOPY | Facility: HOSPITAL | Age: 69
End: 2024-07-24
Payer: MEDICARE

## 2024-07-24 VITALS
BODY MASS INDEX: 31.41 KG/M2 | OXYGEN SATURATION: 97 % | WEIGHT: 160 LBS | DIASTOLIC BLOOD PRESSURE: 60 MMHG | HEIGHT: 60 IN | HEART RATE: 80 BPM | SYSTOLIC BLOOD PRESSURE: 125 MMHG | TEMPERATURE: 98 F | RESPIRATION RATE: 16 BRPM

## 2024-07-24 DIAGNOSIS — Z12.11 ENCOUNTER FOR SCREENING COLONOSCOPY: ICD-10-CM

## 2024-07-24 PROCEDURE — G0121 COLON CA SCRN NOT HI RSK IND: HCPCS | Mod: ,,, | Performed by: SURGERY

## 2024-07-24 PROCEDURE — 63600175 PHARM REV CODE 636 W HCPCS: Performed by: NURSE ANESTHETIST, CERTIFIED REGISTERED

## 2024-07-24 PROCEDURE — 25000003 PHARM REV CODE 250: Performed by: NURSE ANESTHETIST, CERTIFIED REGISTERED

## 2024-07-24 PROCEDURE — 37000008 HC ANESTHESIA 1ST 15 MINUTES: Performed by: SURGERY

## 2024-07-24 PROCEDURE — 37000009 HC ANESTHESIA EA ADD 15 MINS: Performed by: SURGERY

## 2024-07-24 PROCEDURE — G0121 COLON CA SCRN NOT HI RSK IND: HCPCS | Performed by: SURGERY

## 2024-07-24 RX ORDER — LIDOCAINE HYDROCHLORIDE 20 MG/ML
INJECTION INTRAVENOUS
Status: DISCONTINUED | OUTPATIENT
Start: 2024-07-24 | End: 2024-07-24

## 2024-07-24 RX ORDER — SODIUM CHLORIDE 9 MG/ML
INJECTION, SOLUTION INTRAVENOUS CONTINUOUS
Status: DISCONTINUED | OUTPATIENT
Start: 2024-07-24 | End: 2024-07-24 | Stop reason: HOSPADM

## 2024-07-24 RX ORDER — PROPOFOL 10 MG/ML
VIAL (ML) INTRAVENOUS
Status: DISCONTINUED | OUTPATIENT
Start: 2024-07-24 | End: 2024-07-24

## 2024-07-24 RX ADMIN — PROPOFOL 50 MG: 10 INJECTION, EMULSION INTRAVENOUS at 09:07

## 2024-07-24 RX ADMIN — PROPOFOL 80 MG: 10 INJECTION, EMULSION INTRAVENOUS at 09:07

## 2024-07-24 RX ADMIN — PROPOFOL 30 MG: 10 INJECTION, EMULSION INTRAVENOUS at 09:07

## 2024-07-24 RX ADMIN — SODIUM CHLORIDE: 0.9 INJECTION, SOLUTION INTRAVENOUS at 09:07

## 2024-07-24 RX ADMIN — SODIUM CHLORIDE: 0.9 INJECTION, SOLUTION INTRAVENOUS at 08:07

## 2024-07-24 RX ADMIN — LIDOCAINE HYDROCHLORIDE 50 MG: 20 INJECTION INTRAVENOUS at 09:07

## 2024-07-24 NOTE — ANESTHESIA POSTPROCEDURE EVALUATION
Anesthesia Post Evaluation    Patient: Radha Chiang    Procedure(s) Performed: Procedure(s) (LRB):  COLONOSCOPY (N/A)    Final Anesthesia Type: general      Patient location during evaluation: PACU  Patient participation: Yes- Able to Participate  Level of consciousness: awake and alert  Post-procedure vital signs: reviewed and stable  Pain management: adequate  Airway patency: patent    PONV status at discharge: No PONV  Anesthetic complications: no      Cardiovascular status: blood pressure returned to baseline  Respiratory status: unassisted  Hydration status: euvolemic  Follow-up not needed.              Vitals Value Taken Time   /63 07/24/24 1000   Temp 36.6 °C (97.8 °F) 07/24/24 0945   Pulse 79 07/24/24 1000   Resp 16 07/24/24 1000   SpO2 97 % 07/24/24 1000         No case tracking events are documented in the log.      Pain/Kiersten Score: Kiersten Score: 10 (7/24/2024 10:00 AM)

## 2024-07-24 NOTE — PROVATION PATIENT INSTRUCTIONS
Discharge Summary/Instructions after an Endoscopic Procedure  Patient Name: Radha Chiang  Patient MRN: [id number]  Patient YOB: 1955 Wednesday, July 24, 2024  Reina Crabtree MD  Dear patient,  As a result of recent federal legislation (The Federal Cures Act), you may   receive lab or pathology results from your procedure in your [facility]   account before your physician is able to contact you. Your physician or   their representative will relay the results to you with their   recommendations at their soonest availability.  Thank you,  RESTRICTIONS:  During your procedure today, you received medications for sedation.  These   medications may affect your judgment, balance and coordination.  Therefore,   for 24 hours, you have the following restrictions:   - DO NOT drive a car, operate machinery, make legal/financial decisions,   sign important papers or drink alcohol.    ACTIVITY:  Today: no heavy lifting, straining or running due to procedural   sedation/anesthesia.  The following day: return to full activity including work.  DIET:  Eat and drink normally unless instructed otherwise.     TREATMENT FOR COMMON SIDE EFFECTS:  - Mild abdominal pain, nausea, belching, bloating or excessive gas:  rest,   eat lightly and use a heating pad.  - Sore Throat: treat with throat lozenges and/or gargle with warm salt   water.  - Because air was used during the procedure, expelling large amounts of air   from your rectum or belching is normal.  - If a bowel prep was taken, you may not have a bowel movement for 1-3 days.    This is normal.  SYMPTOMS TO WATCH FOR AND REPORT TO YOUR PHYSICIAN:  1. Abdominal pain or bloating, other than gas cramps.  2. Chest pain.  3. Back pain.  4. Signs of infection such as: chills or fever occurring within 24 hours   after the procedure.  5. Rectal bleeding, which would show as bright red, maroon, or black stools.   (A tablespoon of blood from the rectum is not serious, especially if    hemorrhoids are present.)  6. Vomiting.  7. Weakness or dizziness.  GO DIRECTLY TO THE NEAREST EMERGENCY ROOM IF YOU HAVE ANY OF THE FOLLOWING:      Difficulty breathing              Chills and/or fever over 101 F   Persistent vomiting and/or vomiting blood   Severe abdominal pain   Severe chest pain   Black, tarry stools   Bleeding- more than one tablespoon   Any other symptom or condition that you feel may need urgent attention  Your doctor recommends these additional instructions:  If any biopsies were taken, your doctors clinic will contact you in 1 to 2   weeks with any results.  - Patient has a contact number available for emergencies.  The signs and   symptoms of potential delayed complications were discussed with the   patient.  Return to normal activities tomorrow.  Written discharge   instructions were provided to the patient.   - Resume previous diet.   - Continue present medications.   - Discharge patient to home (ambulatory).   - Repeat colonoscopy in 10 years for screening purposes.  For questions, problems or results please call your physician - Reina Crabtree MD at Work:  [phone].  [facility], EMERGENCY ROOM PHONE NUMBER: [phone]  IF A COMPLICATION OR EMERGENCY SITUATION ARISES AND YOU ARE UNABLE TO REACH   YOUR PHYSICIAN - GO DIRECTLY TO THE EMERGENCY ROOM.  MD Reina Renee MD  7/24/2024 9:40:40 AM  This report has been verified and signed electronically.  Dear patient,  As a result of recent federal legislation (The Federal Cures Act), you may   receive lab or pathology results from your procedure in your [facility]   account before your physician is able to contact you. Your physician or   their representative will relay the results to you with their   recommendations at their soonest availability.  Thank you,  PROVATION

## 2024-07-24 NOTE — TRANSFER OF CARE
Anesthesia Transfer of Care Note    Patient: Radha Chiang    Procedure(s) Performed: Procedure(s) (LRB):  COLONOSCOPY (N/A)    Patient location: GI    Transport from OR: Transported from OR on room air with adequate spontaneous ventilation    Post pain: adequate analgesia    Post assessment: no apparent anesthetic complications and tolerated procedure well    Post vital signs: stable    Level of consciousness: responds to stimulation    Nausea/Vomiting: no nausea/vomiting    Complications: none    Transfer of care protocol was followed      Last vitals: Visit Vitals  /83 (BP Location: Left arm, Patient Position: Lying)   Pulse 76   Temp 36.5 °C (97.7 °F) (Oral)   Resp 16   Ht 5' (1.524 m)   Wt 72.6 kg (160 lb)   SpO2 95%   BMI 31.25 kg/m²

## 2024-07-24 NOTE — ANESTHESIA PREPROCEDURE EVALUATION
07/24/2024  Pre-operative evaluation for Procedure(s) (LRB):  COLONOSCOPY (N/A)    Radha Chiang is a 69 y.o. female     Past Medical History:   Diagnosis Date    Acid reflux     Anemia 2years ago    Anxiety     Arthritis     Asthma, chronic 4/10/2013    Crohn's disease 1998    Depression     Dry eyes     Fracture of forearm, proximal, open     screws & plate 6/26/13 in [facility]    History of papilledema 11/10/11    Hyperlipidemia 5/21/2018    Lupus     Neuromuscular disorder 12/2009    Neuropathy    Osteoporosis     Stroke 1/29/2013    Thyroid disease      Patient Active Problem List   Diagnosis    Systemic lupus erythematosus    SCOTT (dyspnea on exertion)    Hepatomegaly    Other acquired deformity of ankle and foot(736.79)    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    Hypothyroid    Crohn's disease    Asthma, chronic    Hearing loss of right ear    Right-sided tinnitus    Elevated alkaline phosphatase level    Right acoustic neuroma    Pancolonic diverticulosis    Abnormal finding on MRI of brain    Vitamin D deficiency    Osteoarthritis of left knee    Vertigo    Obesity (BMI 30-39.9)    History of tobacco use    Dizziness    Carotid artery disease without cerebral infarction    Carotid stenosis, right    Bilateral carotid artery stenosis    Vestibular dizziness    Impairment of balance    Wears contact lenses    Elevated parathyroid hormone    Hyperparathyroidism    Hemorrhoids    Crohn's colitis, with abscess    Other osteoporosis without current pathological fracture    Positive colorectal cancer screening using Cologuard test    Pelvic floor dysfunction    Wheezing    Arthritis of elbow     Review of patient's allergies indicates:   Allergen Reactions    Sulfamethoxazole-trimethoprim Nausea And Vomiting, Anaphylaxis, Other (See Comments), Hives, Rash and Swelling     Nausea, ? rash    Cannot  "remember      Nausea, ? rash Nausea, ? rash      Cannot remember  Nausea, ? rash Nausea, ? rash  Nausea, ? rash       No current facility-administered medications on file prior to encounter.     Current Outpatient Medications on File Prior to Encounter   Medication Sig Dispense Refill    alpha lipoic acid 300 mg Cap       calcium carbonate/vitamin D3 (CALTRATE 600 + D ORAL) Take 2 tablets by mouth once daily.      coenzyme Q10 100 mg capsule       fish oil-omega-3 fatty acids 300-1,000 mg capsule Take 1 g by mouth 2 (two) times daily.      magnesium oxide (MAG-OX) 200 MG tablet       multivitamin (THERAGRAN) per tablet Take 1 tablet by mouth before breakfast.      aspirin (ECOTRIN) 81 MG EC tablet Take 1 tablet (81 mg total) by mouth once daily.         Past Surgical History:   Procedure Laterality Date    ADENOIDECTOMY  1962    BRAIN SURGERY  01/18/2012    Brain Biopsy    BREAST CYST ASPIRATION Left     COLONOSCOPY  2017    [facility] Dr. WILLIS Summers; pan-diverticulosis, rec repeat 3-5 years    COLONOSCOPY N/A 2/9/2023    Procedure: COLONOSCOPY;  Surgeon: Reina Crabtree MD;  Location: [facility];  Service: Colon and Rectal;  Laterality: N/A;    ESOPHAGOGASTRODUODENOSCOPY N/A 4/27/2023    Procedure: ESOPHAGOGASTRODUODENOSCOPY (EGD);  Surgeon: Martir Braxton MD;  [facility]  Service: Endoscopy;  Laterality: N/A;  4/21 Pre-call attempted, pt had phone trouble kls    ESOPHAGOGASTRODUODENOSCOPY N/A 7/21/2023    Procedure: EGD (ESOPHAGOGASTRODUODENOSCOPY);  Surgeon: Martir Braxton MD;  Location: [facility] ([address] [place] [address]);  Service: Endoscopy;  Laterality: N/A;  12wks to check healing, pt request Dr. Braxton, [facility] portal. Referral: REYNA MCKENZIE, Proc order tele enc 5/29/23-LW    INCISION AND DRAINAGE PERIRECTAL ABSCESS  1998    abscess removal    ORIF FOREARM FRACTURE Left 06/23/2013    proximal ulnar and radius       CBC: No results for input(s): "WBC", "HGB", "HCT", "PLT" in the last 72 hours.    CMP: No " "results for input(s): "NA", "K", "CL", "CO2", "BUN", "CREATININE", "GLU", "MG", "PHOS", "CALCIUM", "ALBUMIN", "PROT", "ALKPHOS", "ALT", "AST", "BILITOT" in the last 72 hours.    COAGS  No results for input(s): "PT", "INR", "PROTIME", "APTT" in the last 72 hours.    BP Readings from Last 3 Encounters:   07/24/24 131/83   05/02/24 (!) 146/65   04/09/24 118/66       Diagnostic Studies:    EKG:  Results for orders placed or performed during the hospital encounter of 12/22/23   SCHEDULED EKG 12-LEAD (to Muse)    Collection Time: 12/22/23  2:28 PM    Narrative    Test Reason : R00.2,    Vent. Rate : 067 BPM     Atrial Rate : 067 BPM     P-R Int : 182 ms          QRS Dur : 086 ms      QT Int : 340 ms       P-R-T Axes : 063 031 072 degrees     QTc Int : 359 ms    Normal sinus rhythm  Nonspecific T wave abnormality  Abnormal ECG  When compared with ECG of 31-OCT-2019 09:39,  No significant change was found    Confirmed by Trupti Yoo MD ([id number]) on 12/22/2023 2:53:01 PM    Referred By: NUPUR BUSTAMANTE           Confirmed By:Trupti Yoo MD       2D Echo:  No results found for this or any previous visit.        Pre-op Assessment    I have reviewed the Patient Summary Reports.     I have reviewed the Nursing Notes. I have reviewed the NPO Status.   I have reviewed the Medications.     Review of Systems  Anesthesia Hx:  No problems with previous Anesthesia             Denies Family Hx of Anesthesia complications.    Denies Personal Hx of Anesthesia complications.                    Social:  Non-Smoker       Hematology/Oncology:    Oncology Normal    -- Anemia:                                  EENT/Dental:  EENT/Dental Normal  Right sided hearing loss 2/2 right acoustic neuroma           Cardiovascular:  Exercise tolerance: good               CSOTT                            Pulmonary:    Asthma  Shortness of breath                  Renal/:  Renal/ Normal                 Hepatic/GI:     GERD Liver Disease, (hepatomegaly " )  Crohns          Musculoskeletal:  Arthritis               Neurological:   CVA, residual symptoms Neuromuscular Disease,  Headaches     Right side weakness      Peripheral Neuropathy                          Endocrine:   Hypothyroidism  Lupus        Dermatological:  Skin Normal    Psych:  Psychiatric History  depression                Physical Exam  General: Well nourished, Cooperative and Alert    Airway:  Mallampati: II   Mouth Opening: Normal  TM Distance: Normal  Tongue: Normal  Neck ROM: Normal ROM    Dental:        Anesthesia Plan  Type of Anesthesia, risks & benefits discussed:    Anesthesia Type: Gen Natural Airway  Intra-op Monitoring Plan: Standard ASA Monitors  Post Op Pain Control Plan: IV/PO Opioids PRN  Induction:  IV  Informed Consent: Informed consent signed with the Patient and all parties understand the risks and agree with anesthesia plan.  All questions answered.   ASA Score: 3  Day of Surgery Review of History & Physical: H&P Update referred to the surgeon/provider.    Ready For Surgery From Anesthesia Perspective.     .

## 2024-07-24 NOTE — TELEPHONE ENCOUNTER
Returned patient call and patient declined at time and patient will call back when doctors's schedule opens and will call back to marivel

## 2024-07-25 ENCOUNTER — HOSPITAL ENCOUNTER (OUTPATIENT)
Dept: RADIOLOGY | Facility: HOSPITAL | Age: 69
Discharge: HOME | End: 2024-07-25
Attending: INTERNAL MEDICINE
Payer: MEDICARE

## 2024-07-25 DIAGNOSIS — Z12.31 ENCOUNTER FOR SCREENING MAMMOGRAM FOR BREAST CANCER: ICD-10-CM

## 2024-07-25 PROCEDURE — 77067 SCR MAMMO BI INCL CAD: CPT | Mod: TC

## 2024-07-29 NOTE — PROGRESS NOTES
Pre chart    Answers for HPI/ROS submitted by the patient on 11/18/2020   fever: No  eye redness: No  mouth sores: No  headaches: No  shortness of breath: Yes  chest pain: No  trouble swallowing: No  diarrhea: No  constipation: No  unexpected weight change: No  genital sore: No  dysuria: No  During the last 3 days, have you had a skin rash?: No  Bruises or bleeds easily: No  cough: Yes     No

## 2024-08-02 DIAGNOSIS — I65.21 ASYMPTOMATIC STENOSIS OF RIGHT CAROTID ARTERY: ICD-10-CM

## 2024-08-02 DIAGNOSIS — I65.21 CAROTID STENOSIS, RIGHT: Primary | ICD-10-CM

## 2024-08-19 ENCOUNTER — PATIENT MESSAGE (OUTPATIENT)
Dept: ADMINISTRATIVE | Facility: HOSPITAL | Age: 69
End: 2024-08-19
Payer: MEDICARE

## 2024-08-19 ENCOUNTER — PATIENT MESSAGE (OUTPATIENT)
Dept: RHEUMATOLOGY | Facility: CLINIC | Age: 69
End: 2024-08-19
Payer: MEDICARE

## 2024-08-20 ENCOUNTER — TELEPHONE (OUTPATIENT)
Dept: RHEUMATOLOGY | Facility: CLINIC | Age: 69
End: 2024-08-20
Payer: MEDICARE

## 2024-08-20 DIAGNOSIS — D69.2 PURPURA: Primary | ICD-10-CM

## 2024-08-21 ENCOUNTER — PATIENT MESSAGE (OUTPATIENT)
Dept: RHEUMATOLOGY | Facility: CLINIC | Age: 69
End: 2024-08-21
Payer: MEDICARE

## 2024-08-21 ENCOUNTER — TELEPHONE (OUTPATIENT)
Dept: RHEUMATOLOGY | Facility: CLINIC | Age: 69
End: 2024-08-21
Payer: MEDICARE

## 2024-08-21 NOTE — TELEPHONE ENCOUNTER
Pleasefxa.orders to patient in [place] for PT, aPTT and all her standing lupus labs. Thank you ROBERTO

## 2024-08-23 ENCOUNTER — TELEPHONE (OUTPATIENT)
Dept: RHEUMATOLOGY | Facility: CLINIC | Age: 69
End: 2024-08-23
Payer: MEDICARE

## 2024-08-23 NOTE — TELEPHONE ENCOUNTER
Patient decline.    DAMON Martinez Shale D, MA42 minutes ago (3:33 PM)       Anny, please tell her given her purpura, very important she gets the PT  PTT and all her standing lupus labs done. Thank you Anny Albright MA  You44 minutes ago (3:31 PM)     ANSON  Spoke with patient over the phone, she stated that she is going to wait to get labs done.    DAMON Martinez routed conversation to Anny Roland MA56 minutes ago (3:19 PM)     Christine Maya MA routed conversation to You1 hour ago (2:45 PM)     Radha CHAPARRO Staff (supporting You)5 hours ago (10:51 AM)       Thank you Ruddy.sarah ferrera message says that I need to have the orders in hand when I go in?  Is there anyway you can attach to my chart or email them to me?   [email or web address].   Im sorry this is so difficult.  DAMON Butler7 hours ago (8:27 AM)       Good Morning Ms. Garcia,     Yes ma'am I did and the labs has been faxed again.     DAMON Martinez Shale D, MA routed conversation to Anny Roland MA23 hours ago (4:46 PM)     Radha CHAPARRO Staff (supporting You)Yesterday (11:35 AM)       [facility]     fax:  [phone]     Phone [phone]       Radha CHAPARRO Staff (supporting You)Yesterday (11:32 AM)       Anny.did you receive the message that it needs to be faxed to [facility]sorry I cannot tell by the messages where you sent the request.  Can you please let me know?  Thank you.    DAMON KuoYesterday (9:20 AM)       Good Morning Ms. Garcia,     Lab work has been faxed over.     DAMON Martinez Shale D, MA2 days ago       PT, PTT and all her standing lupus labs, including UA and UPCR. Thank you ROBERTO

## 2024-08-24 LAB
ALBUMIN SERPL-MCNC: 4 G/DL (ref 3–5)
ALP SERPL-CCNC: 103 IU/L (ref 40–130)
ALT SERPL-CCNC: 18 IU/L (ref 7–54)
APPEARANCE UR: CLEAR
APTT PPP: 27 SEC (ref 22–35)
AST SERPL-CCNC: 28 IU/L (ref 8–48)
BACTERIA #/AREA URNS HPF: NORMAL /[HPF]
BACTERIA UR CULT: NORMAL
BASOPHILS # BLD AUTO: 0.1 X10E3/UL (ref 0–0.2)
BASOPHILS NFR BLD AUTO: 1 %
BILIRUB SERPL-MCNC: 0.5 MG/DL (ref 0–1.2)
BILIRUB UR QL STRIP: NEGATIVE
BUN SERPL-MCNC: 15 MG/DL (ref 2–23)
BUN/CREAT SERPL: 17 (ref 12–28)
C3 SERPL-MCNC: 110 MG/DL (ref 96–185)
C4 SERPL-MCNC: 30 MG/DL (ref 15–45)
CALCIUM SERPL-MCNC: 9.9 MG/DL (ref 8.7–10.3)
CASTS URNS QL MICRO: NORMAL /LPF
CHLORIDE SERPL-SCNC: 105 MMOL/L (ref 99–111)
CO2 SERPL-SCNC: 26 MMOL/L (ref 22–32)
COLOR UR: COLORLESS
CREAT SERPL-MCNC: 0.87 MG/DL (ref 0.6–1.3)
CREAT UR-MCNC: 25 MG/DL
CRP SERPL-MCNC: <0.5 MG/DL (ref 0–0.5)
DSDNA AB SER-ACNC: NORMAL [IU]/ML
EOSINOPHIL # BLD AUTO: 0.1 X10E3/UL (ref 0–0.4)
EOSINOPHIL NFR BLD AUTO: 1 %
EPI CELLS #/AREA URNS HPF: NORMAL /HPF (ref 0–10)
ERYTHROCYTE [DISTWIDTH] IN BLOOD BY AUTOMATED COUNT: 12.2 % (ref 11.7–15.4)
ERYTHROCYTE [SEDIMENTATION RATE] IN BLOOD BY WESTERGREN METHOD: 13 MM/HR (ref 0–40)
EST. GFR  (NO RACE VARIABLE): 73 ML/MIN/1.73
GLOBULIN SER CALC-MCNC: 3.3 G/DL (ref 1.5–4.5)
GLUCOSE SERPL-MCNC: 114 MG/DL (ref 70–99)
GLUCOSE UR QL STRIP: NEGATIVE
HCT VFR BLD AUTO: 41.7 % (ref 34–46.6)
HGB BLD-MCNC: 13.3 G/DL (ref 11.1–15.9)
HGB UR QL STRIP: NEGATIVE
IMM GRANULOCYTES # BLD AUTO: 0 X10E3/UL (ref 0–0.1)
IMM GRANULOCYTES NFR BLD AUTO: 0 %
INR PPP: 1 (ref 0.9–1.1)
KETONES UR QL STRIP: NEGATIVE
LEUKOCYTE ESTERASE UR QL STRIP: ABNORMAL
LYMPHOCYTES # BLD AUTO: 1.9 X10E3/UL (ref 0.7–3.1)
LYMPHOCYTES NFR BLD AUTO: 25 %
MCH RBC QN AUTO: 31.7 PG (ref 26.6–33)
MCHC RBC AUTO-ENTMCNC: 31.9 G/DL (ref 31.5–35.7)
MCV RBC AUTO: 100 FL (ref 79–97)
MICRO URNS: ABNORMAL
MONOCYTES # BLD AUTO: 0.7 X10E3/UL (ref 0.1–0.9)
MONOCYTES NFR BLD AUTO: 9 %
NEUTROPHILS # BLD AUTO: 5 X10E3/UL (ref 1.4–7)
NEUTROPHILS NFR BLD AUTO: 64 %
NITRITE UR QL STRIP: NEGATIVE
PH UR STRIP: 6.5 [PH] (ref 5–7.5)
PLATELET # BLD AUTO: 282 X10E3/UL (ref 150–450)
POTASSIUM SERPL-SCNC: 4.9 MMOL/L (ref 3.5–5.2)
PROT SERPL-MCNC: 7.3 G/DL (ref 5.9–8.2)
PROT UR QL STRIP: NEGATIVE
PROT UR-MCNC: <6 MG/DL
PROT/CREAT UR: <240 MG/G CREAT (ref 0–200)
PROTHROMBIN TIME: 12.7 SEC (ref 11.8–14.6)
RBC # BLD AUTO: 4.19 X10E6/UL (ref 3.77–5.28)
RBC #/AREA URNS HPF: NORMAL /HPF (ref 0–2)
SODIUM SERPL-SCNC: 139 MMOL/L (ref 134–144)
SP GR UR STRIP: 1.01 (ref 1–1.03)
URINALYSIS REFLEX: ABNORMAL
WBC # BLD AUTO: 7.7 X10E3/UL (ref 3.4–10.8)
WBC #/AREA URNS HPF: NORMAL /HPF (ref 0–5)

## 2024-08-25 LAB
ALBUMIN SERPL-MCNC: 4 G/DL (ref 3–5)
ALP SERPL-CCNC: 103 IU/L (ref 40–130)
ALT SERPL-CCNC: 18 IU/L (ref 7–54)
APPEARANCE UR: CLEAR
APTT PPP: 27 SEC (ref 22–35)
AST SERPL-CCNC: 28 IU/L (ref 8–48)
BACTERIA #/AREA URNS HPF: NORMAL /[HPF]
BACTERIA UR CULT: NORMAL
BACTERIA UR CULT: NORMAL
BASOPHILS # BLD AUTO: 0.1 X10E3/UL (ref 0–0.2)
BASOPHILS NFR BLD AUTO: 1 %
BILIRUB SERPL-MCNC: 0.5 MG/DL (ref 0–1.2)
BILIRUB UR QL STRIP: NEGATIVE
BUN SERPL-MCNC: 15 MG/DL (ref 2–23)
BUN/CREAT SERPL: 17 (ref 12–28)
C3 SERPL-MCNC: 110 MG/DL (ref 96–185)
C4 SERPL-MCNC: 30 MG/DL (ref 15–45)
CALCIUM SERPL-MCNC: 9.9 MG/DL (ref 8.7–10.3)
CASTS URNS QL MICRO: NORMAL /LPF
CHLORIDE SERPL-SCNC: 105 MMOL/L (ref 99–111)
CO2 SERPL-SCNC: 26 MMOL/L (ref 22–32)
COLOR UR: COLORLESS
CREAT SERPL-MCNC: 0.87 MG/DL (ref 0.6–1.3)
CREAT UR-MCNC: 25 MG/DL
CRP SERPL-MCNC: <0.5 MG/DL (ref 0–0.5)
DSDNA AB SER-ACNC: 6 IU/ML (ref 0–9)
EOSINOPHIL # BLD AUTO: 0.1 X10E3/UL (ref 0–0.4)
EOSINOPHIL NFR BLD AUTO: 1 %
EPI CELLS #/AREA URNS HPF: NORMAL /HPF (ref 0–10)
ERYTHROCYTE [DISTWIDTH] IN BLOOD BY AUTOMATED COUNT: 12.2 % (ref 11.7–15.4)
ERYTHROCYTE [SEDIMENTATION RATE] IN BLOOD BY WESTERGREN METHOD: 13 MM/HR (ref 0–40)
EST. GFR  (NO RACE VARIABLE): 73 ML/MIN/1.73
GLOBULIN SER CALC-MCNC: 3.3 G/DL (ref 1.5–4.5)
GLUCOSE SERPL-MCNC: 114 MG/DL (ref 70–99)
GLUCOSE UR QL STRIP: NEGATIVE
HCT VFR BLD AUTO: 41.7 % (ref 34–46.6)
HGB BLD-MCNC: 13.3 G/DL (ref 11.1–15.9)
HGB UR QL STRIP: NEGATIVE
IMM GRANULOCYTES # BLD AUTO: 0 X10E3/UL (ref 0–0.1)
IMM GRANULOCYTES NFR BLD AUTO: 0 %
INR PPP: 1 (ref 0.9–1.1)
KETONES UR QL STRIP: NEGATIVE
LEUKOCYTE ESTERASE UR QL STRIP: ABNORMAL
LYMPHOCYTES # BLD AUTO: 1.9 X10E3/UL (ref 0.7–3.1)
LYMPHOCYTES NFR BLD AUTO: 25 %
MCH RBC QN AUTO: 31.7 PG (ref 26.6–33)
MCHC RBC AUTO-ENTMCNC: 31.9 G/DL (ref 31.5–35.7)
MCV RBC AUTO: 100 FL (ref 79–97)
MICRO URNS: ABNORMAL
MONOCYTES # BLD AUTO: 0.7 X10E3/UL (ref 0.1–0.9)
MONOCYTES NFR BLD AUTO: 9 %
NEUTROPHILS # BLD AUTO: 5 X10E3/UL (ref 1.4–7)
NEUTROPHILS NFR BLD AUTO: 64 %
NITRITE UR QL STRIP: NEGATIVE
PH UR STRIP: 6.5 [PH] (ref 5–7.5)
PLATELET # BLD AUTO: 282 X10E3/UL (ref 150–450)
POTASSIUM SERPL-SCNC: 4.9 MMOL/L (ref 3.5–5.2)
PROT SERPL-MCNC: 7.3 G/DL (ref 5.9–8.2)
PROT UR QL STRIP: NEGATIVE
PROT UR-MCNC: <6 MG/DL
PROT/CREAT UR: <240 MG/G CREAT (ref 0–200)
PROTHROMBIN TIME: 12.7 SEC (ref 11.8–14.6)
RBC # BLD AUTO: 4.19 X10E6/UL (ref 3.77–5.28)
RBC #/AREA URNS HPF: NORMAL /HPF (ref 0–2)
SODIUM SERPL-SCNC: 139 MMOL/L (ref 134–144)
SP GR UR STRIP: 1.01 (ref 1–1.03)
URINALYSIS REFLEX: ABNORMAL
WBC # BLD AUTO: 7.7 X10E3/UL (ref 3.4–10.8)
WBC #/AREA URNS HPF: NORMAL /HPF (ref 0–5)

## 2024-09-03 ENCOUNTER — PATIENT MESSAGE (OUTPATIENT)
Dept: ADMINISTRATIVE | Facility: HOSPITAL | Age: 69
End: 2024-09-03
Payer: MEDICARE

## 2024-09-03 ENCOUNTER — PATIENT OUTREACH (OUTPATIENT)
Dept: ADMINISTRATIVE | Facility: HOSPITAL | Age: 69
End: 2024-09-03
Payer: MEDICARE

## 2024-09-03 DIAGNOSIS — Z12.4 PAPANICOLAOU SMEAR FOR CERVICAL CANCER SCREENING: Primary | ICD-10-CM

## 2024-09-03 NOTE — PROGRESS NOTES
Health Maintenance Due   Topic Date Due    Cervical Cancer Screening  01/13/2021    TETANUS VACCINE  01/23/2024    Influenza Vaccine (1) 09/01/2024    COVID-19 Vaccine (7 - 2023-24 season) 09/01/2024     Triggered LINKS and reconciled immunizations. Updated Care Everywhere. Pt responded to campaigns outreach asking to schedule cervical cancer screening- referral placed and pt contacted to schedule. Chart review completed.

## 2024-09-18 ENCOUNTER — TELEPHONE (OUTPATIENT)
Dept: ORTHOPEDICS | Facility: CLINIC | Age: 69
End: 2024-09-18
Payer: MEDICARE

## 2024-10-14 ENCOUNTER — PATIENT MESSAGE (OUTPATIENT)
Dept: GASTROENTEROLOGY | Facility: CLINIC | Age: 69
End: 2024-10-14
Payer: MEDICARE

## 2024-10-16 ENCOUNTER — LAB VISIT (OUTPATIENT)
Dept: LAB | Facility: HOSPITAL | Age: 69
End: 2024-10-16
Attending: INTERNAL MEDICINE
Payer: MEDICARE

## 2024-10-16 DIAGNOSIS — D69.2 PURPURA: ICD-10-CM

## 2024-10-16 DIAGNOSIS — M32.9 SYSTEMIC LUPUS ERYTHEMATOSUS, UNSPECIFIED SLE TYPE, UNSPECIFIED ORGAN INVOLVEMENT STATUS: ICD-10-CM

## 2024-10-16 LAB
ALBUMIN SERPL BCP-MCNC: 4.4 G/DL (ref 3.5–5.2)
ALP SERPL-CCNC: 106 U/L (ref 55–135)
ALT SERPL W/O P-5'-P-CCNC: 18 U/L (ref 10–44)
ANION GAP SERPL CALC-SCNC: 9 MMOL/L (ref 8–16)
APTT PPP: 29 SEC (ref 21–32)
AST SERPL-CCNC: 31 U/L (ref 10–40)
BASOPHILS # BLD AUTO: 0.07 K/UL (ref 0–0.2)
BASOPHILS NFR BLD: 1.2 % (ref 0–1.9)
BILIRUB SERPL-MCNC: 0.8 MG/DL (ref 0.1–1)
BUN SERPL-MCNC: 16 MG/DL (ref 8–23)
C3 SERPL-MCNC: 110 MG/DL (ref 50–180)
C4 SERPL-MCNC: 28 MG/DL (ref 11–44)
CALCIUM SERPL-MCNC: 10.1 MG/DL (ref 8.7–10.5)
CHLORIDE SERPL-SCNC: 104 MMOL/L (ref 95–110)
CO2 SERPL-SCNC: 24 MMOL/L (ref 23–29)
CREAT SERPL-MCNC: 0.9 MG/DL (ref 0.5–1.4)
CRP SERPL-MCNC: 1.8 MG/L (ref 0–8.2)
DIFFERENTIAL METHOD BLD: ABNORMAL
EOSINOPHIL # BLD AUTO: 0.1 K/UL (ref 0–0.5)
EOSINOPHIL NFR BLD: 1.1 % (ref 0–8)
ERYTHROCYTE [DISTWIDTH] IN BLOOD BY AUTOMATED COUNT: 11.9 % (ref 11.5–14.5)
ERYTHROCYTE [SEDIMENTATION RATE] IN BLOOD BY PHOTOMETRIC METHOD: 37 MM/HR (ref 0–36)
EST. GFR  (NO RACE VARIABLE): >60 ML/MIN/1.73 M^2
GLUCOSE SERPL-MCNC: 66 MG/DL (ref 70–110)
HCT VFR BLD AUTO: 43.5 % (ref 37–48.5)
HGB BLD-MCNC: 14 G/DL (ref 12–16)
IMM GRANULOCYTES # BLD AUTO: 0.01 K/UL (ref 0–0.04)
IMM GRANULOCYTES NFR BLD AUTO: 0.2 % (ref 0–0.5)
INR PPP: 1 (ref 0.8–1.2)
LYMPHOCYTES # BLD AUTO: 1.5 K/UL (ref 1–4.8)
LYMPHOCYTES NFR BLD: 26.7 % (ref 18–48)
MCH RBC QN AUTO: 31.3 PG (ref 27–31)
MCHC RBC AUTO-ENTMCNC: 32.2 G/DL (ref 32–36)
MCV RBC AUTO: 97 FL (ref 82–98)
MONOCYTES # BLD AUTO: 0.4 K/UL (ref 0.3–1)
MONOCYTES NFR BLD: 7.8 % (ref 4–15)
NEUTROPHILS # BLD AUTO: 3.6 K/UL (ref 1.8–7.7)
NEUTROPHILS NFR BLD: 63 % (ref 38–73)
NRBC BLD-RTO: 0 /100 WBC
PLATELET # BLD AUTO: 293 K/UL (ref 150–450)
PMV BLD AUTO: 11.8 FL (ref 9.2–12.9)
POTASSIUM SERPL-SCNC: 4.4 MMOL/L (ref 3.5–5.1)
PROT SERPL-MCNC: 8.1 G/DL (ref 6–8.4)
PROTHROMBIN TIME: 10.6 SEC (ref 9–12.5)
RBC # BLD AUTO: 4.48 M/UL (ref 4–5.4)
SODIUM SERPL-SCNC: 137 MMOL/L (ref 136–145)
WBC # BLD AUTO: 5.66 K/UL (ref 3.9–12.7)

## 2024-10-16 PROCEDURE — 80053 COMPREHEN METABOLIC PANEL: CPT | Performed by: INTERNAL MEDICINE

## 2024-10-16 PROCEDURE — 86140 C-REACTIVE PROTEIN: CPT | Performed by: INTERNAL MEDICINE

## 2024-10-16 PROCEDURE — 85610 PROTHROMBIN TIME: CPT | Performed by: INTERNAL MEDICINE

## 2024-10-16 PROCEDURE — 36415 COLL VENOUS BLD VENIPUNCTURE: CPT | Performed by: INTERNAL MEDICINE

## 2024-10-16 PROCEDURE — 86225 DNA ANTIBODY NATIVE: CPT | Performed by: INTERNAL MEDICINE

## 2024-10-16 PROCEDURE — 86160 COMPLEMENT ANTIGEN: CPT | Mod: 59 | Performed by: INTERNAL MEDICINE

## 2024-10-16 PROCEDURE — 86160 COMPLEMENT ANTIGEN: CPT | Performed by: INTERNAL MEDICINE

## 2024-10-16 PROCEDURE — 85652 RBC SED RATE AUTOMATED: CPT | Performed by: INTERNAL MEDICINE

## 2024-10-16 PROCEDURE — 85730 THROMBOPLASTIN TIME PARTIAL: CPT | Performed by: INTERNAL MEDICINE

## 2024-10-16 PROCEDURE — 85025 COMPLETE CBC W/AUTO DIFF WBC: CPT | Performed by: INTERNAL MEDICINE

## 2024-10-17 ENCOUNTER — TELEPHONE (OUTPATIENT)
Dept: INTERNAL MEDICINE | Facility: CLINIC | Age: 69
End: 2024-10-17
Payer: MEDICARE

## 2024-10-17 ENCOUNTER — OFFICE VISIT (OUTPATIENT)
Dept: ORTHOPEDICS | Facility: CLINIC | Age: 69
End: 2024-10-17
Payer: MEDICARE

## 2024-10-17 ENCOUNTER — OFFICE VISIT (OUTPATIENT)
Dept: RHEUMATOLOGY | Facility: CLINIC | Age: 69
End: 2024-10-17
Payer: MEDICARE

## 2024-10-17 VITALS
DIASTOLIC BLOOD PRESSURE: 68 MMHG | HEIGHT: 60 IN | WEIGHT: 166.44 LBS | SYSTOLIC BLOOD PRESSURE: 112 MMHG | OXYGEN SATURATION: 93 % | HEART RATE: 61 BPM | WEIGHT: 166 LBS | HEIGHT: 60 IN | BODY MASS INDEX: 32.59 KG/M2 | BODY MASS INDEX: 32.68 KG/M2

## 2024-10-17 DIAGNOSIS — M32.8 OTHER FORMS OF SYSTEMIC LUPUS ERYTHEMATOSUS, UNSPECIFIED ORGAN INVOLVEMENT STATUS: ICD-10-CM

## 2024-10-17 DIAGNOSIS — E55.9 VITAMIN D DEFICIENCY: Primary | ICD-10-CM

## 2024-10-17 DIAGNOSIS — M17.12 PRIMARY OSTEOARTHRITIS OF LEFT KNEE: Primary | ICD-10-CM

## 2024-10-17 DIAGNOSIS — Z79.899 DRUG-INDUCED IMMUNODEFICIENCY: ICD-10-CM

## 2024-10-17 DIAGNOSIS — D84.821 DRUG-INDUCED IMMUNODEFICIENCY: ICD-10-CM

## 2024-10-17 DIAGNOSIS — M81.0 OSTEOPOROSIS, UNSPECIFIED OSTEOPOROSIS TYPE, UNSPECIFIED PATHOLOGICAL FRACTURE PRESENCE: ICD-10-CM

## 2024-10-17 DIAGNOSIS — M17.11 PRIMARY OSTEOARTHRITIS OF RIGHT KNEE: ICD-10-CM

## 2024-10-17 LAB — DSDNA AB SER-ACNC: NORMAL [IU]/ML

## 2024-10-17 PROCEDURE — 99213 OFFICE O/P EST LOW 20 MIN: CPT | Mod: PBBFAC,27 | Performed by: STUDENT IN AN ORGANIZED HEALTH CARE EDUCATION/TRAINING PROGRAM

## 2024-10-17 PROCEDURE — 99213 OFFICE O/P EST LOW 20 MIN: CPT | Mod: PBBFAC | Performed by: INTERNAL MEDICINE

## 2024-10-17 PROCEDURE — 99999 PR PBB SHADOW E&M-EST. PATIENT-LVL III: CPT | Mod: PBBFAC,,, | Performed by: STUDENT IN AN ORGANIZED HEALTH CARE EDUCATION/TRAINING PROGRAM

## 2024-10-17 PROCEDURE — 99999 PR PBB SHADOW E&M-EST. PATIENT-LVL III: CPT | Mod: PBBFAC,,, | Performed by: INTERNAL MEDICINE

## 2024-10-17 RX ORDER — HYDROXYCHLOROQUINE SULFATE 200 MG/1
TABLET, FILM COATED ORAL
Qty: 135 TABLET | Refills: 2 | Status: SHIPPED | OUTPATIENT
Start: 2024-10-17

## 2024-10-17 RX ORDER — MELOXICAM 15 MG/1
15 TABLET ORAL DAILY
Qty: 30 TABLET | Refills: 2 | Status: SHIPPED | OUTPATIENT
Start: 2024-10-17

## 2024-10-17 RX ORDER — ERGOCALCIFEROL 1.25 MG/1
50000 CAPSULE ORAL
Qty: 12 CAPSULE | Refills: 3 | Status: SHIPPED | OUTPATIENT
Start: 2024-10-17

## 2024-10-17 ASSESSMENT — SYSTEMIC LUPUS ERYTHEMATOSUS DISEASE ACTIVITY INDEX (SLEDAI): TOTAL_SCORE: 0

## 2024-10-17 NOTE — TELEPHONE ENCOUNTER
----- Message from Brigette sent at 10/17/2024  4:02 PM CDT -----  Contact: Self/ [phone]  [email or web address]     Patient is calling for Medical Advice regarding: wrong doctor showing on portal       Patient wants a call back or [facility]:No return call needed     Comments: pt said that when she went on the portal to schedule her appt it went to the wrong Dr Espinal , pt stated that she needs that corrected In her My Chart     Please advise patient replies from provider may take up to 48 hours.

## 2024-10-17 NOTE — PROGRESS NOTES
Assessment & Plan   No diagnosis found.     Radha Chiang  is here with left knee pain refractory to conservative measures.  She has severe bone-on-bone arthritis of the bilateral medial compartments and extensive patellofemoral arthritis in the left but less so in the right.  Right side is not symptomatic for her.  We discussed the risks and benefits of knee replacement versus continued conservative measures.  She would like to consider knee replacement and will contact us to return at her convenience if she would like to schedule surgery.  At that time we will go over the consent and get her scheduled for a preop visits and surgery date..     Problem List:  Problem List Items Addressed This Visit    None          Patient ID: Radha Chiang is a 69 y.o. female.  Chief Complaint   Patient presents with    Left Knee - Pain        History of Present Illness:  Radha Chiang is a 69 y.o. female who presents for evaluation of L knee pain. The knee pain has been present for  over 6 mso and has been progressive. Conservative treatment in the form of Tylenol, alleve, Mobic, CSI, and gel injections has been attempted previously. The patient requires no assistive device. The pain is worsened by sitting, standing, or walking more than a 1/2 hour.  She can only walk 2-3 blocks before she has pain.  She has stiffness upon getting up.  She has night pain. Physical therapy  has been attempted previously. The patient has a decreased quality of life due to knee pain and presents today for evaluation.     Past Medical History:  Past Medical History:   Diagnosis Date    Acid reflux     Anemia 2years ago    Anxiety     Arthritis     Asthma, chronic 4/10/2013    Crohn's disease 1998    Depression     Dry eyes     Fracture of forearm, proximal, open     screws & plate 6/26/13 in [facility]    History of papilledema 11/10/11    Hyperlipidemia 5/21/2018    Lupus     Neuromuscular disorder 12/2009    Neuropathy    Osteoporosis      Stroke 1/29/2013    Thyroid disease         Surgical History:  Past Surgical History:   Procedure Laterality Date    ADENOIDECTOMY  1962    BRAIN SURGERY  01/18/2012    Brain Biopsy    BREAST CYST ASPIRATION Left     COLONOSCOPY  2017    [facility] Dr. WILLIS Summers; pan-diverticulosis, rec repeat 3-5 years    COLONOSCOPY N/A 2/9/2023    Procedure: COLONOSCOPY;  Surgeon: Reina Crabtree MD;  Location: [facility];  Service: Colon and Rectal;  Laterality: N/A;    COLONOSCOPY N/A 7/24/2024    Procedure: COLONOSCOPY;  Surgeon: Reina Crabtree MD;  Location: [facility] (4TH FLR);  Service: Endoscopy;  Laterality: N/A;  Ref By:  Dr. Crabtree   05/07 r/s updated instructions sent via portal-dw  7/17-pre call complete-tb  7/23-pt notified of earlier arrival time (07:55am)-[facility]    ESOPHAGOGASTRODUODENOSCOPY N/A 4/27/2023    Procedure: ESOPHAGOGASTRODUODENOSCOPY (EGD);  Surgeon: Martir Braxton MD;  Location: [facility] (4TH FLR);  Service: Endoscopy;  Laterality: N/A;  4/21 Pre-call attempted, pt had phone trouble kls    ESOPHAGOGASTRODUODENOSCOPY N/A 7/21/2023    Procedure: EGD (ESOPHAGOGASTRODUODENOSCOPY);  Surgeon: Martir Braxton MD;  [facility]: [facility] (4TH FLR);  Service: Endoscopy;  Laterality: N/A;  12wks to check healing, pt request Dr. Braxton, [facility] portal. Referral: REYNA MCKENZIE, Proc order tele [facility] 5/29/23-LW    INCISION AND DRAINAGE PERIRECTAL ABSCESS  1998    abscess removal    ORIF FOREARM FRACTURE Left 06/23/2013    proximal ulnar and radius        Social History:  She reports that she quit smoking about 40 years ago. Her smoking use included cigarettes. She started smoking about 55 years ago. She has a 7.5 pack-year smoking history. She has never used smokeless tobacco. She reports current alcohol use. She reports that she does not use drugs.     Family History:  family history includes Allergies in her sister; COPD in her maternal grandfather; Cancer in her maternal grandmother and paternal grandmother; Cancer (age of  onset: 64) in her mother; Heart disease in her paternal grandfather; Heart disease (age of onset: 64) in her father; Hypertension in her sister; Intellectual disability in her brother; Lupus in her paternal uncle; Ovarian cancer in her paternal grandmother; Sjogren's syndrome in her sister; Stroke in her paternal grandmother.     Current Outpatient Medications on File Prior to Visit   Medication Sig Dispense Refill    alpha lipoic acid 300 mg Cap       aspirin (ECOTRIN) 81 MG EC tablet Take 1 tablet (81 mg total) by mouth once daily.      calcium carbonate/vitamin D3 (CALTRATE 600 + D ORAL) Take 2 tablets by mouth once daily.      coenzyme Q10 100 mg capsule       ergocalciferol (ERGOCALCIFEROL) 50,000 unit Cap Take 1 capsule (50,000 Units total) by mouth every 7 days. 12 capsule 3    esomeprazole (NEXIUM) 20 MG capsule TAKE 2 CAPSULES BY MOUTH BEFORE BREAKFAST 180 capsule 1    fish oil-omega-3 fatty acids 300-1,000 mg capsule Take 1 g by mouth 2 (two) times daily.      hydroxychloroquine (PLAQUENIL) 200 mg tablet Take 400mg daily alternating with 200mg daily Brand name only medically necessary 135 tablet 2    levothyroxine (SYNTHROID) 112 MCG tablet TAKE 1 TABLET(112 MCG) BY MOUTH EVERY DAY 90 tablet 0    magnesium oxide (MAG-OX) 200 MG tablet       multivitamin (THERAGRAN) per tablet Take 1 tablet by mouth before breakfast.      rosuvastatin (CRESTOR) 20 MG tablet TAKE 1 TABLET(20 MG) BY MOUTH EVERY DAY 90 tablet 2    traMADoL (ULTRAM) 50 mg tablet Take 1 tablet (50 mg total) by mouth every 6 (six) hours as needed for Pain. 20 tablet 0    VENTOLIN HFA 90 mcg/actuation inhaler Inhale 2 puffs into the lungs every 6 (six) hours as needed for Wheezing or Shortness of Breath (cough). Rescue 18 g 11    [DISCONTINUED] ergocalciferol (ERGOCALCIFEROL) 50,000 unit Cap Take 1 capsule (50,000 Units total) by mouth every 7 days. 12 capsule 3    [DISCONTINUED] hydroxychloroquine (PLAQUENIL) 200 mg tablet Take 400mg daily  "alternating with 200mg daily Brand name only medically necessary 135 tablet 2    [DISCONTINUED] meloxicam (MOBIC) 15 MG tablet Take 1 tablet (15 mg total) by mouth once daily. 30 tablet 1    [DISCONTINUED] chlorhexidine (PERIDEX) 0.12 % solution SWISH AND SPIT 15ML IN THE MOUTH OR THROAT IF NEEDED FOR WOUND CARE FOR UP TO 14 DAYS      [DISCONTINUED] GAVILYTE-G 236-22.74-6.74 -5.86 gram suspension Take 4,000 mLs by mouth once.      [DISCONTINUED] HYDROcodone-acetaminophen (NORCO) 5-325 mg per tablet TAKE 1TABLET BY MOUTH EVERY 6 HOURS IF NEEDED FOR SEVERE PAIN FOR UP TO 5 DAYS      [DISCONTINUED] ibuprofen (ADVIL,MOTRIN) 600 MG tablet       [DISCONTINUED] PAXLOVID 300 mg (150 mg x 2)-100 mg copackaged tablets (EUA) Take by mouth.       No current facility-administered medications on file prior to visit.     Review of patient's allergies indicates:   Allergen Reactions    Sulfamethoxazole-trimethoprim Nausea And Vomiting, Anaphylaxis, Other (See Comments), Hives, Rash and Swelling     Nausea, ? rash    Cannot remember      Nausea, ? rash Nausea, ? rash      Cannot remember  Nausea, ? rash Nausea, ? rash  Nausea, ? rash          Physical exam:  Height 4' 11.57" (1.513 m), weight 75.3 kg (166 lb 0.1 oz).  General: no apparent distress    Gait:  antalgic, + limp, no use of assistive devices    L knee:   TTP at the medial > lateral joint line and  patella  Skin: intact, mild effusion  Range of motion: 0 to 110  Strength: 4+/5 with extension, 5/5 with flexion  Ligament exam: stable to varus/valgus and stable to AP stress in flexion and extension  Neurovascular: WWP,  Light touch sensation intact    R knee:   Not TTP   Skin: intact, mild effusion  Range of motion: 0 to 120  Strength: 5/5 with extension, 5/5 with flexion  Ligament exam: stable to varus/valgus and stable to AP stress in flexion and extension  Neurovascular: WWP,  Light touch sensation intact     Relevant Results:  Imaging:  Plain x-rays of the   Bilateral " knee were obtained and independently reviewed by me today, 10/17/2024, and demonstrate  complete narrowing of the  medial compartments, osteophytes and sclerosis  more significant on the left than the right c/w KL grade  4 arthritic change. There is  neutral alignment.     Labs:  Hb 13.3  Alb 4.4  Cr 0.87  A1c 5.3

## 2024-10-17 NOTE — PROGRESS NOTES
10/16/2024     5:51 PM   Rapid3 Question Responses and Scores   MDHAQ Score 0.4   Psychologic Score 2.2   Pain Score 3.5   When you awakened in the morning OVER THE LAST WEEK, did you feel stiff? Yes   If Yes, please indicate the number of hours until you are as limber as you will be for the day 1   Fatigue Score 2   Global Health Score 4   RAPID3 Score 2.94    Answers submitted by the patient for this visit:  Rheumatology Questionnaire (Submitted on 10/16/2024)  fever: No  eye redness: No  mouth sores: No  headaches: No  shortness of breath: Yes  chest pain: Yes  trouble swallowing: Yes  diarrhea: No  constipation: No  unexpected weight change: No  genital sore: No  dysuria: No  During the last 3 days, have you had a skin rash?: No  Bruises or bleeds easily: No  cough: No

## 2024-10-17 NOTE — PATIENT INSTRUCTIONS
*new Covid vaccine  Cont hydroxychloroquine 300mg  daily  Ophthalmology check with Dr. Marie 5/31/24  Diclofenac gel 1%  Denosumab 60mg sc q 6 mo next dose 11/4/24  Mediterranean diet , discussed, per handout  DXA  RTC  6 months with standing lupus labs and vitamin D

## 2024-10-17 NOTE — PROGRESS NOTES
"Subjective:      Patient ID: Radha Chiang is a 69 y.o. female.    Chief Complaint: SLE; osteoporosis    HPI has severe pain left knee with "bone on bone" medial TF OA bilateral knees.  Has appt with Ortho. No fever, hair fall, oral ulcers, rash, serositis, arthritis except left knee  Review of Systems   Constitutional:  Negative for appetite change, fatigue, fever and unexpected weight change.   HENT:  Positive for trouble swallowing. Negative for mouth sores.    Eyes:  Negative for redness and visual disturbance.   Respiratory:  Positive for shortness of breath. Negative for cough and wheezing.    Cardiovascular:  Positive for chest pain. Negative for palpitations.   Gastrointestinal:  Negative for abdominal pain, anal bleeding, blood in stool, constipation, diarrhea, nausea and vomiting.   Genitourinary:  Negative for dysuria, frequency, genital sores and urgency.   Musculoskeletal:  Negative for arthralgias, back pain, gait problem, joint swelling, myalgias, neck pain and neck stiffness.   Skin:  Negative for rash.   Neurological:  Negative for weakness, numbness and headaches.   Hematological:  Negative for adenopathy. Does not bruise/bleed easily.   Psychiatric/Behavioral:  Negative for sleep disturbance. The patient is not nervous/anxious.         Objective:   There were no vitals taken for this visit.  Physical Exam   Constitutional: She is oriented to person, place, and time. She appears obese.   HENT:   Head: Normocephalic and atraumatic.   Mouth/Throat: Oropharynx is clear and moist.   Eyes: Conjunctivae are normal.   Neck: No thyromegaly present.   Cardiovascular: Normal rate, regular rhythm and normal heart sounds. Exam reveals no gallop and no friction rub.   No murmur heard.  Pulmonary/Chest: Breath sounds normal. She has no wheezes. She has no rales. She exhibits no tenderness.   Abdominal: Soft. She exhibits no distension and no mass. There is no abdominal tenderness.   Musculoskeletal:      " Right shoulder: Normal.      Left shoulder: Normal.      Right elbow: Normal.      Left elbow: Normal.      Right wrist: Normal.      Left wrist: Normal.      Cervical back: Normal range of motion and neck supple.      Right knee: Normal.      Left knee: Normal.      Comments: Jaccoud's   Lymphadenopathy:     She has no cervical adenopathy.   Neurological: She is alert and oriented to person, place, and time. She displays normal reflexes. Gait normal.   Nl motor strength UE and LE bilateral       Right Side Rheumatological Exam     Examination finds the shoulder, elbow, wrist, knee, 1st PIP, 1st MCP, 2nd PIP, 2nd MCP, 3rd PIP, 3rd MCP, 4th PIP, 4th MCP, 5th PIP and 5th MCP normal.    Left Side Rheumatological Exam     Examination finds the shoulder, elbow, wrist, knee, 1st PIP, 1st MCP, 2nd PIP, 2nd MCP, 3rd PIP, 3rd MCP, 4th PIP, 4th MCP, 5th PIP and 5th MCP normal.           11/6/2023 3/25/2024   Tender (HAMLIN-28) 0 / 28  0 / 28    Swollen (HAMLIN-28) 10 / 28  1 / 28    Provider Global 40 / 100 --   Patient Global 90 / 100 50 / 100   ESR 16 mm/hr 23 mm/hr   CRP 1.7 mg/L 2.6 mg/L   HAMLIN-28 (ESR) 4.09 (Moderate disease activity) 3.17 (Low disease activity)   HAMLIN-28 (CRP) 3.46 (Moderate disease activity) 2.4 (Remission)   CDAI Score 23  --      Latest Reference Range & Units 10/16/24 11:27   WBC 3.90 - 12.70 K/uL 5.66   RBC 4.00 - 5.40 M/uL 4.48   Hemoglobin 12.0 - 16.0 g/dL 14.0   Hematocrit 37.0 - 48.5 % 43.5   MCV 82 - 98 fL 97   MCH 27.0 - 31.0 pg 31.3 (H)   MCHC 32.0 - 36.0 g/dL 32.2   RDW 11.5 - 14.5 % 11.9   Platelet Count 150 - 450 K/uL 293   MPV 9.2 - 12.9 fL 11.8   Gran % 38.0 - 73.0 % 63.0   Lymph % 18.0 - 48.0 % 26.7   Mono % 4.0 - 15.0 % 7.8   Eos % 0.0 - 8.0 % 1.1   Basophil % 0.0 - 1.9 % 1.2   Immature Granulocytes 0.0 - 0.5 % 0.2   Gran # (ANC) 1.8 - 7.7 K/uL 3.6   Lymph # 1.0 - 4.8 K/uL 1.5   Mono # 0.3 - 1.0 K/uL 0.4   Eos # 0.0 - 0.5 K/uL 0.1   Baso # 0.00 - 0.20 K/uL 0.07   Immature Grans (Abs) 0.00 -  0.04 K/uL 0.01   nRBC 0 /100 WBC 0   Differential Method  Automated   Sed Rate 0 - 36 mm/Hr 37 (H)   PT 9.0 - 12.5 sec 10.6   INR 0.8 - 1.2  1.0   PTT 21.0 - 32.0 sec 29.0   Sodium 136 - 145 mmol/L 137   Potassium 3.5 - 5.1 mmol/L 4.4   Chloride 95 - 110 mmol/L 104   CO2 23 - 29 mmol/L 24   Anion Gap 8 - 16 mmol/L 9   BUN 8 - 23 mg/dL 16   Creatinine 0.5 - 1.4 mg/dL 0.9   eGFR >60 mL/min/1.73 m^2 >60.0   Glucose 70 - 110 mg/dL 66 (L)   Calcium 8.7 - 10.5 mg/dL 10.1   ALP 55 - 135 U/L 106   PROTEIN TOTAL 6.0 - 8.4 g/dL 8.1   Albumin 3.5 - 5.2 g/dL 4.4   BILIRUBIN TOTAL 0.1 - 1.0 mg/dL 0.8   AST 10 - 40 U/L 31   ALT 10 - 44 U/L 18   CRP 0.0 - 8.2 mg/L 1.8   Complement (C-3) 50 - 180 mg/dL 110   Complement (C-4) 11 - 44 mg/dL 28   (H): Data is abnormally high  (L): Data is abnormally low     Latest Reference Range & Units 10/16/24 11:07   Specimen UA  Urine, Clean Catch   Color, UA Yellow, Straw, Alejandra  Straw   Appearance, UA Clear  Clear   Spec Grav UA 1.005 - 1.030  1.005   pH, UA 5.0 - 8.0  7.0   Protein, UA Negative  Negative   Glucose, UA Negative  Negative   Ketones, UA Negative  Negative   Blood, UA Negative  Negative   NITRITE UA Negative  Negative   Bilirubin (UA) Negative  Negative   Leukocyte Esterase, UA Negative  Negative   Urine Creatinine 15.0 - 325.0 mg/dL 16.0   Urine Protein 0 - 15 mg/dL <7   Urine Protein/Creatinine Ratio 0.00 - 0.20  Unable to calculate           PFTs    FVC  FEV1/FVC        TLC        RV       DLco     2/1/24   99.8       80   11/7/23  92.9      80                80.2       56        71.9        EXAMINATION:  XR CHEST PA AND LATERAL     CLINICAL HISTORY:  Pneumonia, unspecified organism     TECHNIQUE:  PA and lateral views of the chest were performed.     COMPARISON:  Chest radiograph from 11/06/2023     FINDINGS:  The lungs are clear, with normal appearance of pulmonary vasculature and no pleural effusion or pneumothorax.     The cardiac silhouette is normal in size. The hilar and  mediastinal contours are unremarkable.     Bones are intact.     Impression:     No acute abnormality.        Electronically signed by: Damaso Espinal MD  Date:                                            02/25/2024  Time:                                           11:35                   Narrative & Impression  EXAMINATION:  CT CHEST WITHOUT CONTRAST     CLINICAL HISTORY:  SLE with restrictive lung disease on PFTs, r/o ILD;Systemic lupus erythematosus, unspecified     TECHNIQUE:  Images of the chest extending from the supraclavicular regions to the upper abdomen were performed. Images were acquired without intravenous contrast administration. Multiplanar reconstructions were performed and pushed to PACS.     COMPARISON:  None.     FINDINGS:  Chest wall and lower neck: Scattered bilateral lower neck and axillary subcentimeter lymph nodes     Lungs and pleura: Left-sided calcified granulomas.  Few scattered micro nodules measuring less than 3 mm bilaterally.  No reticulations honeycombing or bronchiectasis.     Mediastinum and tammi: No mediastinal or hilar adenopathy.  Left hilar calcified nodes.     Heart and pericardium: Heart size is normal. No pericardial effusion.     Vessels: Minimal atheromatous disease is seen in the aorta.  The coronary arteries show no atheromatous disease.     Upper abdomen: Small sliding hiatal hernia.  No dilated esophagus.  Splenic calcified granulomas.     Bones: Changes..     Impression:     1. No CT chest findings suspicious for interstitial lung disease  2. Granulomatous disease stigmata.        Electronically signed by: Doc Stuart  Date:                                            11/14/2023     EXAMINATION:  XR CHEST PA AND LATERAL     CLINICAL HISTORY:  Cough, unspecified     TECHNIQUE:  PA and lateral views of the chest were performed.     COMPARISON:  02/25/2024     FINDINGS:  The cardiomediastinal silhouette is not enlarged.  There is no pleural effusion.  The  trachea is midline.  The lungs are symmetrically expanded bilaterally with coarse interstitial attenuation.  There is a calcified granuloma projected over the left lower lung zone.  There is mild left basilar subsegmental atelectasis..  No large focal consolidation seen.  There is no pneumothorax.  The osseous structures are remarkable for degenerative change..     Impression:     1. Chronic appearing interstitial findings, no large focal consolidation.      stress echo 1/8/24:    Stress Protocol: The patient exercised for 5 minutes 43 seconds on a high ramp protocol, corresponding to a functional capacity of 9 METS, achieving a peak heart rate of 133 bpm, which is 90 % of the age predicted maximum heart rate. Their exercise capacity was normal. The patient reported no symptoms during the stress test. The test was stopped because the patient experienced leg fatigue and shortness of breath.    ECG Conclusion: The ECG portion of the study is negative for ischemia.    Right Ventricle: Normal right ventricular cavity size. Wall thickness is normal. Right ventricle wall motion  is normal. Systolic function is normal.    Left Ventricle: The left ventricle is normal in size. Normal wall thickness. Normal wall motion. There is normal systolic function. There is normal diastolic function.    Mitral Valve: There is mild regurgitation.    Pulmonary Artery: The estimated pulmonary artery systolic pressure is 24 mmHg.    Post-stress Impression: The study is negative with no echocardiographic evidence of stress induced ischemia.        Saw Dr. Perales 1/31/24:     1) Dyspnea on exertion  2) Wheezing  - Inderjit pre & post bronchodilator are not suggestive of asthma. IgE & eos are normal. I do not think this is a case of allergic asthma.  - Okay to continue prn albuterol if it improves your symptoms, but there is no need to add additional inhalers at this time.     3) Obesity with alveolar hypoventilation  - Counseled patient on weight  loss & lung function.     4) Calcified granulomas of the lung  - These findings were all present on pet/ct from 2010. Probably represent an old histo infection long resolved. No cause for concern.     7/21/23 EGD  Electronically signed by: Bandar Dubose MD  Date:                                            03/14/2024  Time:                                           12:44        Impression:            - Z-line irregular, 33 to 34 cm from the incisors.                          - Esophagogastric landmarks identified.                          - 3 cm hiatal hernia.                          - Previously seen esophagitis appears to have                          healed on current dose of PPI.                          - No gross lesions in the entire stomach.                          - No gross lesions in the entire examined duodenum.                          - No specimens collected.   Recommendation:        - Discharge patient to home          Colonoscopy 7/24/24:   Impression:            - Hemorrhoids found on perianal exam.                          - The entire examined colon is normal.                          - The examined portion of the ileum was normal.                          - No specimens collected.       Assessment:     Osteoporosis DXA 12/8/22: FRAX hip 3.2% major 18%. Discussed alendronate which she has taken intermittently in past and tolerated. However she has taken oral bisphosphonates for > 5 years total, so best to change to denosumab. 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  Mild MEENA s/p EGD, to have colonoscopy 5/10/24  S/p Covid 2/15/24 currently only symptoms are arthralgia and fatigue      Plan:   *new Covid vaccine  Cont hydroxychloroquine 300mg  daily  Ophthalmology check with Dr. Marie 5/31/24  Diclofenac gel 1%  Denosumab 60mg sc q 6 mo next dose 11/4/24  Mediterranean diet , discussed, per handout  DXA >12/8/24  RTC  6 months with standing lupus labs and vitamin D

## 2024-10-17 NOTE — TELEPHONE ENCOUNTER
Pt states she thought she scheduled preop with the wrong provider. Pt appt has been scheduled with correct provider

## 2024-10-18 ENCOUNTER — TELEPHONE (OUTPATIENT)
Dept: INTERNAL MEDICINE | Facility: CLINIC | Age: 69
End: 2024-10-18
Payer: MEDICARE

## 2024-10-18 DIAGNOSIS — E78.5 HYPERLIPIDEMIA, UNSPECIFIED HYPERLIPIDEMIA TYPE: ICD-10-CM

## 2024-10-18 DIAGNOSIS — Z01.818 PREOP EXAM FOR INTERNAL MEDICINE: Primary | ICD-10-CM

## 2024-10-18 NOTE — TELEPHONE ENCOUNTER
----- Message from Fatuma sent at 10/17/2024  4:31 PM CDT -----  Subject:Appointment Request    Appointment Request From: Radha Chiang    With Provider: Merry Espinal MD [[facility]]    Preferred Date Range: 10/18/2024 - 11/6/2024    Preferred Times: Any Time    Reason for visit: Pre op medical clearance for knee surgery with [facility]    Comments:  Fax clearance to Tess at [phone]

## 2024-10-22 ENCOUNTER — HOSPITAL ENCOUNTER (OUTPATIENT)
Dept: CARDIOLOGY | Facility: CLINIC | Age: 69
Discharge: HOME | End: 2024-10-22
Payer: MEDICARE

## 2024-10-22 DIAGNOSIS — E78.5 HYPERLIPIDEMIA, UNSPECIFIED HYPERLIPIDEMIA TYPE: ICD-10-CM

## 2024-10-22 DIAGNOSIS — Z01.818 PREOP EXAM FOR INTERNAL MEDICINE: ICD-10-CM

## 2024-10-22 LAB
OHS QRS DURATION: 96 MS
OHS QTC CALCULATION: 382 MS

## 2024-10-22 PROCEDURE — 93010 ELECTROCARDIOGRAM REPORT: CPT | Mod: S$PBB,,, | Performed by: INTERNAL MEDICINE

## 2024-10-22 PROCEDURE — 93005 ELECTROCARDIOGRAM TRACING: CPT | Mod: PBBFAC | Performed by: INTERNAL MEDICINE

## 2024-10-24 NOTE — TELEPHONE ENCOUNTER
Care Due:                  Date            Visit Type   Department     Provider  --------------------------------------------------------------------------------                                SAME DAY -                              ESTABLISHED   [facility] INTERNAL  Last Visit: 04-      PATIENT      MEDICINE       Nisa Brown                                           [facility] INTERNAL  Merry Amaral  Next Visit: 10-      PRE-OP       MEDICINE       Teddy                                                            Last  Test          Frequency    Reason                     Performed    Due Date  --------------------------------------------------------------------------------    Lipid Panel.  12 months..  rosuvastatin.............  01- 01-    [facility] Embedded Care Due Messages. Reference number: [id number].   10/24/2024 4:00:44 PM CDT

## 2024-10-25 ENCOUNTER — PATIENT MESSAGE (OUTPATIENT)
Dept: INTERNAL MEDICINE | Facility: CLINIC | Age: 69
End: 2024-10-25
Payer: MEDICARE

## 2024-10-25 ENCOUNTER — TELEPHONE (OUTPATIENT)
Dept: INTERNAL MEDICINE | Facility: CLINIC | Age: 69
End: 2024-10-25
Payer: MEDICARE

## 2024-10-25 DIAGNOSIS — R94.31 ABNORMAL EKG: Primary | ICD-10-CM

## 2024-10-25 DIAGNOSIS — Z01.810 PREOP CARDIOVASCULAR EXAM: ICD-10-CM

## 2024-10-25 RX ORDER — LEVOTHYROXINE SODIUM 112 UG/1
112 TABLET ORAL
Qty: 90 TABLET | Refills: 1 | Status: SHIPPED | OUTPATIENT
Start: 2024-10-25

## 2024-10-25 NOTE — TELEPHONE ENCOUNTER
----- Message from Merry Espinal MD sent at 10/25/2024  3:31 PM CDT -----  Spoke with pt please rescheudle her cardiac clearance with me  Until after her cardiology appt    Pt request November 6th please rschedule her preop with me to that day okay to use any spot

## 2024-10-25 NOTE — DISCHARGE INSTRUCTIONS
Knee Athroplasty Discharge Instructions    Pain:     After surgery your knee will be sore. The knee will likely have been injected with a numbing medicine (Exparel) prior to completion of surgery for pain control. This is indicated on a green bracelet that you will continue to wear for 4 days after surgery. Ice and elevation will assist with pain control.    Apply ice as much as possible for the first 72 hours. After 72 hours, apply ice for 20minutes after therapy or whenever experiencing pain. Avoid direct skin contact with ice to prevent frostbit.           Elevate the affected leg with the pillow the length of the leg higher than your heart to assist with swelling and pain.  2.  Incision Care:    Some drainage from the incision in the first 72 hours is normal. If drainage is excessive, reinforce dressing and call your surgeon.  Call [phone]. Do not go to the ER. Keep incision clean, dry and covered until staples removed. Staples will be removed 14-21 days after surgery.    3.  Activity:                  -Perform exercises 2 x day.  -You may shower 48 hours after surgery  providing the dressing is waterproof. You can wrap the knee or hip with press n seal saran wrap to assist with keeping the dressing dry while showering.   No tub or hot tub usage for 4 weeks after surgery. Support help is mandatory during showering. If the  dressing becomes wet, replace with a new dressing. Do not leave a wet dressing on.   -Wear edna hose to both extremities  for 3 weeks after surgery .You may remove stockings for 1- 2 hours during the day only.            -If your physician orders the CPM machine you are to use it for 2 hours in the am and 2 hours in the pm.Start the machine at -5 extension and 50 degree flexion. Increase flexion 5 degrees each SESSION. This is not to replace your exercise program.    5. Possible Complication:  Infections: Report these signs and symptoms to your surgeon:  Unexpected redness around  incision  Persistent drainage from wound after 72 hours  Temperature Greater than 101 degrees F  Additional swelling  Pain not controlled with current pain medication  Blood Clot: Report these signs and symptoms to your surgeon:  Unusual pain, unusual warm skin  Red or discolored skin  Swelling in the leg     You may need antibiotic coverage before dental or minor surgical procedures.    If you are discharged on Aspirin 81 mg twice a day, please administer for 1 month. If you are sent home on Eliquis follow your physician's orders.            Polar Care Cube Instructions            Your Surgeon Gave You EXPAREL® (bupivacaine liposome injectable suspension)    To help control your pain after surgery, your surgeon injected EXPAREL into your surgical incision just before the end of the procedure.   EXPAREL is a local analgesic that contains the local anesthetic bupivacaine. Local anesthetics provide pain relief by numbing the tissue  around the surgical site.   EXPAREL is specifically designed to release pain medication over time and can control pain for up to 72 hours.   In addition to EXPAREL, your surgeon may provide other pain medications to control your pain.   Each patient is different and responds differently to painmedication. Depending on how you respond to EXPAREL, you may require less  additional pain medication during your recovery.    Possible Side Effects    Side effects can occur with any medication and it is important not to ignore anything you may be experiencing. Some patients who  received EXPAREL experienced nausea, vomiting, or constipation. Rarely, patients who receive bupivacaine (the active ingredient in  EXPAREL) have experienced numbness and tingling in their mouth or lips, lightheadedness, or anxiety. Speak with your doctor right  away if you think you may be experiencing any of these sensations, or if you have other questions regarding possible side effects.    Your Recovery    When your  pain is under control, your body can better focus on healing. This is not the time to test your pain tolerance, or grin and  bear it.Work with your surgeon and nurse to make your recovery as speedy and pain-free as possible.   Follow the post-op orders your nurse gave you.   Eat a healthy diet and drink plenty of water. Surgery stresses your body; your body responds by needing more energy to heal.      Important Information About EXPAREL  Products that contain bupivacaine, like EXPAREL, may cause a temporary loss of  sensation or the ability to move in the area where bupivacaine was injected.    Date Administered: ***  Time Administered: ***    Other Forms of Bupivicaine should not be administered within 96hrs following administration of EXPAREL.

## 2024-10-25 NOTE — TELEPHONE ENCOUNTER
----- Message from Tess sent at 10/25/2024  8:04 AM CDT -----  Contact: [phone]  Patient is returning a phone call.    Who left a message for the patient: Dr Espinal's office    Does patient know what this is regarding:  no    Would you like a call back, or a response through your [facility] portal?:   phone    Comments: patient stated that message can be sent thorough [employer].

## 2024-10-29 ENCOUNTER — OFFICE VISIT (OUTPATIENT)
Dept: OBSTETRICS AND GYNECOLOGY | Facility: CLINIC | Age: 69
End: 2024-10-29
Payer: MEDICARE

## 2024-10-29 VITALS
BODY MASS INDEX: 32.55 KG/M2 | WEIGHT: 165.81 LBS | HEIGHT: 60 IN | DIASTOLIC BLOOD PRESSURE: 68 MMHG | SYSTOLIC BLOOD PRESSURE: 118 MMHG

## 2024-10-29 DIAGNOSIS — Z12.4 PAPANICOLAOU SMEAR FOR CERVICAL CANCER SCREENING: ICD-10-CM

## 2024-10-29 DIAGNOSIS — Z01.419 ROUTINE GYNECOLOGICAL EXAMINATION: Primary | ICD-10-CM

## 2024-10-29 PROCEDURE — G0101 CA SCREEN;PELVIC/BREAST EXAM: HCPCS | Mod: S$PBB,,, | Performed by: NURSE PRACTITIONER

## 2024-10-29 PROCEDURE — 99999 PR PBB SHADOW E&M-EST. PATIENT-LVL III: CPT | Mod: PBBFAC,,, | Performed by: NURSE PRACTITIONER

## 2024-10-29 PROCEDURE — 99213 OFFICE O/P EST LOW 20 MIN: CPT | Mod: PBBFAC,25 | Performed by: NURSE PRACTITIONER

## 2024-10-29 PROCEDURE — 87624 HPV HI-RISK TYP POOLED RSLT: CPT | Performed by: NURSE PRACTITIONER

## 2024-10-29 PROCEDURE — G0101 CA SCREEN;PELVIC/BREAST EXAM: HCPCS | Mod: PBBFAC | Performed by: NURSE PRACTITIONER

## 2024-11-01 ENCOUNTER — TELEPHONE (OUTPATIENT)
Dept: CARDIOLOGY | Facility: CLINIC | Age: 69
End: 2024-11-01
Payer: MEDICARE

## 2024-11-01 LAB
HPV HR 12 DNA SPEC QL NAA+PROBE: NEGATIVE
HPV16 AG SPEC QL: NEGATIVE
HPV18 DNA SPEC QL NAA+PROBE: NEGATIVE

## 2024-11-04 ENCOUNTER — INFUSION (OUTPATIENT)
Dept: INFECTIOUS DISEASES | Facility: HOSPITAL | Age: 69
End: 2024-11-04
Payer: MEDICARE

## 2024-11-04 VITALS
DIASTOLIC BLOOD PRESSURE: 60 MMHG | RESPIRATION RATE: 18 BRPM | OXYGEN SATURATION: 97 % | WEIGHT: 168.75 LBS | HEART RATE: 58 BPM | HEIGHT: 60 IN | SYSTOLIC BLOOD PRESSURE: 128 MMHG | TEMPERATURE: 98 F | BODY MASS INDEX: 33.13 KG/M2

## 2024-11-04 DIAGNOSIS — M81.8 OTHER OSTEOPOROSIS WITHOUT CURRENT PATHOLOGICAL FRACTURE: Primary | ICD-10-CM

## 2024-11-04 PROCEDURE — 96372 THER/PROPH/DIAG INJ SC/IM: CPT

## 2024-11-04 PROCEDURE — 63600175 PHARM REV CODE 636 W HCPCS: Mod: JZ,JG | Performed by: INTERNAL MEDICINE

## 2024-11-04 RX ADMIN — DENOSUMAB 60 MG: 60 INJECTION SUBCUTANEOUS at 11:11

## 2024-11-04 NOTE — PROGRESS NOTES
Patient received Prolia injection - confirms use of calcium and vitamin D supplements and denies dental procedures over past 3 months - administered per guidelines. Injection given SQ to right upper arm. Patient tolerated injection well, without difficulty.    Return appointment provided to patient.    Limited head-to-toe assessment due to privacy issues and visit reason though the opportunity was given for patient to express any concerns.

## 2024-11-05 ENCOUNTER — OFFICE VISIT (OUTPATIENT)
Dept: CARDIOLOGY | Facility: CLINIC | Age: 69
End: 2024-11-05
Payer: MEDICARE

## 2024-11-05 VITALS
BODY MASS INDEX: 34.36 KG/M2 | OXYGEN SATURATION: 97 % | DIASTOLIC BLOOD PRESSURE: 60 MMHG | HEART RATE: 63 BPM | HEIGHT: 59 IN | WEIGHT: 170.44 LBS | SYSTOLIC BLOOD PRESSURE: 124 MMHG

## 2024-11-05 DIAGNOSIS — R94.31 ABNORMAL EKG: ICD-10-CM

## 2024-11-05 DIAGNOSIS — I65.23 BILATERAL CAROTID ARTERY STENOSIS: ICD-10-CM

## 2024-11-05 DIAGNOSIS — Z01.810 PREOP CARDIOVASCULAR EXAM: Primary | ICD-10-CM

## 2024-11-05 DIAGNOSIS — R07.89 CHEST PAIN, ATYPICAL: ICD-10-CM

## 2024-11-05 DIAGNOSIS — R06.00 DYSPNEA, UNSPECIFIED TYPE: ICD-10-CM

## 2024-11-05 PROCEDURE — 99999 PR PBB SHADOW E&M-EST. PATIENT-LVL IV: CPT | Mod: PBBFAC,GC,,

## 2024-11-05 PROCEDURE — 99214 OFFICE O/P EST MOD 30 MIN: CPT | Mod: PBBFAC

## 2024-11-06 ENCOUNTER — OFFICE VISIT (OUTPATIENT)
Dept: INTERNAL MEDICINE | Facility: CLINIC | Age: 69
End: 2024-11-06
Payer: MEDICARE

## 2024-11-06 ENCOUNTER — PATIENT MESSAGE (OUTPATIENT)
Dept: INTERNAL MEDICINE | Facility: CLINIC | Age: 69
End: 2024-11-06
Payer: MEDICARE

## 2024-11-06 ENCOUNTER — HOSPITAL ENCOUNTER (OUTPATIENT)
Dept: RADIOLOGY | Facility: HOSPITAL | Age: 69
Discharge: HOME | End: 2024-11-06
Attending: INTERNAL MEDICINE
Payer: MEDICARE

## 2024-11-06 VITALS
WEIGHT: 171.31 LBS | DIASTOLIC BLOOD PRESSURE: 80 MMHG | HEART RATE: 67 BPM | HEIGHT: 59 IN | SYSTOLIC BLOOD PRESSURE: 136 MMHG | OXYGEN SATURATION: 96 % | BODY MASS INDEX: 34.53 KG/M2

## 2024-11-06 DIAGNOSIS — M35.9 POLYNEUROPATHY IN COLLAGEN VASCULAR DISEASE: ICD-10-CM

## 2024-11-06 DIAGNOSIS — R06.00 DYSPNEA, UNSPECIFIED TYPE: ICD-10-CM

## 2024-11-06 DIAGNOSIS — R06.09 DOE (DYSPNEA ON EXERTION): ICD-10-CM

## 2024-11-06 DIAGNOSIS — J98.4 CALCIFIED GRANULOMA OF LUNG: ICD-10-CM

## 2024-11-06 DIAGNOSIS — G63 POLYNEUROPATHY IN COLLAGEN VASCULAR DISEASE: ICD-10-CM

## 2024-11-06 DIAGNOSIS — E03.9 HYPOTHYROIDISM, UNSPECIFIED TYPE: ICD-10-CM

## 2024-11-06 DIAGNOSIS — E16.2 HYPOGLYCEMIA: ICD-10-CM

## 2024-11-06 DIAGNOSIS — Z01.818 PREOP EXAM FOR INTERNAL MEDICINE: Primary | ICD-10-CM

## 2024-11-06 DIAGNOSIS — E21.3 HYPERPARATHYROIDISM: ICD-10-CM

## 2024-11-06 DIAGNOSIS — M17.12 OSTEOARTHRITIS OF LEFT KNEE, UNSPECIFIED OSTEOARTHRITIS TYPE: ICD-10-CM

## 2024-11-06 DIAGNOSIS — Z01.818 PREOP EXAM FOR INTERNAL MEDICINE: ICD-10-CM

## 2024-11-06 DIAGNOSIS — D33.3 RIGHT ACOUSTIC NEUROMA: ICD-10-CM

## 2024-11-06 DIAGNOSIS — I77.9 CAROTID ARTERY DISEASE WITHOUT CEREBRAL INFARCTION: ICD-10-CM

## 2024-11-06 PROCEDURE — 71046 X-RAY EXAM CHEST 2 VIEWS: CPT | Mod: 26,,, | Performed by: RADIOLOGY

## 2024-11-06 PROCEDURE — 99215 OFFICE O/P EST HI 40 MIN: CPT | Mod: PBBFAC,25 | Performed by: INTERNAL MEDICINE

## 2024-11-06 PROCEDURE — 71046 X-RAY EXAM CHEST 2 VIEWS: CPT | Mod: TC

## 2024-11-06 PROCEDURE — 99999 PR PBB SHADOW E&M-EST. PATIENT-LVL V: CPT | Mod: PBBFAC,,, | Performed by: INTERNAL MEDICINE

## 2024-11-06 PROCEDURE — 99214 OFFICE O/P EST MOD 30 MIN: CPT | Mod: S$PBB,,, | Performed by: INTERNAL MEDICINE

## 2024-11-06 RX ORDER — ALBUTEROL SULFATE 90 UG/1
2 AEROSOL, METERED RESPIRATORY (INHALATION) EVERY 6 HOURS PRN
Qty: 18 G | Refills: 11 | Status: SHIPPED | OUTPATIENT
Start: 2024-11-06 | End: 2024-11-13

## 2024-11-06 RX ORDER — TRAMADOL HYDROCHLORIDE 50 MG/1
50 TABLET ORAL EVERY 6 HOURS PRN
Qty: 20 TABLET | Refills: 0 | Status: SHIPPED | OUTPATIENT
Start: 2024-11-06

## 2024-11-06 NOTE — TELEPHONE ENCOUNTER
See please fax preop to her doctor  [facility]  Call for fax

## 2024-11-06 NOTE — PROGRESS NOTES
"Subjective:       Patient ID: Radha Chiang is a 69 y.o. female.    Chief Complaint: Pre-op Exam  This is a 69-year-old who presents today for follow-up prior to upcoming surgery she reports she is planning on left knee replacement with spinal and local anesthesia she had some slight change on her EKG and did go see cardiology who placed a clearance note with no additional testing recommended she does have some issues with reflux for which she takes Nexium.  She has issues with reading at times including wheezing intermittently and feels short of breath at times for which she would like to get back in to see Pulmonary she smoked years ago.  She denies active cough but does get some wheezing on occasion which resolves with an inhaler  She does have some nasal a CT congestion intermittently and did a COVID test she reports which was negative she continues follow with Hematology Oncology for her lupus history and gets Raynaud's type symptoms on occasion but in general her labs look stable or improved with Plaquenil she had some recent blood work.  She has been having more issues with arthritis in her left knee and pain with range of motion she has decided to proceed with knee surgery because of more difficulty ambulating and getting around especially when she was on her last vacation it restricted her ability to do things that she enjoyed doing.    HPI  Review of Systems   Respiratory:  Positive for shortness of breath.         Wheezing on occasion    Gastrointestinal:         Gerd takes nexium    Musculoskeletal:  Positive for arthralgias.        Left knee worse        Objective:      Blood pressure 136/80, pulse 67, height 4' 11" (1.499 m), weight 77.7 kg (171 lb 4.8 oz), SpO2 96%. Temp 97.8   Physical Exam  Constitutional:       General: She is not in acute distress.  HENT:      Head: Normocephalic.      Comments: Oropharynx clear   Hearing aids     Mouth/Throat:      Pharynx: Oropharynx is clear. "   Cardiovascular:      Rate and Rhythm: Normal rate and regular rhythm.      Heart sounds: Normal heart sounds. No murmur heard.     No friction rub. No gallop.   Pulmonary:      Effort: Pulmonary effort is normal. No respiratory distress.      Breath sounds: Normal breath sounds.   Abdominal:      General: Bowel sounds are normal.      Palpations: Abdomen is soft. There is no mass.      Tenderness: There is no abdominal tenderness.   Musculoskeletal:      Comments: Crepitus left knee with pain/rom   No open wounds    Neurological:      Mental Status: She is alert.         Assessment:       1. Preop exam for internal medicine    2. Dyspnea, unspecified type    3. Osteoarthritis of left knee, unspecified osteoarthritis type    4. SCOTT (dyspnea on exertion)    5. Calcified granuloma of lung    6. Hypothyroidism, unspecified type    7. Hypoglycemia    8. Right acoustic neuroma    9. Hyperparathyroidism    10. Carotid artery disease without cerebral infarction    11. 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    Diagnoses and all orders for this visit:    Preop exam for internal medicine  -     X-Ray Chest PA And Lateral; Future    Dyspnea, unspecified type  Discussed she would like to pursue pulmonary appointment at her convenience due to intermittent wheezing episodes upper respiratory symptoms resolving recent COVID negativity she will continue to monitor  -     X-Ray Chest PA And Lateral; Future  -     Ambulatory referral/consult to Pulmonology; Future    Osteoarthritis of left knee, unspecified osteoarthritis type  -     traMADoL (ULTRAM) 50 mg tablet; Take 1 tablet (50 mg total) by mouth every 6 (six) hours as needed for Pain.    SCOTT (dyspnea on exertion)  See cardiology referral and clearance with no new recommendations  -     VENTOLIN HFA 90 mcg/actuation inhaler; Inhale 2 puffs into the lungs every 6 (six) hours as needed for Wheezing or Shortness of  Breath (cough). Rescue    Calcified granuloma of lung  Seen on x-ray stable  -     VENTOLIN HFA 90 mcg/actuation inhaler; Inhale 2 puffs into the lungs every 6 (six) hours as needed for Wheezing or Shortness of Breath (cough). Rescue    Lupus and poly neuropathy history of continues to follow with rheumatology currently on Plaquenil    Hypothyroidism, unspecified type  Euthyroid maintain current regimen and update with next blood work  -     Hemoglobin A1C; Future  -     TSH; Future  -     T4, Free; Future  -     Lipid Panel; Future    History of carotid disease remains on statin lipids ordered for next blood draw    History of hearing loss and right acoustic neuroma stable    Hypoglycemia  -     Hemoglobin A1C; Future    Recent cardiology clearance recent labs and chest x-ray reviewed  Patient cleared to proceed with knee replacement surgery under local and spinal anesthesia as planned  She will hold anti-inflammatory medications aspirin NSAIDs prior

## 2024-11-06 NOTE — PROGRESS NOTES
I have personally taken the history and examined this patient and agree with the resident's note as stated below.  Low risk by RCRI. No further work-up indicated prior to surgery.

## 2024-11-08 ENCOUNTER — OFFICE VISIT (OUTPATIENT)
Dept: PULMONOLOGY | Facility: CLINIC | Age: 69
End: 2024-11-08
Payer: MEDICARE

## 2024-11-08 ENCOUNTER — PATIENT MESSAGE (OUTPATIENT)
Dept: RHEUMATOLOGY | Facility: CLINIC | Age: 69
End: 2024-11-08
Payer: MEDICARE

## 2024-11-08 VITALS
WEIGHT: 169.06 LBS | SYSTOLIC BLOOD PRESSURE: 124 MMHG | HEIGHT: 59 IN | BODY MASS INDEX: 34.08 KG/M2 | DIASTOLIC BLOOD PRESSURE: 74 MMHG | OXYGEN SATURATION: 95 % | HEART RATE: 61 BPM

## 2024-11-08 DIAGNOSIS — R06.00 DYSPNEA, UNSPECIFIED TYPE: ICD-10-CM

## 2024-11-08 DIAGNOSIS — M32.8 OTHER FORMS OF SYSTEMIC LUPUS ERYTHEMATOSUS, UNSPECIFIED ORGAN INVOLVEMENT STATUS: ICD-10-CM

## 2024-11-08 PROCEDURE — 99999 PR PBB SHADOW E&M-EST. PATIENT-LVL IV: CPT | Mod: PBBFAC,GC,, | Performed by: STUDENT IN AN ORGANIZED HEALTH CARE EDUCATION/TRAINING PROGRAM

## 2024-11-08 PROCEDURE — 99214 OFFICE O/P EST MOD 30 MIN: CPT | Mod: PBBFAC | Performed by: STUDENT IN AN ORGANIZED HEALTH CARE EDUCATION/TRAINING PROGRAM

## 2024-11-08 RX ORDER — HYDROXYCHLOROQUINE SULFATE 200 MG/1
TABLET, FILM COATED ORAL
Qty: 135 TABLET | Refills: 2 | Status: SHIPPED | OUTPATIENT
Start: 2024-11-08

## 2024-11-08 NOTE — PROGRESS NOTES
70 yo female with history of lupus on plaquenil, who has been experiencing shortness of breath with exertion. She is able to walk 3 minles continuous and swim 50 to 100 meter continuous, but has noticed limitation in activity due to dypnea. Reports intermittent wheezing relieved with albuterol. PFT reviewed from April 2024 and Nov 2023 reviewed. No airflow obstruction, no bronchodilator response, no restriction, normal diffusing capacity. Cardiac workup including stress echo reviewed, normal systolic function and diastolic function with pasp 24. CT chest from Nov 2024 reviewed, granulomas seen, but no parenchymal disease. Reassurance given. Can continue albuterol as needed.

## 2024-11-08 NOTE — PROGRESS NOTES
[facility]    SUBJECTIVE:     Chief Complaint: SOB/Wheezing    History of Present Illness:  Radha Chiang is a 69 y.o. female who presents today for follow up for SOB/wheezing.   Initially referred from her rheumatologist for evaluation of shortness of breath. Initially saw Dr. Perales in January. She has a history of SLE. Her most bothersome symptoms of her SLE are joint pain and fatigue. She takes plaquenil daily. She has been feeling short of breath on exertion for the last two years. She believes this has been worsening. She is bothered by cold environments as well.     She is able to do 3-4 miles swimming without shortness of breath but is getting short of breath when walking up hills. She has intermittent dry cough. She has heard herself wheezing intermittently during physical activities. She denies chest tightness.      She has been taking albuterol rescue inhaler that provides her relief.      No viral illness, fevers, night sweats, or unintentional weight loss.      She worked in the [employer]. She worked in the office setting. She reports possible passive exposure to asbestos there.     Pulmonary History:  Childhood Illnesses:  Asthma  Occupational: Human resources; not currently working   Environmental:  None   Tobacco/Smoking:  Quit in 1984. Smoked for 15 years about 0.5PPD    Today: Still feels some Dyspnea on exertion with walking 1 mile, states previously was able to walk 3 miles without issues. Still able to swim 3-4 times a week.   Has been using rescue inhaler 2x/week only, but does feel like it helps.   Other activities are not affected. No recent illness. SLE well controlled on plaquenil. Echo last year normal. Planning to get L knee arthroplasty in the near future. She spends 1/2 the year here and the other half in [place].   Did endorse some weight gain in the last few years, though can't quantify.       Review of patient's allergies indicates:   Allergen Reactions     Sulfamethoxazole-trimethoprim Nausea And Vomiting, Anaphylaxis, Other (See Comments), Hives, Rash and Swelling     Nausea, ? rash    Cannot remember      Nausea, ? rash Nausea, ? rash      Cannot remember  Nausea, ? rash Nausea, ? rash  Nausea, ? rash       Past Medical History:   Diagnosis Date    Acid reflux     Anemia 2years ago    Anxiety     Arthritis     Asthma, chronic 4/10/2013    Crohn's disease 1998    Depression     Dry eyes     Fracture of forearm, proximal, open     screws & plate 6/26/13 in [facility]    History of papilledema 11/10/11    Hyperlipidemia 5/21/2018    Lupus     Neuromuscular disorder 12/2009    Neuropathy    Osteoporosis     Stroke 1/29/2013    Thyroid disease      Past Surgical History:   Procedure Laterality Date    ADENOIDECTOMY  1962    BRAIN SURGERY  01/18/2012    Brain Biopsy    BREAST CYST ASPIRATION Left     COLONOSCOPY  2017    [facility] Dr. WILLIS Summers; pan-diverticulosis, rec repeat 3-5 years    COLONOSCOPY N/A 2/9/2023    Procedure: COLONOSCOPY;  Surgeon: Reina Crabtree MD;  Location: [facility];  Service: Colon and Rectal;  Laterality: N/A;    COLONOSCOPY N/A 7/24/2024    Procedure: COLONOSCOPY;  Surgeon: Reina Crabtree MD;  Location: [facility] (4TH FLR);  Service: Endoscopy;  Laterality: N/A;  Ref By:  Dr. Crabtree   05/07 r/s updated instructions sent via portal-dw  7/17-pre call complete-tb  7/23-pt notified of earlier arrival time (07:55am)-[facility]    ESOPHAGOGASTRODUODENOSCOPY N/A 4/27/2023    Procedure: ESOPHAGOGASTRODUODENOSCOPY (EGD);  Surgeon: Martir Braxton MD;  Location: [facility] (4TH FLR);  Service: Endoscopy;  Laterality: N/A;  4/21 Pre-call attempted, pt had phone trouble kls    ESOPHAGOGASTRODUODENOSCOPY N/A 7/21/2023    Procedure: EGD (ESOPHAGOGASTRODUODENOSCOPY);  Surgeon: Martir Braxton MD;  Location: [facility] (4TH FLR);  Service: Endoscopy;  Laterality: N/A;  12wks to check healing, pt request Dr. Braxton, [facility] portal. Referral: REYNA MCKENZIE, Proc order tele enc  23-LW    INCISION AND DRAINAGE PERIRECTAL ABSCESS  1998    abscess removal    ORIF FOREARM FRACTURE Left 2013    proximal ulnar and radius     Family History   Problem Relation Name Age of Onset    Cancer Mother  64        small cell lung cancer; smoker    Heart disease Father  64        acute MI; 3v CABG    Sjogren's syndrome Sister      Allergies Sister      Cancer Maternal Grandmother      COPD Maternal Grandfather      Cancer Paternal Grandmother      Stroke Paternal Grandmother      Ovarian cancer Paternal Grandmother      Heart disease Paternal Grandfather      Intellectual disability Brother      Hypertension Sister      Lupus Paternal Uncle      Angioedema Neg Hx      Asthma Neg Hx      Atopy Neg Hx      Eczema Neg Hx      Immunodeficiency Neg Hx      Rhinitis Neg Hx      Urticaria Neg Hx       Social History     Socioeconomic History    Marital status: Single   Occupational History    Occupation: human resources   Tobacco Use    Smoking status: Former     Current packs/day: 0.00     Average packs/day: 0.5 packs/day for 15.0 years (7.5 ttl pk-yrs)     Types: Cigarettes     Start date: 1969     Quit date: 1984     Years since quittin.3    Smokeless tobacco: Never    Tobacco comments:     Quit    Substance and Sexual Activity    Alcohol use: Yes     Comment: less than one per week    Drug use: No    Sexual activity: Never   Social History Narrative    [place] in [place], rest of the year in [facility]    Enjoys traveling; Going to [facility] x 1 month 2018    Regular exercise     Social Drivers of Health     Financial Resource Strain: Low Risk  (2023)    Overall Financial Resource Strain (CARDIA)     Difficulty of Paying Living Expenses: Not hard at all   Food Insecurity: No Food Insecurity (2023)    Hunger Vital Sign     Worried About Running Out of Food in the Last Year: Never true     Ran Out of Food in the Last Year: Never true   Transportation Needs: No Transportation  "Needs (11/9/2023)    PRAPARE - Transportation     Lack of Transportation (Medical): No     Lack of Transportation (Non-Medical): No   Physical Activity: Sufficiently Active (11/9/2023)    Exercise Vital Sign     Days of Exercise per Week: 3 days     Minutes of Exercise per Session: 60 min   Stress: No Stress Concern Present (11/9/2023)    [facility] [place] [facility] - Occupational Stress Questionnaire     Feeling of Stress : Only a little   Housing Stability: Low Risk  (11/9/2023)    Housing Stability Vital Sign     Unable to Pay for Housing in the Last Year: No     Number of Places Lived in the Last Year: 1     Unstable Housing in the Last Year: No       Review of Systems:  CV: no syncope  ENT: no sore throat  Resp: per hpi  Eyes: no eye pain  Gastrointestinal: no nausea or vomiting  Integument/Breast: no rash  Musculoskeletal: no arthralgias  Neurological: no headaches  Behavioral/Psych: no confusion or depression  Heme: no bleeding      OBJECTIVE:     Vital Signs  Vitals:    11/08/24 1309   BP: 124/74   Pulse: 61   SpO2: 95%   Weight: 76.7 kg (169 lb 1.5 oz)   Height: 4' 11" (1.499 m)     Body mass index is 34.15 kg/m².    Physical Exam:  Physical Exam  Gen: Alert, NAD   HEENT: NC/AT, PERRLA, anicteric sclerae, TMs clear, nasal mucosa pink, OP clear   Neck: Supple, no LAD, no thyromegaly   CV: RRR, no m/r/g, pulses 2+ symmetric   Resp: CTA bilat, no wheezes/r/r   Abd: Soft, NT/ND  Neuro: A&Ox3  Skin: Warm, dry, no r/l      Laboratory:  Lab Results   Component Value Date    WBC 5.66 10/16/2024    HGB 14.0 10/16/2024    HCT 43.5 10/16/2024    MCV 97 10/16/2024     10/16/2024            1/31/2024     9:12 AM 1/26/2024     1:36 PM 1/26/2024    10:34 AM 1/8/2024     1:35 PM 12/22/2023     2:23 PM 12/21/2023    12:01 PM 12/1/2023     8:58 AM   Pulmonary Studies Review   SpO2 95 %       96 %   95 %   Height 5' (1.524 m) 5' (1.524 m) 5' (1.524 m) 5' (1.524 m) 5' (1.524 m) 5' (1.524 m) 5' (1.524 m) "   Weight 76.7 kg (169 lb 1.5 oz) 76.7 kg (169 lb) 76.7 kg (169 lb 1.5 oz) 76.2 kg (168 lb) 74 kg (163 lb 2.3 oz) 74 kg (163 lb 2.3 oz) 76.2 kg (167 lb 15.9 oz)   BMI (Calculated) 33 33 33 32.8 31.9 31.9 32.8   Predicted Distance 287.3 287.3 287.3 288.55 294.16 294.16 288.55   Predicted Distance Meters (Calculated) 431.44 meters 431.54 meters 431.44 meters 432.58 meters 437.62 meters 437.62 meters 432.59 meters      ECHO 01/08/2024:     Stress Protocol: The patient exercised for 5 minutes 43 seconds on a high ramp protocol, corresponding to a functional capacity of 9 METS, achieving a peak heart rate of 133 bpm, which is 90 % of the age predicted maximum heart rate. Their exercise capacity was normal. The patient reported no symptoms during the stress test. The test was stopped because the patient experienced leg fatigue and shortness of breath.    ECG Conclusion: The ECG portion of the study is negative for ischemia.    Right Ventricle: Normal right ventricular cavity size. Wall thickness is normal. Right ventricle wall motion  is normal. Systolic function is normal.    Left Ventricle: The left ventricle is normal in size. Normal wall thickness. Normal wall motion. There is normal systolic function. There is normal diastolic function.    Mitral Valve: There is mild regurgitation.    Pulmonary Artery: The estimated pulmonary artery systolic pressure is 24 mmHg.    Post-stress Impression: The study is negative with no echocardiographic evidence of stress induced ischemia.    Chest Imaging, My Impression:   CT Chest 11/07/2023: no signs of interstitial lung disease;  No reticulations, honeycombing, or bronchiectasis. 2 calcified granulomas on left lung, calcified left hilar adenopathy, calcified granulomas in spleen. These findings were all present on pet/ct from 2010.     I have personally reviewed the above labs and imaging    ASSESSMENT/PLAN:     Dyspnea on exertion  Wheezing (patient reported)  - Kent pre & post  bronchodilator are not suggestive of asthma. IgE & eos are normal. Not consistent with allergic asthma.  - Continue prn albuterol if it improves your symptoms, but there is still no need to add additional inhalers at this time.  - Symptoms still present, but stable.   - Agreeable with pursuing Methacholine challenge test if still present in January.  - Provided reassurance about her previous testing done in the work up of her dyspnea.      Obesity with alveolar hypoventilation  - Counseled patient on weight loss & lung function as that can contribute to her dyspnea     Calcified granulomas of the lung  Present on pet/ct from 2010. Probably represent an old histo infection long resolved.       Driss Hernandes  11/08/2024  Pulmonary & Critical Care Fellow

## 2024-11-15 ENCOUNTER — HOSPITAL ENCOUNTER (OUTPATIENT)
Dept: PREADMISSION TESTING | Facility: OTHER | Age: 69
Discharge: HOME | End: 2024-11-15
Attending: ORTHOPAEDIC SURGERY
Payer: MEDICARE

## 2024-11-15 ENCOUNTER — ANESTHESIA EVENT (OUTPATIENT)
Dept: SURGERY | Facility: OTHER | Age: 69
End: 2024-11-15
Payer: MEDICARE

## 2024-11-15 VITALS
SYSTOLIC BLOOD PRESSURE: 140 MMHG | DIASTOLIC BLOOD PRESSURE: 62 MMHG | OXYGEN SATURATION: 98 % | HEIGHT: 59 IN | HEART RATE: 67 BPM | WEIGHT: 169 LBS | BODY MASS INDEX: 34.07 KG/M2

## 2024-11-15 DIAGNOSIS — Z01.818 PREOP TESTING: Primary | ICD-10-CM

## 2024-11-15 LAB
ABO + RH BLD: NORMAL
BLD GP AB SCN CELLS X3 SERPL QL: NORMAL
SPECIMEN OUTDATE: NORMAL

## 2024-11-15 PROCEDURE — 86901 BLOOD TYPING SEROLOGIC RH(D): CPT | Performed by: ORTHOPAEDIC SURGERY

## 2024-11-15 RX ORDER — ACETAMINOPHEN 325 MG/1
975 TABLET ORAL
OUTPATIENT
Start: 2024-11-15 | End: 2024-11-15

## 2024-11-15 RX ORDER — SODIUM CHLORIDE, SODIUM LACTATE, POTASSIUM CHLORIDE, CALCIUM CHLORIDE 600; 310; 30; 20 MG/100ML; MG/100ML; MG/100ML; MG/100ML
INJECTION, SOLUTION INTRAVENOUS CONTINUOUS
OUTPATIENT
Start: 2024-11-15

## 2024-11-15 RX ORDER — LIDOCAINE HYDROCHLORIDE 10 MG/ML
0.5 INJECTION, SOLUTION EPIDURAL; INFILTRATION; INTRACAUDAL; PERINEURAL ONCE
OUTPATIENT
Start: 2024-11-15 | End: 2024-11-15

## 2024-11-15 RX ORDER — FAMOTIDINE 20 MG/1
20 TABLET, FILM COATED ORAL
OUTPATIENT
Start: 2024-11-15 | End: 2024-11-15

## 2024-11-15 NOTE — DISCHARGE INSTRUCTIONS
Information to Prepare you for your Surgery    PRE-ADMIT TESTING -  [phone]    [address]  [facility]          Your surgery has been scheduled at [facility]. We are pleased to have the opportunity to serve you. For Further Information please call [phone].    On the day of surgery please report to the Information Desk on the 1st floor.    CONTACT YOUR PHYSICIAN'S OFFICE THE DAY PRIOR TO YOUR SURGERY TO OBTAIN YOUR ARRIVAL TIME.     The evening before surgery do not eat anything after 9 p.m. ( this includes hard candy, chewing gum and mints).  You may only have GATORADE, POWERADE AND WATER  from 9 p.m. until you leave your home.   DO NOT DRINK ANY LIQUIDS ON THE WAY TO THE HOSPITAL.      Why does your anesthesiologist allow you to drink Gatorade/Powerade before surgery?  Gatorade/Powerade helps to increase your comfort before surgery and to decrease your nausea after surgery. The carbohydrates in Gatorade/Powerade help reduce your body's stress response to surgery.  If you are a diabetic-drink only water prior to surgery.    Outpatient Surgery- May allow 2 adult (18 and older) Support Persons (1 being the designated ) for all surgical/procedural patients. A breastfeeding mother will be allowed her infant and 2 adult Support Persons. No one under the age of 18 will be allowed in the building. No swapping out of visitors in the [facility].      SPECIAL MEDICATION INSTRUCTIONS: TAKE medications checked off by the Anesthesiologist on your Medication List.    Angiogram Patients: Take medications as instructed by your physician, including aspirin.     Surgery Patients:    If you take ASPIRIN - Your PHYSICIAN/SURGEON will need to inform you IF/OR when you need to stop taking aspirin prior to your surgery.     The week prior to surgery do not ot take any medications containing IBUPROFEN or NSAIDS ( Advil, Motrin, Goodys, BC, Aleve, Naproxen etc)  If you are not sure if you should take a medicine please call your surgeon's office.  Ok to take Tylenol    Do Not Wear any make-up (especially eye make-up) to surgery. Please remove any false eyelashes or eyelash extensions. If you arrive the day of surgery with makeup/eyelashes on you will be required to remove prior to surgery. (There is a risk of corneal abrasions if eye makeup/eyelash extensions are not removed)      Leave all valuables at home.   Do Not wear any jewelry or watches, including any metal in body piercings. Jewelry must be removed prior to coming to the hospital.  There is a possibility that rings that are unable to be removed may be cut off if they are on the surgical extremity.    Please remove all hair extensions, wigs, clips and any other metal accessories/ ornaments from your hair.  These items may pose a flammable/fire risk in Surgery and must be removed.    Do not shave your surgical area at least 5 days prior to your surgery. The surgical prep will be performed at the hospital according to Infection Control regulations.    Contact Lens must be removed before surgery. Either do not wear the contact lens or bring a case and solution for storage.  Please bring a container for eyeglasses or dentures as required.  Bring any paperwork your physician has provided, such as consent forms,  history and physicals, doctor's orders, etc.   Bring comfortable clothes that are loose fitting to wear upon discharge. Take into consideration the type of surgery being performed.  Maintain your diet as advised per your physician the day prior to surgery.      Adequate rest the night before surgery is advised.   Park in the Parking lot behind the hospital or in the [facility] [facility] across the street from the parking lot. Parking is complimentary.  If you will be discharged the same day as your procedure, please arrange for a responsible adult to drive you home or to accompany you if traveling by taxi.    YOU WILL NOT BE PERMITTED TO DRIVE OR TO LEAVE THE HOSPITAL ALONE AFTER SURGERY.   If you are being discharged the same day, it is strongly recommended that you arrange for someone to remain with you for the first 24 hrs following your surgery.    The Surgeon will speak to your family/visitor after your surgery regarding the outcome of your surgery and post op care.  The Surgeon may speak to you after your surgery, but there is a possibility you may not remember the details.  Please check with your family members regarding the conversation with the Surgeon.    We strongly recommend whoever is bringing you home be present for discharge instructions.  This will ensure a thorough understanding for your post op home care.    If the patient has fever, cough, or signs/symptoms of Flu or Covid please do not come in for your surgery. Contact your surgeon and your primary care physician for further instructions.           Thank you for your cooperation.  The Staff of [facility].            Bathing Instructions with Hibiclens    Shower the evening before and morning of your procedure with Chlorhexidine (Hibiclens)  do not use Chlorhexidine on your face or genitals. Do not get in your eyes.  Wash your face with water and your regular face wash/soap  Use your regular shampoo  Apply Chlorhexidine (Hibiclens) directly on your skin or on a wet washcloth and wash gently. When showering: Move away from the shower stream when applying Chlorhexidine (Hibiclens) to avoid rinsing off too soon.  Rinse thoroughly with warm water  Do not dilute Chlorhexidine (Hibiclens)   Dry off as usual, do not use any deodorant, powder, body lotions, perfume, after shave or cologne.

## 2024-11-15 NOTE — ANESTHESIA PREPROCEDURE EVALUATION
11/15/2024  Radha Leija is a 69 y.o., female.      Pre-op Assessment    I have reviewed the Patient Summary Reports.     I have reviewed the Nursing Notes. I have reviewed the NPO Status.   I have reviewed the Medications.     Review of Systems  Anesthesia Hx:  No problems with previous Anesthesia                Social:  Non-Smoker       Hematology/Oncology:  Hematology Normal   Oncology Normal                                   EENT/Dental:  EENT/Dental Normal           Cardiovascular:                 SCOTT                              Pulmonary:    Asthma mild  Denies Shortness of breath.                  Hepatic/GI:     GERD, well controlled Liver Disease,               Musculoskeletal:  Arthritis               Neurological:   CVA, no residual symptoms Neuromuscular Disease,                                   Endocrine:   Hypothyroidism        Denies Obesity / BMI > 30  Psych:  Psychiatric History anxiety depression              Physical Exam  General: Cooperative, Alert and Well nourished    Airway:  Mallampati: II   Mouth Opening: Normal  TM Distance: Normal  Tongue: Normal  Neck ROM: Normal ROM    Dental:  Intact      Anesthesia Plan  Type of Anesthesia, risks & benefits discussed:    Anesthesia Type: Spinal  Intra-op Monitoring Plan: Standard ASA Monitors  Post Op Pain Control Plan: multimodal analgesia  Induction:  IV  Informed Consent: Informed consent signed with the Patient and all parties understand the risks and agree with anesthesia plan.  All questions answered.   ASA Score: 3  Anesthesia Plan Notes: Has recent labs.  Cards clearance in epic. Discussed spinal    Stress echo from jan, - Normal left ventricular systolic function (EF 60-65%).     2 - Normal right ventricular systolic function .     3 - Normal left ventricular diastolic function.     4 - Mild left atrial enlargement.     No  evidence of stress induced myocardial ischemia.       Ready For Surgery From Anesthesia Perspective.     .

## 2024-11-22 NOTE — PLAN OF CARE
LMSW contacted patient. Patient denies the use of HH. Patient has a RW and refused a BSC. Patient is agreeable to MD's preferred choice for HH. I provided the patient a choice of post acute providers and offered a list of CMS rated : home health. Patient/family chose the following as their preferred providers: [facility].  Patient choice pharmacy is bedside. Patient's PCP is correct. Patients' family will transport the patient home at discharge.   [facility] ([place])  Initial Discharge Assessment       Primary Care Provider: Merry Espinal MD    Admission Diagnosis: Unilateral primary osteoarthritis, left knee [M17.12]    Admission Date: (Not on file)  Expected Discharge Date:     Transition of Care Barriers: (P) None    Payor: MEDICARE / Plan: MEDICARE PART A & B / Product Type: Government /     Extended Emergency Contact Information  Primary Emergency Contact: Radha Thompson   [place]  Mobile Phone: [phone]  Relation: Sister    Discharge Plan A: (P) Home Health  Discharge Plan B: (P) Home Health      [id number] - [place], [place] - [address]  [address]  [facility] [address]  Phone: [phone] Fax: [phone]    [id number] - [place], [place] - [address]  [address]  [facility] [address]  Phone: [phone] Fax: [phone]    [facility]  [address]  [place] [address]  Phone: [phone] Fax: [phone]    [facility] - [place], [place] - [address]  [address]  [address]  [facility] [address]  Phone: [phone] Fax: [phone]    [facility] - [place], [place] - [address]   [address]   [facility] [address]  Phone: [phone] Fax: [phone]      Initial Assessment (most recent)       Adult Discharge Assessment -  11/22/24 1529          Discharge Assessment    Assessment Type Discharge Planning Assessment (P)      Confirmed/corrected address, phone number and insurance Yes (P)      Confirmed Demographics Correct on Facesheet (P)      Source of Information patient;health record (P)      People in Home friend(s) (P)      Do you expect to return to your current living situation? Yes (P)      Do you have help at home or someone to help you manage your care at home? Yes (P)      Prior to hospitilization cognitive status: Alert/Oriented;No Deficits (P)      Current cognitive status: Alert/Oriented;No Deficits (P)      Equipment Currently Used at Home cane, straight;walker, rolling (P)      Readmission within 30 days? No (P)      Patient currently being followed by outpatient case management? No (P)      Do you currently have service(s) that help you manage your care at home? No (P)      Do you take prescription medications? Yes (P)      Do you have prescription coverage? Yes (P)      Do you have any problems affording any of your prescribed medications? No (P)      Is the patient taking medications as prescribed? yes (P)      How do you get to doctors appointments? family or friend will provide;car, drives self (P)      Are you on dialysis? No (P)      Do you take coumadin? No (P)      Discharge Plan A Home Health (P)      Discharge Plan B Home Health (P)      DME Needed Upon Discharge  none (P)      Discharge Plan discussed with: Patient (P)      Transition of Care Barriers None (P)

## 2024-11-25 ENCOUNTER — ANESTHESIA (OUTPATIENT)
Dept: SURGERY | Facility: OTHER | Age: 69
End: 2024-11-25
Payer: MEDICARE

## 2024-11-25 ENCOUNTER — HOSPITAL ENCOUNTER (OUTPATIENT)
Facility: OTHER | Age: 69
Discharge: HOME HEALTH | End: 2024-11-25
Attending: ORTHOPAEDIC SURGERY | Admitting: ORTHOPAEDIC SURGERY
Payer: MEDICARE

## 2024-11-25 VITALS
OXYGEN SATURATION: 99 % | TEMPERATURE: 98 F | SYSTOLIC BLOOD PRESSURE: 128 MMHG | HEART RATE: 66 BPM | RESPIRATION RATE: 18 BRPM | DIASTOLIC BLOOD PRESSURE: 67 MMHG | HEIGHT: 59 IN | BODY MASS INDEX: 34.07 KG/M2 | WEIGHT: 169 LBS

## 2024-11-25 DIAGNOSIS — M17.12 PRIMARY OSTEOARTHRITIS OF LEFT KNEE: Primary | ICD-10-CM

## 2024-11-25 DIAGNOSIS — M17.12 OSTEOARTHRITIS OF LEFT KNEE: ICD-10-CM

## 2024-11-25 PROCEDURE — 25000003 PHARM REV CODE 250: Performed by: NURSE ANESTHETIST, CERTIFIED REGISTERED

## 2024-11-25 PROCEDURE — 37000009 HC ANESTHESIA EA ADD 15 MINS: Performed by: ORTHOPAEDIC SURGERY

## 2024-11-25 PROCEDURE — 71000015 HC POSTOP RECOV 1ST HR: Performed by: ORTHOPAEDIC SURGERY

## 2024-11-25 PROCEDURE — 97116 GAIT TRAINING THERAPY: CPT

## 2024-11-25 PROCEDURE — 27800903 OPTIME MED/SURG SUP & DEVICES OTHER IMPLANTS: Performed by: ORTHOPAEDIC SURGERY

## 2024-11-25 PROCEDURE — C1713 ANCHOR/SCREW BN/BN,TIS/BN: HCPCS | Performed by: ORTHOPAEDIC SURGERY

## 2024-11-25 PROCEDURE — C1776 JOINT DEVICE (IMPLANTABLE): HCPCS | Performed by: ORTHOPAEDIC SURGERY

## 2024-11-25 PROCEDURE — 63600175 PHARM REV CODE 636 W HCPCS: Performed by: ANESTHESIOLOGY

## 2024-11-25 PROCEDURE — 37000008 HC ANESTHESIA 1ST 15 MINUTES: Performed by: ORTHOPAEDIC SURGERY

## 2024-11-25 PROCEDURE — 63600175 PHARM REV CODE 636 W HCPCS: Performed by: ORTHOPAEDIC SURGERY

## 2024-11-25 PROCEDURE — 27201423 OPTIME MED/SURG SUP & DEVICES STERILE SUPPLY: Performed by: ORTHOPAEDIC SURGERY

## 2024-11-25 PROCEDURE — 63600175 PHARM REV CODE 636 W HCPCS: Performed by: NURSE ANESTHETIST, CERTIFIED REGISTERED

## 2024-11-25 PROCEDURE — 71000016 HC POSTOP RECOV ADDL HR: Performed by: ORTHOPAEDIC SURGERY

## 2024-11-25 PROCEDURE — 71000033 HC RECOVERY, INTIAL HOUR: Performed by: ORTHOPAEDIC SURGERY

## 2024-11-25 PROCEDURE — 97162 PT EVAL MOD COMPLEX 30 MIN: CPT

## 2024-11-25 PROCEDURE — 36000711: Performed by: ORTHOPAEDIC SURGERY

## 2024-11-25 PROCEDURE — 36000710: Performed by: ORTHOPAEDIC SURGERY

## 2024-11-25 PROCEDURE — C9290 INJ, BUPIVACAINE LIPOSOME: HCPCS | Performed by: ORTHOPAEDIC SURGERY

## 2024-11-25 PROCEDURE — 25000003 PHARM REV CODE 250: Performed by: ORTHOPAEDIC SURGERY

## 2024-11-25 PROCEDURE — 71000039 HC RECOVERY, EACH ADD'L HOUR: Performed by: ORTHOPAEDIC SURGERY

## 2024-11-25 PROCEDURE — 25000003 PHARM REV CODE 250: Performed by: ANESTHESIOLOGY

## 2024-11-25 DEVICE — IMPLANTABLE DEVICE
Type: IMPLANTABLE DEVICE | Site: KNEE | Status: FUNCTIONAL
Brand: REFOBACIN® BONE CEMENT R

## 2024-11-25 DEVICE — IMPLANTABLE DEVICE
Type: IMPLANTABLE DEVICE | Site: KNEE | Status: FUNCTIONAL
Brand: PERSONA®

## 2024-11-25 DEVICE — IMPLANTABLE DEVICE
Type: IMPLANTABLE DEVICE | Site: KNEE | Status: FUNCTIONAL
Brand: PERSONA® NATURAL TIBIA®

## 2024-11-25 RX ORDER — BUPIVACAINE 13.3 MG/ML
INJECTION, SUSPENSION, LIPOSOMAL INFILTRATION
Status: DISCONTINUED | OUTPATIENT
Start: 2024-11-25 | End: 2024-11-25 | Stop reason: HOSPADM

## 2024-11-25 RX ORDER — CEFAZOLIN SODIUM 1 G/3ML
2 INJECTION, POWDER, FOR SOLUTION INTRAMUSCULAR; INTRAVENOUS
Status: DISCONTINUED | OUTPATIENT
Start: 2024-11-25 | End: 2024-11-25 | Stop reason: SDUPTHER

## 2024-11-25 RX ORDER — MEPERIDINE HYDROCHLORIDE 25 MG/ML
12.5 INJECTION INTRAMUSCULAR; INTRAVENOUS; SUBCUTANEOUS ONCE AS NEEDED
Status: DISCONTINUED | OUTPATIENT
Start: 2024-11-25 | End: 2024-11-25 | Stop reason: HOSPADM

## 2024-11-25 RX ORDER — SODIUM CHLORIDE 0.9 % (FLUSH) 0.9 %
3 SYRINGE (ML) INJECTION
Status: DISCONTINUED | OUTPATIENT
Start: 2024-11-25 | End: 2024-11-25 | Stop reason: HOSPADM

## 2024-11-25 RX ORDER — MIDAZOLAM HYDROCHLORIDE 1 MG/ML
INJECTION INTRAMUSCULAR; INTRAVENOUS
Status: DISCONTINUED | OUTPATIENT
Start: 2024-11-25 | End: 2024-11-25

## 2024-11-25 RX ORDER — OXYCODONE HYDROCHLORIDE 5 MG/1
10 TABLET ORAL EVERY 4 HOURS PRN
Status: DISCONTINUED | OUTPATIENT
Start: 2024-11-25 | End: 2024-11-25 | Stop reason: HOSPADM

## 2024-11-25 RX ORDER — METOCLOPRAMIDE HYDROCHLORIDE 5 MG/ML
5 INJECTION INTRAMUSCULAR; INTRAVENOUS EVERY 6 HOURS PRN
Status: DISCONTINUED | OUTPATIENT
Start: 2024-11-25 | End: 2024-11-25 | Stop reason: HOSPADM

## 2024-11-25 RX ORDER — HYDROMORPHONE HYDROCHLORIDE 2 MG/ML
0.4 INJECTION, SOLUTION INTRAMUSCULAR; INTRAVENOUS; SUBCUTANEOUS EVERY 5 MIN PRN
Status: DISCONTINUED | OUTPATIENT
Start: 2024-11-25 | End: 2024-11-25 | Stop reason: HOSPADM

## 2024-11-25 RX ORDER — FAMOTIDINE 20 MG/1
20 TABLET, FILM COATED ORAL
Status: COMPLETED | OUTPATIENT
Start: 2024-11-25 | End: 2024-11-25

## 2024-11-25 RX ORDER — LIDOCAINE HYDROCHLORIDE 10 MG/ML
0.5 INJECTION, SOLUTION EPIDURAL; INFILTRATION; INTRACAUDAL; PERINEURAL ONCE
Status: DISCONTINUED | OUTPATIENT
Start: 2024-11-25 | End: 2024-11-25 | Stop reason: HOSPADM

## 2024-11-25 RX ORDER — PROPOFOL 10 MG/ML
VIAL (ML) INTRAVENOUS
Status: DISCONTINUED | OUTPATIENT
Start: 2024-11-25 | End: 2024-11-25

## 2024-11-25 RX ORDER — TRANEXAMIC ACID 100 MG/ML
INJECTION, SOLUTION INTRAVENOUS
Status: DISCONTINUED | OUTPATIENT
Start: 2024-11-25 | End: 2024-11-25

## 2024-11-25 RX ORDER — ROPIVACAINE HYDROCHLORIDE 5 MG/ML
INJECTION, SOLUTION EPIDURAL; INFILTRATION; PERINEURAL
Status: COMPLETED | OUTPATIENT
Start: 2024-11-25 | End: 2024-11-25

## 2024-11-25 RX ORDER — CEFAZOLIN 2 G/1
2 INJECTION, POWDER, FOR SOLUTION INTRAMUSCULAR; INTRAVENOUS
Status: COMPLETED | OUTPATIENT
Start: 2024-11-25 | End: 2024-11-25

## 2024-11-25 RX ORDER — OXYCODONE AND ACETAMINOPHEN 5; 325 MG/1; MG/1
TABLET ORAL
Qty: 60 TABLET | Refills: 0 | Status: SHIPPED | OUTPATIENT
Start: 2024-11-25

## 2024-11-25 RX ORDER — ACETAMINOPHEN 325 MG/1
975 TABLET ORAL
Status: COMPLETED | OUTPATIENT
Start: 2024-11-25 | End: 2024-11-25

## 2024-11-25 RX ORDER — ONDANSETRON 8 MG/1
8 TABLET, ORALLY DISINTEGRATING ORAL EVERY 8 HOURS PRN
Status: DISCONTINUED | OUTPATIENT
Start: 2024-11-25 | End: 2024-11-25 | Stop reason: HOSPADM

## 2024-11-25 RX ORDER — ACETAMINOPHEN 325 MG/1
650 TABLET ORAL EVERY 4 HOURS PRN
Status: DISCONTINUED | OUTPATIENT
Start: 2024-11-25 | End: 2024-11-25 | Stop reason: HOSPADM

## 2024-11-25 RX ORDER — ONDANSETRON 8 MG/1
8 TABLET, ORALLY DISINTEGRATING ORAL EVERY 8 HOURS PRN
Qty: 20 TABLET | Refills: 1 | Status: SHIPPED | OUTPATIENT
Start: 2024-11-25

## 2024-11-25 RX ORDER — OXYCODONE HYDROCHLORIDE 5 MG/1
5 TABLET ORAL EVERY 4 HOURS PRN
Status: DISCONTINUED | OUTPATIENT
Start: 2024-11-25 | End: 2024-11-25 | Stop reason: HOSPADM

## 2024-11-25 RX ORDER — PHENYLEPHRINE HYDROCHLORIDE 10 MG/ML
INJECTION INTRAVENOUS
Status: DISCONTINUED | OUTPATIENT
Start: 2024-11-25 | End: 2024-11-25

## 2024-11-25 RX ORDER — SODIUM CHLORIDE 0.9 % (FLUSH) 0.9 %
3 SYRINGE (ML) INJECTION EVERY 6 HOURS PRN
Status: DISCONTINUED | OUTPATIENT
Start: 2024-11-25 | End: 2024-11-25 | Stop reason: HOSPADM

## 2024-11-25 RX ORDER — PROCHLORPERAZINE EDISYLATE 5 MG/ML
5 INJECTION INTRAMUSCULAR; INTRAVENOUS EVERY 30 MIN PRN
Status: DISCONTINUED | OUTPATIENT
Start: 2024-11-25 | End: 2024-11-25 | Stop reason: HOSPADM

## 2024-11-25 RX ORDER — SODIUM CHLORIDE, SODIUM LACTATE, POTASSIUM CHLORIDE, CALCIUM CHLORIDE 600; 310; 30; 20 MG/100ML; MG/100ML; MG/100ML; MG/100ML
INJECTION, SOLUTION INTRAVENOUS CONTINUOUS
Status: DISCONTINUED | OUTPATIENT
Start: 2024-11-25 | End: 2024-11-25 | Stop reason: HOSPADM

## 2024-11-25 RX ORDER — NAPROXEN SODIUM 220 MG/1
81 TABLET, FILM COATED ORAL 2 TIMES DAILY
Qty: 60 TABLET | Refills: 0 | Status: SHIPPED | OUTPATIENT
Start: 2024-11-25

## 2024-11-25 RX ORDER — ONDANSETRON HYDROCHLORIDE 2 MG/ML
INJECTION, SOLUTION INTRAMUSCULAR; INTRAVENOUS
Status: DISCONTINUED | OUTPATIENT
Start: 2024-11-25 | End: 2024-11-25

## 2024-11-25 RX ORDER — BUPIVACAINE HCL/EPINEPHRINE 0.25-.0005
VIAL (ML) INJECTION
Status: DISCONTINUED | OUTPATIENT
Start: 2024-11-25 | End: 2024-11-25 | Stop reason: HOSPADM

## 2024-11-25 RX ORDER — LIDOCAINE HYDROCHLORIDE 20 MG/ML
INJECTION INTRAVENOUS
Status: DISCONTINUED | OUTPATIENT
Start: 2024-11-25 | End: 2024-11-25

## 2024-11-25 RX ORDER — FENTANYL CITRATE 50 UG/ML
INJECTION, SOLUTION INTRAMUSCULAR; INTRAVENOUS
Status: DISCONTINUED | OUTPATIENT
Start: 2024-11-25 | End: 2024-11-25

## 2024-11-25 RX ORDER — OXYCODONE HYDROCHLORIDE 5 MG/1
5 TABLET ORAL
Status: DISCONTINUED | OUTPATIENT
Start: 2024-11-25 | End: 2024-11-25

## 2024-11-25 RX ORDER — GLUCAGON 1 MG
1 KIT INJECTION
Status: DISCONTINUED | OUTPATIENT
Start: 2024-11-25 | End: 2024-11-25 | Stop reason: HOSPADM

## 2024-11-25 RX ORDER — PROPOFOL 10 MG/ML
VIAL (ML) INTRAVENOUS CONTINUOUS PRN
Status: DISCONTINUED | OUTPATIENT
Start: 2024-11-25 | End: 2024-11-25

## 2024-11-25 RX ORDER — KETOROLAC TROMETHAMINE 30 MG/ML
INJECTION, SOLUTION INTRAMUSCULAR; INTRAVENOUS
Status: DISCONTINUED | OUTPATIENT
Start: 2024-11-25 | End: 2024-11-25 | Stop reason: HOSPADM

## 2024-11-25 RX ADMIN — FENTANYL CITRATE 50 MCG: 50 INJECTION, SOLUTION INTRAMUSCULAR; INTRAVENOUS at 09:11

## 2024-11-25 RX ADMIN — ACETAMINOPHEN 975 MG: 325 TABLET, FILM COATED ORAL at 08:11

## 2024-11-25 RX ADMIN — PROPOFOL 30 MG: 10 INJECTION, EMULSION INTRAVENOUS at 10:11

## 2024-11-25 RX ADMIN — SODIUM CHLORIDE, POTASSIUM CHLORIDE, SODIUM LACTATE AND CALCIUM CHLORIDE: 600; 310; 30; 20 INJECTION, SOLUTION INTRAVENOUS at 08:11

## 2024-11-25 RX ADMIN — PROPOFOL 150 MCG/KG/MIN: 10 INJECTION, EMULSION INTRAVENOUS at 09:11

## 2024-11-25 RX ADMIN — OXYCODONE 10 MG: 5 TABLET ORAL at 12:11

## 2024-11-25 RX ADMIN — MIDAZOLAM HYDROCHLORIDE 2 MG: 1 INJECTION, SOLUTION INTRAMUSCULAR; INTRAVENOUS at 09:11

## 2024-11-25 RX ADMIN — PHENYLEPHRINE HYDROCHLORIDE 100 MCG: 10 INJECTION INTRAVENOUS at 10:11

## 2024-11-25 RX ADMIN — CEFAZOLIN 2 G: 2 INJECTION, POWDER, FOR SOLUTION INTRAMUSCULAR; INTRAVENOUS at 09:11

## 2024-11-25 RX ADMIN — GLYCOPYRROLATE 0.2 MG: 0.2 INJECTION, SOLUTION INTRAMUSCULAR; INTRAVITREAL at 10:11

## 2024-11-25 RX ADMIN — TRANEXAMIC ACID 2000 MG: 100 INJECTION, SOLUTION INTRAVENOUS at 09:11

## 2024-11-25 RX ADMIN — OXYCODONE HYDROCHLORIDE 5 MG: 5 TABLET ORAL at 02:11

## 2024-11-25 RX ADMIN — ROPIVACAINE HYDROCHLORIDE 3 ML: 5 INJECTION, SOLUTION EPIDURAL; INFILTRATION; PERINEURAL at 09:11

## 2024-11-25 RX ADMIN — ONDANSETRON 4 MG: 2 INJECTION INTRAMUSCULAR; INTRAVENOUS at 09:11

## 2024-11-25 RX ADMIN — OXYCODONE 5 MG: 5 TABLET ORAL at 11:11

## 2024-11-25 RX ADMIN — VANCOMYCIN HYDROCHLORIDE 1250 MG: 1.25 INJECTION, POWDER, LYOPHILIZED, FOR SOLUTION INTRAVENOUS at 08:11

## 2024-11-25 RX ADMIN — CEFAZOLIN 2 G: 2 INJECTION, POWDER, FOR SOLUTION INTRAMUSCULAR; INTRAVENOUS at 04:11

## 2024-11-25 RX ADMIN — ONDANSETRON 8 MG: 8 TABLET, ORALLY DISINTEGRATING ORAL at 02:11

## 2024-11-25 RX ADMIN — FAMOTIDINE 20 MG: 20 TABLET, FILM COATED ORAL at 08:11

## 2024-11-25 RX ADMIN — LIDOCAINE HYDROCHLORIDE 20 MG: 20 INJECTION, SOLUTION INTRAVENOUS at 09:11

## 2024-11-25 NOTE — OR NURSING
Patient complains of pain to left knee, 6/10 throbbing , aching.  Pain medication given per MD orders .

## 2024-11-25 NOTE — PT/OT/SLP EVAL
Physical Therapy Evaluation and Discharge Note    Patient Name:  Radha Leija   MRN:  [id number]    Recommendations:     Discharge Recommendations: Low Intensity Therapy  Discharge Equipment Recommendations: none- pt declining need for any DME   Barriers to discharge: None    Assessment:     Radha Leija is a 69 y.o. female admitted with a medical diagnosis of Unilateral primary osteoarthritis, left knee.    Eval completed; c/o moderate L knee pain but able to perform HEP as well as transfer, gait and stair training with RW SBA. Pt scheduled to D/C POD 0 and has met all acute P.T. goals.     Recent Surgery: Procedure(s) (LRB):  ARTHROPLASTY, KNEE, TOTAL (Left) Day of Surgery    Plan:     During this hospitalization, patient does not require further acute PT services.  Please re-consult if situation changes.      Subjective     Chief Complaint: Pt c/o moderate L knee pain but deferring pain meds until after PT session   Patient/Family Comments/goals: Pt agreeable to PT eval, friend present for education  Pain/Comfort:  Pain Rating 1: 8/10  Location - Side 1: Left  Location 1: knee  Pain Addressed 1: Pre-medicate for activity, Cessation of Activity    Patients cultural, spiritual, Druze conflicts given the current situation: no    Living Environment:  [facility] with 4 ALAINA, 1 HR  Prior to admission, patients level of function was I.  Equipment used at home: none.  DME owned (not currently used): rolling walker and single point cane.  Upon discharge, patient will have assistance from friend Liat will stay with pt 24/7 for the first week, then pt's sisters will stay for the next 2 weeks.    Objective:     Communicated with SALOME Karimi prior to session.  Patient found supine with cryotherapy, peripheral IV upon PT entry to room.    General Precautions: Standard, fall    Orthopedic Precautions:LLE weight bearing as tolerated   Braces: N/A  Respiratory Status: Room air    Exams:  R LE ROM and strength WFL  L LE  sensation intact  L knee AROM 0-90 deg    Functional Mobility:  Bed Mobility:     Supine to Sit: stand by assistance  Transfers:     Sit to Stand:  stand by assistance with rolling walker  Bed to Chair: stand by assistance with  rolling walker  using  Step Transfer  Toilet Transfer: stand by assistance with  rolling walker  using  Step Transfer  Gait: 100' x 2 with RW SBA, pt required cueing to progress to step-through gait pattern, good weight acceptance on L LE  Stairs:  Pt ascended/descended 4 stair(s) with No Assistive Device with left handrail with Stand-by Assistance.     AM-PAC 6 CLICK MOBILITY  Total Score:24       Treatment and Education:  Pt educated on role of P.T, POC and mobility training. Issued handout on and instructed in surgical precautions and HEP.    Pt performed supine L LE therex, 1 set of 10 each as follows: AP, quad set, heelslide in tolerable range, SLR    AM-PAC 6 CLICK MOBILITY  Total Score:24     Patient left up in chair with all lines intact, call button in reach, RN notified, and friend present.    GOALS:   Multidisciplinary Problems       Physical Therapy Goals       Not on file              Multidisciplinary Problems (Resolved)          Problem: Physical Therapy    Goal Priority Disciplines Outcome Interventions   Physical Therapy Goal   (Resolved)     PT, PT/OT Met    Description: No PT goals identified                        History:     Past Medical History:   Diagnosis Date    Acid reflux     Acoustic neuroma 2012    treated at [facility]    Anemia 2years ago    Anxiety     Arthritis     Asthma, chronic 04/10/2013    Crohn's disease 01/01/1998    Depression     Dry eyes     Fracture of forearm, proximal, open     screws & plate 6/26/13 in [facility]    History of papilledema 11/10/2011    Hyperlipidemia 05/21/2018    Lupus     Neuromuscular disorder 12/01/2009    Neuropathy    Osteoporosis     Stroke 01/29/2013    patient reports no residual symptoms; history of drop foot on right     Thyroid disease        Past Surgical History:   Procedure Laterality Date    ADENOIDECTOMY  1962    BRAIN SURGERY  01/18/2012    Brain Biopsy    BREAST CYST ASPIRATION Left     COLONOSCOPY  2017    [facility] Dr. WILLIS Summers; pan-diverticulosis, rec repeat 3-5 years    COLONOSCOPY N/A 2/9/2023    Procedure: COLONOSCOPY;  Surgeon: Reina Crabtree MD;  Location: [facility];  Service: Colon and Rectal;  Laterality: N/A;    COLONOSCOPY N/A 7/24/2024    Procedure: COLONOSCOPY;  Surgeon: Reina Crabtree MD;  Location: [facility] (4TH FLR);  Service: Endoscopy;  Laterality: N/A;  Ref By:  Dr. Crabtree   05/07 r/s updated instructions sent via portal-dw  7/17-pre call complete-tb  7/23-pt notified of earlier arrival time (07:55am)-Kpvt    ESOPHAGOGASTRODUODENOSCOPY N/A 4/27/2023    Procedure: ESOPHAGOGASTRODUODENOSCOPY (EGD);  Surgeon: Martir Braxton MD;  Location: [facility] (4TH FLR);  Service: Endoscopy;  Laterality: N/A;  4/21 Pre-call attempted, pt had phone trouble kls    ESOPHAGOGASTRODUODENOSCOPY N/A 7/21/2023    Procedure: EGD (ESOPHAGOGASTRODUODENOSCOPY);  Surgeon: Martir Braxton MD;  Location: [facility] (4TH FLR);  Service: Endoscopy;  Laterality: N/A;  12wks to check healing, pt request Dr. Braxton, [facility] portal. Referral: REYNA MCKENZIE, Proc order tele enc 5/29/23-LW    INCISION AND DRAINAGE PERIRECTAL ABSCESS  1998    abscess removal    ORIF FOREARM FRACTURE Left 06/23/2013    proximal ulnar and radius       Time Tracking:     PT Received On: 11/25/24  PT Start Time: 1359     PT Stop Time: 1423  PT Total Time (min): 24 min     Billable Minutes: Evaluation 10 and Gait Training 13      11/25/2024

## 2024-11-25 NOTE — OP NOTE
[facility]  Operative Report    SUMMARY     Surgery Date: 11/25/2024     Surgeons and Role:     * Gideon Burgos MD - Primary    Assistant: NIVIA Jonas    Pre-op Diagnosis:  Unilateral primary osteoarthritis, left knee [M17.12]    Post-op Diagnosis:  Post-Op Diagnosis Codes:     * Unilateral primary osteoarthritis, left knee [M17.12]    Procedure(s) (LRB):  ARTHROPLASTY, KNEE, TOTAL (Left) ([facility])    Anesthesia: Spinal    Description of Procedure: The appropriate consent was signed. The patient understood and except all risks and complications. The patient was brought to the Operating Room after undergoing spinal anesthetic. Tourniquet was applied to the proximal operative leg. The lower extremity was then prepped and draped in a sterile manner. After exsanguination of Esmarch bandage, tourniquet was inflated to 300 mmHg. An anterior incision with a medial parapatellar arthrotomy was performed. The menisci, anterior cruciate ligament and patellar fat pad were excised. The patella was resurfaced and sized to a 32 mm patella.   Three holes were then drilled for the lugs and this was trialed. A hole was then  drilled in the distal femur, aditi was placed down the femoral canal and the   distal femur cut in 6 degrees of valgus. The tibia was then cut  with the extramedullary device   in 0 degree varus valgus with 5 degrees in posterior   slope. Extension block was placed and full extension was noted. The femur was   then sized to a #5 and #5 cutting block was placed and the anterior and   posterior cuts as well as chamfer cuts were performed. The tibia was sized to a  size C and a trial was performed.Trials were performed and a 14 mm spacer was felt to be appropriate.The tibia was then prepared with the drill and the punch, and trials were   removed and the knee washed out. Aquamantys was used to paint the posterior   capsule and corners. Palacos cement with gentamicin was then mixed and    pressurized sequentially in tibia, femur and patella. Components were impacted   and excess cement was removed. A trial 14 mm spacer was placed and knee   brought out to full extension. Once the cement was allowed to harden, the 14 mm  spacer was felt to be appropriate and this was locked into the tibial   baseplate. Tourniquet was deflated and hemostasis was obtained. Good patellar   tracking was noted. Exparel cocktail was injected into the fascia and   subcutaneous tissue. The fascial closure was obtained with a running #2 Quill suture. Subcutaneous closure was obtained with #1 and 2-0 Vicryl. Skin was then closed with the staples and a sterile compressive dressing was applied. The patient was then brought to the Recovery Room in a good condition.        Estimated Blood Loss: 100cc         Specimens:   Specimen (24h ago, onward)      None

## 2024-11-25 NOTE — PLAN OF CARE
Case Management Final Discharge Note      Discharge Disposition: Home with HH ([facility])    New DME ordered / company name: Patient has RW and refused a BSC.     Relevant SDOH / Transition of Care Barriers:  None     Primary Caretaker and contact information: sister    Scheduled followup appointment: [place]     Referrals placed: NA    Transportation: Family     Patient educated on discharge services and updated on DC plan. Bedside RN notified, patient clear to discharge from Case Management Perspective.     [facility] ([place])  Discharge Final Note    Primary Care Provider: Merry Espinal MD    Expected Discharge Date: 11/25/2024    Final Discharge Note (most recent)       Final Note - 11/25/24 1332          Final Note    Assessment Type Final Discharge Note (P)      Anticipated Discharge Disposition Home-Health Care Svc (P)      Hospital Resources/Appts/Education Provided Provided patient/caregiver with written discharge plan information;Appointments scheduled and added to AVS (P)         Post-Acute Status    Post-Acute Authorization Home Health (P)      Home Health Status Set-up Complete/Auth obtained (P)      Discharge Delays None known at this time (P)                    Contact Info       Gideon Burgos MD   Specialty: Orthopedic Surgery    [address] [facility]  [facility] [address]   Phone: [phone]       Next Steps: Follow up in 4 week(s)    *[facility]   Specialty: Home Health Services, Home Therapy Services, Home Living Aide Services    [address]  [facility] [address]   Phone: [phone]       Next Steps: Follow up on 11/27/2024    Instructions: They will contact you for home healthcare appointments.

## 2024-11-25 NOTE — ANESTHESIA POSTPROCEDURE EVALUATION
Anesthesia Post Evaluation    Patient: Radha Leija    Procedure(s) Performed: Procedure(s) (LRB):  ARTHROPLASTY, KNEE, TOTAL (Left)    Final Anesthesia Type: spinal      Patient location during evaluation: PACU  Patient participation: Yes- Able to Participate  Level of consciousness: awake and alert  Post-procedure vital signs: reviewed and stable  Pain management: adequate  Airway patency: patent    PONV status at discharge: No PONV  Anesthetic complications: no      Cardiovascular status: blood pressure returned to baseline  Respiratory status: spontaneous ventilation  Hydration status: euvolemic  Follow-up not needed.          Vitals Value Taken Time   /54 11/25/24 1146   Temp 36.5 °C (97.7 °F) 11/25/24 1101   Pulse 55 11/25/24 1158   Resp 15 11/25/24 1138   SpO2 94 % 11/25/24 1158   Vitals shown include unfiled device data.      No case tracking events are documented in the log.      Pain/Kiersten Score: Pain Rating Prior to Med Admin: 4 (11/25/2024 11:38 AM)  Kiersten Score: 10 (11/25/2024 11:37 AM)

## 2024-11-25 NOTE — PLAN OF CARE
Radha Leija has met all discharge criteria from Phase II. Vital Signs are stable, ambulating  without difficulty. Discharge instructions given, patient verbalized understanding. Discharged from facility via wheelchair in stable condition.

## 2024-11-25 NOTE — TRANSFER OF CARE
"Anesthesia Transfer of Care Note    Patient: Radha Leija    Procedure(s) Performed: Procedure(s) (LRB):  ARTHROPLASTY, KNEE, TOTAL (Left)    Patient location: PACU    Anesthesia Type: spinal    Transport from OR: Transported from OR on room air with adequate spontaneous ventilation    Post pain: adequate analgesia    Post assessment: no apparent anesthetic complications and tolerated procedure well    Post vital signs: stable    Level of consciousness: awake    Nausea/Vomiting: no nausea/vomiting    Complications: none    Transfer of care protocol was followed      Last vitals: Visit Vitals  BP (!) 143/65   Pulse 77   Temp 36.5 °C (97.7 °F) (Temporal)   Resp 18   Ht 4' 11" (1.499 m)   Wt 76.7 kg (169 lb)   SpO2 97%   Breastfeeding No   BMI 34.13 kg/m²     "

## 2024-11-25 NOTE — PLAN OF CARE
[facility]  [address]  [place] [address]  [phone]               [facility] Orthopedic Discharge Orders    Home [place]         Expected Discharge Date: 11/25/24       ADMIT TO HOME HEALTH DATE:11/27/24    Diagnoses:  Post-op L TKR replacement    Patient is homebound due to:   Pt requires home health services due to taxing effort to leave the home as a result of immobility from Post-op L TKR  replacement    Weight Bearing Status:   full weight bearing: FWB unless otherwise indicated.  Progress to cane as able.  Set up for outpatient PT as soon as able after staple removal once patient is MOD 1 with cane.    Physical Therapy:   3 times a week for 2 weeks (Call for further orders beyond 2 weeks)  - Ambulate with a rolling walker  - Progress to cane  - Instruct on ROM and strengthening of knee    Aide to provide assistance with personal care and  ADLs  2 times a week for 2 weeks    Wound Care: DAILY AND PRN  Surgical dressing change:Starting on  post op day 2 then daily and prn saturation.Change dressing while knee in flexed position utilizing collagen dressing then apply telfa cover dressing. Teach patient to change daily.   Surgical dressing is water resistant. Protect surgical dressing from getting wet by using press and seal saranwrap or garbage bag. Removal and replacement of dressing after shower only needed if incision is suspected  to have gotten wet during shower.  Otherwise change as previously described depending on dressing/drainage. No soaking in the tub or hot tub use for 6 weeks.    PT/SN to remove staples 14 days Post-op and apply skin prep and steri-strips.    Cold therapy/Ice: Encouraged at least 20 minutes 2-3 times daily or more if desired.  Incision must be kept waterproof while icing.  Wear TEDS Bilateral Thigh High Stockings for 3 weeks. Edna hose x 3 weeks. Ok to remove edna hose 1-2 hours/day max if desired.   If CPM ordered Utilize 2 hours in morning and 2  hours in evening. , Start at -5 degrees extension and 50 degree flexion. Increase flexion 10 degrees daily. Low post op mobility.       Contact:  Please contact Dr Gideon Burgos [phone] with concerns.        BLOOD THINNER:    If sent home on Xarelto         -14 days post-op for TKR       -30 days post-op for THR     If sent home home on ASA    81mg   BID x 4 weeks     Once finished with prescribed blood thinner, patients can return to pre-surgical ASA dosage if they took ASA before surgery.       Outpatient Therapy: [facility]    [address] [address]   or  [address] [place] [address]    Call [phone] to schedule appointment  Fax [phone]    If need orders: Call Leyda at Ext [phone]        DME:  - rolling Walker  - 3 in 1 commode.   - tub bench / shower chair  - Per PT/OT recommendation          Gideon Burgos

## 2024-11-25 NOTE — PLAN OF CARE
D/C Recommendation- Low Intensity Therapy  DME Recommendation- None- pt declining need for any DME     Eval completed; c/o moderate L knee pain but able to perform HEP as well as transfer, gait and stair training with RW SBA. Pt scheduled to D/C POD 0 and has met all acute P.T. goals. Please re-consult if pt admits or situation changes.     Problem: Physical Therapy  Goal: Physical Therapy Goal  Description: No PT goals identified   Outcome: Met

## 2024-11-25 NOTE — ANESTHESIA PROCEDURE NOTES
Spinal    Diagnosis: Left Knee OA  Patient location during procedure: holding area  End time: 11/25/2024 9:44 AM    Staffing  Authorizing Provider: Antony Negron MD  Performing Provider: Antony Negron MD    Staffing  Performed by: Antony Negron MD  Authorized by: Antony Negron MD    Preanesthetic Checklist  Completed: patient identified, IV checked, site marked, risks and benefits discussed, surgical consent, monitors and equipment checked, pre-op evaluation and timeout performed  Spinal Block  Patient position: sitting  Prep: ChloraPrep  Patient monitoring: heart rate, cardiac monitor, continuous pulse ox, continuous capnometry and frequent blood pressure checks  Approach: left paramedian  Location: L3-4  Injection technique: single shot  CSF Fluid: clear free-flowing CSF and blood-tinged free-flowing CSF  Needle  Needle type: Miguel   Needle gauge: 22 G  Needle length: 3.5 in  Additional Documentation: incremental injection, negative aspiration for heme and no paresthesia on injection  Needle localization: anatomical landmarks  Assessment  Sensory level: T8   Dermatomal levels determined by alcohol wipe  Ease of block: moderate  Patient's tolerance of the procedure: comfortable throughout block and no complaints  Medications:    Medications: ropivacaine (NAROPIN) injection 0.5% - Intraspinal   3 mL - 11/25/2024 9:44:00 AM

## 2024-12-23 ENCOUNTER — HOSPITAL ENCOUNTER (OUTPATIENT)
Dept: RADIOLOGY | Facility: CLINIC | Age: 69
Discharge: HOME | End: 2024-12-23
Attending: INTERNAL MEDICINE
Payer: MEDICARE

## 2024-12-23 DIAGNOSIS — M81.0 OSTEOPOROSIS, UNSPECIFIED OSTEOPOROSIS TYPE, UNSPECIFIED PATHOLOGICAL FRACTURE PRESENCE: ICD-10-CM

## 2024-12-23 PROCEDURE — 77080 DXA BONE DENSITY AXIAL: CPT | Mod: 26,,, | Performed by: INTERNAL MEDICINE

## 2024-12-23 PROCEDURE — 77080 DXA BONE DENSITY AXIAL: CPT | Mod: TC

## 2024-12-28 NOTE — PROGRESS NOTES
Your DXA still shows osteoporosis. Will discuss a course of romosozumab sc  monthly for a year in place of Prolia, followed by resumption of Prolia after 12 months as you hip bone density declined significantly ROBERTO

## 2025-01-15 ENCOUNTER — PATIENT MESSAGE (OUTPATIENT)
Dept: INTERNAL MEDICINE | Facility: CLINIC | Age: 70
End: 2025-01-15
Payer: MEDICARE

## 2025-01-15 DIAGNOSIS — G62.9 NEUROPATHY: Primary | ICD-10-CM

## 2025-01-15 NOTE — TELEPHONE ENCOUNTER
Called pt to scheduled pt referral pt states her referral is not for neurology. I informed pt that's the referral was a neurology referral. Pt was upset and states she no longer need my help and ended call.

## 2025-01-15 NOTE — TELEPHONE ENCOUNTER
I put neurology referral in if she wants neruolgoy consult re neuropathy  If she wants same day/urgetn visit assit in roberto carlos

## 2025-01-30 ENCOUNTER — TELEPHONE (OUTPATIENT)
Dept: NEUROLOGY | Facility: CLINIC | Age: 70
End: 2025-01-30
Payer: MEDICARE

## 2025-01-30 NOTE — TELEPHONE ENCOUNTER
Spoke with pt to advise of cancelling scheduled appt with Dr. Saucedo. Advised pt back in June our nurse sent her a message explaining Dr. Saucedo advised ptr to follow up with a general neurologist. Advised pt I will forward a message over to another MA to have her scheduled within General neurology soon. Pt understood.

## 2025-02-04 DIAGNOSIS — K21.9 GASTROESOPHAGEAL REFLUX DISEASE, UNSPECIFIED WHETHER ESOPHAGITIS PRESENT: ICD-10-CM

## 2025-02-04 RX ORDER — HYDROGEN PEROXIDE 3 %
SOLUTION, NON-ORAL MISCELLANEOUS
Qty: 180 CAPSULE | Refills: 3 | Status: SHIPPED | OUTPATIENT
Start: 2025-02-04

## 2025-02-04 NOTE — TELEPHONE ENCOUNTER
Refill Routing Note   Medication(s) are not appropriate for processing by [facility] for the following reason(s):        Drug-disease interaction    [facility] action(s):  Defer      Medication Therapy Plan: FLOS;  Drug-Disease: esomeprazole and Systemic lupus erythematosus; Drug-Disease: esomeprazole and Hepatomegaly    Pharmacist review requested: Yes     Appointments  past 12m or future 3m with PCP    Date Provider   Last Visit   11/6/2024 Merry Espinal MD   Next Visit   Visit date not found Merry Espinal MD   ED visits in past 90 days: 0        Note composed:2:13 PM 02/04/2025

## 2025-02-04 NOTE — TELEPHONE ENCOUNTER
Refill Decision Note   Radha Leija  is requesting a refill authorization.  Brief Assessment and Rationale for Refill:  Approve     [facility]      Pharmacist review requested: Yes   Comments:     Note composed:5:07 PM 02/04/2025

## 2025-02-04 NOTE — TELEPHONE ENCOUNTER
Care Due:                  Date            Visit Type   Department     Provider  --------------------------------------------------------------------------------                                             [facility] INTERNAL  Merry Amaral  Last Visit: 11-      PRE-OP       MEDICINE       [place]  Next Visit: None Scheduled  None         None Found                                                            Last  Test          Frequency    Reason                     Performed    Due Date  --------------------------------------------------------------------------------    Lipid Panel.  12 months..  rosuvastatin.............  01- 01-    Health [facility] Embedded Care Due Messages. Reference number: [id number].   2/04/2025 12:04:28 PM CST

## 2025-02-20 ENCOUNTER — OFFICE VISIT (OUTPATIENT)
Dept: NEUROLOGY | Facility: CLINIC | Age: 70
End: 2025-02-20
Payer: MEDICARE

## 2025-02-20 VITALS
BODY MASS INDEX: 34.53 KG/M2 | WEIGHT: 171.31 LBS | DIASTOLIC BLOOD PRESSURE: 75 MMHG | HEART RATE: 64 BPM | HEIGHT: 59 IN | SYSTOLIC BLOOD PRESSURE: 125 MMHG

## 2025-02-20 DIAGNOSIS — G25.89 OTHER SPECIFIED EXTRAPYRAMIDAL AND MOVEMENT DISORDERS: ICD-10-CM

## 2025-02-20 DIAGNOSIS — E67.2 MEGAVITAMIN-B6 SYNDROME: ICD-10-CM

## 2025-02-20 DIAGNOSIS — G62.0: ICD-10-CM

## 2025-02-20 DIAGNOSIS — T45.2X5A: ICD-10-CM

## 2025-02-20 DIAGNOSIS — G62.9 NEUROPATHY: Primary | ICD-10-CM

## 2025-02-20 PROCEDURE — 99999 PR PBB SHADOW E&M-EST. PATIENT-LVL IV: CPT | Mod: PBBFAC,GC,,

## 2025-02-20 PROCEDURE — 99214 OFFICE O/P EST MOD 30 MIN: CPT | Mod: PBBFAC

## 2025-02-20 RX ORDER — MULTIVITAMIN WITH IRON
TABLET ORAL DAILY
COMMUNITY

## 2025-02-20 RX ORDER — HYDROXYCHLOROQUINE SULFATE 200 MG/1
300 TABLET, FILM COATED ORAL DAILY
COMMUNITY

## 2025-02-20 RX ORDER — UBIDECARENONE 30 MG
30 CAPSULE ORAL 3 TIMES DAILY
COMMUNITY

## 2025-02-20 NOTE — PROGRESS NOTES
[facility]  Follow Up Patient Visit      Disease Summary   Radha Leija, a 70 y.o. y.o. 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 Her neuropathy, affecting both feet (R>L), reportedly began around 2010 following a suspected episode of AIDP, with symptoms of numbness and dull pain at the bottom of her feet, worsened by walking and standing. She underwent a L knee replacement on 11/24/24, after which she noticed an increase in her baseline neuropathic symptoms. She also describes cold hands and feet, primarily at night. Previous trials of gabapentin and Cymbalta were ineffective or intolerable. She currently takes magnesium glycinate, CoQ10, B12, and ALA. Her headaches have improved and are now rare, with occasional visual auras. She remains physically active, swimming 3-4 times per week. Given the chronicity of symptoms and progression following surgery, the question remains whether her worsening neuropathy represents a secondary process such as mechanical nerve compression, medication-related effects, or progression of an underlying neuropathic condition.    Her prior MRI brain on 9/10/21 showed a stable schwannoma, and prior CSF studies from 11/12/21 were unremarkable aside from a mildly elevated protein. Mild renal impairment and prediabetes have been noted, and she has a history of past B6 supplementation and autoimmune concerns. Her exam today reveals preserved cranial nerve function, no dysarthria, and normal EOMI.     Principle neurological diagnosis: R acoustic schwannoma s/p treatment with cyclophasphamide, headaches, neuropathy, osteoporosis treated previously with denosumab  Last MRI Brain: 9/10/21 stable schwannoma  Last MRI C-spine: NA  CSF 11/12/21: No OCB. 50 protein, 55 glucose, B12 544, Normal copper, vitamin E,  "SPEP    ROS:     SOCIAL HISTORY  Social History     Tobacco Use    Smoking status: Former     Current packs/day: 0.00     Average packs/day: 0.5 packs/day for 15.0 years (7.5 ttl pk-yrs)     Types: Cigarettes     Start date: 1969     Quit date: 1984     Years since quittin.6    Smokeless tobacco: Never    Tobacco comments:     Quit    Substance Use Topics    Alcohol use: Yes     Comment: less than one per week    Drug use: No     Exam:     Vitals:    25 0928   BP: 125/75   BP Location: Left arm   Patient Position: Sitting   Pulse: 64   Weight: 77.7 kg (171 lb 4.8 oz)   Height: 4' 11" (1.499 m)          In general, the patient is well nourished and appears to be in no acute distress.          MENTAL STATUS: language is fluent, normal verbal comprehension, fund of knowlege is appropriate by vocabulary.     Neurological   MENTAL STATUS: Alert and oriented to person, place, and time. Attention and concentration within normal limits. Speech without dysarthria, able to name and repeat without difficulty. Recent and remote memory within normal limits   CRANIAL NERVES: Visual fields intact. PERRL. EOMI. Facial sensation intact. Face symmetrical. Hearing grossly intact. Full shoulder shrug bilaterally. Tongue protrudes midline   SENSORY: Sensation is intact to pin and light touch throughout.  Joint position perception intact. Negative Romberg.   MOTOR: Normal bulk and tone. No pronator drift.  5/5 deltoid, biceps, triceps, interosseous, hand  bilaterally. 5/5 iliopsoas, knee extension/flexion, foot dorsi/plantarflexion bilaterally.    REFLEXES: Symmetric and 2+ throughout. Toes down going bilaterally.   CEREBELLAR/COORDINATION/GAIT: Gait steady with normal arm swing and stride length.  Heel to shin intact. Finger to nose intact. Normal rapid alternating movements.           Labs:     Lab Results   Component Value Date    EPCZRUFP76JZ 41 10/20/2021    XOOVBLMO80EA 44 2019    LZMNXPUI13UD 27 " "(L) 10/16/2018     No results found for: "JCVINDEX", "JCVANTIBODY"  No results found for: "ZO7RAAJZ", "ABSOLUTECD3", "II5GOTTH", "ABSOLUTECD8", "DJ2GTUEL", "ABSOLUTECD4", "LABCD48"  Lab Results   Component Value Date    WBC 5.66 10/16/2024    RBC 4.48 10/16/2024    HGB 14.0 10/16/2024    HCT 43.5 10/16/2024    MCV 97 10/16/2024    MCH 31.3 (H) 10/16/2024    MCHC 32.2 10/16/2024    RDW 11.9 10/16/2024     10/16/2024    MPV 11.8 10/16/2024    GRAN 3.6 10/16/2024    GRAN 63.0 10/16/2024    LYMPH 1.5 10/16/2024    LYMPH 26.7 10/16/2024    MONO 0.4 10/16/2024    MONO 7.8 10/16/2024    EOS 0.1 10/16/2024    BASO 0.07 10/16/2024    EOSINOPHIL 1.1 10/16/2024    BASOPHIL 1.2 10/16/2024     Sodium   Date Value Ref Range Status   10/16/2024 137 136 - 145 mmol/L Final     Potassium   Date Value Ref Range Status   10/16/2024 4.4 3.5 - 5.1 mmol/L Final     Chloride   Date Value Ref Range Status   10/16/2024 104 95 - 110 mmol/L Final     CO2   Date Value Ref Range Status   10/16/2024 24 23 - 29 mmol/L Final     Glucose   Date Value Ref Range Status   10/16/2024 66 (L) 70 - 110 mg/dL Final     BUN   Date Value Ref Range Status   10/16/2024 16 8 - 23 mg/dL Final     Creatinine   Date Value Ref Range Status   10/16/2024 0.9 0.5 - 1.4 mg/dL Final     Calcium   Date Value Ref Range Status   10/16/2024 10.1 8.7 - 10.5 mg/dL Final     Total Protein   Date Value Ref Range Status   10/16/2024 8.1 6.0 - 8.4 g/dL Final     Albumin   Date Value Ref Range Status   10/16/2024 4.4 3.5 - 5.2 g/dL Final     Total Bilirubin   Date Value Ref Range Status   10/16/2024 0.8 0.1 - 1.0 mg/dL Final     Comment:     For infants and newborns, interpretation of results should be based  on gestational age, weight and in agreement with clinical  observations.    Premature Infant recommended reference ranges:  Up to 24 hours.............<8.0 mg/dL  Up to 48 hours............<12.0 mg/dL  3-5 days..................<15.0 mg/dL  6-29 " days.................<15.0 mg/dL       Alkaline Phosphatase   Date Value Ref Range Status   10/16/2024 106 55 - 135 U/L Final     AST   Date Value Ref Range Status   10/16/2024 31 10 - 40 U/L Final     ALT   Date Value Ref Range Status   10/16/2024 18 10 - 44 U/L Final     Anion Gap   Date Value Ref Range Status   10/16/2024 9 8 - 16 mmol/L Final     eGFR if    Date Value Ref Range Status   07/14/2022 >60.0 >60 mL/min/1.73 m^2 Final     eGFR if non    Date Value Ref Range Status   07/14/2022 59.3 (A) >60 mL/min/1.73 m^2 Final     Comment:     Calculation used to obtain the estimated glomerular filtration  rate (eGFR) is the CKD-EPI equation.          Diagnosis/Assessment/Plan:   Patient's worsening neuropathy, particularly after L knee replacement, raises concerns about potential secondary causes. Chronic axonal neuropathy remains the leading consideration, possibly due to prior AIDP, medication effects, or metabolic contributions (mild renal failure, prediabetes). The lack of response to prior neuropathic agents limits therapeutic options. The overall timeline and worsening following surgery also bring mechanical factors into question.     Will proceed with symptomatic treatment and would recommend retesting B6, though at this time not elevated.    -- Start pregabalin 25mg BID, titrate as tolerated  -- Repeat B6 at 1 year ambrocio  -- Continue magnesium glycinate, CoQ10, ALA, B12  -- Encourage ongoing physical activity, including swimming  -- Assess for contributing metabolic factors (repeat B6, HgbA1c, renal function)  -- Follow up as needed    Problem List Items Addressed This Visit    None  Visit Diagnoses         Neuropathy    -  Primary    Relevant Medications    pregabalin (LYRICA) 25 MG capsule    LIDOcaine 3 % Crea      Pyridoxine induced neuropathy          Megavitamin-B6 syndrome          Other specified extrapyramidal and movement disorders                  Total time spent  with the patient: 25 minutes, including face to face consultation, chart review and coordination of care, on the day of the visit. This includes face to face time and non-face to face time preparing to see the patient (eg, review of tests), obtaining and/or reviewing separately obtained history, documenting clinical information in the electronic or other health record, independently interpreting resultsand communicating results to the patient/family/caregiver, or care coordination.       02/20/2025  Tyrel Nina MD, [facility]  Neurology Resident, PGY-4  Department of Neurology  [facility]

## 2025-02-21 DIAGNOSIS — Z00.00 ENCOUNTER FOR MEDICARE ANNUAL WELLNESS EXAM: ICD-10-CM

## 2025-02-27 ENCOUNTER — TELEPHONE (OUTPATIENT)
Dept: RHEUMATOLOGY | Facility: CLINIC | Age: 70
End: 2025-02-27
Payer: MEDICARE

## 2025-02-27 RX ORDER — PREGABALIN 25 MG/1
25 CAPSULE ORAL 2 TIMES DAILY
Qty: 60 CAPSULE | Refills: 6 | Status: SHIPPED | OUTPATIENT
Start: 2025-02-27

## 2025-02-27 RX ORDER — LIDOCAINE 30 MG/G
1 CREAM TOPICAL
Qty: 1 EACH | Refills: 7 | Status: SHIPPED | OUTPATIENT
Start: 2025-02-27

## 2025-03-19 ENCOUNTER — PATIENT MESSAGE (OUTPATIENT)
Dept: RHEUMATOLOGY | Facility: CLINIC | Age: 70
End: 2025-03-19
Payer: MEDICARE

## 2025-04-07 ENCOUNTER — TELEPHONE (OUTPATIENT)
Dept: OPTOMETRY | Facility: CLINIC | Age: 70
End: 2025-04-07
Payer: MEDICARE

## 2025-04-07 NOTE — TELEPHONE ENCOUNTER
----- Message from Anne sent at 4/7/2025  1:27 PM CDT -----  Regarding: Appt  Contact: Pt  [phone]  Caller:  Radha  Returning call to:  Yifan can be reached at: [phone] of the call:         Returning missed call to schedule appt

## 2025-04-07 NOTE — TELEPHONE ENCOUNTER
----- Message from Tyra sent at 4/7/2025 11:46 AM CDT -----  Regarding: Appt Request  Scheduling Request   Appt Type:Np  Date/Time Preference: Treating Provider:Dayan Marie OD Caller Name:Radha Leija Contact Preference: [phone] Comments/notes:Patient called to schedule-  Follow up in about 1 year (around 5/31/2025) for annual eye exam WITH TESTING PLAQUENIL PATIENT

## 2025-04-16 ENCOUNTER — TELEPHONE (OUTPATIENT)
Dept: DERMATOLOGY | Facility: CLINIC | Age: 70
End: 2025-04-16
Payer: MEDICARE

## 2025-04-16 NOTE — TELEPHONE ENCOUNTER
----- Message from Nurse Weaver sent at 4/15/2025  9:20 AM CDT -----  Regarding: FW: appt access  Contact: [phone]    ----- Message -----  From: Reina Aquino  Sent: 4/15/2025   8:28 AM CDT  To: [facility] Staff  Subject: appt access                                      Pt is calling to speak with someone in provider office in regards to scheduling her annual skin check with the provider pt  is asking for a return call please call pt at  [phone]

## 2025-04-17 ENCOUNTER — LAB VISIT (OUTPATIENT)
Dept: LAB | Facility: HOSPITAL | Age: 70
End: 2025-04-17
Attending: INTERNAL MEDICINE
Payer: MEDICARE

## 2025-04-17 ENCOUNTER — RESULTS FOLLOW-UP (OUTPATIENT)
Dept: RHEUMATOLOGY | Facility: CLINIC | Age: 70
End: 2025-04-17

## 2025-04-17 DIAGNOSIS — E03.9 HYPOTHYROIDISM, UNSPECIFIED TYPE: ICD-10-CM

## 2025-04-17 DIAGNOSIS — E16.2 HYPOGLYCEMIA: ICD-10-CM

## 2025-04-17 DIAGNOSIS — M32.9 SYSTEMIC LUPUS ERYTHEMATOSUS, UNSPECIFIED SLE TYPE, UNSPECIFIED ORGAN INVOLVEMENT STATUS: ICD-10-CM

## 2025-04-17 LAB
ABSOLUTE EOSINOPHIL (OHS): 0.07 K/UL
ABSOLUTE MONOCYTE (OHS): 0.65 K/UL (ref 0.3–1)
ABSOLUTE NEUTROPHIL COUNT (OHS): 3.63 K/UL (ref 1.8–7.7)
ALBUMIN SERPL BCP-MCNC: 3.9 G/DL (ref 3.5–5.2)
ALP SERPL-CCNC: 108 UNIT/L (ref 40–150)
ALT SERPL W/O P-5'-P-CCNC: 16 UNIT/L (ref 10–44)
ANION GAP (OHS): 12 MMOL/L (ref 8–16)
AST SERPL-CCNC: 25 UNIT/L (ref 11–45)
BASOPHILS # BLD AUTO: 0.07 K/UL
BASOPHILS NFR BLD AUTO: 1.2 %
BILIRUB SERPL-MCNC: 0.8 MG/DL (ref 0.1–1)
BILIRUB UR QL STRIP.AUTO: NEGATIVE
BUN SERPL-MCNC: 20 MG/DL (ref 8–23)
C3 SERPL-MCNC: 120 MG/DL (ref 50–180)
C4 COMPLEMENT (OHS): 30 MG/DL (ref 11–44)
CALCIUM SERPL-MCNC: 9.9 MG/DL (ref 8.7–10.5)
CHLORIDE SERPL-SCNC: 103 MMOL/L (ref 95–110)
CHOLEST SERPL-MCNC: 139 MG/DL (ref 120–199)
CHOLEST/HDLC SERPL: 2.1 {RATIO} (ref 2–5)
CLARITY UR: CLEAR
CO2 SERPL-SCNC: 25 MMOL/L (ref 23–29)
COLOR UR AUTO: COLORLESS
CREAT SERPL-MCNC: 0.9 MG/DL (ref 0.5–1.4)
CREAT UR-MCNC: 22 MG/DL (ref 15–325)
CRP SERPL-MCNC: 2 MG/L
EAG (OHS): 111 MG/DL (ref 68–131)
ERYTHROCYTE [DISTWIDTH] IN BLOOD BY AUTOMATED COUNT: 12.6 % (ref 11.5–14.5)
ERYTHROCYTE [SEDIMENTATION RATE] IN BLOOD BY PHOTOMETRIC METHOD: 22 MM/HR
GFR SERPLBLD CREATININE-BSD FMLA CKD-EPI: >60 ML/MIN/1.73/M2
GLUCOSE SERPL-MCNC: 83 MG/DL (ref 70–110)
GLUCOSE UR QL STRIP: NEGATIVE
HBA1C MFR BLD: 5.5 % (ref 4–5.6)
HCT VFR BLD AUTO: 41.2 % (ref 37–48.5)
HDLC SERPL-MCNC: 66 MG/DL (ref 40–75)
HDLC SERPL: 47.5 % (ref 20–50)
HGB BLD-MCNC: 13.2 GM/DL (ref 12–16)
HGB UR QL STRIP: NEGATIVE
IMM GRANULOCYTES # BLD AUTO: 0.02 K/UL (ref 0–0.04)
IMM GRANULOCYTES NFR BLD AUTO: 0.3 % (ref 0–0.5)
KETONES UR QL STRIP: NEGATIVE
LDLC SERPL CALC-MCNC: 61.2 MG/DL (ref 63–159)
LEUKOCYTE ESTERASE UR QL STRIP: NEGATIVE
LYMPHOCYTES # BLD AUTO: 1.3 K/UL (ref 1–4.8)
MCH RBC QN AUTO: 29.9 PG (ref 27–31)
MCHC RBC AUTO-ENTMCNC: 32 G/DL (ref 32–36)
MCV RBC AUTO: 93 FL (ref 82–98)
NITRITE UR QL STRIP: NEGATIVE
NONHDLC SERPL-MCNC: 73 MG/DL
NUCLEATED RBC (/100WBC) (OHS): 0 /100 WBC
PH UR STRIP: 7 [PH]
PLATELET # BLD AUTO: 296 K/UL (ref 150–450)
PMV BLD AUTO: 11.1 FL (ref 9.2–12.9)
POTASSIUM SERPL-SCNC: 4.8 MMOL/L (ref 3.5–5.1)
PROT SERPL-MCNC: 7.6 GM/DL (ref 6–8.4)
PROT UR QL STRIP: NEGATIVE
PROT UR-MCNC: <7 MG/DL
PROT/CREAT UR: NORMAL MG/G{CREAT}
RBC # BLD AUTO: 4.42 M/UL (ref 4–5.4)
RELATIVE EOSINOPHIL (OHS): 1.2 %
RELATIVE LYMPHOCYTE (OHS): 22.6 % (ref 18–48)
RELATIVE MONOCYTE (OHS): 11.3 % (ref 4–15)
RELATIVE NEUTROPHIL (OHS): 63.4 % (ref 38–73)
SODIUM SERPL-SCNC: 140 MMOL/L (ref 136–145)
SP GR UR STRIP: 1
T4 FREE SERPL-MCNC: 1.28 NG/DL (ref 0.71–1.51)
TRIGL SERPL-MCNC: 59 MG/DL (ref 30–150)
TSH SERPL-ACNC: 2.9 UIU/ML (ref 0.4–4)
UROBILINOGEN UR STRIP-ACNC: NEGATIVE EU/DL
WBC # BLD AUTO: 5.74 K/UL (ref 3.9–12.7)

## 2025-04-17 PROCEDURE — 86140 C-REACTIVE PROTEIN: CPT

## 2025-04-17 PROCEDURE — 84156 ASSAY OF PROTEIN URINE: CPT

## 2025-04-17 PROCEDURE — 85652 RBC SED RATE AUTOMATED: CPT

## 2025-04-17 PROCEDURE — 85025 COMPLETE CBC W/AUTO DIFF WBC: CPT

## 2025-04-17 PROCEDURE — 83036 HEMOGLOBIN GLYCOSYLATED A1C: CPT

## 2025-04-17 PROCEDURE — 36415 COLL VENOUS BLD VENIPUNCTURE: CPT

## 2025-04-17 PROCEDURE — 81003 URINALYSIS AUTO W/O SCOPE: CPT

## 2025-04-17 PROCEDURE — 82374 ASSAY BLOOD CARBON DIOXIDE: CPT

## 2025-04-17 PROCEDURE — 80061 LIPID PANEL: CPT

## 2025-04-17 PROCEDURE — 86160 COMPLEMENT ANTIGEN: CPT

## 2025-04-17 PROCEDURE — 84443 ASSAY THYROID STIM HORMONE: CPT

## 2025-04-17 PROCEDURE — 86225 DNA ANTIBODY NATIVE: CPT

## 2025-04-17 PROCEDURE — 84439 ASSAY OF FREE THYROXINE: CPT

## 2025-04-17 PROCEDURE — 86160 COMPLEMENT ANTIGEN: CPT | Mod: 59

## 2025-04-22 LAB
DSDNA ANTIBODY (OHS): NORMAL
DSDNA ANTIBODY TITER (OHS): NORMAL

## 2025-04-24 ENCOUNTER — RESULTS FOLLOW-UP (OUTPATIENT)
Dept: INTERNAL MEDICINE | Facility: CLINIC | Age: 70
End: 2025-04-24

## 2025-05-01 ENCOUNTER — OFFICE VISIT (OUTPATIENT)
Dept: INTERNAL MEDICINE | Facility: CLINIC | Age: 70
End: 2025-05-01
Payer: MEDICARE

## 2025-05-01 VITALS
BODY MASS INDEX: 36.18 KG/M2 | WEIGHT: 172.38 LBS | DIASTOLIC BLOOD PRESSURE: 66 MMHG | HEART RATE: 63 BPM | SYSTOLIC BLOOD PRESSURE: 116 MMHG | OXYGEN SATURATION: 97 % | HEIGHT: 58 IN

## 2025-05-01 DIAGNOSIS — M32.9 SYSTEMIC LUPUS ERYTHEMATOSUS, UNSPECIFIED SLE TYPE, UNSPECIFIED ORGAN INVOLVEMENT STATUS: Primary | ICD-10-CM

## 2025-05-01 DIAGNOSIS — E66.9 OBESITY (BMI 30-39.9): ICD-10-CM

## 2025-05-01 DIAGNOSIS — J45.20 MILD INTERMITTENT CHRONIC ASTHMA WITHOUT COMPLICATION: ICD-10-CM

## 2025-05-01 DIAGNOSIS — Z23 ENCOUNTER FOR ADMINISTRATION OF VACCINE: ICD-10-CM

## 2025-05-01 PROCEDURE — 99214 OFFICE O/P EST MOD 30 MIN: CPT | Mod: S$PBB,,, | Performed by: PHYSICIAN ASSISTANT

## 2025-05-01 PROCEDURE — 99213 OFFICE O/P EST LOW 20 MIN: CPT | Mod: PBBFAC | Performed by: PHYSICIAN ASSISTANT

## 2025-05-01 PROCEDURE — 99999 PR PBB SHADOW E&M-EST. PATIENT-LVL III: CPT | Mod: PBBFAC,,, | Performed by: PHYSICIAN ASSISTANT

## 2025-05-01 NOTE — PROGRESS NOTES
INTERNAL MEDICINE PROGRESS NOTE    CHIEF COMPLAINT     Chief Complaint   Patient presents with    Follow-up       HPI     Radha Leija is a 70 y.o. female who presents for an Follow-up visit today.    Patient presents for 6 month follow-up today.   Her PCP is Dr. Espinal. Patient is new to me.     Patient is 70-year-old female with complex past medical history.  She is feeling well today and has no complaints.  She request an update to her tetanus vaccine today.  Her blood work is up-to-date.    Health maintenance:  Eligible for tetanus vaccine update and COVID vaccine update.  Due for mammogram update.        Past Medical History:  Past Medical History:   Diagnosis Date    Acid reflux     Acoustic neuroma 2012    treated at [facility]    Anemia 2years ago    Anxiety     Arthritis     Asthma, chronic 04/10/2013    Crohn's disease 01/01/1998    Depression     Dry eyes     Fracture of forearm, proximal, open     screws & plate 6/26/13 in [facility]    History of papilledema 11/10/2011    Hyperlipidemia 05/21/2018    Lupus     Neuromuscular disorder 12/01/2009    Neuropathy    Osteoporosis     Stroke 01/29/2013    patient reports no residual symptoms; history of drop foot on right    Thyroid disease        Home Medications:  Prior to Admission medications    Medication Sig Start Date End Date Taking? Authorizing Provider   alpha lipoic acid 300 mg Cap  4/1/22  Yes Provider, Historical   calcium carbonate/vitamin D3 (CALTRATE 600 + D ORAL) Take 2 tablets by mouth once daily. 9/1/22  Yes Provider, Historical   co-enzyme Q-10 30 mg capsule Take 30 mg by mouth 3 (three) times daily.   Yes Provider, Historical   ergocalciferol (ERGOCALCIFEROL) 50,000 unit Cap Take 1 capsule (50,000 Units total) by mouth every 7 days. 10/17/24  Yes William Rios MD   esomeprazole (NEXIUM) 20 MG capsule TAKE 2 CAPSULES BY MOUTH BEFORE BREAKFAST 2/4/25  Yes Merry Espinal MD   hydroxychloroquine (PLAQUENIL) 200 mg tablet  Take 300 mg by mouth once daily.   Yes Provider, Historical   levothyroxine (SYNTHROID) 112 MCG tablet Take 1 tablet (112 mcg total) by mouth once daily. 4/5/25  Yes Merry Espinal MD   magnesium glycinate 100 mg Tab Take by mouth once.   Yes Provider, Historical   magnesium oxide (MAG-OX) 200 MG tablet  3/1/22  Yes Provider, Historical   multivitamin (THERAGRAN) per tablet Take 1 tablet by mouth before breakfast. 4/20/12  Yes Provider, Historical   omega-3 fatty acids/fish oil (FISH OIL-OMEGA-3 FATTY ACIDS) 300-1,000 mg capsule Take by mouth once daily.   Yes Provider, Historical   rosuvastatin (CRESTOR) 20 MG tablet Take 1 tablet (20 mg total) by mouth once daily. 4/7/25  Yes Merry Espinal MD   hydroxychloroquine (PLAQUENIL) 200 mg tablet Take 400mg daily alternating with 200mg daily Brand name only medically necessary 11/8/24   William Rios MD   VENTOLIN HFA 90 mcg/actuation inhaler Inhale 2 puffs into the lungs every 6 (six) hours as needed for Wheezing or Shortness of Breath (cough). Rescue 11/6/24 11/25/24  Merry Espinal MD   aspirin 81 MG Chew Take 1 tablet (81 mg total) by mouth 2 (two) times daily. 11/25/24 5/1/25  Gideon Burgos MD   LIDOcaine 3 % Crea Apply 1 Tube topically as needed. 2/27/25 5/1/25  Tyrel Nina MD   ondansetron (ZOFRAN-ODT) 8 MG TbDL Take 1 tablet (8 mg total) by mouth every 8 (eight) hours as needed (Nausea). 11/25/24 5/1/25  Gideon Burgos MD   oxyCODONE-acetaminophen (PERCOCET) 5-325 mg per tablet Take 1 tablet every 4 hours by mouth as needed or 2 tablets every 6 hours as needed 11/25/24 5/1/25  Gideon Burgos MD   pregabalin (LYRICA) 25 MG capsule Take 1 capsule (25 mg total) by mouth 2 (two) times daily. 2/27/25 5/1/25  Tyrel Nina MD       Review of Systems:  Review of Systems   Constitutional:  Negative for chills and fever.   HENT:  Negative for ear pain, rhinorrhea, sore throat and trouble swallowing.    Eyes:   "Negative for visual disturbance.   Respiratory:  Negative for cough, shortness of breath and wheezing.    Cardiovascular:  Negative for chest pain and leg swelling.   Gastrointestinal:  Negative for abdominal pain, constipation, diarrhea, nausea and vomiting.   Genitourinary:  Negative for dysuria and flank pain.   Musculoskeletal:  Negative for back pain, neck pain and neck stiffness.   Skin:  Negative for rash.   Neurological:  Negative for dizziness, syncope, weakness and headaches.   Psychiatric/Behavioral:  Negative for confusion.        Health Maintainence:   Immunizations:  Health Maintenance         Date Due Completion Date    TETANUS VACCINE 01/23/2024 1/23/2014    COVID-19 Vaccine (8 - 2024-25 season) 12/13/2024 10/18/2024    Mammogram 07/25/2025 7/25/2024    Pneumococcal Vaccines (Age 50+) (4 of 4 - PCV20 or PCV21) 11/18/2025 11/18/2020    Aspirin/Antiplatelet Therapy 11/25/2025 11/25/2024    High Dose Statin 04/07/2026 4/7/2025    DEXA Scan 12/23/2026 12/23/2024    Hemoglobin A1c (Diabetic Prevention Screening) 04/17/2028 4/17/2025    Cervical Cancer Screening 10/29/2029 10/29/2024    Override on 1/21/2011: Done    Lipid Panel 04/17/2030 4/17/2025    Colorectal Cancer Screening 07/24/2034 7/24/2024    Override on 5/12/2020: Done (ej colonoscopy polyp repeaet 3-5 years)    Override on 1/26/2017: Done    Override on 8/11/2008: Done             PHYSICAL EXAM     /66 (BP Location: Left arm, Patient Position: Sitting)   Pulse 63   Ht 4' 10" (1.473 m)   Wt 78.2 kg (172 lb 6.4 oz)   SpO2 97%   BMI 36.03 kg/m²     Physical Exam  Vitals and nursing note reviewed.   Constitutional:       Appearance: Normal appearance.      Comments: Elderly female in no acute distress or apparent pain.  She makes good eye contact, speaks in clear full sentences and is cooperative.   HENT:      Head: Normocephalic and atraumatic.      Nose: Nose normal.      Mouth/Throat:      Pharynx: Oropharynx is clear.   Eyes:      " Conjunctiva/sclera: Conjunctivae normal.   Cardiovascular:      Rate and Rhythm: Normal rate and regular rhythm.      Pulses: Normal pulses.   Pulmonary:      Effort: No respiratory distress.   Abdominal:      Tenderness: There is no abdominal tenderness.   Musculoskeletal:         General: Normal range of motion.      Cervical back: No rigidity.   Skin:     General: Skin is warm and dry.      Capillary Refill: Capillary refill takes less than 2 seconds.      Findings: No rash.   Neurological:      General: No focal deficit present.      Mental Status: She is alert.      Gait: Gait normal.   Psychiatric:         Mood and Affect: Mood normal.         LABS     Lab Results   Component Value Date    HGBA1C 5.5 04/17/2025     CMP  Sodium   Date Value Ref Range Status   04/17/2025 140 136 - 145 mmol/L Final   10/16/2024 137 136 - 145 mmol/L Final     Potassium   Date Value Ref Range Status   04/17/2025 4.8 3.5 - 5.1 mmol/L Final   10/16/2024 4.4 3.5 - 5.1 mmol/L Final     Chloride   Date Value Ref Range Status   04/17/2025 103 95 - 110 mmol/L Final   10/16/2024 104 95 - 110 mmol/L Final     CO2   Date Value Ref Range Status   04/17/2025 25 23 - 29 mmol/L Final   10/16/2024 24 23 - 29 mmol/L Final     Glucose   Date Value Ref Range Status   04/17/2025 83 70 - 110 mg/dL Final   10/16/2024 66 (L) 70 - 110 mg/dL Final     BUN   Date Value Ref Range Status   04/17/2025 20 8 - 23 mg/dL Final     Creatinine   Date Value Ref Range Status   04/17/2025 0.9 0.5 - 1.4 mg/dL Final     Calcium   Date Value Ref Range Status   04/17/2025 9.9 8.7 - 10.5 mg/dL Final   10/16/2024 10.1 8.7 - 10.5 mg/dL Final     Protein Total   Date Value Ref Range Status   04/17/2025 7.6 6.0 - 8.4 gm/dL Final     Total Protein   Date Value Ref Range Status   10/16/2024 8.1 6.0 - 8.4 g/dL Final     Albumin   Date Value Ref Range Status   04/17/2025 3.9 3.5 - 5.2 g/dL Final   10/16/2024 4.4 3.5 - 5.2 g/dL Final     Total Bilirubin   Date Value Ref Range  Status   10/16/2024 0.8 0.1 - 1.0 mg/dL Final     Comment:     For infants and newborns, interpretation of results should be based  on gestational age, weight and in agreement with clinical  observations.    Premature Infant recommended reference ranges:  Up to 24 hours.............<8.0 mg/dL  Up to 48 hours............<12.0 mg/dL  3-5 days..................<15.0 mg/dL  6-29 days.................<15.0 mg/dL       Bilirubin Total   Date Value Ref Range Status   04/17/2025 0.8 0.1 - 1.0 mg/dL Final     Comment:     For infants and newborns, interpretation of results should be based   on gestational age, weight and in agreement with clinical   observations.    Premature Infant recommended reference ranges:   0-24 hours:  <8.0 mg/dL   24-48 hours: <12.0 mg/dL   3-5 days:    <15.0 mg/dL   6-29 days:   <15.0 mg/dL     Alkaline Phosphatase   Date Value Ref Range Status   10/16/2024 106 55 - 135 U/L Final     ALP   Date Value Ref Range Status   04/17/2025 108 40 - 150 unit/L Final     AST   Date Value Ref Range Status   04/17/2025 25 11 - 45 unit/L Final   10/16/2024 31 10 - 40 U/L Final     ALT   Date Value Ref Range Status   04/17/2025 16 10 - 44 unit/L Final   10/16/2024 18 10 - 44 U/L Final     Anion Gap   Date Value Ref Range Status   04/17/2025 12 8 - 16 mmol/L Final     eGFR if    Date Value Ref Range Status   07/14/2022 >60.0 >60 mL/min/1.73 m^2 Final     eGFR if non    Date Value Ref Range Status   07/14/2022 59.3 (A) >60 mL/min/1.73 m^2 Final     Comment:     Calculation used to obtain the estimated glomerular filtration  rate (eGFR) is the CKD-EPI equation.        Lab Results   Component Value Date    WBC 5.74 04/17/2025    HGB 13.2 04/17/2025    HCT 41.2 04/17/2025    MCV 93 04/17/2025     04/17/2025     Lab Results   Component Value Date    CHOL 139 04/17/2025    CHOL 112 (L) 01/23/2023    CHOL 134 10/20/2021     Lab Results   Component Value Date    HDL 66 04/17/2025     HDL 57 01/23/2023    HDL 81 (H) 10/20/2021     Lab Results   Component Value Date    LDLCALC 61.2 (L) 04/17/2025    LDLCALC 46.8 (L) 01/23/2023    LDLCALC 41.2 (L) 10/20/2021     Lab Results   Component Value Date    TRIG 59 04/17/2025    TRIG 41 01/23/2023    TRIG 59 10/20/2021     Lab Results   Component Value Date    CHOLHDL 47.5 04/17/2025    CHOLHDL 50.9 (H) 01/23/2023    CHOLHDL 60.4 (H) 10/20/2021     Lab Results   Component Value Date    TSH 2.897 04/17/2025       ASSESSMENT/PLAN     Radha Leija is a 70 y.o. female     Radha was seen today for routine follow-up.  She has no current complaints.  Will update tetanus vaccine in office today.  Recommending updating COVID in about 2 weeks.  Blood pressure is well-controlled continue current regimen.  Continue to follow with rheumatology and endocrinology as scheduled.  Plan to see Dr. Espinal in 6 months for annual.    Diagnoses and all orders for this visit:    Systemic lupus erythematosus, unspecified SLE type, unspecified organ involvement status    Encounter for administration of vaccine    Mild intermittent chronic asthma without complication    Obesity (BMI 30-39.9)    Other orders  The following orders have not been finalized:  -     DIPH,PERTUSS(ACEL),TET VAC(PF)(ADULT)(ADACEL)(TDaP)    Patient was counseled on when and how to seek emergent care.       Ml Curtis PA-C   Department of Internal Medicine - [facility]   1:37 PM

## 2025-05-05 ENCOUNTER — INFUSION (OUTPATIENT)
Dept: INFECTIOUS DISEASES | Facility: HOSPITAL | Age: 70
End: 2025-05-05
Payer: MEDICARE

## 2025-05-05 ENCOUNTER — TELEPHONE (OUTPATIENT)
Dept: INTERNAL MEDICINE | Facility: CLINIC | Age: 70
End: 2025-05-05
Payer: MEDICARE

## 2025-05-05 VITALS
HEART RATE: 61 BPM | RESPIRATION RATE: 20 BRPM | HEIGHT: 59 IN | DIASTOLIC BLOOD PRESSURE: 59 MMHG | WEIGHT: 172.19 LBS | OXYGEN SATURATION: 95 % | BODY MASS INDEX: 34.71 KG/M2 | TEMPERATURE: 99 F | SYSTOLIC BLOOD PRESSURE: 122 MMHG

## 2025-05-05 DIAGNOSIS — E55.9 VITAMIN D DEFICIENCY: ICD-10-CM

## 2025-05-05 RX ORDER — LEVOTHYROXINE SODIUM 112 UG/1
TABLET ORAL
Qty: 90 TABLET | Refills: 1 | Status: SHIPPED | OUTPATIENT
Start: 2025-05-05

## 2025-05-05 NOTE — TELEPHONE ENCOUNTER
Refill Decision Note   Radha Leija  is requesting a refill authorization.  Brief Assessment and Rationale for Refill:  Approve     Medication Therapy Plan:         Comments:     Note composed:7:08 AM 05/05/2025

## 2025-05-05 NOTE — PROGRESS NOTES
Pt arrived to infusion center for PROLIA injection. DID NOT receive PROLIA today.    Pt states she is scheduled to have a crown with drilling and possible tooth extraction on 05/30/2025. She is leaving town on June 14, 2025 and not returning until October 2025.    Message sent to Dr. Rios's office (pending response) and patient is contacting dentist for clarification of plan of care and recommendations on scheduling Prolia around dental work.     Once patient is cleared - please fit into to schedule.

## 2025-05-05 NOTE — TELEPHONE ENCOUNTER
No care due was identified.  Health [facility] Embedded Care Due Messages. Reference number: [id number].   5/04/2025 8:21:24 PM CDT

## 2025-05-06 ENCOUNTER — PATIENT MESSAGE (OUTPATIENT)
Dept: RHEUMATOLOGY | Facility: CLINIC | Age: 70
End: 2025-05-06
Payer: MEDICARE

## 2025-05-06 DIAGNOSIS — M81.0 OSTEOPOROSIS, UNSPECIFIED OSTEOPOROSIS TYPE, UNSPECIFIED PATHOLOGICAL FRACTURE PRESENCE: Primary | ICD-10-CM

## 2025-05-06 DIAGNOSIS — Z79.620 ENCOUNTER FOR MONITORING DENOSUMAB THERAPY: Primary | ICD-10-CM

## 2025-05-06 DIAGNOSIS — Z51.81 ENCOUNTER FOR MONITORING DENOSUMAB THERAPY: Primary | ICD-10-CM

## 2025-05-06 DIAGNOSIS — K21.9 GASTROESOPHAGEAL REFLUX DISEASE, UNSPECIFIED WHETHER ESOPHAGITIS PRESENT: ICD-10-CM

## 2025-05-06 RX ORDER — HYDROGEN PEROXIDE 3 %
SOLUTION, NON-ORAL MISCELLANEOUS
Qty: 180 CAPSULE | Refills: 3 | Status: SHIPPED | OUTPATIENT
Start: 2025-05-06

## 2025-05-06 NOTE — TELEPHONE ENCOUNTER
----- Message from Leticia sent at 5/6/2025  4:01 PM CDT -----  Contact: [phone]  Requesting an RX refill or new RX.Is this a refill or new RX: RX name and strength (esomeprazole (NEXIUM) 20 MG capsuleIs this a 30 day or 90 day RX: 90Pharmacy name and phone # (copy/paste from chart):  [id number] [address] [address]Rntna: [phone] Fax: [phone]

## 2025-05-06 NOTE — TELEPHONE ENCOUNTER
No care due was identified.  [facility] Embedded Care Due Messages. Reference number: [id number].   5/06/2025 4:14:50 PM CDT

## 2025-05-09 RX ORDER — ERGOCALCIFEROL 1.25 MG/1
50000 CAPSULE ORAL
Qty: 12 CAPSULE | Refills: 0 | Status: SHIPPED | OUTPATIENT
Start: 2025-05-09

## 2025-05-11 ENCOUNTER — PATIENT MESSAGE (OUTPATIENT)
Dept: OPTOMETRY | Facility: CLINIC | Age: 70
End: 2025-05-11
Payer: MEDICARE

## 2025-05-12 ENCOUNTER — OFFICE VISIT (OUTPATIENT)
Dept: DERMATOLOGY | Facility: CLINIC | Age: 70
End: 2025-05-12
Payer: MEDICARE

## 2025-05-12 DIAGNOSIS — D22.9 MULTIPLE BENIGN NEVI: ICD-10-CM

## 2025-05-12 DIAGNOSIS — Z12.83 SKIN CANCER SCREENING: ICD-10-CM

## 2025-05-12 DIAGNOSIS — D23.9 DERMATOFIBROMA: ICD-10-CM

## 2025-05-12 DIAGNOSIS — L81.4 LENTIGINES: Primary | ICD-10-CM

## 2025-05-12 DIAGNOSIS — L82.1 SK (SEBORRHEIC KERATOSIS): ICD-10-CM

## 2025-05-12 PROCEDURE — 99214 OFFICE O/P EST MOD 30 MIN: CPT | Mod: S$PBB,,, | Performed by: DERMATOLOGY

## 2025-05-12 PROCEDURE — 99213 OFFICE O/P EST LOW 20 MIN: CPT | Mod: PBBFAC,PN | Performed by: DERMATOLOGY

## 2025-05-12 PROCEDURE — 99999 PR PBB SHADOW E&M-EST. PATIENT-LVL III: CPT | Mod: PBBFAC,,, | Performed by: DERMATOLOGY

## 2025-05-12 RX ORDER — HYDROQUINONE 40 MG/G
CREAM TOPICAL 2 TIMES DAILY
Qty: 28.35 G | Refills: 2 | Status: SHIPPED | OUTPATIENT
Start: 2025-05-12 | End: 2025-06-11

## 2025-05-12 NOTE — PROGRESS NOTES
Subjective:      Patient ID:  Radha Leija is a 70 y.o. female who presents for   Chief Complaint   Patient presents with    Skin Check     TBSE     Patient here for Total Body Skin Exam    Last seen by dermatologist: 01/23/2023    no - personal history of atypical moles removed  no - personal history of MM   no - family history of MM  yes - childhood blistering sunburns  no - tanning bed use  no - personal history of NMSC    No new concerning moles or lesions.      Has a hx of SLE, occasionally gets flares of butterfly rash - on plaquenil.     No personal history of skin cancer or atypical moles.  Pt is a swimmer.  Has noticed dark spots on R temple area x yrs. Asymptomatic and no prior treatment.    Review of Systems   Constitutional:  Negative for fever and chills.   Respiratory:  Negative for cough and shortness of breath.    Gastrointestinal:  Negative for nausea and vomiting.   Musculoskeletal:  Negative for joint swelling and arthralgias.   Skin:  Negative for daily sunscreen use, activity-related sunscreen use, recent sunburn and wears hat.   All other systems reviewed and are negative.  Hematologic/Lymphatic: Does not bruise/bleed easily.       Objective:   Physical Exam   Constitutional: She appears well-developed and well-nourished. No distress.   Neurological: She is alert and oriented to person, place, and time. She is not disoriented.   Psychiatric: She has a normal mood and affect.   Skin:   Areas Examined (abnormalities noted in diagram):   Scalp / Hair Palpated and Inspected  Head / Face Inspection Performed  Neck Inspection Performed  Chest / Axilla Inspection Performed  Abdomen Inspection Performed  Genitals / Buttocks / Groin Inspection Performed  Back Inspection Performed  RUE Inspected  LUE Inspection Performed  RLE Inspected  LLE Inspection Performed  Nails and Digits Inspection Performed                 Diagram Legend     Erythematous scaling macule/papule c/w actinic keratosis        Vascular papule c/w angioma      Pigmented verrucoid papule/plaque c/w seborrheic keratosis      Yellow umbilicated papule c/w sebaceous hyperplasia      Irregularly shaped tan macule c/w lentigo     1-2 mm smooth white papules consistent with Milia      Movable subcutaneous cyst with punctum c/w epidermal inclusion cyst      Subcutaneous movable cyst c/w pilar cyst      Firm pink to brown papule c/w dermatofibroma      Pedunculated fleshy papule(s) c/w skin tag(s)      Evenly pigmented macule c/w junctional nevus     Mildly variegated pigmented, slightly irregular-bordered macule c/w mildly atypical nevus      Flesh colored to evenly pigmented papule c/w intradermal nevus       Pink pearly papule/plaque c/w basal cell carcinoma      Erythematous hyperkeratotic cursted plaque c/w SCC      Surgical scar with no sign of skin cancer recurrence      Open and closed comedones      Inflammatory papules and pustules      Verrucoid papule consistent consistent with wart     Erythematous eczematous patches and plaques     Dystrophic onycholytic nail with subungual debris c/w onychomycosis     Umbilicated papule    Erythematous-base heme-crusted tan verrucoid plaque consistent with inflamed seborrheic keratosis     Erythematous Silvery Scaling Plaque c/w Psoriasis     See annotation      Assessment / Plan:        Lentigines  -     hydroquinone 4 % Crea; Apply topically 2 (two) times daily. To dark spots ONLY.  Dispense: 28.35 g; Refill: 2  Patient instructed in importance of daily broad spectrum sunscreen use with spf at least 30. Sun avoidance and topical protection/protective clothing discussed.    Multiple benign nevi  Benign-appearing nevi present on exam today. Reassurance provided. Periodically examine moles and return to clinic if any moles change or become symptomatic (bleeding, itching, pain, etc).    SK (seborrheic keratosis)  These are benign inherited growths without a malignant potential. Reassurance given to  patient. No treatment is necessary.   Treatment of benign, asymptomatic lesions may be considered cosmetic.  Warned about risk of hypo- or hyperpigmentation with treatment and risk of recurrence.    Dermatofibroma  Reassurance given to patient. No treatment is necessary.  This is benign scar tissue from previous minor trauma to the skin such as a bug bite.    Skin cancer screening  Total body skin examination performed today including at least 12 points as noted in physical examination. No lesions suspicious for malignancy noted.  Patient instructed in importance of daily broad spectrum sunscreen use with spf at least 30. Sun avoidance and topical protection/protective clothing discussed.    Follow up in about 1 year (around 5/12/2026) for skin check or sooner for any concerns.

## 2025-05-12 NOTE — PATIENT INSTRUCTIONS

## 2025-05-23 ENCOUNTER — OFFICE VISIT (OUTPATIENT)
Dept: OPTOMETRY | Facility: CLINIC | Age: 70
End: 2025-05-23
Payer: MEDICARE

## 2025-05-23 ENCOUNTER — CLINICAL SUPPORT (OUTPATIENT)
Dept: OPHTHALMOLOGY | Facility: CLINIC | Age: 70
End: 2025-05-23
Payer: MEDICARE

## 2025-05-23 ENCOUNTER — RESULTS FOLLOW-UP (OUTPATIENT)
Dept: RHEUMATOLOGY | Facility: CLINIC | Age: 70
End: 2025-05-23

## 2025-05-23 ENCOUNTER — OFFICE VISIT (OUTPATIENT)
Dept: RHEUMATOLOGY | Facility: CLINIC | Age: 70
End: 2025-05-23
Payer: MEDICARE

## 2025-05-23 ENCOUNTER — HOSPITAL ENCOUNTER (OUTPATIENT)
Dept: RADIOLOGY | Facility: HOSPITAL | Age: 70
Discharge: HOME | End: 2025-05-23
Attending: INTERNAL MEDICINE
Payer: MEDICARE

## 2025-05-23 VITALS
DIASTOLIC BLOOD PRESSURE: 83 MMHG | BODY MASS INDEX: 35.2 KG/M2 | SYSTOLIC BLOOD PRESSURE: 155 MMHG | HEIGHT: 59 IN | WEIGHT: 174.63 LBS | HEART RATE: 54 BPM

## 2025-05-23 DIAGNOSIS — Z79.899 LONG-TERM USE OF PLAQUENIL: ICD-10-CM

## 2025-05-23 DIAGNOSIS — Z97.3 WEARS CONTACT LENSES: ICD-10-CM

## 2025-05-23 DIAGNOSIS — E66.812 CLASS 2 OBESITY: ICD-10-CM

## 2025-05-23 DIAGNOSIS — M81.0 OSTEOPOROSIS, UNSPECIFIED OSTEOPOROSIS TYPE, UNSPECIFIED PATHOLOGICAL FRACTURE PRESENCE: Primary | ICD-10-CM

## 2025-05-23 DIAGNOSIS — R05.9 COUGH, UNSPECIFIED TYPE: ICD-10-CM

## 2025-05-23 DIAGNOSIS — K50.90 CROHN'S DISEASE WITHOUT COMPLICATION, UNSPECIFIED GASTROINTESTINAL TRACT LOCATION: ICD-10-CM

## 2025-05-23 DIAGNOSIS — H52.4 MYOPIA WITH PRESBYOPIA OF BOTH EYES: ICD-10-CM

## 2025-05-23 DIAGNOSIS — R06.02 SOB (SHORTNESS OF BREATH): ICD-10-CM

## 2025-05-23 DIAGNOSIS — H52.13 MYOPIA WITH PRESBYOPIA OF BOTH EYES: ICD-10-CM

## 2025-05-23 DIAGNOSIS — M32.9 SYSTEMIC LUPUS ERYTHEMATOSUS, UNSPECIFIED SLE TYPE, UNSPECIFIED ORGAN INVOLVEMENT STATUS: Primary | ICD-10-CM

## 2025-05-23 PROCEDURE — 71046 X-RAY EXAM CHEST 2 VIEWS: CPT | Mod: TC

## 2025-05-23 PROCEDURE — 99214 OFFICE O/P EST MOD 30 MIN: CPT | Mod: S$PBB,,, | Performed by: OPTOMETRIST

## 2025-05-23 PROCEDURE — 71046 X-RAY EXAM CHEST 2 VIEWS: CPT | Mod: 26,,, | Performed by: RADIOLOGY

## 2025-05-23 PROCEDURE — 99213 OFFICE O/P EST LOW 20 MIN: CPT | Mod: PBBFAC,25,27 | Performed by: OPTOMETRIST

## 2025-05-23 PROCEDURE — 99999 PR PBB SHADOW E&M-EST. PATIENT-LVL III: CPT | Mod: PBBFAC,,, | Performed by: OPTOMETRIST

## 2025-05-23 PROCEDURE — 92015 DETERMINE REFRACTIVE STATE: CPT | Mod: ,,, | Performed by: OPTOMETRIST

## 2025-05-23 PROCEDURE — 99214 OFFICE O/P EST MOD 30 MIN: CPT | Mod: PBBFAC,25 | Performed by: INTERNAL MEDICINE

## 2025-05-23 PROCEDURE — 99999 PR PBB SHADOW E&M-EST. PATIENT-LVL IV: CPT | Mod: PBBFAC,,, | Performed by: INTERNAL MEDICINE

## 2025-05-23 RX ORDER — AMOXICILLIN AND CLAVULANATE POTASSIUM 875; 125 MG/1; MG/1
1 TABLET, FILM COATED ORAL EVERY 12 HOURS
Qty: 14 TABLET | Refills: 0 | Status: SHIPPED | OUTPATIENT
Start: 2025-05-23

## 2025-05-23 RX ORDER — NAPROXEN SODIUM 220 MG
TABLET ORAL
Qty: 90 EACH | Refills: 3 | Status: SHIPPED | OUTPATIENT
Start: 2025-05-23

## 2025-05-23 RX ORDER — TERIPARATIDE 250 UG/ML
20 INJECTION, SOLUTION SUBCUTANEOUS DAILY
Qty: 2.4 ML | Refills: 11 | Status: SHIPPED | OUTPATIENT
Start: 2025-05-23 | End: 2026-05-23

## 2025-05-23 ASSESSMENT — SYSTEMIC LUPUS ERYTHEMATOSUS DISEASE ACTIVITY INDEX (SLEDAI): TOTAL_SCORE: 0

## 2025-05-23 NOTE — PROGRESS NOTES
OCT/HVF done ou/ rel/fix/coop. Good ou./ chart checked for latex allergy./ -2.50/od -1.75 + 1.00 x 140/os-smh

## 2025-05-23 NOTE — PATIENT INSTRUCTIONS
Chest x-ray  Augmentin 875mg q 12h x 7 days  Albuterol inhaler   Recheck Covid home test tomorrow or Sunday given recent trip   Osteoporosis labs today   Teriparatide 20 mcg sc daily Risks and benefits discussed. ACR handout reviewed and provided. Rx sent to [facility]  *Covid vaccine booster  Cont hydroxychloroquine 300mg  daily  just had Ophthalmology check with Dr. Marie due now  Diclofenac gel 1%  Denosumab 60mg sc q 6 mo was due 4/4/25 on hold b/o tooth extraction Discussed changing to anabolic agent(romosozumab v teriparatide) given significant decline of FN BMD on   Mediterranean diet , discussed, per handout  RTC  6 months with standing lupus labs

## 2025-05-23 NOTE — PROGRESS NOTES
5/20/2025     7:33 AM   Rapid3 Question Responses and Scores   MDHAQ Score 0.1   Psychologic Score 1.1   Pain Score 3   When you awakened in the morning OVER THE LAST WEEK, did you feel stiff? Yes   If Yes, please indicate the number of hours until you are as limber as you will be for the day 1   Fatigue Score 1   Global Health Score 0.5   RAPID3 Score 1.28     Answers submitted by the patient for this visit:  Rheumatology Questionnaire (Submitted on 5/20/2025)  fever: No  eye redness: Yes  mouth sores: No  headaches: No  shortness of breath: No  chest pain: No  trouble swallowing: No  diarrhea: No  constipation: No  unexpected weight change: No  genital sore: No  dysuria: No  During the last 3 days, have you had a skin rash?: No  Bruises or bleeds easily: Yes  cough: Yes

## 2025-05-23 NOTE — PROGRESS NOTES
Subjective:      Patient ID: Radha Leija is a 70 y.o. female.    Chief Complaint: SLE; osteoporosis    HPI  just back from [facility] now with cough, green sputum, some SOB  Did Covid test yesterday which was negative. Has albuterol inhaler.  No fever, chills  No hair fall  No oral ulcers  No serositis  No painful swollen joints except OA right knee  s/p left JOSE doing well  Review of Systems   Constitutional:  Negative for appetite change, fatigue, fever and unexpected weight change.   HENT:  Negative for mouth sores and trouble swallowing.    Eyes:  Positive for redness. Negative for visual disturbance.   Respiratory:  Positive for cough. Negative for shortness of breath and wheezing.    Cardiovascular:  Negative for chest pain and palpitations.   Gastrointestinal:  Negative for abdominal pain, anal bleeding, blood in stool, constipation, diarrhea, nausea and vomiting.   Genitourinary:  Negative for dysuria, frequency, genital sores and urgency.   Musculoskeletal:  Negative for arthralgias, back pain, gait problem, joint swelling, myalgias, neck pain and neck stiffness.   Skin:  Negative for rash.   Neurological:  Negative for weakness, numbness and headaches.   Hematological:  Negative for adenopathy. Bruises/bleeds easily.   Psychiatric/Behavioral:  Negative for sleep disturbance. The patient is not nervous/anxious.         Objective:   There were no vitals taken for this visit.  Physical Exam   Constitutional: She appears obese.  Non-toxic appearance. No distress. She does not appear ill.   Cardiovascular: Bradycardia present. Exam reveals no gallop and no friction rub.   No murmur heard.  Pulmonary/Chest: No stridor. No respiratory distress. She has no wheezes. She has no rhonchi. She has no rales.   Pulmonary Comments: Generally coarse breath sounds, no definite crackles or wheezes  Abdominal:   obese   Musculoskeletal:      Comments: Jaccoud's arthropathy         11/6/2023 3/25/2024   Tender (HAMLIN-28) 0 / 28   0 / 28    Swollen (HAMLIN-28) 10 / 28  1 / 28    Provider Global 40 / 100 --   Patient Global 90 / 100 50 / 100   ESR 16 mm/hr 23 mm/hr   CRP 1.7 mg/L 2.6 mg/L   HAMLIN-28 (ESR) 4.09 (Moderate disease activity) 3.17 (Low disease activity)   HAMLIN-28 (CRP) 3.46 (Moderate disease activity) 2.4 (Remission)   CDAI Score 23  --      Latest Reference Range & Units 04/17/25 10:36   WBC 3.90 - 12.70 K/uL 5.74   RBC 4.00 - 5.40 M/uL 4.42   Hemoglobin 12.0 - 16.0 gm/dL 13.2   Hematocrit 37.0 - 48.5 % 41.2   MCV 82 - 98 fL 93   MCH 27.0 - 31.0 pg 29.9   MCHC 32.0 - 36.0 g/dL 32.0   RDW 11.5 - 14.5 % 12.6   Platelet Count 150 - 450 K/uL 296   MPV 9.2 - 12.9 fL 11.1   Neut % 38 - 73 % 63.4   Lymph % 18 - 48 % 22.6   Mono % 4 - 15 % 11.3   Eos % <=8 % 1.2   Basophil % <=1.9 % 1.2   Immature Granulocytes 0.0 - 0.5 % 0.3   Gran # (ANC) 1.8 - 7.7 K/uL 3.63   Lymph # 1 - 4.8 K/uL 1.30   Mono # 0.3 - 1 K/uL 0.65   Eos # <=0.5 K/uL 0.07   Baso # <=0.2 K/uL 0.07   Immature Grans (Abs) 0.00 - 0.04 K/uL 0.02   nRBC <=0 /100 WBC 0   Sed Rate <=36 mm/hr 22   Sodium 136 - 145 mmol/L 140   Potassium 3.5 - 5.1 mmol/L 4.8   Chloride 95 - 110 mmol/L 103   CO2 23 - 29 mmol/L 25   Anion Gap 8 - 16 mmol/L 12   BUN 8 - 23 mg/dL 20   Creatinine 0.5 - 1.4 mg/dL 0.9   eGFR >60 mL/min/1.73/m2 >60   Glucose 70 - 110 mg/dL 83   Calcium 8.7 - 10.5 mg/dL 9.9   ALP 40 - 150 unit/L 108   PROTEIN TOTAL 6.0 - 8.4 gm/dL 7.6   Albumin 3.5 - 5.2 g/dL 3.9   BILIRUBIN TOTAL 0.1 - 1.0 mg/dL 0.8   AST 11 - 45 unit/L 25   ALT 10 - 44 unit/L 16   CRP <=8.2 mg/L 2.0   Cholesterol Total 120 - 199 mg/dL 139   HDL 40 - 75 mg/dL 66   HDL/Cholesterol Ratio 20.0 - 50.0 % 47.5   Non-HDL Cholesterol mg/dL 73   Total Cholesterol/HDL Ratio 2.0 - 5.0  2.1   Triglycerides 30 - 150 mg/dL 59   LDL Cholesterol 63.0 - 159.0 mg/dL 61.2 (L)   Hemoglobin A1C External 4.0 - 5.6 % 5.5   Estimated Avg Glucose 68 - 131 mg/dL 111   TSH 0.400 - 4.000 uIU/mL 2.897   Free T4 0.71 - 1.51 ng/dL 1.28   ds  DNA Ab Negative 1:10   -  Negative 1:10  See Comments   C3 Complement 50 - 180 mg/dL 120   C4 Complement 11 - 44 mg/dL 30   Color, UA Straw, Alejandra, Yellow, Light-Orange  Colorless !   Appearance, UA Clear  Clear   Spec Grav UA 1.005 - 1.030  1.005   pH, UA 5.0 - 8.0  7.0   Protein, UA Negative  Negative   Glucose, UA Negative  Negative   Ketones, UA Negative  Negative   Blood, UA Negative  Negative   NITRITE UA Negative  Negative   Bilirubin (UA) Negative  Negative   Urobilinogen, UA <2.0 EU/dL Negative   Leukocyte Esterase, UA Negative  Negative   Urine Creatinine 15.0 - 325.0 mg/dL 22.0   Urine Protein <=15 mg/dL <7   Urine Protein/Creatinine Ratio  See Comments   (L): Data is abnormally low     Latest Reference Range & Units 04/17/25 10:36   TSH 0.400 - 4.000 uIU/mL 2.897      Latest Reference Range & Units 12/06/22 10:24   PTH 9.0 - 77.0 pg/mL 81.3 (H)   (H): Data is abnormally high  !: Data is abnormal   PFTs    FVC  FEV1/FVC        TLC        RV       DLco     2/1/24   99.8       80   11/7/23  92.9      80                80.2       56        71.9    Narrative & Impression  EXAMINATION:  CT CHEST WITHOUT CONTRAST     CLINICAL HISTORY:  SLE with restrictive lung disease on PFTs, r/o ILD;Systemic lupus erythematosus, unspecified     TECHNIQUE:  Images of the chest extending from the supraclavicular regions to the upper abdomen were performed. Images were acquired without intravenous contrast administration. Multiplanar reconstructions were performed and pushed to PACS.     COMPARISON:  None.     FINDINGS:  Chest wall and lower neck: Scattered bilateral lower neck and axillary subcentimeter lymph nodes     Lungs and pleura: Left-sided calcified granulomas.  Few scattered micro nodules measuring less than 3 mm bilaterally.  No reticulations honeycombing or bronchiectasis.     Mediastinum and tammi: No mediastinal or hilar adenopathy.  Left hilar calcified nodes.     Heart and pericardium: Heart size is normal. No  pericardial effusion.     Vessels: Minimal atheromatous disease is seen in the aorta.  The coronary arteries show no atheromatous disease.     Upper abdomen: Small sliding hiatal hernia.  No dilated esophagus.  Splenic calcified granulomas.     Bones: Changes..     Impression:     1. No CT chest findings suspicious for interstitial lung disease  2. Granulomatous disease stigmata.        Electronically signed by: Doc Stuart  Date:                                            11/14/2023    Saw Dr. Perales 1/31/24:     1) Dyspnea on exertion  2) Wheezing  - Inderjit pre & post bronchodilator are not suggestive of asthma. IgE & eos are normal. I do not think this is a case of allergic asthma.  - Okay to continue prn albuterol if it improves your symptoms, but there is no need to add additional inhalers at this time.     3) Obesity with alveolar hypoventilation  - Counseled patient on weight loss & lung function.     4) Calcified granulomas of the lung  - These findings were all present on pet/ct from 2010. Probably represent an old histo infection long resolved. No cause for concern.      Colonoscopy 7/24/24:    Impression:            - Hemorrhoids found on perianal exam.                          - The entire examined colon is normal.                          - The examined portion of the ileum was normal.                          - No specimens collected.   Assessment:   SLE SLEDAI: 0 pending dsDNA  APLS panel negative  Jaccoud's arthropathy, asymptomatic    Osteoporosis DXA 12/3/24 : FN T=-2.6 with significant decline of 3.8% on denosumab FRAX hip 4.5% major 20%. Discussed alendronate which she has taken intermittently in past and tolerated. However she has taken oral bisphosphonates for > 5 years total, so changed to  denosumab. Received denosumab 60mg sc 3/23/23  most recent dose  11/4/24 currently on hold d/t dental work. Best option would be course of anabolics romosozumab x 12 months (but ? 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   H/o  + Cologuard last year  s/p  colonoscopy 7/24/24  S/p Covid 2/15/24 currently only symptoms are arthralgia and fatigue      PLAN:     Chest x-ray  Augmentin 875mg q 12h x 7 days  Albuterol inhaler   Recheck Covid home test tomorrow or Sunday given recent trip   Osteoporosis labs today   Teriparatide 20 mcg sc daily Risks and benefits discussed. ACR handout reviewed and provided. Rx sent to [facility]  *Covid vaccine booster  Cont hydroxychloroquine 300mg  daily  just had Ophthalmology check with Dr. Marie due now  Diclofenac gel 1%  Denosumab 60mg sc q 6 mo was due 4/4/25 on hold b/o tooth extraction Discussed changing to anabolic agent(romosozumab v teriparatide) given significant decline of FN BMD on   Mediterranean diet , discussed, per handout  RTC  6 months with standing lupus labs

## 2025-06-18 ENCOUNTER — PATIENT MESSAGE (OUTPATIENT)
Dept: OPTOMETRY | Facility: CLINIC | Age: 70
End: 2025-06-18
Payer: MEDICARE

## 2025-06-30 ENCOUNTER — PATIENT MESSAGE (OUTPATIENT)
Dept: RHEUMATOLOGY | Facility: CLINIC | Age: 70
End: 2025-06-30
Payer: MEDICARE

## 2025-06-30 DIAGNOSIS — M81.0 OSTEOPOROSIS, UNSPECIFIED OSTEOPOROSIS TYPE, UNSPECIFIED PATHOLOGICAL FRACTURE PRESENCE: ICD-10-CM

## 2025-06-30 RX ORDER — TERIPARATIDE 250 UG/ML
20 INJECTION, SOLUTION SUBCUTANEOUS DAILY
Qty: 2.4 ML | Refills: 11 | Status: SHIPPED | OUTPATIENT
Start: 2025-06-30 | End: 2025-06-30 | Stop reason: SDUPTHER

## 2025-06-30 RX ORDER — TERIPARATIDE 250 UG/ML
20 INJECTION, SOLUTION SUBCUTANEOUS DAILY
Qty: 2.4 ML | Refills: 11 | Status: ACTIVE | OUTPATIENT
Start: 2025-06-30 | End: 2025-07-02 | Stop reason: SDUPTHER

## 2025-07-02 DIAGNOSIS — M81.0 OSTEOPOROSIS, UNSPECIFIED OSTEOPOROSIS TYPE, UNSPECIFIED PATHOLOGICAL FRACTURE PRESENCE: ICD-10-CM

## 2025-07-02 RX ORDER — TERIPARATIDE 250 UG/ML
20 INJECTION, SOLUTION SUBCUTANEOUS DAILY
Qty: 2.4 ML | Refills: 11 | Status: SHIPPED | OUTPATIENT
Start: 2025-07-02 | End: 2026-07-02

## 2025-07-10 ENCOUNTER — PATIENT MESSAGE (OUTPATIENT)
Dept: RHEUMATOLOGY | Facility: CLINIC | Age: 70
End: 2025-07-10
Payer: MEDICARE

## 2025-07-17 RX ORDER — HYDROXYCHLOROQUINE SULFATE 200 MG/1
TABLET ORAL
Qty: 135 TABLET | Refills: 2 | Status: SHIPPED | OUTPATIENT
Start: 2025-07-17

## 2025-07-22 ENCOUNTER — TELEPHONE (OUTPATIENT)
Dept: RHEUMATOLOGY | Facility: CLINIC | Age: 70
End: 2025-07-22
Payer: MEDICARE

## 2025-07-22 DIAGNOSIS — M81.0 OSTEOPOROSIS, UNSPECIFIED OSTEOPOROSIS TYPE, UNSPECIFIED PATHOLOGICAL FRACTURE PRESENCE: Primary | ICD-10-CM

## 2025-07-22 NOTE — TELEPHONE ENCOUNTER
Please schedule RFP this week or next and again before she leaves on her trip in Oct. . Thank you ROBERTO

## 2025-08-01 RX ORDER — HYDROXYCHLOROQUINE SULFATE 200 MG/1
TABLET, FILM COATED ORAL
Qty: 135 TABLET | Refills: 2 | Status: SHIPPED | OUTPATIENT
Start: 2025-08-01

## 2025-08-18 ENCOUNTER — PATIENT MESSAGE (OUTPATIENT)
Dept: RHEUMATOLOGY | Facility: CLINIC | Age: 70
End: 2025-08-18
Payer: MEDICARE

## 2025-08-19 RX ORDER — HYDROXYCHLOROQUINE SULFATE 200 MG/1
TABLET, FILM COATED ORAL
Qty: 135 TABLET | Refills: 2 | Status: SHIPPED | OUTPATIENT
Start: 2025-08-19

## 2025-09-05 RX ORDER — LEVOTHYROXINE SODIUM 112 UG/1
TABLET ORAL
Qty: 90 TABLET | Refills: 2 | Status: SHIPPED | OUTPATIENT
Start: 2025-09-05

## (undated) DEVICE — UNDERGLOVES BIOGEL PI SIZE 8.5

## (undated) DEVICE — KIT TOTAL HIP OPTIVAC

## (undated) DEVICE — UNDERGLOVES BIOGEL PI SZ 7 LF

## (undated) DEVICE — PAD CAST SPECIALIST STRL 6

## (undated) DEVICE — SEALER BIPOLAR TISSUE 6.0

## (undated) DEVICE — UNDERGLOVES BIOGEL PI SIZE 8

## (undated) DEVICE — SOL NACL IRR 3000ML

## (undated) DEVICE — WRAP COBAN NL STRL 4INX5YD

## (undated) DEVICE — SYR 0.9% NACL 10ML STERILE

## (undated) DEVICE — DRAPE STERI INSTRUMENT 1018

## (undated) DEVICE — SUT ETHIBOND EXCEL 2 V37 30

## (undated) DEVICE — ALCOHOL ISOPROPYL BLU 70% 16OZ

## (undated) DEVICE — CONTAINER SPEC OR STRL 4.5OZ

## (undated) DEVICE — APPLICATOR CHLORAPREP ORN 26ML

## (undated) DEVICE — UNDERPAD ULTRASORB 300LB 30X36

## (undated) DEVICE — SUT VICRYL PLUS 2-0 CT1 18

## (undated) DEVICE — HOOD T7 W/ PEEL AWAY LENS

## (undated) DEVICE — SUT VICRYL+ 1 CTX 18IN VIOL

## (undated) DEVICE — SUT QUILL 2T11 36IN

## (undated) DEVICE — CUFF TOURNIQUET DL PRT

## (undated) DEVICE — GOWN SMART IMP BREATHABLE XXLG

## (undated) DEVICE — TRAY CATH 1-LYR URIMTR 16FR

## (undated) DEVICE — SOL NACL .9% 250ML INJ

## (undated) DEVICE — DRESSING XEROFORM NONADH 1X8IN

## (undated) DEVICE — SPONGE LAP 18X18 PREWASHED

## (undated) DEVICE — GLOVE BIOGEL SKINSENSE PI 7.5

## (undated) DEVICE — TIP YANKAUERS BULB NO VENT

## (undated) DEVICE — STAPLER SKIN PROXIMATE WIDE

## (undated) DEVICE — COVER HD BACK TABLE 6FT

## (undated) DEVICE — Device

## (undated) DEVICE — SPONGE COTTON TRAY 4X4IN

## (undated) DEVICE — BLADE SAG DUAL 18MMX1.27MMX90M

## (undated) DEVICE — BANDAGE ESMARK ELASTIC ST 6X9

## (undated) DEVICE — BANDAGE MATRIX HK LOOP 6IN 5YD

## (undated) DEVICE — BLADE RMR PATELLA 38MM W/ PILO

## (undated) DEVICE — GLOVE BIOGEL PI ORTHO PRO 8.5

## (undated) DEVICE — ELECTRODE REM PLYHSV RETURN 9

## (undated) DEVICE — TRAY LUMBAR PUNCT ADT 20GX3.5

## (undated) DEVICE — GLOVE BIOGEL SKINSENSE PI 8.0

## (undated) DEVICE — BLANKET HYPER ADULT 24X60IN

## (undated) DEVICE — PAD COLD THERAPY KNEE WRAP ON

## (undated) DEVICE — GLOVE BIOGEL SKINSENSE PI 7.0

## (undated) DEVICE — NDL 21GA X1 1/2 REG BEVEL

## (undated) DEVICE — SOL IRR SOD CHL .9% POUR

## (undated) DEVICE — TOWEL OR DISP STRL BLUE 4/PK

## (undated) DEVICE — PULSAVAC ZIMMER

## (undated) DEVICE — DRAPE EXTREMITY ORTHOMAX